# Patient Record
Sex: FEMALE | Race: BLACK OR AFRICAN AMERICAN | Employment: OTHER | URBAN - METROPOLITAN AREA
[De-identification: names, ages, dates, MRNs, and addresses within clinical notes are randomized per-mention and may not be internally consistent; named-entity substitution may affect disease eponyms.]

---

## 2022-01-01 ENCOUNTER — APPOINTMENT (OUTPATIENT)
Dept: GENERAL RADIOLOGY | Age: 82
DRG: 870 | End: 2022-01-01
Attending: INTERNAL MEDICINE
Payer: MEDICARE

## 2022-01-01 ENCOUNTER — APPOINTMENT (OUTPATIENT)
Dept: NON INVASIVE DIAGNOSTICS | Age: 82
DRG: 870 | End: 2022-01-01
Attending: SURGERY
Payer: MEDICARE

## 2022-01-01 ENCOUNTER — ANESTHESIA EVENT (OUTPATIENT)
Dept: CARDIOLOGY UNIT | Age: 82
DRG: 870 | End: 2022-01-01
Payer: MEDICARE

## 2022-01-01 ENCOUNTER — APPOINTMENT (OUTPATIENT)
Dept: NON INVASIVE DIAGNOSTICS | Age: 82
DRG: 870 | End: 2022-01-01
Attending: FAMILY MEDICINE
Payer: MEDICARE

## 2022-01-01 ENCOUNTER — APPOINTMENT (OUTPATIENT)
Dept: GENERAL RADIOLOGY | Age: 82
DRG: 177 | End: 2022-01-01
Attending: PHYSICIAN ASSISTANT
Payer: MEDICARE

## 2022-01-01 ENCOUNTER — ANESTHESIA (OUTPATIENT)
Dept: CARDIOLOGY UNIT | Age: 82
DRG: 870 | End: 2022-01-01
Payer: MEDICARE

## 2022-01-01 ENCOUNTER — HOSPITAL ENCOUNTER (INPATIENT)
Age: 82
LOS: 30 days | DRG: 870 | End: 2022-02-20
Attending: EMERGENCY MEDICINE | Admitting: FAMILY MEDICINE
Payer: MEDICARE

## 2022-01-01 ENCOUNTER — APPOINTMENT (OUTPATIENT)
Dept: CT IMAGING | Age: 82
DRG: 870 | End: 2022-01-01
Attending: EMERGENCY MEDICINE
Payer: MEDICARE

## 2022-01-01 ENCOUNTER — APPOINTMENT (OUTPATIENT)
Dept: GENERAL RADIOLOGY | Age: 82
DRG: 870 | End: 2022-01-01
Attending: EMERGENCY MEDICINE
Payer: MEDICARE

## 2022-01-01 ENCOUNTER — APPOINTMENT (OUTPATIENT)
Dept: CT IMAGING | Age: 82
DRG: 177 | End: 2022-01-01
Attending: PHYSICIAN ASSISTANT
Payer: MEDICARE

## 2022-01-01 ENCOUNTER — APPOINTMENT (OUTPATIENT)
Dept: INTERVENTIONAL RADIOLOGY/VASCULAR | Age: 82
DRG: 870 | End: 2022-01-01
Attending: FAMILY MEDICINE
Payer: MEDICARE

## 2022-01-01 ENCOUNTER — APPOINTMENT (OUTPATIENT)
Dept: GENERAL RADIOLOGY | Age: 82
DRG: 870 | End: 2022-01-01
Attending: FAMILY MEDICINE
Payer: MEDICARE

## 2022-01-01 ENCOUNTER — HOSPITAL ENCOUNTER (INPATIENT)
Age: 82
LOS: 4 days | Discharge: HOME HEALTH | DRG: 177 | End: 2022-01-21
Attending: FAMILY MEDICINE | Admitting: FAMILY MEDICINE
Payer: MEDICARE

## 2022-01-01 ENCOUNTER — APPOINTMENT (OUTPATIENT)
Dept: NON INVASIVE DIAGNOSTICS | Age: 82
DRG: 870 | End: 2022-01-01
Attending: INTERNAL MEDICINE
Payer: MEDICARE

## 2022-01-01 VITALS
BODY MASS INDEX: 33.32 KG/M2 | HEART RATE: 100 BPM | SYSTOLIC BLOOD PRESSURE: 143 MMHG | OXYGEN SATURATION: 90 % | WEIGHT: 200 LBS | HEIGHT: 65 IN | DIASTOLIC BLOOD PRESSURE: 95 MMHG | TEMPERATURE: 99 F | RESPIRATION RATE: 20 BRPM

## 2022-01-01 VITALS
RESPIRATION RATE: 17 BRPM | TEMPERATURE: 98 F | BODY MASS INDEX: 36.33 KG/M2 | DIASTOLIC BLOOD PRESSURE: 53 MMHG | HEART RATE: 70 BPM | WEIGHT: 218.03 LBS | SYSTOLIC BLOOD PRESSURE: 95 MMHG | OXYGEN SATURATION: 98 % | HEIGHT: 65 IN

## 2022-01-01 DIAGNOSIS — M62.242: ICD-10-CM

## 2022-01-01 DIAGNOSIS — I73.9 PVD (PERIPHERAL VASCULAR DISEASE) (HCC): ICD-10-CM

## 2022-01-01 DIAGNOSIS — J96.01 ACUTE RESPIRATORY FAILURE WITH HYPOXIA (HCC): Primary | ICD-10-CM

## 2022-01-01 DIAGNOSIS — R09.02 HYPOXIA: ICD-10-CM

## 2022-01-01 DIAGNOSIS — J12.82 PNEUMONIA DUE TO COVID-19 VIRUS: ICD-10-CM

## 2022-01-01 DIAGNOSIS — E87.6 HYPOKALEMIA: ICD-10-CM

## 2022-01-01 DIAGNOSIS — R41.82 ALTERED MENTAL STATUS, UNSPECIFIED ALTERED MENTAL STATUS TYPE: ICD-10-CM

## 2022-01-01 DIAGNOSIS — U07.1 PNEUMONIA DUE TO COVID-19 VIRUS: ICD-10-CM

## 2022-01-01 DIAGNOSIS — U07.1 COVID: Primary | ICD-10-CM

## 2022-01-01 LAB
ABO + RH BLD: NORMAL
ALBUMIN SERPL-MCNC: 1.6 G/DL (ref 3.5–5)
ALBUMIN SERPL-MCNC: 1.7 G/DL (ref 3.5–5)
ALBUMIN SERPL-MCNC: 1.7 G/DL (ref 3.5–5)
ALBUMIN SERPL-MCNC: 1.8 G/DL (ref 3.5–5)
ALBUMIN SERPL-MCNC: 1.9 G/DL (ref 3.5–5)
ALBUMIN SERPL-MCNC: 2 G/DL (ref 3.5–5)
ALBUMIN SERPL-MCNC: 2.1 G/DL (ref 3.5–5)
ALBUMIN SERPL-MCNC: 2.2 G/DL (ref 3.5–5)
ALBUMIN SERPL-MCNC: 2.2 G/DL (ref 3.5–5)
ALBUMIN SERPL-MCNC: 2.4 G/DL (ref 3.5–5)
ALBUMIN SERPL-MCNC: 2.6 G/DL (ref 3.5–5)
ALBUMIN SERPL-MCNC: 2.7 G/DL (ref 3.5–5)
ALBUMIN SERPL-MCNC: 2.8 G/DL (ref 3.5–5)
ALBUMIN SERPL-MCNC: 2.9 G/DL (ref 3.5–5)
ALBUMIN SERPL-MCNC: 2.9 G/DL (ref 3.5–5)
ALBUMIN SERPL-MCNC: 3 G/DL (ref 3.5–5)
ALBUMIN SERPL-MCNC: 3.1 G/DL (ref 3.5–5)
ALBUMIN/GLOB SERPL: 0.4 {RATIO} (ref 1.1–2.2)
ALBUMIN/GLOB SERPL: 0.5 {RATIO} (ref 1.1–2.2)
ALBUMIN/GLOB SERPL: 0.6 {RATIO} (ref 1.1–2.2)
ALBUMIN/GLOB SERPL: 0.6 {RATIO} (ref 1.1–2.2)
ALBUMIN/GLOB SERPL: 0.7 {RATIO} (ref 1.1–2.2)
ALBUMIN/GLOB SERPL: 0.7 {RATIO} (ref 1.1–2.2)
ALBUMIN/GLOB SERPL: 0.8 {RATIO} (ref 1.1–2.2)
ALBUMIN/GLOB SERPL: 0.9 {RATIO} (ref 1.1–2.2)
ALBUMIN/GLOB SERPL: 1 {RATIO} (ref 1.1–2.2)
ALBUMIN/GLOB SERPL: 1.2 {RATIO} (ref 1.1–2.2)
ALBUMIN/GLOB SERPL: 1.3 {RATIO} (ref 1.1–2.2)
ALP SERPL-CCNC: 100 U/L (ref 45–117)
ALP SERPL-CCNC: 135 U/L (ref 45–117)
ALP SERPL-CCNC: 171 U/L (ref 45–117)
ALP SERPL-CCNC: 38 U/L (ref 45–117)
ALP SERPL-CCNC: 38 U/L (ref 45–117)
ALP SERPL-CCNC: 39 U/L (ref 45–117)
ALP SERPL-CCNC: 46 U/L (ref 45–117)
ALP SERPL-CCNC: 47 U/L (ref 45–117)
ALP SERPL-CCNC: 48 U/L (ref 45–117)
ALP SERPL-CCNC: 57 U/L (ref 45–117)
ALP SERPL-CCNC: 58 U/L (ref 45–117)
ALP SERPL-CCNC: 59 U/L (ref 45–117)
ALP SERPL-CCNC: 61 U/L (ref 45–117)
ALP SERPL-CCNC: 61 U/L (ref 45–117)
ALP SERPL-CCNC: 62 U/L (ref 45–117)
ALP SERPL-CCNC: 62 U/L (ref 45–117)
ALP SERPL-CCNC: 64 U/L (ref 45–117)
ALP SERPL-CCNC: 70 U/L (ref 45–117)
ALP SERPL-CCNC: 70 U/L (ref 45–117)
ALP SERPL-CCNC: 71 U/L (ref 45–117)
ALP SERPL-CCNC: 72 U/L (ref 45–117)
ALP SERPL-CCNC: 72 U/L (ref 45–117)
ALP SERPL-CCNC: 77 U/L (ref 45–117)
ALP SERPL-CCNC: 78 U/L (ref 45–117)
ALP SERPL-CCNC: 78 U/L (ref 45–117)
ALP SERPL-CCNC: 82 U/L (ref 45–117)
ALP SERPL-CCNC: 82 U/L (ref 45–117)
ALP SERPL-CCNC: 84 U/L (ref 45–117)
ALP SERPL-CCNC: 88 U/L (ref 45–117)
ALP SERPL-CCNC: 94 U/L (ref 45–117)
ALT SERPL-CCNC: 106 U/L (ref 12–78)
ALT SERPL-CCNC: 106 U/L (ref 12–78)
ALT SERPL-CCNC: 107 U/L (ref 12–78)
ALT SERPL-CCNC: 111 U/L (ref 12–78)
ALT SERPL-CCNC: 124 U/L (ref 12–78)
ALT SERPL-CCNC: 142 U/L (ref 12–78)
ALT SERPL-CCNC: 154 U/L (ref 12–78)
ALT SERPL-CCNC: 223 U/L (ref 12–78)
ALT SERPL-CCNC: 307 U/L (ref 12–78)
ALT SERPL-CCNC: 317 U/L (ref 12–78)
ALT SERPL-CCNC: 403 U/L (ref 12–78)
ALT SERPL-CCNC: 50 U/L (ref 12–78)
ALT SERPL-CCNC: 51 U/L (ref 12–78)
ALT SERPL-CCNC: 54 U/L (ref 12–78)
ALT SERPL-CCNC: 55 U/L (ref 12–78)
ALT SERPL-CCNC: 56 U/L (ref 12–78)
ALT SERPL-CCNC: 61 U/L (ref 12–78)
ALT SERPL-CCNC: 61 U/L (ref 12–78)
ALT SERPL-CCNC: 62 U/L (ref 12–78)
ALT SERPL-CCNC: 63 U/L (ref 12–78)
ALT SERPL-CCNC: 65 U/L (ref 12–78)
ALT SERPL-CCNC: 67 U/L (ref 12–78)
ALT SERPL-CCNC: 68 U/L (ref 12–78)
ALT SERPL-CCNC: 76 U/L (ref 12–78)
ALT SERPL-CCNC: 77 U/L (ref 12–78)
ALT SERPL-CCNC: 83 U/L (ref 12–78)
ALT SERPL-CCNC: 85 U/L (ref 12–78)
ALT SERPL-CCNC: 88 U/L (ref 12–78)
ALT SERPL-CCNC: 90 U/L (ref 12–78)
ALT SERPL-CCNC: 91 U/L (ref 12–78)
ANION GAP SERPL CALC-SCNC: 1 MMOL/L (ref 5–15)
ANION GAP SERPL CALC-SCNC: 2 MMOL/L (ref 5–15)
ANION GAP SERPL CALC-SCNC: 3 MMOL/L (ref 5–15)
ANION GAP SERPL CALC-SCNC: 3 MMOL/L (ref 5–15)
ANION GAP SERPL CALC-SCNC: 4 MMOL/L (ref 5–15)
ANION GAP SERPL CALC-SCNC: 5 MMOL/L (ref 5–15)
ANION GAP SERPL CALC-SCNC: 6 MMOL/L (ref 5–15)
ANION GAP SERPL CALC-SCNC: 6 MMOL/L (ref 5–15)
ANION GAP SERPL CALC-SCNC: 7 MMOL/L (ref 5–15)
ANION GAP SERPL CALC-SCNC: 8 MMOL/L (ref 5–15)
ANION GAP SERPL CALC-SCNC: 9 MMOL/L (ref 5–15)
ANION GAP SERPL CALC-SCNC: ABNORMAL MMOL/L (ref 5–15)
APPEARANCE UR: ABNORMAL
APPEARANCE UR: ABNORMAL
APTT PPP: 104.9 SEC (ref 21.2–34.1)
APTT PPP: 121.9 SEC (ref 21.2–34.1)
APTT PPP: 126 SEC (ref 21.2–34.1)
APTT PPP: 25.2 SEC (ref 21.2–34.1)
APTT PPP: 26.2 SEC (ref 21.2–34.1)
APTT PPP: 26.3 SEC (ref 21.2–34.1)
APTT PPP: 30.3 SEC (ref 21.2–34.1)
APTT PPP: 41.2 SEC (ref 21.2–34.1)
APTT PPP: 63.1 SEC (ref 21.2–34.1)
APTT PPP: 72.3 SEC (ref 21.2–34.1)
APTT PPP: 77.1 SEC (ref 21.2–34.1)
APTT PPP: 87 SEC (ref 21.2–34.1)
APTT PPP: 91.3 SEC (ref 21.2–34.1)
APTT PPP: 94.7 SEC (ref 21.2–34.1)
APTT PPP: 98.1 SEC (ref 21.2–34.1)
APTT PPP: 98.3 SEC (ref 21.2–34.1)
APTT PPP: >153 SEC (ref 21.2–34.1)
APTT PPP: >153 SEC (ref 21.2–34.1)
ARTERIAL PATENCY WRIST A: ABNORMAL
ARTERIAL PATENCY WRIST A: POSITIVE
ARTERIAL PATENCY WRIST A: POSITIVE
AST SERPL W P-5'-P-CCNC: 100 U/L (ref 15–37)
AST SERPL W P-5'-P-CCNC: 107 U/L (ref 15–37)
AST SERPL W P-5'-P-CCNC: 123 U/L (ref 15–37)
AST SERPL W P-5'-P-CCNC: 126 U/L (ref 15–37)
AST SERPL W P-5'-P-CCNC: 133 U/L (ref 15–37)
AST SERPL W P-5'-P-CCNC: 138 U/L (ref 15–37)
AST SERPL W P-5'-P-CCNC: 181 U/L (ref 15–37)
AST SERPL W P-5'-P-CCNC: 189 U/L (ref 15–37)
AST SERPL W P-5'-P-CCNC: 227 U/L (ref 15–37)
AST SERPL W P-5'-P-CCNC: 258 U/L (ref 15–37)
AST SERPL W P-5'-P-CCNC: 305 U/L (ref 15–37)
AST SERPL W P-5'-P-CCNC: 323 U/L (ref 15–37)
AST SERPL W P-5'-P-CCNC: 38 U/L (ref 15–37)
AST SERPL W P-5'-P-CCNC: 42 U/L (ref 15–37)
AST SERPL W P-5'-P-CCNC: 45 U/L (ref 15–37)
AST SERPL W P-5'-P-CCNC: 46 U/L (ref 15–37)
AST SERPL W P-5'-P-CCNC: 47 U/L (ref 15–37)
AST SERPL W P-5'-P-CCNC: 48 U/L (ref 15–37)
AST SERPL W P-5'-P-CCNC: 49 U/L (ref 15–37)
AST SERPL W P-5'-P-CCNC: 49 U/L (ref 15–37)
AST SERPL W P-5'-P-CCNC: 51 U/L (ref 15–37)
AST SERPL W P-5'-P-CCNC: 54 U/L (ref 15–37)
AST SERPL W P-5'-P-CCNC: 55 U/L (ref 15–37)
AST SERPL W P-5'-P-CCNC: 60 U/L (ref 15–37)
AST SERPL W P-5'-P-CCNC: 62 U/L (ref 15–37)
AST SERPL W P-5'-P-CCNC: 63 U/L (ref 15–37)
AST SERPL W P-5'-P-CCNC: 64 U/L (ref 15–37)
AST SERPL W P-5'-P-CCNC: 73 U/L (ref 15–37)
AST SERPL W P-5'-P-CCNC: 78 U/L (ref 15–37)
AST SERPL W P-5'-P-CCNC: 96 U/L (ref 15–37)
ATRIAL RATE: 124 BPM
ATRIAL RATE: 166 BPM
ATRIAL RATE: 58 BPM
ATRIAL RATE: 67 BPM
ATRIAL RATE: 94 BPM
BACTERIA SPEC CULT: ABNORMAL
BACTERIA SPEC CULT: ABNORMAL
BACTERIA SPEC CULT: NORMAL
BACTERIA SPEC CULT: NORMAL
BACTERIA URNS QL MICRO: NEGATIVE /HPF
BACTERIA URNS QL MICRO: NEGATIVE /HPF
BASE DEFICIT BLDA-SCNC: 2.6 MMOL/L (ref 0–2)
BASE DEFICIT BLDA-SCNC: 3.1 MMOL/L (ref 0–2)
BASE EXCESS BLDA CALC-SCNC: 0.1 MMOL/L (ref 0–2)
BASE EXCESS BLDA CALC-SCNC: 0.5 MMOL/L (ref 0–2)
BASE EXCESS BLDA CALC-SCNC: 0.9 MMOL/L (ref 0–2)
BASE EXCESS BLDA CALC-SCNC: 1.1 MMOL/L (ref 0–2)
BASE EXCESS BLDA CALC-SCNC: 1.2 MMOL/L (ref 0–2)
BASE EXCESS BLDA CALC-SCNC: 10.2 MMOL/L (ref 0–2)
BASE EXCESS BLDA CALC-SCNC: 11.6 MMOL/L (ref 0–2)
BASE EXCESS BLDA CALC-SCNC: 14.6 MMOL/L (ref 0–2)
BASE EXCESS BLDA CALC-SCNC: 3 MMOL/L (ref 0–2)
BASE EXCESS BLDA CALC-SCNC: 3.8 MMOL/L (ref 0–2)
BASE EXCESS BLDA CALC-SCNC: 3.8 MMOL/L (ref 0–2)
BASE EXCESS BLDA CALC-SCNC: 4.3 MMOL/L (ref 0–2)
BASE EXCESS BLDA CALC-SCNC: 4.4 MMOL/L (ref 0–2)
BASE EXCESS BLDA CALC-SCNC: 4.5 MMOL/L (ref 0–2)
BASE EXCESS BLDA CALC-SCNC: 4.7 MMOL/L (ref 0–2)
BASE EXCESS BLDA CALC-SCNC: 4.9 MMOL/L (ref 0–2)
BASE EXCESS BLDA CALC-SCNC: 5.4 MMOL/L (ref 0–2)
BASE EXCESS BLDA CALC-SCNC: 5.7 MMOL/L (ref 0–2)
BASE EXCESS BLDA CALC-SCNC: 5.9 MMOL/L (ref 0–2)
BASE EXCESS BLDA CALC-SCNC: 6.3 MMOL/L (ref 0–2)
BASE EXCESS BLDA CALC-SCNC: 6.4 MMOL/L (ref 0–2)
BASE EXCESS BLDA CALC-SCNC: 7 MMOL/L (ref 0–2)
BASE EXCESS BLDA CALC-SCNC: 7.1 MMOL/L (ref 0–2)
BASE EXCESS BLDA CALC-SCNC: 7.4 MMOL/L (ref 0–2)
BASE EXCESS BLDA CALC-SCNC: 7.7 MMOL/L (ref 0–2)
BASE EXCESS BLDA CALC-SCNC: 7.7 MMOL/L (ref 0–2)
BASOPHILS # BLD: 0 K/UL (ref 0–0.1)
BASOPHILS NFR BLD: 0 % (ref 0–1)
BDY SITE: ABNORMAL
BILIRUB SERPL-MCNC: 0.3 MG/DL (ref 0.2–1)
BILIRUB SERPL-MCNC: 0.4 MG/DL (ref 0.2–1)
BILIRUB SERPL-MCNC: 0.5 MG/DL (ref 0.2–1)
BILIRUB SERPL-MCNC: 0.6 MG/DL (ref 0.2–1)
BILIRUB SERPL-MCNC: 0.7 MG/DL (ref 0.2–1)
BILIRUB SERPL-MCNC: 0.7 MG/DL (ref 0.2–1)
BILIRUB UR QL: NEGATIVE
BILIRUB UR QL: NEGATIVE
BLD PROD TYP BPU: NORMAL
BLOOD GROUP ANTIBODIES SERPL: NEGATIVE
BNP SERPL-MCNC: 623 PG/ML
BPU ID: NORMAL
BUN SERPL-MCNC: 103 MG/DL (ref 6–20)
BUN SERPL-MCNC: 11 MG/DL (ref 6–20)
BUN SERPL-MCNC: 11 MG/DL (ref 6–20)
BUN SERPL-MCNC: 118 MG/DL (ref 6–20)
BUN SERPL-MCNC: 145 MG/DL (ref 6–20)
BUN SERPL-MCNC: 19 MG/DL (ref 6–20)
BUN SERPL-MCNC: 20 MG/DL (ref 6–20)
BUN SERPL-MCNC: 24 MG/DL (ref 6–20)
BUN SERPL-MCNC: 24 MG/DL (ref 6–20)
BUN SERPL-MCNC: 25 MG/DL (ref 6–20)
BUN SERPL-MCNC: 27 MG/DL (ref 6–20)
BUN SERPL-MCNC: 29 MG/DL (ref 6–20)
BUN SERPL-MCNC: 29 MG/DL (ref 6–20)
BUN SERPL-MCNC: 31 MG/DL (ref 6–20)
BUN SERPL-MCNC: 34 MG/DL (ref 6–20)
BUN SERPL-MCNC: 35 MG/DL (ref 6–20)
BUN SERPL-MCNC: 39 MG/DL (ref 6–20)
BUN SERPL-MCNC: 41 MG/DL (ref 6–20)
BUN SERPL-MCNC: 42 MG/DL (ref 6–20)
BUN SERPL-MCNC: 44 MG/DL (ref 6–20)
BUN SERPL-MCNC: 47 MG/DL (ref 6–20)
BUN SERPL-MCNC: 50 MG/DL (ref 6–20)
BUN SERPL-MCNC: 51 MG/DL (ref 6–20)
BUN SERPL-MCNC: 56 MG/DL (ref 6–20)
BUN SERPL-MCNC: 56 MG/DL (ref 6–20)
BUN SERPL-MCNC: 59 MG/DL (ref 6–20)
BUN SERPL-MCNC: 61 MG/DL (ref 6–20)
BUN SERPL-MCNC: 64 MG/DL (ref 6–20)
BUN SERPL-MCNC: 69 MG/DL (ref 6–20)
BUN SERPL-MCNC: 71 MG/DL (ref 6–20)
BUN SERPL-MCNC: 89 MG/DL (ref 6–20)
BUN/CREAT SERPL: 106 (ref 12–20)
BUN/CREAT SERPL: 107 (ref 12–20)
BUN/CREAT SERPL: 111 (ref 12–20)
BUN/CREAT SERPL: 112 (ref 12–20)
BUN/CREAT SERPL: 123 (ref 12–20)
BUN/CREAT SERPL: 124 (ref 12–20)
BUN/CREAT SERPL: 127 (ref 12–20)
BUN/CREAT SERPL: 133 (ref 12–20)
BUN/CREAT SERPL: 138 (ref 12–20)
BUN/CREAT SERPL: 138 (ref 12–20)
BUN/CREAT SERPL: 14 (ref 12–20)
BUN/CREAT SERPL: 161 (ref 12–20)
BUN/CREAT SERPL: 21 (ref 12–20)
BUN/CREAT SERPL: 25 (ref 12–20)
BUN/CREAT SERPL: 33 (ref 12–20)
BUN/CREAT SERPL: 43 (ref 12–20)
BUN/CREAT SERPL: 53 (ref 12–20)
BUN/CREAT SERPL: 57 (ref 12–20)
BUN/CREAT SERPL: 58 (ref 12–20)
BUN/CREAT SERPL: 61 (ref 12–20)
BUN/CREAT SERPL: 63 (ref 12–20)
BUN/CREAT SERPL: 65 (ref 12–20)
BUN/CREAT SERPL: 66 (ref 12–20)
BUN/CREAT SERPL: 67 (ref 12–20)
BUN/CREAT SERPL: 71 (ref 12–20)
BUN/CREAT SERPL: 74 (ref 12–20)
BUN/CREAT SERPL: 75 (ref 12–20)
BUN/CREAT SERPL: 77 (ref 12–20)
BUN/CREAT SERPL: 77 (ref 12–20)
BUN/CREAT SERPL: 85 (ref 12–20)
BUN/CREAT SERPL: 97 (ref 12–20)
BUN/CREAT SERPL: 98 (ref 12–20)
BUN/CREAT SERPL: 99 (ref 12–20)
CA-I BLD-MCNC: 8.6 MG/DL (ref 8.5–10.1)
CA-I BLD-MCNC: 8.7 MG/DL (ref 8.5–10.1)
CA-I BLD-MCNC: 8.8 MG/DL (ref 8.5–10.1)
CA-I BLD-MCNC: 8.8 MG/DL (ref 8.5–10.1)
CA-I BLD-MCNC: 8.9 MG/DL (ref 8.5–10.1)
CA-I BLD-MCNC: 8.9 MG/DL (ref 8.5–10.1)
CA-I BLD-MCNC: 9 MG/DL (ref 8.5–10.1)
CA-I BLD-MCNC: 9.1 MG/DL (ref 8.5–10.1)
CA-I BLD-MCNC: 9.2 MG/DL (ref 8.5–10.1)
CA-I BLD-MCNC: 9.3 MG/DL (ref 8.5–10.1)
CA-I BLD-MCNC: 9.4 MG/DL (ref 8.5–10.1)
CA-I BLD-MCNC: 9.5 MG/DL (ref 8.5–10.1)
CA-I BLD-MCNC: 9.6 MG/DL (ref 8.5–10.1)
CA-I BLD-MCNC: 9.7 MG/DL (ref 8.5–10.1)
CA-I BLD-MCNC: 9.8 MG/DL (ref 8.5–10.1)
CA-I BLD-MCNC: 9.8 MG/DL (ref 8.5–10.1)
CALCULATED P AXIS, ECG09: 33 DEGREES
CALCULATED P AXIS, ECG09: 33 DEGREES
CALCULATED P AXIS, ECG09: 70 DEGREES
CALCULATED R AXIS, ECG10: -24 DEGREES
CALCULATED R AXIS, ECG10: -29 DEGREES
CALCULATED R AXIS, ECG10: -32 DEGREES
CALCULATED R AXIS, ECG10: -40 DEGREES
CALCULATED R AXIS, ECG10: -45 DEGREES
CALCULATED T AXIS, ECG11: -18 DEGREES
CALCULATED T AXIS, ECG11: 109 DEGREES
CALCULATED T AXIS, ECG11: 83 DEGREES
CALCULATED T AXIS, ECG11: 83 DEGREES
CALCULATED T AXIS, ECG11: 87 DEGREES
CHLORIDE SERPL-SCNC: 100 MMOL/L (ref 97–108)
CHLORIDE SERPL-SCNC: 101 MMOL/L (ref 97–108)
CHLORIDE SERPL-SCNC: 101 MMOL/L (ref 97–108)
CHLORIDE SERPL-SCNC: 102 MMOL/L (ref 97–108)
CHLORIDE SERPL-SCNC: 102 MMOL/L (ref 97–108)
CHLORIDE SERPL-SCNC: 103 MMOL/L (ref 97–108)
CHLORIDE SERPL-SCNC: 104 MMOL/L (ref 97–108)
CHLORIDE SERPL-SCNC: 104 MMOL/L (ref 97–108)
CHLORIDE SERPL-SCNC: 105 MMOL/L (ref 97–108)
CHLORIDE SERPL-SCNC: 109 MMOL/L (ref 97–108)
CHLORIDE SERPL-SCNC: 111 MMOL/L (ref 97–108)
CHLORIDE SERPL-SCNC: 112 MMOL/L (ref 97–108)
CHLORIDE SERPL-SCNC: 113 MMOL/L (ref 97–108)
CHLORIDE SERPL-SCNC: 113 MMOL/L (ref 97–108)
CHLORIDE SERPL-SCNC: 114 MMOL/L (ref 97–108)
CHLORIDE SERPL-SCNC: 116 MMOL/L (ref 97–108)
CHLORIDE SERPL-SCNC: 119 MMOL/L (ref 97–108)
CHLORIDE SERPL-SCNC: 92 MMOL/L (ref 97–108)
CHLORIDE SERPL-SCNC: 93 MMOL/L (ref 97–108)
CHLORIDE SERPL-SCNC: 94 MMOL/L (ref 97–108)
CHLORIDE SERPL-SCNC: 95 MMOL/L (ref 97–108)
CHLORIDE SERPL-SCNC: 95 MMOL/L (ref 97–108)
CHLORIDE UR-SCNC: 35 MMOL/L
CO2 SERPL-SCNC: 24 MMOL/L (ref 21–32)
CO2 SERPL-SCNC: 25 MMOL/L (ref 21–32)
CO2 SERPL-SCNC: 26 MMOL/L (ref 21–32)
CO2 SERPL-SCNC: 27 MMOL/L (ref 21–32)
CO2 SERPL-SCNC: 28 MMOL/L (ref 21–32)
CO2 SERPL-SCNC: 29 MMOL/L (ref 21–32)
CO2 SERPL-SCNC: 29 MMOL/L (ref 21–32)
CO2 SERPL-SCNC: 30 MMOL/L (ref 21–32)
CO2 SERPL-SCNC: 31 MMOL/L (ref 21–32)
CO2 SERPL-SCNC: 32 MMOL/L (ref 21–32)
CO2 SERPL-SCNC: 34 MMOL/L (ref 21–32)
CO2 SERPL-SCNC: 36 MMOL/L (ref 21–32)
CO2 SERPL-SCNC: 39 MMOL/L (ref 21–32)
COLOR UR: ABNORMAL
COLOR UR: ABNORMAL
COVID-19 RAPID TEST, COVR: DETECTED
COVID-19 RAPID TEST, COVR: DETECTED
CREAT SERPL-MCNC: 0.26 MG/DL (ref 0.55–1.02)
CREAT SERPL-MCNC: 0.31 MG/DL (ref 0.55–1.02)
CREAT SERPL-MCNC: 0.32 MG/DL (ref 0.55–1.02)
CREAT SERPL-MCNC: 0.34 MG/DL (ref 0.55–1.02)
CREAT SERPL-MCNC: 0.35 MG/DL (ref 0.55–1.02)
CREAT SERPL-MCNC: 0.36 MG/DL (ref 0.55–1.02)
CREAT SERPL-MCNC: 0.36 MG/DL (ref 0.55–1.02)
CREAT SERPL-MCNC: 0.38 MG/DL (ref 0.55–1.02)
CREAT SERPL-MCNC: 0.38 MG/DL (ref 0.55–1.02)
CREAT SERPL-MCNC: 0.45 MG/DL (ref 0.55–1.02)
CREAT SERPL-MCNC: 0.48 MG/DL (ref 0.55–1.02)
CREAT SERPL-MCNC: 0.48 MG/DL (ref 0.55–1.02)
CREAT SERPL-MCNC: 0.51 MG/DL (ref 0.55–1.02)
CREAT SERPL-MCNC: 0.52 MG/DL (ref 0.55–1.02)
CREAT SERPL-MCNC: 0.53 MG/DL (ref 0.55–1.02)
CREAT SERPL-MCNC: 0.56 MG/DL (ref 0.55–1.02)
CREAT SERPL-MCNC: 0.57 MG/DL (ref 0.55–1.02)
CREAT SERPL-MCNC: 0.6 MG/DL (ref 0.55–1.02)
CREAT SERPL-MCNC: 0.63 MG/DL (ref 0.55–1.02)
CREAT SERPL-MCNC: 0.64 MG/DL (ref 0.55–1.02)
CREAT SERPL-MCNC: 0.66 MG/DL (ref 0.55–1.02)
CREAT SERPL-MCNC: 0.7 MG/DL (ref 0.55–1.02)
CREAT SERPL-MCNC: 0.71 MG/DL (ref 0.55–1.02)
CREAT SERPL-MCNC: 0.79 MG/DL (ref 0.55–1.02)
CREAT SERPL-MCNC: 0.8 MG/DL (ref 0.55–1.02)
CREAT SERPL-MCNC: 0.8 MG/DL (ref 0.55–1.02)
CREAT SERPL-MCNC: 0.83 MG/DL (ref 0.55–1.02)
CREAT SERPL-MCNC: 0.83 MG/DL (ref 0.55–1.02)
CREAT SERPL-MCNC: 0.84 MG/DL (ref 0.55–1.02)
CREAT SERPL-MCNC: 0.89 MG/DL (ref 0.55–1.02)
CREAT SERPL-MCNC: 0.97 MG/DL (ref 0.55–1.02)
CREAT SERPL-MCNC: 1.14 MG/DL (ref 0.55–1.02)
CREAT SERPL-MCNC: 1.14 MG/DL (ref 0.55–1.02)
CREAT UR-MCNC: 170 MG/DL
CROSSMATCH RESULT,%XM: NORMAL
CRP SERPL-MCNC: 3.11 MG/DL (ref 0–0.6)
DIAGNOSIS, 93000: NORMAL
DIFFERENTIAL METHOD BLD: ABNORMAL
ECHO AO ASC DIAM: 2.9 CM
ECHO AO ASCENDING AORTA INDEX: 1.38 CM/M2
ECHO AO DESC DIAM: 1.8 CM
ECHO AO DESCENDING AORTA INDEX: 0.86 CM/M2
ECHO AO ROOT DIAM: 2.9 CM
ECHO AO ROOT INDEX: 1.38 CM/M2
ECHO AV AREA PEAK VELOCITY: 1.3 CM2
ECHO AV AREA VTI: 1.5 CM2
ECHO AV AREA/BSA PEAK VELOCITY: 0.6 CM2/M2
ECHO AV AREA/BSA VTI: 0.7 CM2/M2
ECHO AV MEAN GRADIENT: 7 MMHG
ECHO AV MEAN VELOCITY: 1.2 M/S
ECHO AV PEAK GRADIENT: 15 MMHG
ECHO AV PEAK VELOCITY: 1.9 M/S
ECHO AV VELOCITY RATIO: 0.53
ECHO AV VTI: 29.1 CM
ECHO EST RA PRESSURE: 3 MMHG
ECHO LA AREA 4C: 17 CM2
ECHO LA DIAMETER INDEX: 1.71 CM/M2
ECHO LA DIAMETER: 3.6 CM
ECHO LA MAJOR AXIS: 6.2 CM
ECHO LA TO AORTIC ROOT RATIO: 1.24
ECHO LV E' LATERAL VELOCITY: 7 CM/S
ECHO LV E' SEPTAL VELOCITY: 4 CM/S
ECHO LV EJECTION FRACTION BIPLANE: 69 % (ref 55–100)
ECHO LV FRACTIONAL SHORTENING: 38 % (ref 28–44)
ECHO LV INTERNAL DIMENSION DIASTOLE INDEX: 1.76 CM/M2
ECHO LV INTERNAL DIMENSION DIASTOLIC: 3.7 CM (ref 3.9–5.3)
ECHO LV INTERNAL DIMENSION SYSTOLIC INDEX: 1.1 CM/M2
ECHO LV INTERNAL DIMENSION SYSTOLIC: 2.3 CM
ECHO LV IVSD: 1.6 CM (ref 0.6–0.9)
ECHO LV MASS 2D: 197.7 G (ref 67–162)
ECHO LV MASS INDEX 2D: 94.1 G/M2 (ref 43–95)
ECHO LV POSTERIOR WALL DIASTOLIC: 1.3 CM (ref 0.6–0.9)
ECHO LV RELATIVE WALL THICKNESS RATIO: 0.7
ECHO LVOT AREA: 2.5 CM2
ECHO LVOT AV VTI INDEX: 0.6
ECHO LVOT DIAM: 1.8 CM
ECHO LVOT MEAN GRADIENT: 2 MMHG
ECHO LVOT PEAK GRADIENT: 4 MMHG
ECHO LVOT PEAK VELOCITY: 1 M/S
ECHO LVOT STROKE VOLUME INDEX: 21.2 ML/M2
ECHO LVOT SV: 44.5 ML
ECHO LVOT VTI: 17.5 CM
ECHO MV A VELOCITY: 0.74 M/S
ECHO MV AREA VTI: 1.8 CM2
ECHO MV E DECELERATION TIME (DT): 264 MS
ECHO MV E VELOCITY: 0.45 M/S
ECHO MV E/A RATIO: 0.61
ECHO MV E/E' LATERAL: 6.43
ECHO MV E/E' RATIO (AVERAGED): 8.84
ECHO MV E/E' SEPTAL: 11.25
ECHO MV LVOT VTI INDEX: 1.39
ECHO MV MAX VELOCITY: 1 M/S
ECHO MV MEAN GRADIENT: 1 MMHG
ECHO MV MEAN VELOCITY: 0.5 M/S
ECHO MV PEAK GRADIENT: 4 MMHG
ECHO MV REGURGITANT PEAK GRADIENT: 4 MMHG
ECHO MV REGURGITANT PEAK VELOCITY: 1 M/S
ECHO MV VTI: 24.3 CM
ECHO RIGHT VENTRICULAR SYSTOLIC PRESSURE (RVSP): 23 MMHG
ECHO TV REGURGITANT MAX VELOCITY: 2.23 M/S
ECHO TV REGURGITANT PEAK GRADIENT: 20 MMHG
EOSINOPHIL # BLD: 0 K/UL (ref 0–0.4)
EOSINOPHIL # BLD: 0.1 K/UL (ref 0–0.4)
EOSINOPHIL # BLD: 0.2 K/UL (ref 0–0.4)
EOSINOPHIL # BLD: 0.3 K/UL (ref 0–0.4)
EOSINOPHIL # BLD: 0.3 K/UL (ref 0–0.4)
EOSINOPHIL # BLD: 0.4 K/UL (ref 0–0.4)
EOSINOPHIL # BLD: 0.4 K/UL (ref 0–0.4)
EOSINOPHIL # BLD: 0.5 K/UL (ref 0–0.4)
EOSINOPHIL # BLD: 0.6 K/UL (ref 0–0.4)
EOSINOPHIL NFR BLD: 0 % (ref 0–7)
EOSINOPHIL NFR BLD: 1 % (ref 0–7)
EOSINOPHIL NFR BLD: 2 % (ref 0–7)
EOSINOPHIL NFR BLD: 4 % (ref 0–7)
EOSINOPHIL NFR BLD: 4 % (ref 0–7)
EOSINOPHIL NFR BLD: 5 % (ref 0–7)
EOSINOPHIL NFR BLD: 5 % (ref 0–7)
EOSINOPHIL NFR BLD: 7 % (ref 0–7)
EPAP/CPAP/PEEP, PAPEEP: 10
EPAP/CPAP/PEEP, PAPEEP: 10
EPAP/CPAP/PEEP, PAPEEP: 5
EPAP/CPAP/PEEP, PAPEEP: 8
ERYTHROCYTE [DISTWIDTH] IN BLOOD BY AUTOMATED COUNT: 12.9 % (ref 11.5–14.5)
ERYTHROCYTE [DISTWIDTH] IN BLOOD BY AUTOMATED COUNT: 13 % (ref 11.5–14.5)
ERYTHROCYTE [DISTWIDTH] IN BLOOD BY AUTOMATED COUNT: 13.1 % (ref 11.5–14.5)
ERYTHROCYTE [DISTWIDTH] IN BLOOD BY AUTOMATED COUNT: 13.2 % (ref 11.5–14.5)
ERYTHROCYTE [DISTWIDTH] IN BLOOD BY AUTOMATED COUNT: 13.3 % (ref 11.5–14.5)
ERYTHROCYTE [DISTWIDTH] IN BLOOD BY AUTOMATED COUNT: 13.3 % (ref 11.5–14.5)
ERYTHROCYTE [DISTWIDTH] IN BLOOD BY AUTOMATED COUNT: 13.4 % (ref 11.5–14.5)
ERYTHROCYTE [DISTWIDTH] IN BLOOD BY AUTOMATED COUNT: 13.5 % (ref 11.5–14.5)
ERYTHROCYTE [DISTWIDTH] IN BLOOD BY AUTOMATED COUNT: 13.7 % (ref 11.5–14.5)
ERYTHROCYTE [DISTWIDTH] IN BLOOD BY AUTOMATED COUNT: 13.7 % (ref 11.5–14.5)
ERYTHROCYTE [DISTWIDTH] IN BLOOD BY AUTOMATED COUNT: 13.8 % (ref 11.5–14.5)
ERYTHROCYTE [DISTWIDTH] IN BLOOD BY AUTOMATED COUNT: 13.9 % (ref 11.5–14.5)
ERYTHROCYTE [DISTWIDTH] IN BLOOD BY AUTOMATED COUNT: 14.2 % (ref 11.5–14.5)
ERYTHROCYTE [DISTWIDTH] IN BLOOD BY AUTOMATED COUNT: 14.3 % (ref 11.5–14.5)
ERYTHROCYTE [DISTWIDTH] IN BLOOD BY AUTOMATED COUNT: 14.5 % (ref 11.5–14.5)
ERYTHROCYTE [DISTWIDTH] IN BLOOD BY AUTOMATED COUNT: 14.6 % (ref 11.5–14.5)
ERYTHROCYTE [DISTWIDTH] IN BLOOD BY AUTOMATED COUNT: 14.8 % (ref 11.5–14.5)
ERYTHROCYTE [DISTWIDTH] IN BLOOD BY AUTOMATED COUNT: 14.8 % (ref 11.5–14.5)
ERYTHROCYTE [DISTWIDTH] IN BLOOD BY AUTOMATED COUNT: 15.1 % (ref 11.5–14.5)
ERYTHROCYTE [DISTWIDTH] IN BLOOD BY AUTOMATED COUNT: 15.2 % (ref 11.5–14.5)
ERYTHROCYTE [DISTWIDTH] IN BLOOD BY AUTOMATED COUNT: 15.3 % (ref 11.5–14.5)
ERYTHROCYTE [DISTWIDTH] IN BLOOD BY AUTOMATED COUNT: 15.4 % (ref 11.5–14.5)
ERYTHROCYTE [DISTWIDTH] IN BLOOD BY AUTOMATED COUNT: 15.5 % (ref 11.5–14.5)
ERYTHROCYTE [DISTWIDTH] IN BLOOD BY AUTOMATED COUNT: 15.7 % (ref 11.5–14.5)
ERYTHROCYTE [DISTWIDTH] IN BLOOD BY AUTOMATED COUNT: 15.7 % (ref 11.5–14.5)
FIO2 ON VENT: 100 %
FIO2 ON VENT: 50 %
FIO2 ON VENT: 70 %
FIO2 ON VENT: 80 %
FIO2 ON VENT: 80 %
FIO2 ON VENT: 85 %
FIO2 ON VENT: 90 %
FLUAV AG NPH QL IA: NEGATIVE
FLUBV AG NOSE QL IA: NEGATIVE
GAS FLOW.O2 SETTING OXYMISER: 12 L/MIN
GAS FLOW.O2 SETTING OXYMISER: 15 L/MIN
GLOBULIN SER CALC-MCNC: 2.4 G/DL (ref 2–4)
GLOBULIN SER CALC-MCNC: 2.4 G/DL (ref 2–4)
GLOBULIN SER CALC-MCNC: 2.6 G/DL (ref 2–4)
GLOBULIN SER CALC-MCNC: 2.9 G/DL (ref 2–4)
GLOBULIN SER CALC-MCNC: 2.9 G/DL (ref 2–4)
GLOBULIN SER CALC-MCNC: 3.1 G/DL (ref 2–4)
GLOBULIN SER CALC-MCNC: 3.4 G/DL (ref 2–4)
GLOBULIN SER CALC-MCNC: 3.6 G/DL (ref 2–4)
GLOBULIN SER CALC-MCNC: 3.8 G/DL (ref 2–4)
GLOBULIN SER CALC-MCNC: 3.8 G/DL (ref 2–4)
GLOBULIN SER CALC-MCNC: 3.9 G/DL (ref 2–4)
GLOBULIN SER CALC-MCNC: 4.1 G/DL (ref 2–4)
GLOBULIN SER CALC-MCNC: 4.2 G/DL (ref 2–4)
GLOBULIN SER CALC-MCNC: 4.3 G/DL (ref 2–4)
GLOBULIN SER CALC-MCNC: 4.3 G/DL (ref 2–4)
GLOBULIN SER CALC-MCNC: 4.4 G/DL (ref 2–4)
GLOBULIN SER CALC-MCNC: 4.4 G/DL (ref 2–4)
GLOBULIN SER CALC-MCNC: 4.5 G/DL (ref 2–4)
GLOBULIN SER CALC-MCNC: 4.6 G/DL (ref 2–4)
GLOBULIN SER CALC-MCNC: 4.8 G/DL (ref 2–4)
GLOBULIN SER CALC-MCNC: 4.9 G/DL (ref 2–4)
GLOBULIN SER CALC-MCNC: 5 G/DL (ref 2–4)
GLOBULIN SER CALC-MCNC: 5.2 G/DL (ref 2–4)
GLUCOSE BLD STRIP.AUTO-MCNC: 100 MG/DL (ref 65–117)
GLUCOSE BLD STRIP.AUTO-MCNC: 100 MG/DL (ref 65–117)
GLUCOSE BLD STRIP.AUTO-MCNC: 105 MG/DL (ref 65–117)
GLUCOSE BLD STRIP.AUTO-MCNC: 105 MG/DL (ref 65–117)
GLUCOSE BLD STRIP.AUTO-MCNC: 107 MG/DL (ref 65–117)
GLUCOSE BLD STRIP.AUTO-MCNC: 111 MG/DL (ref 65–117)
GLUCOSE BLD STRIP.AUTO-MCNC: 119 MG/DL (ref 65–117)
GLUCOSE BLD STRIP.AUTO-MCNC: 121 MG/DL (ref 65–117)
GLUCOSE BLD STRIP.AUTO-MCNC: 122 MG/DL (ref 65–117)
GLUCOSE BLD STRIP.AUTO-MCNC: 127 MG/DL (ref 65–117)
GLUCOSE BLD STRIP.AUTO-MCNC: 128 MG/DL (ref 65–117)
GLUCOSE BLD STRIP.AUTO-MCNC: 128 MG/DL (ref 65–117)
GLUCOSE BLD STRIP.AUTO-MCNC: 138 MG/DL (ref 65–117)
GLUCOSE BLD STRIP.AUTO-MCNC: 141 MG/DL (ref 65–117)
GLUCOSE BLD STRIP.AUTO-MCNC: 143 MG/DL (ref 65–117)
GLUCOSE BLD STRIP.AUTO-MCNC: 146 MG/DL (ref 65–117)
GLUCOSE BLD STRIP.AUTO-MCNC: 150 MG/DL (ref 65–117)
GLUCOSE BLD STRIP.AUTO-MCNC: 153 MG/DL (ref 65–117)
GLUCOSE BLD STRIP.AUTO-MCNC: 157 MG/DL (ref 65–117)
GLUCOSE BLD STRIP.AUTO-MCNC: 160 MG/DL (ref 65–117)
GLUCOSE BLD STRIP.AUTO-MCNC: 168 MG/DL (ref 65–117)
GLUCOSE BLD STRIP.AUTO-MCNC: 169 MG/DL (ref 65–117)
GLUCOSE BLD STRIP.AUTO-MCNC: 170 MG/DL (ref 65–117)
GLUCOSE BLD STRIP.AUTO-MCNC: 172 MG/DL (ref 65–117)
GLUCOSE BLD STRIP.AUTO-MCNC: 173 MG/DL (ref 65–117)
GLUCOSE BLD STRIP.AUTO-MCNC: 179 MG/DL (ref 65–117)
GLUCOSE BLD STRIP.AUTO-MCNC: 180 MG/DL (ref 65–117)
GLUCOSE BLD STRIP.AUTO-MCNC: 181 MG/DL (ref 65–117)
GLUCOSE BLD STRIP.AUTO-MCNC: 182 MG/DL (ref 65–117)
GLUCOSE BLD STRIP.AUTO-MCNC: 187 MG/DL (ref 65–117)
GLUCOSE BLD STRIP.AUTO-MCNC: 206 MG/DL (ref 65–117)
GLUCOSE BLD STRIP.AUTO-MCNC: 206 MG/DL (ref 65–117)
GLUCOSE BLD STRIP.AUTO-MCNC: 213 MG/DL (ref 65–117)
GLUCOSE BLD STRIP.AUTO-MCNC: 214 MG/DL (ref 65–117)
GLUCOSE BLD STRIP.AUTO-MCNC: 224 MG/DL (ref 65–117)
GLUCOSE BLD STRIP.AUTO-MCNC: 241 MG/DL (ref 65–117)
GLUCOSE BLD STRIP.AUTO-MCNC: 303 MG/DL (ref 65–117)
GLUCOSE BLD STRIP.AUTO-MCNC: 321 MG/DL (ref 65–117)
GLUCOSE BLD STRIP.AUTO-MCNC: 347 MG/DL (ref 65–117)
GLUCOSE BLD STRIP.AUTO-MCNC: 360 MG/DL (ref 65–117)
GLUCOSE BLD STRIP.AUTO-MCNC: 82 MG/DL (ref 65–117)
GLUCOSE BLD STRIP.AUTO-MCNC: 83 MG/DL (ref 65–117)
GLUCOSE BLD STRIP.AUTO-MCNC: 83 MG/DL (ref 65–117)
GLUCOSE BLD STRIP.AUTO-MCNC: 84 MG/DL (ref 65–117)
GLUCOSE BLD STRIP.AUTO-MCNC: 91 MG/DL (ref 65–117)
GLUCOSE BLD STRIP.AUTO-MCNC: 93 MG/DL (ref 65–117)
GLUCOSE BLD STRIP.AUTO-MCNC: 93 MG/DL (ref 65–117)
GLUCOSE BLD STRIP.AUTO-MCNC: 96 MG/DL (ref 65–117)
GLUCOSE BLD STRIP.AUTO-MCNC: 97 MG/DL (ref 65–117)
GLUCOSE BLD STRIP.AUTO-MCNC: 98 MG/DL (ref 65–117)
GLUCOSE BLD STRIP.AUTO-MCNC: 98 MG/DL (ref 65–117)
GLUCOSE SERPL-MCNC: 104 MG/DL (ref 65–100)
GLUCOSE SERPL-MCNC: 106 MG/DL (ref 65–100)
GLUCOSE SERPL-MCNC: 110 MG/DL (ref 65–100)
GLUCOSE SERPL-MCNC: 112 MG/DL (ref 65–100)
GLUCOSE SERPL-MCNC: 112 MG/DL (ref 65–100)
GLUCOSE SERPL-MCNC: 114 MG/DL (ref 65–100)
GLUCOSE SERPL-MCNC: 123 MG/DL (ref 65–100)
GLUCOSE SERPL-MCNC: 125 MG/DL (ref 65–100)
GLUCOSE SERPL-MCNC: 140 MG/DL (ref 65–100)
GLUCOSE SERPL-MCNC: 142 MG/DL (ref 65–100)
GLUCOSE SERPL-MCNC: 160 MG/DL (ref 65–100)
GLUCOSE SERPL-MCNC: 173 MG/DL (ref 65–100)
GLUCOSE SERPL-MCNC: 175 MG/DL (ref 65–100)
GLUCOSE SERPL-MCNC: 180 MG/DL (ref 65–100)
GLUCOSE SERPL-MCNC: 181 MG/DL (ref 65–100)
GLUCOSE SERPL-MCNC: 184 MG/DL (ref 65–100)
GLUCOSE SERPL-MCNC: 185 MG/DL (ref 65–100)
GLUCOSE SERPL-MCNC: 189 MG/DL (ref 65–100)
GLUCOSE SERPL-MCNC: 191 MG/DL (ref 65–100)
GLUCOSE SERPL-MCNC: 222 MG/DL (ref 65–100)
GLUCOSE SERPL-MCNC: 231 MG/DL (ref 65–100)
GLUCOSE SERPL-MCNC: 237 MG/DL (ref 65–100)
GLUCOSE SERPL-MCNC: 244 MG/DL (ref 65–100)
GLUCOSE SERPL-MCNC: 250 MG/DL (ref 65–100)
GLUCOSE SERPL-MCNC: 262 MG/DL (ref 65–100)
GLUCOSE SERPL-MCNC: 335 MG/DL (ref 65–100)
GLUCOSE SERPL-MCNC: 375 MG/DL (ref 65–100)
GLUCOSE SERPL-MCNC: 418 MG/DL (ref 65–100)
GLUCOSE SERPL-MCNC: 434 MG/DL (ref 65–100)
GLUCOSE SERPL-MCNC: 450 MG/DL (ref 65–100)
GLUCOSE SERPL-MCNC: 94 MG/DL (ref 65–100)
GLUCOSE SERPL-MCNC: 95 MG/DL (ref 65–100)
GLUCOSE SERPL-MCNC: 99 MG/DL (ref 65–100)
GLUCOSE UR STRIP.AUTO-MCNC: 50 MG/DL
GLUCOSE UR STRIP.AUTO-MCNC: NEGATIVE MG/DL
HCO3 BLDA-SCNC: 22 MMOL/L (ref 22–26)
HCO3 BLDA-SCNC: 22 MMOL/L (ref 22–26)
HCO3 BLDA-SCNC: 24 MMOL/L (ref 22–26)
HCO3 BLDA-SCNC: 24 MMOL/L (ref 22–26)
HCO3 BLDA-SCNC: 25 MMOL/L (ref 22–26)
HCO3 BLDA-SCNC: 27 MMOL/L (ref 22–26)
HCO3 BLDA-SCNC: 28 MMOL/L (ref 22–26)
HCO3 BLDA-SCNC: 29 MMOL/L (ref 22–26)
HCO3 BLDA-SCNC: 30 MMOL/L (ref 22–26)
HCO3 BLDA-SCNC: 31 MMOL/L (ref 22–26)
HCO3 BLDA-SCNC: 32 MMOL/L (ref 22–26)
HCO3 BLDA-SCNC: 34 MMOL/L (ref 22–26)
HCO3 BLDA-SCNC: 35 MMOL/L (ref 22–26)
HCO3 BLDA-SCNC: 38 MMOL/L (ref 22–26)
HCT VFR BLD AUTO: 12.4 % (ref 35–47)
HCT VFR BLD AUTO: 19 % (ref 35–47)
HCT VFR BLD AUTO: 21.8 % (ref 35–47)
HCT VFR BLD AUTO: 22.7 % (ref 35–47)
HCT VFR BLD AUTO: 22.8 % (ref 35–47)
HCT VFR BLD AUTO: 23.4 % (ref 35–47)
HCT VFR BLD AUTO: 24.2 % (ref 35–47)
HCT VFR BLD AUTO: 24.3 % (ref 35–47)
HCT VFR BLD AUTO: 24.4 % (ref 35–47)
HCT VFR BLD AUTO: 24.5 % (ref 35–47)
HCT VFR BLD AUTO: 24.7 % (ref 35–47)
HCT VFR BLD AUTO: 24.7 % (ref 35–47)
HCT VFR BLD AUTO: 25.1 % (ref 35–47)
HCT VFR BLD AUTO: 25.5 % (ref 35–47)
HCT VFR BLD AUTO: 25.6 % (ref 35–47)
HCT VFR BLD AUTO: 25.7 % (ref 35–47)
HCT VFR BLD AUTO: 26.2 % (ref 35–47)
HCT VFR BLD AUTO: 26.8 % (ref 35–47)
HCT VFR BLD AUTO: 28 % (ref 35–47)
HCT VFR BLD AUTO: 30.2 % (ref 35–47)
HCT VFR BLD AUTO: 31.1 % (ref 35–47)
HCT VFR BLD AUTO: 31.4 % (ref 35–47)
HCT VFR BLD AUTO: 31.5 % (ref 35–47)
HCT VFR BLD AUTO: 32 % (ref 35–47)
HCT VFR BLD AUTO: 33.4 % (ref 35–47)
HCT VFR BLD AUTO: 33.4 % (ref 35–47)
HCT VFR BLD AUTO: 33.8 % (ref 35–47)
HCT VFR BLD AUTO: 34.2 % (ref 35–47)
HCT VFR BLD AUTO: 34.2 % (ref 35–47)
HCT VFR BLD AUTO: 36 % (ref 35–47)
HCT VFR BLD AUTO: 37.7 % (ref 35–47)
HCT VFR BLD AUTO: 39.1 % (ref 35–47)
HGB BLD-MCNC: 10.3 G/DL (ref 11.5–16)
HGB BLD-MCNC: 10.6 G/DL (ref 11.5–16)
HGB BLD-MCNC: 10.6 G/DL (ref 11.5–16)
HGB BLD-MCNC: 10.9 G/DL (ref 11.5–16)
HGB BLD-MCNC: 11 G/DL (ref 11.5–16)
HGB BLD-MCNC: 11.5 G/DL (ref 11.5–16)
HGB BLD-MCNC: 12 G/DL (ref 11.5–16)
HGB BLD-MCNC: 12.5 G/DL (ref 11.5–16)
HGB BLD-MCNC: 4.1 G/DL (ref 11.5–16)
HGB BLD-MCNC: 6.6 G/DL (ref 11.5–16)
HGB BLD-MCNC: 7 G/DL (ref 11.5–16)
HGB BLD-MCNC: 7.3 G/DL (ref 11.5–16)
HGB BLD-MCNC: 7.3 G/DL (ref 11.5–16)
HGB BLD-MCNC: 7.8 G/DL (ref 11.5–16)
HGB BLD-MCNC: 7.9 G/DL (ref 11.5–16)
HGB BLD-MCNC: 8 G/DL (ref 11.5–16)
HGB BLD-MCNC: 8 G/DL (ref 11.5–16)
HGB BLD-MCNC: 8.1 G/DL (ref 11.5–16)
HGB BLD-MCNC: 8.2 G/DL (ref 11.5–16)
HGB BLD-MCNC: 8.4 G/DL (ref 11.5–16)
HGB BLD-MCNC: 8.5 G/DL (ref 11.5–16)
HGB BLD-MCNC: 8.6 G/DL (ref 11.5–16)
HGB BLD-MCNC: 9.1 G/DL (ref 11.5–16)
HGB BLD-MCNC: 9.6 G/DL (ref 11.5–16)
HGB BLD-MCNC: 9.7 G/DL (ref 11.5–16)
HGB BLD-MCNC: 9.8 G/DL (ref 11.5–16)
HGB BLD-MCNC: 9.8 G/DL (ref 11.5–16)
HGB BLD-MCNC: 9.9 G/DL (ref 11.5–16)
HGB UR QL STRIP: ABNORMAL
HGB UR QL STRIP: NEGATIVE
IMM GRANULOCYTES # BLD AUTO: 0 K/UL
IMM GRANULOCYTES # BLD AUTO: 0 K/UL (ref 0–0.04)
IMM GRANULOCYTES # BLD AUTO: 0.1 K/UL (ref 0–0.04)
IMM GRANULOCYTES # BLD AUTO: 0.2 K/UL (ref 0–0.04)
IMM GRANULOCYTES # BLD AUTO: 0.2 K/UL (ref 0–0.04)
IMM GRANULOCYTES # BLD AUTO: 0.3 K/UL (ref 0–0.04)
IMM GRANULOCYTES # BLD AUTO: 0.4 K/UL (ref 0–0.04)
IMM GRANULOCYTES # BLD AUTO: 0.5 K/UL (ref 0–0.04)
IMM GRANULOCYTES # BLD AUTO: 0.6 K/UL (ref 0–0.04)
IMM GRANULOCYTES # BLD AUTO: 0.9 K/UL (ref 0–0.04)
IMM GRANULOCYTES NFR BLD AUTO: 0 %
IMM GRANULOCYTES NFR BLD AUTO: 0 % (ref 0–0.5)
IMM GRANULOCYTES NFR BLD AUTO: 0 % (ref 0–0.5)
IMM GRANULOCYTES NFR BLD AUTO: 1 % (ref 0–0.5)
IMM GRANULOCYTES NFR BLD AUTO: 2 % (ref 0–0.5)
IMM GRANULOCYTES NFR BLD AUTO: 3 % (ref 0–0.5)
IMM GRANULOCYTES NFR BLD AUTO: 3 % (ref 0–0.5)
IPAP/PIP, IPAPIP: 0
IPAP/PIP, IPAPIP: 25
IPAP/PIP, IPAPIP: 28
KETONES UR QL STRIP.AUTO: 20 MG/DL
KETONES UR QL STRIP.AUTO: 5 MG/DL
LACTATE SERPL-SCNC: 1.3 MMOL/L (ref 0.4–2)
LACTATE SERPL-SCNC: 1.4 MMOL/L (ref 0.4–2)
LACTATE SERPL-SCNC: 3.7 MMOL/L (ref 0.4–2)
LEFT 1ST DIGIT BP: 83 MMHG
LEFT 2ND DIGIT BP: 101 MMHG
LEFT ARM BP: 141 MMHG
LEFT DIST BRACHIAL A EDV: 0 CM/S
LEFT DIST BRACHIAL A PSV: 147 CM/S
LEFT DIST RADIAL A PSV: 61.4 CM/S
LEFT PROX RADIAL A BP: 126 MMHG
LEFT PROX RADIAL A PSV: 65.3 CM/S
LEFT PROX ULNAR A BP: 99 MMHG
LEFT WBI: 0.89
LEUKOCYTE ESTERASE UR QL STRIP.AUTO: NEGATIVE
LEUKOCYTE ESTERASE UR QL STRIP.AUTO: NEGATIVE
LYMPHOCYTES # BLD: 0.5 K/UL (ref 0.8–3.5)
LYMPHOCYTES # BLD: 0.6 K/UL (ref 0.8–3.5)
LYMPHOCYTES # BLD: 0.7 K/UL (ref 0.8–3.5)
LYMPHOCYTES # BLD: 0.8 K/UL (ref 0.8–3.5)
LYMPHOCYTES # BLD: 0.9 K/UL (ref 0.8–3.5)
LYMPHOCYTES # BLD: 1.1 K/UL (ref 0.8–3.5)
LYMPHOCYTES # BLD: 1.2 K/UL (ref 0.8–3.5)
LYMPHOCYTES # BLD: 1.4 K/UL (ref 0.8–3.5)
LYMPHOCYTES # BLD: 1.5 K/UL (ref 0.8–3.5)
LYMPHOCYTES # BLD: 1.8 K/UL (ref 0.8–3.5)
LYMPHOCYTES NFR BLD: 10 % (ref 12–49)
LYMPHOCYTES NFR BLD: 10 % (ref 12–49)
LYMPHOCYTES NFR BLD: 14 % (ref 12–49)
LYMPHOCYTES NFR BLD: 14 % (ref 12–49)
LYMPHOCYTES NFR BLD: 15 % (ref 12–49)
LYMPHOCYTES NFR BLD: 15 % (ref 12–49)
LYMPHOCYTES NFR BLD: 17 % (ref 12–49)
LYMPHOCYTES NFR BLD: 2 % (ref 12–49)
LYMPHOCYTES NFR BLD: 23 % (ref 12–49)
LYMPHOCYTES NFR BLD: 3 % (ref 12–49)
LYMPHOCYTES NFR BLD: 4 % (ref 12–49)
LYMPHOCYTES NFR BLD: 5 % (ref 12–49)
LYMPHOCYTES NFR BLD: 6 % (ref 12–49)
LYMPHOCYTES NFR BLD: 6 % (ref 12–49)
LYMPHOCYTES NFR BLD: 9 % (ref 12–49)
LYMPHOCYTES NFR BLD: 9 % (ref 12–49)
MAGNESIUM SERPL-MCNC: 2.1 MG/DL (ref 1.6–2.4)
MAGNESIUM SERPL-MCNC: 2.6 MG/DL (ref 1.6–2.4)
MCH RBC QN AUTO: 29.3 PG (ref 26–34)
MCH RBC QN AUTO: 29.6 PG (ref 26–34)
MCH RBC QN AUTO: 29.6 PG (ref 26–34)
MCH RBC QN AUTO: 29.7 PG (ref 26–34)
MCH RBC QN AUTO: 29.7 PG (ref 26–34)
MCH RBC QN AUTO: 29.8 PG (ref 26–34)
MCH RBC QN AUTO: 29.8 PG (ref 26–34)
MCH RBC QN AUTO: 29.9 PG (ref 26–34)
MCH RBC QN AUTO: 29.9 PG (ref 26–34)
MCH RBC QN AUTO: 30 PG (ref 26–34)
MCH RBC QN AUTO: 30.2 PG (ref 26–34)
MCH RBC QN AUTO: 30.3 PG (ref 26–34)
MCH RBC QN AUTO: 30.3 PG (ref 26–34)
MCH RBC QN AUTO: 30.6 PG (ref 26–34)
MCH RBC QN AUTO: 31.3 PG (ref 26–34)
MCH RBC QN AUTO: 31.5 PG (ref 26–34)
MCH RBC QN AUTO: 31.6 PG (ref 26–34)
MCH RBC QN AUTO: 31.6 PG (ref 26–34)
MCH RBC QN AUTO: 31.7 PG (ref 26–34)
MCH RBC QN AUTO: 31.7 PG (ref 26–34)
MCH RBC QN AUTO: 31.9 PG (ref 26–34)
MCH RBC QN AUTO: 32.2 PG (ref 26–34)
MCH RBC QN AUTO: 32.3 PG (ref 26–34)
MCH RBC QN AUTO: 32.4 PG (ref 26–34)
MCH RBC QN AUTO: 32.5 PG (ref 26–34)
MCH RBC QN AUTO: 32.6 PG (ref 26–34)
MCH RBC QN AUTO: 32.8 PG (ref 26–34)
MCHC RBC AUTO-ENTMCNC: 30.8 G/DL (ref 30–36.5)
MCHC RBC AUTO-ENTMCNC: 30.9 G/DL (ref 30–36.5)
MCHC RBC AUTO-ENTMCNC: 30.9 G/DL (ref 30–36.5)
MCHC RBC AUTO-ENTMCNC: 31.1 G/DL (ref 30–36.5)
MCHC RBC AUTO-ENTMCNC: 31.4 G/DL (ref 30–36.5)
MCHC RBC AUTO-ENTMCNC: 31.5 G/DL (ref 30–36.5)
MCHC RBC AUTO-ENTMCNC: 31.7 G/DL (ref 30–36.5)
MCHC RBC AUTO-ENTMCNC: 31.8 G/DL (ref 30–36.5)
MCHC RBC AUTO-ENTMCNC: 31.8 G/DL (ref 30–36.5)
MCHC RBC AUTO-ENTMCNC: 31.9 G/DL (ref 30–36.5)
MCHC RBC AUTO-ENTMCNC: 32 G/DL (ref 30–36.5)
MCHC RBC AUTO-ENTMCNC: 32 G/DL (ref 30–36.5)
MCHC RBC AUTO-ENTMCNC: 32.1 G/DL (ref 30–36.5)
MCHC RBC AUTO-ENTMCNC: 32.2 G/DL (ref 30–36.5)
MCHC RBC AUTO-ENTMCNC: 32.2 G/DL (ref 30–36.5)
MCHC RBC AUTO-ENTMCNC: 32.4 G/DL (ref 30–36.5)
MCHC RBC AUTO-ENTMCNC: 32.5 G/DL (ref 30–36.5)
MCHC RBC AUTO-ENTMCNC: 32.5 G/DL (ref 30–36.5)
MCHC RBC AUTO-ENTMCNC: 32.7 G/DL (ref 30–36.5)
MCHC RBC AUTO-ENTMCNC: 32.9 G/DL (ref 30–36.5)
MCHC RBC AUTO-ENTMCNC: 33.1 G/DL (ref 30–36.5)
MCHC RBC AUTO-ENTMCNC: 33.2 G/DL (ref 30–36.5)
MCHC RBC AUTO-ENTMCNC: 33.3 G/DL (ref 30–36.5)
MCHC RBC AUTO-ENTMCNC: 33.5 G/DL (ref 30–36.5)
MCHC RBC AUTO-ENTMCNC: 33.9 G/DL (ref 30–36.5)
MCV RBC AUTO: 100.8 FL (ref 80–99)
MCV RBC AUTO: 101.1 FL (ref 80–99)
MCV RBC AUTO: 90.7 FL (ref 80–99)
MCV RBC AUTO: 92.1 FL (ref 80–99)
MCV RBC AUTO: 92.4 FL (ref 80–99)
MCV RBC AUTO: 92.9 FL (ref 80–99)
MCV RBC AUTO: 93 FL (ref 80–99)
MCV RBC AUTO: 93.5 FL (ref 80–99)
MCV RBC AUTO: 93.5 FL (ref 80–99)
MCV RBC AUTO: 94 FL (ref 80–99)
MCV RBC AUTO: 94.2 FL (ref 80–99)
MCV RBC AUTO: 94.7 FL (ref 80–99)
MCV RBC AUTO: 95 FL (ref 80–99)
MCV RBC AUTO: 95.4 FL (ref 80–99)
MCV RBC AUTO: 95.7 FL (ref 80–99)
MCV RBC AUTO: 95.7 FL (ref 80–99)
MCV RBC AUTO: 95.8 FL (ref 80–99)
MCV RBC AUTO: 95.8 FL (ref 80–99)
MCV RBC AUTO: 96.7 FL (ref 80–99)
MCV RBC AUTO: 97.2 FL (ref 80–99)
MCV RBC AUTO: 97.3 FL (ref 80–99)
MCV RBC AUTO: 97.4 FL (ref 80–99)
MCV RBC AUTO: 97.6 FL (ref 80–99)
MCV RBC AUTO: 97.6 FL (ref 80–99)
MCV RBC AUTO: 98.1 FL (ref 80–99)
MCV RBC AUTO: 98.7 FL (ref 80–99)
MCV RBC AUTO: 98.8 FL (ref 80–99)
MCV RBC AUTO: 98.8 FL (ref 80–99)
MCV RBC AUTO: 99.1 FL (ref 80–99)
METAMYELOCYTES NFR BLD MANUAL: 1 %
MONOCYTES # BLD: 0.1 K/UL (ref 0–1)
MONOCYTES # BLD: 0.2 K/UL (ref 0–1)
MONOCYTES # BLD: 0.3 K/UL (ref 0–1)
MONOCYTES # BLD: 0.4 K/UL (ref 0–1)
MONOCYTES # BLD: 0.5 K/UL (ref 0–1)
MONOCYTES # BLD: 0.6 K/UL (ref 0–1)
MONOCYTES # BLD: 0.6 K/UL (ref 0–1)
MONOCYTES # BLD: 0.7 K/UL (ref 0–1)
MONOCYTES # BLD: 0.7 K/UL (ref 0–1)
MONOCYTES # BLD: 0.8 K/UL (ref 0–1)
MONOCYTES # BLD: 0.8 K/UL (ref 0–1)
MONOCYTES # BLD: 0.9 K/UL (ref 0–1)
MONOCYTES # BLD: 1.4 K/UL (ref 0–1)
MONOCYTES # BLD: 1.5 K/UL (ref 0–1)
MONOCYTES # BLD: 1.5 K/UL (ref 0–1)
MONOCYTES NFR BLD: 2 % (ref 5–13)
MONOCYTES NFR BLD: 2 % (ref 5–13)
MONOCYTES NFR BLD: 3 % (ref 5–13)
MONOCYTES NFR BLD: 4 % (ref 5–13)
MONOCYTES NFR BLD: 5 % (ref 5–13)
MONOCYTES NFR BLD: 6 % (ref 5–13)
MONOCYTES NFR BLD: 7 % (ref 5–13)
MONOCYTES NFR BLD: 8 % (ref 5–13)
MUCOUS THREADS URNS QL MICRO: ABNORMAL /LPF
MYELOCYTES NFR BLD MANUAL: 1 %
NEUTS BAND NFR BLD MANUAL: 1 % (ref 0–6)
NEUTS SEG # BLD: 10.1 K/UL (ref 1.8–8)
NEUTS SEG # BLD: 10.2 K/UL (ref 1.8–8)
NEUTS SEG # BLD: 12 K/UL (ref 1.8–8)
NEUTS SEG # BLD: 13 K/UL (ref 1.8–8)
NEUTS SEG # BLD: 14.7 K/UL (ref 1.8–8)
NEUTS SEG # BLD: 14.7 K/UL (ref 1.8–8)
NEUTS SEG # BLD: 15.6 K/UL (ref 1.8–8)
NEUTS SEG # BLD: 17.9 K/UL (ref 1.8–8)
NEUTS SEG # BLD: 19.3 K/UL (ref 1.8–8)
NEUTS SEG # BLD: 2.6 K/UL (ref 1.8–8)
NEUTS SEG # BLD: 21.9 K/UL (ref 1.8–8)
NEUTS SEG # BLD: 24.1 K/UL (ref 1.8–8)
NEUTS SEG # BLD: 25.2 K/UL (ref 1.8–8)
NEUTS SEG # BLD: 28.1 K/UL (ref 1.8–8)
NEUTS SEG # BLD: 3.5 K/UL (ref 1.8–8)
NEUTS SEG # BLD: 33.5 K/UL (ref 1.8–8)
NEUTS SEG # BLD: 5 K/UL (ref 1.8–8)
NEUTS SEG # BLD: 5.7 K/UL (ref 1.8–8)
NEUTS SEG # BLD: 6.1 K/UL (ref 1.8–8)
NEUTS SEG # BLD: 6.4 K/UL (ref 1.8–8)
NEUTS SEG # BLD: 7.5 K/UL (ref 1.8–8)
NEUTS SEG # BLD: 7.6 K/UL (ref 1.8–8)
NEUTS SEG # BLD: 7.6 K/UL (ref 1.8–8)
NEUTS SEG # BLD: 9.5 K/UL (ref 1.8–8)
NEUTS SEG # BLD: 9.6 K/UL (ref 1.8–8)
NEUTS SEG NFR BLD: 70 % (ref 32–75)
NEUTS SEG NFR BLD: 75 % (ref 32–75)
NEUTS SEG NFR BLD: 75 % (ref 32–75)
NEUTS SEG NFR BLD: 77 % (ref 32–75)
NEUTS SEG NFR BLD: 77 % (ref 32–75)
NEUTS SEG NFR BLD: 79 % (ref 32–75)
NEUTS SEG NFR BLD: 82 % (ref 32–75)
NEUTS SEG NFR BLD: 82 % (ref 32–75)
NEUTS SEG NFR BLD: 84 % (ref 32–75)
NEUTS SEG NFR BLD: 84 % (ref 32–75)
NEUTS SEG NFR BLD: 87 % (ref 32–75)
NEUTS SEG NFR BLD: 89 % (ref 32–75)
NEUTS SEG NFR BLD: 90 % (ref 32–75)
NEUTS SEG NFR BLD: 91 % (ref 32–75)
NEUTS SEG NFR BLD: 92 % (ref 32–75)
NEUTS SEG NFR BLD: 93 % (ref 32–75)
NITRITE UR QL STRIP.AUTO: NEGATIVE
NITRITE UR QL STRIP.AUTO: NEGATIVE
NRBC # BLD: 0 K/UL (ref 0–0.01)
NRBC # BLD: 0.02 K/UL (ref 0–0.01)
NRBC # BLD: 0.02 K/UL (ref 0–0.01)
NRBC # BLD: 0.03 K/UL (ref 0–0.01)
NRBC # BLD: 0.05 K/UL (ref 0–0.01)
NRBC # BLD: 0.05 K/UL (ref 0–0.01)
NRBC # BLD: 0.07 K/UL (ref 0–0.01)
NRBC # BLD: 0.08 K/UL (ref 0–0.01)
NRBC # BLD: 0.12 K/UL (ref 0–0.01)
NRBC # BLD: 0.16 K/UL (ref 0–0.01)
NRBC # BLD: 0.22 K/UL (ref 0–0.01)
NRBC # BLD: 0.24 K/UL (ref 0–0.01)
NRBC BLD-RTO: 0 PER 100 WBC
NRBC BLD-RTO: 0.1 PER 100 WBC
NRBC BLD-RTO: 0.2 PER 100 WBC
NRBC BLD-RTO: 0.3 PER 100 WBC
NRBC BLD-RTO: 0.4 PER 100 WBC
NRBC BLD-RTO: 0.4 PER 100 WBC
NRBC BLD-RTO: 0.6 PER 100 WBC
NRBC BLD-RTO: 0.7 PER 100 WBC
NRBC BLD-RTO: 0.9 PER 100 WBC
P-R INTERVAL, ECG05: 130 MS
P-R INTERVAL, ECG05: 140 MS
P-R INTERVAL, ECG05: 152 MS
PCO2 BLDA: 36 MMHG (ref 35–45)
PCO2 BLDA: 37 MMHG (ref 35–45)
PCO2 BLDA: 38 MMHG (ref 35–45)
PCO2 BLDA: 41 MMHG (ref 35–45)
PCO2 BLDA: 42 MMHG (ref 35–45)
PCO2 BLDA: 42 MMHG (ref 35–45)
PCO2 BLDA: 43 MMHG (ref 35–45)
PCO2 BLDA: 44 MMHG (ref 35–45)
PCO2 BLDA: 44 MMHG (ref 35–45)
PCO2 BLDA: 45 MMHG (ref 35–45)
PCO2 BLDA: 46 MMHG (ref 35–45)
PCO2 BLDA: 48 MMHG (ref 35–45)
PCO2 BLDA: 49 MMHG (ref 35–45)
PCO2 BLDA: 52 MMHG (ref 35–45)
PCO2 BLDA: 53 MMHG (ref 35–45)
PCO2 BLDA: 53 MMHG (ref 35–45)
PCO2 BLDA: 54 MMHG (ref 35–45)
PCO2 BLDA: 55 MMHG (ref 35–45)
PCO2 BLDA: 60 MMHG (ref 35–45)
PERFORMED BY, TECHID: ABNORMAL
PERFORMED BY, TECHID: NORMAL
PH BLDA: 7.3 [PH] (ref 7.35–7.45)
PH BLDA: 7.32 [PH] (ref 7.35–7.45)
PH BLDA: 7.33 [PH] (ref 7.35–7.45)
PH BLDA: 7.34 [PH] (ref 7.35–7.45)
PH BLDA: 7.35 [PH] (ref 7.35–7.45)
PH BLDA: 7.36 [PH] (ref 7.35–7.45)
PH BLDA: 7.38 [PH] (ref 7.35–7.45)
PH BLDA: 7.39 [PH] (ref 7.35–7.45)
PH BLDA: 7.4 [PH] (ref 7.35–7.45)
PH BLDA: 7.4 [PH] (ref 7.35–7.45)
PH BLDA: 7.41 [PH] (ref 7.35–7.45)
PH BLDA: 7.42 [PH] (ref 7.35–7.45)
PH BLDA: 7.42 [PH] (ref 7.35–7.45)
PH BLDA: 7.43 [PH] (ref 7.35–7.45)
PH BLDA: 7.43 [PH] (ref 7.35–7.45)
PH BLDA: 7.44 [PH] (ref 7.35–7.45)
PH BLDA: 7.45 [PH] (ref 7.35–7.45)
PH BLDA: 7.46 [PH] (ref 7.35–7.45)
PH BLDA: 7.48 [PH] (ref 7.35–7.45)
PH BLDA: 7.48 [PH] (ref 7.35–7.45)
PH BLDA: 7.49 [PH] (ref 7.35–7.45)
PH BLDA: 7.49 [PH] (ref 7.35–7.45)
PH BLDA: 7.52 [PH] (ref 7.35–7.45)
PH UR STRIP: 6 [PH] (ref 5–8)
PH UR STRIP: 7 [PH] (ref 5–8)
PHOSPHATE SERPL-MCNC: 2.3 MG/DL (ref 2.6–4.7)
PHOSPHATE SERPL-MCNC: 3.2 MG/DL (ref 2.6–4.7)
PHOSPHATE SERPL-MCNC: 3.2 MG/DL (ref 2.6–4.7)
PLATELET # BLD AUTO: 101 K/UL (ref 150–400)
PLATELET # BLD AUTO: 103 K/UL (ref 150–400)
PLATELET # BLD AUTO: 111 K/UL (ref 150–400)
PLATELET # BLD AUTO: 122 K/UL (ref 150–400)
PLATELET # BLD AUTO: 131 K/UL (ref 150–400)
PLATELET # BLD AUTO: 132 K/UL (ref 150–400)
PLATELET # BLD AUTO: 132 K/UL (ref 150–400)
PLATELET # BLD AUTO: 134 K/UL (ref 150–400)
PLATELET # BLD AUTO: 140 K/UL (ref 150–400)
PLATELET # BLD AUTO: 150 K/UL (ref 150–400)
PLATELET # BLD AUTO: 160 K/UL (ref 150–400)
PLATELET # BLD AUTO: 161 K/UL (ref 150–400)
PLATELET # BLD AUTO: 164 K/UL (ref 150–400)
PLATELET # BLD AUTO: 171 K/UL (ref 150–400)
PLATELET # BLD AUTO: 180 K/UL (ref 150–400)
PLATELET # BLD AUTO: 186 K/UL (ref 150–400)
PLATELET # BLD AUTO: 189 K/UL (ref 150–400)
PLATELET # BLD AUTO: 190 K/UL (ref 150–400)
PLATELET # BLD AUTO: 197 K/UL (ref 150–400)
PLATELET # BLD AUTO: 204 K/UL (ref 150–400)
PLATELET # BLD AUTO: 205 K/UL (ref 150–400)
PLATELET # BLD AUTO: 216 K/UL (ref 150–400)
PLATELET # BLD AUTO: 231 K/UL (ref 150–400)
PLATELET # BLD AUTO: 236 K/UL (ref 150–400)
PLATELET # BLD AUTO: 247 K/UL (ref 150–400)
PLATELET # BLD AUTO: 281 K/UL (ref 150–400)
PLATELET # BLD AUTO: 304 K/UL (ref 150–400)
PLATELET # BLD AUTO: 335 K/UL (ref 150–400)
PLATELET # BLD AUTO: 408 K/UL (ref 150–400)
PLATELET # BLD AUTO: 79 K/UL (ref 150–400)
PLATELET # BLD AUTO: 98 K/UL (ref 150–400)
PMV BLD AUTO: 11 FL (ref 8.9–12.9)
PMV BLD AUTO: 11.1 FL (ref 8.9–12.9)
PMV BLD AUTO: 11.2 FL (ref 8.9–12.9)
PMV BLD AUTO: 11.2 FL (ref 8.9–12.9)
PMV BLD AUTO: 11.3 FL (ref 8.9–12.9)
PMV BLD AUTO: 11.3 FL (ref 8.9–12.9)
PMV BLD AUTO: 11.4 FL (ref 8.9–12.9)
PMV BLD AUTO: 11.5 FL (ref 8.9–12.9)
PMV BLD AUTO: 11.6 FL (ref 8.9–12.9)
PMV BLD AUTO: 11.7 FL (ref 8.9–12.9)
PMV BLD AUTO: 11.8 FL (ref 8.9–12.9)
PMV BLD AUTO: 11.9 FL (ref 8.9–12.9)
PMV BLD AUTO: 12 FL (ref 8.9–12.9)
PMV BLD AUTO: 12 FL (ref 8.9–12.9)
PMV BLD AUTO: 12.2 FL (ref 8.9–12.9)
PMV BLD AUTO: 12.2 FL (ref 8.9–12.9)
PMV BLD AUTO: 12.3 FL (ref 8.9–12.9)
PMV BLD AUTO: 12.3 FL (ref 8.9–12.9)
PMV BLD AUTO: 12.7 FL (ref 8.9–12.9)
PMV BLD AUTO: 13 FL (ref 8.9–12.9)
PMV BLD AUTO: 13.1 FL (ref 8.9–12.9)
PO2 BLDA: 105 MMHG (ref 75–100)
PO2 BLDA: 116 MMHG (ref 75–100)
PO2 BLDA: 269 MMHG (ref 75–100)
PO2 BLDA: 46 MMHG (ref 75–100)
PO2 BLDA: 50 MMHG (ref 75–100)
PO2 BLDA: 54 MMHG (ref 75–100)
PO2 BLDA: 59 MMHG (ref 75–100)
PO2 BLDA: 60 MMHG (ref 75–100)
PO2 BLDA: 61 MMHG (ref 75–100)
PO2 BLDA: 65 MMHG (ref 75–100)
PO2 BLDA: 67 MMHG (ref 75–100)
PO2 BLDA: 68 MMHG (ref 75–100)
PO2 BLDA: 70 MMHG (ref 75–100)
PO2 BLDA: 71 MMHG (ref 75–100)
PO2 BLDA: 72 MMHG (ref 75–100)
PO2 BLDA: 74 MMHG (ref 75–100)
PO2 BLDA: 80 MMHG (ref 75–100)
PO2 BLDA: 81 MMHG (ref 75–100)
PO2 BLDA: 82 MMHG (ref 75–100)
PO2 BLDA: 85 MMHG (ref 75–100)
PO2 BLDA: 85 MMHG (ref 75–100)
PO2 BLDA: 88 MMHG (ref 75–100)
PO2 BLDA: 96 MMHG (ref 75–100)
POTASSIUM SERPL-SCNC: 3.1 MMOL/L (ref 3.5–5.1)
POTASSIUM SERPL-SCNC: 3.1 MMOL/L (ref 3.5–5.1)
POTASSIUM SERPL-SCNC: 3.2 MMOL/L (ref 3.5–5.1)
POTASSIUM SERPL-SCNC: 3.3 MMOL/L (ref 3.5–5.1)
POTASSIUM SERPL-SCNC: 3.3 MMOL/L (ref 3.5–5.1)
POTASSIUM SERPL-SCNC: 3.4 MMOL/L (ref 3.5–5.1)
POTASSIUM SERPL-SCNC: 3.4 MMOL/L (ref 3.5–5.1)
POTASSIUM SERPL-SCNC: 3.5 MMOL/L (ref 3.5–5.1)
POTASSIUM SERPL-SCNC: 3.5 MMOL/L (ref 3.5–5.1)
POTASSIUM SERPL-SCNC: 3.6 MMOL/L (ref 3.5–5.1)
POTASSIUM SERPL-SCNC: 4 MMOL/L (ref 3.5–5.1)
POTASSIUM SERPL-SCNC: 4.3 MMOL/L (ref 3.5–5.1)
POTASSIUM SERPL-SCNC: 4.4 MMOL/L (ref 3.5–5.1)
POTASSIUM SERPL-SCNC: 4.5 MMOL/L (ref 3.5–5.1)
POTASSIUM SERPL-SCNC: 4.6 MMOL/L (ref 3.5–5.1)
POTASSIUM SERPL-SCNC: 4.6 MMOL/L (ref 3.5–5.1)
POTASSIUM SERPL-SCNC: 4.7 MMOL/L (ref 3.5–5.1)
POTASSIUM SERPL-SCNC: 4.8 MMOL/L (ref 3.5–5.1)
POTASSIUM SERPL-SCNC: 4.9 MMOL/L (ref 3.5–5.1)
POTASSIUM SERPL-SCNC: 4.9 MMOL/L (ref 3.5–5.1)
POTASSIUM SERPL-SCNC: 5 MMOL/L (ref 3.5–5.1)
POTASSIUM SERPL-SCNC: 5.2 MMOL/L (ref 3.5–5.1)
POTASSIUM SERPL-SCNC: 5.3 MMOL/L (ref 3.5–5.1)
POTASSIUM UR-SCNC: 43 MMOL/L
PROCALCITONIN SERPL-MCNC: 0.06 NG/ML
PROT SERPL-MCNC: 4.8 G/DL (ref 6.4–8.2)
PROT SERPL-MCNC: 4.9 G/DL (ref 6.4–8.2)
PROT SERPL-MCNC: 5.2 G/DL (ref 6.4–8.2)
PROT SERPL-MCNC: 5.4 G/DL (ref 6.4–8.2)
PROT SERPL-MCNC: 5.5 G/DL (ref 6.4–8.2)
PROT SERPL-MCNC: 5.6 G/DL (ref 6.4–8.2)
PROT SERPL-MCNC: 5.6 G/DL (ref 6.4–8.2)
PROT SERPL-MCNC: 5.8 G/DL (ref 6.4–8.2)
PROT SERPL-MCNC: 5.9 G/DL (ref 6.4–8.2)
PROT SERPL-MCNC: 6.1 G/DL (ref 6.4–8.2)
PROT SERPL-MCNC: 6.1 G/DL (ref 6.4–8.2)
PROT SERPL-MCNC: 6.2 G/DL (ref 6.4–8.2)
PROT SERPL-MCNC: 6.3 G/DL (ref 6.4–8.2)
PROT SERPL-MCNC: 6.5 G/DL (ref 6.4–8.2)
PROT SERPL-MCNC: 6.5 G/DL (ref 6.4–8.2)
PROT SERPL-MCNC: 6.6 G/DL (ref 6.4–8.2)
PROT SERPL-MCNC: 6.7 G/DL (ref 6.4–8.2)
PROT SERPL-MCNC: 6.7 G/DL (ref 6.4–8.2)
PROT SERPL-MCNC: 6.8 G/DL (ref 6.4–8.2)
PROT SERPL-MCNC: 6.9 G/DL (ref 6.4–8.2)
PROT SERPL-MCNC: 7.3 G/DL (ref 6.4–8.2)
PROT SERPL-MCNC: 7.4 G/DL (ref 6.4–8.2)
PROT SERPL-MCNC: 7.5 G/DL (ref 6.4–8.2)
PROT SERPL-MCNC: 8 G/DL (ref 6.4–8.2)
PROT UR STRIP-MCNC: 100 MG/DL
PROT UR STRIP-MCNC: 100 MG/DL
Q-T INTERVAL, ECG07: 296 MS
Q-T INTERVAL, ECG07: 316 MS
Q-T INTERVAL, ECG07: 346 MS
Q-T INTERVAL, ECG07: 370 MS
Q-T INTERVAL, ECG07: 470 MS
QRS DURATION, ECG06: 76 MS
QRS DURATION, ECG06: 76 MS
QRS DURATION, ECG06: 82 MS
QRS DURATION, ECG06: 84 MS
QRS DURATION, ECG06: 86 MS
QTC CALCULATION (BEZET), ECG08: 370 MS
QTC CALCULATION (BEZET), ECG08: 461 MS
QTC CALCULATION (BEZET), ECG08: 482 MS
QTC CALCULATION (BEZET), ECG08: 497 MS
QTC CALCULATION (BEZET), ECG08: 571 MS
RBC # BLD AUTO: 1.31 M/UL (ref 3.8–5.2)
RBC # BLD AUTO: 2.3 M/UL (ref 3.8–5.2)
RBC # BLD AUTO: 2.3 M/UL (ref 3.8–5.2)
RBC # BLD AUTO: 2.36 M/UL (ref 3.8–5.2)
RBC # BLD AUTO: 2.42 M/UL (ref 3.8–5.2)
RBC # BLD AUTO: 2.5 M/UL (ref 3.8–5.2)
RBC # BLD AUTO: 2.51 M/UL (ref 3.8–5.2)
RBC # BLD AUTO: 2.51 M/UL (ref 3.8–5.2)
RBC # BLD AUTO: 2.53 M/UL (ref 3.8–5.2)
RBC # BLD AUTO: 2.53 M/UL (ref 3.8–5.2)
RBC # BLD AUTO: 2.54 M/UL (ref 3.8–5.2)
RBC # BLD AUTO: 2.58 M/UL (ref 3.8–5.2)
RBC # BLD AUTO: 2.59 M/UL (ref 3.8–5.2)
RBC # BLD AUTO: 2.6 M/UL (ref 3.8–5.2)
RBC # BLD AUTO: 2.6 M/UL (ref 3.8–5.2)
RBC # BLD AUTO: 2.65 M/UL (ref 3.8–5.2)
RBC # BLD AUTO: 2.81 M/UL (ref 3.8–5.2)
RBC # BLD AUTO: 3.01 M/UL (ref 3.8–5.2)
RBC # BLD AUTO: 3.2 M/UL (ref 3.8–5.2)
RBC # BLD AUTO: 3.28 M/UL (ref 3.8–5.2)
RBC # BLD AUTO: 3.29 M/UL (ref 3.8–5.2)
RBC # BLD AUTO: 3.31 M/UL (ref 3.8–5.2)
RBC # BLD AUTO: 3.34 M/UL (ref 3.8–5.2)
RBC # BLD AUTO: 3.43 M/UL (ref 3.8–5.2)
RBC # BLD AUTO: 3.5 M/UL (ref 3.8–5.2)
RBC # BLD AUTO: 3.53 M/UL (ref 3.8–5.2)
RBC # BLD AUTO: 3.6 M/UL (ref 3.8–5.2)
RBC # BLD AUTO: 3.63 M/UL (ref 3.8–5.2)
RBC # BLD AUTO: 3.85 M/UL (ref 3.8–5.2)
RBC # BLD AUTO: 4.03 M/UL (ref 3.8–5.2)
RBC # BLD AUTO: 4.21 M/UL (ref 3.8–5.2)
RBC #/AREA URNS HPF: ABNORMAL /HPF (ref 0–5)
RBC #/AREA URNS HPF: ABNORMAL /HPF (ref 0–5)
RBC MORPH BLD: ABNORMAL
SAO2 % BLD: 100 %
SAO2 % BLD: 100 %
SAO2 % BLD: 77 %
SAO2 % BLD: 86 %
SAO2 % BLD: 89 %
SAO2 % BLD: 91 %
SAO2 % BLD: 92 %
SAO2 % BLD: 93 %
SAO2 % BLD: 94 %
SAO2 % BLD: 94 %
SAO2 % BLD: 95 %
SAO2 % BLD: 96 %
SAO2 % BLD: 97 %
SAO2 % BLD: 98 %
SAO2 % BLD: 99 %
SAO2% DEVICE SAO2% SENSOR NAME: ABNORMAL
SERVICE CMNT-IMP: ABNORMAL
SODIUM SERPL-SCNC: 130 MMOL/L (ref 136–145)
SODIUM SERPL-SCNC: 130 MMOL/L (ref 136–145)
SODIUM SERPL-SCNC: 131 MMOL/L (ref 136–145)
SODIUM SERPL-SCNC: 132 MMOL/L (ref 136–145)
SODIUM SERPL-SCNC: 133 MMOL/L (ref 136–145)
SODIUM SERPL-SCNC: 133 MMOL/L (ref 136–145)
SODIUM SERPL-SCNC: 135 MMOL/L (ref 136–145)
SODIUM SERPL-SCNC: 136 MMOL/L (ref 136–145)
SODIUM SERPL-SCNC: 137 MMOL/L (ref 136–145)
SODIUM SERPL-SCNC: 138 MMOL/L (ref 136–145)
SODIUM SERPL-SCNC: 139 MMOL/L (ref 136–145)
SODIUM SERPL-SCNC: 139 MMOL/L (ref 136–145)
SODIUM SERPL-SCNC: 140 MMOL/L (ref 136–145)
SODIUM SERPL-SCNC: 140 MMOL/L (ref 136–145)
SODIUM SERPL-SCNC: 142 MMOL/L (ref 136–145)
SODIUM SERPL-SCNC: 143 MMOL/L (ref 136–145)
SODIUM SERPL-SCNC: 143 MMOL/L (ref 136–145)
SODIUM SERPL-SCNC: 144 MMOL/L (ref 136–145)
SODIUM SERPL-SCNC: 145 MMOL/L (ref 136–145)
SODIUM SERPL-SCNC: 146 MMOL/L (ref 136–145)
SODIUM SERPL-SCNC: 147 MMOL/L (ref 136–145)
SODIUM SERPL-SCNC: 147 MMOL/L (ref 136–145)
SODIUM SERPL-SCNC: 148 MMOL/L (ref 136–145)
SODIUM SERPL-SCNC: 149 MMOL/L (ref 136–145)
SODIUM SERPL-SCNC: 150 MMOL/L (ref 136–145)
SODIUM UR-SCNC: 10 MMOL/L
SODIUM UR-SCNC: 36 MMOL/L
SP GR UR REFRACTOMETRY: 1.01 (ref 1–1.03)
SP GR UR REFRACTOMETRY: 1.03 (ref 1–1.03)
SPECIAL REQUESTS,SREQ: ABNORMAL
SPECIAL REQUESTS,SREQ: NORMAL
SPECIAL REQUESTS,SREQ: NORMAL
SPECIMEN EXP DATE BLD: NORMAL
SPECIMEN SITE: ABNORMAL
SPECIMEN SOURCE: ABNORMAL
SPECIMEN SOURCE: ABNORMAL
STATUS OF UNIT,%ST: NORMAL
THERAPEUTIC RANGE,PTTT: ABNORMAL SEC (ref 82–109)
THERAPEUTIC RANGE,PTTT: NORMAL SEC (ref 82–109)
TRANSFUSION STATUS PATIENT QL: NORMAL
TROPONIN-HIGH SENSITIVITY: 5686 NG/L (ref 0–51)
UA: UC IF INDICATED,UAUC: ABNORMAL
UNIT DIVISION, %UDIV: 0
UROBILINOGEN UR QL STRIP.AUTO: 0.1 EU/DL (ref 0.1–1)
UROBILINOGEN UR QL STRIP.AUTO: 4 EU/DL (ref 0.1–1)
VENTILATION MODE VENT: ABNORMAL
VENTRICULAR RATE, ECG03: 124 BPM
VENTRICULAR RATE, ECG03: 140 BPM
VENTRICULAR RATE, ECG03: 143 BPM
VENTRICULAR RATE, ECG03: 58 BPM
VENTRICULAR RATE, ECG03: 94 BPM
VT SETTING VENT: 450 ML
VT SETTING VENT: 500 ML
WBC # BLD AUTO: 10.5 K/UL (ref 3.6–11)
WBC # BLD AUTO: 10.6 K/UL (ref 3.6–11)
WBC # BLD AUTO: 10.6 K/UL (ref 3.6–11)
WBC # BLD AUTO: 11.1 K/UL (ref 3.6–11)
WBC # BLD AUTO: 11.2 K/UL (ref 3.6–11)
WBC # BLD AUTO: 11.7 K/UL (ref 3.6–11)
WBC # BLD AUTO: 14.2 K/UL (ref 3.6–11)
WBC # BLD AUTO: 14.6 K/UL (ref 3.6–11)
WBC # BLD AUTO: 16.2 K/UL (ref 3.6–11)
WBC # BLD AUTO: 16.5 K/UL (ref 3.6–11)
WBC # BLD AUTO: 16.9 K/UL (ref 3.6–11)
WBC # BLD AUTO: 17.1 K/UL (ref 3.6–11)
WBC # BLD AUTO: 19.3 K/UL (ref 3.6–11)
WBC # BLD AUTO: 21.4 K/UL (ref 3.6–11)
WBC # BLD AUTO: 23.9 K/UL (ref 3.6–11)
WBC # BLD AUTO: 26.9 K/UL (ref 3.6–11)
WBC # BLD AUTO: 28.1 K/UL (ref 3.6–11)
WBC # BLD AUTO: 3.8 K/UL (ref 3.6–11)
WBC # BLD AUTO: 31.3 K/UL (ref 3.6–11)
WBC # BLD AUTO: 34.5 K/UL (ref 3.6–11)
WBC # BLD AUTO: 36.8 K/UL (ref 3.6–11)
WBC # BLD AUTO: 4.7 K/UL (ref 3.6–11)
WBC # BLD AUTO: 5.8 K/UL (ref 3.6–11)
WBC # BLD AUTO: 6.2 K/UL (ref 3.6–11)
WBC # BLD AUTO: 7.4 K/UL (ref 3.6–11)
WBC # BLD AUTO: 7.5 K/UL (ref 3.6–11)
WBC # BLD AUTO: 8.3 K/UL (ref 3.6–11)
WBC # BLD AUTO: 8.5 K/UL (ref 3.6–11)
WBC # BLD AUTO: 9.1 K/UL (ref 3.6–11)
WBC # BLD AUTO: 9.5 K/UL (ref 3.6–11)
WBC # BLD AUTO: 9.8 K/UL (ref 3.6–11)
WBC URNS QL MICRO: ABNORMAL /HPF (ref 0–4)
WBC URNS QL MICRO: ABNORMAL /HPF (ref 0–4)

## 2022-01-01 PROCEDURE — 80053 COMPREHEN METABOLIC PANEL: CPT

## 2022-01-01 PROCEDURE — 74011250636 HC RX REV CODE- 250/636: Performed by: SURGERY

## 2022-01-01 PROCEDURE — 74011250636 HC RX REV CODE- 250/636: Performed by: FAMILY MEDICINE

## 2022-01-01 PROCEDURE — 93005 ELECTROCARDIOGRAM TRACING: CPT

## 2022-01-01 PROCEDURE — 93922 UPR/L XTREMITY ART 2 LEVELS: CPT | Performed by: SURGERY

## 2022-01-01 PROCEDURE — 74011000250 HC RX REV CODE- 250: Performed by: FAMILY MEDICINE

## 2022-01-01 PROCEDURE — 74011250636 HC RX REV CODE- 250/636: Performed by: INTERNAL MEDICINE

## 2022-01-01 PROCEDURE — 71045 X-RAY EXAM CHEST 1 VIEW: CPT

## 2022-01-01 PROCEDURE — 82803 BLOOD GASES ANY COMBINATION: CPT

## 2022-01-01 PROCEDURE — 74011000258 HC RX REV CODE- 258: Performed by: INTERNAL MEDICINE

## 2022-01-01 PROCEDURE — 65610000006 HC RM INTENSIVE CARE

## 2022-01-01 PROCEDURE — 94003 VENT MGMT INPAT SUBQ DAY: CPT

## 2022-01-01 PROCEDURE — 82962 GLUCOSE BLOOD TEST: CPT

## 2022-01-01 PROCEDURE — 77010033678 HC OXYGEN DAILY

## 2022-01-01 PROCEDURE — 85025 COMPLETE CBC W/AUTO DIFF WBC: CPT

## 2022-01-01 PROCEDURE — 74011250637 HC RX REV CODE- 250/637: Performed by: FAMILY MEDICINE

## 2022-01-01 PROCEDURE — 36415 COLL VENOUS BLD VENIPUNCTURE: CPT

## 2022-01-01 PROCEDURE — 85027 COMPLETE CBC AUTOMATED: CPT

## 2022-01-01 PROCEDURE — 74011250637 HC RX REV CODE- 250/637: Performed by: INTERNAL MEDICINE

## 2022-01-01 PROCEDURE — 74011000250 HC RX REV CODE- 250: Performed by: INTERNAL MEDICINE

## 2022-01-01 PROCEDURE — 74011636637 HC RX REV CODE- 636/637: Performed by: FAMILY MEDICINE

## 2022-01-01 PROCEDURE — 99285 EMERGENCY DEPT VISIT HI MDM: CPT

## 2022-01-01 PROCEDURE — 76937 US GUIDE VASCULAR ACCESS: CPT

## 2022-01-01 PROCEDURE — 82570 ASSAY OF URINE CREATININE: CPT

## 2022-01-01 PROCEDURE — 96374 THER/PROPH/DIAG INJ IV PUSH: CPT

## 2022-01-01 PROCEDURE — 30233N1 TRANSFUSION OF NONAUTOLOGOUS RED BLOOD CELLS INTO PERIPHERAL VEIN, PERCUTANEOUS APPROACH: ICD-10-PCS | Performed by: INTERNAL MEDICINE

## 2022-01-01 PROCEDURE — 36600 WITHDRAWAL OF ARTERIAL BLOOD: CPT

## 2022-01-01 PROCEDURE — 0BH17EZ INSERTION OF ENDOTRACHEAL AIRWAY INTO TRACHEA, VIA NATURAL OR ARTIFICIAL OPENING: ICD-10-PCS | Performed by: INTERNAL MEDICINE

## 2022-01-01 PROCEDURE — 93922 UPR/L XTREMITY ART 2 LEVELS: CPT

## 2022-01-01 PROCEDURE — 74011250636 HC RX REV CODE- 250/636

## 2022-01-01 PROCEDURE — 99291 CRITICAL CARE FIRST HOUR: CPT | Performed by: SURGERY

## 2022-01-01 PROCEDURE — 84300 ASSAY OF URINE SODIUM: CPT

## 2022-01-01 PROCEDURE — 5A1955Z RESPIRATORY VENTILATION, GREATER THAN 96 CONSECUTIVE HOURS: ICD-10-PCS | Performed by: INTERNAL MEDICINE

## 2022-01-01 PROCEDURE — 86900 BLOOD TYPING SEROLOGIC ABO: CPT

## 2022-01-01 PROCEDURE — 94761 N-INVAS EAR/PLS OXIMETRY MLT: CPT

## 2022-01-01 PROCEDURE — 97530 THERAPEUTIC ACTIVITIES: CPT

## 2022-01-01 PROCEDURE — 65270000029 HC RM PRIVATE

## 2022-01-01 PROCEDURE — 86140 C-REACTIVE PROTEIN: CPT

## 2022-01-01 PROCEDURE — 86923 COMPATIBILITY TEST ELECTRIC: CPT

## 2022-01-01 PROCEDURE — 74011250636 HC RX REV CODE- 250/636: Performed by: EMERGENCY MEDICINE

## 2022-01-01 PROCEDURE — 83735 ASSAY OF MAGNESIUM: CPT

## 2022-01-01 PROCEDURE — 82436 ASSAY OF URINE CHLORIDE: CPT

## 2022-01-01 PROCEDURE — 83605 ASSAY OF LACTIC ACID: CPT

## 2022-01-01 PROCEDURE — 99223 1ST HOSP IP/OBS HIGH 75: CPT | Performed by: SURGERY

## 2022-01-01 PROCEDURE — P9047 ALBUMIN (HUMAN), 25%, 50ML: HCPCS | Performed by: INTERNAL MEDICINE

## 2022-01-01 PROCEDURE — 87635 SARS-COV-2 COVID-19 AMP PRB: CPT

## 2022-01-01 PROCEDURE — 84145 PROCALCITONIN (PCT): CPT

## 2022-01-01 PROCEDURE — 87040 BLOOD CULTURE FOR BACTERIA: CPT

## 2022-01-01 PROCEDURE — 93306 TTE W/DOPPLER COMPLETE: CPT

## 2022-01-01 PROCEDURE — 93931 UPPER EXTREMITY STUDY: CPT | Performed by: SURGERY

## 2022-01-01 PROCEDURE — XW0DXM6 INTRODUCTION OF BARICITINIB INTO MOUTH AND PHARYNX, EXTERNAL APPROACH, NEW TECHNOLOGY GROUP 6: ICD-10-PCS | Performed by: FAMILY MEDICINE

## 2022-01-01 PROCEDURE — 81001 URINALYSIS AUTO W/SCOPE: CPT

## 2022-01-01 PROCEDURE — 85730 THROMBOPLASTIN TIME PARTIAL: CPT

## 2022-01-01 PROCEDURE — 85018 HEMOGLOBIN: CPT

## 2022-01-01 PROCEDURE — 97165 OT EVAL LOW COMPLEX 30 MIN: CPT

## 2022-01-01 PROCEDURE — C1894 INTRO/SHEATH, NON-LASER: HCPCS

## 2022-01-01 PROCEDURE — 80069 RENAL FUNCTION PANEL: CPT

## 2022-01-01 PROCEDURE — 70450 CT HEAD/BRAIN W/O DYE: CPT

## 2022-01-01 PROCEDURE — 93931 UPPER EXTREMITY STUDY: CPT

## 2022-01-01 PROCEDURE — 94002 VENT MGMT INPAT INIT DAY: CPT

## 2022-01-01 PROCEDURE — 74011250636 HC RX REV CODE- 250/636: Performed by: PHYSICIAN ASSISTANT

## 2022-01-01 PROCEDURE — 74011000250 HC RX REV CODE- 250

## 2022-01-01 PROCEDURE — 94760 N-INVAS EAR/PLS OXIMETRY 1: CPT

## 2022-01-01 PROCEDURE — 84133 ASSAY OF URINE POTASSIUM: CPT

## 2022-01-01 PROCEDURE — 84484 ASSAY OF TROPONIN QUANT: CPT

## 2022-01-01 PROCEDURE — 87804 INFLUENZA ASSAY W/OPTIC: CPT

## 2022-01-01 PROCEDURE — P9016 RBC LEUKOCYTES REDUCED: HCPCS

## 2022-01-01 PROCEDURE — 3E0333Z INTRODUCTION OF ANTI-INFLAMMATORY INTO PERIPHERAL VEIN, PERCUTANEOUS APPROACH: ICD-10-PCS | Performed by: FAMILY MEDICINE

## 2022-01-01 PROCEDURE — 97161 PT EVAL LOW COMPLEX 20 MIN: CPT

## 2022-01-01 PROCEDURE — 36430 TRANSFUSION BLD/BLD COMPNT: CPT

## 2022-01-01 PROCEDURE — 83880 ASSAY OF NATRIURETIC PEPTIDE: CPT

## 2022-01-01 PROCEDURE — 31500 INSERT EMERGENCY AIRWAY: CPT

## 2022-01-01 RX ORDER — DEXTROSE MONOHYDRATE 50 MG/ML
50 INJECTION, SOLUTION INTRAVENOUS CONTINUOUS
Status: DISCONTINUED | OUTPATIENT
Start: 2022-01-01 | End: 2022-01-01

## 2022-01-01 RX ORDER — DEXAMETHASONE SODIUM PHOSPHATE 4 MG/ML
4 INJECTION, SOLUTION INTRA-ARTICULAR; INTRALESIONAL; INTRAMUSCULAR; INTRAVENOUS; SOFT TISSUE EVERY 12 HOURS
Status: DISCONTINUED | OUTPATIENT
Start: 2022-01-01 | End: 2022-01-01

## 2022-01-01 RX ORDER — FUROSEMIDE 10 MG/ML
40 INJECTION INTRAMUSCULAR; INTRAVENOUS ONCE
Status: COMPLETED | OUTPATIENT
Start: 2022-01-01 | End: 2022-01-01

## 2022-01-01 RX ORDER — POLYETHYLENE GLYCOL 3350 17 G/17G
17 POWDER, FOR SOLUTION ORAL DAILY PRN
Status: DISCONTINUED | OUTPATIENT
Start: 2022-01-01 | End: 2022-01-01 | Stop reason: HOSPADM

## 2022-01-01 RX ORDER — MORPHINE SULFATE 2 MG/ML
2 INJECTION, SOLUTION INTRAMUSCULAR; INTRAVENOUS
Status: DISCONTINUED | OUTPATIENT
Start: 2022-01-01 | End: 2022-01-01

## 2022-01-01 RX ORDER — DEXTROSE 50 % IN WATER (D50W) INTRAVENOUS SYRINGE
25-50 AS NEEDED
Status: DISCONTINUED | OUTPATIENT
Start: 2022-01-01 | End: 2022-01-01

## 2022-01-01 RX ORDER — ONDANSETRON 2 MG/ML
4 INJECTION INTRAMUSCULAR; INTRAVENOUS
Status: DISCONTINUED | OUTPATIENT
Start: 2022-01-01 | End: 2022-01-01 | Stop reason: HOSPADM

## 2022-01-01 RX ORDER — DEXTROSE MONOHYDRATE, SODIUM CHLORIDE, SODIUM LACTATE, POTASSIUM CHLORIDE, CALCIUM CHLORIDE 5; 600; 310; 179; 20 G/100ML; MG/100ML; MG/100ML; MG/100ML; MG/100ML
INJECTION, SOLUTION INTRAVENOUS CONTINUOUS
Status: DISCONTINUED | OUTPATIENT
Start: 2022-01-01 | End: 2022-01-01

## 2022-01-01 RX ORDER — NOREPINEPHRINE BITARTRATE/D5W 4MG/250ML
.5-16 PLASTIC BAG, INJECTION (ML) INTRAVENOUS
Status: DISCONTINUED | OUTPATIENT
Start: 2022-01-01 | End: 2022-01-01 | Stop reason: CLARIF

## 2022-01-01 RX ORDER — AZITHROMYCIN 500 MG/1
500 TABLET, FILM COATED ORAL DAILY
Qty: 4 TABLET | Refills: 0 | Status: SHIPPED | OUTPATIENT
Start: 2022-01-01 | End: 2022-01-01

## 2022-01-01 RX ORDER — SODIUM CHLORIDE 9 MG/ML
75 INJECTION, SOLUTION INTRAVENOUS CONTINUOUS
Status: DISCONTINUED | OUTPATIENT
Start: 2022-01-01 | End: 2022-01-01 | Stop reason: SDUPTHER

## 2022-01-01 RX ORDER — ACETAMINOPHEN 325 MG/1
650 TABLET ORAL
Status: DISCONTINUED | OUTPATIENT
Start: 2022-01-01 | End: 2022-01-01

## 2022-01-01 RX ORDER — ROCURONIUM BROMIDE 10 MG/ML
100 INJECTION, SOLUTION INTRAVENOUS
Status: DISCONTINUED | OUTPATIENT
Start: 2022-01-01 | End: 2022-01-01

## 2022-01-01 RX ORDER — INSULIN GLARGINE 100 [IU]/ML
10 INJECTION, SOLUTION SUBCUTANEOUS DAILY
Status: DISCONTINUED | OUTPATIENT
Start: 2022-01-01 | End: 2022-01-01

## 2022-01-01 RX ORDER — NOREPINEPHRINE BIT/0.9 % NACL 8 MG/250ML
.5-16 INFUSION BOTTLE (ML) INTRAVENOUS
Status: DISCONTINUED | OUTPATIENT
Start: 2022-01-01 | End: 2022-01-01 | Stop reason: HOSPADM

## 2022-01-01 RX ORDER — POLYETHYLENE GLYCOL 3350 17 G/17G
17 POWDER, FOR SOLUTION ORAL DAILY PRN
Status: DISCONTINUED | OUTPATIENT
Start: 2022-01-01 | End: 2022-01-01

## 2022-01-01 RX ORDER — SODIUM CHLORIDE 9 MG/ML
75 INJECTION, SOLUTION INTRAVENOUS CONTINUOUS
Status: DISCONTINUED | OUTPATIENT
Start: 2022-01-01 | End: 2022-01-01

## 2022-01-01 RX ORDER — ADENOSINE 3 MG/ML
6 INJECTION, SOLUTION INTRAVENOUS
Status: COMPLETED | OUTPATIENT
Start: 2022-01-01 | End: 2022-01-01

## 2022-01-01 RX ORDER — FUROSEMIDE 10 MG/ML
40 INJECTION INTRAMUSCULAR; INTRAVENOUS EVERY 8 HOURS
Status: COMPLETED | OUTPATIENT
Start: 2022-01-01 | End: 2022-01-01

## 2022-01-01 RX ORDER — BUDESONIDE AND FORMOTEROL FUMARATE DIHYDRATE 160; 4.5 UG/1; UG/1
2 AEROSOL RESPIRATORY (INHALATION) 2 TIMES DAILY
Qty: 10.2 G | Refills: 0 | Status: SHIPPED | OUTPATIENT
Start: 2022-01-01

## 2022-01-01 RX ORDER — SODIUM POLYSTYRENE SULFONATE 15 G/60ML
30 SUSPENSION ORAL; RECTAL
Status: COMPLETED | OUTPATIENT
Start: 2022-01-01 | End: 2022-01-01

## 2022-01-01 RX ORDER — BUDESONIDE AND FORMOTEROL FUMARATE DIHYDRATE 160; 4.5 UG/1; UG/1
2 AEROSOL RESPIRATORY (INHALATION) 2 TIMES DAILY
Status: DISCONTINUED | OUTPATIENT
Start: 2022-01-01 | End: 2022-01-01

## 2022-01-01 RX ORDER — HYDROXYZINE 50 MG/ML
25 INJECTION, SOLUTION INTRAMUSCULAR
Status: DISCONTINUED | OUTPATIENT
Start: 2022-01-01 | End: 2022-01-01 | Stop reason: HOSPADM

## 2022-01-01 RX ORDER — AZITHROMYCIN 500 MG/1
500 TABLET, FILM COATED ORAL DAILY
Status: DISCONTINUED | OUTPATIENT
Start: 2022-01-01 | End: 2022-01-01

## 2022-01-01 RX ORDER — BUDESONIDE AND FORMOTEROL FUMARATE DIHYDRATE 160; 4.5 UG/1; UG/1
2 AEROSOL RESPIRATORY (INHALATION) 2 TIMES DAILY
Status: DISCONTINUED | OUTPATIENT
Start: 2022-01-01 | End: 2022-01-01 | Stop reason: HOSPADM

## 2022-01-01 RX ORDER — ACETAMINOPHEN 325 MG/1
650 TABLET ORAL
Status: DISCONTINUED | OUTPATIENT
Start: 2022-01-01 | End: 2022-01-01 | Stop reason: HOSPADM

## 2022-01-01 RX ORDER — ETOMIDATE 2 MG/ML
INJECTION INTRAVENOUS
Status: COMPLETED
Start: 2022-01-01 | End: 2022-01-01

## 2022-01-01 RX ORDER — DEXAMETHASONE SODIUM PHOSPHATE 4 MG/ML
4 INJECTION, SOLUTION INTRA-ARTICULAR; INTRALESIONAL; INTRAMUSCULAR; INTRAVENOUS; SOFT TISSUE EVERY 12 HOURS
Status: DISCONTINUED | OUTPATIENT
Start: 2022-01-01 | End: 2022-01-01 | Stop reason: HOSPADM

## 2022-01-01 RX ORDER — ALBUTEROL SULFATE 90 UG/1
2 AEROSOL, METERED RESPIRATORY (INHALATION)
Status: DISCONTINUED | OUTPATIENT
Start: 2022-01-01 | End: 2022-01-01 | Stop reason: HOSPADM

## 2022-01-01 RX ORDER — POTASSIUM CHLORIDE 750 MG/1
40 TABLET, FILM COATED, EXTENDED RELEASE ORAL
Status: ACTIVE | OUTPATIENT
Start: 2022-01-01 | End: 2022-01-01

## 2022-01-01 RX ORDER — ACETAMINOPHEN 650 MG/1
650 SUPPOSITORY RECTAL
Status: DISCONTINUED | OUTPATIENT
Start: 2022-01-01 | End: 2022-01-01 | Stop reason: HOSPADM

## 2022-01-01 RX ORDER — INSULIN LISPRO 100 [IU]/ML
INJECTION, SOLUTION INTRAVENOUS; SUBCUTANEOUS EVERY 6 HOURS
Status: DISCONTINUED | OUTPATIENT
Start: 2022-01-01 | End: 2022-01-01

## 2022-01-01 RX ORDER — ALBUTEROL SULFATE 90 UG/1
2 AEROSOL, METERED RESPIRATORY (INHALATION)
Status: DISCONTINUED | OUTPATIENT
Start: 2022-01-01 | End: 2022-01-01

## 2022-01-01 RX ORDER — ACETAMINOPHEN 650 MG/1
650 SUPPOSITORY RECTAL
Status: DISCONTINUED | OUTPATIENT
Start: 2022-01-01 | End: 2022-01-01

## 2022-01-01 RX ORDER — NOREPINEPHRINE BITARTRATE/D5W 8 MG/250ML
.5-3 PLASTIC BAG, INJECTION (ML) INTRAVENOUS
Status: DISCONTINUED | OUTPATIENT
Start: 2022-01-01 | End: 2022-01-01

## 2022-01-01 RX ORDER — POTASSIUM CHLORIDE 7.45 MG/ML
10 INJECTION INTRAVENOUS
Status: COMPLETED | OUTPATIENT
Start: 2022-01-01 | End: 2022-01-01

## 2022-01-01 RX ORDER — DEXAMETHASONE SODIUM PHOSPHATE 4 MG/ML
4 INJECTION, SOLUTION INTRA-ARTICULAR; INTRALESIONAL; INTRAMUSCULAR; INTRAVENOUS; SOFT TISSUE EVERY 6 HOURS
Status: DISCONTINUED | OUTPATIENT
Start: 2022-01-01 | End: 2022-01-01

## 2022-01-01 RX ORDER — HYDROXYZINE PAMOATE 25 MG/1
25 CAPSULE ORAL
Status: DISCONTINUED | OUTPATIENT
Start: 2022-01-01 | End: 2022-01-01 | Stop reason: HOSPADM

## 2022-01-01 RX ORDER — NOREPINEPHRINE BIT/0.9 % NACL 8 MG/250ML
INFUSION BOTTLE (ML) INTRAVENOUS
Status: COMPLETED
Start: 2022-01-01 | End: 2022-01-01

## 2022-01-01 RX ORDER — POTASSIUM CHLORIDE 750 MG/1
40 TABLET, FILM COATED, EXTENDED RELEASE ORAL
Status: COMPLETED | OUTPATIENT
Start: 2022-01-01 | End: 2022-01-01

## 2022-01-01 RX ORDER — HEPARIN SODIUM 5000 [USP'U]/ML
5000 INJECTION, SOLUTION INTRAVENOUS; SUBCUTANEOUS EVERY 8 HOURS
Status: DISCONTINUED | OUTPATIENT
Start: 2022-01-01 | End: 2022-01-01

## 2022-01-01 RX ORDER — MAGNESIUM SULFATE HEPTAHYDRATE 40 MG/ML
2 INJECTION, SOLUTION INTRAVENOUS ONCE
Status: COMPLETED | OUTPATIENT
Start: 2022-01-01 | End: 2022-01-01

## 2022-01-01 RX ORDER — ENOXAPARIN SODIUM 100 MG/ML
40 INJECTION SUBCUTANEOUS DAILY
Status: DISCONTINUED | OUTPATIENT
Start: 2022-01-01 | End: 2022-01-01

## 2022-01-01 RX ORDER — DEXAMETHASONE SODIUM PHOSPHATE 10 MG/ML
6 INJECTION INTRAMUSCULAR; INTRAVENOUS ONCE
Status: COMPLETED | OUTPATIENT
Start: 2022-01-01 | End: 2022-01-01

## 2022-01-01 RX ORDER — ETOMIDATE 2 MG/ML
20 INJECTION INTRAVENOUS ONCE
Status: DISCONTINUED | OUTPATIENT
Start: 2022-01-01 | End: 2022-01-01

## 2022-01-01 RX ORDER — DILTIAZEM HYDROCHLORIDE 30 MG/1
30 TABLET, FILM COATED ORAL 3 TIMES DAILY
Status: DISCONTINUED | OUTPATIENT
Start: 2022-01-01 | End: 2022-01-01

## 2022-01-01 RX ORDER — ADENOSINE 3 MG/ML
INJECTION, SOLUTION INTRAVENOUS
Status: DISPENSED
Start: 2022-01-01 | End: 2022-01-01

## 2022-01-01 RX ORDER — DEXAMETHASONE 4 MG/1
TABLET ORAL
Qty: 20 TABLET | Refills: 0 | Status: SHIPPED | OUTPATIENT
Start: 2022-01-01

## 2022-01-01 RX ORDER — LORAZEPAM 2 MG/ML
1 INJECTION INTRAMUSCULAR ONCE
Status: DISCONTINUED | OUTPATIENT
Start: 2022-01-01 | End: 2022-01-01

## 2022-01-01 RX ORDER — LORAZEPAM 2 MG/ML
2 INJECTION INTRAMUSCULAR
Status: DISCONTINUED | OUTPATIENT
Start: 2022-01-01 | End: 2022-01-01 | Stop reason: HOSPADM

## 2022-01-01 RX ORDER — HALOPERIDOL 5 MG/ML
0.5 INJECTION INTRAMUSCULAR ONCE
Status: COMPLETED | OUTPATIENT
Start: 2022-01-01 | End: 2022-01-01

## 2022-01-01 RX ORDER — SODIUM CHLORIDE 9 MG/ML
50 INJECTION, SOLUTION INTRAVENOUS CONTINUOUS
Status: DISCONTINUED | OUTPATIENT
Start: 2022-01-01 | End: 2022-01-01

## 2022-01-01 RX ORDER — AZITHROMYCIN 500 MG/1
500 TABLET, FILM COATED ORAL DAILY
Status: DISCONTINUED | OUTPATIENT
Start: 2022-01-01 | End: 2022-01-01 | Stop reason: HOSPADM

## 2022-01-01 RX ORDER — ONDANSETRON 4 MG/1
4 TABLET, ORALLY DISINTEGRATING ORAL
Status: DISCONTINUED | OUTPATIENT
Start: 2022-01-01 | End: 2022-01-01

## 2022-01-01 RX ORDER — HEPARIN SODIUM 1000 [USP'U]/ML
40 INJECTION, SOLUTION INTRAVENOUS; SUBCUTANEOUS AS NEEDED
Status: DISCONTINUED | OUTPATIENT
Start: 2022-01-01 | End: 2022-01-01

## 2022-01-01 RX ORDER — HEPARIN SODIUM 1000 [USP'U]/ML
80 INJECTION, SOLUTION INTRAVENOUS; SUBCUTANEOUS AS NEEDED
Status: DISCONTINUED | OUTPATIENT
Start: 2022-01-01 | End: 2022-01-01

## 2022-01-01 RX ORDER — MORPHINE SULFATE 2 MG/ML
2 INJECTION, SOLUTION INTRAMUSCULAR; INTRAVENOUS
Status: DISCONTINUED | OUTPATIENT
Start: 2022-01-01 | End: 2022-01-01 | Stop reason: HOSPADM

## 2022-01-01 RX ORDER — ACETAZOLAMIDE 500 MG/1
500 CAPSULE, EXTENDED RELEASE ORAL EVERY 6 HOURS
Status: COMPLETED | OUTPATIENT
Start: 2022-01-01 | End: 2022-01-01

## 2022-01-01 RX ORDER — PROPOFOL 10 MG/ML
INJECTION, EMULSION INTRAVENOUS
Status: COMPLETED
Start: 2022-01-01 | End: 2022-01-01

## 2022-01-01 RX ORDER — ONDANSETRON 4 MG/1
4 TABLET, ORALLY DISINTEGRATING ORAL
Status: DISCONTINUED | OUTPATIENT
Start: 2022-01-01 | End: 2022-01-01 | Stop reason: HOSPADM

## 2022-01-01 RX ORDER — ALBUTEROL SULFATE 90 UG/1
2 AEROSOL, METERED RESPIRATORY (INHALATION)
Qty: 18 G | Refills: 0 | Status: SHIPPED | OUTPATIENT
Start: 2022-01-01

## 2022-01-01 RX ORDER — ALBUMIN HUMAN 250 G/1000ML
25 SOLUTION INTRAVENOUS 2 TIMES DAILY
Status: COMPLETED | OUTPATIENT
Start: 2022-01-01 | End: 2022-01-01

## 2022-01-01 RX ORDER — SODIUM CHLORIDE 9 MG/ML
250 INJECTION, SOLUTION INTRAVENOUS AS NEEDED
Status: DISCONTINUED | OUTPATIENT
Start: 2022-01-01 | End: 2022-01-01

## 2022-01-01 RX ORDER — ONDANSETRON 2 MG/ML
4 INJECTION INTRAMUSCULAR; INTRAVENOUS
Status: DISCONTINUED | OUTPATIENT
Start: 2022-01-01 | End: 2022-01-01

## 2022-01-01 RX ORDER — SODIUM POLYSTYRENE SULFONATE 15 G/60ML
15 SUSPENSION ORAL; RECTAL
Status: COMPLETED | OUTPATIENT
Start: 2022-01-01 | End: 2022-01-01

## 2022-01-01 RX ORDER — MAGNESIUM SULFATE 100 %
4 CRYSTALS MISCELLANEOUS AS NEEDED
Status: DISCONTINUED | OUTPATIENT
Start: 2022-01-01 | End: 2022-01-01 | Stop reason: HOSPADM

## 2022-01-01 RX ORDER — DEXTROSE MONOHYDRATE 100 MG/ML
125-250 INJECTION, SOLUTION INTRAVENOUS AS NEEDED
Status: DISCONTINUED | OUTPATIENT
Start: 2022-01-01 | End: 2022-01-01 | Stop reason: HOSPADM

## 2022-01-01 RX ORDER — SODIUM CHLORIDE 9 MG/ML
250 INJECTION, SOLUTION INTRAVENOUS AS NEEDED
Status: DISCONTINUED | OUTPATIENT
Start: 2022-01-01 | End: 2022-01-01 | Stop reason: HOSPADM

## 2022-01-01 RX ORDER — DEXTROSE MONOHYDRATE AND SODIUM CHLORIDE 5; .45 G/100ML; G/100ML
50 INJECTION, SOLUTION INTRAVENOUS CONTINUOUS
Status: DISCONTINUED | OUTPATIENT
Start: 2022-01-01 | End: 2022-01-01

## 2022-01-01 RX ORDER — POTASSIUM CHLORIDE 1.5 G/1.77G
40 POWDER, FOR SOLUTION ORAL
Status: COMPLETED | OUTPATIENT
Start: 2022-01-01 | End: 2022-01-01

## 2022-01-01 RX ORDER — SODIUM CHLORIDE 9 MG/ML
75 INJECTION, SOLUTION INTRAVENOUS CONTINUOUS
Status: DISCONTINUED | OUTPATIENT
Start: 2022-01-01 | End: 2022-01-01 | Stop reason: HOSPADM

## 2022-01-01 RX ORDER — ENOXAPARIN SODIUM 100 MG/ML
40 INJECTION SUBCUTANEOUS DAILY
Status: DISCONTINUED | OUTPATIENT
Start: 2022-01-01 | End: 2022-01-01 | Stop reason: HOSPADM

## 2022-01-01 RX ORDER — PROPOFOL 10 MG/ML
0-50 VIAL (ML) INTRAVENOUS
Status: DISCONTINUED | OUTPATIENT
Start: 2022-01-01 | End: 2022-01-01 | Stop reason: HOSPADM

## 2022-01-01 RX ORDER — DEXAMETHASONE SODIUM PHOSPHATE 4 MG/ML
4 INJECTION, SOLUTION INTRA-ARTICULAR; INTRALESIONAL; INTRAMUSCULAR; INTRAVENOUS; SOFT TISSUE EVERY 24 HOURS
Status: DISCONTINUED | OUTPATIENT
Start: 2022-01-01 | End: 2022-01-01

## 2022-01-01 RX ORDER — FUROSEMIDE 10 MG/ML
20 INJECTION INTRAMUSCULAR; INTRAVENOUS EVERY 12 HOURS
Status: DISCONTINUED | OUTPATIENT
Start: 2022-01-01 | End: 2022-01-01

## 2022-01-01 RX ADMIN — BARICITINIB 2 MG: 2 TABLET, FILM COATED ORAL at 08:01

## 2022-01-01 RX ADMIN — PROPOFOL 40 MCG/KG/MIN: 10 INJECTION, EMULSION INTRAVENOUS at 05:45

## 2022-01-01 RX ADMIN — BARICITINIB 2 MG: 2 TABLET, FILM COATED ORAL at 08:48

## 2022-01-01 RX ADMIN — BARICITINIB 2 MG: 2 TABLET, FILM COATED ORAL at 09:34

## 2022-01-01 RX ADMIN — SODIUM CHLORIDE 75 ML/HR: 9 INJECTION, SOLUTION INTRAVENOUS at 10:31

## 2022-01-01 RX ADMIN — PROPOFOL 25 MCG/KG/MIN: 10 INJECTION, EMULSION INTRAVENOUS at 04:24

## 2022-01-01 RX ADMIN — DILTIAZEM HYDROCHLORIDE 30 MG: 30 TABLET, FILM COATED ORAL at 16:44

## 2022-01-01 RX ADMIN — ENOXAPARIN SODIUM 40 MG: 100 INJECTION SUBCUTANEOUS at 08:42

## 2022-01-01 RX ADMIN — DEXAMETHASONE SODIUM PHOSPHATE 4 MG: 4 INJECTION, SOLUTION INTRA-ARTICULAR; INTRALESIONAL; INTRAMUSCULAR; INTRAVENOUS; SOFT TISSUE at 06:01

## 2022-01-01 RX ADMIN — ALBUMIN (HUMAN) 25 G: 0.25 INJECTION, SOLUTION INTRAVENOUS at 21:16

## 2022-01-01 RX ADMIN — DEXAMETHASONE SODIUM PHOSPHATE 4 MG: 4 INJECTION, SOLUTION INTRA-ARTICULAR; INTRALESIONAL; INTRAMUSCULAR; INTRAVENOUS; SOFT TISSUE at 12:00

## 2022-01-01 RX ADMIN — Medication 14 UNITS/KG/HR: at 14:10

## 2022-01-01 RX ADMIN — PROPOFOL 50 MCG/KG/MIN: 10 INJECTION, EMULSION INTRAVENOUS at 12:07

## 2022-01-01 RX ADMIN — Medication 50 MCG/HR: at 16:46

## 2022-01-01 RX ADMIN — FUROSEMIDE 20 MG: 10 INJECTION, SOLUTION INTRAMUSCULAR; INTRAVENOUS at 21:45

## 2022-01-01 RX ADMIN — FUROSEMIDE 20 MG: 10 INJECTION, SOLUTION INTRAMUSCULAR; INTRAVENOUS at 20:31

## 2022-01-01 RX ADMIN — DEXMEDETOMIDINE HYDROCHLORIDE 0.4 MCG/KG/HR: 100 INJECTION, SOLUTION, CONCENTRATE INTRAVENOUS at 17:57

## 2022-01-01 RX ADMIN — PROPOFOL 30 MCG/KG/MIN: 10 INJECTION, EMULSION INTRAVENOUS at 06:02

## 2022-01-01 RX ADMIN — SODIUM CHLORIDE, PRESERVATIVE FREE 1 G: 5 INJECTION INTRAVENOUS at 00:00

## 2022-01-01 RX ADMIN — PROPOFOL 30 MCG/KG/MIN: 10 INJECTION, EMULSION INTRAVENOUS at 23:46

## 2022-01-01 RX ADMIN — DILTIAZEM HYDROCHLORIDE 30 MG: 30 TABLET, FILM COATED ORAL at 08:23

## 2022-01-01 RX ADMIN — HEPARIN SODIUM 3040 UNITS: 1000 INJECTION, SOLUTION INTRAVENOUS; SUBCUTANEOUS at 06:45

## 2022-01-01 RX ADMIN — DEXMEDETOMIDINE HYDROCHLORIDE 0.5 MCG/KG/HR: 100 INJECTION, SOLUTION, CONCENTRATE INTRAVENOUS at 21:41

## 2022-01-01 RX ADMIN — DILTIAZEM HYDROCHLORIDE 30 MG: 30 TABLET, FILM COATED ORAL at 22:41

## 2022-01-01 RX ADMIN — DEXMEDETOMIDINE HYDROCHLORIDE 1 MCG/KG/HR: 100 INJECTION, SOLUTION, CONCENTRATE INTRAVENOUS at 10:09

## 2022-01-01 RX ADMIN — AZITHROMYCIN MONOHYDRATE 500 MG: 500 INJECTION, POWDER, LYOPHILIZED, FOR SOLUTION INTRAVENOUS at 09:34

## 2022-01-01 RX ADMIN — DEXAMETHASONE SODIUM PHOSPHATE 4 MG: 4 INJECTION, SOLUTION INTRA-ARTICULAR; INTRALESIONAL; INTRAMUSCULAR; INTRAVENOUS; SOFT TISSUE at 17:47

## 2022-01-01 RX ADMIN — DEXAMETHASONE SODIUM PHOSPHATE 4 MG: 4 INJECTION, SOLUTION INTRAMUSCULAR; INTRAVENOUS at 12:00

## 2022-01-01 RX ADMIN — PROPOFOL 30 MCG/KG/MIN: 10 INJECTION, EMULSION INTRAVENOUS at 18:26

## 2022-01-01 RX ADMIN — DEXAMETHASONE SODIUM PHOSPHATE 4 MG: 4 INJECTION, SOLUTION INTRA-ARTICULAR; INTRALESIONAL; INTRAMUSCULAR; INTRAVENOUS; SOFT TISSUE at 15:10

## 2022-01-01 RX ADMIN — BARICITINIB 2 MG: 2 TABLET, FILM COATED ORAL at 09:15

## 2022-01-01 RX ADMIN — PROPOFOL 40 MCG/KG/MIN: 10 INJECTION, EMULSION INTRAVENOUS at 04:13

## 2022-01-01 RX ADMIN — FUROSEMIDE 40 MG: 10 INJECTION, SOLUTION INTRAMUSCULAR; INTRAVENOUS at 09:19

## 2022-01-01 RX ADMIN — BARICITINIB 4 MG: 2 TABLET, FILM COATED ORAL at 08:58

## 2022-01-01 RX ADMIN — HALOPERIDOL LACTATE 0.5 MG: 5 INJECTION, SOLUTION INTRAMUSCULAR at 05:03

## 2022-01-01 RX ADMIN — POTASSIUM CHLORIDE 40 MEQ: 1.5 FOR SOLUTION ORAL at 15:46

## 2022-01-01 RX ADMIN — Medication 4 MCG/MIN: at 17:41

## 2022-01-01 RX ADMIN — SODIUM CHLORIDE, PRESERVATIVE FREE 1 G: 5 INJECTION INTRAVENOUS at 16:41

## 2022-01-01 RX ADMIN — INSULIN LISPRO 3 UNITS: 100 INJECTION, SOLUTION INTRAVENOUS; SUBCUTANEOUS at 12:34

## 2022-01-01 RX ADMIN — DEXAMETHASONE SODIUM PHOSPHATE 4 MG: 4 INJECTION, SOLUTION INTRA-ARTICULAR; INTRALESIONAL; INTRAMUSCULAR; INTRAVENOUS; SOFT TISSUE at 08:32

## 2022-01-01 RX ADMIN — BARICITINIB 2 MG: 2 TABLET, FILM COATED ORAL at 08:23

## 2022-01-01 RX ADMIN — DEXMEDETOMIDINE HYDROCHLORIDE 0.7 MCG/KG/HR: 100 INJECTION, SOLUTION, CONCENTRATE INTRAVENOUS at 12:02

## 2022-01-01 RX ADMIN — DILTIAZEM HYDROCHLORIDE 30 MG: 30 TABLET, FILM COATED ORAL at 08:16

## 2022-01-01 RX ADMIN — PROPOFOL 40 MCG/KG/MIN: 10 INJECTION, EMULSION INTRAVENOUS at 15:49

## 2022-01-01 RX ADMIN — INSULIN LISPRO 3 UNITS: 100 INJECTION, SOLUTION INTRAVENOUS; SUBCUTANEOUS at 18:11

## 2022-01-01 RX ADMIN — INSULIN GLARGINE 10 UNITS: 100 INJECTION, SOLUTION SUBCUTANEOUS at 08:18

## 2022-01-01 RX ADMIN — DEXAMETHASONE SODIUM PHOSPHATE 4 MG: 4 INJECTION, SOLUTION INTRA-ARTICULAR; INTRALESIONAL; INTRAMUSCULAR; INTRAVENOUS; SOFT TISSUE at 08:44

## 2022-01-01 RX ADMIN — DEXAMETHASONE SODIUM PHOSPHATE 4 MG: 4 INJECTION, SOLUTION INTRA-ARTICULAR; INTRALESIONAL; INTRAMUSCULAR; INTRAVENOUS; SOFT TISSUE at 05:16

## 2022-01-01 RX ADMIN — PROPOFOL 25 MCG/KG/MIN: 10 INJECTION, EMULSION INTRAVENOUS at 16:28

## 2022-01-01 RX ADMIN — HEPARIN SODIUM 5000 UNITS: 5000 INJECTION INTRAVENOUS; SUBCUTANEOUS at 21:03

## 2022-01-01 RX ADMIN — SODIUM CHLORIDE, PRESERVATIVE FREE 1 G: 5 INJECTION INTRAVENOUS at 08:00

## 2022-01-01 RX ADMIN — INSULIN GLARGINE 10 UNITS: 100 INJECTION, SOLUTION SUBCUTANEOUS at 11:25

## 2022-01-01 RX ADMIN — DEXMEDETOMIDINE HYDROCHLORIDE 0.6 MCG/KG/HR: 100 INJECTION, SOLUTION, CONCENTRATE INTRAVENOUS at 17:00

## 2022-01-01 RX ADMIN — DEXAMETHASONE SODIUM PHOSPHATE 4 MG: 4 INJECTION, SOLUTION INTRA-ARTICULAR; INTRALESIONAL; INTRAMUSCULAR; INTRAVENOUS; SOFT TISSUE at 20:25

## 2022-01-01 RX ADMIN — DILTIAZEM HYDROCHLORIDE 30 MG: 30 TABLET, FILM COATED ORAL at 08:48

## 2022-01-01 RX ADMIN — PROPOFOL 25 MCG/KG/MIN: 10 INJECTION, EMULSION INTRAVENOUS at 03:31

## 2022-01-01 RX ADMIN — POTASSIUM CHLORIDE 40 MEQ: 750 TABLET, FILM COATED, EXTENDED RELEASE ORAL at 13:05

## 2022-01-01 RX ADMIN — SODIUM CHLORIDE, PRESERVATIVE FREE 1 G: 5 INJECTION INTRAVENOUS at 00:13

## 2022-01-01 RX ADMIN — ENOXAPARIN SODIUM 40 MG: 40 INJECTION SUBCUTANEOUS at 09:51

## 2022-01-01 RX ADMIN — DEXAMETHASONE SODIUM PHOSPHATE 4 MG: 4 INJECTION, SOLUTION INTRA-ARTICULAR; INTRALESIONAL; INTRAMUSCULAR; INTRAVENOUS; SOFT TISSUE at 00:36

## 2022-01-01 RX ADMIN — SODIUM CHLORIDE, PRESERVATIVE FREE 1 G: 5 INJECTION INTRAVENOUS at 07:54

## 2022-01-01 RX ADMIN — SODIUM CHLORIDE, PRESERVATIVE FREE 1 G: 5 INJECTION INTRAVENOUS at 15:35

## 2022-01-01 RX ADMIN — Medication 50 MCG/HR: at 18:15

## 2022-01-01 RX ADMIN — SODIUM CHLORIDE, PRESERVATIVE FREE 1 G: 5 INJECTION INTRAVENOUS at 17:12

## 2022-01-01 RX ADMIN — SODIUM CHLORIDE, PRESERVATIVE FREE 1 G: 5 INJECTION INTRAVENOUS at 15:28

## 2022-01-01 RX ADMIN — DEXMEDETOMIDINE HYDROCHLORIDE 0.8 MCG/KG/HR: 100 INJECTION, SOLUTION, CONCENTRATE INTRAVENOUS at 20:00

## 2022-01-01 RX ADMIN — DEXAMETHASONE SODIUM PHOSPHATE 4 MG: 4 INJECTION, SOLUTION INTRA-ARTICULAR; INTRALESIONAL; INTRAMUSCULAR; INTRAVENOUS; SOFT TISSUE at 08:09

## 2022-01-01 RX ADMIN — DEXMEDETOMIDINE HYDROCHLORIDE 0.4 MCG/KG/HR: 100 INJECTION, SOLUTION, CONCENTRATE INTRAVENOUS at 02:34

## 2022-01-01 RX ADMIN — DEXAMETHASONE SODIUM PHOSPHATE 4 MG: 4 INJECTION, SOLUTION INTRA-ARTICULAR; INTRALESIONAL; INTRAMUSCULAR; INTRAVENOUS; SOFT TISSUE at 08:21

## 2022-01-01 RX ADMIN — ENOXAPARIN SODIUM 40 MG: 100 INJECTION SUBCUTANEOUS at 08:13

## 2022-01-01 RX ADMIN — DEXAMETHASONE SODIUM PHOSPHATE 4 MG: 4 INJECTION, SOLUTION INTRA-ARTICULAR; INTRALESIONAL; INTRAMUSCULAR; INTRAVENOUS; SOFT TISSUE at 00:34

## 2022-01-01 RX ADMIN — SODIUM CHLORIDE 1000 ML: 9 INJECTION, SOLUTION INTRAVENOUS at 17:11

## 2022-01-01 RX ADMIN — HEPARIN SODIUM 5000 UNITS: 5000 INJECTION INTRAVENOUS; SUBCUTANEOUS at 06:18

## 2022-01-01 RX ADMIN — PROPOFOL 30 MCG/KG/MIN: 10 INJECTION, EMULSION INTRAVENOUS at 11:52

## 2022-01-01 RX ADMIN — PROPOFOL 30 MCG/KG/MIN: 10 INJECTION, EMULSION INTRAVENOUS at 18:14

## 2022-01-01 RX ADMIN — DEXMEDETOMIDINE HYDROCHLORIDE 0.6 MCG/KG/HR: 100 INJECTION, SOLUTION, CONCENTRATE INTRAVENOUS at 00:14

## 2022-01-01 RX ADMIN — SODIUM CHLORIDE, PRESERVATIVE FREE 1 G: 5 INJECTION INTRAVENOUS at 15:41

## 2022-01-01 RX ADMIN — DILTIAZEM HYDROCHLORIDE 30 MG: 30 TABLET, FILM COATED ORAL at 08:35

## 2022-01-01 RX ADMIN — PROPOFOL 45 MCG/KG/MIN: 10 INJECTION, EMULSION INTRAVENOUS at 10:25

## 2022-01-01 RX ADMIN — DILTIAZEM HYDROCHLORIDE 30 MG: 30 TABLET, FILM COATED ORAL at 17:30

## 2022-01-01 RX ADMIN — INSULIN GLARGINE 10 UNITS: 100 INJECTION, SOLUTION SUBCUTANEOUS at 08:08

## 2022-01-01 RX ADMIN — DILTIAZEM HYDROCHLORIDE 30 MG: 30 TABLET, FILM COATED ORAL at 21:18

## 2022-01-01 RX ADMIN — BARICITINIB 2 MG: 2 TABLET, FILM COATED ORAL at 08:44

## 2022-01-01 RX ADMIN — BARICITINIB 2 MG: 2 TABLET, FILM COATED ORAL at 08:09

## 2022-01-01 RX ADMIN — DEXAMETHASONE SODIUM PHOSPHATE 4 MG: 4 INJECTION, SOLUTION INTRA-ARTICULAR; INTRALESIONAL; INTRAMUSCULAR; INTRAVENOUS; SOFT TISSUE at 20:22

## 2022-01-01 RX ADMIN — SODIUM CHLORIDE, PRESERVATIVE FREE 1 G: 5 INJECTION INTRAVENOUS at 23:35

## 2022-01-01 RX ADMIN — DILTIAZEM HYDROCHLORIDE 30 MG: 30 TABLET, FILM COATED ORAL at 21:27

## 2022-01-01 RX ADMIN — FUROSEMIDE 20 MG: 10 INJECTION, SOLUTION INTRAMUSCULAR; INTRAVENOUS at 08:30

## 2022-01-01 RX ADMIN — Medication 50 MCG/HR: at 05:35

## 2022-01-01 RX ADMIN — HEPARIN SODIUM 5000 UNITS: 5000 INJECTION INTRAVENOUS; SUBCUTANEOUS at 14:29

## 2022-01-01 RX ADMIN — Medication 50 MCG/HR: at 21:16

## 2022-01-01 RX ADMIN — ALBUMIN (HUMAN) 25 G: 0.25 INJECTION, SOLUTION INTRAVENOUS at 11:55

## 2022-01-01 RX ADMIN — PROPOFOL 50 MCG/KG/MIN: 10 INJECTION, EMULSION INTRAVENOUS at 19:17

## 2022-01-01 RX ADMIN — DEXMEDETOMIDINE HYDROCHLORIDE 0.6 MCG/KG/HR: 100 INJECTION, SOLUTION, CONCENTRATE INTRAVENOUS at 09:16

## 2022-01-01 RX ADMIN — SODIUM CHLORIDE 50 ML/HR: 9 INJECTION, SOLUTION INTRAVENOUS at 07:45

## 2022-01-01 RX ADMIN — DEXTROSE MONOHYDRATE 50 ML/HR: 50 INJECTION, SOLUTION INTRAVENOUS at 12:53

## 2022-01-01 RX ADMIN — FUROSEMIDE 20 MG: 10 INJECTION, SOLUTION INTRAMUSCULAR; INTRAVENOUS at 20:08

## 2022-01-01 RX ADMIN — PROPOFOL 25 MCG/KG/MIN: 10 INJECTION, EMULSION INTRAVENOUS at 12:31

## 2022-01-01 RX ADMIN — ETOMIDATE 10 MG: 20 INJECTION, SOLUTION INTRAVENOUS at 12:19

## 2022-01-01 RX ADMIN — POTASSIUM CHLORIDE 10 MEQ: 10 INJECTION, SOLUTION INTRAVENOUS at 10:25

## 2022-01-01 RX ADMIN — DEXAMETHASONE SODIUM PHOSPHATE 4 MG: 4 INJECTION, SOLUTION INTRA-ARTICULAR; INTRALESIONAL; INTRAMUSCULAR; INTRAVENOUS; SOFT TISSUE at 06:43

## 2022-01-01 RX ADMIN — DEXTROSE MONOHYDRATE 50 ML/HR: 50 INJECTION, SOLUTION INTRAVENOUS at 04:07

## 2022-01-01 RX ADMIN — Medication 50 MCG/HR: at 14:21

## 2022-01-01 RX ADMIN — PROPOFOL 20 MCG/KG/MIN: 10 INJECTION, EMULSION INTRAVENOUS at 14:53

## 2022-01-01 RX ADMIN — PROPOFOL 50 MCG/KG/MIN: 10 INJECTION, EMULSION INTRAVENOUS at 00:42

## 2022-01-01 RX ADMIN — PROPOFOL 25 MCG/KG/MIN: 10 INJECTION, EMULSION INTRAVENOUS at 08:45

## 2022-01-01 RX ADMIN — PROPOFOL 50 MCG/KG/MIN: 10 INJECTION, EMULSION INTRAVENOUS at 04:34

## 2022-01-01 RX ADMIN — POTASSIUM CHLORIDE 40 MEQ: 750 TABLET, FILM COATED, EXTENDED RELEASE ORAL at 15:12

## 2022-01-01 RX ADMIN — LORAZEPAM 2 MG: 2 INJECTION INTRAMUSCULAR; INTRAVENOUS at 12:40

## 2022-01-01 RX ADMIN — DILTIAZEM HYDROCHLORIDE 30 MG: 30 TABLET, FILM COATED ORAL at 17:25

## 2022-01-01 RX ADMIN — PROPOFOL 40 MCG/KG/MIN: 10 INJECTION, EMULSION INTRAVENOUS at 17:27

## 2022-01-01 RX ADMIN — POTASSIUM CHLORIDE 40 MEQ: 750 TABLET, FILM COATED, EXTENDED RELEASE ORAL at 11:45

## 2022-01-01 RX ADMIN — DEXAMETHASONE SODIUM PHOSPHATE 4 MG: 4 INJECTION, SOLUTION INTRA-ARTICULAR; INTRALESIONAL; INTRAMUSCULAR; INTRAVENOUS; SOFT TISSUE at 06:39

## 2022-01-01 RX ADMIN — BARICITINIB 2 MG: 2 TABLET, FILM COATED ORAL at 08:32

## 2022-01-01 RX ADMIN — PROPOFOL 15 MCG/KG/MIN: 10 INJECTION, EMULSION INTRAVENOUS at 17:08

## 2022-01-01 RX ADMIN — HEPARIN SODIUM 5000 UNITS: 5000 INJECTION INTRAVENOUS; SUBCUTANEOUS at 14:00

## 2022-01-01 RX ADMIN — ENOXAPARIN SODIUM 40 MG: 100 INJECTION SUBCUTANEOUS at 08:44

## 2022-01-01 RX ADMIN — SODIUM CHLORIDE, PRESERVATIVE FREE 1 G: 5 INJECTION INTRAVENOUS at 15:49

## 2022-01-01 RX ADMIN — SODIUM CHLORIDE, PRESERVATIVE FREE 1 G: 5 INJECTION INTRAVENOUS at 16:00

## 2022-01-01 RX ADMIN — SODIUM CHLORIDE, PRESERVATIVE FREE 1 G: 5 INJECTION INTRAVENOUS at 00:04

## 2022-01-01 RX ADMIN — INSULIN GLARGINE 10 UNITS: 100 INJECTION, SOLUTION SUBCUTANEOUS at 08:44

## 2022-01-01 RX ADMIN — PROPOFOL 20 MCG/KG/MIN: 10 INJECTION, EMULSION INTRAVENOUS at 18:55

## 2022-01-01 RX ADMIN — DEXAMETHASONE SODIUM PHOSPHATE 4 MG: 4 INJECTION, SOLUTION INTRA-ARTICULAR; INTRALESIONAL; INTRAMUSCULAR; INTRAVENOUS; SOFT TISSUE at 08:49

## 2022-01-01 RX ADMIN — PROPOFOL 30 MCG/KG/MIN: 10 INJECTION, EMULSION INTRAVENOUS at 18:23

## 2022-01-01 RX ADMIN — DEXMEDETOMIDINE HYDROCHLORIDE 0.4 MCG/KG/HR: 100 INJECTION, SOLUTION, CONCENTRATE INTRAVENOUS at 06:03

## 2022-01-01 RX ADMIN — Medication 75 MCG/HR: at 20:35

## 2022-01-01 RX ADMIN — HEPARIN SODIUM 5000 UNITS: 5000 INJECTION INTRAVENOUS; SUBCUTANEOUS at 22:16

## 2022-01-01 RX ADMIN — DEXAMETHASONE SODIUM PHOSPHATE 4 MG: 4 INJECTION, SOLUTION INTRA-ARTICULAR; INTRALESIONAL; INTRAMUSCULAR; INTRAVENOUS; SOFT TISSUE at 18:10

## 2022-01-01 RX ADMIN — SODIUM ZIRCONIUM CYCLOSILICATE 10 G: 10 POWDER, FOR SUSPENSION ORAL at 16:44

## 2022-01-01 RX ADMIN — HEPARIN SODIUM 5000 UNITS: 5000 INJECTION INTRAVENOUS; SUBCUTANEOUS at 21:17

## 2022-01-01 RX ADMIN — DILTIAZEM HYDROCHLORIDE 30 MG: 30 TABLET, FILM COATED ORAL at 22:14

## 2022-01-01 RX ADMIN — DILTIAZEM HYDROCHLORIDE 30 MG: 30 TABLET, FILM COATED ORAL at 15:58

## 2022-01-01 RX ADMIN — PROPOFOL 40 MCG/KG/MIN: 10 INJECTION, EMULSION INTRAVENOUS at 17:09

## 2022-01-01 RX ADMIN — BARICITINIB 2 MG: 2 TABLET, FILM COATED ORAL at 08:12

## 2022-01-01 RX ADMIN — DEXAMETHASONE SODIUM PHOSPHATE 4 MG: 4 INJECTION, SOLUTION INTRA-ARTICULAR; INTRALESIONAL; INTRAMUSCULAR; INTRAVENOUS; SOFT TISSUE at 11:11

## 2022-01-01 RX ADMIN — DEXAMETHASONE SODIUM PHOSPHATE 4 MG: 4 INJECTION, SOLUTION INTRA-ARTICULAR; INTRALESIONAL; INTRAMUSCULAR; INTRAVENOUS; SOFT TISSUE at 12:23

## 2022-01-01 RX ADMIN — PROPOFOL 40 MCG/KG/MIN: 10 INJECTION, EMULSION INTRAVENOUS at 15:33

## 2022-01-01 RX ADMIN — Medication 16 UNITS/KG/HR: at 10:44

## 2022-01-01 RX ADMIN — DILTIAZEM HYDROCHLORIDE 30 MG: 30 TABLET, FILM COATED ORAL at 21:06

## 2022-01-01 RX ADMIN — DILTIAZEM HYDROCHLORIDE 30 MG: 30 TABLET, FILM COATED ORAL at 21:13

## 2022-01-01 RX ADMIN — BARICITINIB 2 MG: 2 TABLET, FILM COATED ORAL at 08:30

## 2022-01-01 RX ADMIN — SODIUM CHLORIDE, PRESERVATIVE FREE 1 G: 5 INJECTION INTRAVENOUS at 00:14

## 2022-01-01 RX ADMIN — PROPOFOL 40 MCG/KG/MIN: 10 INJECTION, EMULSION INTRAVENOUS at 00:32

## 2022-01-01 RX ADMIN — POTASSIUM CHLORIDE 10 MEQ: 7.46 INJECTION, SOLUTION INTRAVENOUS at 12:15

## 2022-01-01 RX ADMIN — DEXAMETHASONE SODIUM PHOSPHATE 4 MG: 4 INJECTION, SOLUTION INTRA-ARTICULAR; INTRALESIONAL; INTRAMUSCULAR; INTRAVENOUS; SOFT TISSUE at 11:56

## 2022-01-01 RX ADMIN — BARICITINIB 2 MG: 2 TABLET, FILM COATED ORAL at 08:42

## 2022-01-01 RX ADMIN — DEXAMETHASONE SODIUM PHOSPHATE 4 MG: 4 INJECTION, SOLUTION INTRAMUSCULAR; INTRAVENOUS at 23:11

## 2022-01-01 RX ADMIN — DILTIAZEM HYDROCHLORIDE 30 MG: 30 TABLET, FILM COATED ORAL at 18:02

## 2022-01-01 RX ADMIN — INSULIN LISPRO 6 UNITS: 100 INJECTION, SOLUTION INTRAVENOUS; SUBCUTANEOUS at 12:37

## 2022-01-01 RX ADMIN — DILTIAZEM HYDROCHLORIDE 30 MG: 30 TABLET, FILM COATED ORAL at 08:01

## 2022-01-01 RX ADMIN — BARICITINIB 2 MG: 2 TABLET, FILM COATED ORAL at 08:03

## 2022-01-01 RX ADMIN — DILTIAZEM HYDROCHLORIDE 30 MG: 30 TABLET, FILM COATED ORAL at 16:26

## 2022-01-01 RX ADMIN — DEXAMETHASONE SODIUM PHOSPHATE 4 MG: 4 INJECTION, SOLUTION INTRAMUSCULAR; INTRAVENOUS at 18:22

## 2022-01-01 RX ADMIN — SODIUM CHLORIDE, PRESERVATIVE FREE 1 G: 5 INJECTION INTRAVENOUS at 08:42

## 2022-01-01 RX ADMIN — PROPOFOL 25 MCG/KG/MIN: 10 INJECTION, EMULSION INTRAVENOUS at 22:13

## 2022-01-01 RX ADMIN — PROPOFOL 40 MCG/KG/MIN: 10 INJECTION, EMULSION INTRAVENOUS at 06:17

## 2022-01-01 RX ADMIN — DEXAMETHASONE SODIUM PHOSPHATE 4 MG: 4 INJECTION, SOLUTION INTRA-ARTICULAR; INTRALESIONAL; INTRAMUSCULAR; INTRAVENOUS; SOFT TISSUE at 00:25

## 2022-01-01 RX ADMIN — PROPOFOL 50 MCG/KG/MIN: 10 INJECTION, EMULSION INTRAVENOUS at 20:00

## 2022-01-01 RX ADMIN — DILTIAZEM HYDROCHLORIDE 30 MG: 30 TABLET, FILM COATED ORAL at 21:16

## 2022-01-01 RX ADMIN — SODIUM CHLORIDE 50 ML/HR: 9 INJECTION, SOLUTION INTRAVENOUS at 10:17

## 2022-01-01 RX ADMIN — PROPOFOL 20 MCG/KG/MIN: 10 INJECTION, EMULSION INTRAVENOUS at 18:19

## 2022-01-01 RX ADMIN — Medication 1 MCG/MIN: at 22:35

## 2022-01-01 RX ADMIN — DEXTROSE MONOHYDRATE 50 ML/HR: 50 INJECTION, SOLUTION INTRAVENOUS at 16:07

## 2022-01-01 RX ADMIN — SODIUM CHLORIDE, PRESERVATIVE FREE 1 G: 5 INJECTION INTRAVENOUS at 00:38

## 2022-01-01 RX ADMIN — HEPARIN SODIUM 5000 UNITS: 5000 INJECTION INTRAVENOUS; SUBCUTANEOUS at 05:31

## 2022-01-01 RX ADMIN — DEXAMETHASONE SODIUM PHOSPHATE 4 MG: 4 INJECTION, SOLUTION INTRA-ARTICULAR; INTRALESIONAL; INTRAMUSCULAR; INTRAVENOUS; SOFT TISSUE at 00:14

## 2022-01-01 RX ADMIN — DEXAMETHASONE SODIUM PHOSPHATE 4 MG: 4 INJECTION, SOLUTION INTRA-ARTICULAR; INTRALESIONAL; INTRAMUSCULAR; INTRAVENOUS; SOFT TISSUE at 06:14

## 2022-01-01 RX ADMIN — PROPOFOL 50 MCG/KG/MIN: 10 INJECTION, EMULSION INTRAVENOUS at 16:01

## 2022-01-01 RX ADMIN — Medication 1 MCG/MIN: at 02:00

## 2022-01-01 RX ADMIN — PROPOFOL 35 MCG/KG/MIN: 10 INJECTION, EMULSION INTRAVENOUS at 18:02

## 2022-01-01 RX ADMIN — PROPOFOL 45 MCG/KG/MIN: 10 INJECTION, EMULSION INTRAVENOUS at 14:35

## 2022-01-01 RX ADMIN — DILTIAZEM HYDROCHLORIDE 30 MG: 30 TABLET, FILM COATED ORAL at 08:12

## 2022-01-01 RX ADMIN — FUROSEMIDE 20 MG: 10 INJECTION, SOLUTION INTRAMUSCULAR; INTRAVENOUS at 08:19

## 2022-01-01 RX ADMIN — DEXTROSE MONOHYDRATE 50 ML/HR: 50 INJECTION, SOLUTION INTRAVENOUS at 06:53

## 2022-01-01 RX ADMIN — HEPARIN SODIUM 5000 UNITS: 5000 INJECTION INTRAVENOUS; SUBCUTANEOUS at 21:06

## 2022-01-01 RX ADMIN — DILTIAZEM HYDROCHLORIDE 30 MG: 30 TABLET, FILM COATED ORAL at 10:52

## 2022-01-01 RX ADMIN — ENOXAPARIN SODIUM 40 MG: 100 INJECTION SUBCUTANEOUS at 10:30

## 2022-01-01 RX ADMIN — PROPOFOL 40 MCG/KG/MIN: 10 INJECTION, EMULSION INTRAVENOUS at 02:00

## 2022-01-01 RX ADMIN — DILTIAZEM HYDROCHLORIDE 30 MG: 30 TABLET, FILM COATED ORAL at 17:00

## 2022-01-01 RX ADMIN — SODIUM CHLORIDE 50 ML/HR: 9 INJECTION, SOLUTION INTRAVENOUS at 05:21

## 2022-01-01 RX ADMIN — Medication 12 UNITS/KG/HR: at 18:35

## 2022-01-01 RX ADMIN — DILTIAZEM HYDROCHLORIDE 30 MG: 30 TABLET, FILM COATED ORAL at 22:32

## 2022-01-01 RX ADMIN — INSULIN LISPRO 3 UNITS: 100 INJECTION, SOLUTION INTRAVENOUS; SUBCUTANEOUS at 18:00

## 2022-01-01 RX ADMIN — DEXAMETHASONE SODIUM PHOSPHATE 4 MG: 4 INJECTION, SOLUTION INTRA-ARTICULAR; INTRALESIONAL; INTRAMUSCULAR; INTRAVENOUS; SOFT TISSUE at 11:52

## 2022-01-01 RX ADMIN — DEXAMETHASONE SODIUM PHOSPHATE 4 MG: 4 INJECTION, SOLUTION INTRAMUSCULAR; INTRAVENOUS at 20:00

## 2022-01-01 RX ADMIN — DEXAMETHASONE SODIUM PHOSPHATE 4 MG: 4 INJECTION, SOLUTION INTRA-ARTICULAR; INTRALESIONAL; INTRAMUSCULAR; INTRAVENOUS; SOFT TISSUE at 18:19

## 2022-01-01 RX ADMIN — POTASSIUM CHLORIDE 10 MEQ: 7.46 INJECTION, SOLUTION INTRAVENOUS at 08:17

## 2022-01-01 RX ADMIN — DEXMEDETOMIDINE HYDROCHLORIDE 0.7 MCG/KG/HR: 100 INJECTION, SOLUTION, CONCENTRATE INTRAVENOUS at 18:18

## 2022-01-01 RX ADMIN — DEXAMETHASONE SODIUM PHOSPHATE 4 MG: 4 INJECTION, SOLUTION INTRAMUSCULAR; INTRAVENOUS at 05:55

## 2022-01-01 RX ADMIN — DILTIAZEM HYDROCHLORIDE 30 MG: 30 TABLET, FILM COATED ORAL at 16:48

## 2022-01-01 RX ADMIN — BARICITINIB 2 MG: 2 TABLET, FILM COATED ORAL at 10:53

## 2022-01-01 RX ADMIN — SODIUM CHLORIDE, PRESERVATIVE FREE 1 G: 5 INJECTION INTRAVENOUS at 18:02

## 2022-01-01 RX ADMIN — DEXMEDETOMIDINE HYDROCHLORIDE 0.9 MCG/KG/HR: 100 INJECTION, SOLUTION, CONCENTRATE INTRAVENOUS at 16:44

## 2022-01-01 RX ADMIN — PROPOFOL 35 MCG/KG/MIN: 10 INJECTION, EMULSION INTRAVENOUS at 13:36

## 2022-01-01 RX ADMIN — Medication 2 MCG/MIN: at 20:07

## 2022-01-01 RX ADMIN — ACETAZOLAMIDE 500 MG: 500 CAPSULE, EXTENDED RELEASE ORAL at 18:19

## 2022-01-01 RX ADMIN — Medication 4 MCG/MIN: at 05:20

## 2022-01-01 RX ADMIN — DILTIAZEM HYDROCHLORIDE 30 MG: 30 TABLET, FILM COATED ORAL at 08:19

## 2022-01-01 RX ADMIN — DEXMEDETOMIDINE HYDROCHLORIDE 0.5 MCG/KG/HR: 100 INJECTION, SOLUTION, CONCENTRATE INTRAVENOUS at 12:34

## 2022-01-01 RX ADMIN — FUROSEMIDE 40 MG: 10 INJECTION, SOLUTION INTRAMUSCULAR; INTRAVENOUS at 09:34

## 2022-01-01 RX ADMIN — DILTIAZEM HYDROCHLORIDE 30 MG: 30 TABLET, FILM COATED ORAL at 17:12

## 2022-01-01 RX ADMIN — DEXAMETHASONE SODIUM PHOSPHATE 4 MG: 4 INJECTION, SOLUTION INTRA-ARTICULAR; INTRALESIONAL; INTRAMUSCULAR; INTRAVENOUS; SOFT TISSUE at 11:53

## 2022-01-01 RX ADMIN — DEXAMETHASONE SODIUM PHOSPHATE 4 MG: 4 INJECTION, SOLUTION INTRAMUSCULAR; INTRAVENOUS at 05:18

## 2022-01-01 RX ADMIN — DEXMEDETOMIDINE HYDROCHLORIDE 0.7 MCG/KG/HR: 100 INJECTION, SOLUTION, CONCENTRATE INTRAVENOUS at 21:29

## 2022-01-01 RX ADMIN — DILTIAZEM HYDROCHLORIDE 30 MG: 30 TABLET, FILM COATED ORAL at 16:41

## 2022-01-01 RX ADMIN — DEXMEDETOMIDINE HYDROCHLORIDE 0.4 MCG/KG/HR: 100 INJECTION, SOLUTION, CONCENTRATE INTRAVENOUS at 17:10

## 2022-01-01 RX ADMIN — DEXMEDETOMIDINE HYDROCHLORIDE 1 MCG/KG/HR: 100 INJECTION, SOLUTION, CONCENTRATE INTRAVENOUS at 13:34

## 2022-01-01 RX ADMIN — DEXAMETHASONE SODIUM PHOSPHATE 4 MG: 4 INJECTION, SOLUTION INTRA-ARTICULAR; INTRALESIONAL; INTRAMUSCULAR; INTRAVENOUS; SOFT TISSUE at 08:01

## 2022-01-01 RX ADMIN — Medication 16 UNITS/KG/HR: at 11:00

## 2022-01-01 RX ADMIN — PROPOFOL 25 MCG/KG/MIN: 10 INJECTION, EMULSION INTRAVENOUS at 00:40

## 2022-01-01 RX ADMIN — MORPHINE SULFATE 2 MG: 2 INJECTION, SOLUTION INTRAMUSCULAR; INTRAVENOUS at 14:39

## 2022-01-01 RX ADMIN — BARICITINIB 2 MG: 2 TABLET, FILM COATED ORAL at 08:35

## 2022-01-01 RX ADMIN — PROPOFOL 40 MCG/KG/MIN: 10 INJECTION, EMULSION INTRAVENOUS at 21:09

## 2022-01-01 RX ADMIN — DEXMEDETOMIDINE HYDROCHLORIDE 0.6 MCG/KG/HR: 100 INJECTION, SOLUTION, CONCENTRATE INTRAVENOUS at 10:27

## 2022-01-01 RX ADMIN — DEXMEDETOMIDINE HYDROCHLORIDE 1.5 MCG/KG/HR: 100 INJECTION, SOLUTION, CONCENTRATE INTRAVENOUS at 10:50

## 2022-01-01 RX ADMIN — DEXAMETHASONE SODIUM PHOSPHATE 4 MG: 4 INJECTION, SOLUTION INTRA-ARTICULAR; INTRALESIONAL; INTRAMUSCULAR; INTRAVENOUS; SOFT TISSUE at 08:48

## 2022-01-01 RX ADMIN — DILTIAZEM HYDROCHLORIDE 30 MG: 30 TABLET, FILM COATED ORAL at 15:28

## 2022-01-01 RX ADMIN — FUROSEMIDE 40 MG: 10 INJECTION, SOLUTION INTRAMUSCULAR; INTRAVENOUS at 21:27

## 2022-01-01 RX ADMIN — DILTIAZEM HYDROCHLORIDE 30 MG: 30 TABLET, FILM COATED ORAL at 10:29

## 2022-01-01 RX ADMIN — SODIUM CHLORIDE 75 ML/HR: 9 INJECTION, SOLUTION INTRAVENOUS at 23:39

## 2022-01-01 RX ADMIN — DEXAMETHASONE SODIUM PHOSPHATE 4 MG: 4 INJECTION, SOLUTION INTRA-ARTICULAR; INTRALESIONAL; INTRAMUSCULAR; INTRAVENOUS; SOFT TISSUE at 08:19

## 2022-01-01 RX ADMIN — BARICITINIB 2 MG: 2 TABLET, FILM COATED ORAL at 08:16

## 2022-01-01 RX ADMIN — DILTIAZEM HYDROCHLORIDE 30 MG: 30 TABLET, FILM COATED ORAL at 15:43

## 2022-01-01 RX ADMIN — PROPOFOL 25 MCG/KG/MIN: 10 INJECTION, EMULSION INTRAVENOUS at 13:22

## 2022-01-01 RX ADMIN — INSULIN LISPRO 15 UNITS: 100 INJECTION, SOLUTION INTRAVENOUS; SUBCUTANEOUS at 18:08

## 2022-01-01 RX ADMIN — DILTIAZEM HYDROCHLORIDE 30 MG: 30 TABLET, FILM COATED ORAL at 20:21

## 2022-01-01 RX ADMIN — HEPARIN SODIUM 5000 UNITS: 5000 INJECTION INTRAVENOUS; SUBCUTANEOUS at 15:46

## 2022-01-01 RX ADMIN — HEPARIN SODIUM 5000 UNITS: 5000 INJECTION INTRAVENOUS; SUBCUTANEOUS at 05:45

## 2022-01-01 RX ADMIN — PROPOFOL 30 MCG/KG/MIN: 10 INJECTION, EMULSION INTRAVENOUS at 09:37

## 2022-01-01 RX ADMIN — PROPOFOL 40 MCG/KG/MIN: 10 INJECTION, EMULSION INTRAVENOUS at 00:19

## 2022-01-01 RX ADMIN — PROPOFOL 35 MCG/KG/MIN: 10 INJECTION, EMULSION INTRAVENOUS at 01:36

## 2022-01-01 RX ADMIN — HEPARIN SODIUM 5000 UNITS: 5000 INJECTION INTRAVENOUS; SUBCUTANEOUS at 21:45

## 2022-01-01 RX ADMIN — POTASSIUM CHLORIDE 10 MEQ: 7.46 INJECTION, SOLUTION INTRAVENOUS at 10:10

## 2022-01-01 RX ADMIN — DEXTROSE MONOHYDRATE 50 ML/HR: 50 INJECTION, SOLUTION INTRAVENOUS at 05:55

## 2022-01-01 RX ADMIN — DEXAMETHASONE SODIUM PHOSPHATE 4 MG: 4 INJECTION, SOLUTION INTRA-ARTICULAR; INTRALESIONAL; INTRAMUSCULAR; INTRAVENOUS; SOFT TISSUE at 14:08

## 2022-01-01 RX ADMIN — DEXMEDETOMIDINE HYDROCHLORIDE 0.6 MCG/KG/HR: 100 INJECTION, SOLUTION, CONCENTRATE INTRAVENOUS at 03:46

## 2022-01-01 RX ADMIN — DEXAMETHASONE SODIUM PHOSPHATE 4 MG: 4 INJECTION, SOLUTION INTRA-ARTICULAR; INTRALESIONAL; INTRAMUSCULAR; INTRAVENOUS; SOFT TISSUE at 09:15

## 2022-01-01 RX ADMIN — DEXAMETHASONE SODIUM PHOSPHATE 4 MG: 4 INJECTION, SOLUTION INTRA-ARTICULAR; INTRALESIONAL; INTRAMUSCULAR; INTRAVENOUS; SOFT TISSUE at 05:27

## 2022-01-01 RX ADMIN — DEXAMETHASONE SODIUM PHOSPHATE 4 MG: 4 INJECTION, SOLUTION INTRA-ARTICULAR; INTRALESIONAL; INTRAMUSCULAR; INTRAVENOUS; SOFT TISSUE at 08:35

## 2022-01-01 RX ADMIN — FUROSEMIDE 20 MG: 10 INJECTION, SOLUTION INTRAMUSCULAR; INTRAVENOUS at 08:12

## 2022-01-01 RX ADMIN — AZITHROMYCIN MONOHYDRATE 500 MG: 500 INJECTION, POWDER, LYOPHILIZED, FOR SOLUTION INTRAVENOUS at 10:22

## 2022-01-01 RX ADMIN — Medication 50 MCG/HR: at 18:26

## 2022-01-01 RX ADMIN — PROPOFOL 50 MCG/KG/MIN: 10 INJECTION, EMULSION INTRAVENOUS at 11:53

## 2022-01-01 RX ADMIN — DEXAMETHASONE SODIUM PHOSPHATE 4 MG: 4 INJECTION, SOLUTION INTRA-ARTICULAR; INTRALESIONAL; INTRAMUSCULAR; INTRAVENOUS; SOFT TISSUE at 11:45

## 2022-01-01 RX ADMIN — SODIUM CHLORIDE, PRESERVATIVE FREE 1 G: 5 INJECTION INTRAVENOUS at 10:55

## 2022-01-01 RX ADMIN — DEXAMETHASONE SODIUM PHOSPHATE 4 MG: 4 INJECTION, SOLUTION INTRA-ARTICULAR; INTRALESIONAL; INTRAMUSCULAR; INTRAVENOUS; SOFT TISSUE at 05:03

## 2022-01-01 RX ADMIN — INSULIN GLARGINE 10 UNITS: 100 INJECTION, SOLUTION SUBCUTANEOUS at 08:05

## 2022-01-01 RX ADMIN — FUROSEMIDE 20 MG: 10 INJECTION, SOLUTION INTRAMUSCULAR; INTRAVENOUS at 08:31

## 2022-01-01 RX ADMIN — PROPOFOL 40 MCG/KG/MIN: 10 INJECTION, EMULSION INTRAVENOUS at 15:43

## 2022-01-01 RX ADMIN — PROPOFOL 50 MCG/KG/MIN: 10 INJECTION, EMULSION INTRAVENOUS at 11:30

## 2022-01-01 RX ADMIN — INSULIN GLARGINE 10 UNITS: 100 INJECTION, SOLUTION SUBCUTANEOUS at 09:15

## 2022-01-01 RX ADMIN — DILTIAZEM HYDROCHLORIDE 30 MG: 30 TABLET, FILM COATED ORAL at 08:05

## 2022-01-01 RX ADMIN — DILTIAZEM HYDROCHLORIDE 30 MG: 30 TABLET, FILM COATED ORAL at 21:28

## 2022-01-01 RX ADMIN — PROPOFOL 40 MCG/KG/MIN: 10 INJECTION, EMULSION INTRAVENOUS at 07:58

## 2022-01-01 RX ADMIN — Medication 100 MCG/HR: at 19:17

## 2022-01-01 RX ADMIN — BARICITINIB 2 MG: 2 TABLET, FILM COATED ORAL at 10:55

## 2022-01-01 RX ADMIN — SODIUM CHLORIDE, PRESERVATIVE FREE 1 G: 5 INJECTION INTRAVENOUS at 17:46

## 2022-01-01 RX ADMIN — SODIUM CHLORIDE, PRESERVATIVE FREE 1 G: 5 INJECTION INTRAVENOUS at 00:22

## 2022-01-01 RX ADMIN — PROPOFOL 40 MCG/KG/MIN: 10 INJECTION, EMULSION INTRAVENOUS at 21:32

## 2022-01-01 RX ADMIN — PROPOFOL 50 MCG/KG/MIN: 10 INJECTION, EMULSION INTRAVENOUS at 08:12

## 2022-01-01 RX ADMIN — SODIUM CHLORIDE, PRESERVATIVE FREE 1 G: 5 INJECTION INTRAVENOUS at 23:51

## 2022-01-01 RX ADMIN — DEXAMETHASONE SODIUM PHOSPHATE 4 MG: 4 INJECTION, SOLUTION INTRA-ARTICULAR; INTRALESIONAL; INTRAMUSCULAR; INTRAVENOUS; SOFT TISSUE at 00:00

## 2022-01-01 RX ADMIN — MORPHINE SULFATE 2 MG: 2 INJECTION, SOLUTION INTRAMUSCULAR; INTRAVENOUS at 13:23

## 2022-01-01 RX ADMIN — PROPOFOL 40 MCG/KG/MIN: 10 INJECTION, EMULSION INTRAVENOUS at 09:53

## 2022-01-01 RX ADMIN — DEXMEDETOMIDINE HYDROCHLORIDE 0.6 MCG/KG/HR: 100 INJECTION, SOLUTION, CONCENTRATE INTRAVENOUS at 05:34

## 2022-01-01 RX ADMIN — DILTIAZEM HYDROCHLORIDE 30 MG: 30 TABLET, FILM COATED ORAL at 22:17

## 2022-01-01 RX ADMIN — Medication 100 MCG/HR: at 16:04

## 2022-01-01 RX ADMIN — DEXAMETHASONE SODIUM PHOSPHATE 4 MG: 4 INJECTION, SOLUTION INTRA-ARTICULAR; INTRALESIONAL; INTRAMUSCULAR; INTRAVENOUS; SOFT TISSUE at 05:35

## 2022-01-01 RX ADMIN — PROPOFOL 30 MCG/KG/MIN: 10 INJECTION, EMULSION INTRAVENOUS at 18:39

## 2022-01-01 RX ADMIN — SODIUM CHLORIDE, PRESERVATIVE FREE 1 G: 5 INJECTION INTRAVENOUS at 09:34

## 2022-01-01 RX ADMIN — SODIUM CHLORIDE, PRESERVATIVE FREE 1 G: 5 INJECTION INTRAVENOUS at 23:36

## 2022-01-01 RX ADMIN — DILTIAZEM HYDROCHLORIDE 30 MG: 30 TABLET, FILM COATED ORAL at 08:32

## 2022-01-01 RX ADMIN — DILTIAZEM HYDROCHLORIDE 30 MG: 30 TABLET, FILM COATED ORAL at 18:49

## 2022-01-01 RX ADMIN — Medication 100 MCG/HR: at 02:30

## 2022-01-01 RX ADMIN — DEXAMETHASONE SODIUM PHOSPHATE 4 MG: 4 INJECTION, SOLUTION INTRA-ARTICULAR; INTRALESIONAL; INTRAMUSCULAR; INTRAVENOUS; SOFT TISSUE at 18:49

## 2022-01-01 RX ADMIN — FUROSEMIDE 20 MG: 10 INJECTION, SOLUTION INTRAMUSCULAR; INTRAVENOUS at 21:08

## 2022-01-01 RX ADMIN — DEXAMETHASONE SODIUM PHOSPHATE 4 MG: 4 INJECTION, SOLUTION INTRA-ARTICULAR; INTRALESIONAL; INTRAMUSCULAR; INTRAVENOUS; SOFT TISSUE at 12:26

## 2022-01-01 RX ADMIN — SODIUM CHLORIDE, PRESERVATIVE FREE 1 G: 5 INJECTION INTRAVENOUS at 16:44

## 2022-01-01 RX ADMIN — BARICITINIB 2 MG: 2 TABLET, FILM COATED ORAL at 14:46

## 2022-01-01 RX ADMIN — DILTIAZEM HYDROCHLORIDE 30 MG: 30 TABLET, FILM COATED ORAL at 17:14

## 2022-01-01 RX ADMIN — DILTIAZEM HYDROCHLORIDE 30 MG: 30 TABLET, FILM COATED ORAL at 16:06

## 2022-01-01 RX ADMIN — SODIUM CHLORIDE, PRESERVATIVE FREE 1 G: 5 INJECTION INTRAVENOUS at 01:02

## 2022-01-01 RX ADMIN — HEPARIN SODIUM 5000 UNITS: 5000 INJECTION INTRAVENOUS; SUBCUTANEOUS at 13:22

## 2022-01-01 RX ADMIN — DEXMEDETOMIDINE HYDROCHLORIDE 0.6 MCG/KG/HR: 100 INJECTION, SOLUTION, CONCENTRATE INTRAVENOUS at 02:01

## 2022-01-01 RX ADMIN — PROPOFOL 30 MCG/KG/MIN: 10 INJECTION, EMULSION INTRAVENOUS at 07:51

## 2022-01-01 RX ADMIN — INSULIN GLARGINE 10 UNITS: 100 INJECTION, SOLUTION SUBCUTANEOUS at 08:49

## 2022-01-01 RX ADMIN — SODIUM CHLORIDE 75 ML/HR: 9 INJECTION, SOLUTION INTRAVENOUS at 05:53

## 2022-01-01 RX ADMIN — HYDROXYZINE HYDROCHLORIDE 25 MG: 50 INJECTION, SOLUTION INTRAMUSCULAR at 19:43

## 2022-01-01 RX ADMIN — INSULIN LISPRO 3 UNITS: 100 INJECTION, SOLUTION INTRAVENOUS; SUBCUTANEOUS at 17:40

## 2022-01-01 RX ADMIN — LORAZEPAM 2 MG: 2 INJECTION INTRAMUSCULAR; INTRAVENOUS at 15:40

## 2022-01-01 RX ADMIN — ADENOSINE 6 MG: 3 INJECTION, SOLUTION INTRAVENOUS at 02:40

## 2022-01-01 RX ADMIN — DEXMEDETOMIDINE HYDROCHLORIDE 0.4 MCG/KG/HR: 100 INJECTION, SOLUTION, CONCENTRATE INTRAVENOUS at 13:22

## 2022-01-01 RX ADMIN — FUROSEMIDE 40 MG: 10 INJECTION, SOLUTION INTRAMUSCULAR; INTRAVENOUS at 14:08

## 2022-01-01 RX ADMIN — PROPOFOL 45 MCG/KG/MIN: 10 INJECTION, EMULSION INTRAVENOUS at 00:24

## 2022-01-01 RX ADMIN — PROPOFOL 50 MCG/KG/MIN: 10 INJECTION, EMULSION INTRAVENOUS at 08:42

## 2022-01-01 RX ADMIN — DEXAMETHASONE SODIUM PHOSPHATE 4 MG: 4 INJECTION, SOLUTION INTRA-ARTICULAR; INTRALESIONAL; INTRAMUSCULAR; INTRAVENOUS; SOFT TISSUE at 00:21

## 2022-01-01 RX ADMIN — PROPOFOL 30 MCG/KG/MIN: 10 INJECTION, EMULSION INTRAVENOUS at 02:23

## 2022-01-01 RX ADMIN — HEPARIN SODIUM 5000 UNITS: 5000 INJECTION INTRAVENOUS; SUBCUTANEOUS at 21:44

## 2022-01-01 RX ADMIN — PERFLUTREN: 6.52 INJECTION, SUSPENSION INTRAVENOUS at 09:00

## 2022-01-01 RX ADMIN — PROPOFOL 40 MCG/KG/MIN: 10 INJECTION, EMULSION INTRAVENOUS at 05:17

## 2022-01-01 RX ADMIN — PROPOFOL 25 MCG/KG/MIN: 10 INJECTION, EMULSION INTRAVENOUS at 05:26

## 2022-01-01 RX ADMIN — BARICITINIB 2 MG: 2 TABLET, FILM COATED ORAL at 08:05

## 2022-01-01 RX ADMIN — DEXAMETHASONE SODIUM PHOSPHATE 4 MG: 4 INJECTION, SOLUTION INTRA-ARTICULAR; INTRALESIONAL; INTRAMUSCULAR; INTRAVENOUS; SOFT TISSUE at 08:05

## 2022-01-01 RX ADMIN — DEXMEDETOMIDINE HYDROCHLORIDE 1 MCG/KG/HR: 100 INJECTION, SOLUTION, CONCENTRATE INTRAVENOUS at 14:54

## 2022-01-01 RX ADMIN — POLYETHYLENE GLYCOL 3350 17 G: 17 POWDER, FOR SOLUTION ORAL at 08:13

## 2022-01-01 RX ADMIN — Medication 75 MCG/HR: at 20:40

## 2022-01-01 RX ADMIN — HEPARIN SODIUM 5000 UNITS: 5000 INJECTION INTRAVENOUS; SUBCUTANEOUS at 13:26

## 2022-01-01 RX ADMIN — DEXMEDETOMIDINE HYDROCHLORIDE 0.6 MCG/KG/HR: 100 INJECTION, SOLUTION, CONCENTRATE INTRAVENOUS at 02:58

## 2022-01-01 RX ADMIN — DILTIAZEM HYDROCHLORIDE 30 MG: 30 TABLET, FILM COATED ORAL at 21:03

## 2022-01-01 RX ADMIN — PROPOFOL 10 MCG/KG/MIN: 10 INJECTION, EMULSION INTRAVENOUS at 08:00

## 2022-01-01 RX ADMIN — HEPARIN SODIUM 5000 UNITS: 5000 INJECTION INTRAVENOUS; SUBCUTANEOUS at 05:35

## 2022-01-01 RX ADMIN — DEXMEDETOMIDINE HYDROCHLORIDE 0.6 MCG/KG/HR: 100 INJECTION, SOLUTION, CONCENTRATE INTRAVENOUS at 10:04

## 2022-01-01 RX ADMIN — SODIUM CHLORIDE, PRESERVATIVE FREE 1 G: 5 INJECTION INTRAVENOUS at 08:23

## 2022-01-01 RX ADMIN — HEPARIN SODIUM 5000 UNITS: 5000 INJECTION INTRAVENOUS; SUBCUTANEOUS at 14:35

## 2022-01-01 RX ADMIN — INSULIN LISPRO 3 UNITS: 100 INJECTION, SOLUTION INTRAVENOUS; SUBCUTANEOUS at 00:22

## 2022-01-01 RX ADMIN — Medication 50 MCG/HR: at 18:37

## 2022-01-01 RX ADMIN — PROPOFOL 50 MCG/KG/MIN: 10 INJECTION, EMULSION INTRAVENOUS at 08:13

## 2022-01-01 RX ADMIN — DILTIAZEM HYDROCHLORIDE 30 MG: 30 TABLET, FILM COATED ORAL at 21:43

## 2022-01-01 RX ADMIN — DEXAMETHASONE SODIUM PHOSPHATE 6 MG: 10 INJECTION INTRAMUSCULAR; INTRAVENOUS at 17:13

## 2022-01-01 RX ADMIN — SODIUM CHLORIDE, PRESERVATIVE FREE 1 G: 5 INJECTION INTRAVENOUS at 17:32

## 2022-01-01 RX ADMIN — BARICITINIB 4 MG: 2 TABLET, FILM COATED ORAL at 09:51

## 2022-01-01 RX ADMIN — Medication 50 MCG/HR: at 08:16

## 2022-01-01 RX ADMIN — DEXAMETHASONE SODIUM PHOSPHATE 4 MG: 4 INJECTION, SOLUTION INTRAMUSCULAR; INTRAVENOUS at 05:16

## 2022-01-01 RX ADMIN — BARICITINIB 2 MG: 2 TABLET, FILM COATED ORAL at 11:45

## 2022-01-01 RX ADMIN — DILTIAZEM HYDROCHLORIDE 30 MG: 30 TABLET, FILM COATED ORAL at 21:09

## 2022-01-01 RX ADMIN — INSULIN GLARGINE 10 UNITS: 100 INJECTION, SOLUTION SUBCUTANEOUS at 09:00

## 2022-01-01 RX ADMIN — DEXAMETHASONE SODIUM PHOSPHATE 4 MG: 4 INJECTION, SOLUTION INTRA-ARTICULAR; INTRALESIONAL; INTRAMUSCULAR; INTRAVENOUS; SOFT TISSUE at 17:14

## 2022-01-01 RX ADMIN — PROPOFOL 20 MCG/KG/MIN: 10 INJECTION, EMULSION INTRAVENOUS at 17:40

## 2022-01-01 RX ADMIN — SODIUM POLYSTYRENE SULFONATE 30 G: 15 SUSPENSION ORAL; RECTAL at 11:59

## 2022-01-01 RX ADMIN — PROPOFOL 40 MCG/KG/MIN: 10 INJECTION, EMULSION INTRAVENOUS at 11:46

## 2022-01-01 RX ADMIN — Medication 100 MCG/HR: at 12:29

## 2022-01-01 RX ADMIN — SODIUM CHLORIDE, PRESERVATIVE FREE 1 G: 5 INJECTION INTRAVENOUS at 11:45

## 2022-01-01 RX ADMIN — PROPOFOL 25 MCG/KG/MIN: 10 INJECTION, EMULSION INTRAVENOUS at 16:46

## 2022-01-01 RX ADMIN — SODIUM CHLORIDE, PRESERVATIVE FREE 1 G: 5 INJECTION INTRAVENOUS at 17:25

## 2022-01-01 RX ADMIN — DILTIAZEM HYDROCHLORIDE 30 MG: 30 TABLET, FILM COATED ORAL at 22:40

## 2022-01-01 RX ADMIN — SODIUM POLYSTYRENE SULFONATE 15 G: 15 SUSPENSION ORAL; RECTAL at 12:27

## 2022-01-01 RX ADMIN — SODIUM CHLORIDE, PRESERVATIVE FREE 1 G: 5 INJECTION INTRAVENOUS at 10:29

## 2022-01-01 RX ADMIN — SODIUM CHLORIDE, PRESERVATIVE FREE 1 G: 5 INJECTION INTRAVENOUS at 23:38

## 2022-01-01 RX ADMIN — PROPOFOL 20 MCG/KG/MIN: 10 INJECTION, EMULSION INTRAVENOUS at 00:14

## 2022-01-01 RX ADMIN — ENOXAPARIN SODIUM 40 MG: 100 INJECTION SUBCUTANEOUS at 09:40

## 2022-01-01 RX ADMIN — PROPOFOL 40 MCG/KG/MIN: 10 INJECTION, EMULSION INTRAVENOUS at 12:28

## 2022-01-01 RX ADMIN — PROPOFOL 30 MCG/KG/MIN: 10 INJECTION, EMULSION INTRAVENOUS at 22:40

## 2022-01-01 RX ADMIN — HEPARIN SODIUM 5000 UNITS: 5000 INJECTION INTRAVENOUS; SUBCUTANEOUS at 05:56

## 2022-01-01 RX ADMIN — SODIUM CHLORIDE, PRESERVATIVE FREE 1 G: 5 INJECTION INTRAVENOUS at 08:04

## 2022-01-01 RX ADMIN — DEXMEDETOMIDINE HYDROCHLORIDE 1 MCG/KG/HR: 100 INJECTION, SOLUTION, CONCENTRATE INTRAVENOUS at 19:05

## 2022-01-01 RX ADMIN — PROPOFOL 40 MCG/KG/MIN: 10 INJECTION, EMULSION INTRAVENOUS at 21:39

## 2022-01-01 RX ADMIN — Medication 18 UNITS/KG/HR: at 15:52

## 2022-01-01 RX ADMIN — AZITHROMYCIN MONOHYDRATE 500 MG: 500 INJECTION, POWDER, LYOPHILIZED, FOR SOLUTION INTRAVENOUS at 08:15

## 2022-01-01 RX ADMIN — PROPOFOL 20 MCG/KG/MIN: 10 INJECTION, EMULSION INTRAVENOUS at 04:07

## 2022-01-01 RX ADMIN — PROPOFOL 40 MCG/KG/MIN: 10 INJECTION, EMULSION INTRAVENOUS at 19:59

## 2022-01-01 RX ADMIN — HEPARIN SODIUM 6080 UNITS: 1000 INJECTION, SOLUTION INTRAVENOUS; SUBCUTANEOUS at 00:32

## 2022-01-01 RX ADMIN — DEXTROSE MONOHYDRATE, SODIUM CHLORIDE, SODIUM LACTATE, POTASSIUM CHLORIDE, CALCIUM CHLORIDE: 5; 600; 310; 179; 20 INJECTION, SOLUTION INTRAVENOUS at 01:00

## 2022-01-01 RX ADMIN — HEPARIN SODIUM 5000 UNITS: 5000 INJECTION INTRAVENOUS; SUBCUTANEOUS at 05:53

## 2022-01-01 RX ADMIN — SODIUM CHLORIDE, PRESERVATIVE FREE 1 G: 5 INJECTION INTRAVENOUS at 16:06

## 2022-01-01 RX ADMIN — FUROSEMIDE 20 MG: 10 INJECTION, SOLUTION INTRAMUSCULAR; INTRAVENOUS at 08:10

## 2022-01-01 RX ADMIN — INSULIN GLARGINE 10 UNITS: 100 INJECTION, SOLUTION SUBCUTANEOUS at 08:32

## 2022-01-01 RX ADMIN — DEXAMETHASONE SODIUM PHOSPHATE 4 MG: 4 INJECTION, SOLUTION INTRA-ARTICULAR; INTRALESIONAL; INTRAMUSCULAR; INTRAVENOUS; SOFT TISSUE at 08:20

## 2022-01-01 RX ADMIN — SODIUM CHLORIDE, PRESERVATIVE FREE 1 G: 5 INJECTION INTRAVENOUS at 09:19

## 2022-01-01 RX ADMIN — SODIUM CHLORIDE, PRESERVATIVE FREE 1 G: 5 INJECTION INTRAVENOUS at 08:37

## 2022-01-01 RX ADMIN — HEPARIN SODIUM 5000 UNITS: 5000 INJECTION INTRAVENOUS; SUBCUTANEOUS at 05:26

## 2022-01-01 RX ADMIN — PROPOFOL 10 MCG/KG/MIN: 10 INJECTION, EMULSION INTRAVENOUS at 12:00

## 2022-01-01 RX ADMIN — MORPHINE SULFATE 2 MG: 2 INJECTION, SOLUTION INTRAMUSCULAR; INTRAVENOUS at 12:34

## 2022-01-01 RX ADMIN — DEXAMETHASONE SODIUM PHOSPHATE 4 MG: 4 INJECTION, SOLUTION INTRA-ARTICULAR; INTRALESIONAL; INTRAMUSCULAR; INTRAVENOUS; SOFT TISSUE at 00:37

## 2022-01-01 RX ADMIN — PROPOFOL 35 MCG/KG/MIN: 10 INJECTION, EMULSION INTRAVENOUS at 02:29

## 2022-01-01 RX ADMIN — PROPOFOL 35 MCG/KG/MIN: 10 INJECTION, EMULSION INTRAVENOUS at 04:13

## 2022-01-01 RX ADMIN — DEXTROSE MONOHYDRATE, SODIUM CHLORIDE, SODIUM LACTATE, POTASSIUM CHLORIDE, CALCIUM CHLORIDE: 5; 600; 310; 179; 20 INJECTION, SOLUTION INTRAVENOUS at 14:59

## 2022-01-01 RX ADMIN — AZITHROMYCIN MONOHYDRATE 500 MG: 500 TABLET ORAL at 23:11

## 2022-01-01 RX ADMIN — FUROSEMIDE 20 MG: 10 INJECTION, SOLUTION INTRAMUSCULAR; INTRAVENOUS at 21:44

## 2022-01-01 RX ADMIN — HEPARIN SODIUM 5000 UNITS: 5000 INJECTION INTRAVENOUS; SUBCUTANEOUS at 22:09

## 2022-01-01 RX ADMIN — DEXMEDETOMIDINE HYDROCHLORIDE 0.4 MCG/KG/HR: 100 INJECTION, SOLUTION, CONCENTRATE INTRAVENOUS at 23:04

## 2022-01-01 RX ADMIN — DEXMEDETOMIDINE HYDROCHLORIDE 0.8 MCG/KG/HR: 100 INJECTION, SOLUTION, CONCENTRATE INTRAVENOUS at 18:41

## 2022-01-01 RX ADMIN — DEXAMETHASONE SODIUM PHOSPHATE 4 MG: 4 INJECTION, SOLUTION INTRA-ARTICULAR; INTRALESIONAL; INTRAMUSCULAR; INTRAVENOUS; SOFT TISSUE at 11:33

## 2022-01-01 RX ADMIN — HEPARIN SODIUM 5000 UNITS: 5000 INJECTION INTRAVENOUS; SUBCUTANEOUS at 06:50

## 2022-01-01 RX ADMIN — PROPOFOL 40 MCG/KG/MIN: 10 INJECTION, EMULSION INTRAVENOUS at 14:34

## 2022-01-01 RX ADMIN — DEXAMETHASONE SODIUM PHOSPHATE 4 MG: 4 INJECTION, SOLUTION INTRA-ARTICULAR; INTRALESIONAL; INTRAMUSCULAR; INTRAVENOUS; SOFT TISSUE at 23:51

## 2022-01-01 RX ADMIN — AZITHROMYCIN MONOHYDRATE 500 MG: 500 INJECTION, POWDER, LYOPHILIZED, FOR SOLUTION INTRAVENOUS at 09:40

## 2022-01-01 RX ADMIN — PROPOFOL 35 MCG/KG/MIN: 10 INJECTION, EMULSION INTRAVENOUS at 01:08

## 2022-01-01 RX ADMIN — Medication 20 MCG/MIN: at 05:14

## 2022-01-01 RX ADMIN — BARICITINIB 2 MG: 2 TABLET, FILM COATED ORAL at 09:58

## 2022-01-01 RX ADMIN — PROPOFOL 40 MCG/KG/MIN: 10 INJECTION, EMULSION INTRAVENOUS at 06:58

## 2022-01-01 RX ADMIN — PROPOFOL 35 MCG/KG/MIN: 10 INJECTION, EMULSION INTRAVENOUS at 06:25

## 2022-01-01 RX ADMIN — ACETAMINOPHEN 650 MG: 325 TABLET ORAL at 09:15

## 2022-01-01 RX ADMIN — BARICITINIB 2 MG: 2 TABLET, FILM COATED ORAL at 08:19

## 2022-01-01 RX ADMIN — PROPOFOL 25 MCG/KG/MIN: 10 INJECTION, EMULSION INTRAVENOUS at 18:27

## 2022-01-01 RX ADMIN — SODIUM CHLORIDE, PRESERVATIVE FREE 1 G: 5 INJECTION INTRAVENOUS at 08:13

## 2022-01-01 RX ADMIN — FUROSEMIDE 20 MG: 10 INJECTION, SOLUTION INTRAMUSCULAR; INTRAVENOUS at 08:48

## 2022-01-01 RX ADMIN — BARICITINIB 4 MG: 2 TABLET, FILM COATED ORAL at 09:10

## 2022-01-01 RX ADMIN — PROPOFOL 50 MCG/KG/MIN: 10 INJECTION, EMULSION INTRAVENOUS at 00:22

## 2022-01-01 RX ADMIN — PROPOFOL 5 MCG/KG/MIN: 10 INJECTION, EMULSION INTRAVENOUS at 10:06

## 2022-01-01 RX ADMIN — DEXAMETHASONE SODIUM PHOSPHATE 4 MG: 4 INJECTION, SOLUTION INTRA-ARTICULAR; INTRALESIONAL; INTRAMUSCULAR; INTRAVENOUS; SOFT TISSUE at 17:32

## 2022-01-01 RX ADMIN — DILTIAZEM HYDROCHLORIDE 30 MG: 30 TABLET, FILM COATED ORAL at 22:45

## 2022-01-01 RX ADMIN — HEPARIN SODIUM 5000 UNITS: 5000 INJECTION INTRAVENOUS; SUBCUTANEOUS at 13:36

## 2022-01-01 RX ADMIN — Medication 4 MCG/MIN: at 05:38

## 2022-01-01 RX ADMIN — HEPARIN SODIUM 5000 UNITS: 5000 INJECTION INTRAVENOUS; SUBCUTANEOUS at 13:05

## 2022-01-01 RX ADMIN — DILTIAZEM HYDROCHLORIDE 30 MG: 30 TABLET, FILM COATED ORAL at 16:39

## 2022-01-01 RX ADMIN — PROPOFOL 40 MCG/KG/MIN: 10 INJECTION, EMULSION INTRAVENOUS at 10:41

## 2022-01-01 RX ADMIN — POTASSIUM CHLORIDE 10 MEQ: 10 INJECTION, SOLUTION INTRAVENOUS at 11:33

## 2022-01-01 RX ADMIN — PROPOFOL 40 MCG/KG/MIN: 10 INJECTION, EMULSION INTRAVENOUS at 03:04

## 2022-01-01 RX ADMIN — PROPOFOL 35 MCG/KG/MIN: 10 INJECTION, EMULSION INTRAVENOUS at 20:42

## 2022-01-01 RX ADMIN — DEXAMETHASONE SODIUM PHOSPHATE 4 MG: 4 INJECTION, SOLUTION INTRA-ARTICULAR; INTRALESIONAL; INTRAMUSCULAR; INTRAVENOUS; SOFT TISSUE at 17:11

## 2022-01-01 RX ADMIN — MORPHINE SULFATE 2 MG: 2 INJECTION, SOLUTION INTRAMUSCULAR; INTRAVENOUS at 16:17

## 2022-01-01 RX ADMIN — PROPOFOL 50 MCG/KG/MIN: 10 INJECTION, EMULSION INTRAVENOUS at 15:19

## 2022-01-01 RX ADMIN — DILTIAZEM HYDROCHLORIDE 30 MG: 30 TABLET, FILM COATED ORAL at 17:10

## 2022-01-01 RX ADMIN — HEPARIN SODIUM 5000 UNITS: 5000 INJECTION INTRAVENOUS; SUBCUTANEOUS at 13:06

## 2022-01-01 RX ADMIN — SODIUM POLYSTYRENE SULFONATE 30 G: 15 SUSPENSION ORAL; RECTAL at 10:17

## 2022-01-01 RX ADMIN — ALBUMIN (HUMAN) 25 G: 0.25 INJECTION, SOLUTION INTRAVENOUS at 20:23

## 2022-01-01 RX ADMIN — PROPOFOL 25 MCG/KG/MIN: 10 INJECTION, EMULSION INTRAVENOUS at 05:55

## 2022-01-01 RX ADMIN — DEXAMETHASONE SODIUM PHOSPHATE 4 MG: 4 INJECTION, SOLUTION INTRA-ARTICULAR; INTRALESIONAL; INTRAMUSCULAR; INTRAVENOUS; SOFT TISSUE at 08:12

## 2022-01-01 RX ADMIN — SODIUM CHLORIDE, PRESERVATIVE FREE 1 G: 5 INJECTION INTRAVENOUS at 00:03

## 2022-01-01 RX ADMIN — BARICITINIB 2 MG: 2 TABLET, FILM COATED ORAL at 09:19

## 2022-01-01 RX ADMIN — PROPOFOL 35 MCG/KG/MIN: 10 INJECTION, EMULSION INTRAVENOUS at 05:21

## 2022-01-01 RX ADMIN — SODIUM CHLORIDE, PRESERVATIVE FREE 1 G: 5 INJECTION INTRAVENOUS at 00:27

## 2022-01-01 RX ADMIN — DEXTROSE MONOHYDRATE 50 ML/HR: 50 INJECTION, SOLUTION INTRAVENOUS at 11:19

## 2022-01-01 RX ADMIN — HYDROXYZINE HYDROCHLORIDE 25 MG: 50 INJECTION, SOLUTION INTRAMUSCULAR at 01:11

## 2022-01-01 RX ADMIN — SODIUM CHLORIDE, PRESERVATIVE FREE 1 G: 5 INJECTION INTRAVENOUS at 00:36

## 2022-01-01 RX ADMIN — PROPOFOL 50 MCG/KG/MIN: 10 INJECTION, EMULSION INTRAVENOUS at 10:45

## 2022-01-01 RX ADMIN — MAGNESIUM SULFATE HEPTAHYDRATE 2 G: 2 INJECTION, SOLUTION INTRAVENOUS at 10:44

## 2022-01-01 RX ADMIN — ENOXAPARIN SODIUM 40 MG: 100 INJECTION SUBCUTANEOUS at 10:16

## 2022-01-01 RX ADMIN — DEXAMETHASONE SODIUM PHOSPHATE 4 MG: 4 INJECTION, SOLUTION INTRAMUSCULAR; INTRAVENOUS at 00:10

## 2022-01-01 RX ADMIN — SODIUM CHLORIDE, PRESERVATIVE FREE 1 G: 5 INJECTION INTRAVENOUS at 10:17

## 2022-01-01 RX ADMIN — SODIUM CHLORIDE, PRESERVATIVE FREE 1 G: 5 INJECTION INTRAVENOUS at 02:46

## 2022-01-01 RX ADMIN — PROPOFOL 50 MCG/KG/MIN: 10 INJECTION, EMULSION INTRAVENOUS at 14:46

## 2022-01-01 RX ADMIN — HEPARIN SODIUM 5000 UNITS: 5000 INJECTION INTRAVENOUS; SUBCUTANEOUS at 21:12

## 2022-01-01 RX ADMIN — HEPARIN SODIUM 5000 UNITS: 5000 INJECTION INTRAVENOUS; SUBCUTANEOUS at 22:42

## 2022-01-01 RX ADMIN — DILTIAZEM HYDROCHLORIDE 30 MG: 30 TABLET, FILM COATED ORAL at 16:00

## 2022-01-01 RX ADMIN — SODIUM CHLORIDE, PRESERVATIVE FREE 1 G: 5 INJECTION INTRAVENOUS at 08:12

## 2022-01-01 RX ADMIN — POTASSIUM CHLORIDE 10 MEQ: 7.46 INJECTION, SOLUTION INTRAVENOUS at 09:15

## 2022-01-01 RX ADMIN — INSULIN LISPRO 6 UNITS: 100 INJECTION, SOLUTION INTRAVENOUS; SUBCUTANEOUS at 00:03

## 2022-01-01 RX ADMIN — POLYETHYLENE GLYCOL 3350 17 G: 17 POWDER, FOR SOLUTION ORAL at 08:44

## 2022-01-01 RX ADMIN — DEXAMETHASONE SODIUM PHOSPHATE 4 MG: 4 INJECTION, SOLUTION INTRA-ARTICULAR; INTRALESIONAL; INTRAMUSCULAR; INTRAVENOUS; SOFT TISSUE at 09:58

## 2022-01-01 RX ADMIN — ACETAMINOPHEN 650 MG: 325 TABLET ORAL at 23:03

## 2022-01-01 RX ADMIN — PROPOFOL 15 MCG/KG/MIN: 10 INJECTION, EMULSION INTRAVENOUS at 14:16

## 2022-01-01 RX ADMIN — POTASSIUM CHLORIDE 10 MEQ: 7.46 INJECTION, SOLUTION INTRAVENOUS at 09:28

## 2022-01-01 RX ADMIN — PROPOFOL 30 MCG/KG/MIN: 10 INJECTION, EMULSION INTRAVENOUS at 04:55

## 2022-01-01 RX ADMIN — PROPOFOL 20 MCG/KG/MIN: 10 INJECTION, EMULSION INTRAVENOUS at 13:06

## 2022-01-01 RX ADMIN — FUROSEMIDE 20 MG: 10 INJECTION, SOLUTION INTRAMUSCULAR; INTRAVENOUS at 21:42

## 2022-01-01 RX ADMIN — DEXAMETHASONE SODIUM PHOSPHATE 4 MG: 4 INJECTION, SOLUTION INTRA-ARTICULAR; INTRALESIONAL; INTRAMUSCULAR; INTRAVENOUS; SOFT TISSUE at 17:25

## 2022-01-01 RX ADMIN — PROPOFOL 20 MCG/KG/MIN: 10 INJECTION, EMULSION INTRAVENOUS at 05:23

## 2022-01-01 RX ADMIN — PROPOFOL 35 MCG/KG/MIN: 10 INJECTION, EMULSION INTRAVENOUS at 21:16

## 2022-01-01 RX ADMIN — SODIUM CHLORIDE, PRESERVATIVE FREE 1 G: 5 INJECTION INTRAVENOUS at 10:28

## 2022-01-01 RX ADMIN — DEXAMETHASONE SODIUM PHOSPHATE 4 MG: 4 INJECTION, SOLUTION INTRA-ARTICULAR; INTRALESIONAL; INTRAMUSCULAR; INTRAVENOUS; SOFT TISSUE at 05:24

## 2022-01-01 RX ADMIN — HEPARIN SODIUM 5000 UNITS: 5000 INJECTION INTRAVENOUS; SUBCUTANEOUS at 21:08

## 2022-01-01 RX ADMIN — SODIUM CHLORIDE, PRESERVATIVE FREE 1 G: 5 INJECTION INTRAVENOUS at 00:33

## 2022-01-01 RX ADMIN — ENOXAPARIN SODIUM 40 MG: 100 INJECTION SUBCUTANEOUS at 09:34

## 2022-01-01 RX ADMIN — DEXAMETHASONE SODIUM PHOSPHATE 4 MG: 4 INJECTION, SOLUTION INTRA-ARTICULAR; INTRALESIONAL; INTRAMUSCULAR; INTRAVENOUS; SOFT TISSUE at 23:38

## 2022-01-01 RX ADMIN — DEXAMETHASONE SODIUM PHOSPHATE 4 MG: 4 INJECTION, SOLUTION INTRA-ARTICULAR; INTRALESIONAL; INTRAMUSCULAR; INTRAVENOUS; SOFT TISSUE at 08:04

## 2022-01-01 RX ADMIN — DEXAMETHASONE SODIUM PHOSPHATE 4 MG: 4 INJECTION, SOLUTION INTRA-ARTICULAR; INTRALESIONAL; INTRAMUSCULAR; INTRAVENOUS; SOFT TISSUE at 20:24

## 2022-01-01 RX ADMIN — DEXMEDETOMIDINE HYDROCHLORIDE 0.4 MCG/KG/HR: 100 INJECTION, SOLUTION, CONCENTRATE INTRAVENOUS at 22:42

## 2022-01-01 RX ADMIN — DEXMEDETOMIDINE HYDROCHLORIDE 1.2 MCG/KG/HR: 100 INJECTION, SOLUTION, CONCENTRATE INTRAVENOUS at 14:47

## 2022-01-01 RX ADMIN — PROPOFOL 25 MCG/KG/MIN: 10 INJECTION, EMULSION INTRAVENOUS at 17:32

## 2022-01-01 RX ADMIN — DEXAMETHASONE SODIUM PHOSPHATE 4 MG: 4 INJECTION, SOLUTION INTRA-ARTICULAR; INTRALESIONAL; INTRAMUSCULAR; INTRAVENOUS; SOFT TISSUE at 18:12

## 2022-01-01 RX ADMIN — PROPOFOL 40 MCG/KG/MIN: 10 INJECTION, EMULSION INTRAVENOUS at 17:19

## 2022-01-01 RX ADMIN — DEXAMETHASONE SODIUM PHOSPHATE 4 MG: 4 INJECTION, SOLUTION INTRA-ARTICULAR; INTRALESIONAL; INTRAMUSCULAR; INTRAVENOUS; SOFT TISSUE at 08:30

## 2022-01-01 RX ADMIN — ALBUMIN (HUMAN) 25 G: 0.25 INJECTION, SOLUTION INTRAVENOUS at 17:11

## 2022-01-01 RX ADMIN — DILTIAZEM HYDROCHLORIDE 30 MG: 30 TABLET, FILM COATED ORAL at 09:58

## 2022-01-01 RX ADMIN — INSULIN GLARGINE 10 UNITS: 100 INJECTION, SOLUTION SUBCUTANEOUS at 08:11

## 2022-01-01 RX ADMIN — DEXMEDETOMIDINE HYDROCHLORIDE 0.7 MCG/KG/HR: 100 INJECTION, SOLUTION, CONCENTRATE INTRAVENOUS at 15:43

## 2022-01-01 RX ADMIN — PROPOFOL 20 MCG/KG/MIN: 10 INJECTION, EMULSION INTRAVENOUS at 10:48

## 2022-01-01 RX ADMIN — INSULIN LISPRO 3 UNITS: 100 INJECTION, SOLUTION INTRAVENOUS; SUBCUTANEOUS at 17:18

## 2022-01-01 RX ADMIN — HEPARIN SODIUM 5000 UNITS: 5000 INJECTION INTRAVENOUS; SUBCUTANEOUS at 05:19

## 2022-01-01 RX ADMIN — PROPOFOL INJECTABLE EMULSION 10 MCG/KG/MIN: 10 INJECTION, EMULSION INTRAVENOUS at 12:00

## 2022-01-01 RX ADMIN — SODIUM ZIRCONIUM CYCLOSILICATE 10 G: 10 POWDER, FOR SUSPENSION ORAL at 08:42

## 2022-01-01 RX ADMIN — SODIUM CHLORIDE, PRESERVATIVE FREE 1 G: 5 INJECTION INTRAVENOUS at 08:16

## 2022-01-01 RX ADMIN — SODIUM CHLORIDE, PRESERVATIVE FREE 1 G: 5 INJECTION INTRAVENOUS at 15:54

## 2022-01-01 RX ADMIN — SODIUM CHLORIDE 50 ML/HR: 9 INJECTION, SOLUTION INTRAVENOUS at 22:47

## 2022-01-01 RX ADMIN — HEPARIN SODIUM 5000 UNITS: 5000 INJECTION INTRAVENOUS; SUBCUTANEOUS at 13:11

## 2022-01-01 RX ADMIN — POTASSIUM CHLORIDE 10 MEQ: 7.46 INJECTION, SOLUTION INTRAVENOUS at 06:50

## 2022-01-01 RX ADMIN — SODIUM CHLORIDE, PRESERVATIVE FREE 1 G: 5 INJECTION INTRAVENOUS at 10:53

## 2022-01-01 RX ADMIN — SODIUM CHLORIDE, PRESERVATIVE FREE 1 G: 5 INJECTION INTRAVENOUS at 23:48

## 2022-01-01 RX ADMIN — DEXMEDETOMIDINE HYDROCHLORIDE 0.5 MCG/KG/HR: 100 INJECTION, SOLUTION, CONCENTRATE INTRAVENOUS at 09:25

## 2022-01-01 RX ADMIN — INSULIN LISPRO 3 UNITS: 100 INJECTION, SOLUTION INTRAVENOUS; SUBCUTANEOUS at 18:23

## 2022-01-01 RX ADMIN — PROPOFOL 30 MCG/KG/MIN: 10 INJECTION, EMULSION INTRAVENOUS at 15:42

## 2022-01-01 RX ADMIN — ENOXAPARIN SODIUM 40 MG: 100 INJECTION SUBCUTANEOUS at 08:03

## 2022-01-01 RX ADMIN — INSULIN GLARGINE 10 UNITS: 100 INJECTION, SOLUTION SUBCUTANEOUS at 08:03

## 2022-01-01 RX ADMIN — PROPOFOL 20 MCG/KG/MIN: 10 INJECTION, EMULSION INTRAVENOUS at 21:24

## 2022-01-01 RX ADMIN — SODIUM CHLORIDE, PRESERVATIVE FREE 1 G: 5 INJECTION INTRAVENOUS at 17:14

## 2022-01-01 RX ADMIN — PROPOFOL 25 MCG/KG/MIN: 10 INJECTION, EMULSION INTRAVENOUS at 21:14

## 2022-01-01 RX ADMIN — DEXAMETHASONE SODIUM PHOSPHATE 4 MG: 4 INJECTION, SOLUTION INTRA-ARTICULAR; INTRALESIONAL; INTRAMUSCULAR; INTRAVENOUS; SOFT TISSUE at 12:18

## 2022-01-01 RX ADMIN — SODIUM CHLORIDE, PRESERVATIVE FREE 1 G: 5 INJECTION INTRAVENOUS at 16:42

## 2022-01-01 RX ADMIN — PROPOFOL 25 MCG/KG/MIN: 10 INJECTION, EMULSION INTRAVENOUS at 10:29

## 2022-01-01 RX ADMIN — SODIUM CHLORIDE, PRESERVATIVE FREE 1 G: 5 INJECTION INTRAVENOUS at 16:38

## 2022-01-01 RX ADMIN — ENOXAPARIN SODIUM 40 MG: 40 INJECTION SUBCUTANEOUS at 09:10

## 2022-01-01 RX ADMIN — DILTIAZEM HYDROCHLORIDE 30 MG: 30 TABLET, FILM COATED ORAL at 15:49

## 2022-01-01 RX ADMIN — SODIUM CHLORIDE, PRESERVATIVE FREE 1 G: 5 INJECTION INTRAVENOUS at 18:49

## 2022-01-01 RX ADMIN — DILTIAZEM HYDROCHLORIDE 30 MG: 30 TABLET, FILM COATED ORAL at 08:09

## 2022-01-01 RX ADMIN — HEPARIN SODIUM 5000 UNITS: 5000 INJECTION INTRAVENOUS; SUBCUTANEOUS at 16:41

## 2022-01-01 RX ADMIN — DILTIAZEM HYDROCHLORIDE 30 MG: 30 TABLET, FILM COATED ORAL at 08:43

## 2022-01-01 RX ADMIN — DEXAMETHASONE SODIUM PHOSPHATE 4 MG: 4 INJECTION, SOLUTION INTRA-ARTICULAR; INTRALESIONAL; INTRAMUSCULAR; INTRAVENOUS; SOFT TISSUE at 17:33

## 2022-01-01 RX ADMIN — SODIUM CHLORIDE, PRESERVATIVE FREE 1 G: 5 INJECTION INTRAVENOUS at 08:21

## 2022-01-01 RX ADMIN — PROPOFOL 40 MCG/KG/MIN: 10 INJECTION, EMULSION INTRAVENOUS at 12:04

## 2022-01-01 RX ADMIN — DILTIAZEM HYDROCHLORIDE 30 MG: 30 TABLET, FILM COATED ORAL at 21:50

## 2022-01-01 RX ADMIN — BARICITINIB 2 MG: 2 TABLET, FILM COATED ORAL at 10:29

## 2022-01-01 RX ADMIN — DILTIAZEM HYDROCHLORIDE 30 MG: 30 TABLET, FILM COATED ORAL at 21:17

## 2022-01-01 RX ADMIN — DEXAMETHASONE SODIUM PHOSPHATE 4 MG: 4 INJECTION, SOLUTION INTRA-ARTICULAR; INTRALESIONAL; INTRAMUSCULAR; INTRAVENOUS; SOFT TISSUE at 05:37

## 2022-01-01 RX ADMIN — SODIUM CHLORIDE, PRESERVATIVE FREE 1 G: 5 INJECTION INTRAVENOUS at 08:44

## 2022-01-01 RX ADMIN — PROPOFOL 30 MCG/KG/MIN: 10 INJECTION, EMULSION INTRAVENOUS at 23:21

## 2022-01-01 RX ADMIN — DEXTROSE AND SODIUM CHLORIDE 50 ML/HR: 5; 450 INJECTION, SOLUTION INTRAVENOUS at 10:09

## 2022-01-01 RX ADMIN — DEXAMETHASONE SODIUM PHOSPHATE 4 MG: 4 INJECTION, SOLUTION INTRA-ARTICULAR; INTRALESIONAL; INTRAMUSCULAR; INTRAVENOUS; SOFT TISSUE at 21:16

## 2022-01-01 RX ADMIN — INSULIN LISPRO 6 UNITS: 100 INJECTION, SOLUTION INTRAVENOUS; SUBCUTANEOUS at 18:16

## 2022-01-01 RX ADMIN — DEXMEDETOMIDINE HYDROCHLORIDE 0.4 MCG/KG/HR: 100 INJECTION, SOLUTION, CONCENTRATE INTRAVENOUS at 09:11

## 2022-01-01 RX ADMIN — PROPOFOL 25 MCG/KG/MIN: 10 INJECTION, EMULSION INTRAVENOUS at 15:04

## 2022-01-01 RX ADMIN — SODIUM CHLORIDE, PRESERVATIVE FREE 1 G: 5 INJECTION INTRAVENOUS at 09:39

## 2022-01-01 RX ADMIN — BARICITINIB 2 MG: 2 TABLET, FILM COATED ORAL at 08:14

## 2022-01-01 RX ADMIN — HEPARIN SODIUM 5000 UNITS: 5000 INJECTION INTRAVENOUS; SUBCUTANEOUS at 21:28

## 2022-01-01 RX ADMIN — FUROSEMIDE 20 MG: 10 INJECTION, SOLUTION INTRAMUSCULAR; INTRAVENOUS at 08:03

## 2022-01-01 RX ADMIN — FUROSEMIDE 20 MG: 10 INJECTION, SOLUTION INTRAMUSCULAR; INTRAVENOUS at 08:49

## 2022-01-01 RX ADMIN — PROPOFOL 40 MCG/KG/MIN: 10 INJECTION, EMULSION INTRAVENOUS at 08:11

## 2022-01-01 RX ADMIN — AZITHROMYCIN MONOHYDRATE 500 MG: 500 INJECTION, POWDER, LYOPHILIZED, FOR SOLUTION INTRAVENOUS at 10:30

## 2022-01-01 RX ADMIN — FUROSEMIDE 20 MG: 10 INJECTION, SOLUTION INTRAMUSCULAR; INTRAVENOUS at 22:16

## 2022-01-01 RX ADMIN — Medication 50 MCG/HR: at 09:37

## 2022-01-01 RX ADMIN — DEXAMETHASONE SODIUM PHOSPHATE 4 MG: 4 INJECTION, SOLUTION INTRA-ARTICULAR; INTRALESIONAL; INTRAMUSCULAR; INTRAVENOUS; SOFT TISSUE at 23:47

## 2022-01-01 RX ADMIN — DEXAMETHASONE SODIUM PHOSPHATE 4 MG: 4 INJECTION, SOLUTION INTRA-ARTICULAR; INTRALESIONAL; INTRAMUSCULAR; INTRAVENOUS; SOFT TISSUE at 01:08

## 2022-01-01 RX ADMIN — DILTIAZEM HYDROCHLORIDE 30 MG: 30 TABLET, FILM COATED ORAL at 10:55

## 2022-01-01 RX ADMIN — PROPOFOL 35 MCG/KG/MIN: 10 INJECTION, EMULSION INTRAVENOUS at 08:31

## 2022-01-01 RX ADMIN — FUROSEMIDE 20 MG: 10 INJECTION, SOLUTION INTRAMUSCULAR; INTRAVENOUS at 21:17

## 2022-01-01 RX ADMIN — PROPOFOL 25 MCG/KG/MIN: 10 INJECTION, EMULSION INTRAVENOUS at 05:32

## 2022-01-01 RX ADMIN — PROPOFOL 25 MCG/KG/MIN: 10 INJECTION, EMULSION INTRAVENOUS at 00:24

## 2022-01-01 RX ADMIN — PROPOFOL 30 MCG/KG/MIN: 10 INJECTION, EMULSION INTRAVENOUS at 00:14

## 2022-01-01 RX ADMIN — INSULIN LISPRO 3 UNITS: 100 INJECTION, SOLUTION INTRAVENOUS; SUBCUTANEOUS at 12:14

## 2022-01-01 RX ADMIN — ENOXAPARIN SODIUM 40 MG: 40 INJECTION SUBCUTANEOUS at 08:58

## 2022-01-01 RX ADMIN — Medication 14 UNITS/KG/HR: at 10:56

## 2022-01-01 RX ADMIN — INSULIN LISPRO 6 UNITS: 100 INJECTION, SOLUTION INTRAVENOUS; SUBCUTANEOUS at 13:12

## 2022-01-01 RX ADMIN — FUROSEMIDE 20 MG: 10 INJECTION, SOLUTION INTRAMUSCULAR; INTRAVENOUS at 09:58

## 2022-01-01 RX ADMIN — DILTIAZEM HYDROCHLORIDE 30 MG: 30 TABLET, FILM COATED ORAL at 08:03

## 2022-01-01 RX ADMIN — ACETAZOLAMIDE 500 MG: 500 CAPSULE, EXTENDED RELEASE ORAL at 14:22

## 2022-01-01 RX ADMIN — HEPARIN SODIUM 5000 UNITS: 5000 INJECTION INTRAVENOUS; SUBCUTANEOUS at 05:33

## 2022-01-01 RX ADMIN — SODIUM CHLORIDE, PRESERVATIVE FREE 1 G: 5 INJECTION INTRAVENOUS at 01:08

## 2022-01-01 RX ADMIN — HEPARIN SODIUM 5000 UNITS: 5000 INJECTION INTRAVENOUS; SUBCUTANEOUS at 21:42

## 2022-01-01 RX ADMIN — Medication 100 MCG/HR: at 05:32

## 2022-01-01 RX ADMIN — BARICITINIB 2 MG: 2 TABLET, FILM COATED ORAL at 08:21

## 2022-01-01 RX ADMIN — DILTIAZEM HYDROCHLORIDE 30 MG: 30 TABLET, FILM COATED ORAL at 21:44

## 2022-01-01 RX ADMIN — PROPOFOL 50 MCG/KG/MIN: 10 INJECTION, EMULSION INTRAVENOUS at 07:54

## 2022-01-01 RX ADMIN — DILTIAZEM HYDROCHLORIDE 30 MG: 30 TABLET, FILM COATED ORAL at 22:42

## 2022-01-01 RX ADMIN — DEXMEDETOMIDINE HYDROCHLORIDE 0.6 MCG/KG/HR: 100 INJECTION, SOLUTION, CONCENTRATE INTRAVENOUS at 01:36

## 2022-01-01 RX ADMIN — DEXAMETHASONE SODIUM PHOSPHATE 4 MG: 4 INJECTION, SOLUTION INTRAMUSCULAR; INTRAVENOUS at 14:43

## 2022-01-01 RX ADMIN — DEXAMETHASONE SODIUM PHOSPHATE 4 MG: 4 INJECTION, SOLUTION INTRA-ARTICULAR; INTRALESIONAL; INTRAMUSCULAR; INTRAVENOUS; SOFT TISSUE at 01:01

## 2022-01-01 RX ADMIN — FUROSEMIDE 20 MG: 10 INJECTION, SOLUTION INTRAMUSCULAR; INTRAVENOUS at 21:03

## 2022-01-01 RX ADMIN — PROPOFOL 50 MCG/KG/MIN: 10 INJECTION, EMULSION INTRAVENOUS at 08:44

## 2022-01-01 RX ADMIN — DEXMEDETOMIDINE HYDROCHLORIDE 1 MCG/KG/HR: 100 INJECTION, SOLUTION, CONCENTRATE INTRAVENOUS at 13:11

## 2022-01-01 RX ADMIN — PROPOFOL 40 MCG/KG/MIN: 10 INJECTION, EMULSION INTRAVENOUS at 03:28

## 2022-01-01 RX ADMIN — HEPARIN SODIUM 5000 UNITS: 5000 INJECTION INTRAVENOUS; SUBCUTANEOUS at 06:49

## 2022-01-17 PROBLEM — J96.01 ACUTE HYPOXEMIC RESPIRATORY FAILURE DUE TO COVID-19 (HCC): Status: ACTIVE | Noted: 2022-01-01

## 2022-01-17 PROBLEM — U07.1 COVID-19: Status: ACTIVE | Noted: 2022-01-01

## 2022-01-17 PROBLEM — U07.1 ACUTE HYPOXEMIC RESPIRATORY FAILURE DUE TO COVID-19 (HCC): Status: ACTIVE | Noted: 2022-01-01

## 2022-01-17 NOTE — Clinical Note
Status[de-identified] INPATIENT [101]   Type of Bed: Remote Telemetry [29]   Cardiac Monitoring Required?: Yes   Inpatient Hospitalization Certified Necessary for the Following Reasons: 3.  Patient receiving treatment that can only be provided in an inpatient setting (further clarification in H&P documentation)   Admitting Diagnosis: Acute hypoxemic respiratory failure due to COVID-19 [facility]) [[id number]]   Admitting Physician: Iona Caruso [[id number]]   Attending Physician: Iona Caruso [[id number]]   Estimated Length of Stay: 2 Midnights   Discharge Plan[de-identified] Home with Office Follow-up

## 2022-01-17 NOTE — ED PROVIDER NOTES
EMERGENCY DEPARTMENT HISTORY AND PHYSICAL EXAM      Date: 1/17/2022  Patient Name: Karthik Black    History of Presenting Illness     Chief Complaint   Patient presents with    Fatigue       History Provided By: Patient, Patient's Son and EMS    HPI: Karthik Black, 80 y.o. female with a past medical history significant Hypertension and arthritis presents to the ED with cc of lethargy, generalized body aches, and low blood pressure onset 3 days ago. Patient mildly hypoxic on EMS arrival at 91%. Patient is not vaccinated against COVID-19. Son denies any known exposure. Son reports that the patient's knee was bothering her a few days ago and she did have a ground-level fall. He denies any head injury. He also reports that she has been generally confused, not acting herself. She was checked out by EMS after her fall, no noted injuries. Son concerned about UTI. Low-grade fever. Patient denies chest pain, shortness of breath, abdominal pain. Son also reports lack of food and water intake. Patient is DNR according to son who is POA. There are no other complaints, changes, or physical findings at this time.     PCP: Lyndon Baumgarten, MD    Current Facility-Administered Medications   Medication Dose Route Frequency Provider Last Rate Last Admin    0.9% sodium chloride infusion  75 mL/hr IntraVENous CONTINUOUS Sandeep Lopez MD        acetaminophen (TYLENOL) tablet 650 mg  650 mg Oral Q6H PRN Sandeep Lopez MD        Or   Stormy Izaguirre acetaminophen (TYLENOL) suppository 650 mg  650 mg Rectal Q6H PRN Sandeep Lopez MD        polyethylene glycol (MIRALAX) packet 17 g  17 g Oral DAILY PRN Sandeep Lopez MD        ondansetron (ZOFRAN ODT) tablet 4 mg  4 mg Oral Q8H PRN Polo Lopez MD        Or    ondansetron [facility]) injection 4 mg  4 mg IntraVENous Q6H PRN Sandeep Lopez MD        enoxaparin (LOVENOX) injection 40 mg  40 mg SubCUTAneous DAILY Sandeep Lopez MD        dexamethasone (DECADRON) 4 mg/mL injection 4 mg  4 mg IntraVENous Q6H Polo Lopez MD   4 mg at 01/18/22 0516    baricitinib (OLUMIANT) tablet 4 mg  4 mg Oral DAILY Jose Lopez MD        albuterol (PROVENTIL HFA, VENTOLIN HFA, PROAIR HFA) inhaler 2 Puff  2 Puff Inhalation Q6H PRN Jose Lopez MD        budesonide-formoteroL (SYMBICORT) 160-4.5 mcg/actuation HFA inhaler 2 Puff  2 Puff Inhalation BID Jose Lopez MD        [facility]) tablet 500 mg  500 mg Oral DAILY Zahira Cheng MD           Past History     Past Medical History:  No past medical history on file. Past Surgical History:  No past surgical history on file. Family History:  No family history on file. Social History:  Social History     Tobacco Use    Smoking status: Not on file    Smokeless tobacco: Not on file   Substance Use Topics    Alcohol use: Not on file    Drug use: Not on file       Allergies: Allergies   Allergen Reactions    Penicillins Hives         Review of Systems   Review of Systems   Constitutional: Positive for activity change, appetite change, fatigue and fever. Negative for chills. HENT: Negative for congestion, ear pain, rhinorrhea, sneezing and sore throat. Eyes: Negative for pain and visual disturbance. Respiratory: Negative for cough and shortness of breath. Cardiovascular: Negative for chest pain. Gastrointestinal: Negative for abdominal pain, diarrhea, nausea and vomiting. Genitourinary: Negative for dysuria and hematuria. Musculoskeletal: Negative for gait problem. Skin: Negative for rash. Neurological: Negative for speech difficulty, weakness and headaches. Psychiatric/Behavioral: Positive for confusion. The patient is not nervous/anxious. All other systems reviewed and are negative. Physical Exam   Physical Exam  Vitals and nursing note reviewed. Constitutional:       General: She is not in acute distress. Appearance: Normal appearance.  She is ill-appearing. She is not toxic-appearing. HENT:      Head: Normocephalic and atraumatic. Nose: Nose normal.      Mouth/Throat:      Mouth: Mucous membranes are moist.   Eyes:      Extraocular Movements: Extraocular movements intact. Conjunctiva/sclera: Conjunctivae normal.      Pupils: Pupils are equal, round, and reactive to light. Cardiovascular:      Rate and Rhythm: Normal rate. Pulses: Normal pulses. Heart sounds: Normal heart sounds. Pulmonary:      Effort: Pulmonary effort is normal. No tachypnea or respiratory distress. Breath sounds: Normal breath sounds. Decreased air movement present. No wheezing. Abdominal:      General: Bowel sounds are normal.      Palpations: Abdomen is soft. Tenderness: There is no abdominal tenderness. Musculoskeletal:         General: No deformity or signs of injury. Normal range of motion. Cervical back: Normal range of motion. Right lower leg: No edema. Left lower leg: No edema. Skin:     General: Skin is warm and dry. Capillary Refill: Capillary refill takes less than 2 seconds. Findings: No rash. Neurological:      General: No focal deficit present. Mental Status: She is alert. She is confused. GCS: GCS eye subscore is 4. GCS verbal subscore is 5. GCS motor subscore is 6. Cranial Nerves: No cranial nerve deficit, dysarthria or facial asymmetry. Motor: Motor function is intact.       Comments: No focal weakness   Psychiatric:         Mood and Affect: Mood normal.         Diagnostic Study Results     Labs -     Recent Results (from the past 48 hour(s))   URINALYSIS W/ REFLEX CULTURE    Collection Time: 01/17/22 10:30 AM    Specimen: Urine   Result Value Ref Range    Color Yellow/Straw      Appearance Turbid (A) Clear      Specific gravity 1.014 1.003 - 1.030      pH (UA) 7.0 5.0 - 8.0      Protein 100 (A) Negative mg/dL    Glucose Negative Negative mg/dL    Ketone 20 (A) Negative mg/dL Bilirubin Negative Negative      Blood Small (A) Negative      Urobilinogen 0.1 0.1 - 1.0 EU/dL    Nitrites Negative Negative      Leukocyte Esterase Negative Negative      UA:UC IF INDICATED Culture not indicated by UA result Culture not indicated by UA result      WBC 0-4 0 - 4 /hpf    RBC 0-5 0 - 5 /hpf    Bacteria Negative Negative /hpf    Mucus Trace /lpf   EKG, 12 LEAD, INITIAL    Collection Time: 01/17/22 11:23 AM   Result Value Ref Range    Ventricular Rate 94 BPM    Atrial Rate 94 BPM    P-R Interval 152 ms    QRS Duration 76 ms    Q-T Interval 296 ms    QTC Calculation (Bezet) 370 ms    Calculated P Axis 33 degrees    Calculated R Axis -29 degrees    Calculated T Axis 87 degrees    Diagnosis       Sinus rhythm with Premature atrial complexes with Abberant conduction  Moderate voltage criteria for LVH, may be normal variant  Cannot rule out Septal infarct , age undetermined  Abnormal ECG  No previous ECGs available  Confirmed by AJAY MONROY MD ([id number]) on 1/17/2022 5:36:46 PM     CBC WITH AUTOMATED DIFF    Collection Time: 01/17/22 11:33 AM   Result Value Ref Range    WBC 4.7 3.6 - 11.0 K/uL    RBC 3.63 (L) 3.80 - 5.20 M/uL    HGB 10.9 (L) 11.5 - 16.0 g/dL    HCT 34.2 (L) 35.0 - 47.0 %    MCV 94.2 80.0 - 99.0 FL    MCH 30.0 26.0 - 34.0 PG    MCHC 31.9 30.0 - 36.5 g/dL    RDW 13.5 11.5 - 14.5 %    PLATELET 063 726 - 036 K/uL    MPV 11.4 8.9 - 12.9 FL    NRBC 0.0 0.0  WBC    ABSOLUTE NRBC 0.00 0.00 - 0.01 K/uL    NEUTROPHILS 75 32 - 75 %    LYMPHOCYTES 17 12 - 49 %    MONOCYTES 8 5 - 13 %    EOSINOPHILS 0 0 - 7 %    BASOPHILS 0 0 - 1 %    IMMATURE GRANULOCYTES 0 0 - 0.5 %    ABS. NEUTROPHILS 3.5 1.8 - 8.0 K/UL    ABS. LYMPHOCYTES 0.8 0.8 - 3.5 K/UL    ABS. MONOCYTES 0.4 0.0 - 1.0 K/UL    ABS. EOSINOPHILS 0.0 0.0 - 0.4 K/UL    ABS. BASOPHILS 0.0 0.0 - 0.1 K/UL    ABS. IMM.  GRANS. 0.0 0.00 - 0.04 K/UL    DF AUTOMATED     METABOLIC PANEL, COMPREHENSIVE    Collection Time: 01/17/22 11:33 AM   Result Value Ref Range    Sodium 139 136 - 145 mmol/L    Potassium 3.2 (L) 3.5 - 5.1 mmol/L    Chloride 105 97 - 108 mmol/L    CO2 29 21 - 32 mmol/L    Anion gap 5 5 - 15 mmol/L    Glucose 106 (H) 65 - 100 mg/dL    BUN 11 6 - 20 mg/dL    Creatinine 0.80 0.55 - 1.02 mg/dL    BUN/Creatinine ratio 14 12 - 20      GFR est AA >60 >60 ml/min/1.73m2    GFR est non-AA >60 >60 ml/min/1.73m2    Calcium 9.0 8.5 - 10.1 mg/dL    Bilirubin, total 0.3 0.2 - 1.0 mg/dL    AST (SGOT) 138 (H) 15 - 37 U/L    ALT (SGPT) 50 12 - 78 U/L    Alk. phosphatase 39 (L) 45 - 117 U/L    Protein, total 7.3 6.4 - 8.2 g/dL    Albumin 2.9 (L) 3.5 - 5.0 g/dL    Globulin 4.4 (H) 2.0 - 4.0 g/dL    A-G Ratio 0.7 (L) 1.1 - 2.2     COVID-19 RAPID TEST    Collection Time: 01/17/22 11:36 AM   Result Value Ref Range    Specimen source Please find results under separate order      COVID-19 rapid test DETECTED (A) Not Detected     INFLUENZA A & B AG (RAPID TEST)    Collection Time: 01/17/22 11:36 AM   Result Value Ref Range    Influenza A Antigen Negative Negative      Influenza B Antigen Negative Negative     LACTIC ACID    Collection Time: 01/17/22  1:00 PM   Result Value Ref Range    Lactic acid 1.3 0.4 - 2.0 mmol/L   METABOLIC PANEL, COMPREHENSIVE    Collection Time: 01/18/22  7:12 AM   Result Value Ref Range    Sodium 143 136 - 145 mmol/L    Potassium 3.6 3.5 - 5.1 mmol/L    Chloride 109 (H) 97 - 108 mmol/L    CO2 28 21 - 32 mmol/L    Anion gap 6 5 - 15 mmol/L    Glucose 112 (H) 65 - 100 mg/dL    BUN 11 6 - 20 mg/dL    Creatinine 0.53 (L) 0.55 - 1.02 mg/dL    BUN/Creatinine ratio 21 (H) 12 - 20      GFR est AA >60 >60 ml/min/1.73m2    GFR est non-AA >60 >60 ml/min/1.73m2    Calcium 8.9 8.5 - 10.1 mg/dL    Bilirubin, total 0.3 0.2 - 1.0 mg/dL    AST (SGOT) 189 (H) 15 - 37 U/L    ALT (SGPT) 62 12 - 78 U/L    Alk.  phosphatase 38 (L) 45 - 117 U/L    Protein, total 6.9 6.4 - 8.2 g/dL    Albumin 2.7 (L) 3.5 - 5.0 g/dL    Globulin 4.2 (H) 2.0 - 4.0 g/dL    A-G Ratio 0.6 (L) 1.1 - 2.2     CBC WITH AUTOMATED DIFF    Collection Time: 01/18/22  7:12 AM   Result Value Ref Range    WBC 3.8 3.6 - 11.0 K/uL    RBC 3.60 (L) 3.80 - 5.20 M/uL    HGB 11.0 (L) 11.5 - 16.0 g/dL    HCT 34.2 (L) 35.0 - 47.0 %    MCV 95.0 80.0 - 99.0 FL    MCH 30.6 26.0 - 34.0 PG    MCHC 32.2 30.0 - 36.5 g/dL    RDW 13.4 11.5 - 14.5 %    PLATELET 473 843 - 408 K/uL    MPV 11.0 8.9 - 12.9 FL    NRBC 0.0 0.0  WBC    ABSOLUTE NRBC 0.00 0.00 - 0.01 K/uL    NEUTROPHILS PENDING %    LYMPHOCYTES PENDING %    MONOCYTES PENDING %    EOSINOPHILS PENDING %    BASOPHILS PENDING %    IMMATURE GRANULOCYTES PENDING %    ABS. NEUTROPHILS PENDING K/UL    ABS. LYMPHOCYTES PENDING K/UL    ABS. MONOCYTES PENDING K/UL    ABS. EOSINOPHILS PENDING K/UL    ABS. BASOPHILS PENDING K/UL    ABS. IMM. GRANS. PENDING K/UL    DF PENDING        Radiologic Studies -   XR Results (most recent):  Results from Hospital Encounter encounter on 01/17/22    XR CHEST PORT    Narrative  1 view    Moderate patchy central infiltrates, right greater than left, accentuated by  hypoinflation and technique. No effusion or pneumothorax. Borderline  cardiomegaly without congestion     CT Results  (Last 48 hours)               01/17/22 1333  CT HEAD WO CONT Final result    Impression:  Periventricular/subcortical white matter gliosis. Evidence for   nonacute end vessel occlusive change. Central cerebral arteriosclerosis. No intracranial hemorrhage. Narrative:  CT head. Axial images are reviewed along with reformatted sagittal/coronal images. Dose reduction: All CT scans at this facility are performed using dose reduction   optimization techniques as appropriate to a performed exam including the   following-   automated exposure control, adjustments of mA and/or Kv according to patient   size, or use of iterative reconstructive technique. .       Bone windows demonstrate normal aeration of the imaged sinuses and mastoid air   cells. Review of intracranial content reveals patchy low density through   periventricular/subcortical white matter. Evidence for nonacute end vessel   occlusive change. Central cerebral arteriosclerosis. Falx based calcification No   hydrocephalus. No intracranial hemorrhage. Medical Decision Making and ED Course   I am the first provider for this patient. I reviewed the vital signs, available nursing notes, past medical history, past surgical history, family history and social history. Vital Signs-Reviewed the patient's vital signs. Patient Vitals for the past 12 hrs:   Temp Pulse Resp BP SpO2   01/18/22 0826 98.1 °F (36.7 °C) 76 24 (!) 147/50 98 %   01/18/22 0429  72 18 107/67 94 %   01/18/22 0300  (!) 107 15 (!) 107/46 96 %   01/18/22 0103  85 20 (!) 141/71 98 %       Records Reviewed: Nursing Notes, Old Medical Records, Previous Radiology Studies and Previous Laboratory Studies    Provider Notes (Medical Decision Making):       MDM  Number of Diagnoses or Management Options  Altered mental status, unspecified altered mental status type  COVID  Hypokalemia  Hypoxia  Diagnosis management comments: 51-year-old female with history of hypertension is presenting to the emergency department via EMS with confusion, lethargy, low blood pressure. Patient is positive for COVID-19. Patient is hypoxic, 88% on room air however she is not tachypneic, in no acute respiratory distress. Supplemental oxygen via nasal cannula improves patient saturation to 95%. Low-grade fever on arrival.  Mildly hypotensive. She was given IV fluid bolus. Mild hypokalemia. Head CT is without any acute intracranial findings. No focal weakness. She is refusing chest x-ray adamantly, multiple times. States that she will do it tomorrow. Patient will be admitted to medicine service, stable at time of admission.        Amount and/or Complexity of Data Reviewed  Clinical lab tests: ordered and reviewed  Tests in the radiology section of CPT®: ordered and reviewed  Obtain history from someone other than the patient: yes  Review and summarize past medical records: yes          ED Course:   Initial assessment performed. The patients presenting problems have been discussed, and they are in agreement with the care plan formulated and outlined with them. I have encouraged them to ask questions as they arise throughout their visit. 2:37 PM  Patient is refusing chest xray. Refused multiple times. 3:45 PM  Progress Note:  Patient was re-evaluated at bedside. Saturating 88% on room air. NAD. Consult Note:  3:45 PM  Spencer Henry PA-C spoke with Dr. Jessica Madrigal,  Specialty: Internal Medicine  Discussed pt's hx, disposition, and available diagnostic and imaging results. Reviewed care plans. Dr. Jessica Madrigal agrees with admit plan. Admit Note:  3:45 PM  Pt is being admitted by Dr. Jessica Madrigal. The results of their tests and reason(s) for their admission have been discussed with pt and/or available family. They convey agreement and understanding for the need to be admitted and for admission diagnosis. Procedures       Spencer Henry PA-C    Procedures       CRITICAL CARE NOTE :  12:47 PM  Amount of Critical Care Time: _35___(minutes)__    IMPENDING DETERIORATION -Respiratory  ASSOCIATED RISK FACTORS - Hypoxia, Hypotension  MANAGEMENT- Bedside Assessment and Supervision of Care  INTERPRETATION -  Blood Pressure and Cardiac Output Measures   INTERVENTIONS - Supplemental oxygen  CASE REVIEW - Hospitalist/Intensivist, Nursing and Family  TREATMENT RESPONSE -Stable  PERFORMED BY - DELL    NOTES   :  I have spent critical care time involved in lab review, consultations with specialist, family decision- making, bedside attention and documentation. This time excludes time spent in any separate billed procedures. During this entire length of time I was immediately available to the patient .     Spencer Henry TALISHA        Disposition     Disposition: Admitted to Floor Medical Floor the case was discussed with the admitting physician Dr. Margarita Kirkpatrick    Admitted      Diagnosis     Clinical Impression:   1. COVID    2. Altered mental status, unspecified altered mental status type    3. Hypokalemia    4. Hypoxia        Attestations:    Walter Remy PA-C    Please note that this dictation was completed with [employer], the computer voice recognition software. Quite often unanticipated grammatical, syntax, homophones, and other interpretive errors are inadvertently transcribed by the computer software. Please disregard these errors. Please excuse any errors that have escaped final proofreading. Thank you.

## 2022-01-18 NOTE — PROGRESS NOTES
Problem: Self Care Deficits Care Plan (Adult)  Goal: *Acute Goals and Plan of Care (Insert Text)  Description: Pt will be MI sup<->sit in prep for EOB ADL's  Pt will be MI  LB dressing EOB level  Pt will be MI  grooming EOB level  Pt will be MI  sit<-> prep for toilet transfer  Pt will be MI  toilet transfer with LRAD  Pt will be MI  toileting/cloth mgmt LRAD  Pt will be MI  grooming standing sink  Pt will be MI bathing sitting/standing sink LRAD  Pt will be MI maksim UE HEP in prep for self care tasks    Outcome: Not Met     OCCUPATIONAL THERAPY EVALUATION  Patient: Karthik Black (80 y.o. female)  Date: 1/18/2022  Primary Diagnosis: Acute hypoxemic respiratory failure due to COVID-19 ([facility] [address] [U07.1, J96.01]  COVID-19 [U07.1]        Precautions: COVID 19 positive       ASSESSMENT  Patient is 79 y/o female came to [facility] with possible UTI, generalized weakness, intermittent confusion x1 week and adm 1/17/2022 for COVID 19 positive/infection (1/17/2022), HTN, generalized weakness, hypokalemia, hypoxia, AMS,     Pt has hx of HTN, arthritis. Pt received semi supine in bed A&Ox to self, place and time and agreeable for OT/PT eval/tx. Per pt report, pt lives with son  in 1 story home with 5 steps maksim rails to enter and is primarily MI for self care (has shower chair) and functional transfers/mobility (has RW and cane however pt does not use).  Pt noted with intermittent confusion and requiring redirecting throughout eval.     Pt currently presents with decreased balance, decreased activity tolerance (on RA with SPO2 decreasing to 86% with activity increasing to 92% with rest, decreased safety awareness, decreased command following (requiring redirecting throughout eval), generalized weakness and increased need for assist with self care (SBA simple grooming EOb simulated, min A LB dressing EOB simulated, min A toileting/cloth mgmt) and functional transfers/mobility (mod A rolling, mod Ax2 sup<->sit, min Ax2 scooting EOB, sit<->Stand and side step with Rw and gait belt in prep for [facility] transfer). Pt would benefit from skilled OT services while at [facility] in order to increase safety and independence with self care and functional transfers/mobility. D/C recommendation TBD between SNF vs HHOT pending PLOF information     Other factors to consider for discharge: time since onset, severity of deficits, PLOF        PLAN :  Recommendations and Planned Interventions: self care training, functional mobility training, therapeutic exercise, balance training, therapeutic activities, endurance activities, patient education, and home safety training    Frequency/Duration: Patient will be followed by occupational therapy 3-5x/week to address goals. Recommendation for discharge: (in order for the patient to meet his/her long term goals)  TBD SNF vs HHOT pending progress with therapy    This discharge recommendation:  Has been made in collaboration with the attending provider and/or case management    IF patient discharges home will need the following DME: TBD       SUBJECTIVE:   Patient stated my son is there with me.     OBJECTIVE DATA SUMMARY:   HISTORY:   No past medical history on file. No past surgical history on file. Expanded or extensive additional review of patient history:     Home Situation  Home Environment: Private residence  # Steps to Enter: 5  Rails to Enter: Yes  Hand Rails : Bilateral  One/Two Story Residence: One story  Living Alone: No  Support Systems: Child(jon) (pt stating son is with her 24/7)  Patient Expects to be Discharged to[de-identified] House  Current DME Used/Available at Home: Cane, straight,Shower chair,Walker, rolling    PLOF: Pt MI for ADLS/IADLS, MI with mobility prior to admission.      EXAMINATION OF PERFORMANCE DEFICITS:  Cognitive/Behavioral Status:  Neurologic State: Alert  Orientation Level: Oriented to person;Oriented to place;Oriented to time     Range of Motion:  AROM: Generally decreased, functional  PROM: Generally decreased, functional     Strength:  Strength: Generally decreased, functional (maksim UE grossly observed to be 3+/5)     Balance:  Sitting: Intact  Standing: Impaired; With support  Standing - Static: Constant support; Fair  Standing - Dynamic : Fair    Functional Mobility and Transfers for ADLs:  Bed Mobility:  Rolling: Moderate assistance  Supine to Sit: Moderate assistance;Assist x2  Sit to Supine: Moderate assistance;Assist x2  Scooting: Minimum assistance;Assist x2    Transfers:  Sit to Stand: Minimum assistance;Assist x2  Stand to Sit: Minimum assistance;Assist x2  Assistive Device : Gait Belt;Walker, rolling    ADL Assessment:     Oral Facial Hygiene/Grooming: Stand-by assistance (EOB simulated)    Lower Body Dressing: Minimum assistance (EOB)    Toileting: Minimum assistance (simulated)     ADL Intervention and task modifications:     Grooming  Grooming Assistance: Stand-by assistance (simulated)  Position Performed: Seated edge of bed  Washing Face: Stand-by assistance  Washing Hands: Stand-by assistance    Lower Body Dressing Assistance  Socks: Minimum assistance  Position Performed: Seated edge of bed    Toileting  Toileting Assistance: Minimum assistance (simulated)  Clothing Management: Minimum assistance    [address] [place] [address] [address] [address] [facility] \"6 Clicks\"                                                       Daily Activity Inpatient Short Form  How much help from another person does the patient currently need. .. Total; A Lot A Little None   1. Putting on and taking off regular lower body clothing? []  1 []  2 [x]  3 []  4   2. Bathing (including washing, rinsing, drying)? []  1 []  2 [x]  3 []  4   3. Toileting, which includes using toilet, bedpan or urinal? [] 1 []  2 [x]  3 []  4   4. Putting on and taking off regular upper body clothing? []  1 []  2 [x]  3 []  4   5. Taking care of personal grooming such as brushing teeth? []  1 []  2 [x]  3 []  4   6. Eating meals?  []  1 []  2 []  3 [x]  4   © 2007, Trustees of [address] [place] [address] [address] [address], under license to [employer]. All rights reserved     Score: 19/24     Interpretation of Tool:  Represents clinically-significant functional categories (i.e. Activities of daily living). Percentage of Impairment CH    0%   CI    1-19% CJ    20-39% CK    40-59% CL    60-79% CM    80-99% CN     100%   [facility] [address] [facility]  Score 6-24 24 23 20-22 15-19 10-14 7-9 6        Occupational Therapy Evaluation Charge Determination   History Examination Decision-Making   LOW Complexity : Brief history review  MEDIUM Complexity : 3-5 performance deficits relating to physical, cognitive , or psychosocial skils that result in activity limitations and / or participation restrictions MEDIUM Complexity : Patient may present with comorbidities that affect occupational performnce. Miniml to moderate modification of tasks or assistance (eg, physical or verbal ) with assesment(s) is necessary to enable patient to complete evaluation       Based on the above components, the patient evaluation is determined to be of the following complexity level: LOW   Pain Rating:  No pain reported    Activity Tolerance:   Fair and requires rest breaks  Please refer to the flowsheet for vital signs taken during this treatment. After treatment patient left in no apparent distress:    Supine in bed, Call bell within reach, Bed / chair alarm activated, and Side rails x 3    COMMUNICATION/EDUCATION:   The patients plan of care was discussed with: Physical therapist and Registered nurse. PT/OT sessions occurred together for increased safety of pt and clinician. Home safety education was provided and the patient/caregiver indicated understanding. and Patient/family have participated as able in goal setting and plan of care. This patients plan of care is appropriate for delegation to [facility].     Thank you for this referral.  Zoe Edwards  Time Calculation: 24 mins

## 2022-01-18 NOTE — PROGRESS NOTES
Problem: Mobility Impaired (Adult and Pediatric)  Goal: *Acute Goals and Plan of Care (Insert Text)  Description: Physical Therapy Goals  Initiated 1/18/22  1)  I with HEP in 7 days to improve overall functional mobility. 2)  Bed mobility and transfers with sup in 7 days to prevent skin breakdown. 3)  Pt to amb 100ft with LRAD and sup in 7 days    Pt. Goal:  Pt to be able to walk safely without falls. Outcome: Not Met   PHYSICAL THERAPY EVALUATION  Patient: Ilana Birmingham (89 y.o. female)  Date: 1/18/2022  Primary Diagnosis: Acute hypoxemic respiratory failure due to COVID-19 ([facility] [address] [U07.1, J96.01]  COVID-19 [U07.1]        Precautions: fall/COVID +       ASSESSMENT  Per OT note: \"Patient is 81 y/o female came to [facility] with possible UTI, generalized weakness, intermittent confusion x1 week and adm 1/17/2022 for COVID 19 positive/infection (1/17/2022), HTN, generalized weakness, hypokalemia, hypoxia, AMS. Pt received semi supine in bed A&Ox to self, place and time and agreeable for OT/PT eval/tx. Per pt report, pt lives with son  in 1 story home with 5 steps maksim rails to enter and is primarily MI for self care (has shower chair) and functional transfers/mobility (has RW and cane however pt does not use). Pt noted with intermittent confusion and requiring redirecting throughout eval.\"      Based on the objective data described below, the patient presents with decreased balance, decreased activity tolerance (on RA with SPO2 decreasing to 86% with activity increasing to 92% with rest, decreased safety awareness, decreased command following (requiring redirecting throughout eval), generalized weakness, difficulty with functional transfers/mobility (mod A rolling, mod Ax2 sup<->sit, min Ax2 scooting EOB, sit<->Stand,) and difficulty with gait requiring RW and min A x 2 for sidestepping along edge of bed. Pt amb 6ft along the edge of bed before saying she needed to sit down due to fatigue.   Pt would benefit from skilled PT services while at [facility] in order to increase safety with functional transfers/mobility. D/C recommendation TBD between SNF vs HHPT pending PLOF information. Other factors to consider for discharge: impaired mobility, level of assist     Patient will benefit from skilled therapy intervention to address the above noted impairments. PLAN :  Recommendations and Planned Interventions: bed mobility training, transfer training, gait training, therapeutic exercises, patient and family training/education, and therapeutic activities      Frequency/Duration: Patient will be followed by physical therapy:  3-5x/week to address goals. Recommendation for discharge: (in order for the patient to meet his/her long term goals)  SNF vs home with HHPT and family care    This discharge recommendation:  Has been made in collaboration with the attending provider and/or case management    IF patient discharges home will need the following DME: to be determined (TBD)         SUBJECTIVE:   Patient stated my son is always there.     OBJECTIVE DATA SUMMARY:   HISTORY:    No past medical history on file. No past surgical history on file. Personal factors and/or comorbidities impacting plan of care: impaired mobility, level of assist    Home Situation  Home Environment: Private residence  # Steps to Enter: 5  Rails to Enter: Yes  Hand Rails : Bilateral  One/Two Story Residence: One story  Living Alone: No  Support Systems: Child(jon) (pt stating son is with her 24/7)  Patient Expects to be Discharged to[de-identified] House  Current DME Used/Available at Home: Cane, straight,Shower chair,Walker, rolling    PLOF: Pt MI for ADLS/IADLS, MI with mobility prior to admission.      EXAMINATION/PRESENTATION/DECISION MAKING:   Critical Behavior:  Neurologic State: Alert  Orientation Level: Oriented to person,Oriented to place,Oriented to time        Range Of Motion:  AROM: Generally decreased, functional           PROM: Generally decreased, functional           Strength:     Right Left   HIP FLEXION 3-/5 3-/5   HIP EXTENSION     HIP ABDUCTION     HIP ADDUCTION     KNEE FLEXION 4/5 4/5   KNEE EXTENSION 4/5 4/5   ANKLE PF  4/5 4/5   ANKLE DF 4/5 4/5     0/5       No palpable muscle contraction  1/5       Palpable muscle contraction, no joint movement  2-/5      Less than full range of motion in gravity eliminated position  2/5       Able to complete full range of motion in gravity eliminated position  2+/5     Able to initiate movement against gravity  3-/5      More than half but not full range of motion against gravity  3/5       Able to complete full range of motion against gravity  3+/5     Completes full range of motion against gravity with minimal resistance  4-/5      Completes full range of motion against gravity with minimal-moderate resistance  4/5       Completes full range of motion against gravity with moderate resistance  4+/5     Completes full range of motion against gravity with moderate-maximum resistance  5/5       Completes full range of motion against gravity with maximum resistance                        Functional Mobility:  Bed Mobility:  Rolling: Moderate assistance  Supine to Sit: Moderate assistance;Assist x2  Sit to Supine: Moderate assistance;Assist x2  Scooting: Minimum assistance;Assist x2  Transfers:  Sit to Stand: Minimum assistance;Assist x2  Stand to Sit: Minimum assistance;Assist x2                       Balance:   Sitting: Intact  Standing: Impaired; With support  Standing - Static: Constant support; Fair  Standing - Dynamic : Fair  Ambulation/Gait Training:  Distance (ft): 6 Feet (ft)  Assistive Device: Gait belt;Walker, rolling  Ambulation - Level of Assistance: Minimal assistance;Assist x2     Gait Description (WDL): Exceptions to WDL                 Speed/Autumn: Slow  Step Length: Left shortened;Right shortened                  Functional Measure:    [employer] AM-PAC[employer] 6 [facility]         Basic Mobility Inpatient Short Form  How much difficulty does the patient currently have. .. Unable A Lot A Little None   1. Turning over in bed (including adjusting bedclothes, sheets and blankets)? [] 1   [x] 2   [] 3   [] 4   2. Sitting down on and standing up from a chair with arms ( e.g., wheelchair, bedside commode, etc.)   [] 1   [x] 2   [] 3   [] 4   3. Moving from lying on back to sitting on the side of the bed? [] 1   [x] 2   [] 3   [] 4          How much help from another person does the patient currently need. .. Total A Lot A Little None   4. Moving to and from a bed to a chair (including a wheelchair)? [] 1   [] 2   [x] 3   [] 4   5. Need to walk in hospital room? [] 1   [] 2   [x] 3   [] 4   6. Climbing 3-5 steps with a railing? [] 1   [] 2   [x] 3   [] 4   © , Trustees of [address] [place] [address], under license to [employer]. All rights reserved     Score:  Initial: 15 Most Recent: X (Date: 22)   Interpretation of Tool:  Represents activities that are increasingly more difficult (i.e. Bed mobility, Transfers, Gait). Score 24 23 22-20 19-15 14-10 9-7 6   Modifier CH CI CJ CK CL CM CN          Physical Therapy Evaluation Charge Determination   History Examination Presentation Decision-Making   MEDIUM  Complexity : 1-2 comorbidities / personal factors will impact the outcome/ POC  MEDIUM Complexity : 3 Standardized tests and measures addressing body structure, function, activity limitation and / or participation in recreation  LOW Complexity : Stable, uncomplicated  Other Functional Measure [facility] 6 low      Based on the above components, the patient evaluation is determined to be of the following complexity level: LOW     Pain Ratin/10    Activity Tolerance:   Fair, requires rest breaks, and observed SOB with activity  Please refer to the flowsheet for vital signs taken during this treatment.     After treatment patient left in no apparent distress:   Supine in bed and Call bell within reach    COMMUNICATION/EDUCATION:   The patients plan of care was discussed with: Occupational therapist and Registered nurse. Patient/family agree to work toward stated goals and plan of care. PT/OT sessions occurred together for increased safety of pt and clinician.      Thank you for this referral.  Bridget Worthy   Time Calculation: 28 mins

## 2022-01-18 NOTE — ROUTINE PROCESS
TRANSFER - OUT REPORT:    Verbal report given to LU Mace(name) on Karthik Black  being transferred to [facility](unit) for routine progression of care       Report consisted of patients Situation, Background, Assessment and   Recommendations(SBAR). Information from the following report(s) ED Summary was reviewed with the receiving nurse. Lines:   Peripheral IV 01/17/22 Left Antecubital (Active)        Opportunity for questions and clarification was provided.       Patient transported with:   aitainment

## 2022-01-18 NOTE — ED NOTES
Pt transferred to a hospital bed to make more comfortable while waiting for bed assignment upstairs.

## 2022-01-18 NOTE — PROGRESS NOTES
Remdesivir Consult from Dr. Sridhar Esquivel:    Patient oxygen requirements have decreased since admit, on room air this morning at 98%. Patient starting baricitinib, has received dexamethasone IV (1/17). Patient does not qualify for Remdesivir.

## 2022-01-18 NOTE — ED NOTES
Bedside and Verbal shift change report given to Grayson Ceballos RN (oncoming nurse) by Carlos Velazquez RN (offgoing nurse). Report included the following information SBAR, ED Summary and MAR.

## 2022-01-18 NOTE — CONSULTS
PULMONARY CONSULT  [facility]    Name: Diaz Elizondo MRN: [id number]   : 1940 Hospital: [address] [place] [address]   Date: 2022  Admission date: 2022 Hospital Day: 2       HPI:     Hospital Problems  Never Reviewed          Codes Class Noted POA    Acute hypoxemic respiratory failure due to COVID-19 [facility]) ICD-10-CM: U07.1, J96.01  ICD-9-CM: 518.81, 079.89, 799.02  2022 Unknown        COVID-19 ICD-10-CM: U07.1  ICD-9-CM: 079.89  2022 Unknown                   [x] High complexity decision making was performed  [x] See my orders for details      Subjective/Initial History:     I was asked by Parnell Opitz, MD to see Diaz Elizondo  a 80 y.o.   female in consultation     Excerpts from admission 2022 or consult notes as follows:   53-year-old lady came in because of generalized weakness shortness of breath and fatigue his oxygen saturation was low she was put on oxygen via nasal cannula still see short of breath and dyspneic she is not vaccinated COVID-19 test came back positive chest x-ray shows bilateral patchy infiltrate consistent with COVID-19 so admitted and pulmonary consult was called.       Allergies   Allergen Reactions    Penicillins Hives        MAR reviewed and pertinent medications noted or modified as needed     Current Facility-Administered Medications   Medication    0.9% sodium chloride infusion    acetaminophen (TYLENOL) tablet 650 mg    Or    acetaminophen (TYLENOL) suppository 650 mg    polyethylene glycol (MIRALAX) packet 17 g    ondansetron (ZOFRAN ODT) tablet 4 mg    Or    ondansetron (ZOFRAN) injection 4 mg    enoxaparin (LOVENOX) injection 40 mg    dexamethasone (DECADRON) 4 mg/mL injection 4 mg    baricitinib (OLUMIANT) tablet 4 mg    albuterol (PROVENTIL HFA, VENTOLIN HFA, PROAIR HFA) inhaler 2 Puff    budesonide-formoteroL (SYMBICORT) 160-4.5 mcg/actuation HFA inhaler 2 Puff    azithromycin (ZITHROMAX) tablet 500 mg     No current outpatient medications on file. Patient PCP: Lamar Godoy MD  PMH:  has no past medical history on file. PSH:   has no past surgical history on file. FHX: family history is not on file. SHX:       ROS:    Review of Systems   Constitutional: Positive for malaise/fatigue. HENT: Negative. Eyes: Negative. Respiratory: Positive for shortness of breath. Cardiovascular: Negative. Gastrointestinal: Negative. Genitourinary: Negative. Musculoskeletal: Negative. Skin: Negative. Neurological: Negative. Psychiatric/Behavioral: Negative. Objective:     Vital Signs: Telemetry:    normal sinus rhythm Intake/Output:   Visit Vitals  BP (!) 147/50   Pulse 76   Temp 98.1 °F (36.7 °C)   Resp 24   Ht 5' 5\" (1.651 m)   Wt 90.7 kg (200 lb)   SpO2 98%   BMI 33.28 kg/m²       Temp (24hrs), Av.6 °F (37.6 °C), Min:98.1 °F (36.7 °C), Max:100.8 °F (38.2 °C)        O2 Device: None (Room air) O2 Flow Rate (L/min): 4 l/min       Wt Readings from Last 4 Encounters:   22 90.7 kg (200 lb)        No intake or output data in the 24 hours ending 22 1021    Last shift:      No intake/output data recorded. Last 3 shifts: No intake/output data recorded. Physical Exam:     Physical Exam  Constitutional:       Appearance: Normal appearance. HENT:      Head: Normocephalic and atraumatic. Nose: Nose normal.      Mouth/Throat:      Mouth: Mucous membranes are moist.   Eyes:      Pupils: Pupils are equal, round, and reactive to light. Cardiovascular:      Rate and Rhythm: Normal rate and regular rhythm. Pulses: Normal pulses. Heart sounds: Normal heart sounds. Pulmonary:      Effort: Pulmonary effort is normal.      Breath sounds: Rhonchi present. Abdominal:      General: Abdomen is flat. Bowel sounds are normal.   Musculoskeletal:      Cervical back: Normal range of motion and neck supple. Skin:     General: Skin is warm.    Neurological:      General: No focal deficit present. Mental Status: She is alert. Psychiatric:         Mood and Affect: Mood normal.          Labs:    Recent Labs     01/18/22  0712 01/17/22  1133   WBC 3.8 4.7   HGB 11.0* 10.9*    190     Recent Labs     01/18/22  0712 01/17/22  1300 01/17/22  1133     --  139   K 3.6  --  3.2*   *  --  105   CO2 28  --  29   *  --  106*   BUN 11  --  11   CREA 0.53*  --  0.80   CA 8.9  --  9.0   LAC  --  1.3  --    ALB 2.7*  --  2.9*   ALT 62  --  50     No results for input(s): PH, PCO2, PO2, HCO3, FIO2 in the last 72 hours. No results for input(s): CPK, CKNDX, TROIQ in the last 72 hours. No lab exists for component: CPKMB  No results found for: BNPP, BNP   No results found for: CULTNo results found for: TSH, TSHEXT    Imaging:    CXR Results  (Last 48 hours)               01/17/22 2051  XR CHEST PORT Final result    Narrative:  1 view       Moderate patchy central infiltrates, right greater than left, accentuated by   hypoinflation and technique. No effusion or pneumothorax. Borderline   cardiomegaly without congestion           Results from Hospital Encounter encounter on 01/17/22    XR CHEST PORT    Narrative  1 view    Moderate patchy central infiltrates, right greater than left, accentuated by  hypoinflation and technique. No effusion or pneumothorax. Borderline  cardiomegaly without congestion    Results from Hospital Encounter encounter on 01/17/22    CT HEAD WO CONT    Narrative  CT head. Axial images are reviewed along with reformatted sagittal/coronal images. Dose reduction: All CT scans at this facility are performed using dose reduction  optimization techniques as appropriate to a performed exam including the  following-  automated exposure control, adjustments of mA and/or Kv according to patient  size, or use of iterative reconstructive technique. .    Bone windows demonstrate normal aeration of the imaged sinuses and mastoid air  cells.     Review of intracranial content reveals patchy low density through  periventricular/subcortical white matter. Evidence for nonacute end vessel  occlusive change. Central cerebral arteriosclerosis. Falx based calcification No  hydrocephalus. No intracranial hemorrhage. Impression  Periventricular/subcortical white matter gliosis. Evidence for  nonacute end vessel occlusive change. Central cerebral arteriosclerosis. No intracranial hemorrhage. IMPRESSION:   1. Acute hypoxic respiratory failure  2. COVID-19 pneumonia  3. Hypertension by history  4. Pt is requiring Drug therapy requiring intensive monitoring for toxicity  5. Pt is unstable, unpredictable needing inpatient monitoring; is acutely ill and at high risk of sudden decline and decompensation with severe consequenses and continued end organ dysfunction and failure  6. Prognosis guarded       RECOMMENDATIONS/PLAN:     1. She is on 4 liter nasal Cannula oxygen as salvage oxygen delivery device to provide high concentration of oxygen to overcome refractory hypoxia;  2. Patient fulfill criteria for remdesivir she is already on baricitinib we will consult pharmacy  3. On Decadron Zithromax  4. Intubate and place on vent if NIV fails  5. Agree with Empiric IV antibiotics pending culture results   6. Follow culture results  7. Supplemental O2 to keep sats > 93%  8. Aspiration precautions  9. Labs to follow electrolytes, renal function and and blood counts  10. Glucose monitoring and SSI  11. Bronchial hygiene with respiratory therapy techniques, bronchodilators  12.  DVT, SUP prophylaxis       This care involved high complexity medical decision making: I personally:  · Reviewed the flowsheet and previous days notes  · Reviewed and summarized records or history from previous days note or discussions with staff, family  · High Risk Drug therapy requiring intensive monitoring for toxicity: eg steroids, pressors, antibiotics  · Reviewed and/or ordered Clinical lab tests  · Reviewed images and/or ordered Radiology tests  · Reviewed the patients ECG / Telemetry  · Reviewed and/or adjusted NiPPV settings  · Called and arranged for Radiologic procedures or interventions  · performed or ordered Diagnostic endoscopies with identified risk factors.   · discussed my assessment/management with : Nursing, Hospitalist and Family for coordination of care          Constantin Fields MD

## 2022-01-18 NOTE — H&P
History and Physical    NAME: Manuela Silver   :  1940   MRN:  [id number]     Date/Time:  2022 9:00 PM    Patient PCP: Mani Gill MD  ______________________________________________________________________             Subjective:     CHIEF COMPLAINT:     Shortness of breath fatigue    HISTORY OF PRESENT ILLNESS:       Patient is a 80y.o. year old female history of hypertension arthritis. We will with generalized weakness body ache low blood pressure since last 3 days EMS was called saturation was 91% on room air patient not vaccinated against COVID-19 came to emergency room seen by ER physician work-up done shows positive for COVID-19 patient was admitted for further evaluation and treatment    No past medical history on file. Hypertension and arthritis    No past surgical history on file. Social History     Tobacco Use    Smoking status: Not on file    Smokeless tobacco: Not on file   Substance Use Topics    Alcohol use: Not on file        No family history on file.     Allergies   Allergen Reactions    Penicillins Hives        Prior to Admission medications    Not on File         Current Facility-Administered Medications:     potassium chloride SR (KLOR-CON 10) tablet 40 mEq, 40 mEq, Oral, NOW, Patty Lopez MD    0.9% sodium chloride infusion, 75 mL/hr, IntraVENous, CONTINUOUS, Patty Lopez MD    acetaminophen (TYLENOL) tablet 650 mg, 650 mg, Oral, Q6H PRN **OR** acetaminophen (TYLENOL) suppository 650 mg, 650 mg, Rectal, Q6H PRN, Patty Lopez MD    polyethylene glycol (MIRALAX) packet 17 g, 17 g, Oral, DAILY PRN, Patty Lopez MD    ondansetron (ZOFRAN ODT) tablet 4 mg, 4 mg, Oral, Q8H PRN **OR** ondansetron (ZOFRAN) injection 4 mg, 4 mg, IntraVENous, Q6H PRN, Polo Lopez MD    [START ON 2022] enoxaparin (LOVENOX) injection 40 mg, 40 mg, SubCUTAneous, DAILY, Polo Lopez MD    [START ON 2022] azithromycin (ZITHROMAX) tablet 500 mg, 500 mg, Oral, DAILY, Polo Lopez MD    [START ON 1/18/2022] dexamethasone (DECADRON) 4 mg/mL injection 4 mg, 4 mg, IntraVENous, Q6H, Polo Lopez MD    [START ON 1/18/2022] baricitinib (OLUMIANT) tablet 2 mg, 2 mg, Oral, DAILY, Mohiuddin, Corrinne Cords, MD  No current outpatient medications on file.     LAB DATA REVIEWED:    Recent Results (from the past 24 hour(s))   URINALYSIS W/ REFLEX CULTURE    Collection Time: 01/17/22 10:30 AM    Specimen: Urine   Result Value Ref Range    Color Yellow/Straw      Appearance Turbid (A) Clear      Specific gravity 1.014 1.003 - 1.030      pH (UA) 7.0 5.0 - 8.0      Protein 100 (A) Negative mg/dL    Glucose Negative Negative mg/dL    Ketone 20 (A) Negative mg/dL    Bilirubin Negative Negative      Blood Small (A) Negative      Urobilinogen 0.1 0.1 - 1.0 EU/dL    Nitrites Negative Negative      Leukocyte Esterase Negative Negative      UA:UC IF INDICATED Culture not indicated by UA result Culture not indicated by UA result      WBC 0-4 0 - 4 /hpf    RBC 0-5 0 - 5 /hpf    Bacteria Negative Negative /hpf    Mucus Trace /lpf   EKG, 12 LEAD, INITIAL    Collection Time: 01/17/22 11:23 AM   Result Value Ref Range    Ventricular Rate 94 BPM    Atrial Rate 94 BPM    P-R Interval 152 ms    QRS Duration 76 ms    Q-T Interval 296 ms    QTC Calculation (Bezet) 370 ms    Calculated P Axis 33 degrees    Calculated R Axis -29 degrees    Calculated T Axis 87 degrees    Diagnosis       Sinus rhythm with Premature atrial complexes with Abberant conduction  Moderate voltage criteria for LVH, may be normal variant  Cannot rule out Septal infarct , age undetermined  Abnormal ECG  No previous ECGs available  Confirmed by ELIANA WILLIAMSON, Funmi Jo ([id number]) on 1/17/2022 5:36:46 PM     CBC WITH AUTOMATED DIFF    Collection Time: 01/17/22 11:33 AM   Result Value Ref Range    WBC 4.7 3.6 - 11.0 K/uL    RBC 3.63 (L) 3.80 - 5.20 M/uL    HGB 10.9 (L) 11.5 - 16.0 g/dL    HCT 34.2 (L) 35.0 - 47.0 %    MCV 94.2 80.0 - 99.0 FL    MCH 30.0 26.0 - 34.0 PG    MCHC 31.9 30.0 - 36.5 g/dL    RDW 13.5 11.5 - 14.5 %    PLATELET 270 422 - 488 K/uL    MPV 11.4 8.9 - 12.9 FL    NRBC 0.0 0.0  WBC    ABSOLUTE NRBC 0.00 0.00 - 0.01 K/uL    NEUTROPHILS 75 32 - 75 %    LYMPHOCYTES 17 12 - 49 %    MONOCYTES 8 5 - 13 %    EOSINOPHILS 0 0 - 7 %    BASOPHILS 0 0 - 1 %    IMMATURE GRANULOCYTES 0 0 - 0.5 %    ABS. NEUTROPHILS 3.5 1.8 - 8.0 K/UL    ABS. LYMPHOCYTES 0.8 0.8 - 3.5 K/UL    ABS. MONOCYTES 0.4 0.0 - 1.0 K/UL    ABS. EOSINOPHILS 0.0 0.0 - 0.4 K/UL    ABS. BASOPHILS 0.0 0.0 - 0.1 K/UL    ABS. IMM. GRANS. 0.0 0.00 - 0.04 K/UL    DF AUTOMATED     METABOLIC PANEL, COMPREHENSIVE    Collection Time: 01/17/22 11:33 AM   Result Value Ref Range    Sodium 139 136 - 145 mmol/L    Potassium 3.2 (L) 3.5 - 5.1 mmol/L    Chloride 105 97 - 108 mmol/L    CO2 29 21 - 32 mmol/L    Anion gap 5 5 - 15 mmol/L    Glucose 106 (H) 65 - 100 mg/dL    BUN 11 6 - 20 mg/dL    Creatinine 0.80 0.55 - 1.02 mg/dL    BUN/Creatinine ratio 14 12 - 20      GFR est AA >60 >60 ml/min/1.73m2    GFR est non-AA >60 >60 ml/min/1.73m2    Calcium 9.0 8.5 - 10.1 mg/dL    Bilirubin, total 0.3 0.2 - 1.0 mg/dL    AST (SGOT) 138 (H) 15 - 37 U/L    ALT (SGPT) 50 12 - 78 U/L    Alk.  phosphatase 39 (L) 45 - 117 U/L    Protein, total 7.3 6.4 - 8.2 g/dL    Albumin 2.9 (L) 3.5 - 5.0 g/dL    Globulin 4.4 (H) 2.0 - 4.0 g/dL    A-G Ratio 0.7 (L) 1.1 - 2.2     COVID-19 RAPID TEST    Collection Time: 01/17/22 11:36 AM   Result Value Ref Range    Specimen source Please find results under separate order      COVID-19 rapid test DETECTED (A) Not Detected     INFLUENZA A & B AG (RAPID TEST)    Collection Time: 01/17/22 11:36 AM   Result Value Ref Range    Influenza A Antigen Negative Negative      Influenza B Antigen Negative Negative     LACTIC ACID    Collection Time: 01/17/22  1:00 PM   Result Value Ref Range    Lactic acid 1.3 0.4 - 2.0 mmol/L       XR Results (most recent):  Results from Hospital Encounter encounter on 01/17/22    XR CHEST PORT    Narrative  1 view    Moderate patchy central infiltrates, right greater than left, accentuated by  hypoinflation and technique. No effusion or pneumothorax. Borderline  cardiomegaly without congestion         XR CHEST PORT   Final Result      CT HEAD WO CONT   Final Result   Periventricular/subcortical white matter gliosis. Evidence for   nonacute end vessel occlusive change. Central cerebral arteriosclerosis. No intracranial hemorrhage. Review of Systems:  Constitutional: Negative for chills and fever. HENT: Negative. Eyes: Negative. Respiratory: Negative. Cardiovascular: Negative. Gastrointestinal: Negative for abdominal pain and nausea. Skin: Negative. Neurological: Negative. Objective:   VITALS:    Visit Vitals  /87 (BP 1 Location: Right lower arm, BP Patient Position: At rest)   Pulse 87   Temp 99.8 °F (37.7 °C)   Resp 20   Ht 5' 5\" (1.651 m)   Wt 90.7 kg (200 lb)   SpO2 96%   BMI 33.28 kg/m²       Physical Exam:   Constitutional: pt is oriented to person, place, and time. HENT:   Head: Normocephalic and atraumatic. Eyes: Pupils are equal, round, and reactive to light. EOM are normal.   Cardiovascular: Normal rate, regular rhythm and normal heart sounds. Pulmonary/Chest: Breath sounds normal. No wheezes. No rales. Exhibits no tenderness. Abdominal: Soft. Bowel sounds are normal. There is no abdominal tenderness. There is no rebound and no guarding. Musculoskeletal: Normal range of motion. Neurological: pt is alert and oriented to person, place, and time. Alert. Normal strength. No cranial nerve deficit or sensory deficit. Displays a negative Romberg sign.         ASSESSMENT & PLAN:    COVID-19 infection  Hypertension  Generalized weakness        Current Facility-Administered Medications:     potassium chloride SR (KLOR-CON 10) tablet 40 mEq, 40 mEq, Oral, NOW, Eder Lopez MD    0.9% sodium chloride infusion, 75 mL/hr, IntraVENous, CONTINUOUS, Thania Lopez MD    acetaminophen (TYLENOL) tablet 650 mg, 650 mg, Oral, Q6H PRN **OR** acetaminophen (TYLENOL) suppository 650 mg, 650 mg, Rectal, Q6H PRN, Thania Lopez MD    polyethylene glycol (MIRALAX) packet 17 g, 17 g, Oral, DAILY PRN, Thania Lopez MD    ondansetron (ZOFRAN ODT) tablet 4 mg, 4 mg, Oral, Q8H PRN **OR** ondansetron (ZOFRAN) injection 4 mg, 4 mg, IntraVENous, Q6H PRN, Polo Lopez MD [place]  [START ON 1/18/2022] enoxaparin (LOVENOX) injection 40 mg, 40 mg, SubCUTAneous, DAILY, Thania Lopez MD [place]  [START ON 1/18/2022] azithromycin (ZITHROMAX) tablet 500 mg, 500 mg, Oral, DAILY, Polo Lopez MD [place]  [START ON 1/18/2022] dexamethasone (DECADRON) 4 mg/mL injection 4 mg, 4 mg, IntraVENous, Q6H, Polo Lopez MD    [START ON 1/18/2022] baricitinib (OLUMIANT) tablet 2 mg, 2 mg, Oral, DAILY, Thania Lopez MD  No current outpatient medications on file.        ________________________________________________________________________    Signed: Josesito Pineda MD

## 2022-01-19 NOTE — PROGRESS NOTES
PULMONARY NOTE  [facility]    Name: Diaz Elizondo MRN: [id number]   : 1940 Hospital: [address] [place] [address]   Date: 2022  Admission date: 2022 Hospital Day: 3       HPI:     Hospital Problems  Never Reviewed          Codes Class Noted POA    Acute hypoxemic respiratory failure due to COVID-19 [facility]) ICD-10-CM: U07.1, J96.01  ICD-9-CM: 518.81, 079.89, 799.02  2022 Unknown        COVID-19 ICD-10-CM: U07.1  ICD-9-CM: 079.89  2022 Unknown                   [x] High complexity decision making was performed  [x] See my orders for details      Subjective/Initial History:     I was asked by Parnell Opitz, MD to see Diaz Elizondo  a 80 y.o.   female in consultation     Excerpts from admission 2022 or consult notes as follows:   74-year-old lady came in because of generalized weakness shortness of breath and fatigue his oxygen saturation was low she was put on oxygen via nasal cannula still see short of breath and dyspneic she is not vaccinated COVID-19 test came back positive chest x-ray shows bilateral patchy infiltrate consistent with COVID-19 so admitted and pulmonary consult was called.       Allergies   Allergen Reactions    Penicillins Hives        MAR reviewed and pertinent medications noted or modified as needed     Current Facility-Administered Medications   Medication    0.9% sodium chloride infusion    acetaminophen (TYLENOL) tablet 650 mg    Or    acetaminophen (TYLENOL) suppository 650 mg    polyethylene glycol (MIRALAX) packet 17 g    ondansetron (ZOFRAN ODT) tablet 4 mg    Or    ondansetron (ZOFRAN) injection 4 mg    enoxaparin (LOVENOX) injection 40 mg    dexamethasone (DECADRON) 4 mg/mL injection 4 mg    baricitinib (OLUMIANT) tablet 4 mg    albuterol (PROVENTIL HFA, VENTOLIN HFA, PROAIR HFA) inhaler 2 Puff    budesonide-formoteroL (SYMBICORT) 160-4.5 mcg/actuation HFA inhaler 2 Puff    azithromycin (ZITHROMAX) tablet 500 mg      Patient PCP: Emelina Pereira MD  PMH:  has no past medical history on file. PSH:   has no past surgical history on file. FHX: family history is not on file. SHX:       ROS:    Review of Systems   Constitutional: Negative. HENT: Negative. Eyes: Negative. Respiratory: Positive for cough and shortness of breath. Cardiovascular: Negative. Gastrointestinal: Negative. Genitourinary: Negative. Musculoskeletal: Negative. Skin: Negative. Neurological: Negative. Psychiatric/Behavioral: Negative. Objective:     Vital Signs: Telemetry:    normal sinus rhythm Intake/Output:   Visit Vitals  BP (!) 136/54   Pulse 71   Temp 98.3 °F (36.8 °C)   Resp 20   Ht 5' 5\" (1.651 m)   Wt 200 lb (90.7 kg)   SpO2 (!) 89%   BMI 33.28 kg/m²       Temp (24hrs), Av °F (36.7 °C), Min:97.8 °F (36.6 °C), Max:98.3 °F (36.8 °C)        O2 Device: Nasal cannula O2 Flow Rate (L/min): 2 l/min       Wt Readings from Last 4 Encounters:   22 200 lb (90.7 kg)        No intake or output data in the 24 hours ending 22 1027    Last shift:      No intake/output data recorded. Last 3 shifts: No intake/output data recorded. Physical Exam:     Physical Exam  Constitutional:       Appearance: Normal appearance. HENT:      Head: Normocephalic and atraumatic. Nose: Nose normal.      Mouth/Throat:      Mouth: Mucous membranes are moist.   Eyes:      Pupils: Pupils are equal, round, and reactive to light. Cardiovascular:      Rate and Rhythm: Normal rate and regular rhythm. Pulses: Normal pulses. Heart sounds: Normal heart sounds. Pulmonary:      Effort: Pulmonary effort is normal.      Breath sounds: Rhonchi present. Abdominal:      General: Abdomen is flat. Bowel sounds are normal.   Musculoskeletal:      Cervical back: Normal range of motion and neck supple. Skin:     General: Skin is warm. Neurological:      General: No focal deficit present. Mental Status: She is alert. Psychiatric:         Mood and Affect: Mood normal.          Labs:    Recent Labs     01/18/22  0712 01/17/22  1133   WBC 3.8 4.7   HGB 11.0* 10.9*    190     Recent Labs     01/18/22  0712 01/17/22  1300 01/17/22  1133     --  139   K 3.6  --  3.2*   *  --  105   CO2 28  --  29   *  --  106*   BUN 11  --  11   CREA 0.53*  --  0.80   CA 8.9  --  9.0   LAC  --  1.3  --    ALB 2.7*  --  2.9*   ALT 62  --  50     No results for input(s): PH, PCO2, PO2, HCO3, FIO2 in the last 72 hours. No results for input(s): CPK, CKNDX, TROIQ in the last 72 hours. No lab exists for component: CPKMB  No results found for: BNPP, BNP   Lab Results   Component Value Date/Time    Culture result:  01/17/2022 07:12 AM     Gram pos cocci in clusters one of two bottles has been flagged positive by instrument. Bottle has been sent to [facility] laboratory to assess for possible growth. CALLED TO AND READ BACK BY [facility] RN AT 4548 BY INDERJIT   No results found for: TSH, TSHEXT, TSHEXT    Imaging:    CXR Results  (Last 48 hours)               01/17/22 2051  XR CHEST PORT Final result    Narrative:  1 view       Moderate patchy central infiltrates, right greater than left, accentuated by   hypoinflation and technique. No effusion or pneumothorax. Borderline   cardiomegaly without congestion           Results from Hospital Encounter encounter on 01/17/22    XR CHEST PORT    Narrative  1 view    Moderate patchy central infiltrates, right greater than left, accentuated by  hypoinflation and technique. No effusion or pneumothorax. Borderline  cardiomegaly without congestion    Results from Hospital Encounter encounter on 01/17/22    CT HEAD WO CONT    Narrative  CT head. Axial images are reviewed along with reformatted sagittal/coronal images.   Dose reduction: All CT scans at this facility are performed using dose reduction  optimization techniques as appropriate to a performed exam including the  following-  automated exposure control, adjustments of mA and/or Kv according to patient  size, or use of iterative reconstructive technique. .    Bone windows demonstrate normal aeration of the imaged sinuses and mastoid air  cells. Review of intracranial content reveals patchy low density through  periventricular/subcortical white matter. Evidence for nonacute end vessel  occlusive change. Central cerebral arteriosclerosis. Falx based calcification No  hydrocephalus. No intracranial hemorrhage. Impression  Periventricular/subcortical white matter gliosis. Evidence for  nonacute end vessel occlusive change. Central cerebral arteriosclerosis. No intracranial hemorrhage. IMPRESSION:   1. Acute hypoxic respiratory failure  2. COVID-19 pneumonia  3. Hypertension by history  4. Pt is requiring Drug therapy requiring intensive monitoring for toxicity  5. Pt is unstable, unpredictable needing inpatient monitoring; is acutely ill and at high risk of sudden decline and decompensation with severe consequenses and continued end organ dysfunction and failure  6. Prognosis guarded       RECOMMENDATIONS/PLAN:     1. She is on 4 liter nasal Cannula oxygen as salvage oxygen delivery device to provide high concentration of oxygen to overcome refractory hypoxia;  2. Patient fulfill criteria for remdesivir she is already on baricitinib we will consult pharmacy  3. She was not wearing nasal cannula on examination, counseled on importance of oxygen compliance  4. On Decadron Zithromax  5. Intubate and place on vent if NIV fails  6. Agree with Empiric IV antibiotics pending culture results   7. Follow culture results  8. Supplemental O2 to keep sats > 93%  9. Aspiration precautions  10. Labs to follow electrolytes, renal function and and blood counts  11. Glucose monitoring and SSI  12. Bronchial hygiene with respiratory therapy techniques, bronchodilators  13.  DVT, SUP prophylaxis       This care involved high complexity medical decision making: I personally:  · Reviewed the flowsheet and previous days notes  · Reviewed and summarized records or history from previous days note or discussions with staff, family  · High Risk Drug therapy requiring intensive monitoring for toxicity: eg steroids, pressors, antibiotics  · Reviewed and/or ordered Clinical lab tests  · Reviewed images and/or ordered Radiology tests  · Reviewed the patients ECG / Telemetry  · Reviewed and/or adjusted NiPPV settings  · Called and arranged for Radiologic procedures or interventions  · performed or ordered Diagnostic endoscopies with identified risk factors.   · discussed my assessment/management with : Nursing, Hospitalist and Family for coordination of care          [facility]

## 2022-01-19 NOTE — PROGRESS NOTES
PULMONARY NOTE  [facility]    Name: Elodia Elliott MRN: [id number]   : 1940 Hospital: [facility]   Date: 2022  Admission date: 2022 Hospital Day: 3       HPI:     Hospital Problems  Never Reviewed          Codes Class Noted POA    Acute hypoxemic respiratory failure due to COVID-19 [facility]) ICD-10-CM: U07.1, J96.01  ICD-9-CM: 518.81, 079.89, 799.02  2022 Unknown        COVID-19 ICD-10-CM: U07.1  ICD-9-CM: 079.89  2022 Unknown                   [x] High complexity decision making was performed  [x] See my orders for details      Subjective/Initial History:     I was asked by Antonio Portillo MD to see Elodia Elliott  a 80 y.o.   female in consultation     Excerpts from admission 2022 or consult notes as follows:   22-year-old lady came in because of generalized weakness shortness of breath and fatigue his oxygen saturation was low she was put on oxygen via nasal cannula still see short of breath and dyspneic she is not vaccinated COVID-19 test came back positive chest x-ray shows bilateral patchy infiltrate consistent with COVID-19 so admitted and pulmonary consult was called.       Allergies   Allergen Reactions    Penicillins Hives        MAR reviewed and pertinent medications noted or modified as needed     Current Facility-Administered Medications   Medication    0.9% sodium chloride infusion    acetaminophen (TYLENOL) tablet 650 mg    Or    acetaminophen (TYLENOL) suppository 650 mg    polyethylene glycol (MIRALAX) packet 17 g    ondansetron (ZOFRAN ODT) tablet 4 mg    Or    ondansetron (ZOFRAN) injection 4 mg    enoxaparin (LOVENOX) injection 40 mg    dexamethasone (DECADRON) 4 mg/mL injection 4 mg    baricitinib (OLUMIANT) tablet 4 mg    albuterol (PROVENTIL HFA, VENTOLIN HFA, PROAIR HFA) inhaler 2 Puff    budesonide-formoteroL (SYMBICORT) 160-4.5 mcg/actuation HFA inhaler 2 Puff    azithromycin (ZITHROMAX) tablet 500 mg      Patient PCP: Marla Oliveira MD  PMH:  has no past medical history on file. PSH:   has no past surgical history on file. FHX: family history is not on file. SHX:       ROS:    Review of Systems   Constitutional: Negative. HENT: Negative. Eyes: Negative. Respiratory: Positive for cough and shortness of breath. Cardiovascular: Negative. Gastrointestinal: Negative. Genitourinary: Negative. Musculoskeletal: Negative. Skin: Negative. Neurological: Negative. Psychiatric/Behavioral: Negative. Objective:     Vital Signs: Telemetry:    normal sinus rhythm Intake/Output:   Visit Vitals  BP (!) 136/54   Pulse 71   Temp 98.3 °F (36.8 °C)   Resp 20   Ht 5' 5\" (1.651 m)   Wt 90.7 kg (200 lb)   SpO2 (!) 89%   BMI 33.28 kg/m²       Temp (24hrs), Av °F (36.7 °C), Min:97.8 °F (36.6 °C), Max:98.3 °F (36.8 °C)        O2 Device: Nasal cannula O2 Flow Rate (L/min): 2 l/min       Wt Readings from Last 4 Encounters:   22 90.7 kg (200 lb)        No intake or output data in the 24 hours ending 22 1147    Last shift:      No intake/output data recorded. Last 3 shifts: No intake/output data recorded. Physical Exam:     Physical Exam  Constitutional:       Appearance: Normal appearance. HENT:      Head: Normocephalic and atraumatic. Nose: Nose normal.      Mouth/Throat:      Mouth: Mucous membranes are moist.   Eyes:      Pupils: Pupils are equal, round, and reactive to light. Cardiovascular:      Rate and Rhythm: Normal rate and regular rhythm. Pulses: Normal pulses. Heart sounds: Normal heart sounds. Pulmonary:      Effort: Pulmonary effort is normal.      Breath sounds: Rhonchi present. Abdominal:      General: Abdomen is flat. Bowel sounds are normal.   Musculoskeletal:      Cervical back: Normal range of motion and neck supple. Skin:     General: Skin is warm. Neurological:      General: No focal deficit present. Mental Status: She is alert. Psychiatric:         Mood and Affect: Mood normal.          Labs:    Recent Labs     01/18/22  0712 01/17/22  1133   WBC 3.8 4.7   HGB 11.0* 10.9*    190     Recent Labs     01/18/22  0712 01/17/22  1300 01/17/22  1133     --  139   K 3.6  --  3.2*   *  --  105   CO2 28  --  29   *  --  106*   BUN 11  --  11   CREA 0.53*  --  0.80   CA 8.9  --  9.0   LAC  --  1.3  --    ALB 2.7*  --  2.9*   ALT 62  --  50     No results for input(s): PH, PCO2, PO2, HCO3, FIO2 in the last 72 hours. No results for input(s): CPK, CKNDX, TROIQ in the last 72 hours. No lab exists for component: CPKMB  No results found for: BNPP, BNP   Lab Results   Component Value Date/Time    Culture result: No growth after 2 hours 01/18/2022 07:12 AM    Culture result: (A) 01/17/2022 07:12 AM     Gram Positive Cocci in clusters growing in 1 of 4 bottles drawn NO SITE INDICATED    Culture result:  01/17/2022 07:12 AM     (NOTE) GPC IN CLUSTERS GROWING IN 1 OF 2 BOTTLES CALLED TO JOSE MIGUEL PAGAN AT 0831 ON 1/19/22.    No results found for: TSH, TSHEXT, TSHEXT    Imaging:    CXR Results  (Last 48 hours)               01/17/22 2051  XR CHEST PORT Final result    Narrative:  1 view       Moderate patchy central infiltrates, right greater than left, accentuated by   hypoinflation and technique. No effusion or pneumothorax. Borderline   cardiomegaly without congestion           Results from Hospital Encounter encounter on 01/17/22    XR CHEST PORT    Narrative  1 view    Moderate patchy central infiltrates, right greater than left, accentuated by  hypoinflation and technique. No effusion or pneumothorax. Borderline  cardiomegaly without congestion    Results from Hospital Encounter encounter on 01/17/22    CT HEAD WO CONT    Narrative  CT head. Axial images are reviewed along with reformatted sagittal/coronal images.   Dose reduction: All CT scans at this facility are performed using dose reduction  optimization techniques as appropriate to a performed exam including the  following-  automated exposure control, adjustments of mA and/or Kv according to patient  size, or use of iterative reconstructive technique. .    Bone windows demonstrate normal aeration of the imaged sinuses and mastoid air  cells. Review of intracranial content reveals patchy low density through  periventricular/subcortical white matter. Evidence for nonacute end vessel  occlusive change. Central cerebral arteriosclerosis. Falx based calcification No  hydrocephalus. No intracranial hemorrhage. Impression  Periventricular/subcortical white matter gliosis. Evidence for  nonacute end vessel occlusive change. Central cerebral arteriosclerosis. No intracranial hemorrhage. IMPRESSION:   1. Acute hypoxic respiratory failure  2. COVID-19 pneumonia  3. Hypertension by history      RECOMMENDATIONS/PLAN:     1. She is on 2 liter nasal Cannula oxygen as salvage oxygen delivery device to provide high concentration of oxygen to overcome refractory hypoxia; again she is not wearing her oxygen  2. Patient is on baricitinib   3. She was not wearing nasal cannula on examination, counseled on importance of oxygen compliance  4.  On Decadron Zithromax       ·           Cherise Olszewski, MD

## 2022-01-19 NOTE — ROUTINE PROCESS
SpO2 96% on room air at rest. Patient states that she walks with a cane or walker. PT note states that patient has decreased balance, generalized weakness and decreased safety. Unable to ambulate patient at this time.

## 2022-01-19 NOTE — PROGRESS NOTES
Problem: Patient Education: Go to Patient Education Activity  Goal: Patient/Family Education  Outcome: Progressing Towards Goal     Problem: Breathing Pattern - Ineffective  Goal: Ability to achieve and maintain a regular respiratory rate  Outcome: Progressing Towards Goal

## 2022-01-19 NOTE — PROGRESS NOTES
OCCUPATIONAL THERAPY TREATMENT  Patient: Benita West (52 y.o. female)  Date: 1/19/2022  Diagnosis: Acute hypoxemic respiratory failure due to COVID-19 ([facility] [address] [U07.1, J96.01]  COVID-19 [U07.1] <principal problem not specified>       Precautions:    Chart, occupational therapy assessment, plan of care, and goals were reviewed. ASSESSMENT  Patient continues with skilled OT services and is minimally progressing towards goals. Upon CORDERO/PTA arrival, pt semi supine in bed and agreeable to tx session. Pt noted with intermittent confusion throughout session requiring constant redirection to task. Pt required cueing for initiation for all mobility and ADLs today. Pt required [address] x2 for all bed mobility. Pt balance at EOB varied, pt required CGA always due to random leaning in all directions. Pt completed seated EOB UB bathing with CGA and donning of clean gown with ModA. Pt picking at heart monitor leads/ gown requiring cueing to focus on task. Pt able to complete sit>stand from EOB with Igor x2 at EOB using RW for balance upon standing. Pt leaned forearms unsafely on RW and when cued to take a step back pt sat down at EOB. Pt left semi supine in bed with call bell within reach. Will continue to follow pt throughout remainder of stay and progress towards OT goals. Recommending SNF at discharge when medically appropriate. Current Level of Function Impacting Discharge (ADLs): ModA x2 bed mobility, Igor x2 sit>stand, ModA UB dressing, CGA UB bathing    Other factors to consider for discharge: time since onset, severity of deficits, PLOF         PLAN :  Patient continues to benefit from skilled intervention to address the above impairments. Continue treatment per established plan of care. to address goals.     Recommendation for discharge: (in order for the patient to meet his/her long term goals)  [place]    This discharge recommendation:  Has been made in collaboration with the attending provider and/or case management    IF patient discharges home will need the following DME: TBD       SUBJECTIVE:   Patient stated I am so cold.     OBJECTIVE DATA SUMMARY:   Cognitive/Behavioral Status:  Neurologic State: Alert;Confused  Orientation Level: Disoriented to situation;Disoriented to place;Oriented to person;Oriented to time    Functional Mobility and Transfers for ADLs:  Bed Mobility:  Rolling: Moderate assistance;Assist x2  Supine to Sit: Moderate assistance;Assist x2  Sit to Supine: Moderate assistance;Assist x2  Scooting: Moderate assistance;Assist x2    Transfers:  Sit to Stand: Minimum assistance;Assist x2    Balance:  Sitting: Impaired; With support  Sitting - Static: Fair (occasional)  Sitting - Dynamic: Fair (occasional)  Standing: Impaired; With support;Pull to stand  Standing - Static: Constant support; Fair    ADL Intervention:  Upper Body Bathing  Bathing Assistance: Contact guard assistance  Position Performed: Seated edge of bed    Upper [address]: Moderate assistance    Pain:  0/10    Activity Tolerance:   Fair  Please refer to the flowsheet for vital signs taken during this treatment. After treatment patient left in no apparent distress:   Supine in bed, Call bell within reach, and Side rails x 3    COMMUNICATION/COLLABORATION:   The patients plan of care was discussed with: Physical therapy assistant and Registered nurse. [place] with PT for increased safety for pt and clinician.     CONSUELO Mazariegos  Time Calculation: 31 mins    Problem: Self Care Deficits Care Plan (Adult)  Goal: *Acute Goals and Plan of Care (Insert Text)  Description: Pt will be MI sup<->sit in prep for EOB ADL's  Pt will be MI  LB dressing EOB level  Pt will be MI  grooming EOB level  Pt will be MI  sit<-> prep for toilet transfer  Pt will be MI  toilet transfer with LRAD  Pt will be MI  toileting/cloth mgmt LRAD  Pt will be MI  grooming standing sink  Pt will be MI bathing sitting/standing sink LRAD  Pt will be MI maksim UE HEP in prep for self care tasks    Outcome: Progressing Towards Goal

## 2022-01-19 NOTE — PROGRESS NOTES
PHYSICAL THERAPY TREATMENT  Patient: Kraig Grimes (40 y.o. female)  Date: 1/19/2022  Diagnosis: [facility] [U07.1, J96.01]  COVID-19 [U07.1] <principal problem not specified>       Precautions:    Chart, physical therapy assessment, plan of care and goals were reviewed. ASSESSMENT  Patient continues with skilled PT services and is minimally progressing towards goals. Patient demos intermittent confusion throughout treatment today req constant redirection to task. Pt req initiation for all mobility and req more assistance today, mod A x2 for bed mobility. Increased assistance may be due to the constant initiation needed for each task. Pt balance varied at EOB requiring CGA always due to random leaning in all directions. Patient picking at heart monitor leads/gown req cues to focus on task. Patient stood with min A x2 at EOB with RW and pt leaned forearms unsafely on RW and when cued to take a step pt sat back down EOB. Based on today, rec d/c to SNF for safety and improving functional mobility. Current Level of Function Impacting Discharge (mobility/balance): weakness, unsteady mobility     Other factors to consider for discharge: safety, assistance at home, confusion     PLAN :  Patient continues to benefit from skilled intervention to address the above impairments. Continue treatment per established plan of care. to address goals.     Recommendation for discharge: (in order for the patient to meet his/her long term goals)  [place]    This discharge recommendation:  Has been made in collaboration with the attending provider and/or case management    IF patient discharges home will need the following DME: to be determined (TBD)       SUBJECTIVE:   Patient stated it's cold in here    OBJECTIVE DATA SUMMARY:   Critical Behavior:  Neurologic State: Alert,Confused  Orientation Level: Disoriented to situation,Disoriented to place,Oriented to person,Oriented to time        Functional Mobility Training:  Bed Mobility:  Rolling: Moderate assistance;Assist x2  Supine to Sit: Moderate assistance;Assist x2  Sit to Supine: Moderate assistance;Assist x2  Scooting: Moderate assistance;Assist x2        Transfers:  Sit to Stand: Minimum assistance;Assist x2  Stand to Sit: Minimum assistance;Assist x2       Balance:  Sitting: Impaired; With support  Sitting - Static: Fair (occasional)  Sitting - Dynamic: Fair (occasional)  Standing: Impaired; With support;Pull to stand  Standing - Static: Constant support; Fair    Pain Ratin/10    Activity Tolerance:   Fair, requires rest breaks, and constant redirection to task  Please refer to the flowsheet for vital signs taken during this treatment. After treatment patient left in no apparent distress:   Supine in bed, Call bell within reach, Bed / chair alarm activated, and Side rails x 3    COMMUNICATION/COLLABORATION:   The patients plan of care was discussed with: Occupational therapy assistant. Cotx with KAMLESH for increased safety. [place] Geiger   Time Calculation: 31 mins         Problem: Mobility Impaired (Adult and Pediatric)  Goal: *Acute Goals and Plan of Care (Insert Text)  Description: Physical Therapy Goals  Initiated 22  1)  I with HEP in 7 days to improve overall functional mobility. 2)  Bed mobility and transfers with sup in 7 days to prevent skin breakdown. 3)  Pt to amb 100ft with LRAD and sup in 7 days    Pt. Goal:  Pt to be able to walk safely without falls.      Outcome: Progressing Towards Goal

## 2022-01-19 NOTE — PROGRESS NOTES
General Daily Progress Note          Patient Name:   Sofia Rothman       YOB: 1940       Age:  80 y.o. Admit Date: 1/17/2022      Subjective:     Patient is a 80y.o. year old female history of hypertension arthritis. We will with generalized weakness body ache low blood pressure since last 3 days EMS was called saturation was 91% on room air patient not vaccinated against COVID-19 came to emergency room seen by ER physician work-up done shows positive for COVID-19 patient was admitted for further evaluation and treatment       Patient breathing much better this morning more alert awake      Objective:     Visit Vitals  BP (!) 136/54   Pulse 71   Temp 98.3 °F (36.8 °C)   Resp 20   Ht 5' 5\" (1.651 m)   Wt 90.7 kg (200 lb)   SpO2 (!) 89%   BMI 33.28 kg/m²        Recent Results (from the past 24 hour(s))   CBC W/O DIFF    Collection Time: 01/19/22  9:36 AM   Result Value Ref Range    WBC 8.5 3.6 - 11.0 K/uL    RBC 4.03 3.80 - 5.20 M/uL    HGB 12.0 11.5 - 16.0 g/dL    HCT 37.7 35.0 - 47.0 %    MCV 93.5 80.0 - 99.0 FL    MCH 29.8 26.0 - 34.0 PG    MCHC 31.8 30.0 - 36.5 g/dL    RDW 13.3 11.5 - 14.5 %    PLATELET 033 518 - 275 K/uL    MPV 11.6 8.9 - 12.9 FL    NRBC 0.0 0.0  WBC    ABSOLUTE NRBC 0.00 0.00 - 8.84 K/uL   METABOLIC PANEL, COMPREHENSIVE    Collection Time: 01/19/22  9:36 AM   Result Value Ref Range    Sodium 142 136 - 145 mmol/L    Potassium 3.3 (L) 3.5 - 5.1 mmol/L    Chloride 111 (H) 97 - 108 mmol/L    CO2 27 21 - 32 mmol/L    Anion gap 4 (L) 5 - 15 mmol/L    Glucose 95 65 - 100 mg/dL    BUN 19 6 - 20 mg/dL    Creatinine 0.57 0.55 - 1.02 mg/dL    BUN/Creatinine ratio 33 (H) 12 - 20      GFR est AA >60 >60 ml/min/1.73m2    GFR est non-AA >60 >60 ml/min/1.73m2    Calcium 9.5 8.5 - 10.1 mg/dL    Bilirubin, total 0.3 0.2 - 1.0 mg/dL    AST (SGOT) 227 (H) 15 - 37 U/L    ALT (SGPT) 91 (H) 12 - 78 U/L    Alk.  phosphatase 38 (L) 45 - 117 U/L    Protein, total 7.5 6.4 - 8.2 g/dL    Albumin 2.9 (L) 3.5 - 5.0 g/dL    Globulin 4.6 (H) 2.0 - 4.0 g/dL    A-G Ratio 0.6 (L) 1.1 - 2.2       [unfilled]      Review of Systems    Constitutional: Negative for chills and fever. HENT: Negative. Eyes: Negative. Respiratory: Negative. Cardiovascular: Negative. Gastrointestinal: Negative for abdominal pain and nausea. Skin: Negative. Neurological: Negative. Physical Exam:      Constitutional: pt is oriented to person, place, and time. HENT:   Head: Normocephalic and atraumatic. Eyes: Pupils are equal, round, and reactive to light. EOM are normal.   Cardiovascular: Normal rate, regular rhythm and normal heart sounds. Pulmonary/Chest: Breath sounds normal. No wheezes. No rales. Exhibits no tenderness. Abdominal: Soft. Bowel sounds are normal. There is no abdominal tenderness. There is no rebound and no guarding. Musculoskeletal: Normal range of motion. Neurological: pt is alert and oriented to person, place, and time. XR CHEST PORT   Final Result      CT HEAD WO CONT   Final Result   Periventricular/subcortical white matter gliosis. Evidence for   nonacute end vessel occlusive change. Central cerebral arteriosclerosis. No intracranial hemorrhage.            Recent Results (from the past 24 hour(s))   CBC W/O DIFF    Collection Time: 01/19/22  9:36 AM   Result Value Ref Range    WBC 8.5 3.6 - 11.0 K/uL    RBC 4.03 3.80 - 5.20 M/uL    HGB 12.0 11.5 - 16.0 g/dL    HCT 37.7 35.0 - 47.0 %    MCV 93.5 80.0 - 99.0 FL    MCH 29.8 26.0 - 34.0 PG    MCHC 31.8 30.0 - 36.5 g/dL    RDW 13.3 11.5 - 14.5 %    PLATELET 784 601 - 006 K/uL    MPV 11.6 8.9 - 12.9 FL    NRBC 0.0 0.0  WBC    ABSOLUTE NRBC 0.00 0.00 - 7.77 K/uL   METABOLIC PANEL, COMPREHENSIVE    Collection Time: 01/19/22  9:36 AM   Result Value Ref Range    Sodium 142 136 - 145 mmol/L    Potassium 3.3 (L) 3.5 - 5.1 mmol/L    Chloride 111 (H) 97 - 108 mmol/L    CO2 27 21 - 32 mmol/L    Anion gap 4 (L) 5 - 15 mmol/L Glucose 95 65 - 100 mg/dL    BUN 19 6 - 20 mg/dL    Creatinine 0.57 0.55 - 1.02 mg/dL    BUN/Creatinine ratio 33 (H) 12 - 20      GFR est AA >60 >60 ml/min/1.73m2    GFR est non-AA >60 >60 ml/min/1.73m2    Calcium 9.5 8.5 - 10.1 mg/dL    Bilirubin, total 0.3 0.2 - 1.0 mg/dL    AST (SGOT) 227 (H) 15 - 37 U/L    ALT (SGPT) 91 (H) 12 - 78 U/L    Alk. phosphatase 38 (L) 45 - 117 U/L    Protein, total 7.5 6.4 - 8.2 g/dL    Albumin 2.9 (L) 3.5 - 5.0 g/dL    Globulin 4.6 (H) 2.0 - 4.0 g/dL    A-G Ratio 0.6 (L) 1.1 - 2.2         Results     Procedure Component Value Units Date/Time    CULTURE, BLOOD #1 [[id number]] Collected: 01/18/22 2754    Order Status: Completed Specimen: Blood Updated: 01/19/22 1111     Special Requests: No Special Requests        Culture result: No growth after 2 hours       COVID-19 RAPID TEST [[id number]]  (Abnormal) Collected: 01/17/22 1136    Order Status: Completed Specimen: Nasopharyngeal Updated: 01/17/22 1201     Specimen source       Please find results under separate order           COVID-19 rapid test DETECTED        Comment: Rapid Abbott ID Now   The specimen is POSITIVE for SARS-CoV-2, the novel coronavirus associated with COVID-19. This test has been authorized by the FDA under an Emergency Use Authorization (EUA) for use by authorized laboratories.    Fact sheet for Healthcare Providers: ConventionUpdate.co.nz Fact sheet for Patients: ConventionUpdate.co.nz   Methodology: Isothermal Nucleic Acid Amplification Results verified, phoned to and read back by Carlos Persaud, 12:00   1/17/22 MACK         INFLUENZA A & B AG (RAPID TEST) [[id number]] Collected: 01/17/22 1136    Order Status: Completed Specimen: Nasopharyngeal from Nasal washing Updated: 01/17/22 1202     Influenza A Antigen Negative        Influenza B Antigen Negative       CULTURE, BLOOD #2 [[id number]]  (Abnormal) Collected: 01/17/22 6765    Order Status: Completed Specimen: Blood Updated: 01/19/22 1133     Special Requests: No Special Requests        Culture result:       Gram Positive Cocci in clusters growing in 1 of 4 bottles drawn NO SITE INDICATED            (NOTE) [facility] IN CL[facility] GROWING IN 1 OF 2 BOTTLES CALLED TO JOSE MIGUEL PAGAN AT 0831 ON 1/19/22. JF           Labs:     Recent Labs     01/19/22  0936 01/18/22  0712   WBC 8.5 3.8   HGB 12.0 11.0*   HCT 37.7 34.2*    204     Recent Labs     01/19/22  0936 01/18/22  0712 01/17/22  1133    143 139   K 3.3* 3.6 3.2*   * 109* 105   CO2 27 28 29   BUN 19 11 11   CREA 0.57 0.53* 0.80   GLU 95 112* 106*   CA 9.5 8.9 9.0     Recent Labs     01/19/22  0936 01/18/22  0712 01/17/22  1133   ALT 91* 62 50   AP 38* 38* 39*   TBILI 0.3 0.3 0.3   TP 7.5 6.9 7.3   ALB 2.9* 2.7* 2.9*   GLOB 4.6* 4.2* 4.4*     No results for input(s): INR, PTP, APTT, INREXT, INREXT in the last 72 hours. No results for input(s): FE, TIBC, PSAT, FERR in the last 72 hours. No results found for: FOL, RBCF   No results for input(s): PH, PCO2, PO2 in the last 72 hours. No results for input(s): CPK, CKNDX, TROIQ in the last 72 hours.     No lab exists for component: CPKMB  No results found for: CHOL, CHOLX, CHLST, CHOLV, HDL, HDLP, LDL, LDLC, DLDLP, TGLX, TRIGL, TRIGP, CHHD, CHHDX  No results found for: GLUCPOC  Lab Results   Component Value Date/Time    Color Yellow/Straw 01/17/2022 10:30 AM    Appearance Turbid (A) 01/17/2022 10:30 AM    Specific gravity 1.014 01/17/2022 10:30 AM    pH (UA) 7.0 01/17/2022 10:30 AM    Protein 100 (A) 01/17/2022 10:30 AM    Glucose Negative 01/17/2022 10:30 AM    Ketone 20 (A) 01/17/2022 10:30 AM    Bilirubin Negative 01/17/2022 10:30 AM    Urobilinogen 0.1 01/17/2022 10:30 AM    Nitrites Negative 01/17/2022 10:30 AM    Leukocyte Esterase Negative 01/17/2022 10:30 AM    Bacteria Negative 01/17/2022 10:30 AM    WBC 0-4 01/17/2022 10:30 AM    RBC 0-5 01/17/2022 10:30 AM         Assessment:     COVID-19 infection  Hypertension  Generalized weakness      Plan:       Continue Zithromax 100 mg IV daily Decadron 4 mg IV every 6 hours Olumiant 4 mg daily continue IV fluids    PT OT consult      Current Facility-Administered Medications:     0.9% sodium chloride infusion, 75 mL/hr, IntraVENous, CONTINUOUS, Bennie Lopez MD    acetaminophen (TYLENOL) tablet 650 mg, 650 mg, Oral, Q6H PRN **OR** acetaminophen (TYLENOL) suppository 650 mg, 650 mg, Rectal, Q6H PRN, Bennie Lopez MD    polyethylene glycol (MIRALAX) packet 17 g, 17 g, Oral, DAILY PRN, Bennie Lopez MD    ondansetron (ZOFRAN ODT) tablet 4 mg, 4 mg, Oral, Q8H PRN **OR** ondansetron (ZOFRAN) injection 4 mg, 4 mg, IntraVENous, Q6H PRN, Polo Lopez MD    enoxaparin (LOVENOX) injection 40 mg, 40 mg, SubCUTAneous, DAILY, Polo Lopez MD, 40 mg at 01/19/22 0858    dexamethasone (DECADRON) 4 mg/mL injection 4 mg, 4 mg, IntraVENous, Q6H, Polo Lopez MD, 4 mg at 01/19/22 0555    baricitinib (OLUMIANT) tablet 4 mg, 4 mg, Oral, DAILY, Polo Lopez MD, 4 mg at 01/19/22 0858    albuterol (PROVENTIL HFA, VENTOLIN HFA, PROAIR HFA) inhaler 2 Puff, 2 Puff, Inhalation, Q6H PRN, Polo Lopez MD    budesonide-formoteroL (SYMBICORT) 160-4.5 mcg/actuation HFA inhaler 2 Puff, 2 Puff, Inhalation, BID, Polo Lopez MD    [facility]) tablet 500 mg, 500 mg, Oral, DAILY, Polo Lopez MD, 500 mg at 01/18/22 7756

## 2022-01-20 NOTE — PROGRESS NOTES
CM called patients son, Sofia Clay @ [phone] to notify that patient has been accepted with [id number] date 1/23/22. Sofia Clay stated that he has spoken with [address] and they are delivering patients oxygen, wheelchair and bedside commode to patients home. Son is going to bring portable tank when he picks patient up. CM gave son nurses station number to call and notify the nurse when the oxygen has been delivered and he is on his way. Son verbalized understanding. Discharge plan of care/case management plan validated with provider discharge order.

## 2022-01-20 NOTE — PROGRESS NOTES
PHYSICAL THERAPY TREATMENT  Patient: Ortega Morocho (82 y.o. female)  Date: 1/20/2022  Diagnosis: Acute hypoxemic respiratory failure due to COVID-19 ([facility] [U07.1, J96.01]  COVID-19 [U07.1] <principal problem not specified>       Precautions:    Chart, physical therapy assessment, plan of care and goals were reviewed. ASSESSMENT  Patient continues with skilled PT services and is progressing towards goals. Patient continues to demonstrate intermittent confusion and req constant safety cueing with mobility as well as to keep nasal cannula on. Patient sat up to the EOB with mod A x2 and was unsafely scooting sideways to the EOB, so assisted back to supine to sit up again with safer rolling/cueing and technique. Patient was then able to stand with min-mod A x2 and cont to lean forward on RW unsafely despite cueing for hand placement. Able to take 1 side step with RW and patient needed to sit back down due to fatigue and poor balance. Progressing fair but limited mobility and very unsafe at this time. Progressing well overall, rec d/c to SNF. Current Level of Function Impacting Discharge (mobility/balance): weakness, unsteady mobility, A x2    Other factors to consider for discharge: safety, assistance needed at home         PLAN :  Patient continues to benefit from skilled intervention to address the above impairments. Continue treatment per established plan of care. to address goals.     Recommendation for discharge: (in order for the patient to meet his/her long term goals)  oRcky Muir    This discharge recommendation:  Has been made in collaboration with the attending provider and/or case management    IF patient discharges home will need the following DME: to be determined (TBD)       SUBJECTIVE:   Patient stated I was some ice cold water    OBJECTIVE DATA SUMMARY:   Critical Behavior:  Neurologic State: Confused  Orientation Level: Disoriented to situation,Disoriented to place,Disoriented to time        Functional Mobility Training:  Bed Mobility:  Rolling: Moderate assistance  Supine to Sit: Moderate assistance;Assist x2  Sit to Supine: Moderate assistance;Assist x2  Scooting: Moderate assistance;Assist x2        Transfers:  Sit to Stand: Assist x2;Minimum assistance; Moderate assistance  Stand to Sit: Assist x2;Minimum assistance; Moderate assistance       Balance:  Sitting: Impaired; With support  Sitting - Static: Fair (occasional)  Sitting - Dynamic: Fair (occasional)  Standing: Impaired; With support  Standing - Static: Constant support; Fair    Ambulation/Gait Training:  Attempted side steps, pt took 2 side steps    Therapeutic Exercises:   10x LAQ, ankle pumps    Pain Ratin/10    Activity Tolerance:   Fair and requires rest breaks  Please refer to the flowsheet for vital signs taken during this treatment. After treatment patient left in no apparent distress:   Supine in bed, Call bell within reach, and Side rails x 3    COMMUNICATION/COLLABORATION:   The patients plan of care was discussed with: Occupational therapy assistant and Registered nurse. Cotx with KAMLESH for increased safety. Essence Geiger   Time Calculation: 38 mins         Problem: Mobility Impaired (Adult and Pediatric)  Goal: *Acute Goals and Plan of Care (Insert Text)  Description: Physical Therapy Goals  Initiated 22  1)  I with HEP in 7 days to improve overall functional mobility. 2)  Bed mobility and transfers with sup in 7 days to prevent skin breakdown. 3)  Pt to amb 100ft with LRAD and sup in 7 days    Pt. Goal:  Pt to be able to walk safely without falls.      Outcome: Progressing Towards Goal

## 2022-01-20 NOTE — DISCHARGE SUMMARY
General Daily Progress Note          Patient Name:   Benita West       YOB: 1940       Age:  80 y.o. Admit Date: 1/17/2022      Subjective:     Patient is a 80y.o. year old female history of hypertension arthritis. We will with generalized weakness body ache low blood pressure since last 3 days EMS was called saturation was 91% on room air patient not vaccinated against COVID-19 came to emergency room seen by ER physician work-up done shows positive for COVID-19 patient was admitted for further evaluation and treatment       Patient breathing much better this morning more alert awake      Objective:     Visit Vitals  /61   Pulse 89   Temp 98.9 °F (37.2 °C)   Resp 20   Ht 5' 5\" (1.651 m)   Wt 90.7 kg (200 lb)   SpO2 93%   BMI 33.28 kg/m²        No results found for this or any previous visit (from the past 24 hour(s)). [unfilled]      Review of Systems    Constitutional: Negative for chills and fever. HENT: Negative. Eyes: Negative. Respiratory: Negative. Cardiovascular: Negative. Gastrointestinal: Negative for abdominal pain and nausea. Skin: Negative. Neurological: Negative. Physical Exam:      Constitutional: pt is oriented to person, place, and time. HENT:   Head: Normocephalic and atraumatic. Eyes: Pupils are equal, round, and reactive to light. EOM are normal.   Cardiovascular: Normal rate, regular rhythm and normal heart sounds. Pulmonary/Chest: Breath sounds normal. No wheezes. No rales. Exhibits no tenderness. Abdominal: Soft. Bowel sounds are normal. There is no abdominal tenderness. There is no rebound and no guarding. Musculoskeletal: Normal range of motion. Neurological: pt is alert and oriented to person, place, and time. XR CHEST PORT   Final Result      CT HEAD WO CONT   Final Result   Periventricular/subcortical white matter gliosis. Evidence for   nonacute end vessel occlusive change. Central cerebral arteriosclerosis. No intracranial hemorrhage. No results found for this or any previous visit (from the past 24 hour(s)). Results     Procedure Component Value Units Date/Time    CULTURE, BLOOD #1 [[id number]] Collected: 01/18/22 7367    Order Status: Completed Specimen: Blood Updated: 01/20/22 1101     Special Requests: No Special Requests        Culture result: No growth 1 day       COVID-19 RAPID TEST [[id number]]  (Abnormal) Collected: 01/17/22 1136    Order Status: Completed Specimen: Nasopharyngeal Updated: 01/17/22 1201     Specimen source       Please find results under separate order           COVID-19 rapid test DETECTED        Comment: Rapid Abbott ID Now   The specimen is POSITIVE for SARS-CoV-2, the novel coronavirus associated with COVID-19. This test has been authorized by the FDA under an Emergency Use Authorization (EUA) for use by authorized laboratories. Fact sheet for Healthcare Providers: expresscoindate.co.nz Fact sheet for Patients: expresscoindate.co.nz   Methodology: Isothermal Nucleic Acid Amplification Results verified, phoned to and read back by Marlys Del Valle, 12:00   1/17/22 LBO         INFLUENZA A & B AG (RAPID TEST) [[id number]] Collected: 01/17/22 1136    Order Status: Completed Specimen: Nasopharyngeal from Nasal washing Updated: 01/17/22 1202     Influenza A Antigen Negative        Influenza B Antigen Negative       CULTURE, BLOOD #2 [[id number]]  (Abnormal) Collected: 01/17/22 0712    Order Status: Completed Specimen: Blood Updated: 01/20/22 0946     Special Requests: No Special Requests        Culture result:       Staphylococcus species, coagulase negative growing in 1 of 4 bottles drawn NO SITE INDICATED            (NOTE) [facility] IN [facility] GROWING IN 1 OF 2 BOTTLES CALLED TO [address] [place] [address] AT 0831 ON 1/19/22.  JF           Labs:     Recent Labs     01/19/22  0936 01/18/22  0712   WBC 8.5 3.8   HGB 12.0 11.0*   HCT 37.7 34.2*    204 Recent Labs     01/19/22  0936 01/18/22  0712    143   K 3.3* 3.6   * 109*   CO2 27 28   BUN 19 11   CREA 0.57 0.53*   GLU 95 112*   CA 9.5 8.9     Recent Labs     01/19/22  0936 01/18/22  0712   ALT 91* 62   AP 38* 38*   TBILI 0.3 0.3   TP 7.5 6.9   ALB 2.9* 2.7*   GLOB 4.6* 4.2*     No results for input(s): INR, PTP, APTT, INREXT, INREXT in the last 72 hours. No results for input(s): FE, TIBC, PSAT, FERR in the last 72 hours. No results found for: FOL, RBCF   No results for input(s): PH, PCO2, PO2 in the last 72 hours. No results for input(s): CPK, CKNDX, TROIQ in the last 72 hours.     No lab exists for component: CPKMB  No results found for: CHOL, CHOLX, CHLST, CHOLV, HDL, HDLP, LDL, LDLC, DLDLP, TGLX, TRIGL, TRIGP, CHHD, CHHDX  No results found for: GLUCPOC  Lab Results   Component Value Date/Time    Color Yellow/Straw 01/17/2022 10:30 AM    Appearance Turbid (A) 01/17/2022 10:30 AM    Specific gravity 1.014 01/17/2022 10:30 AM    pH (UA) 7.0 01/17/2022 10:30 AM    Protein 100 (A) 01/17/2022 10:30 AM    Glucose Negative 01/17/2022 10:30 AM    Ketone 20 (A) 01/17/2022 10:30 AM    Bilirubin Negative 01/17/2022 10:30 AM    Urobilinogen 0.1 01/17/2022 10:30 AM    Nitrites Negative 01/17/2022 10:30 AM    Leukocyte Esterase Negative 01/17/2022 10:30 AM    Bacteria Negative 01/17/2022 10:30 AM    WBC 0-4 01/17/2022 10:30 AM    RBC 0-5 01/17/2022 10:30 AM         Assessment:     COVID-19 infection  Hypertension  Generalized weakness      Plan:         Patient seen by the PT OT recommend skilled care    F/u LFT    Discharge to skilled care rehab on 2 L oxygen by nasal cannula

## 2022-01-20 NOTE — PROGRESS NOTES
OCCUPATIONAL THERAPY TREATMENT  Patient: Neftali Dutton (78 y.o. female)  Date: 1/20/2022  Diagnosis: Acute hypoxemic respiratory failure due to COVID-19 ([facility] [U07.1, J96.01]  COVID-19 [U07.1] <principal problem not specified>       Precautions:    Chart, occupational therapy assessment, plan of care, and goals were reviewed. ASSESSMENT  Patient continues with skilled OT services and is progressing towards goals. Upon CORDERO/PTA arrival, pt semi supine in bed and agreeable to tx session. Pt noted with intermittent confusion and required constant cueing for mobility as well as to keep in NC. Pt sat up to EOB with ModA x2 and was unsafely scooting sideways to the EOB, pt assisted back to supine to sit up again with safer rolling/cueing and technique. Pt able to complete sit>stand with Min/ModA x2 and continued to lean forward onto RW unsafely despite cueing for hand placement. Pt able to take one side step to [facility] with RW and needed to sit down due to fatigue and poor balance. Pt returned to supine and completed face washing with setup A. Pt left semi supine in bed with call bell within reach. Will continue to follow pt throughout remainder of stay and progress towards OT goals. Recommending SNF at discharge when medically appropriate. Current Level of Function Impacting Discharge (ADLs): Mod-ModA x2 bed mobility, Min-ModA x2 transfers, setup A grooming    Other factors to consider for discharge: time since onset, severity of deficits, PLOF         PLAN :  Patient continues to benefit from skilled intervention to address the above impairments. Continue treatment per established plan of care. to address goals.     Recommendation for discharge: (in order for the patient to meet his/her long term goals)  Rocky Muir    This discharge recommendation:  Has been made in collaboration with the attending provider and/or case management    IF patient discharges home will need the following DME: JACOBO SUBJECTIVE:   Patient stated I want some ice water.     OBJECTIVE DATA SUMMARY:   Cognitive/Behavioral Status:  Neurologic State: Confused  Orientation Level: Disoriented to situation;Disoriented to place; Disoriented to time    Functional Mobility and Transfers for ADLs:  Bed Mobility:  Rolling: Moderate assistance  Supine to Sit: Moderate assistance;Assist x2  Sit to Supine: Moderate assistance;Assist x2  Scooting: Moderate assistance;Assist x2    Transfers:  Sit to Stand: Assist x2;Minimum assistance; Moderate assistance    Balance:  Sitting: Impaired; With support  Sitting - Static: Fair (occasional)  Sitting - Dynamic: Fair (occasional)  Standing: Impaired; With support  Standing - Static: Constant support; Fair    ADL Intervention:  Grooming  Position Performed: Long sitting on bed  Washing Face: Set-up    Pain:  0/10    Activity Tolerance:   Fair and requires rest breaks  Please refer to the flowsheet for vital signs taken during this treatment. After treatment patient left in no apparent distress:   Supine in bed and Call bell within reach    COMMUNICATION/COLLABORATION:   The patients plan of care was discussed with: Physical therapy assistant and Registered nurse. Cotreat with PT for increased safety for pt and clinician.     CONSUELO Mazariegos  Time Calculation: 38 mins    Problem: Self Care Deficits Care Plan (Adult)  Goal: *Acute Goals and Plan of Care (Insert Text)  Description: Pt will be MI sup<->sit in prep for EOB ADL's  Pt will be MI  LB dressing EOB level  Pt will be MI  grooming EOB level  Pt will be MI  sit<-> prep for toilet transfer  Pt will be MI  toilet transfer with LRAD  Pt will be MI  toileting/cloth mgmt LRAD  Pt will be MI  grooming standing sink  Pt will be MI bathing sitting/standing sink LRAD  Pt will be MI maksim UE HEP in prep for self care tasks    Outcome: Progressing Towards Goal

## 2022-01-20 NOTE — DISCHARGE INSTRUCTIONS
Discharge Instructions       PATIENT ID: Rosetta Méndez  MRN: [id number]   YOB: 1940    DATE OF ADMISSION: 1/17/2022 10:11 AM    DATE OF DISCHARGE: 1/20/2022    PRIMARY CARE PROVIDER: Mariam Barajas MD     ATTENDING PHYSICIAN: Zahira Cheng MD  DISCHARGING PROVIDER: Daniella Gan MD    To contact this individual call [phone] and ask the  to page. If unavailable ask to be transferred the Adult Hospitalist Department. DISCHARGE DIAGNOSES COVID-19    CONSULTATIONS: IP CONSULT TO HOSPITALIST  IP CONSULT TO PULMONOLOGY    PROCEDURES/SURGERIES: * No surgery found *    PENDING TEST RESULTS:   At the time of discharge the following test results are still pending: None    FOLLOW UP APPOINTMENTS:   Follow-up Information     Follow up With Specialties Details Why Casey Moreno MD Internal Medicine In 1 week  [address]  [phone]             ADDITIONAL CARE RECOMMENDATIONS: Discharge home on oxygen    DIET: Cardiac Diet      ACTIVITY: Activity as tolerated    Wound care: Wound Care Order: submitted to [facility] Please view [email or web address]    EQUIPMENT needed: 2 L oxygen by nasal cannula    DISCHARGE MEDICATIONS:   See Medication Reconciliation Form    · It is important that you take the medication exactly as they are prescribed. · Keep your medication in the bottles provided by the pharmacist and keep a list of the medication names, dosages, and times to be taken in your wallet. · Do not take other medications without consulting your doctor. NOTIFY YOUR PHYSICIAN FOR ANY OF THE FOLLOWING:   Fever over 101 degrees for 24 hours. Chest pain, shortness of breath, fever, chills, nausea, vomiting, diarrhea, change in mentation, falling, weakness, bleeding. Severe pain or pain not relieved by medications. Or, any other signs or symptoms that you may have questions about.       DISPOSITION: Home With:   OT  PT  [facility]  RN       SNF/Inpatient Rehab/LTAC    Independent/assisted living    Hospice    Other:         PROBLEM LIST Updated:  Yes ***       Signed:   Shahid Bray MD  1/20/2022  11:48 AM     Discharge Instructions       PATIENT ID: Tayler House  MRN: [id number]   YOB: 1940    DATE OF ADMISSION: 1/17/2022 10:11 AM    DATE OF DISCHARGE: 1/20/2022    PRIMARY CARE PROVIDER: Ashanti Platt MD     ATTENDING PHYSICIAN: Rahat Nascimento MD  DISCHARGING PROVIDER: Shahid Bray MD    To contact this individual call [phone] and ask the  to page. If unavailable ask to be transferred the Adult Hospitalist Department. DISCHARGE DIAGNOSES ***    CONSULTATIONS: IP CONSULT TO HOSPITALIST  IP CONSULT TO PULMONOLOGY    PROCEDURES/SURGERIES: * No surgery found *    PENDING TEST RESULTS:   At the time of discharge the following test results are still pending: ***    FOLLOW UP APPOINTMENTS:   Follow-up Information     Follow up With Specialties Details Why Contact Info    Ashanti Platt MD Internal Medicine In 1 week  [address]  [phone]             ADDITIONAL CARE RECOMMENDATIONS: ***    DIET: {diet:66682}      ACTIVITY: {discharge activity:44932}    Wound care: {[facility] Wound Care Instructions:15849}    EQUIPMENT needed: ***      DISCHARGE MEDICATIONS:   See Medication Reconciliation Form    · It is important that you take the medication exactly as they are prescribed. · Keep your medication in the bottles provided by the pharmacist and keep a list of the medication names, dosages, and times to be taken in your wallet. · Do not take other medications without consulting your doctor. NOTIFY YOUR PHYSICIAN FOR ANY OF THE FOLLOWING:   Fever over 101 degrees for 24 hours. Chest pain, shortness of breath, fever, chills, nausea, vomiting, diarrhea, change in mentation, falling, weakness, bleeding.  Severe pain or pain not relieved by medications. Or, any other signs or symptoms that you may have questions about.       DISPOSITION:    Home With:   OT  PT  HH  RN       SNF/Inpatient Rehab/LTAC    Independent/assisted living    Hospice    Other:         PROBLEM LIST Updated:  Yes ***       Signed:   Armando Decker MD  1/20/2022  11:48 AM

## 2022-01-20 NOTE — PROGRESS NOTES
[facility] unable to accept patient due to being COVID +. Currently, only 2 SNF's in [place], 1 in [facility] and 1 in [place] have availability for COVID + patients. CM called patients son, Baldomero Rodriguez @ [phone] to notify. Son states that he does not want his mother to be far away from him and also states that the patient would not want to be far from home. Son requests [facility] for patient. CM encouraged SNF for safety, but son is adamant about wanting HH. DME was discussed and son requests bedside commode and wheelchair. CM notified son that patient will require oxygen at home. Son verbalized understanding. No preference for [facility] agency or DME supplier. CM sent referrals. CM notified Dr. Love Butts.

## 2022-01-20 NOTE — PROGRESS NOTES
PULMONARY NOTE  [facility]    Name: Manuela Silver MRN: [id number]   : 1940 Hospital: [address] [place] [address]   Date: 2022  Admission date: 2022 Hospital Day: 4       HPI:     Hospital Problems  Never Reviewed          Codes Class Noted POA    Acute hypoxemic respiratory failure due to COVID-19 [facility]) ICD-10-CM: U07.1, J96.01  ICD-9-CM: 518.81, 079.89, 799.02  2022 Unknown        COVID-19 ICD-10-CM: U07.1  ICD-9-CM: 079.89  2022 Unknown                   [x] High complexity decision making was performed  [x] See my orders for details      Subjective/Initial History:     I was asked by Perfecto Shepard MD to see Manuela Silver  a 80 y.o.   female in consultation     Excerpts from admission 2022 or consult notes as follows:   61-year-old lady came in because of generalized weakness shortness of breath and fatigue his oxygen saturation was low she was put on oxygen via nasal cannula still see short of breath and dyspneic she is not vaccinated COVID-19 test came back positive chest x-ray shows bilateral patchy infiltrate consistent with COVID-19 so admitted and pulmonary consult was called.     : Patient endorses occasional cough, but does not feel she needs the nasal cannula oxygen as a supportive measure    Allergies   Allergen Reactions    Penicillins Hives        MAR reviewed and pertinent medications noted or modified as needed     Current Facility-Administered Medications   Medication    0.9% sodium chloride infusion    acetaminophen (TYLENOL) tablet 650 mg    Or    acetaminophen (TYLENOL) suppository 650 mg    polyethylene glycol (MIRALAX) packet 17 g    ondansetron (ZOFRAN ODT) tablet 4 mg    Or    ondansetron (ZOFRAN) injection 4 mg    enoxaparin (LOVENOX) injection 40 mg    dexamethasone (DECADRON) 4 mg/mL injection 4 mg    baricitinib (OLUMIANT) tablet 4 mg    albuterol (PROVENTIL HFA, VENTOLIN HFA, PROAIR HFA) inhaler 2 Puff    budesonide-formoteroL (SYMBICORT) 160-4.5 mcg/actuation HFA inhaler 2 Puff    azithromycin (ZITHROMAX) tablet 500 mg      Patient PCP: Amber Cassidy MD  PMH:  has no past medical history on file. PSH:   has no past surgical history on file. FHX: family history is not on file. SHX:       ROS:    Review of Systems   Constitutional: Negative. HENT: Negative. Eyes: Negative. Respiratory: Positive for cough. Cardiovascular: Negative. Gastrointestinal: Negative. Genitourinary: Negative. Musculoskeletal: Negative. Skin: Negative. Neurological: Negative. Psychiatric/Behavioral: Negative. Objective:     Vital Signs: Telemetry:    normal sinus rhythm Intake/Output:   Visit Vitals  /61   Pulse 89   Temp 98.9 °F (37.2 °C)   Resp 20   Ht 5' 5\" (1.651 m)   Wt 90.7 kg (200 lb)   SpO2 93%   BMI 33.28 kg/m²       Temp (24hrs), Av.1 °F (37.3 °C), Min:98.9 °F (37.2 °C), Max:99.3 °F (37.4 °C)        O2 Device: None (Room air) O2 Flow Rate (L/min): 2 l/min       Wt Readings from Last 4 Encounters:   22 90.7 kg (200 lb)        No intake or output data in the 24 hours ending 22 0957    Last shift:      No intake/output data recorded. Last 3 shifts: No intake/output data recorded. Physical Exam:     Physical Exam  Constitutional:       Appearance: Normal appearance. HENT:      Head: Normocephalic and atraumatic. Nose: Nose normal.      Mouth/Throat:      Mouth: Mucous membranes are moist.   Eyes:      Pupils: Pupils are equal, round, and reactive to light. Cardiovascular:      Rate and Rhythm: Normal rate and regular rhythm. Pulses: Normal pulses. Heart sounds: Normal heart sounds. Pulmonary:      Effort: Pulmonary effort is normal.      Breath sounds: Normal breath sounds. Abdominal:      General: Abdomen is flat. Bowel sounds are normal.   Musculoskeletal:      Cervical back: Normal range of motion and neck supple. Skin:     General: Skin is warm. Neurological:      General: No focal deficit present. Mental Status: She is alert. Psychiatric:         Mood and Affect: Mood normal.          Labs:    Recent Labs     01/19/22  0936 01/18/22  0712 01/17/22  1133   WBC 8.5 3.8 4.7   HGB 12.0 11.0* 10.9*    204 190     Recent Labs     01/19/22  0936 01/18/22  0712 01/17/22  1300 01/17/22  1133    143  --  139   K 3.3* 3.6  --  3.2*   * 109*  --  105   CO2 27 28  --  29   GLU 95 112*  --  106*   BUN 19 11  --  11   CREA 0.57 0.53*  --  0.80   CA 9.5 8.9  --  9.0   LAC  --   --  1.3  --    ALB 2.9* 2.7*  --  2.9*   ALT 91* 62  --  50     No results for input(s): PH, PCO2, PO2, HCO3, FIO2 in the last 72 hours. No results for input(s): CPK, CKNDX, TROIQ in the last 72 hours. No lab exists for component: CPKMB  No results found for: BNPP, BNP   Lab Results   Component Value Date/Time    Culture result: No growth after 5 hours 01/18/2022 07:12 AM    Culture result: (A) 01/17/2022 07:12 AM     Staphylococcus species, coagulase negative growing in 1 of 4 bottles drawn NO SITE INDICATED    Culture result:  01/17/2022 07:12 AM     (NOTE) GPC IN CLUSTERS GROWING IN 1 OF 2 BOTTLES CALLED TO JOSE MIGUEL PAGAN AT 0831 ON 1/19/22.    No results found for: TSH, TSHEXT, TSHEXT    Imaging:    CXR Results  (Last 48 hours)    None        Results from Hospital Encounter encounter on 01/17/22    XR CHEST PORT    Narrative  1 view    Moderate patchy central infiltrates, right greater than left, accentuated by  hypoinflation and technique. No effusion or pneumothorax. Borderline  cardiomegaly without congestion    Results from Hospital Encounter encounter on 01/17/22    CT HEAD WO CONT    Narrative  CT head. Axial images are reviewed along with reformatted sagittal/coronal images.   Dose reduction: All CT scans at this facility are performed using dose reduction  optimization techniques as appropriate to a performed exam including the  following-  automated exposure control, adjustments of mA and/or Kv according to patient  size, or use of iterative reconstructive technique. .    Bone windows demonstrate normal aeration of the imaged sinuses and mastoid air  cells. Review of intracranial content reveals patchy low density through  periventricular/subcortical white matter. Evidence for nonacute end vessel  occlusive change. Central cerebral arteriosclerosis. Falx based calcification No  hydrocephalus. No intracranial hemorrhage. Impression  Periventricular/subcortical white matter gliosis. Evidence for  nonacute end vessel occlusive change. Central cerebral arteriosclerosis. No intracranial hemorrhage. IMPRESSION:   1. Acute hypoxic respiratory failure  2. COVID-19 pneumonia  3. Hypertension by history      RECOMMENDATIONS/PLAN:     1. She is on 2 liter nasal Cannula oxygen as salvage oxygen delivery device to provide high concentration of oxygen to overcome refractory hypoxia; again she is not wearing her oxygen (morning of 1/20)  2. Will get pulse ox room air and ambulation  3.  Patient is on baricitinib  Decadron, Zithromax       ·           Stephanie Paniagua MD

## 2022-01-20 NOTE — PROGRESS NOTES
PULMONARY NOTE  [facility]    Name: Manuela Silver MRN: [id number]   : 1940 Hospital: [address] [place] [address]   Date: 2022  Admission date: 2022 Hospital Day: 4       HPI:     Hospital Problems  Never Reviewed          Codes Class Noted POA    Acute hypoxemic respiratory failure due to COVID-19 [facility]) ICD-10-CM: U07.1, J96.01  ICD-9-CM: 518.81, 079.89, 799.02  2022 Unknown        COVID-19 ICD-10-CM: U07.1  ICD-9-CM: 079.89  2022 Unknown                   [x] High complexity decision making was performed  [x] See my orders for details      Subjective/Initial History:     I was asked by Perfecto Shepard MD to see Manuela Silver  a 80 y.o.   female in consultation     Excerpts from admission 2022 or consult notes as follows:   44-year-old lady came in because of generalized weakness shortness of breath and fatigue his oxygen saturation was low she was put on oxygen via nasal cannula still see short of breath and dyspneic she is not vaccinated COVID-19 test came back positive chest x-ray shows bilateral patchy infiltrate consistent with COVID-19 so admitted and pulmonary consult was called.     : Patient endorses occasional cough, but does not feel she needs the nasal cannula oxygen as a supportive measure    Allergies   Allergen Reactions    Penicillins Hives        MAR reviewed and pertinent medications noted or modified as needed     Current Facility-Administered Medications   Medication    0.9% sodium chloride infusion    acetaminophen (TYLENOL) tablet 650 mg    Or    acetaminophen (TYLENOL) suppository 650 mg    polyethylene glycol (MIRALAX) packet 17 g    ondansetron (ZOFRAN ODT) tablet 4 mg    Or    ondansetron (ZOFRAN) injection 4 mg    enoxaparin (LOVENOX) injection 40 mg    dexamethasone (DECADRON) 4 mg/mL injection 4 mg    baricitinib (OLUMIANT) tablet 4 mg    albuterol (PROVENTIL HFA, VENTOLIN HFA, PROAIR HFA) inhaler 2 Puff    budesonide-formoteroL (SYMBICORT) 160-4.5 mcg/actuation HFA inhaler 2 Puff    azithromycin (ZITHROMAX) tablet 500 mg      Patient PCP: Mani Gill MD  PMH:  has no past medical history on file. PSH:   has no past surgical history on file. FHX: family history is not on file. SHX:       ROS:    Review of Systems   Constitutional: Negative. HENT: Negative. Eyes: Negative. Respiratory: Positive for cough. Cardiovascular: Negative. Gastrointestinal: Negative. Genitourinary: Negative. Musculoskeletal: Negative. Skin: Negative. Neurological: Negative. Psychiatric/Behavioral: Negative. Objective:     Vital Signs: Telemetry:    normal sinus rhythm Intake/Output:   Visit Vitals  /61   Pulse 89   Temp 98.9 °F (37.2 °C)   Resp 20   Ht 5' 5\" (1.651 m)   Wt 200 lb (90.7 kg)   SpO2 93%   BMI 33.28 kg/m²       Temp (24hrs), Av.8 °F (37.1 °C), Min:98.3 °F (36.8 °C), Max:99.3 °F (37.4 °C)        O2 Device: None (Room air) O2 Flow Rate (L/min): 2 l/min       Wt Readings from Last 4 Encounters:   22 200 lb (90.7 kg)        No intake or output data in the 24 hours ending 22 0854    Last shift:      No intake/output data recorded. Last 3 shifts: No intake/output data recorded. Physical Exam:     Physical Exam  Constitutional:       Appearance: Normal appearance. HENT:      Head: Normocephalic and atraumatic. Nose: Nose normal.      Mouth/Throat:      Mouth: Mucous membranes are moist.   Eyes:      Pupils: Pupils are equal, round, and reactive to light. Cardiovascular:      Rate and Rhythm: Normal rate and regular rhythm. Pulses: Normal pulses. Heart sounds: Normal heart sounds. Pulmonary:      Effort: Pulmonary effort is normal.      Breath sounds: Normal breath sounds. Abdominal:      General: Abdomen is flat. Bowel sounds are normal.   Musculoskeletal:      Cervical back: Normal range of motion and neck supple. Skin:     General: Skin is warm. Neurological:      General: No focal deficit present. Mental Status: She is alert. Psychiatric:         Mood and Affect: Mood normal.          Labs:    Recent Labs     01/19/22  0936 01/18/22  0712 01/17/22  1133   WBC 8.5 3.8 4.7   HGB 12.0 11.0* 10.9*    204 190     Recent Labs     01/19/22  0936 01/18/22  0712 01/17/22  1300 01/17/22  1133    143  --  139   K 3.3* 3.6  --  3.2*   * 109*  --  105   CO2 27 28  --  29   GLU 95 112*  --  106*   BUN 19 11  --  11   CREA 0.57 0.53*  --  0.80   CA 9.5 8.9  --  9.0   LAC  --   --  1.3  --    ALB 2.9* 2.7*  --  2.9*   ALT 91* 62  --  50     No results for input(s): PH, PCO2, PO2, HCO3, FIO2 in the last 72 hours. No results for input(s): CPK, CKNDX, TROIQ in the last 72 hours. No lab exists for component: CPKMB  No results found for: BNPP, BNP   Lab Results   Component Value Date/Time    Culture result: No growth after 5 hours 01/18/2022 07:12 AM    Culture result: (A) 01/17/2022 07:12 AM     Gram Positive Cocci in clusters growing in 1 of 4 bottles drawn NO SITE INDICATED    Culture result:  01/17/2022 07:12 AM     (NOTE) GPC IN CLUSTERS GROWING IN 1 OF 2 BOTTLES CALLED TO JOSE MIGUEL PAGAN AT 0831 ON 1/19/22.    No results found for: TSH, TSHEXT, TSHEXT    Imaging:    CXR Results  (Last 48 hours)    None        Results from Hospital Encounter encounter on 01/17/22    XR CHEST PORT    Narrative  1 view    Moderate patchy central infiltrates, right greater than left, accentuated by  hypoinflation and technique. No effusion or pneumothorax. Borderline  cardiomegaly without congestion    Results from Hospital Encounter encounter on 01/17/22    CT HEAD WO CONT    Narrative  CT head. Axial images are reviewed along with reformatted sagittal/coronal images.   Dose reduction: All CT scans at this facility are performed using dose reduction  optimization techniques as appropriate to a performed exam including the  following-  automated exposure control, adjustments of mA and/or Kv according to patient  size, or use of iterative reconstructive technique. .    Bone windows demonstrate normal aeration of the imaged sinuses and mastoid air  cells. Review of intracranial content reveals patchy low density through  periventricular/subcortical white matter. Evidence for nonacute end vessel  occlusive change. Central cerebral arteriosclerosis. Falx based calcification No  hydrocephalus. No intracranial hemorrhage. Impression  Periventricular/subcortical white matter gliosis. Evidence for  nonacute end vessel occlusive change. Central cerebral arteriosclerosis. No intracranial hemorrhage. IMPRESSION:   1. Acute hypoxic respiratory failure  2. COVID-19 pneumonia  3. Hypertension by history      RECOMMENDATIONS/PLAN:     1. She is on 2 liter nasal Cannula oxygen as salvage oxygen delivery device to provide high concentration of oxygen to overcome refractory hypoxia; again she is not wearing her oxygen (morning of 1/20)  2. Patient is on baricitinib   3.  On Decadron, Zithromax       ·           Gricelda Saunders

## 2022-01-20 NOTE — PROGRESS NOTES
Reason for Admission:  Acute hypoxemic respiratory failure due to COVID-19                   RUR Score:  9%                   Plan for utilizing home health: None         PCP: First and Last name:  Arturo Perez MD     Name of Practice:    Are you a current patient: Yes/No:    Approximate date of last visit: Around 2 years ago. Patient takes no medications   Can you participate in a virtual visit with your PCP:                     Current Advanced Directive/Advance Care Plan: DNR    Healthcare Decision Maker:   Click here to complete [place] including selection of the Healthcare Decision Maker Relationship (ie \"Primary\")           Son (POA)- Flori Bearden.: [phone]  -Son asked that CM text him CM email address and he will email POA documentation. CM sent text. Transition of Care Plan: CM called patients son to complete assessment as patient is confused per chart. Address verified on chart. Patient lives with son in a 1 story residence with 5 steps to enter. DME: walker, cane, shower chair. Independent in all care. Son provides transportation. CM discussed DC plans and therapy recommendations for SNF. Son agrees and choice given for [facility].  CM sent referral.

## 2022-01-21 PROBLEM — J96.91 RESPIRATORY FAILURE WITH HYPOXIA (HCC): Status: ACTIVE | Noted: 2022-01-01

## 2022-01-21 NOTE — H&P
History and Physical    NAME: Farhana Carrion   :  1940   MRN:  [id number]     Date/Time:  2022 8:42 AM    Patient PCP: Panchito Salgado MD  ______________________________________________________________________             Subjective:     CHIEF COMPLAINT:     Shortness of breath    HISTORY OF PRESENT ILLNESS:       Patient is a 80y.o. year old female with signal past medical history of hypertension arthritis who discharged from the hospital yesterday in stable condition patient is admitted last admission with COVID-19 patient was asymptomatic all this time while in the hospital patient discharged on 2 L oxygen to skilled care but patient son want to go home with home health but is at home patient's saturations drops sent back to the ER seen by the ER physician patient placed on BiPAP patient was little hypotensive started on Levophed admitted for further work-up and treatment    Patient had no COVID-vaccine    No past medical history on file. Arthritis hypertension        No past surgical history on file. Social History     Tobacco Use    Smoking status: Not on file    Smokeless tobacco: Not on file   Substance Use Topics    Alcohol use: Not on file        No family history on file. Allergies   Allergen Reactions    Penicillins Hives        Prior to Admission medications    Medication Sig Start Date End Date Taking? Authorizing Provider   albuterol (PROVENTIL HFA, VENTOLIN HFA, PROAIR HFA) 90 mcg/actuation inhaler Take 2 Puffs by inhalation every six (6) hours as needed for Wheezing or Shortness of Breath. 22   Mohiuddin, Gibson Babinski, MD   azithromycin (ZITHROMAX) 500 mg tab Take 1 Tablet by mouth daily for 4 doses. 22  Mohiuddin, Gibson Babinski, MD   baricitinib (OLUMIANT) 2 mg tablet Take 2 Tablets by mouth daily. 22   Mohiuddin, Gibson Babinski, MD   budesonide-formoteroL [facility]) 160-4.5 mcg/actuation HFAA Take 2 Puffs by inhalation two (2) times a day.  22   Jesús Greerelor, MD dexAMETHasone (Decadron) 4 mg tablet 1 tablet twice a day 1/20/22   Eber Munoz MD         Current Facility-Administered Medications:     NOREPINephrine (LEVOPHED) 8 mg in 0.9% NS 250ml infusion, 0.5-16 mcg/min, IntraVENous, TITRATE, Polo Lopez MD, Last Rate: 18.8 mL/hr at 01/21/22 0541, 10 mcg/min at 01/21/22 0541    albuterol (PROVENTIL HFA, VENTOLIN HFA, PROAIR HFA) inhaler 2 Puff, 2 Puff, Inhalation, Q6H PRN, Cesar Lopez MD    budesonide-formoteroL (SYMBICORT) 160-4.5 mcg/actuation HFA inhaler 2 Puff, 2 Puff, Inhalation, BID, Cesar Lopez MD    0.9% sodium chloride infusion, 75 mL/hr, IntraVENous, CONTINUOUS, Cesar Lopez MD    acetaminophen (TYLENOL) tablet 650 mg, 650 mg, Oral, Q6H PRN **OR** acetaminophen (TYLENOL) suppository 650 mg, 650 mg, Rectal, Q6H PRN, Cesar Lopez MD    polyethylene glycol (MIRALAX) packet 17 g, 17 g, Oral, DAILY PRN, Cesar Lopez MD    ondansetron (ZOFRAN ODT) tablet 4 mg, 4 mg, Oral, Q8H PRN **OR** ondansetron (ZOFRAN) injection 4 mg, 4 mg, IntraVENous, Q6H PRN, Polo Lopez MD    enoxaparin (LOVENOX) injection 40 mg, 40 mg, SubCUTAneous, DAILY, Cesar Lopez MD    baricitinib (OLUMIANT) tablet 4 mg, 4 mg, Oral, DAILY, Cesar Lopez MD    albuterol (PROVENTIL HFA, VENTOLIN HFA, PROAIR HFA) inhaler 2 Puff, 2 Puff, Inhalation, Q6H PRN, Cesar Lopez MD    budesonide-formoteroL (SYMBICORT) 160-4.5 mcg/actuation HFA inhaler 2 Puff, 2 Puff, Inhalation, BID, Cesar Lopez MD    0.9% sodium chloride infusion, 75 mL/hr, IntraVENous, CONTINUOUS, Cesar Lopez MD    acetaminophen (TYLENOL) tablet 650 mg, 650 mg, Oral, Q6H PRN **OR** acetaminophen (TYLENOL) suppository 650 mg, 650 mg, Rectal, Q6H PRN, Polo Lopez MD    polyethylene glycol (MIRALAX) packet 17 g, 17 g, Oral, DAILY PRN, Polo Lopez MD    ondansetron (ZOFRAN ODT) tablet 4 mg, 4 mg, Oral, Q8H PRN **OR** ondansetron (ZOFRAN) injection 4 mg, 4 mg, IntraVENous, Q6H PRN, Johnathan Lopez Res, MD    meropenem (MERREM) 1 g in 0.9% sodium chloride 20 mL IV syringe, 1 g, IntraVENous, Q8H, Polo Lopez MD    azithromycin (ZITHROMAX) 500 mg in 0.9% sodium chloride 250 mL IVPB, 500 mg, IntraVENous, Q24H, Johnathan Lopez Res, MD    dexamethasone (DECADRON) 4 mg/mL injection 4 mg, 4 mg, IntraVENous, Q6H, Polo Lopez MD    potassium chloride SR (KLOR-CON 10) tablet 40 mEq, 40 mEq, Oral, NOW, Polo Lopez MD    Current Outpatient Medications:     albuterol (PROVENTIL HFA, VENTOLIN HFA, PROAIR HFA) 90 mcg/actuation inhaler, Take 2 Puffs by inhalation every six (6) hours as needed for Wheezing or Shortness of Breath., Disp: 18 g, Rfl: 0    azithromycin (ZITHROMAX) 500 mg tab, Take 1 Tablet by mouth daily for 4 doses. , Disp: 4 Tablet, Rfl: 0    baricitinib (OLUMIANT) 2 mg tablet, Take 2 Tablets by mouth daily. , Disp: 10 Tablet, Rfl: 0    budesonide-formoteroL (SYMBICORT) 160-4.5 mcg/actuation HFAA, Take 2 Puffs by inhalation two (2) times a day., Disp: 10.2 g, Rfl: 0    dexAMETHasone (Decadron) 4 mg tablet, 1 tablet twice a day, Disp: 20 Tablet, Rfl: 0    LAB DATA REVIEWED:    Recent Results (from the past 24 hour(s))   EKG, 12 LEAD, INITIAL    Collection Time: 01/21/22  2:24 AM   Result Value Ref Range    Ventricular Rate 143 BPM    Atrial Rate 67 BPM    QRS Duration 86 ms    Q-T Interval 370 ms    QTC Calculation (Bezet) 571 ms    Calculated R Axis -45 degrees    Calculated T Axis 83 degrees    Diagnosis       Sinus tachycardia  Left axis deviation  Minimal voltage criteria for LVH, may be normal variant  Anterior infarct (cited on or before 17-JAN-2022)  Abnormal ECG  When compared with ECG of 17-JAN-2022 11:23,  Abberant conduction is no longer Present  Vent.  rate has increased BY  49 BPM  Nonspecific T wave abnormality no longer evident in Inferior leads  Nonspecific T wave abnormality, improved in Anterior leads  Confirmed by Dora Franco MD, Chrissy Zelaya ([phone]) on 1/21/2022 7:07:23 AM     GLUCOSE, POC    Collection Time: 01/21/22  2:45 AM   Result Value Ref Range    Glucose (POC) 241 (H) 65 - 117 mg/dL    Performed by Rico Boles        XR Results (most recent):  Results from Hospital Encounter encounter on 01/21/22    XR CHEST PORT    Narrative  Study: XR CHEST PORT    Clinical indication: unresponsive    Comparison: Chest x-ray 1/17/2022    Impression  Findings/impression:    Diffuse interstitial airspace disease/edema. No definite pleural effusion or  pneumothorax. Cardiac contours are partially obscured. No acute osseous abnormality identified. XR CHEST PORT   Final Result   Findings/impression:      Diffuse interstitial airspace disease/edema. No definite pleural effusion or   pneumothorax. Cardiac contours are partially obscured. No acute osseous abnormality identified. CT HEAD WO CONT    (Results Pending)        Review of Systems:  Alert awake confused    Objective:   VITALS:    Visit Vitals  BP (!) 121/48 (BP 1 Location: Right upper arm, BP Patient Position: At rest)   Pulse (!) 102   Resp (!) 48   SpO2 98%       Physical Exam:   Constitutional: Alert awake confused   HENT:   Head: Normocephalic and atraumatic. Eyes: Pupils are equal, round, and reactive to light. EOM are normal.   Cardiovascular: Normal rate, regular rhythm and normal heart sounds. Pulmonary/Chest: Breath sounds normal. No wheezes. No rales. Exhibits no tenderness. Abdominal: Soft. Bowel sounds are normal. There is no abdominal tenderness. There is no rebound and no guarding. Musculoskeletal: Normal range of motion.    Neurological: Awake confused moving all the extremities      ASSESSMENT & PLAN:    Acute hypoxemic respiratory failure  COVID-19 pneumonitis   hypotension    Hypertension  Arthritis  Hypokalemia    Admit the patient to ICU because of hypotension  Continue Zithromax 5 mg IV every 24 hours meropenem 1 g every 8 hours Decadron 4 mg IV every 6 hours patient on Levophed drip    Pulmonary consult    Patient is DNR      Current Facility-Administered Medications:     NOREPINephrine (LEVOPHED) 8 mg in 0.9% NS 250ml infusion, 0.5-16 mcg/min, IntraVENous, TITRATE, Polo Lopez MD, Last Rate: 18.8 mL/hr at 01/21/22 0541, 10 mcg/min at 01/21/22 0541    albuterol (PROVENTIL HFA, VENTOLIN HFA, PROAIR HFA) inhaler 2 Puff, 2 Puff, Inhalation, Q6H PRN, Manan Lopez MD    budesonide-formoteroL (SYMBICORT) 160-4.5 mcg/actuation HFA inhaler 2 Puff, 2 Puff, Inhalation, BID, Manan Lopez MD    0.9% sodium chloride infusion, 75 mL/hr, IntraVENous, CONTINUOUS, Manan Lopez MD    acetaminophen (TYLENOL) tablet 650 mg, 650 mg, Oral, Q6H PRN **OR** acetaminophen (TYLENOL) suppository 650 mg, 650 mg, Rectal, Q6H PRN, Manan Lopez MD    polyethylene glycol (MIRALAX) packet 17 g, 17 g, Oral, DAILY PRN, Manan Lopez MD    ondansetron (ZOFRAN ODT) tablet 4 mg, 4 mg, Oral, Q8H PRN **OR** ondansetron (ZOFRAN) injection 4 mg, 4 mg, IntraVENous, Q6H PRN, Polo Lopez MD    enoxaparin (LOVENOX) injection 40 mg, 40 mg, SubCUTAneous, DAILY, Manan Lopez MD    baricitinib (OLUMIANT) tablet 4 mg, 4 mg, Oral, DAILY, Manan Lopez MD    albuterol (PROVENTIL HFA, VENTOLIN HFA, PROAIR HFA) inhaler 2 Puff, 2 Puff, Inhalation, Q6H PRN, Manan Lopez MD    budesonide-formoteroL (SYMBICORT) 160-4.5 mcg/actuation HFA inhaler 2 Puff, 2 Puff, Inhalation, BID, Manan Lopez MD    0.9% sodium chloride infusion, 75 mL/hr, IntraVENous, CONTINUOUS, Manan Lopez MD    acetaminophen (TYLENOL) tablet 650 mg, 650 mg, Oral, Q6H PRN **OR** acetaminophen (TYLENOL) suppository 650 mg, 650 mg, Rectal, Q6H PRN, Manan Lopez MD    polyethylene glycol (MIRALAX) packet 17 g, 17 g, Oral, DAILY PRN, Polo Lopez MD    ondansetron (ZOFRAN ODT) tablet 4 mg, 4 mg, Oral, Q8H PRN **OR** ondansetron (ZOFRAN) injection 4 mg, 4 mg, IntraVENous, Q6H PRN, Lila Lopez MD    meropenem (MERREM) 1 g in 0.9% sodium chloride 20 mL IV syringe, 1 g, IntraVENous, Q8H, Polo Lopez MD    azithromycin (ZITHROMAX) 500 mg in 0.9% sodium chloride 250 mL IVPB, 500 mg, IntraVENous, Q24H, Lila Lopez MD    dexamethasone (DECADRON) 4 mg/mL injection 4 mg, 4 mg, IntraVENous, Q6H, Polo Lopez MD    potassium chloride SR (KLOR-CON 10) tablet 40 mEq, 40 mEq, Oral, NOW, Polo Lopez MD    Current Outpatient Medications:     albuterol (PROVENTIL HFA, VENTOLIN HFA, PROAIR HFA) 90 mcg/actuation inhaler, Take 2 Puffs by inhalation every six (6) hours as needed for Wheezing or Shortness of Breath., Disp: 18 g, Rfl: 0    azithromycin (ZITHROMAX) 500 mg tab, Take 1 Tablet by mouth daily for 4 doses. , Disp: 4 Tablet, Rfl: 0    baricitinib (OLUMIANT) 2 mg tablet, Take 2 Tablets by mouth daily. , Disp: 10 Tablet, Rfl: 0    budesonide-formoteroL (SYMBICORT) 160-4.5 mcg/actuation HFAA, Take 2 Puffs by inhalation two (2) times a day., Disp: 10.2 g, Rfl: 0    dexAMETHasone (Decadron) 4 mg tablet, 1 tablet twice a day, Disp: 20 Tablet, Rfl: 0      ________________________________________________________________________    Signed: Merly Neff MD

## 2022-01-21 NOTE — ED NOTES
Son Brooklynn Oconnell contacted due to DNR status in computer and confirmed, no intubation, shocks or resuscitation should be done.

## 2022-01-21 NOTE — PROGRESS NOTES
Spiritual Care Assessment/Progress Note  [facility]      NAME: Neftali Dutton      MRN: [id number]  AGE: 80 y.o. SEX: female  Oriental orthodox Affiliation: Presbyterian   Language: English     1/21/2022     Total Time (in minutes): 10     Spiritual Assessment begun in [facility] DEPT through conversation with:         [x]Patient        [] Family    [] Friend(s)        Reason for Consult: Emergency Department visit     Spiritual beliefs: (Please include comment if needed)     [x] Identifies with a lj tradition:  PRESBYTERIAN       [] Supported by a lj community:            [] Claims no spiritual orientation:           [] Seeking spiritual identity:                [] Adheres to an individual form of spirituality:           [] Not able to assess:                           Identified resources for coping:      [] Prayer                               [] Music                  [] Guided Imagery     [] Family/friends                 [] Pet visits     [] Devotional reading                         [x] Unknown     [] Other:                                              Interventions offered during this visit: (See comments for more details)    Patient Interventions: Prayer (actual)           Plan of Care:     [] Support spiritual and/or cultural needs    [] Support AMD and/or advance care planning process      [] Support grieving process   [] Coordinate Rites and/or Rituals    [] Coordination with community clergy   [] No spiritual needs identified at this time   [] Detailed Plan of Care below (See Comments)  [] Make referral to Music Therapy  [] Make referral to Pet Therapy     [] Make referral to Addiction services  [] Make referral to [facility]  [] Make referral to Spiritual Care Partner  [] No future visits requested        [x] Contact Spiritual Care for further referrals     Comments:  visited patient in ED T1. Patient minimally engaging, words unintelligible.   provided words of caring and support, patient constantly pulled at oxy mask.  provided prayer, Advised nurse to contact [facility] for any further referrals.     Visited by Raiza Richards, [facility], [facility]    can be contacted by calling  and requesting  on call

## 2022-01-21 NOTE — CONSULTS
IMPRESSION:   1. Acute hypoxic respiratory  2. COVID-19 pneumonia  3. Sepsis with septic shock  4. Arthritis hypertension by hx  5. Hypokalemia  6. Additional workup outlined below  7. Pt is at high risk of sudden decline and decompensation with life threatening consequenses and continued end organ dysfunction and failure  8. Pt is critically ill. Time spent with pt and staff actively rendering care, managing pt and coordinating care as stated below;  55 minutes, exclusive of any procedures      RECOMMENDATIONS/PLAN:   1. ICU monitoring  1. Non Invasive Ventilator for mechanical life support and prevent respiratory arrest with protective lung strategies BIPAP for non invasive ventilatory life support to avoid worsening respiratory acidosis, hypercacarbic or hypoxic respiratory arrest we will get arterial blood gases and change BiPAP settings  2. Patient is on dexamethasone and baricitinib  3. Replace potassium  4. Troponin is around 6000  5. Lactic acid 3.7  6. She is DNR  7. Chest x-ray shows diffuse bilateral infiltrate with congestive changes  8. Agree with Empiric IV antibiotics pending culture results on meropenem and Zithromax  9. Follow culture results  10. CVP monitoring hemodynamically unstable on Levophed will continue to  11. IV vasopressors for circulatory shock refractory to fluids to maintain SBP> 90  12. Transfuse prn to maintain Hgb > 7  13. Labs to follow electrolytes, renal function and and blood counts  14. Bronchial hygiene with respiratory therapy techniques, bronchodilators  15. Pt needs IV fluids with additives and Drug therapy requiring intensive monitoring for toxicity  16. Prescription drug management with home med reconciliation reviewed  17. DVT, SUP prophylaxis  18.  Will be available to assist in medical management while in the CCU pending disposition     [x] High complexity decision making was performed  [x] See my orders for details  HPI  57-year-old lady came in because of shortness of breath and dyspnea she has significant past medical history of arthritis hypertension she was recently discharged from the hospital came back with worsening of hypoxia she was discharged on oxygen 2 L nasal cannula and patient condition got worse she was supposed to be discharged to skilled care but patient's son took her home where her condition got worse and she was transferred back to the hospital now she is on noninvasive ventilator BiPAP machine hemodynamically unstable hypotensive on vasopressors and not giving much history acutely ill so critical care consult was called  PMH:  has no past medical history on file. PSH:   has no past surgical history on file. FHX: family history is not on file.      SHX:      ALL:   Allergies   Allergen Reactions    Penicillins Hives        MEDS:   [x] Reviewed - As Below   [] Not reviewed    Current Facility-Administered Medications   Medication    NOREPINephrine (LEVOPHED) 8 mg in 0.9% NS 250ml infusion    budesonide-formoteroL (SYMBICORT) 160-4.5 mcg/actuation HFA inhaler 2 Puff    0.9% sodium chloride infusion    acetaminophen (TYLENOL) tablet 650 mg    Or    acetaminophen (TYLENOL) suppository 650 mg    polyethylene glycol (MIRALAX) packet 17 g    ondansetron (ZOFRAN ODT) tablet 4 mg    enoxaparin (LOVENOX) injection 40 mg    baricitinib (OLUMIANT) tablet 2 mg    albuterol (PROVENTIL HFA, VENTOLIN HFA, PROAIR HFA) inhaler 2 Puff    polyethylene glycol (MIRALAX) packet 17 g    ondansetron (ZOFRAN) injection 4 mg    meropenem (MERREM) 1 g in 0.9% sodium chloride 20 mL IV syringe    dexamethasone (DECADRON) 4 mg/mL injection 4 mg    potassium chloride SR (KLOR-CON 10) tablet 40 mEq    azithromycin (ZITHROMAX) 500 mg in 0.9% sodium chloride 250 mL (VIAL-MATE)     Current Outpatient Medications   Medication Sig    albuterol (PROVENTIL HFA, VENTOLIN HFA, PROAIR HFA) 90 mcg/actuation inhaler Take 2 Puffs by inhalation every six (6) hours as needed for Wheezing or Shortness of Breath.  azithromycin (ZITHROMAX) 500 mg tab Take 1 Tablet by mouth daily for 4 doses.  baricitinib (OLUMIANT) 2 mg tablet Take 2 Tablets by mouth daily.  budesonide-formoteroL (SYMBICORT) 160-4.5 mcg/actuation HFAA Take 2 Puffs by inhalation two (2) times a day.  dexAMETHasone (Decadron) 4 mg tablet 1 tablet twice a day      MAR reviewed and pertinent medications noted or modified as needed   Current Facility-Administered Medications   Medication    NOREPINephrine (LEVOPHED) 8 mg in 0.9% NS 250ml infusion    budesonide-formoteroL (SYMBICORT) 160-4.5 mcg/actuation HFA inhaler 2 Puff    0.9% sodium chloride infusion    acetaminophen (TYLENOL) tablet 650 mg    Or    acetaminophen (TYLENOL) suppository 650 mg    polyethylene glycol (MIRALAX) packet 17 g    ondansetron (ZOFRAN ODT) tablet 4 mg    enoxaparin (LOVENOX) injection 40 mg    baricitinib (OLUMIANT) tablet 2 mg    albuterol (PROVENTIL HFA, VENTOLIN HFA, PROAIR HFA) inhaler 2 Puff    polyethylene glycol (MIRALAX) packet 17 g    ondansetron (ZOFRAN) injection 4 mg    meropenem (MERREM) 1 g in 0.9% sodium chloride 20 mL IV syringe    dexamethasone (DECADRON) 4 mg/mL injection 4 mg    potassium chloride SR (KLOR-CON 10) tablet 40 mEq    azithromycin (ZITHROMAX) 500 mg in 0.9% sodium chloride 250 mL (VIAL-MATE)     Current Outpatient Medications   Medication Sig    albuterol (PROVENTIL HFA, VENTOLIN HFA, PROAIR HFA) 90 mcg/actuation inhaler Take 2 Puffs by inhalation every six (6) hours as needed for Wheezing or Shortness of Breath.  azithromycin (ZITHROMAX) 500 mg tab Take 1 Tablet by mouth daily for 4 doses.  baricitinib (OLUMIANT) 2 mg tablet Take 2 Tablets by mouth daily.  budesonide-formoteroL (SYMBICORT) 160-4.5 mcg/actuation HFAA Take 2 Puffs by inhalation two (2) times a day.  dexAMETHasone (Decadron) 4 mg tablet 1 tablet twice a day      PMH:  has no past medical history on file.   PSH:   has no past surgical history on file. FHX: family history is not on file. SHX:       ROS:  Unable to obtain    Hemodynamics:    CO:    CI:    CVP:    SVR:   PAP Systolic:    PAP Diastolic:    PVR:    SN13:        Ventilator Settings:      Mode Rate TV Press PEEP FiO2 PIP Min. Vent               90 %     21.2 l/min        Vital Signs: Telemetry:    normal sinus rhythm Intake/Output:   Visit Vitals  BP (!) 138/90   Pulse 95   Resp 25   SpO2 92%       Temp (24hrs), Av.6 °F (37 °C), Min:98.2 °F (36.8 °C), Max:99 °F (37.2 °C)        O2 Device: BIPAP         Wt Readings from Last 4 Encounters:   22 90.7 kg (200 lb)        No intake or output data in the 24 hours ending 22 1055    Last shift:      No intake/output data recorded. Last 3 shifts: No intake/output data recorded. Physical Exam:     General: BIPAP/NIV;  HEENT: NCAT, poor dentition, lips and mucosa dry  Eyes: anicteric; conjunctiva clear  Neck: no nodes, , trach midline; no accessory MM use. Chest: no deformity,   Cardiac: R regular; no murmur;   Lungs: distant breath sounds; no wheezes  Abd: soft, NT, hypoactive BS  Ext: no edema; no joint swelling;  No clubbing  : NO [place], clear urine  Neuro: \"alert\"  Psych- + agitation,   Skin: warm, dry, no cyanosis;   Pulses: 1-2+ Bilateral pedal, radial  Capillary: brisk; pale      DATA:    MAR reviewed and pertinent medications noted or modified as needed  MEDS:   Current Facility-Administered Medications   Medication    NOREPINephrine (LEVOPHED) 8 mg in 0.9% NS 250ml infusion    budesonide-formoteroL (SYMBICORT) 160-4.5 mcg/actuation HFA inhaler 2 Puff    0.9% sodium chloride infusion    acetaminophen (TYLENOL) tablet 650 mg    Or    acetaminophen (TYLENOL) suppository 650 mg    polyethylene glycol (MIRALAX) packet 17 g    ondansetron (ZOFRAN ODT) tablet 4 mg    enoxaparin (LOVENOX) injection 40 mg    baricitinib (OLUMIANT) tablet 2 mg    albuterol (PROVENTIL HFA, VENTOLIN HFA, PROAIR HFA) inhaler 2 Puff    polyethylene glycol (MIRALAX) packet 17 g    ondansetron (ZOFRAN) injection 4 mg    meropenem (MERREM) 1 g in 0.9% sodium chloride 20 mL IV syringe    dexamethasone (DECADRON) 4 mg/mL injection 4 mg    potassium chloride SR (KLOR-CON 10) tablet 40 mEq    azithromycin (ZITHROMAX) 500 mg in 0.9% sodium chloride 250 mL (VIAL-MATE)     Current Outpatient Medications   Medication Sig    albuterol (PROVENTIL HFA, VENTOLIN HFA, PROAIR HFA) 90 mcg/actuation inhaler Take 2 Puffs by inhalation every six (6) hours as needed for Wheezing or Shortness of Breath.  azithromycin (ZITHROMAX) 500 mg tab Take 1 Tablet by mouth daily for 4 doses.  baricitinib (OLUMIANT) 2 mg tablet Take 2 Tablets by mouth daily.  budesonide-formoteroL (SYMBICORT) 160-4.5 mcg/actuation HFAA Take 2 Puffs by inhalation two (2) times a day.  dexAMETHasone (Decadron) 4 mg tablet 1 tablet twice a day        Labs:    Recent Labs     01/21/22  0755 01/19/22  0936   WBC 14.2* 8.5   HGB 12.5 12.0   * 281     Recent Labs     01/21/22  0755 01/19/22  0936    142   K 3.2* 3.3*   * 111*   CO2 25 27   * 95   BUN 29* 19   CREA 1.14* 0.57   CA 9.8 9.5   LAC 3.7*  --    ALB 2.8* 2.9*   * 91*     No results for input(s): PH, PCO2, PO2, HCO3, FIO2 in the last 72 hours. No results for input(s): CPK, CKNDX, TROIQ in the last 72 hours. No lab exists for component: CPKMB  No results found for: BNPP, BNP   Lab Results   Component Value Date/Time    Culture result: No growth 1 day 01/18/2022 07:12 AM    Culture result: (A) 01/17/2022 07:12 AM     Staphylococcus species, coagulase negative growing in 1 of 4 bottles drawn NO SITE INDICATED    Culture result:  01/17/2022 07:12 AM     (NOTE) [facility] IN [facility] GROWING IN 1 OF 2 BOTTLES CALLED TO JOSE MIGUEL PAGAN AT 0831 ON 1/19/22.  JF     No results found for: TSH, TSHEXT     Imaging:    Results from Hospital Encounter encounter on 01/21/22    XR CHEST PORT    Narrative  Study: XR CHEST PORT    Clinical indication: unresponsive    Comparison: Chest x-ray 1/17/2022    Impression  Findings/impression:    Diffuse interstitial airspace disease/edema. No definite pleural effusion or  pneumothorax. Cardiac contours are partially obscured. No acute osseous abnormality identified. Results from [place] encounter on 01/17/22    CT HEAD WO CONT    Narrative  CT head. Axial images are reviewed along with reformatted sagittal/coronal images. Dose reduction: All CT scans at this facility are performed using dose reduction  optimization techniques as appropriate to a performed exam including the  following-  automated exposure control, adjustments of mA and/or Kv according to patient  size, or use of iterative reconstructive technique. .    Bone windows demonstrate normal aeration of the imaged sinuses and mastoid air  cells. Review of intracranial content reveals patchy low density through  periventricular/subcortical white matter. Evidence for nonacute end vessel  occlusive change. Central cerebral arteriosclerosis. Falx based calcification No  hydrocephalus. No intracranial hemorrhage. Impression  Periventricular/subcortical white matter gliosis. Evidence for  nonacute end vessel occlusive change. Central cerebral arteriosclerosis. No intracranial hemorrhage. This care involved high complexity decision making which includes independently reviewing the patient's past medical records, current laboratory results, medication profiles that were immediately available to me and actual Xray images at the bedside in order to assess, support vital system function, and to treat this degree of vital organ system failure, and to prevent further life threatening deterioration of the patients condition. I was in direct communication with the nursing staff throughout this time.     Medical Decision Making Today  · Reviewed the flowsheet and previous days notes  · Reviewed and summarized records or history from previous days note or discussions with staff, family  · Parenteral controlled substances - Reviewed/ Adjusted / Molly Scarlett / Started  · High Risk Drug therapy requiring intensive monitoring for toxicity: eg steroids, pressors, antibiotics  · Review and order of Clinical lab tests  · Review and Order of Radiology tests  · Review and Order of Medicine tests  · Independent visualization of radiologic Images  · Reviewed Ventilator / NiPPV  · I have personally reviewed the patients ECG / Telemetry  · Diagnostic endoscopies with identified risk factors    My assessment/management discussed with: Consultants, Nursing, Pharmacy, Case Management, PT, OT, Respiratory Therapy, Hospitalist and Family for coordination of care    I have provided total of 55 minutes of critical care time rendering care exclusive of any procedures. During this entire length of time the patient's condition was unstable, unpredictable and critically ill in the CCU/ ICU. I was immediately available to the patient whose care required several interactions with nursing, multidisciplinary team members leading to multiple interventions with fluid resuscitation and medication adjustments to optimize respiratory support, hemodynamic treatment, medication changes based on repeat labs results, reviews, exams and assessments. The reason for providing this level of medical care was due to a critical illness that impaired one or more vital organ systems, such that there was a high probability of sudden or life threatening deterioration in the patient's condition.

## 2022-01-21 NOTE — ED TRIAGE NOTES
Pt arrives to the ED via EMS from home with c/o AMS. Per EMS pt was going in SVT and hypoxic. Pt was recently d/c from [facility] today, COVID+. Per EMS on arrival pt received 2mg versed in route    Pt arrives 70% on NRB.  Pt minimally responsive with spontaneous eye opening    John De Paz MD at bedside      RT at bedside

## 2022-01-21 NOTE — CONSULTS
Interventional Radiology Post Procedure Note    1/21/2022    Indications: ICU status needing CVC    Procedure(s): US-guided bedside CVC placement    Preliminary Findings (full report to follow): Patent right IJV    Contrast: None    Specimen: None    Implants: See above    Estimated blood loss: Minimal    Complications: None    Plan: Catheter functional; waiting for chest XR to confirm catheter position     Follow Up: PRN

## 2022-01-22 NOTE — ROUTINE PROCESS
TRANSFER - OUT REPORT:    Verbal report given to Marina RN Bedside(name) on Carlton Mak  being transferred to [facility](unit) for routine progression of care       Report consisted of patients Situation, Background, Assessment and   Recommendations(SBAR). Information from the following report(s) SBAR, ED Summary, Intake/Output, MAR, Recent Results and Cardiac Rhythm Sinus with PVC was reviewed with the receiving nurse. Lines:   Triple Lumen 01/21/22 Anterior;Right Internal jugular (Active)       Peripheral IV 01/21/22 Anterior;Right Hand (Active)       Peripheral IV 01/21/22 Right Wrist (Active)       Peripheral IV 01/21/22 Left Antecubital (Active)   Site Assessment Clean, dry, & intact 01/21/22 0803   Phlebitis Assessment 0 01/21/22 0803   Infiltration Assessment 0 01/21/22 0803   Dressing Status Clean, dry, & intact 01/21/22 0803   Dressing Type Transparent;Tape 01/21/22 0803   Hub Color/Line Status Pink 01/21/22 0803       Intraosseous Line 01/21/22 Left;Humerus (Active)   Site Assessment Clean, dry, & intact 01/21/22 0238   Dressing Status Clean, dry, & intact 01/21/22 0238        Opportunity for questions and clarification was provided.       Patient transported with:   Monitor  O2 @ BiPAP liters  Registered Nurse  Tech

## 2022-01-22 NOTE — CONSULTS
IMPRESSION:   1. Acute hypoxic respiratory  2. COVID-19 pneumonia  3. Sepsis with septic shock  4. Arthritis hypertension by hx  5. Hypokalemia  6. Additional workup outlined below  7. Pt is at high risk of sudden decline and decompensation with life threatening consequenses and continued end organ dysfunction and failure  8. Pt is critically ill. Time spent with pt and staff actively rendering care, managing pt and coordinating care as stated below; 30 minutes, exclusive of any procedures      RECOMMENDATIONS/PLAN:   1. ICU monitoring  1. Non Invasive Ventilator for mechanical life support and prevent respiratory arrest with protective lung strategies BIPAP for non invasive ventilatory life support to avoid worsening respiratory acidosis, hypercacarbic or hypoxic respiratory arrest we will get arterial blood gases and change BiPAP settings blood gases still shows hypoxia she is on 70% of rate of 12 500 tidal volume  2. Patient is on dexamethasone and baricitinib  3. Patient is off Levophed  4. Replace potassium  5. Troponin is around 6000  6. Lactic acid 3.7  7. She is DNR  8. Chest x-ray shows diffuse bilateral infiltrate with congestive changes  9. She is on meropenem and Zithromax  10. CVP monitoring hemodynamically getting better off Levophed  11. IV vasopressors for circulatory shock refractory to fluids to maintain SBP> 90  12. Transfuse prn to maintain Hgb > 7  13. Labs to follow electrolytes, renal function and and blood counts  14. Bronchial hygiene with respiratory therapy techniques, bronchodilators  15. Pt needs IV fluids with additives and Drug therapy requiring intensive monitoring for toxicity  16. Prescription drug management with home med reconciliation reviewed  17. DVT, SUP prophylaxis  18.  Will be available to assist in medical management while in the CCU pending disposition     [x] High complexity decision making was performed  [x] See my orders for details  HPI  68-year-old lady came in because of shortness of breath and dyspnea she has significant past medical history of arthritis hypertension she was recently discharged from the hospital came back with worsening of hypoxia she was discharged on oxygen 2 L nasal cannula and patient condition got worse she was supposed to be discharged to skilled care but patient's son took her home where her condition got worse and she was transferred back to the hospital now she is on noninvasive ventilator BiPAP machine hemodynamically unstable hypotensive on vasopressors and not giving much history acutely ill so critical care consult was called  PMH:  has no past medical history on file. PSH:   has a past surgical history that includes ir insert non tunl cvc over 5 yrs (1/21/2022). FHX: family history is not on file.      SHX:      ALL:   Allergies   Allergen Reactions    Penicillins Hives        MEDS:   [x] Reviewed - As Below   [] Not reviewed    Current Facility-Administered Medications   Medication    NOREPINephrine (LEVOPHED) 8 mg in 0.9% NS 250ml infusion    budesonide-formoteroL (SYMBICORT) 160-4.5 mcg/actuation HFA inhaler 2 Puff    0.9% sodium chloride infusion    acetaminophen (TYLENOL) tablet 650 mg    Or    acetaminophen (TYLENOL) suppository 650 mg    polyethylene glycol (MIRALAX) packet 17 g    ondansetron (ZOFRAN ODT) tablet 4 mg    enoxaparin (LOVENOX) injection 40 mg    baricitinib (OLUMIANT) tablet 2 mg    albuterol (PROVENTIL HFA, VENTOLIN HFA, PROAIR HFA) inhaler 2 Puff    polyethylene glycol (MIRALAX) packet 17 g    ondansetron (ZOFRAN) injection 4 mg    meropenem (MERREM) 1 g in 0.9% sodium chloride 20 mL IV syringe    dexamethasone (DECADRON) 4 mg/mL injection 4 mg    azithromycin (ZITHROMAX) 500 mg in 0.9% sodium chloride 250 mL (VIAL-MATE)    hydrOXYzine (VISTARIL) injection 25 mg      MAR reviewed and pertinent medications noted or modified as needed   Current Facility-Administered Medications   Medication    NOREPINephrine (LEVOPHED) 8 mg in 0.9% NS 250ml infusion    budesonide-formoteroL (SYMBICORT) 160-4.5 mcg/actuation HFA inhaler 2 Puff    0.9% sodium chloride infusion    acetaminophen (TYLENOL) tablet 650 mg    Or    acetaminophen (TYLENOL) suppository 650 mg    polyethylene glycol (MIRALAX) packet 17 g    ondansetron (ZOFRAN ODT) tablet 4 mg    enoxaparin (LOVENOX) injection 40 mg    baricitinib (OLUMIANT) tablet 2 mg    albuterol (PROVENTIL HFA, VENTOLIN HFA, PROAIR HFA) inhaler 2 Puff    polyethylene glycol (MIRALAX) packet 17 g    ondansetron (ZOFRAN) injection 4 mg    meropenem (MERREM) 1 g in 0.9% sodium chloride 20 mL IV syringe    dexamethasone (DECADRON) 4 mg/mL injection 4 mg    azithromycin (ZITHROMAX) 500 mg in 0.9% sodium chloride 250 mL (VIAL-MATE)    hydrOXYzine (VISTARIL) injection 25 mg      PMH:  has no past medical history on file. PSH:   has a past surgical history that includes ir insert non tunl cvc over 5 yrs (2022). FHX: family history is not on file. SHX:       ROS:  Unable to obtain    Hemodynamics:    CO:    CI:    CVP:    SVR:   PAP Systolic:    PAP Diastolic:    PVR:    FE06:        Ventilator Settings:      Mode Rate TV Press PEEP FiO2 PIP Min. Vent               100 %     21.1 l/min        Vital Signs: Telemetry:    normal sinus rhythm Intake/Output:   Visit Vitals  /79   Pulse 90   Temp 98 °F (36.7 °C)   Resp (!) 35   Ht 5' 5\" (1.651 m)   Wt 90.7 kg (200 lb)   SpO2 98%   BMI 33.28 kg/m²       Temp (24hrs), Av.3 °F (36.8 °C), Min:97.9 °F (36.6 °C), Max:98.7 °F (37.1 °C)        O2 Device: BIPAP         Wt Readings from Last 4 Encounters:   22 90.7 kg (200 lb)   22 90.7 kg (200 lb)          Intake/Output Summary (Last 24 hours) at 2022 0902  Last data filed at 2022 0630  Gross per 24 hour   Intake    Output 1300 ml   Net -1300 ml       Last shift:      No intake/output data recorded.   Last 3 shifts:  1901 -  0700  In: - Out: 1300 [Urine:1300]       Physical Exam:     General: BIPAP/NIV;  HEENT: NCAT, poor dentition, lips and mucosa dry  Eyes: anicteric; conjunctiva clear  Neck: no nodes, , trach midline; no accessory MM use. Chest: no deformity,   Cardiac: R regular; no murmur;   Lungs: distant breath sounds; no wheezes  Abd: soft, NT, hypoactive BS  Ext: no edema; no joint swelling;  No clubbing  : NO [place], clear urine  Neuro: \"alert\"  Psych- + agitation,   Skin: warm, dry, no cyanosis;   Pulses: 1-2+ Bilateral pedal, radial  Capillary: brisk; pale      DATA:    MAR reviewed and pertinent medications noted or modified as needed  MEDS:   Current Facility-Administered Medications   Medication    NOREPINephrine (LEVOPHED) 8 mg in 0.9% NS 250ml infusion    budesonide-formoteroL (SYMBICORT) 160-4.5 mcg/actuation HFA inhaler 2 Puff    0.9% sodium chloride infusion    acetaminophen (TYLENOL) tablet 650 mg    Or    acetaminophen (TYLENOL) suppository 650 mg    polyethylene glycol (MIRALAX) packet 17 g    ondansetron (ZOFRAN ODT) tablet 4 mg    enoxaparin (LOVENOX) injection 40 mg    baricitinib (OLUMIANT) tablet 2 mg    albuterol (PROVENTIL HFA, VENTOLIN HFA, PROAIR HFA) inhaler 2 Puff    polyethylene glycol (MIRALAX) packet 17 g    ondansetron (ZOFRAN) injection 4 mg    meropenem (MERREM) 1 g in 0.9% sodium chloride 20 mL IV syringe    dexamethasone (DECADRON) 4 mg/mL injection 4 mg    azithromycin (ZITHROMAX) 500 mg in 0.9% sodium chloride 250 mL (VIAL-MATE)    hydrOXYzine (VISTARIL) injection 25 mg        Labs:    Recent Labs     01/22/22  0300 01/21/22  0755 01/19/22  0936   WBC 11.1* 14.2* 8.5   HGB 11.5 12.5 12.0    408* 281     Recent Labs     01/22/22  0300 01/21/22  0755 01/19/22  0936   * 145 142   K 3.3* 3.2* 3.3*   * 112* 111*   CO2 27 25 27   * 142* 95   BUN 27* 29* 19   CREA 0.63 1.14* 0.57   CA 9.4 9.8 9.5   LAC  --  3.7*  --    ALB 2.4* 2.8* 2.9*   * 142* 91*     Recent Labs     01/22/22  0311 01/21/22  1130   PH 7.44 7.43   PCO2 37 37   PO2 59* 67*   HCO3 25 24   FIO2 70.0 90     No results for input(s): CPK, CKNDX, TROIQ in the last 72 hours. No lab exists for component: CPKMB  No results found for: BNPP, BNP   Lab Results   Component Value Date/Time    Culture result: No growth 3 days 01/18/2022 07:12 AM    Culture result: (A) 01/17/2022 07:12 AM     Staphylococcus species, coagulase negative growing in 1 of 4 bottles drawn NO SITE INDICATED    Culture result:  01/17/2022 07:12 AM     (NOTE) [facility] IN [facility] GROWING IN 1 OF 2 BOTTLES CALLED TO JOSE MIGUEL PAGAN AT 0831 ON 1/19/22. JF     No results found for: TSH, TSHEXT, TSHEXT     Imaging:    Results from Hospital Encounter encounter on 01/21/22    XR CHEST PORT    Narrative  1 view 9910    Right IJ line with tip in lower SVC    Impression  Patchy mixed asymmetric lung disease appears somewhat better but the  lungs are better inflated. No effusion or pneumothorax. Normal heart and  mediastinum      Results from Hospital Encounter encounter on 01/21/22    CT HEAD WO CONT    Narrative  The study is a noncontrasted head CT examination dated 1/21/2022. HISTORY: Unresponsive. TECHNIQUE: Thin section axial imaging was performed followed by sagittal and  coronal reconstructed imaging. Dose Reduction Technique was employed to reduce radiation exposure - This  includes reduction optimization techniques as appropriate to a performed exam  with automated exposure control adjustments of the mA and/or Kv according to  patient size, or use of iterative reconstruction technique. COMPARISON: CT head dated 1/17/2022. There is an appropriate size to the ventricles, the deep cortical sulci, and the  sylvian fissures for the patient's age. This examination is negative for  intracranial hemorrhage, mass effect or an extra axial collection. The  gray-white matter junctions are preserved without cytotoxic or vasogenic edema.     An assessment of the white matter tracts demonstrates a mild decrease in the  density characteristics of the central periventricular white matter. These  findings are consistent with expected aging changes and milder microvascular  disease. There also is a region of diminished density within the right posterior  inferior cerebellar hemisphere which may represent an older cerebrovascular  insult. ORBITS: This examination is negative for acute orbital pathology. PARANASAL SINUSES: The paranasal sinuses are well pneumatized without acute  sinus disease. There is a decreased number of right sided mastoid air cells which can be  associated with chronic mastoiditis. The craniocervical junction images in a  normal fashion. Impression  1. There are milder microvascular changes within the central periventricular  white matter. 2.  There is an area of diminished density within the inferior aspect of the  right cerebellar hemisphere without change (series 201 image number 12). These  findings may represent an older ischemic insult within the right posterior  inferior cerebellar region. 3.  This examination is negative for acute intracranial pathology or short-term  interval changes dating back to 1/17/2022. This care involved high complexity decision making which includes independently reviewing the patient's past medical records, current laboratory results, medication profiles that were immediately available to me and actual Xray images at the bedside in order to assess, support vital system function, and to treat this degree of vital organ system failure, and to prevent further life threatening deterioration of the patients condition. I was in direct communication with the nursing staff throughout this time.     Medical Decision Making Today  · Reviewed the flowsheet and previous days notes  · Reviewed and summarized records or history from previous days note or discussions with staff, family  · Parenteral controlled substances - Reviewed/ Adjusted / Weaned / Started  · High Risk Drug therapy requiring intensive monitoring for toxicity: eg steroids, pressors, antibiotics  · Review and order of Clinical lab tests  · Review and Order of Radiology tests  · Review and Order of Medicine tests  · Independent visualization of radiologic Images  · Reviewed Ventilator / NiPPV  · I have personally reviewed the patients ECG / Telemetry  · Diagnostic endoscopies with identified risk factors

## 2022-01-22 NOTE — PROGRESS NOTES
General Daily Progress Note          Patient Name:   Richar Stewart       YOB: 1940       Age:  80 y.o.       Admit Date: 1/21/2022      Subjective:     Patient is a 80y.o. year old female with signal past medical history of hypertension arthritis who discharged from the hospital yesterday in stable condition patient is admitted last admission with COVID-19 patient was asymptomatic all this time while in the hospital patient discharged on 2 L oxygen to skilled care but patient son want to go home with home health but is at home patient's saturations drops sent back to the ER seen by the ER physician patient placed on BiPAP patient was little hypotensive started on Levophed admitted for further work-up and treatment       Patient awake on BiPAP      Objective:     Visit Vitals  /79   Pulse 90   Temp 98 °F (36.7 °C)   Resp (!) 35   Ht 5' 5\" (1.651 m)   Wt 90.7 kg (200 lb)   SpO2 100%   BMI 33.28 kg/m²        Recent Results (from the past 24 hour(s))   BLOOD GAS, ARTERIAL    Collection Time: 01/21/22 11:30 AM   Result Value Ref Range    pH 7.43 7.35 - 7.45      PCO2 37 35 - 45 mmHg    PO2 67 (L) 75 - 100 mmHg    O2 SAT 93 (L) >95 %    BICARBONATE 24 22 - 26 mmol/L    BASE EXCESS 0.5 0 - 2 mmol/L    O2 METHOD BiPAP      FIO2 90 %    Tidal volume 500      SET RATE 15      EPAP/CPAP/PEEP 8      Sample source BiPAP      SITE Right Radial      BEN'S TEST Positive     METABOLIC PANEL, COMPREHENSIVE    Collection Time: 01/22/22  3:00 AM   Result Value Ref Range    Sodium 147 (H) 136 - 145 mmol/L    Potassium 3.3 (L) 3.5 - 5.1 mmol/L    Chloride 116 (H) 97 - 108 mmol/L    CO2 27 21 - 32 mmol/L    Anion gap 4 (L) 5 - 15 mmol/L    Glucose 160 (H) 65 - 100 mg/dL    BUN 27 (H) 6 - 20 mg/dL    Creatinine 0.63 0.55 - 1.02 mg/dL    BUN/Creatinine ratio 43 (H) 12 - 20      GFR est AA >60 >60 ml/min/1.73m2    GFR est non-AA >60 >60 ml/min/1.73m2    Calcium 9.4 8.5 - 10.1 mg/dL    Bilirubin, total 0.7 0.2 - 1.0 mg/dL    AST (SGOT) 181 (H) 15 - 37 U/L    ALT (SGPT) 111 (H) 12 - 78 U/L    Alk. phosphatase 47 45 - 117 U/L    Protein, total 7.4 6.4 - 8.2 g/dL    Albumin 2.4 (L) 3.5 - 5.0 g/dL    Globulin 5.0 (H) 2.0 - 4.0 g/dL    A-G Ratio 0.5 (L) 1.1 - 2.2     CBC WITH AUTOMATED DIFF    Collection Time: 01/22/22  3:00 AM   Result Value Ref Range    WBC 11.1 (H) 3.6 - 11.0 K/uL    RBC 3.85 3.80 - 5.20 M/uL    HGB 11.5 11.5 - 16.0 g/dL    HCT 36.0 35.0 - 47.0 %    MCV 93.5 80.0 - 99.0 FL    MCH 29.9 26.0 - 34.0 PG    MCHC 31.9 30.0 - 36.5 g/dL    RDW 13.9 11.5 - 14.5 %    PLATELET 738 053 - 352 K/uL    MPV 11.2 8.9 - 12.9 FL    NRBC 0.0 0.0  WBC    ABSOLUTE NRBC 0.00 0.00 - 0.01 K/uL    NEUTROPHILS 90 (H) 32 - 75 %    LYMPHOCYTES 6 (L) 12 - 49 %    MONOCYTES 3 (L) 5 - 13 %    EOSINOPHILS 0 0 - 7 %    BASOPHILS 0 0 - 1 %    IMMATURE GRANULOCYTES 1 (H) 0 - 0.5 %    ABS. NEUTROPHILS 10.1 (H) 1.8 - 8.0 K/UL    ABS. LYMPHOCYTES 0.7 (L) 0.8 - 3.5 K/UL    ABS. MONOCYTES 0.3 0.0 - 1.0 K/UL    ABS. EOSINOPHILS 0.0 0.0 - 0.4 K/UL    ABS. BASOPHILS 0.0 0.0 - 0.1 K/UL    ABS. IMM. GRANS. 0.1 (H) 0.00 - 0.04 K/UL    DF AUTOMATED     BLOOD GAS, ARTERIAL    Collection Time: 01/22/22  3:11 AM   Result Value Ref Range    pH 7.44 7.35 - 7.45      PCO2 37 35 - 45 mmHg    PO2 59 (L) 75 - 100 mmHg    O2 SAT 91 (L) >95 %    BICARBONATE 25 22 - 26 mmol/L    BASE EXCESS 1.1 0 - 2 mmol/L    O2 METHOD Non-invasive ventilation      FIO2 70.0 %    Tidal volume 500      SET RATE 12      EPAP/CPAP/PEEP 8.0      SITE Right Radial      BEN'S TEST PASS       [unfilled]      Review of Systems    Unable to obtain. Physical Exam:      Constitutional: Awake on BiPAP  HENT:   Head: Normocephalic and atraumatic. Eyes: Pupils are equal, round, and reactive to light. EOM are normal.   Cardiovascular: Normal rate, regular rhythm and normal heart sounds. Pulmonary/Chest: Breath sounds normal. No wheezes. No rales. Exhibits no tenderness. Abdominal: Soft. Bowel sounds are normal. There is no abdominal tenderness. There is no rebound and no guarding. Musculoskeletal: Normal range of motion. Neurological: pt is alert and oriented to person, place, and time. XR CHEST PORT   Final Result   Moderate diffuse bilateral airspace opacities, likely a combination of edema and   airspace disease, mildly increased from the previous exam.      CT HEAD WO CONT   Final Result   1. There are milder microvascular changes within the central periventricular   white matter. 2.  There is an area of diminished density within the inferior aspect of the   right cerebellar hemisphere without change (series 201 image number 12). These   findings may represent an older ischemic insult within the right posterior   inferior cerebellar region. 3.  This examination is negative for acute intracranial pathology or short-term   interval changes dating back to 1/17/2022. XR CHEST PORT   Final Result   Patchy mixed asymmetric lung disease appears somewhat better but the   lungs are better inflated. No effusion or pneumothorax. Normal heart and   mediastinum      IR INSERT NON TUNL CVC OVER 5 YRS   Final Result   Ultrasound of the right neck demonstrated patent IJV. Successful US-guided placement of a right internal jugular triple lumen central   venous catheter as described above. The catheter is functioning. PLAN:   Catheter position was confirmed by follow-up chest x-ray: catheter tip in the   lower SVC and ready to use. IR US GUIDED VASCULAR ACCESS   Final Result   Ultrasound of the right neck demonstrated patent IJV. Successful US-guided placement of a right internal jugular triple lumen central   venous catheter as described above. The catheter is functioning. PLAN:   Catheter position was confirmed by follow-up chest x-ray: catheter tip in the   lower SVC and ready to use.       XR CHEST PORT   Final Result Findings/impression:      Diffuse interstitial airspace disease/edema. No definite pleural effusion or   pneumothorax. Cardiac contours are partially obscured. No acute osseous abnormality identified. XR CHEST PORT    (Results Pending)        Recent Results (from the past 24 hour(s))   BLOOD GAS, ARTERIAL    Collection Time: 01/21/22 11:30 AM   Result Value Ref Range    pH 7.43 7.35 - 7.45      PCO2 37 35 - 45 mmHg    PO2 67 (L) 75 - 100 mmHg    O2 SAT 93 (L) >95 %    BICARBONATE 24 22 - 26 mmol/L    BASE EXCESS 0.5 0 - 2 mmol/L    O2 METHOD BiPAP      FIO2 90 %    Tidal volume 500      SET RATE 15      EPAP/CPAP/PEEP 8      Sample source BiPAP      SITE Right Radial      BEN'S TEST Positive     METABOLIC PANEL, COMPREHENSIVE    Collection Time: 01/22/22  3:00 AM   Result Value Ref Range    Sodium 147 (H) 136 - 145 mmol/L    Potassium 3.3 (L) 3.5 - 5.1 mmol/L    Chloride 116 (H) 97 - 108 mmol/L    CO2 27 21 - 32 mmol/L    Anion gap 4 (L) 5 - 15 mmol/L    Glucose 160 (H) 65 - 100 mg/dL    BUN 27 (H) 6 - 20 mg/dL    Creatinine 0.63 0.55 - 1.02 mg/dL    BUN/Creatinine ratio 43 (H) 12 - 20      GFR est AA >60 >60 ml/min/1.73m2    GFR est non-AA >60 >60 ml/min/1.73m2    Calcium 9.4 8.5 - 10.1 mg/dL    Bilirubin, total 0.7 0.2 - 1.0 mg/dL    AST (SGOT) 181 (H) 15 - 37 U/L    ALT (SGPT) 111 (H) 12 - 78 U/L    Alk.  phosphatase 47 45 - 117 U/L    Protein, total 7.4 6.4 - 8.2 g/dL    Albumin 2.4 (L) 3.5 - 5.0 g/dL    Globulin 5.0 (H) 2.0 - 4.0 g/dL    A-G Ratio 0.5 (L) 1.1 - 2.2     CBC WITH AUTOMATED DIFF    Collection Time: 01/22/22  3:00 AM   Result Value Ref Range    WBC 11.1 (H) 3.6 - 11.0 K/uL    RBC 3.85 3.80 - 5.20 M/uL    HGB 11.5 11.5 - 16.0 g/dL    HCT 36.0 35.0 - 47.0 %    MCV 93.5 80.0 - 99.0 FL    MCH 29.9 26.0 - 34.0 PG    MCHC 31.9 30.0 - 36.5 g/dL    RDW 13.9 11.5 - 14.5 %    PLATELET 577 486 - 482 K/uL    MPV 11.2 8.9 - 12.9 FL    NRBC 0.0 0.0  WBC    ABSOLUTE NRBC 0.00 0.00 - 0.01 K/uL    NEUTROPHILS 90 (H) 32 - 75 %    LYMPHOCYTES 6 (L) 12 - 49 %    MONOCYTES 3 (L) 5 - 13 %    EOSINOPHILS 0 0 - 7 %    BASOPHILS 0 0 - 1 %    IMMATURE GRANULOCYTES 1 (H) 0 - 0.5 %    ABS. NEUTROPHILS 10.1 (H) 1.8 - 8.0 K/UL    ABS. LYMPHOCYTES 0.7 (L) 0.8 - 3.5 K/UL    ABS. MONOCYTES 0.3 0.0 - 1.0 K/UL    ABS. EOSINOPHILS 0.0 0.0 - 0.4 K/UL    ABS. BASOPHILS 0.0 0.0 - 0.1 K/UL    ABS. IMM.  GRANS. 0.1 (H) 0.00 - 0.04 K/UL    DF AUTOMATED     BLOOD GAS, ARTERIAL    Collection Time: 01/22/22  3:11 AM   Result Value Ref Range    pH 7.44 7.35 - 7.45      PCO2 37 35 - 45 mmHg    PO2 59 (L) 75 - 100 mmHg    O2 SAT 91 (L) >95 %    BICARBONATE 25 22 - 26 mmol/L    BASE EXCESS 1.1 0 - 2 mmol/L    O2 METHOD Non-invasive ventilation      FIO2 70.0 %    Tidal volume 500      SET RATE 12      EPAP/CPAP/PEEP 8.0      SITE Right Radial      BEN'S TEST PASS         Results     Procedure Component Value Units Date/Time    MRSA SCREEN - PCR (NASAL) [[id number]] Collected: 01/22/22 0440    Order Status: No result Specimen: Swab     MRSA SCREEN - PCR (NASAL) [[id number]] Collected: 01/22/22 0300    Order Status: Canceled Specimen: Swab     CULTURE, BLOOD #1 [[id number]]     Order Status: Sent Specimen: Blood     CULTURE, BLOOD #2 [[id number]]     Order Status: Sent Specimen: Blood     CULTURE, BLOOD, PAIRED [[id number]] Collected: 01/21/22 0755    Order Status: Completed Specimen: Blood Updated: 01/21/22 0804    CULTURE, BLOOD #1 [[id number]] Collected: 01/18/22 3044    Order Status: Completed Specimen: Blood Updated: 01/22/22 0825     Special Requests: No Special Requests        Culture result: No growth 3 days       COVID-19 RAPID TEST [[id number]]  (Abnormal) Collected: 01/17/22 1136    Order Status: Completed Specimen: Nasopharyngeal Updated: 01/17/22 1201     Specimen source       Please find results under separate order           COVID-19 rapid test DETECTED        Comment: Rapid [employer] ID Now   The specimen is POSITIVE for SARS-CoV-2, the novel coronavirus associated with COVID-19. This test has been authorized by the FDA under an Emergency Use Authorization (EUA) for use by authorized laboratories. Fact sheet for Healthcare Providers: ConventionUpdate.co.nz Fact sheet for Patients: ConventionUpdate.co.nz   Methodology: Isothermal Nucleic Acid Amplification Results verified, phoned to and read back by Lucretia Millan, 12:00   1/17/22 LBO         INFLUENZA A & B AG (RAPID TEST) [[id number]] Collected: 01/17/22 1136    Order Status: Completed Specimen: Nasopharyngeal from Nasal washing Updated: 01/17/22 1202     Influenza A Antigen Negative        Influenza B Antigen Negative       CULTURE, BLOOD #2 [[id number]]  (Abnormal) Collected: 01/17/22 0712    Order Status: Completed Specimen: Blood Updated: 01/20/22 0946     Special Requests: No Special Requests        Culture result:       Staphylococcus species, coagulase negative growing in 1 of 4 bottles drawn NO SITE INDICATED            (NOTE) [facility] GROWING IN 1 OF 2 BOTTLES CALLED TO [address] [place] [address] AT 0831 ON 1/19/22. JF           Labs:     Recent Labs     01/22/22  0300 01/21/22  0755   WBC 11.1* 14.2*   HGB 11.5 12.5   HCT 36.0 39.1    408*     Recent Labs     01/22/22  0300 01/21/22  0755   * 145   K 3.3* 3.2*   * 112*   CO2 27 25   BUN 27* 29*   CREA 0.63 1.14*   * 142*   CA 9.4 9.8     Recent Labs     01/22/22  0300 01/21/22  0755   * 142*   AP 47 46   TBILI 0.7 0.6   TP 7.4 8.0   ALB 2.4* 2.8*   GLOB 5.0* 5.2*     No results for input(s): INR, PTP, APTT, INREXT in the last 72 hours. No results for input(s): FE, TIBC, PSAT, FERR in the last 72 hours. No results found for: FOL, RBCF   Recent Labs     01/22/22  0311 01/21/22  1130   PH 7.44 7.43   PCO2 37 37   PO2 59* 67*     No results for input(s): CPK, CKNDX, TROIQ in the last 72 hours.     No lab exists for component: CPKMB  No results found for: CHOL, CHOLX, CHLST, CHOLV, HDL, HDLP, LDL, LDLC, DLDLP, TGLX, TRIGL, TRIGP, CHHD, CHHDX  Lab Results   Component Value Date/Time    Glucose (POC) 241 (H) 01/21/2022 02:45 AM     Lab Results   Component Value Date/Time    Color Yellow/Straw 01/17/2022 10:30 AM    Appearance Turbid (A) 01/17/2022 10:30 AM    Specific gravity 1.014 01/17/2022 10:30 AM    pH (UA) 7.0 01/17/2022 10:30 AM    Protein 100 (A) 01/17/2022 10:30 AM    Glucose Negative 01/17/2022 10:30 AM    Ketone 20 (A) 01/17/2022 10:30 AM    Bilirubin Negative 01/17/2022 10:30 AM    Urobilinogen 0.1 01/17/2022 10:30 AM    Nitrites Negative 01/17/2022 10:30 AM    Leukocyte Esterase Negative 01/17/2022 10:30 AM    Bacteria Negative 01/17/2022 10:30 AM    WBC 0-4 01/17/2022 10:30 AM    RBC 0-5 01/17/2022 10:30 AM         Assessment:     Acute hypoxemic respiratory failure on BiPAP 100%   COVID-19 pneumonitis   hypotension     Hypertension  Arthritis  Hypokalemia      Plan:       Continue Zithromax 500 g IV daily IV meropenem 1 g every 8 hours Decadron 4 mg every 6 hours  Olumiant 2 mg daily    Ordered NG tube placement start NG tube feeding  Replace potassium    Monitor electrolyte      Current Facility-Administered Medications:     potassium chloride 10 mEq in 100 ml IVPB, 10 mEq, IntraVENous, Q1H, Karan Flower MD, Last Rate: 100 mL/hr at 01/22/22 1025, 10 mEq at 01/22/22 1025    dexmedeTOMidine (PRECEDEX) 400 mcg in 0.9% sodium chloride (MBP/ADV) 100 mL MBP, 0.1-1.5 mcg/kg/hr, IntraVENous, TITRATE, Karan Flower MD, Last Rate: 6.8 mL/hr at 01/22/22 1054, 0.3 mcg/kg/hr at 01/22/22 1054    NOREPINephrine (LEVOPHED) 8 mg in 0.9% NS 250ml infusion, 0.5-16 mcg/min, IntraVENous, TITRATE, Jorge Lopez MD, Stopped at 01/21/22 2300    budesonide-formoteroL (SYMBICORT) 160-4.5 mcg/actuation HFA inhaler 2 Puff, 2 Puff, Inhalation, BID, Polo Lopez MD    0.9% sodium chloride infusion, 75 mL/hr, IntraVENous, CONTINUOUS, Jorge Lopez MD, Last Rate: 75 mL/hr at 01/21/22 2339, 75 mL/hr at 01/21/22 2339    acetaminophen (TYLENOL) tablet 650 mg, 650 mg, Oral, Q6H PRN **OR** acetaminophen (TYLENOL) suppository 650 mg, 650 mg, Rectal, Q6H PRN, Thania Lopez MD    polyethylene glycol (MIRALAX) packet 17 g, 17 g, Oral, DAILY PRN, Thania Lopez MD    ondansetron (ZOFRAN ODT) tablet 4 mg, 4 mg, Oral, Q8H PRN **OR** [DISCONTINUED] ondansetron (ZOFRAN) injection 4 mg, 4 mg, IntraVENous, Q6H PRN, Polo Lopez MD    enoxaparin (LOVENOX) injection 40 mg, 40 mg, SubCUTAneous, DAILY, Polo Lopez MD, 40 mg at 01/22/22 1016    baricitinib (OLUMIANT) tablet 2 mg, 2 mg, Oral, DAILY, Thania Lopez MD    albuterol (PROVENTIL HFA, VENTOLIN HFA, PROAIR HFA) inhaler 2 Puff, 2 Puff, Inhalation, Q6H PRN, Thania Lopez MD    polyethylene glycol (MIRALAX) packet 17 g, 17 g, Oral, DAILY PRN, Thania Lopez MD    [DISCONTINUED] ondansetron (ZOFRAN ODT) tablet 4 mg, 4 mg, Oral, Q8H PRN **OR** ondansetron (ZOFRAN) injection 4 mg, 4 mg, IntraVENous, Q6H PRN, Polo Lopez MD    meropenem (MERREM) 1 g in 0.9% sodium chloride 20 mL IV syringe, 1 g, IntraVENous, Q8H, Polo Lopez MD, 1 g at 01/22/22 1017    dexamethasone (DECADRON) 4 mg/mL injection 4 mg, 4 mg, IntraVENous, Q6H, Polo Lopez MD, 4 mg at 01/22/22 0503    azithromycin (ZITHROMAX) 500 mg in 0.9% sodium chloride 250 mL (VIAL-MATE), 500 mg, IntraVENous, Q24H, Polo Lopez MD, Last Rate: 250 mL/hr at 01/22/22 1022, 500 mg at 01/22/22 1022    hydrOXYzine (VISTARIL) injection 25 mg, 25 mg, IntraMUSCular, Q6H PRN, Polo Lopez MD, 25 mg at 01/22/22 0111

## 2022-01-22 NOTE — PROGRESS NOTES
Per order, attempted to start placement of NG insertion. Unsuccessful, d/t patient immediately desat in the 70's in less than 2 seconds. Patient is uncooperative; refuses to open mouth. Notififed Dr. Gian Villeda. Stated to hold on NG placement at this time. Continuing with POC.

## 2022-01-22 NOTE — ED PROVIDER NOTES
EMERGENCY DEPARTMENT HISTORY AND PHYSICAL EXAM      Date: 1/21/2022  Patient Name: Marcus Villanueva      History of Presenting Illness     Chief Complaint   Patient presents with    Respiratory Distress       History Provided By: Patient's Son and EMS    HPI: Marcus Villanueva, 80 y.o. female with a past medical history significant hypertension and COVID presents to the ED with cc of altered mental status and respiratory distress. According to EMS reports, patient was recently discharged from the hospital on 1/21/2022 after treatment for COVID. EMS was initially called by patient's son to assist in getting patient into the house, however when EMS arrived they suggested patient be transported back to the hospital for evaluation. EMS noted episodes of SVT during transport. Upon arrival to the ED, patient is altered and minimally responsive to verbal stimuli. SPO2 of 70% on nonrebreather. Per patient's son who is her DPOA, patient is DNR/DNI/no cardioversion/defibrillation. There are no other complaints, changes, or physical findings at this time.     PCP: Olimpia Cunningham MD    Current Facility-Administered Medications   Medication Dose Route Frequency Provider Last Rate Last Admin    NOREPINephrine (LEVOPHED) 8 mg in 0.9% NS 250ml infusion  0.5-16 mcg/min IntraVENous TITRATE Polo Lopez MD 5.6 mL/hr at 01/21/22 1545 3 mcg/min at 01/21/22 1545    budesonide-formoteroL (SYMBICORT) 160-4.5 mcg/actuation HFA inhaler 2 Puff  2 Puff Inhalation BID Polo Lopez MD        0.9% sodium chloride infusion  75 mL/hr IntraVENous CONTINUOUS Polo Lopez MD 75 mL/hr at 01/21/22 1031 75 mL/hr at 01/21/22 1031    acetaminophen (TYLENOL) tablet 650 mg  650 mg Oral Q6H PRN Polo Lopez MD        Or   Crum acetaminophen (TYLENOL) suppository 650 mg  650 mg Rectal Q6H PRN Cindy Lopez MD        polyethylene glycol (MIRALAX) packet 17 g  17 g Oral DAILY PRN Cindy Lopez MD        ondansetron [facility]) tablet 4 mg  4 mg Oral Q8H PRN Brayan Lopez MD        enoxaparin (LOVENOX) injection 40 mg  40 mg SubCUTAneous DAILY Brayan Lopez MD   40 mg at 01/21/22 1030    baricitinib (OLUMIANT) tablet 2 mg  2 mg Oral DAILY Brayan Lopez MD        albuterol (PROVENTIL HFA, VENTOLIN HFA, PROAIR HFA) inhaler 2 Puff  2 Puff Inhalation Q6H PRN Brayan Lopez MD        polyethylene glycol (MIRALAX) packet 17 g  17 g Oral DAILY PRN Brayan Lopez MD        ondansetron [facility]) injection 4 mg  4 mg IntraVENous Q6H PRN Brayan Lopez MD        meropenem (MERREM) 1 g in 0.9% sodium chloride 20 mL IV syringe  1 g IntraVENous Q8H Polo Lopez MD   1 g at 01/21/22 1029    dexamethasone (DECADRON) 4 mg/mL injection 4 mg  4 mg IntraVENous Q6H Polo Lopez MD   4 mg at 01/21/22 1510    azithromycin (ZITHROMAX) 500 mg in 0.9% sodium chloride 250 mL (VIAL-MATE)  500 mg IntraVENous Q24H Polo Lopez  mL/hr at 01/21/22 1030 500 mg at 01/21/22 1030       Past History     Past Medical History:  No past medical history on file. Past Surgical History:  Past Surgical History:   Procedure Laterality Date    IR INSERT NON TUNL CVC OVER 5 YRS  1/21/2022       Family History:  No family history on file. Social History:  Social History     Tobacco Use    Smoking status: Not on file    Smokeless tobacco: Not on file   Substance Use Topics    Alcohol use: Not on file    Drug use: Not on file       Allergies: Allergies   Allergen Reactions    Penicillins Hives         Review of Systems     Review of Systems   Unable to perform ROS: Severe respiratory distress       Physical Exam     Physical Exam  Constitutional:       General: She is in acute distress. Appearance: Normal appearance. She is ill-appearing and toxic-appearing. HENT:      Head: Normocephalic and atraumatic.       Right Ear: External ear normal.      Left Ear: External ear normal.      Nose: Nose normal. Mouth/Throat:      Mouth: Mucous membranes are dry. Eyes:      Extraocular Movements: Extraocular movements intact. Conjunctiva/sclera: Conjunctivae normal.   Cardiovascular:      Rate and Rhythm: Regular rhythm. Tachycardia present. Pulses: Normal pulses. Heart sounds: Normal heart sounds. Pulmonary:      Effort: Tachypnea, accessory muscle usage and respiratory distress present. Breath sounds: Decreased breath sounds present. Abdominal:      General: Abdomen is flat. There is no distension. Palpations: Abdomen is soft. Tenderness: There is no abdominal tenderness. Musculoskeletal:         General: Normal range of motion. Cervical back: Normal range of motion. Skin:     General: Skin is warm and dry. Capillary Refill: Capillary refill takes 2 to 3 seconds. Neurological:      Mental Status: She is alert. She is disoriented. GCS: GCS eye subscore is 3. GCS verbal subscore is 3. GCS motor subscore is 5. Lab and Diagnostic Study Results     Labs -     Recent Results (from the past 12 hour(s))   CBC WITH AUTOMATED DIFF    Collection Time: 01/21/22  7:55 AM   Result Value Ref Range    WBC 14.2 (H) 3.6 - 11.0 K/uL    RBC 4.21 3.80 - 5.20 M/uL    HGB 12.5 11.5 - 16.0 g/dL    HCT 39.1 35.0 - 47.0 %    MCV 92.9 80.0 - 99.0 FL    MCH 29.7 26.0 - 34.0 PG    MCHC 32.0 30.0 - 36.5 g/dL    RDW 13.8 11.5 - 14.5 %    PLATELET 389 (H) 015 - 400 K/uL    MPV 11.1 8.9 - 12.9 FL    NRBC 0.0 0.0  WBC    ABSOLUTE NRBC 0.00 0.00 - 0.01 K/uL    NEUTROPHILS 84 (H) 32 - 75 %    LYMPHOCYTES 9 (L) 12 - 49 %    MONOCYTES 6 5 - 13 %    EOSINOPHILS 0 0 - 7 %    BASOPHILS 0 0 - 1 %    IMMATURE GRANULOCYTES 1 (H) 0 - 0.5 %    ABS. NEUTROPHILS 12.0 (H) 1.8 - 8.0 K/UL    ABS. LYMPHOCYTES 1.2 0.8 - 3.5 K/UL    ABS. MONOCYTES 0.8 0.0 - 1.0 K/UL    ABS. EOSINOPHILS 0.0 0.0 - 0.4 K/UL    ABS. BASOPHILS 0.0 0.0 - 0.1 K/UL    ABS. IMM.  GRANS. 0.1 (H) 0.00 - 0.04 K/UL    DF AUTOMATED METABOLIC PANEL, COMPREHENSIVE    Collection Time: 01/21/22  7:55 AM   Result Value Ref Range    Sodium 145 136 - 145 mmol/L    Potassium 3.2 (L) 3.5 - 5.1 mmol/L    Chloride 112 (H) 97 - 108 mmol/L    CO2 25 21 - 32 mmol/L    Anion gap 8 5 - 15 mmol/L    Glucose 142 (H) 65 - 100 mg/dL    BUN 29 (H) 6 - 20 mg/dL    Creatinine 1.14 (H) 0.55 - 1.02 mg/dL    BUN/Creatinine ratio 25 (H) 12 - 20      GFR est AA 56 (L) >60 ml/min/1.73m2    GFR est non-AA 46 (L) >60 ml/min/1.73m2    Calcium 9.8 8.5 - 10.1 mg/dL    Bilirubin, total 0.6 0.2 - 1.0 mg/dL    AST (SGOT) 258 (H) 15 - 37 U/L    ALT (SGPT) 142 (H) 12 - 78 U/L    Alk. phosphatase 46 45 - 117 U/L    Protein, total 8.0 6.4 - 8.2 g/dL    Albumin 2.8 (L) 3.5 - 5.0 g/dL    Globulin 5.2 (H) 2.0 - 4.0 g/dL    A-G Ratio 0.5 (L) 1.1 - 2.2     TROPONIN-HIGH SENSITIVITY    Collection Time: 01/21/22  7:55 AM   Result Value Ref Range    Troponin-High Sensitivity 5,686 (HH) 0 - 51 ng/L   LACTIC ACID    Collection Time: 01/21/22  7:55 AM   Result Value Ref Range    Lactic acid 3.7 (HH) 0.4 - 2.0 mmol/L   BLOOD GAS, ARTERIAL    Collection Time: 01/21/22 11:30 AM   Result Value Ref Range    pH 7.43 7.35 - 7.45      PCO2 37 35 - 45 mmHg    PO2 67 (L) 75 - 100 mmHg    O2 SAT 93 (L) >95 %    BICARBONATE 24 22 - 26 mmol/L    BASE EXCESS 0.5 0 - 2 mmol/L    O2 METHOD BiPAP      FIO2 90 %    Tidal volume 500      SET RATE 15      EPAP/CPAP/PEEP 8      Sample source BiPAP      SITE Right Radial      BEN'S TEST Positive         Radiologic Studies -   [unfilled]  CT Results  (Last 48 hours)               01/21/22 1704  CT HEAD WO CONT Final result    Impression:  1. There are milder microvascular changes within the central periventricular   white matter. 2.  There is an area of diminished density within the inferior aspect of the   right cerebellar hemisphere without change (series 201 image number 12).  These   findings may represent an older ischemic insult within the right posterior   inferior cerebellar region. 3.  This examination is negative for acute intracranial pathology or short-term   interval changes dating back to 1/17/2022. Narrative: The study is a noncontrasted head CT examination dated 1/21/2022. HISTORY: Unresponsive. TECHNIQUE: Thin section axial imaging was performed followed by sagittal and   coronal reconstructed imaging. Dose Reduction Technique was employed to reduce radiation exposure - This   includes reduction optimization techniques as appropriate to a performed exam   with automated exposure control adjustments of the mA and/or Kv according to   patient size, or use of iterative reconstruction technique. COMPARISON: CT head dated 1/17/2022. There is an appropriate size to the ventricles, the deep cortical sulci, and the   sylvian fissures for the patient's age. This examination is negative for   intracranial hemorrhage, mass effect or an extra axial collection. The   gray-white matter junctions are preserved without cytotoxic or vasogenic edema. An assessment of the white matter tracts demonstrates a mild decrease in the   density characteristics of the central periventricular white matter. These   findings are consistent with expected aging changes and milder microvascular   disease. There also is a region of diminished density within the right posterior   inferior cerebellar hemisphere which may represent an older cerebrovascular   insult. ORBITS: This examination is negative for acute orbital pathology. PARANASAL SINUSES: The paranasal sinuses are well pneumatized without acute   sinus disease. There is a decreased number of right sided mastoid air cells which can be   associated with chronic mastoiditis. The craniocervical junction images in a   normal fashion.                CXR Results  (Last 48 hours)               01/21/22 1500  XR CHEST PORT Final result    Impression: Patchy mixed asymmetric lung disease appears somewhat better but the   lungs are better inflated. No effusion or pneumothorax. Normal heart and   mediastinum       Narrative:  1 view 1451       Right IJ line with tip in lower SVC           01/21/22 0258  XR CHEST PORT Final result    Impression:  Findings/impression:       Diffuse interstitial airspace disease/edema. No definite pleural effusion or   pneumothorax. Cardiac contours are partially obscured. No acute osseous abnormality identified. Narrative:  Study: XR CHEST PORT       Clinical indication: unresponsive       Comparison: Chest x-ray 1/17/2022                 Medical Decision Making and ED Course   - I am the first and primary provider for this patient AND AM THE PRIMARY PROVIDER OF RECORD. - I reviewed the vital signs, available nursing notes, past medical history, past surgical history, family history and social history. - Initial assessment performed. The patients presenting problems have been discussed, and the staff are in agreement with the care plan formulated and outlined with them. I have encouraged them to ask questions as they arise throughout their visit. Vital Signs-Reviewed the patient's vital signs. Patient Vitals for the past 12 hrs:   Temp Pulse Resp BP SpO2   01/21/22 1544 97.9 °F (36.6 °C) (!) 101 25 (!) 143/66 96 %   01/21/22 1036  95 25 (!) 138/90 92 %   01/21/22 1014  95 (!) 35 120/70 97 %       Records Reviewed: Nursing Notes and Old Medical Records    The patient presents with altered mental status with a differential diagnosis of  cerebral hemorrhage, encephalopathy, hepatic encephalopathy, hypernatremia, hyponatremia, hypoxia, ICB, UTI and volume depletion    ED Course:       ED Course as of 01/21/22 1915 Fri Jan 21, 2022   0603 Spoke with patient's son, patient's Jayne Bran he states that he does not want patient to be intubated, received chest compressions or defibrillation/cardioversion. Patient's blood pressure noted to be low. I am unable to reach patient's son to ask about the desires of a central line. We will continue to attempt calling and start Levophed via peripheral line at this point in time. [RS]   0381 I attempted to call patient's son again with no answer. [RS]      ED Course User Index  [RS] Joe Rogers DO         Provider Notes (Medical Decision Making):   81-year-old female with history of hypertension presents to the ED for evaluation of altered mental status and hypoxia. Patient was recently discharged from the hospital for admission due to COVID pneumonia. Per reports, patient was discharged home with 2 L nasal cannula. Upon arrival to the ED, patient noted to be altered. She is tachycardic with rates into the 140s. She is hypoxic on nonrebreather with SPO2 in the 70s and tachypneic with respiratory rates into the 40s with guppy breathing. Patient is DNR/DNI per patient's son, who is DPOA. He is not ready to make the patient comfort care at this time. Patient with improvement of her mentation and subsequently placed on BiPAP with improvement of her oxygenation as well as decrease in her respiratory rate. Chest x-ray shows bilateral interstitial pulmonary infiltrates consistent with COVID-pneumonia. Patient with apparent SVT versus A. fib. Given borderline blood pressure, 6 mg of adenosine was given without change. Repeat EKG shows atrial fibrillation. With improvement of patient's oxygenation, patient apparently with return to sinus tachycardia. Patient did develop gilberto hypotension while in the ED. Levophed started peripherally with improvement of her blood pressure. As I am unable to contact patient's son at this time, will run peripherally.     Blood work pending at time of signout to Dr. Kareem Siddiqui for admission to the hospital.  MDM           Consultations:       Consultations: -  Hospitalist Consultant: Dr. Kareem Siddiqui: We have asked for emergent assistance with regard to this patient. We have discussed the patients HPI, ROS, PE and results this far. They will come and evaluate the patient for admission. Procedures and Critical Care       Performed by: Cynthia Do DO  PROCEDURES:  Procedures         CRITICAL CARE NOTE :  7:15 PM  Amount of Critical Care Time: 48(minutes)    IMPENDING DETERIORATION -Respiratory and Cardiovascular  ASSOCIATED RISK FACTORS - Hypotension, Hypoxia and Dysrhythmia  MANAGEMENT- Bedside Assessment  INTERPRETATION -  Xrays, ECG and Blood Pressure  INTERVENTIONS - hemodynamic mngmt and noninvasive vent mngmt  CASE REVIEW - Hospitalist/Intensivist, Nursing and Family  TREATMENT RESPONSE -Stable  PERFORMED BY - Self    NOTES   :  I have spent critical care time involved in lab review, consultations with specialist, family decision- making, bedside attention and documentation. This time excludes time spent in any separate billed procedures. During this entire length of time I was immediately available to the patient . Cynthia Do DO        Disposition     Disposition: Admitted to ICU Medical ICU the case was discussed with the admitting physician Jessica    Admitted      Diagnosis     Clinical Impression:   1. Acute respiratory failure with hypoxia ([address]    2. Pneumonia due to COVID-19 virus        Attestations:    Cyntiha Do DO    Please note that this dictation was completed with [employer], the computer voice recognition software. Quite often unanticipated grammatical, syntax, homophones, and other interpretive errors are inadvertently transcribed by the computer software. Please disregard these errors. Please excuse any errors that have escaped final proofreading. Thank you.

## 2022-01-23 PROBLEM — E87.0 HYPERNATREMIA: Status: ACTIVE | Noted: 2022-01-01

## 2022-01-23 PROBLEM — E87.6 HYPOKALEMIA: Status: ACTIVE | Noted: 2022-01-01

## 2022-01-23 NOTE — ANESTHESIA PROCEDURE NOTES
Emergent Intubation  Performed by: Maron Aschoff, CRNA  Authorized by: Maron Aschoff, CRNA     Emergent Intubation:   Location:  ICU  Date/Time:  1/23/2022 12:00 PM  Indications:  Respiratory failure  Spontaneous Ventilation: present    Level of Consciousness: sedated  Preoxygenated:  Yes      Airway Documentation:   Airway:  ETT - Cuffed  Technique:  Direct laryngoscopy  Blade Type:  Ashok  Blade Size:  4  ETT Line Gage:  Lips  ETT Insertion depth (cm):  24  Placement verified by: auscultation, EtCO2 and BBS    Attempts:  2  Difficult airway: No    Anterior view

## 2022-01-23 NOTE — PROGRESS NOTES
IMPRESSION:   1. Acute hypoxic respiratory  2. COVID-19 pneumonia  3. Sepsis with septic shock  4. Arthritis hypertension by hx  5. Hypokalemia  6. Hypernatremia  7. Additional workup outlined below  8. Pt is at high risk of sudden decline and decompensation with life threatening consequenses and continued end organ dysfunction and failure  9. Pt is critically ill. Time spent with pt and staff actively rendering care, managing pt and coordinating care as stated below; 30 minutes, exclusive of any procedures      RECOMMENDATIONS/PLAN:   1. ICU monitoring  1. Non Invasive Ventilator for mechanical life support and prevent respiratory arrest with protective lung strategies BIPAP for non invasive ventilatory life support to avoid worsening respiratory acidosis, hypercacarbic or hypoxic respiratory arrest she is on AVAPS settings blood gases still shows hypoxia she is on 100% of rate of 12, 500 tidal volume  2. Patient is on dexamethasone and baricitinib  3. Patient is off Levophed  4. Replace potassium  5. Start on IV fluid D5W nephrology consult  6. Troponin is around 6000 will get cardiology consult  7. Lactic acid 3.7  8. She is DNR  9. Chest x-ray shows diffuse bilateral infiltrate with congestive changes  10. She is on meropenem and Zithromax  11. CVP monitoring hemodynamically getting better off Levophed  12. IV vasopressors for circulatory shock refractory to fluids to maintain SBP> 90  13. Transfuse prn to maintain Hgb > 7  14. Labs to follow electrolytes, renal function and and blood counts  15. Bronchial hygiene with respiratory therapy techniques, bronchodilators  16. Pt needs IV fluids with additives and Drug therapy requiring intensive monitoring for toxicity  17. Prescription drug management with home med reconciliation reviewed  18. DVT, SUP prophylaxis  19.  Will be available to assist in medical management while in the CCU pending disposition     [x] High complexity decision making was performed  [x] See my orders for details  HPI  79-year-old lady came in because of shortness of breath and dyspnea she has significant past medical history of arthritis hypertension she was recently discharged from the hospital came back with worsening of hypoxia she was discharged on oxygen 2 L nasal cannula and patient condition got worse she was supposed to be discharged to skilled care but patient's son took her home where her condition got worse and she was transferred back to the hospital now she is on noninvasive ventilator BiPAP machine hemodynamically unstable hypotensive on vasopressors and not giving much history acutely ill so critical care consult was called  PMH:  has no past medical history on file. PSH:   has a past surgical history that includes ir insert non tunl cvc over 5 yrs (1/21/2022). FHX: family history is not on file.      SHX:      ALL:   Allergies   Allergen Reactions    Penicillins Hives        MEDS:   [x] Reviewed - As Below   [] Not reviewed    Current Facility-Administered Medications   Medication    dexmedeTOMidine (PRECEDEX) 400 mcg in 0.9% sodium chloride (MBP/ADV) 100 mL MBP    NOREPINephrine (LEVOPHED) 8 mg in 0.9% NS 250ml infusion    budesonide-formoteroL (SYMBICORT) 160-4.5 mcg/actuation HFA inhaler 2 Puff    0.9% sodium chloride infusion    acetaminophen (TYLENOL) tablet 650 mg    Or    acetaminophen (TYLENOL) suppository 650 mg    polyethylene glycol (MIRALAX) packet 17 g    ondansetron (ZOFRAN ODT) tablet 4 mg    enoxaparin (LOVENOX) injection 40 mg    baricitinib (OLUMIANT) tablet 2 mg    albuterol (PROVENTIL HFA, VENTOLIN HFA, PROAIR HFA) inhaler 2 Puff    polyethylene glycol (MIRALAX) packet 17 g    ondansetron (ZOFRAN) injection 4 mg    meropenem (MERREM) 1 g in 0.9% sodium chloride 20 mL IV syringe    dexamethasone (DECADRON) 4 mg/mL injection 4 mg    azithromycin (ZITHROMAX) 500 mg in 0.9% sodium chloride 250 mL (VIAL-MATE)    hydrOXYzine (VISTARIL) injection 25 mg      MAR reviewed and pertinent medications noted or modified as needed   Current Facility-Administered Medications   Medication    dexmedeTOMidine (PRECEDEX) 400 mcg in 0.9% sodium chloride (MBP/ADV) 100 mL MBP    NOREPINephrine (LEVOPHED) 8 mg in 0.9% NS 250ml infusion    budesonide-formoteroL (SYMBICORT) 160-4.5 mcg/actuation HFA inhaler 2 Puff    0.9% sodium chloride infusion    acetaminophen (TYLENOL) tablet 650 mg    Or    acetaminophen (TYLENOL) suppository 650 mg    polyethylene glycol (MIRALAX) packet 17 g    ondansetron (ZOFRAN ODT) tablet 4 mg    enoxaparin (LOVENOX) injection 40 mg    baricitinib (OLUMIANT) tablet 2 mg    albuterol (PROVENTIL HFA, VENTOLIN HFA, PROAIR HFA) inhaler 2 Puff    polyethylene glycol (MIRALAX) packet 17 g    ondansetron (ZOFRAN) injection 4 mg    meropenem (MERREM) 1 g in 0.9% sodium chloride 20 mL IV syringe    dexamethasone (DECADRON) 4 mg/mL injection 4 mg    azithromycin (ZITHROMAX) 500 mg in 0.9% sodium chloride 250 mL (VIAL-MATE)    hydrOXYzine (VISTARIL) injection 25 mg      PMH:  has no past medical history on file. PSH:   has a past surgical history that includes ir insert non tunl cvc over 5 yrs (2022). FHX: family history is not on file. SHX:       ROS:  Unable to obtain    Hemodynamics:    CO:    CI:    CVP:    SVR:   PAP Systolic:    PAP Diastolic:    PVR:    SB85:        Ventilator Settings:      Mode Rate TV Press PEEP FiO2 PIP Min.  Vent               100 %     22 l/min        Vital Signs: Telemetry:    normal sinus rhythm Intake/Output:   Visit Vitals  /71 (BP 1 Location: Right lower arm, BP Patient Position: At rest)   Pulse (!) 57   Temp 97.7 °F (36.5 °C)   Resp (!) 36   Ht 5' 5\" (1.651 m)   Wt 90.7 kg (200 lb)   SpO2 92%   BMI 33.28 kg/m²       Temp (24hrs), Av.8 °F (36.6 °C), Min:97.4 °F (36.3 °C), Max:98.5 °F (36.9 °C)        O2 Device: BIPAP         Wt Readings from Last 4 Encounters:   22 90.7 kg (200 lb) 01/17/22 90.7 kg (200 lb)          Intake/Output Summary (Last 24 hours) at 1/23/2022 0858  Last data filed at 1/22/2022 1930  Gross per 24 hour   Intake    Output 700 ml   Net -700 ml       Last shift:      No intake/output data recorded. Last 3 shifts: 01/21 1901 - 01/23 0700  In: -   Out: 2000 [Urine:2000]       Physical Exam:     General: BIPAP/NIV;  HEENT: NCAT, poor dentition, lips and mucosa dry  Eyes: anicteric; conjunctiva clear  Neck: no nodes, , trach midline; no accessory MM use. Chest: no deformity,   Cardiac: R regular; no murmur;   Lungs: distant breath sounds; no wheezes  Abd: soft, NT, hypoactive BS  Ext: no edema; no joint swelling;  No clubbing  : NO [place], clear urine  Neuro: \"alert\"  Psych- + agitation,   Skin: warm, dry, no cyanosis;   Pulses: 1-2+ Bilateral pedal, radial  Capillary: brisk; pale      DATA:    MAR reviewed and pertinent medications noted or modified as needed  MEDS:   Current Facility-Administered Medications   Medication    dexmedeTOMidine (PRECEDEX) 400 mcg in 0.9% sodium chloride (MBP/ADV) 100 mL MBP    NOREPINephrine (LEVOPHED) 8 mg in 0.9% NS 250ml infusion    budesonide-formoteroL (SYMBICORT) 160-4.5 mcg/actuation HFA inhaler 2 Puff    0.9% sodium chloride infusion    acetaminophen (TYLENOL) tablet 650 mg    Or    acetaminophen (TYLENOL) suppository 650 mg    polyethylene glycol (MIRALAX) packet 17 g    ondansetron (ZOFRAN ODT) tablet 4 mg    enoxaparin (LOVENOX) injection 40 mg    baricitinib (OLUMIANT) tablet 2 mg    albuterol (PROVENTIL HFA, VENTOLIN HFA, PROAIR HFA) inhaler 2 Puff    polyethylene glycol (MIRALAX) packet 17 g    ondansetron (ZOFRAN) injection 4 mg    meropenem (MERREM) 1 g in 0.9% sodium chloride 20 mL IV syringe    dexamethasone (DECADRON) 4 mg/mL injection 4 mg    azithromycin (ZITHROMAX) 500 mg in 0.9% sodium chloride 250 mL (VIAL-MATE)    hydrOXYzine (VISTARIL) injection 25 mg        Labs:    Recent Labs     01/23/22  0569 01/22/22  0300 01/21/22  0755   WBC 10.6 11.1* 14.2*   HGB 10.6* 11.5 12.5    335 408*     Recent Labs     01/23/22  0541 01/22/22  0300 01/21/22  0755   * 147* 145   K 3.4* 3.3* 3.2*   * 116* 112*   CO2 24 27 25   * 160* 142*   BUN 41* 27* 29*   CREA 0.71 0.63 1.14*   CA 9.3 9.4 9.8   LAC  --   --  3.7*   ALB 2.2* 2.4* 2.8*   ALT 85* 111* 142*     Recent Labs     01/23/22  0509 01/22/22  0311 01/21/22  1130   PH 7.39 7.44 7.43   PCO2 42 37 37   PO2 68* 59* 67*   HCO3 24 25 24   FIO2 100.0 70.0 90     No results for input(s): CPK, CKNDX, TROIQ in the last 72 hours. No lab exists for component: CPKMB  No results found for: BNPP, BNP   Lab Results   Component Value Date/Time    Culture result: No growth 3 days 01/18/2022 07:12 AM    Culture result: (A) 01/17/2022 07:12 AM     Staphylococcus species, coagulase negative growing in 1 of 4 bottles drawn NO SITE INDICATED    Culture result:  01/17/2022 07:12 AM     (NOTE) [facility] IN [facility] GROWING IN 1 OF 2 BOTTLES CALLED TO JOSE MIGUEL PAGAN AT 0831 ON 1/19/22.      No results found for: TSH, TSHEXT, TSHEXT     Imaging:    Results from Hospital Encounter encounter on 01/21/22    XR CHEST PORT    Narrative  1 view 9118    Right IJ line with tip in lower SVC    Impression  Patchy mixed asymmetric lung disease appears somewhat better but the  lungs are better inflated. No effusion or pneumothorax. Normal heart and  mediastinum      Results from Hospital Encounter encounter on 01/21/22    CT HEAD WO CONT    Narrative  The study is a noncontrasted head CT examination dated 1/21/2022. HISTORY: Unresponsive. TECHNIQUE: Thin section axial imaging was performed followed by sagittal and  coronal reconstructed imaging.     Dose Reduction Technique was employed to reduce radiation exposure - This  includes reduction optimization techniques as appropriate to a performed exam  with automated exposure control adjustments of the mA and/or Kv according to  patient size, or use of iterative reconstruction technique. COMPARISON: CT head dated 1/17/2022. There is an appropriate size to the ventricles, the deep cortical sulci, and the  sylvian fissures for the patient's age. This examination is negative for  intracranial hemorrhage, mass effect or an extra axial collection. The  gray-white matter junctions are preserved without cytotoxic or vasogenic edema. An assessment of the white matter tracts demonstrates a mild decrease in the  density characteristics of the central periventricular white matter. These  findings are consistent with expected aging changes and milder microvascular  disease. There also is a region of diminished density within the right posterior  inferior cerebellar hemisphere which may represent an older cerebrovascular  insult. ORBITS: This examination is negative for acute orbital pathology. PARANASAL SINUSES: The paranasal sinuses are well pneumatized without acute  sinus disease. There is a decreased number of right sided mastoid air cells which can be  associated with chronic mastoiditis. The craniocervical junction images in a  normal fashion. Impression  1. There are milder microvascular changes within the central periventricular  white matter. 2.  There is an area of diminished density within the inferior aspect of the  right cerebellar hemisphere without change (series 201 image number 12). These  findings may represent an older ischemic insult within the right posterior  inferior cerebellar region. 3.  This examination is negative for acute intracranial pathology or short-term  interval changes dating back to 1/17/2022.       This care involved high complexity decision making which includes independently reviewing the patient's past medical records, current laboratory results, medication profiles that were immediately available to me and actual Xray images at the bedside in order to assess, support vital system function, and to treat this degree of vital organ system failure, and to prevent further life threatening deterioration of the patients condition. I was in direct communication with the nursing staff throughout this time.     Medical Decision Making Today  · Reviewed the flowsheet and previous days notes  · Reviewed and summarized records or history from previous days note or discussions with staff, family  · Parenteral controlled substances - Reviewed/ Adjusted / Michelle Brooms / Started  · High Risk Drug therapy requiring intensive monitoring for toxicity: eg steroids, pressors, antibiotics  · Review and order of Clinical lab tests  · Review and Order of Radiology tests  · Review and Order of Medicine tests  · Independent visualization of radiologic Images  · Reviewed Ventilator / NiPPV  · I have personally reviewed the patients ECG / Telemetry  · Diagnostic endoscopies with identified risk factors

## 2022-01-23 NOTE — ASSESSMENT & PLAN NOTE
- Nephrology consultation initially obtained on account of hyponatremia and hypokalemia  -After initial correction, patient was noted to have further worsening of serum sodium now up to [address] [facility] patient's maintenance fluids from normal saline at 50 cc an hour, to D5W at 50 cc an hour  Continued monitoring for serum sodium level of daily labs

## 2022-01-23 NOTE — CONSULTS
Consult Date: 1/23/2022    IP CONSULT TO NEPHROLOGY  Consult performed by: Blanquita Hernandez MD  Consult ordered by: Georgia Chatman MD          Subjective    Ms. Neftali Dutton is a 73-year-old female with past medical history that is significant for osteoarthritis, hypertension, and recent hospitalization for hypoxic respiratory failure, discharged to skilled nursing care, and brought back for symptoms of worsening shortness of breath on 1/17. Debora Stevenson Patient is started on noninvasive ventilation, noted to be hypotensive. Patient has been started on vasopressors. Patient mental status is suboptimal and she is not able to give significant history. She is currently being treated with baricitinib, meropenem, azithromycin, while on Levophed gtt. Nephrology consultation is now obtained on account of hyponatremia and hypokalemia    During the hospital course her serum sodium has worsened from 139 on 1/17-1 50 on 1/23. Concurrently her serum potassium is also low with values close to 3.2-3.4. Patient's current drips of Precedex and Levophed are based on normal saline. Her most recent chest x-ray shows moderate diffuse bilateral airspace opacities likely a combination of edema and airspace disease. Other notable lab abnormalities include elevated BUN at 41, normal serum creatinine, glucose of 222. No past medical history on file. Past Surgical History:   Procedure Laterality Date    IR INSERT NON TUNL CVC OVER 5 YRS  1/21/2022     No family history on file.    Social History     Tobacco Use    Smoking status: Not on file    Smokeless tobacco: Not on file   Substance Use Topics    Alcohol use: Not on file       Current Facility-Administered Medications   Medication Dose Route Frequency Provider Last Rate Last Admin    dexmedeTOMidine (PRECEDEX) 400 mcg in 0.9% sodium chloride (MBP/ADV) 100 mL MBP  0.1-1.5 mcg/kg/hr IntraVENous TITRATE Karan Flower MD 11.3 mL/hr at 01/23/22 0420 0.5 mcg/kg/hr at 01/23/22 0420    NOREPINephrine (LEVOPHED) 8 mg in 0.9% NS 250ml infusion  0.5-16 mcg/min IntraVENous TITRATE Alma Lopez MD   Stopped at 01/21/22 2300    budesonide-formoteroL (SYMBICORT) 160-4.5 mcg/actuation HFA inhaler 2 Puff  2 Puff Inhalation BID Alma Lopez MD        acetaminophen (TYLENOL) tablet 650 mg  650 mg Oral Q6H PRN Alma Lopez MD        Or   [facility] acetaminophen (TYLENOL) suppository 650 mg  650 mg Rectal Q6H PRN Alma Lopez MD        polyethylene glycol (MIRALAX) packet 17 g  17 g Oral DAILY PRN Alma Lopez MD        ondansetron (ZOFRAN ODT) tablet 4 mg  4 mg Oral Q8H PRN Alma Lopez MD        enoxaparin (LOVENOX) injection 40 mg  40 mg SubCUTAneous DAILY Polo Lopez MD   40 mg at 01/22/22 1016    baricitinib (OLUMIANT) tablet 2 mg  2 mg Oral DAILY Alma Lopez MD        albuterol (PROVENTIL HFA, VENTOLIN HFA, PROAIR HFA) inhaler 2 Puff  2 Puff Inhalation Q6H PRN Alma Lopez MD        polyethylene glycol (MIRALAX) packet 17 g  17 g Oral DAILY PRN Alma Lopez MD        ondansetron [facility]) injection 4 mg  4 mg IntraVENous Q6H PRN Alma Lopez MD        meropenem (MERREM) 1 g in 0.9% sodium chloride 20 mL IV syringe  1 g IntraVENous Q8H Polo Lopez MD   1 g at 01/23/22 0036    dexamethasone (DECADRON) 4 mg/mL injection 4 mg  4 mg IntraVENous Q6H Polo Lopez MD   4 mg at 01/23/22 0537    azithromycin (ZITHROMAX) 500 mg in 0.9% sodium chloride 250 mL (VIAL-MATE)  500 mg IntraVENous Q24H Polo Lopez  mL/hr at 01/22/22 1022 500 mg at 01/22/22 1022    hydrOXYzine (VISTARIL) injection 25 mg  25 mg IntraMUSCular Q6H PRN Polo Lopez MD   25 mg at 01/22/22 0111        Review of Systems   Unable to perform ROS: Acuity of condition       Objective     Vital signs for last 24 hours:  Visit Vitals  /71 (BP 1 Location: Right lower arm, BP Patient Position: At rest)   Pulse (!) 57   Temp 97.7 °F (36.5 °C) Resp (!) 36   Ht 5' 5\" (1.651 m)   Wt 90.7 kg (200 lb)   SpO2 92%   BMI 33.28 kg/m²       Intake/Output this shift:  Current Shift: No intake/output data recorded. Last 3 Shifts: 01/21 1901 - 01/23 0700  In: -   Out: 2000 [Urine:2000]    Data Review:   Recent Results (from the past 24 hour(s))   BLOOD GAS, ARTERIAL    Collection Time: 01/23/22  5:09 AM   Result Value Ref Range    pH 7.39 7.35 - 7.45      PCO2 42 35 - 45 mmHg    PO2 68 (L) 75 - 100 mmHg    O2 SAT 93 (L) >95 %    BICARBONATE 24 22 - 26 mmol/L    BASE EXCESS 0.1 0 - 2 mmol/L    O2 METHOD Non-invasive ventilation      FIO2 100.0 %    MODE AVAPS      Tidal volume 500      SET RATE 12      EPAP/CPAP/PEEP 8.0      SITE Right Radial      BEN'S TEST PASS     CBC WITH AUTOMATED DIFF    Collection Time: 01/23/22  5:41 AM   Result Value Ref Range    WBC 10.6 3.6 - 11.0 K/uL    RBC 3.53 (L) 3.80 - 5.20 M/uL    HGB 10.6 (L) 11.5 - 16.0 g/dL    HCT 33.8 (L) 35.0 - 47.0 %    MCV 95.8 80.0 - 99.0 FL    MCH 30.0 26.0 - 34.0 PG    MCHC 31.4 30.0 - 36.5 g/dL    RDW 14.2 11.5 - 14.5 %    PLATELET 722 302 - 607 K/uL    MPV 11.1 8.9 - 12.9 FL    NRBC 0.0 0.0  WBC    ABSOLUTE NRBC 0.00 0.00 - 0.01 K/uL    NEUTROPHILS 90 (H) 32 - 75 %    LYMPHOCYTES 5 (L) 12 - 49 %    MONOCYTES 4 (L) 5 - 13 %    EOSINOPHILS 0 0 - 7 %    BASOPHILS 0 0 - 1 %    IMMATURE GRANULOCYTES 1 (H) 0 - 0.5 %    ABS. NEUTROPHILS 9.6 (H) 1.8 - 8.0 K/UL    ABS. LYMPHOCYTES 0.5 (L) 0.8 - 3.5 K/UL    ABS. MONOCYTES 0.4 0.0 - 1.0 K/UL    ABS. EOSINOPHILS 0.0 0.0 - 0.4 K/UL    ABS. BASOPHILS 0.0 0.0 - 0.1 K/UL    ABS. IMM.  GRANS. 0.1 (H) 0.00 - 0.04 K/UL    DF AUTOMATED     METABOLIC PANEL, COMPREHENSIVE    Collection Time: 01/23/22  5:41 AM   Result Value Ref Range    Sodium 150 (H) 136 - 145 mmol/L    Potassium 3.4 (L) 3.5 - 5.1 mmol/L    Chloride 119 (H) 97 - 108 mmol/L    CO2 24 21 - 32 mmol/L    Anion gap 7 5 - 15 mmol/L    Glucose 222 (H) 65 - 100 mg/dL    BUN 41 (H) 6 - 20 mg/dL Creatinine 0.71 0.55 - 1.02 mg/dL    BUN/Creatinine ratio 58 (H) 12 - 20      GFR est AA >60 >60 ml/min/1.73m2    GFR est non-AA >60 >60 ml/min/1.73m2    Calcium 9.3 8.5 - 10.1 mg/dL    Bilirubin, total 0.4 0.2 - 1.0 mg/dL    AST (SGOT) 96 (H) 15 - 37 U/L    ALT (SGPT) 85 (H) 12 - 78 U/L    Alk. phosphatase 59 45 - 117 U/L    Protein, total 6.8 6.4 - 8.2 g/dL    Albumin 2.2 (L) 3.5 - 5.0 g/dL    Globulin 4.6 (H) 2.0 - 4.0 g/dL    A-G Ratio 0.5 (L) 1.1 - 2.2         Physical Exam  Constitutional:       General: She is in acute distress. Appearance: She is toxic-appearing. Pulmonary:      Effort: Respiratory distress present. Comments: On bipap  tachypneic  Abdominal:      General: There is no distension. Musculoskeletal:         General: No swelling. Hypernatremia  - Nephrology consultation is now obtained on account of hyponatremia and hypokalemia  - During the hospital course her serum sodium has worsened from 139 on 1/17-1 50 on 1/23. Concurrently her serum potassium is also low with values close to 3.2-3.4.  - Patient's current drips of Precedex and Levophed are based on normal saline. Patient's current free water deficit stands at 3.3 L, with additional insensible fluid losses of up to 1 L/day given high insensible losses.     Plan   Start the patient on D5W at 125 cc an hour, with additional potassium supplementation   Urine electrolytes to evaluate for free water clearance   Monitoring serum sodium with daily labs      Hypokalemia   Low serum potassium  And seen throughout the hospital course   In the setting of hyponatremia which can result in low extracellular potassium levels    Plan   Correcting underlying hyponatremia with D5W   Additional 20 mEq/L of KCl per liter of D5W infused

## 2022-01-23 NOTE — ANESTHESIA PREPROCEDURE EVALUATION
Relevant Problems   No relevant active problems       Anesthetic History   No history of anesthetic complications            Review of Systems / Medical History  Patient summary reviewed, nursing notes reviewed and pertinent labs reviewed    Pulmonary          Pneumonia         Neuro/Psych   Within defined limits           Cardiovascular    Hypertension                   GI/Hepatic/Renal  Within defined limits              Endo/Other  Within defined limits           Other Findings              Physical Exam    Airway  Mallampati: III  TM Distance: 4 - 6 cm  Neck ROM: normal range of motion, short neck   Mouth opening: Normal     Cardiovascular    Rhythm: regular           Dental    Dentition: Edentulous     Pulmonary      Decreased breath sounds: bilateral           Abdominal        Comments: Pt positive covid pneumonia on bipap needing intubation for respiratory distress Other Findings            Anesthetic Plan    ASA: 4, emergent  Anesthesia type: total IV anesthesia    Monitoring Plan: Continuous noninvasive hemodynamic monitoring    Post procedure ventilation   Induction: Intravenous  Anesthetic plan and risks discussed with: Family

## 2022-01-23 NOTE — PROGRESS NOTES
General Daily Progress Note          Patient Name:   Tamara Gruber       YOB: 1940       Age:  80 y.o.       Admit Date: 1/21/2022      Subjective:     Patient is a 80y.o. year old female with signal past medical history of hypertension arthritis who discharged from the hospital yesterday in stable condition patient is admitted last admission with COVID-19 patient was asymptomatic all this time while in the hospital patient discharged on 2 L oxygen to skilled care but patient son want to go home with home health but is at home patient's saturations drops sent back to the ER seen by the ER physician patient placed on BiPAP patient was little hypotensive started on Levophed admitted for further work-up and treatment       Patient awake on BiPAP  Patient very restless moving around and pulling out BiPAP      Objective:     Visit Vitals  /71 (BP 1 Location: Right lower arm, BP Patient Position: At rest)   Pulse (!) 57   Temp 97.7 °F (36.5 °C)   Resp (!) 36   Ht 5' 5\" (1.651 m)   Wt 90.7 kg (200 lb)   SpO2 92%   BMI 33.28 kg/m²        Recent Results (from the past 24 hour(s))   BLOOD GAS, ARTERIAL    Collection Time: 01/23/22  5:09 AM   Result Value Ref Range    pH 7.39 7.35 - 7.45      PCO2 42 35 - 45 mmHg    PO2 68 (L) 75 - 100 mmHg    O2 SAT 93 (L) >95 %    BICARBONATE 24 22 - 26 mmol/L    BASE EXCESS 0.1 0 - 2 mmol/L    O2 METHOD Non-invasive ventilation      FIO2 100.0 %    MODE AVAPS      Tidal volume 500      SET RATE 12      EPAP/CPAP/PEEP 8.0      SITE Right Radial      BEN'S TEST PASS     CBC WITH AUTOMATED DIFF    Collection Time: 01/23/22  5:41 AM   Result Value Ref Range    WBC 10.6 3.6 - 11.0 K/uL    RBC 3.53 (L) 3.80 - 5.20 M/uL    HGB 10.6 (L) 11.5 - 16.0 g/dL    HCT 33.8 (L) 35.0 - 47.0 %    MCV 95.8 80.0 - 99.0 FL    MCH 30.0 26.0 - 34.0 PG    MCHC 31.4 30.0 - 36.5 g/dL    RDW 14.2 11.5 - 14.5 %    PLATELET 889 581 - 785 K/uL    MPV 11.1 8.9 - 12.9 FL    NRBC 0.0 0.0  WBC ABSOLUTE NRBC 0.00 0.00 - 0.01 K/uL    NEUTROPHILS 90 (H) 32 - 75 %    LYMPHOCYTES 5 (L) 12 - 49 %    MONOCYTES 4 (L) 5 - 13 %    EOSINOPHILS 0 0 - 7 %    BASOPHILS 0 0 - 1 %    IMMATURE GRANULOCYTES 1 (H) 0 - 0.5 %    ABS. NEUTROPHILS 9.6 (H) 1.8 - 8.0 K/UL    ABS. LYMPHOCYTES 0.5 (L) 0.8 - 3.5 K/UL    ABS. MONOCYTES 0.4 0.0 - 1.0 K/UL    ABS. EOSINOPHILS 0.0 0.0 - 0.4 K/UL    ABS. BASOPHILS 0.0 0.0 - 0.1 K/UL    ABS. IMM. GRANS. 0.1 (H) 0.00 - 0.04 K/UL    DF AUTOMATED     METABOLIC PANEL, COMPREHENSIVE    Collection Time: 01/23/22  5:41 AM   Result Value Ref Range    Sodium 150 (H) 136 - 145 mmol/L    Potassium 3.4 (L) 3.5 - 5.1 mmol/L    Chloride 119 (H) 97 - 108 mmol/L    CO2 24 21 - 32 mmol/L    Anion gap 7 5 - 15 mmol/L    Glucose 222 (H) 65 - 100 mg/dL    BUN 41 (H) 6 - 20 mg/dL    Creatinine 0.71 0.55 - 1.02 mg/dL    BUN/Creatinine ratio 58 (H) 12 - 20      GFR est AA >60 >60 ml/min/1.73m2    GFR est non-AA >60 >60 ml/min/1.73m2    Calcium 9.3 8.5 - 10.1 mg/dL    Bilirubin, total 0.4 0.2 - 1.0 mg/dL    AST (SGOT) 96 (H) 15 - 37 U/L    ALT (SGPT) 85 (H) 12 - 78 U/L    Alk. phosphatase 59 45 - 117 U/L    Protein, total 6.8 6.4 - 8.2 g/dL    Albumin 2.2 (L) 3.5 - 5.0 g/dL    Globulin 4.6 (H) 2.0 - 4.0 g/dL    A-G Ratio 0.5 (L) 1.1 - 2.2       [unfilled]      Review of Systems    Unable to obtain. Physical Exam:      Constitutional: Awake on BiPAP  HENT:   Head: Normocephalic and atraumatic. Eyes: Pupils are equal, round, and reactive to light. EOM are normal.   Cardiovascular: Normal rate, regular rhythm and normal heart sounds. Pulmonary/Chest: Breath sounds normal. No wheezes. No rales. Exhibits no tenderness. Abdominal: Soft. Bowel sounds are normal. There is no abdominal tenderness. There is no rebound and no guarding. Musculoskeletal: Normal range of motion. Neurological: pt is alert and oriented to person, place, and time.      XR CHEST PORT   Final Result   Moderate patchy diffuse bilateral airspace opacities, waxing and waning from the   prior exam.      XR CHEST PORT   Final Result   Moderate diffuse bilateral airspace opacities, likely a combination of edema and   airspace disease, mildly increased from the previous exam.      CT HEAD WO CONT   Final Result   1. There are milder microvascular changes within the central periventricular   white matter. 2.  There is an area of diminished density within the inferior aspect of the   right cerebellar hemisphere without change (series 201 image number 12). These   findings may represent an older ischemic insult within the right posterior   inferior cerebellar region. 3.  This examination is negative for acute intracranial pathology or short-term   interval changes dating back to 1/17/2022. XR CHEST PORT   Final Result   Patchy mixed asymmetric lung disease appears somewhat better but the   lungs are better inflated. No effusion or pneumothorax. Normal heart and   mediastinum      IR INSERT NON TUNL CVC OVER 5 YRS   Final Result   Ultrasound of the right neck demonstrated patent IJV. Successful US-guided placement of a right internal jugular triple lumen central   venous catheter as described above. The catheter is functioning. PLAN:   Catheter position was confirmed by follow-up chest x-ray: catheter tip in the   lower SVC and ready to use. IR US GUIDED VASCULAR ACCESS   Final Result   Ultrasound of the right neck demonstrated patent IJV. Successful US-guided placement of a right internal jugular triple lumen central   venous catheter as described above. The catheter is functioning. PLAN:   Catheter position was confirmed by follow-up chest x-ray: catheter tip in the   lower SVC and ready to use. XR CHEST PORT   Final Result   Findings/impression:      Diffuse interstitial airspace disease/edema. No definite pleural effusion or   pneumothorax. Cardiac contours are partially obscured. No acute osseous abnormality identified. Recent Results (from the past 24 hour(s))   BLOOD GAS, ARTERIAL    Collection Time: 01/23/22  5:09 AM   Result Value Ref Range    pH 7.39 7.35 - 7.45      PCO2 42 35 - 45 mmHg    PO2 68 (L) 75 - 100 mmHg    O2 SAT 93 (L) >95 %    BICARBONATE 24 22 - 26 mmol/L    BASE EXCESS 0.1 0 - 2 mmol/L    O2 METHOD Non-invasive ventilation      FIO2 100.0 %    MODE AVAPS      Tidal volume 500      SET RATE 12      EPAP/CPAP/PEEP 8.0      SITE Right Radial      BEN'S TEST PASS     CBC WITH AUTOMATED DIFF    Collection Time: 01/23/22  5:41 AM   Result Value Ref Range    WBC 10.6 3.6 - 11.0 K/uL    RBC 3.53 (L) 3.80 - 5.20 M/uL    HGB 10.6 (L) 11.5 - 16.0 g/dL    HCT 33.8 (L) 35.0 - 47.0 %    MCV 95.8 80.0 - 99.0 FL    MCH 30.0 26.0 - 34.0 PG    MCHC 31.4 30.0 - 36.5 g/dL    RDW 14.2 11.5 - 14.5 %    PLATELET 109 443 - 927 K/uL    MPV 11.1 8.9 - 12.9 FL    NRBC 0.0 0.0  WBC    ABSOLUTE NRBC 0.00 0.00 - 0.01 K/uL    NEUTROPHILS 90 (H) 32 - 75 %    LYMPHOCYTES 5 (L) 12 - 49 %    MONOCYTES 4 (L) 5 - 13 %    EOSINOPHILS 0 0 - 7 %    BASOPHILS 0 0 - 1 %    IMMATURE GRANULOCYTES 1 (H) 0 - 0.5 %    ABS. NEUTROPHILS 9.6 (H) 1.8 - 8.0 K/UL    ABS. LYMPHOCYTES 0.5 (L) 0.8 - 3.5 K/UL    ABS. MONOCYTES 0.4 0.0 - 1.0 K/UL    ABS. EOSINOPHILS 0.0 0.0 - 0.4 K/UL    ABS. BASOPHILS 0.0 0.0 - 0.1 K/UL    ABS. IMM.  GRANS. 0.1 (H) 0.00 - 0.04 K/UL    DF AUTOMATED     METABOLIC PANEL, COMPREHENSIVE    Collection Time: 01/23/22  5:41 AM   Result Value Ref Range    Sodium 150 (H) 136 - 145 mmol/L    Potassium 3.4 (L) 3.5 - 5.1 mmol/L    Chloride 119 (H) 97 - 108 mmol/L    CO2 24 21 - 32 mmol/L    Anion gap 7 5 - 15 mmol/L    Glucose 222 (H) 65 - 100 mg/dL    BUN 41 (H) 6 - 20 mg/dL    Creatinine 0.71 0.55 - 1.02 mg/dL    BUN/Creatinine ratio 58 (H) 12 - 20      GFR est AA >60 >60 ml/min/1.73m2    GFR est non-AA >60 >60 ml/min/1.73m2    Calcium 9.3 8.5 - 10.1 mg/dL    Bilirubin, total 0.4 0.2 - 1.0 mg/dL    AST (SGOT) 96 (H) 15 - 37 U/L    ALT (SGPT) 85 (H) 12 - 78 U/L    Alk. phosphatase 59 45 - 117 U/L    Protein, total 6.8 6.4 - 8.2 g/dL    Albumin 2.2 (L) 3.5 - 5.0 g/dL    Globulin 4.6 (H) 2.0 - 4.0 g/dL    A-G Ratio 0.5 (L) 1.1 - 2.2         Results     Procedure Component Value Units Date/Time    MRSA SCREEN - PCR (NASAL) [[id number]] Collected: 01/22/22 0440    Order Status: No result Specimen: Swab     MRSA SCREEN - PCR (NASAL) [[id number]] Collected: 01/22/22 0300    Order Status: Canceled Specimen: Swab     CULTURE, BLOOD #1 [[id number]]     Order Status: Sent Specimen: Blood     CULTURE, BLOOD #2 [[id number]]     Order Status: Sent Specimen: Blood     CULTURE, BLOOD, PAIRED [[id number]] Collected: 01/21/22 0755    Order Status: Completed Specimen: Blood Updated: 01/21/22 0804    CULTURE, BLOOD #1 [[id number]] Collected: 01/18/22 0712    Order Status: Completed Specimen: Blood Updated: 01/22/22 0825     Special Requests: No Special Requests        Culture result: No growth 3 days       COVID-19 RAPID TEST [[id number]]  (Abnormal) Collected: 01/17/22 1136    Order Status: Completed Specimen: Nasopharyngeal Updated: 01/17/22 1201     Specimen source       Please find results under separate order           COVID-19 rapid test DETECTED        Comment: Rapid Abbott ID Now   The specimen is POSITIVE for SARS-CoV-2, the novel coronavirus associated with COVID-19. This test has been authorized by the FDA under an Emergency Use Authorization (EUA) for use by authorized laboratories.    Fact sheet for Healthcare Providers: ConventionUpdate.co.nz Fact sheet for Patients: ConventionUpdate.co.nz   Methodology: Isothermal Nucleic Acid Amplification Results verified, phoned to and read back by Luna Rai, 12:00   1/17/22 LBO         INFLUENZA A & B AG (RAPID TEST) [[id number]] Collected: 01/17/22 1136    Order Status: Completed Specimen: Nasopharyngeal from Nasal washing Updated: 01/17/22 1202     Influenza A Antigen Negative        Influenza B Antigen Negative       CULTURE, BLOOD #2 [[id number]]  (Abnormal) Collected: 01/17/22 0712    Order Status: Completed Specimen: Blood Updated: 01/20/22 0946     Special Requests: No Special Requests        Culture result:       Staphylococcus species, coagulase negative growing in 1 of 4 bottles drawn NO SITE INDICATED            (NOTE) [facility] GROWING IN 1 OF 2 BOTTLES CALLED TO JOSE MIGUEL PAGAN AT 0831 ON 1/19/22. JF           Labs:     Recent Labs     01/23/22  0541 01/22/22  0300   WBC 10.6 11.1*   HGB 10.6* 11.5   HCT 33.8* 36.0    335     Recent Labs     01/23/22  0541 01/22/22  0300 01/21/22  0755   * 147* 145   K 3.4* 3.3* 3.2*   * 116* 112*   CO2 24 27 25   BUN 41* 27* 29*   CREA 0.71 0.63 1.14*   * 160* 142*   CA 9.3 9.4 9.8     Recent Labs     01/23/22  0541 01/22/22  0300 01/21/22  0755   ALT 85* 111* 142*   AP 59 47 46   TBILI 0.4 0.7 0.6   TP 6.8 7.4 8.0   ALB 2.2* 2.4* 2.8*   GLOB 4.6* 5.0* 5.2*     No results for input(s): INR, PTP, APTT, INREXT, INREXT in the last 72 hours. No results for input(s): FE, TIBC, PSAT, FERR in the last 72 hours. No results found for: FOL, RBCF   Recent Labs     01/23/22  0509 01/22/22  0311   PH 7.39 7.44   PCO2 42 37   PO2 68* 59*     No results for input(s): CPK, CKNDX, TROIQ in the last 72 hours.     No lab exists for component: CPKMB  No results found for: CHOL, CHOLX, CHLST, CHOLV, HDL, HDLP, LDL, LDLC, DLDLP, TGLX, TRIGL, TRIGP, CHHD, CHHDX  Lab Results   Component Value Date/Time    Glucose (POC) 241 (H) 01/21/2022 02:45 AM     Lab Results   Component Value Date/Time    Color Yellow/Straw 01/17/2022 10:30 AM    Appearance Turbid (A) 01/17/2022 10:30 AM    Specific gravity 1.014 01/17/2022 10:30 AM    pH (UA) 7.0 01/17/2022 10:30 AM    Protein 100 (A) 01/17/2022 10:30 AM    Glucose Negative 01/17/2022 10:30 AM    Ketone 20 (A) 01/17/2022 10:30 AM Bilirubin Negative 01/17/2022 10:30 AM    Urobilinogen 0.1 01/17/2022 10:30 AM    Nitrites Negative 01/17/2022 10:30 AM    Leukocyte Esterase Negative 01/17/2022 10:30 AM    Bacteria Negative 01/17/2022 10:30 AM    WBC 0-4 01/17/2022 10:30 AM    RBC 0-5 01/17/2022 10:30 AM         Assessment:     Acute hypoxemic respiratory failure on BiPAP 100%   COVID-19 pneumonitis   hypotension     Hypertension  Arthritis  Hypokalemia  Hyperenatremia      Plan:     Replace potassium  Continue Zithromax 500 mg daily Olumiant 2 mg daily  Symbicort 2 puff twice a day  Decadron 4 mg IV every 6 hours  Decadron 4 mg IV every 6 hours meropenem 1 g every 8 hours    Follow-up with pulmonologist and nephrologist      Current Facility-Administered Medications:     dextrose 5% - LR with KCl 20 mEq/L infusion, , IntraVENous, CONTINUOUS, Christine Ramos MD    LORazepam (ATIVAN) injection 1 mg, 1 mg, IntraVENous, ONCE, Polo Lopez MD    hydrOXYzine pamoate (VISTARIL) capsule 25 mg, 25 mg, Oral, Q4H PRN, Polo Lopez MD    dexmedeTOMidine (PRECEDEX) 400 mcg in 0.9% sodium chloride (MBP/ADV) 100 mL MBP, 0.1-1.5 mcg/kg/hr, IntraVENous, TITRATE, Karan Flower MD, Last Rate: 22.7 mL/hr at 01/23/22 1009, 1 mcg/kg/hr at 01/23/22 1009    NOREPINephrine (LEVOPHED) 8 mg in 0.9% NS 250ml infusion, 0.5-16 mcg/min, IntraVENous, TITRATE, Damaris Lopez MD, Stopped at 01/21/22 2300    budesonide-formoteroL (SYMBICORT) 160-4.5 mcg/actuation HFA inhaler 2 Puff, 2 Puff, Inhalation, BID, Damaris Lopez MD    acetaminophen (TYLENOL) tablet 650 mg, 650 mg, Oral, Q6H PRN **OR** acetaminophen (TYLENOL) suppository 650 mg, 650 mg, Rectal, Q6H PRN, Polo Lopez MD    polyethylene glycol (MIRALAX) packet 17 g, 17 g, Oral, DAILY PRN, Polo Lopez MD    ondansetron (ZOFRAN ODT) tablet 4 mg, 4 mg, Oral, Q8H PRN **OR** [DISCONTINUED] ondansetron (ZOFRAN) injection 4 mg, 4 mg, IntraVENous, Q6H PRN, Damaris Lopez MD    enoxaparin (LOVENOX) injection 40 mg, 40 mg, SubCUTAneous, DAILY, Polo Lopez MD, 40 mg at 01/23/22 0940    baricitinib (OLUMIANT) tablet 2 mg, 2 mg, Oral, DAILY, Jose Lopez MD    albuterol (PROVENTIL HFA, VENTOLIN HFA, PROAIR HFA) inhaler 2 Puff, 2 Puff, Inhalation, Q6H PRN, Jose Lopez MD    polyethylene glycol (MIRALAX) packet 17 g, 17 g, Oral, DAILY PRN, Jose Lopez MD    [DISCONTINUED] ondansetron (ZOFRAN ODT) tablet 4 mg, 4 mg, Oral, Q8H PRN **OR** ondansetron (ZOFRAN) injection 4 mg, 4 mg, IntraVENous, Q6H PRN, Polo Lopez MD    meropenem (MERREM) 1 g in 0.9% sodium chloride 20 mL IV syringe, 1 g, IntraVENous, Q8H, Polo Lopez MD, 1 g at 01/23/22 0939    dexamethasone (DECADRON) 4 mg/mL injection 4 mg, 4 mg, IntraVENous, Q6H, Polo Lopez MD, 4 mg at 01/23/22 0537    azithromycin (ZITHROMAX) 500 mg in 0.9% sodium chloride 250 mL (VIAL-MATE), 500 mg, IntraVENous, Q24H, Polo Lopez MD, Last Rate: 250 mL/hr at 01/23/22 0940, 500 mg at 01/23/22 0940    hydrOXYzine (VISTARIL) injection 25 mg, 25 mg, IntraMUSCular, Q6H PRN, Polo Lopez MD, 25 mg at 01/22/22 0111

## 2022-01-24 NOTE — PROGRESS NOTES
Nutrition Note    RD updated by RN, D5 at 125ml/hr now d/c'd, RD adjusted TF order as follows: With hemodynamic stability, initiate the following:  Initiate TF via OGT of Promote at goal of 10 ml/hr  Flush with 200ml water q6hrs  Please provide 2 pkts Prosource TID, 1 overnight (7pkt/d; 420kcals, 105g pro)  Provides 660kcals (52%), 120g pro (94%), and 1400ml water (93%)      Propofol 50mcg/kg/min providing 718kcals/d (109%)     Please document TF initiation, rate, flushes, prosource provision, and GRVs    Estimated Daily Nutrient Needs:  Energy (kcal): 1270kcals (14kcals/kg); Weight Used for Energy Requirements: Current  Protein (g): 127g (1.4g/kg);  Weight Used for Protein Requirements: Current  Fluid (ml/day): 1500ml; Method Used for Fluid Requirements: Other (comment) (adult minimum)       Electronically signed by Leonarda Mckeon on 1/24/2022 at 11:43 AM    Contact: Ext [phone], or via [employer]

## 2022-01-24 NOTE — PROGRESS NOTES
1/24/2022 11:51 AM      Admit Date: 1/21/2022    Admit Diagnosis: Respiratory failure with hypoxia (HCC) [J96.91]  COVID-19 [U07.1]      Subjective:   Seen  And evaluated   Intubated   Review of Systems   Unable to perform ROS: Intubated      Cant be done  Objective:      Visit Vitals  /70 (BP 1 Location: Right lower arm, BP Patient Position: At rest)   Pulse 72   Temp 97.7 °F (36.5 °C)   Resp 28   Ht 5' 5\" (1.651 m)   Wt 90.7 kg (200 lb)   SpO2 95%   BMI 33.28 kg/m²       Physical Exam:  General-ill appearing, vent dependent   Low dose levophed  Tube feeds on hold for now  Urine clear   tachypneic but hypoxic as per saturation  Fi02 100%  Obtunded on precedex drip       Recent Results (from the past 24 hour(s))   BLOOD GAS, ARTERIAL    Collection Time: 01/23/22 12:40 PM   Result Value Ref Range    pH 7.30 (L) 7.35 - 7.45      PCO2 48 (H) 35 - 45 mmHg    PO2 269 (H) 75 - 100 mmHg    O2  >95 %    BICARBONATE 22 22 - 26 mmol/L    BASE DEFICIT 3.1 (H) 0 - 2 mmol/L    O2 METHOD VENT      FIO2 100 %    MODE Assist Control/Volume Control      Tidal volume 450      SET RATE 12      EPAP/CPAP/PEEP 10      SITE Right Radial      BEN'S TEST Positive     CBC W/O DIFF    Collection Time: 01/24/22  4:10 AM   Result Value Ref Range    WBC 11.2 (H) 3.6 - 11.0 K/uL    RBC 3.50 (L) 3.80 - 5.20 M/uL    HGB 10.6 (L) 11.5 - 16.0 g/dL    HCT 33.4 (L) 35.0 - 47.0 %    MCV 95.4 80.0 - 99.0 FL    MCH 30.3 26.0 - 34.0 PG    MCHC 31.7 30.0 - 36.5 g/dL    RDW 14.3 11.5 - 14.5 %    PLATELET 173 518 - 827 K/uL    MPV 11.2 8.9 - 12.9 FL    NRBC 0.2 (H) 0.0  WBC    ABSOLUTE NRBC 0.02 (H) 0.00 - 2.33 K/uL   METABOLIC PANEL, COMPREHENSIVE    Collection Time: 01/24/22  4:10 AM   Result Value Ref Range    Sodium 149 (H) 136 - 145 mmol/L    Potassium 4.4 3.5 - 5.1 mmol/L    Chloride 119 (H) 97 - 108 mmol/L    CO2 24 21 - 32 mmol/L    Anion gap 6 5 - 15 mmol/L    Glucose 237 (H) 65 - 100 mg/dL    BUN 51 (H) 6 - 20 mg/dL Creatinine 0.97 0.55 - 1.02 mg/dL    BUN/Creatinine ratio 53 (H) 12 - 20      GFR est AA >60 >60 ml/min/1.73m2    GFR est non-AA 55 (L) >60 ml/min/1.73m2    Calcium 9.5 8.5 - 10.1 mg/dL    Bilirubin, total 0.4 0.2 - 1.0 mg/dL    AST (SGOT) 133 (H) 15 - 37 U/L    ALT (SGPT) 106 (H) 12 - 78 U/L    Alk. phosphatase 57 45 - 117 U/L    Protein, total 6.6 6.4 - 8.2 g/dL    Albumin 2.2 (L) 3.5 - 5.0 g/dL    Globulin 4.4 (H) 2.0 - 4.0 g/dL    A-G Ratio 0.5 (L) 1.1 - 2.2            Intake/Output Summary (Last 24 hours) at 1/24/2022 1151  Last data filed at 1/24/2022 1100  Gross per 24 hour   Intake 4296.1 ml   Output 330 ml   Net 3966.1 ml          Data Review:   Recent Labs     01/24/22  0410   *   K 4.4   BUN 51*   CREA 0.97   WBC 11.2*   HGB 10.6*   HCT 33.4*          No current facility-administered medications on file prior to encounter. Current Outpatient Medications on File Prior to Encounter   Medication Sig Dispense Refill    albuterol (PROVENTIL HFA, VENTOLIN HFA, PROAIR HFA) 90 mcg/actuation inhaler Take 2 Puffs by inhalation every six (6) hours as needed for Wheezing or Shortness of Breath. 18 g 0    azithromycin (ZITHROMAX) 500 mg tab Take 1 Tablet by mouth daily for 4 doses. 4 Tablet 0    baricitinib (OLUMIANT) 2 mg tablet Take 2 Tablets by mouth daily. 10 Tablet 0    budesonide-formoteroL (SYMBICORT) 160-4.5 mcg/actuation HFAA Take 2 Puffs by inhalation two (2) times a day. 10.2 g 0    dexAMETHasone (Decadron) 4 mg tablet 1 tablet twice a day 20 Tablet 0      Assessment/Plan:     Active Problems:    COVID-19 (1/17/2022)      Respiratory failure with hypoxia (HCC) (1/21/2022)      Hypernatremia (1/23/2022)      Hypokalemia (1/23/2022)        DIAGNOSES:  1. Mild hypernatremia  2. hypokelamie resolved  3. COVID 19 PNA  4. VDRF  5. Hypoalbuminemia      DISCUSSION:   Mild chronic hypernatremia at this time.  Monitor I and 0s    PLAN:   Tube feeds to be restarted   Give extra water 1200 ml a day.. could be 300 ml q4 hrs    Track renal panel,  And mg tomorrow am        Thanks for consulting me. Please don't hesitate to contact me if any questions arise of if I can assist in any manner. This dictation was done by dragon, computer voice recognition software. Often unanticipated grammatical, syntax, phones and other interpretive errors are inadvertently transcribed. Please excuse errors that have escaped final proofreading. Please contact me if you suspect dictation or transcription errors.   Dr Irving Gamboa  [address] [place] [address], [address] [facility], [facility]  Cell Phone: [phone]  Telephone: [phone]  Fax: [phone]

## 2022-01-24 NOTE — PROGRESS NOTES
General Daily Progress Note          Patient Name:   Ilana Birmingham       YOB: 1940       Age:  80 y.o.       Admit Date: 1/21/2022      Subjective:     Patient is a 80y.o. year old female with signal past medical history of hypertension arthritis who discharged from the hospital yesterday in stable condition patient is admitted last admission with COVID-19 patient was asymptomatic all this time while in the hospital patient discharged on 2 L oxygen to skilled care but patient son want to go home with home health but is at home patient's saturations drops sent back to the ER seen by the ER physician patient placed on BiPAP patient was little hypotensive started on Levophed admitted for further work-up and treatment         Patient e hypoxemic and hypotension patient was intubated and started on Levophed drip    Patient is DNR      Objective:     Visit Vitals  /70 (BP 1 Location: Right lower arm, BP Patient Position: At rest)   Pulse 72   Temp 97.7 °F (36.5 °C)   Resp 28   Ht 5' 5\" (1.651 m)   Wt 90.7 kg (200 lb)   SpO2 95%   BMI 33.28 kg/m²        Recent Results (from the past 24 hour(s))   CHLORIDE, URINE RANDOM    Collection Time: 01/23/22 10:30 AM   Result Value Ref Range    Chloride,urine random 35 mmol/L   SODIUM, UR, RANDOM    Collection Time: 01/23/22 10:30 AM   Result Value Ref Range    Sodium,urine random 36 mmol/L   POTASSIUM, UR, RANDOM    Collection Time: 01/23/22 10:30 AM   Result Value Ref Range    Potassium urine, random 43 mmol/L   CREATININE, UR, RANDOM    Collection Time: 01/23/22 10:30 AM   Result Value Ref Range    Creatinine, urine 170.00 mg/dL   URINALYSIS W/MICROSCOPIC    Collection Time: 01/23/22 10:30 AM   Result Value Ref Range    Color Yellow/Straw      Appearance Turbid (A) Clear      Specific gravity 1.027 1.003 - 1.030      pH (UA) 6.0 5.0 - 8.0      Protein 100 (A) Negative mg/dL    Glucose 50 (A) Negative mg/dL    Ketone 5 (A) Negative mg/dL    Bilirubin Negative Negative      Blood Negative Negative      Urobilinogen 4.0 (H) 0.1 - 1.0 EU/dL    Nitrites Negative Negative      Leukocyte Esterase Negative Negative      WBC 0-4 0 - 4 /hpf    RBC 0-5 0 - 5 /hpf    Bacteria Negative Negative /hpf   BLOOD GAS, ARTERIAL    Collection Time: 01/23/22 11:15 AM   Result Value Ref Range    pH 7.33 (L) 7.35 - 7.45      PCO2 44 35 - 45 mmHg    PO2 46 (LL) 75 - 100 mmHg    O2 SAT 77 (LL) >95 %    BICARBONATE 22 22 - 26 mmol/L    BASE DEFICIT 2.6 (H) 0 - 2 mmol/L    O2 METHOD Non-invasive ventilation      FIO2 100.0 %    MODE AVAPS      Tidal volume 500      SET RATE 12      EPAP/CPAP/PEEP 8      SITE Right Radial      BEN'S TEST PASS      Critical value read back  KARIS CLARK RN    BLOOD GAS, ARTERIAL    Collection Time: 01/23/22 12:40 PM   Result Value Ref Range    pH 7.30 (L) 7.35 - 7.45      PCO2 48 (H) 35 - 45 mmHg    PO2 269 (H) 75 - 100 mmHg    O2  >95 %    BICARBONATE 22 22 - 26 mmol/L    BASE DEFICIT 3.1 (H) 0 - 2 mmol/L    O2 METHOD VENT      FIO2 100 %    MODE Assist Control/Volume Control      Tidal volume 450      SET RATE 12      EPAP/CPAP/PEEP 10      SITE Right Radial      BEN'S TEST Positive     CBC W/O DIFF    Collection Time: 01/24/22  4:10 AM   Result Value Ref Range    WBC 11.2 (H) 3.6 - 11.0 K/uL    RBC 3.50 (L) 3.80 - 5.20 M/uL    HGB 10.6 (L) 11.5 - 16.0 g/dL    HCT 33.4 (L) 35.0 - 47.0 %    MCV 95.4 80.0 - 99.0 FL    MCH 30.3 26.0 - 34.0 PG    MCHC 31.7 30.0 - 36.5 g/dL    RDW 14.3 11.5 - 14.5 %    PLATELET 368 809 - 623 K/uL    MPV 11.2 8.9 - 12.9 FL    NRBC 0.2 (H) 0.0  WBC    ABSOLUTE NRBC 0.02 (H) 0.00 - 1.68 K/uL   METABOLIC PANEL, COMPREHENSIVE    Collection Time: 01/24/22  4:10 AM   Result Value Ref Range    Sodium 149 (H) 136 - 145 mmol/L    Potassium 4.4 3.5 - 5.1 mmol/L    Chloride 119 (H) 97 - 108 mmol/L    CO2 24 21 - 32 mmol/L    Anion gap 6 5 - 15 mmol/L    Glucose 237 (H) 65 - 100 mg/dL    BUN 51 (H) 6 - 20 mg/dL    Creatinine 0.97 0.55 - 1.02 mg/dL    BUN/Creatinine ratio 53 (H) 12 - 20      GFR est AA >60 >60 ml/min/1.73m2    GFR est non-AA 55 (L) >60 ml/min/1.73m2    Calcium 9.5 8.5 - 10.1 mg/dL    Bilirubin, total 0.4 0.2 - 1.0 mg/dL    AST (SGOT) 133 (H) 15 - 37 U/L    ALT (SGPT) 106 (H) 12 - 78 U/L    Alk. phosphatase 57 45 - 117 U/L    Protein, total 6.6 6.4 - 8.2 g/dL    Albumin 2.2 (L) 3.5 - 5.0 g/dL    Globulin 4.4 (H) 2.0 - 4.0 g/dL    A-G Ratio 0.5 (L) 1.1 - 2.2       [unfilled]      Review of Systems    Unable to obtain. Physical Exam:      Constitutional: Awake on BiPAP  HENT:   Head: Normocephalic and atraumatic. Eyes: Pupils are equal, round, and reactive to light. EOM are normal.   Cardiovascular: Normal rate, regular rhythm and normal heart sounds. Pulmonary/Chest: Breath sounds normal. No wheezes. No rales. Exhibits no tenderness. Abdominal: Soft. Bowel sounds are normal. There is no abdominal tenderness. There is no rebound and no guarding. Musculoskeletal: Normal range of motion. Neurological: pt is alert and oriented to person, place, and time. XR CHEST PORT   Final Result   Diffuse opacities in the lungs, not significantly changed. XR CHEST PORT   Final Result   1. ETT terminating 2 cm above the maggie. Remaining support apparatus as above. 2.  Worsening bilateral airspace disease with developing ARDS not excluded. XR CHEST PORT   Final Result   Moderate patchy diffuse bilateral airspace opacities, waxing and waning from the   prior exam.      XR CHEST PORT   Final Result   Moderate diffuse bilateral airspace opacities, likely a combination of edema and   airspace disease, mildly increased from the previous exam.      CT HEAD WO CONT   Final Result   1. There are milder microvascular changes within the central periventricular   white matter.    2.  There is an area of diminished density within the inferior aspect of the   right cerebellar hemisphere without change (series 201 image number 12). These   findings may represent an older ischemic insult within the right posterior   inferior cerebellar region. 3.  This examination is negative for acute intracranial pathology or short-term   interval changes dating back to 1/17/2022. XR CHEST PORT   Final Result   Patchy mixed asymmetric lung disease appears somewhat better but the   lungs are better inflated. No effusion or pneumothorax. Normal heart and   mediastinum      IR INSERT NON TUNL CVC OVER 5 YRS   Final Result   Ultrasound of the right neck demonstrated patent IJV. Successful US-guided placement of a right internal jugular triple lumen central   venous catheter as described above. The catheter is functioning. PLAN:   Catheter position was confirmed by follow-up chest x-ray: catheter tip in the   lower SVC and ready to use. IR US GUIDED VASCULAR ACCESS   Final Result   Ultrasound of the right neck demonstrated patent IJV. Successful US-guided placement of a right internal jugular triple lumen central   venous catheter as described above. The catheter is functioning. PLAN:   Catheter position was confirmed by follow-up chest x-ray: catheter tip in the   lower SVC and ready to use. XR CHEST PORT   Final Result   Findings/impression:      Diffuse interstitial airspace disease/edema. No definite pleural effusion or   pneumothorax. Cardiac contours are partially obscured. No acute osseous abnormality identified.       XR CHEST PORT    (Results Pending)        Recent Results (from the past 24 hour(s))   CHLORIDE, URINE RANDOM    Collection Time: 01/23/22 10:30 AM   Result Value Ref Range    Chloride,urine random 35 mmol/L   SODIUM, UR, RANDOM    Collection Time: 01/23/22 10:30 AM   Result Value Ref Range    Sodium,urine random 36 mmol/L   POTASSIUM, UR, RANDOM    Collection Time: 01/23/22 10:30 AM   Result Value Ref Range    Potassium urine, random 43 mmol/L   CREATININE, UR, RANDOM Collection Time: 01/23/22 10:30 AM   Result Value Ref Range    Creatinine, urine 170.00 mg/dL   URINALYSIS W/MICROSCOPIC    Collection Time: 01/23/22 10:30 AM   Result Value Ref Range    Color Yellow/Straw      Appearance Turbid (A) Clear      Specific gravity 1.027 1.003 - 1.030      pH (UA) 6.0 5.0 - 8.0      Protein 100 (A) Negative mg/dL    Glucose 50 (A) Negative mg/dL    Ketone 5 (A) Negative mg/dL    Bilirubin Negative Negative      Blood Negative Negative      Urobilinogen 4.0 (H) 0.1 - 1.0 EU/dL    Nitrites Negative Negative      Leukocyte Esterase Negative Negative      WBC 0-4 0 - 4 /hpf    RBC 0-5 0 - 5 /hpf    Bacteria Negative Negative /hpf   BLOOD GAS, ARTERIAL    Collection Time: 01/23/22 11:15 AM   Result Value Ref Range    pH 7.33 (L) 7.35 - 7.45      PCO2 44 35 - 45 mmHg    PO2 46 (LL) 75 - 100 mmHg    O2 SAT 77 (LL) >95 %    BICARBONATE 22 22 - 26 mmol/L    BASE DEFICIT 2.6 (H) 0 - 2 mmol/L    O2 METHOD Non-invasive ventilation      FIO2 100.0 %    MODE AVAPS      Tidal volume 500      SET RATE 12      EPAP/CPAP/PEEP 8      SITE Right Radial      BEN'S TEST PASS      Critical value read back  KARIS CLARK RN    BLOOD GAS, ARTERIAL    Collection Time: 01/23/22 12:40 PM   Result Value Ref Range    pH 7.30 (L) 7.35 - 7.45      PCO2 48 (H) 35 - 45 mmHg    PO2 269 (H) 75 - 100 mmHg    O2  >95 %    BICARBONATE 22 22 - 26 mmol/L    BASE DEFICIT 3.1 (H) 0 - 2 mmol/L    O2 METHOD VENT      FIO2 100 %    MODE Assist Control/Volume Control      Tidal volume 450      SET RATE 12      EPAP/CPAP/PEEP 10      SITE Right Radial      BEN'S TEST Positive     CBC W/O DIFF    Collection Time: 01/24/22  4:10 AM   Result Value Ref Range    WBC 11.2 (H) 3.6 - 11.0 K/uL    RBC 3.50 (L) 3.80 - 5.20 M/uL    HGB 10.6 (L) 11.5 - 16.0 g/dL    HCT 33.4 (L) 35.0 - 47.0 %    MCV 95.4 80.0 - 99.0 FL    MCH 30.3 26.0 - 34.0 PG    MCHC 31.7 30.0 - 36.5 g/dL    RDW 14.3 11.5 - 14.5 %    PLATELET 536 633 - 926 K/uL MPV 11.2 8.9 - 12.9 FL    NRBC 0.2 (H) 0.0  WBC    ABSOLUTE NRBC 0.02 (H) 0.00 - 5.33 K/uL   METABOLIC PANEL, COMPREHENSIVE    Collection Time: 01/24/22  4:10 AM   Result Value Ref Range    Sodium 149 (H) 136 - 145 mmol/L    Potassium 4.4 3.5 - 5.1 mmol/L    Chloride 119 (H) 97 - 108 mmol/L    CO2 24 21 - 32 mmol/L    Anion gap 6 5 - 15 mmol/L    Glucose 237 (H) 65 - 100 mg/dL    BUN 51 (H) 6 - 20 mg/dL    Creatinine 0.97 0.55 - 1.02 mg/dL    BUN/Creatinine ratio 53 (H) 12 - 20      GFR est AA >60 >60 ml/min/1.73m2    GFR est non-AA 55 (L) >60 ml/min/1.73m2    Calcium 9.5 8.5 - 10.1 mg/dL    Bilirubin, total 0.4 0.2 - 1.0 mg/dL    AST (SGOT) 133 (H) 15 - 37 U/L    ALT (SGPT) 106 (H) 12 - 78 U/L    Alk.  phosphatase 57 45 - 117 U/L    Protein, total 6.6 6.4 - 8.2 g/dL    Albumin 2.2 (L) 3.5 - 5.0 g/dL    Globulin 4.4 (H) 2.0 - 4.0 g/dL    A-G Ratio 0.5 (L) 1.1 - 2.2         Results     Procedure Component Value Units Date/Time    MRSA SCREEN - PCR (NASAL) [[id number]] Collected: 01/22/22 0440    Order Status: Canceled Specimen: Swab     MRSA SCREEN - PCR (NASAL) [[id number]] Collected: 01/22/22 0300    Order Status: Canceled Specimen: Swab     CULTURE, BLOOD #1 [[id number]]     Order Status: Canceled Specimen: Blood     CULTURE, BLOOD #2 [[id number]]     Order Status: Canceled Specimen: Blood     CULTURE, BLOOD, PAIRED [[id number]] Collected: 01/21/22 0754    Order Status: Completed Specimen: Blood Updated: 01/24/22 0840     Special Requests: No Special Requests        Culture result: No growth 2 days       CULTURE, BLOOD #1 [[id number]] Collected: 01/18/22 0497    Order Status: Completed Specimen: Blood Updated: 01/24/22 0840     Special Requests: No Special Requests        Culture result: No growth 5 days       COVID-19 RAPID TEST [[id number]]  (Abnormal) Collected: 01/17/22 1136    Order Status: Completed Specimen: Nasopharyngeal Updated: 01/17/22 1201     Specimen source       Please find results under separate order           COVID-19 rapid test DETECTED        Comment: Rapid Abbott ID Now   The specimen is POSITIVE for SARS-CoV-2, the novel coronavirus associated with COVID-19. This test has been authorized by the FDA under an Emergency Use Authorization (EUA) for use by authorized laboratories. Fact sheet for Healthcare Providers: ConventionUpdate.co.nz Fact sheet for Patients: ConventionUpdate.co.nz   Methodology: Isothermal Nucleic Acid Amplification Results verified, phoned to and read back by Mariela Stark, 12:00   1/17/22 LBO         INFLUENZA A & B AG (RAPID TEST) [[id number]] Collected: 01/17/22 1136    Order Status: Completed Specimen: Nasopharyngeal from Nasal washing Updated: 01/17/22 1202     Influenza A Antigen Negative        Influenza B Antigen Negative       CULTURE, BLOOD #2 [[id number]]  (Abnormal) Collected: 01/17/22 0712    Order Status: Completed Specimen: Blood Updated: 01/20/22 0946     Special Requests: No Special Requests        Culture result:       Staphylococcus species, coagulase negative growing in 1 of 4 bottles drawn NO SITE INDICATED            (NOTE) [facility] GROWING IN 1 OF 2 BOTTLES CALLED TO [address] [place] [address] AT 0831 ON 1/19/22. JF           Labs:     Recent Labs     01/24/22  0410 01/23/22  0541   WBC 11.2* 10.6   HGB 10.6* 10.6*   HCT 33.4* 33.8*    304     Recent Labs     01/24/22  0410 01/23/22  0541 01/22/22  0300   * 150* 147*   K 4.4 3.4* 3.3*   * 119* 116*   CO2 24 24 27   BUN 51* 41* 27*   CREA 0.97 0.71 0.63   * 222* 160*   CA 9.5 9.3 9.4     Recent Labs     01/24/22  0410 01/23/22  0541 01/22/22  0300   * 85* 111*   AP 57 59 47   TBILI 0.4 0.4 0.7   TP 6.6 6.8 7.4   ALB 2.2* 2.2* 2.4*   GLOB 4.4* 4.6* 5.0*     No results for input(s): INR, PTP, APTT, INREXT, INREXT in the last 72 hours. No results for input(s): FE, TIBC, PSAT, FERR in the last 72 hours.    No results found for: FOL, RBCF   Recent Labs     01/23/22  1240 01/23/22  1115   PH 7.30* 7.33*   PCO2 48* 44   PO2 269* 46*     No results for input(s): CPK, CKNDX, TROIQ in the last 72 hours.     No lab exists for component: CPKMB  No results found for: CHOL, CHOLX, CHLST, CHOLV, HDL, HDLP, LDL, LDLC, DLDLP, TGLX, TRIGL, TRIGP, CHHD, CHHDX  Lab Results   Component Value Date/Time    Glucose (POC) 241 (H) 01/21/2022 02:45 AM     Lab Results   Component Value Date/Time    Color Yellow/Straw 01/23/2022 10:30 AM    Appearance Turbid (A) 01/23/2022 10:30 AM    Specific gravity 1.027 01/23/2022 10:30 AM    pH (UA) 6.0 01/23/2022 10:30 AM    Protein 100 (A) 01/23/2022 10:30 AM    Glucose 50 (A) 01/23/2022 10:30 AM    Ketone 5 (A) 01/23/2022 10:30 AM    Bilirubin Negative 01/23/2022 10:30 AM    Urobilinogen 4.0 (H) 01/23/2022 10:30 AM    Nitrites Negative 01/23/2022 10:30 AM    Leukocyte Esterase Negative 01/23/2022 10:30 AM    Bacteria Negative 01/23/2022 10:30 AM    WBC 0-4 01/23/2022 10:30 AM    RBC 0-5 01/23/2022 10:30 AM         Assessment:     Acute hypoxemic respiratory failure on on ventilator 50% of   COVID-19 pneumonitis   hypotension     Hypertension  Arthritis  Hypokalemia  Hyperenatremia      Plan:     Zithromax 500 mg IV daily  Olumiant 2 mg daily  Decadron 4 mg every 6 hours  Meropenem 1 g every 8 hours    Follow-up with pulmonologist and nephrologist    Overall prognosis    Patient is DNR  Discussed with the patient's son    Repeat the lab      Current Facility-Administered Medications:     fentaNYL (PF) 1,500 mcg/30 mL (50 mcg/mL) infusion, 0-200 mcg/hr, IntraVENous, TITRATE, Karan Flower MD, Last Rate: 1 mL/hr at 01/24/22 0816, 50 mcg/hr at 01/24/22 0816    dextrose 5% - LR with KCl 20 mEq/L infusion, , IntraVENous, CONTINUOUS, Mirela Stinson MD, Last Rate: 125 mL/hr at 01/24/22 0100, New Bag at 01/24/22 0100    hydrOXYzine pamoate (VISTARIL) capsule 25 mg, 25 mg, Oral, Q4H PRN, Polo Lopez MD    propofol (DIPRIVAN) 10 mg/mL infusion, 0-50 mcg/kg/min, IntraVENous, TITRATE, Karan Flower MD, Last Rate: 27.2 mL/hr at 01/24/22 0813, 50 mcg/kg/min at 01/24/22 0813    dexmedeTOMidine (PRECEDEX) 400 mcg in 0.9% sodium chloride (MBP/ADV) 100 mL MBP, 0.1-1.5 mcg/kg/hr, IntraVENous, TITRATE, Karan Flower MD, Stopped at 01/24/22 0824    NOREPINephrine (LEVOPHED) 8 mg in 0.9% NS 250ml infusion, 0.5-16 mcg/min, IntraVENous, TITRATE, Polo Lopez MD, Last Rate: 3.8 mL/hr at 01/24/22 0825, 2 mcg/min at 01/24/22 0825    acetaminophen (TYLENOL) tablet 650 mg, 650 mg, Oral, Q6H PRN **OR** acetaminophen (TYLENOL) suppository 650 mg, 650 mg, Rectal, Q6H PRN, Dorothea Lopez MD    polyethylene glycol (MIRALAX) packet 17 g, 17 g, Oral, DAILY PRN, Dorothea Lopez MD    ondansetron (ZOFRAN ODT) tablet 4 mg, 4 mg, Oral, Q8H PRN **OR** [DISCONTINUED] ondansetron (ZOFRAN) injection 4 mg, 4 mg, IntraVENous, Q6H PRN, Polo Lopez MD    enoxaparin (LOVENOX) injection 40 mg, 40 mg, SubCUTAneous, DAILY, Polo Lopez MD, 40 mg at 01/24/22 0813    baricitinib (OLUMIANT) tablet 2 mg, 2 mg, Oral, DAILY, Polo Lopez MD, 2 mg at 01/24/22 0814    albuterol (PROVENTIL HFA, VENTOLIN HFA, PROAIR HFA) inhaler 2 Puff, 2 Puff, Inhalation, Q6H PRN, Dorothea Lopez MD    polyethylene glycol (MIRALAX) packet 17 g, 17 g, Oral, DAILY PRN, Dorothea Lopez MD    [DISCONTINUED] ondansetron (ZOFRAN ODT) tablet 4 mg, 4 mg, Oral, Q8H PRN **OR** ondansetron (ZOFRAN) injection 4 mg, 4 mg, IntraVENous, Q6H PRN, Polo Lopez MD    meropenem (MERREM) 1 g in 0.9% sodium chloride 20 mL IV syringe, 1 g, IntraVENous, Q8H, Polo Lopez MD, 1 g at 01/24/22 0813    dexamethasone (DECADRON) 4 mg/mL injection 4 mg, 4 mg, IntraVENous, Q6H, Polo Lopez MD, 4 mg at 01/24/22 0516    azithromycin (ZITHROMAX) 500 mg in 0.9% sodium chloride 250 mL (VIAL-MATE), 500 mg, IntraVENous, Q24H, Polo Lopez MD, Last Rate: 250 mL/hr at 01/24/22 0815, 500 mg at 01/24/22 0815    hydrOXYzine (VISTARIL) injection 25 mg, 25 mg, IntraMUSCular, Q6H PRN, Polo Lopez MD, 25 mg at 01/22/22 0111

## 2022-01-24 NOTE — PROGRESS NOTES
Comprehensive Nutrition Assessment    Type and Reason for Visit: Initial (vent)    Nutrition Recommendations/Plan:   With hemodynamic stability, initiate the following:  Please provide 2 pkts Prosource q6hrs (8pkt/day)  Flush with 200ml water q6hrs  Provides 480kcals (38%), 120g pro (94%), and 800ml water (53%)        50mcg/kg/min providing 718kcals/d      D5 at 125ml/hr providing 510kcals/d      With D5 and Propofol, meets 134% EENs and 253% fluid needs     Please document TF initiation, rate, flushes, prosource provision, and GRVs    Nutrition Assessment:  Admitted for hypoxia, hypotension, intubated (1/23). +COVID-19. Noted recent re-admit, inpatient x5d with COVID-19 but asymptomatic. TF ordered by MD this AM, RD to adjust. Please only initiate with hemodynamic stability. Labs: H/H 10/6/33.4, Na 149, K+ 4.4, BUN 51, -873, , , Alb 2.2. Meds:  Azithromycin, baricitinib, dexamethasone, precedex, D5 at 125ml/hr, meropenem, levophed, propofol at 50mcg/kg/min. Malnutrition Assessment:  Malnutrition Status: At risk for malnutrition (specify) (intubation)    Context:  Acute illness     Findings of the 6 clinical characteristics of malnutrition:   Energy Intake:  Mild decrease in energy intake (specify) (likely poor PO considering hx COVID-19)  Weight Loss:  No significant weight loss     Body Fat Loss:  Unable to assess,     Muscle Mass Loss:  Unable to assess,    Fluid Accumulation:  No significant fluid accumulation,      Estimated Daily Nutrient Needs:  Energy (kcal): 1270kcals (14kcals/kg); Weight Used for Energy Requirements: Current  Protein (g): 127g (1.4g/kg); Weight Used for Protein Requirements: Current  Fluid (ml/day): 1500ml; Method Used for Fluid Requirements: Other (comment) (adult minimum)      Nutrition Related Findings:  Unable to perform NFPE d/t COVID-19 precautions. No hx dysphagia. Last BM 1/21. No edema.       Wounds:    None       Current Nutrition Therapies:  ADULT DIET Regular; Low Fat/Low Chol/High Fiber/2 gm Na  ADULT TUBE FEEDING Nasogastric; Standard with Fiber; Delivery Method: Continuous; Continuous Initial Rate (mL/hr): 30; Continuous Advance Tube Feeding: No; Water Flush Volume (mL): 100; Water Flush Frequency: Q 3 hours    Anthropometric Measures:  · Height:  5' 5\" (165.1 cm)  · Current Body Wt:  90.7 kg (199 lb 15.3 oz) (1/21)   · Admission Body Wt:       · Usual Body Wt:   (stefan)     · Ideal Body Wt:  125 lbs:  160 %   · BMI Category:  Obese class 1 (BMI 30.0-34. 9)     Wt Readings from Last 6 Encounters:   01/21/22 90.7 kg (200 lb)   01/17/22 90.7 kg (200 lb)       Nutrition Diagnosis:   · Inadequate oral intake related to impaired respiratory function as evidenced by intubation,nutrition support-enteral nutrition      Nutrition Interventions:   Food and/or Nutrient Delivery: Continue NPO,Start tube feeding  Nutrition Education and Counseling: No recommendations at this time,Education not appropriate  Coordination of Nutrition Care: Continue to monitor while inpatient    Goals:  Intakes >/=75% of EENs in >5 days, Wt maintenance within +/-0.5kg in >5 days       Nutrition Monitoring and Evaluation:   Behavioral-Environmental Outcomes: None identified  Food/Nutrient Intake Outcomes: Enteral nutrition intake/tolerance  Physical Signs/Symptoms Outcomes: Biochemical data,Hemodynamic status,Weight    Discharge Planning:     Too soon to determine     Electronically signed by Karma Augustin on 1/24/2022 at 7:32 AM    Contact: Ext [phone], or via meets

## 2022-01-24 NOTE — PROGRESS NOTES
IMPRESSION:   1. Acute hypoxic respiratory  2. COVID-19 pneumonia  3. Sepsis with septic shock  4. Arthritis hypertension by hx  5. Hypokalemia  6. Hypernatremia  7. Additional workup outlined below  8. Pt is at high risk of sudden decline and decompensation with life threatening consequenses and continued end organ dysfunction and failure  9. Pt is critically ill. Time spent with pt and staff actively rendering care, managing pt and coordinating care as stated below; 30 minutes, exclusive of any procedures      RECOMMENDATIONS/PLAN:   1. ICU monitoring  1. Patient condition got worse on 1/24 went to asystole subsequently got intubated now on ventilator assist control mode sedated with propofol and Precedex heart rate is in 6 high 50s will discontinue Precedex start patient on fentanyl will decrease FiO2 decrease the PEEP decrease the rate  2. Patient is on dexamethasone and baricitinib  3. Patient is back on Levophed  4. Potassium corrected  5. Start on IV fluid D5W   6. Troponin is around 6000   7. Lactic acid 3.7  8. Chest x-ray shows diffuse bilateral infiltrate with congestive changes  9. She is on meropenem and Zithromax  10. CVP monitoring hemodynamically unstable  11. IV vasopressors for circulatory shock refractory to fluids to maintain SBP> 90  12. Transfuse prn to maintain Hgb > 7  13. Labs to follow electrolytes, renal function and and blood counts  14. Bronchial hygiene with respiratory therapy techniques, bronchodilators  15. Pt needs IV fluids with additives and Drug therapy requiring intensive monitoring for toxicity  16. Prescription drug management with home med reconciliation reviewed  17. DVT, SUP prophylaxis  18.  Will be available to assist in medical management while in the CCU pending disposition     [x] High complexity decision making was performed  [x] See my orders for details  HPI  61-year-old lady came in because of shortness of breath and dyspnea she has significant past medical history of arthritis hypertension she was recently discharged from the hospital came back with worsening of hypoxia she was discharged on oxygen 2 L nasal cannula and patient condition got worse she was supposed to be discharged to skilled care but patient's son took her home where her condition got worse and she was transferred back to the hospital now she is on noninvasive ventilator BiPAP machine hemodynamically unstable hypotensive on vasopressors and not giving much history acutely ill so critical care consult was called  PMH:  has no past medical history on file. PSH:   has a past surgical history that includes ir insert non tunl cvc over 5 yrs (1/21/2022). FHX: family history is not on file.      SHX:      ALL:   Allergies   Allergen Reactions    Penicillins Hives        MEDS:   [x] Reviewed - As Below   [] Not reviewed    Current Facility-Administered Medications   Medication    fentaNYL (PF) 1,500 mcg/30 mL (50 mcg/mL) infusion    dextrose 5% - LR with KCl 20 mEq/L infusion    hydrOXYzine pamoate (VISTARIL) capsule 25 mg    propofol (DIPRIVAN) 10 mg/mL infusion    dexmedeTOMidine (PRECEDEX) 400 mcg in 0.9% sodium chloride (MBP/ADV) 100 mL MBP    NOREPINephrine (LEVOPHED) 8 mg in 0.9% NS 250ml infusion    acetaminophen (TYLENOL) tablet 650 mg    Or    acetaminophen (TYLENOL) suppository 650 mg    polyethylene glycol (MIRALAX) packet 17 g    ondansetron (ZOFRAN ODT) tablet 4 mg    enoxaparin (LOVENOX) injection 40 mg    baricitinib (OLUMIANT) tablet 2 mg    albuterol (PROVENTIL HFA, VENTOLIN HFA, PROAIR HFA) inhaler 2 Puff    polyethylene glycol (MIRALAX) packet 17 g    ondansetron (ZOFRAN) injection 4 mg    meropenem (MERREM) 1 g in 0.9% sodium chloride 20 mL IV syringe    dexamethasone (DECADRON) 4 mg/mL injection 4 mg    azithromycin (ZITHROMAX) 500 mg in 0.9% sodium chloride 250 mL (VIAL-MATE)    hydrOXYzine (VISTARIL) injection 25 mg      MAR reviewed and pertinent medications noted or modified as needed   Current Facility-Administered Medications   Medication    fentaNYL (PF) 1,500 mcg/30 mL (50 mcg/mL) infusion    dextrose 5% - LR with KCl 20 mEq/L infusion    hydrOXYzine pamoate (VISTARIL) capsule 25 mg    propofol (DIPRIVAN) 10 mg/mL infusion    dexmedeTOMidine (PRECEDEX) 400 mcg in 0.9% sodium chloride (MBP/ADV) 100 mL MBP    NOREPINephrine (LEVOPHED) 8 mg in 0.9% NS 250ml infusion    acetaminophen (TYLENOL) tablet 650 mg    Or    acetaminophen (TYLENOL) suppository 650 mg    polyethylene glycol (MIRALAX) packet 17 g    ondansetron (ZOFRAN ODT) tablet 4 mg    enoxaparin (LOVENOX) injection 40 mg    baricitinib (OLUMIANT) tablet 2 mg    albuterol (PROVENTIL HFA, VENTOLIN HFA, PROAIR HFA) inhaler 2 Puff    polyethylene glycol (MIRALAX) packet 17 g    ondansetron (ZOFRAN) injection 4 mg    meropenem (MERREM) 1 g in 0.9% sodium chloride 20 mL IV syringe    dexamethasone (DECADRON) 4 mg/mL injection 4 mg    azithromycin (ZITHROMAX) 500 mg in 0.9% sodium chloride 250 mL (VIAL-MATE)    hydrOXYzine (VISTARIL) injection 25 mg      PMH:  has no past medical history on file. PSH:   has a past surgical history that includes ir insert non tunl cvc over 5 yrs (2022). FHX: family history is not on file. SHX:       ROS:  Unable to obtain    Hemodynamics:    CO:    CI:    CVP:    SVR:   PAP Systolic:    PAP Diastolic:    PVR:    DL20:        Ventilator Settings:      Mode Rate TV Press PEEP FiO2 PIP Min.  Vent   Assist control    450 ml    10 cm H20 50 %  33 cm H2O  10.7 l/min        Vital Signs: Telemetry:    normal sinus rhythm Intake/Output:   Visit Vitals  /70 (BP 1 Location: Right lower arm, BP Patient Position: At rest)   Pulse 60   Temp 98.4 °F (36.9 °C)   Resp 25   Ht 5' 5\" (1.651 m)   Wt 90.7 kg (200 lb)   SpO2 99%   BMI 33.28 kg/m²       Temp (24hrs), Av.3 °F (36.3 °C), Min:96.3 °F (35.7 °C), Max:98.4 °F (36.9 °C)        O2 Device: Ventilator         Wt Readings from Last 4 Encounters:   01/21/22 90.7 kg (200 lb)   01/17/22 90.7 kg (200 lb)          Intake/Output Summary (Last 24 hours) at 1/24/2022 0807  Last data filed at 1/24/2022 0300  Gross per 24 hour   Intake 100 ml   Output 480 ml   Net -380 ml       Last shift:      No intake/output data recorded. Last 3 shifts: 01/22 1901 - 01/24 0700  In: 100   Out: 80 [Urine:580]       Physical Exam:     General: BIPAP/NIV;  HEENT: NCAT, poor dentition, lips and mucosa dry  Eyes: anicteric; conjunctiva clear  Neck: no nodes, , trach midline; no accessory MM use. Chest: no deformity,   Cardiac: R regular; no murmur;   Lungs: distant breath sounds; no wheezes  Abd: soft, NT, hypoactive BS  Ext: no edema; no joint swelling;  No clubbing  : NO [place], clear urine  Neuro: \"alert\"  Psych- + agitation,   Skin: warm, dry, no cyanosis;   Pulses: 1-2+ Bilateral pedal, radial  Capillary: brisk; pale      DATA:    MAR reviewed and pertinent medications noted or modified as needed  MEDS:   Current Facility-Administered Medications   Medication    fentaNYL (PF) 1,500 mcg/30 mL (50 mcg/mL) infusion    dextrose 5% - LR with KCl 20 mEq/L infusion    hydrOXYzine pamoate (VISTARIL) capsule 25 mg    propofol (DIPRIVAN) 10 mg/mL infusion    dexmedeTOMidine (PRECEDEX) 400 mcg in 0.9% sodium chloride (MBP/ADV) 100 mL MBP    NOREPINephrine (LEVOPHED) 8 mg in 0.9% NS 250ml infusion    acetaminophen (TYLENOL) tablet 650 mg    Or    acetaminophen (TYLENOL) suppository 650 mg    polyethylene glycol (MIRALAX) packet 17 g    ondansetron (ZOFRAN ODT) tablet 4 mg    enoxaparin (LOVENOX) injection 40 mg    baricitinib (OLUMIANT) tablet 2 mg    albuterol (PROVENTIL HFA, VENTOLIN HFA, PROAIR HFA) inhaler 2 Puff    polyethylene glycol (MIRALAX) packet 17 g    ondansetron (ZOFRAN) injection 4 mg    meropenem (MERREM) 1 g in 0.9% sodium chloride 20 mL IV syringe    dexamethasone (DECADRON) 4 mg/mL injection 4 mg    azithromycin (ZITHROMAX) 500 mg in 0.9% sodium chloride 250 mL (VIAL-MATE)    hydrOXYzine (VISTARIL) injection 25 mg        Labs:    Recent Labs     01/24/22  0410 01/23/22  0541 01/22/22  0300   WBC 11.2* 10.6 11.1*   HGB 10.6* 10.6* 11.5    304 335     Recent Labs     01/24/22  0410 01/23/22  0541 01/22/22  0300   * 150* 147*   K 4.4 3.4* 3.3*   * 119* 116*   CO2 24 24 27   * 222* 160*   BUN 51* 41* 27*   CREA 0.97 0.71 0.63   CA 9.5 9.3 9.4   ALB 2.2* 2.2* 2.4*   * 85* 111*     Recent Labs     01/23/22  1240 01/23/22  1115 01/23/22  0509   PH 7.30* 7.33* 7.39   PCO2 48* 44 42   PO2 269* 46* 68*   HCO3 22 22 24   FIO2 100 100.0 100.0     No results for input(s): CPK, CKNDX, TROIQ in the last 72 hours. No lab exists for component: CPKMB  No results found for: BNPP, BNP   Lab Results   Component Value Date/Time    Culture result: No growth 1 day 01/21/2022 07:55 AM    Culture result: No growth 4 days 01/18/2022 07:12 AM    Culture result: (A) 01/17/2022 07:12 AM     Staphylococcus species, coagulase negative growing in 1 of 4 bottles drawn NO SITE INDICATED    Culture result:  01/17/2022 07:12 AM     (NOTE) [facility] IN [facility] GROWING IN 1 OF 2 BOTTLES CALLED TO JOSE MIGUEL PAGAN AT 0831 ON 1/19/22. JF     No results found for: TSH, TSHEXT, TSHEXT     Imaging:    Results from Hospital Encounter encounter on 01/21/22    XR CHEST PORT    Narrative  Study: Chest radiograph(s), 1 view    Clinical Indication: Post intubation. Comparison: Chest radiograph dated 1/23/2022. Findings:    Interval intubation with the tip of the endotracheal tube terminating  approximately 2 cm superior to the maggie. Interval placement of a gastric tube  which terminates in the left medial mid abdomen with the side-port approximately  16 cm distal to the expected position of the GE junction. Unchanged positioning  of a right internal jugular approach central venous catheter. Overlying  monitoring leads.     The cardiac silhouette is unchanged in size. Calcified plaque in aortic arch. Heterogeneous opacities throughout both lungs have overall mildly worsened in  the interval. No new large pleural effusion. No discernible pneumothorax. Impression  1. ETT terminating 2 cm above the maggie. Remaining support apparatus as above. 2.  Worsening bilateral airspace disease with developing ARDS not excluded. Results from [place] encounter on 01/21/22    CT HEAD WO CONT    Narrative  The study is a noncontrasted head CT examination dated 1/21/2022. HISTORY: Unresponsive. TECHNIQUE: Thin section axial imaging was performed followed by sagittal and  coronal reconstructed imaging. Dose Reduction Technique was employed to reduce radiation exposure - This  includes reduction optimization techniques as appropriate to a performed exam  with automated exposure control adjustments of the mA and/or Kv according to  patient size, or use of iterative reconstruction technique. COMPARISON: CT head dated 1/17/2022. There is an appropriate size to the ventricles, the deep cortical sulci, and the  sylvian fissures for the patient's age. This examination is negative for  intracranial hemorrhage, mass effect or an extra axial collection. The  gray-white matter junctions are preserved without cytotoxic or vasogenic edema. An assessment of the white matter tracts demonstrates a mild decrease in the  density characteristics of the central periventricular white matter. These  findings are consistent with expected aging changes and milder microvascular  disease. There also is a region of diminished density within the right posterior  inferior cerebellar hemisphere which may represent an older cerebrovascular  insult. ORBITS: This examination is negative for acute orbital pathology. PARANASAL SINUSES: The paranasal sinuses are well pneumatized without acute  sinus disease.     There is a decreased number of right sided mastoid air cells which can be  associated with chronic mastoiditis. The craniocervical junction images in a  normal fashion. Impression  1. There are milder microvascular changes within the central periventricular  white matter. 2.  There is an area of diminished density within the inferior aspect of the  right cerebellar hemisphere without change (series 201 image number 12). These  findings may represent an older ischemic insult within the right posterior  inferior cerebellar region. 3.  This examination is negative for acute intracranial pathology or short-term  interval changes dating back to 1/17/2022.       · 1/24 event noted intubated last night on ventilator back on Levofed

## 2022-01-24 NOTE — PROGRESS NOTES
CM reviewed clinical chart. Discharge disposition is home with home health provided by [facility]. CM will continue to follow.

## 2022-01-25 NOTE — PROGRESS NOTES
General Daily Progress Note          Patient Name:   Carlton Mak       YOB: 1940       Age:  80 y.o.       Admit Date: 1/21/2022      Subjective:     Patient is a 80y.o. year old female with signal past medical history of hypertension arthritis who discharged from the hospital yesterday in stable condition patient is admitted last admission with COVID-19 patient was asymptomatic all this time while in the hospital patient discharged on 2 L oxygen to skilled care but patient son want to go home with home health but is at home patient's saturations drops sent back to the ER seen by the ER physician patient placed on BiPAP patient was little hypotensive started on Levophed admitted for further work-up and treatment         Patient e hypoxemic and hypotension patient was intubated and started on Levophed drip    Patient is DNR    Patient on ventilator 80% O2  On fentanyl drip for sedation  On Levophed for hypotension  NG tube in place for medication and feeding      Objective:     Visit Vitals  BP (!) 123/59 (BP 1 Location: Right lower arm, BP Patient Position: At rest)   Pulse 73   Temp 98.1 °F (36.7 °C)   Resp 12   Ht 5' 5\" (1.651 m)   Wt 102.3 kg (225 lb 8.5 oz)   SpO2 98%   BMI 37.53 kg/m²        Recent Results (from the past 24 hour(s))   GLUCOSE, POC    Collection Time: 01/24/22 11:15 PM   Result Value Ref Range    Glucose (POC) 150 (H) 65 - 117 mg/dL    Performed by Nicolette Neumann    CBC WITH AUTOMATED DIFF    Collection Time: 01/25/22  4:50 AM   Result Value Ref Range    WBC 16.5 (H) 3.6 - 11.0 K/uL    RBC 3.43 (L) 3.80 - 5.20 M/uL    HGB 10.3 (L) 11.5 - 16.0 g/dL    HCT 33.4 (L) 35.0 - 47.0 %    MCV 97.4 80.0 - 99.0 FL    MCH 30.0 26.0 - 34.0 PG    MCHC 30.8 30.0 - 36.5 g/dL    RDW 14.6 (H) 11.5 - 14.5 %    PLATELET 055 205 - 232 K/uL    MPV 11.7 8.9 - 12.9 FL    NRBC 0.4 (H) 0.0  WBC    ABSOLUTE NRBC 0.07 (H) 0.00 - 0.01 K/uL    NEUTROPHILS 89 (H) 32 - 75 %    LYMPHOCYTES 5 (L) 12 - 49 %    MONOCYTES 3 (L) 5 - 13 %    EOSINOPHILS 0 0 - 7 %    BASOPHILS 0 0 - 1 %    IMMATURE GRANULOCYTES 3 (H) 0 - 0.5 %    ABS. NEUTROPHILS 14.7 (H) 1.8 - 8.0 K/UL    ABS. LYMPHOCYTES 0.8 0.8 - 3.5 K/UL    ABS. MONOCYTES 0.5 0.0 - 1.0 K/UL    ABS. EOSINOPHILS 0.0 0.0 - 0.4 K/UL    ABS. BASOPHILS 0.0 0.0 - 0.1 K/UL    ABS. IMM. GRANS. 0.4 (H) 0.00 - 0.04 K/UL    DF AUTOMATED     METABOLIC PANEL, COMPREHENSIVE    Collection Time: 01/25/22  4:50 AM   Result Value Ref Range    Sodium 149 (H) 136 - 145 mmol/L    Potassium 4.5 3.5 - 5.1 mmol/L    Chloride 119 (H) 97 - 108 mmol/L    CO2 26 21 - 32 mmol/L    Anion gap 4 (L) 5 - 15 mmol/L    Glucose 189 (H) 65 - 100 mg/dL    BUN 59 (H) 6 - 20 mg/dL    Creatinine 0.80 0.55 - 1.02 mg/dL    BUN/Creatinine ratio 74 (H) 12 - 20      GFR est AA >60 >60 ml/min/1.73m2    GFR est non-AA >60 >60 ml/min/1.73m2    Calcium 9.4 8.5 - 10.1 mg/dL    Bilirubin, total 0.3 0.2 - 1.0 mg/dL    AST (SGOT) 100 (H) 15 - 37 U/L    ALT (SGPT) 90 (H) 12 - 78 U/L    Alk.  phosphatase 61 45 - 117 U/L    Protein, total 6.5 6.4 - 8.2 g/dL    Albumin 2.0 (L) 3.5 - 5.0 g/dL    Globulin 4.5 (H) 2.0 - 4.0 g/dL    A-G Ratio 0.4 (L) 1.1 - 2.2     RENAL FUNCTION PANEL    Collection Time: 01/25/22  4:50 AM   Result Value Ref Range    Sodium 149 (H) 136 - 145 mmol/L    Potassium 4.6 3.5 - 5.1 mmol/L    Chloride 119 (H) 97 - 108 mmol/L    CO2 26 21 - 32 mmol/L    Anion gap 4 (L) 5 - 15 mmol/L    Glucose 181 (H) 65 - 100 mg/dL    BUN 61 (H) 6 - 20 mg/dL    Creatinine 0.79 0.55 - 1.02 mg/dL    BUN/Creatinine ratio 77 (H) 12 - 20      GFR est AA >60 >60 ml/min/1.73m2    GFR est non-AA >60 >60 ml/min/1.73m2    Calcium 9.5 8.5 - 10.1 mg/dL    Phosphorus 3.2 2.6 - 4.7 mg/dL    Albumin 2.1 (L) 3.5 - 5.0 g/dL   MAGNESIUM    Collection Time: 01/25/22  4:50 AM   Result Value Ref Range    Magnesium 2.6 (H) 1.6 - 2.4 mg/dL   BLOOD GAS, ARTERIAL    Collection Time: 01/25/22  5:10 AM   Result Value Ref Range    pH 7.32 (L) 7.35 - 7.45      PCO2 53 (H) 35 - 45 mmHg    PO2 54 (L) 75 - 100 mmHg    O2 SAT 86 (L) >95 %    BICARBONATE 25 22 - 26 mmol/L    BASE EXCESS 1.2 0 - 2 mmol/L    O2 METHOD VENT      FIO2 70.0 %    MODE Assist Control/Volume Control      Tidal volume 450      SET RATE 12      EPAP/CPAP/PEEP 10.0      SITE Right Radial      BEN'S TEST PASS      Critical value read back CALLED TO       [unfilled]      Review of Systems    Unable to obtain. Physical Exam:      Constitutional: Awake on BiPAP  HENT:   Head: Normocephalic and atraumatic. Eyes: Pupils are equal, round, and reactive to light. EOM are normal.   Cardiovascular: Normal rate, regular rhythm and normal heart sounds. Pulmonary/Chest: Breath sounds normal. No wheezes. No rales. Exhibits no tenderness. Abdominal: Soft. Bowel sounds are normal. There is no abdominal tenderness. There is no rebound and no guarding. Musculoskeletal: Normal range of motion. Neurological: pt is alert and oriented to person, place, and time. XR CHEST PORT   Final Result   Endotracheal tube tip at the maggie. Diffuse opacities in the   lungs, accentuated by lower lung volumes or increased. XR CHEST PORT   Final Result   Diffuse opacities in the lungs, not significantly changed. XR CHEST PORT   Final Result   1. ETT terminating 2 cm above the maggie. Remaining support apparatus as above. 2.  Worsening bilateral airspace disease with developing ARDS not excluded. XR CHEST PORT   Final Result   Moderate patchy diffuse bilateral airspace opacities, waxing and waning from the   prior exam.      XR CHEST PORT   Final Result   Moderate diffuse bilateral airspace opacities, likely a combination of edema and   airspace disease, mildly increased from the previous exam.      CT HEAD WO CONT   Final Result   1. There are milder microvascular changes within the central periventricular   white matter.    2.  There is an area of diminished density within the inferior aspect of the   right cerebellar hemisphere without change (series 201 image number 12). These   findings may represent an older ischemic insult within the right posterior   inferior cerebellar region. 3.  This examination is negative for acute intracranial pathology or short-term   interval changes dating back to 1/17/2022. XR CHEST PORT   Final Result   Patchy mixed asymmetric lung disease appears somewhat better but the   lungs are better inflated. No effusion or pneumothorax. Normal heart and   mediastinum      IR INSERT NON TUNL CVC OVER 5 YRS   Final Result   Ultrasound of the right neck demonstrated patent IJV. Successful US-guided placement of a right internal jugular triple lumen central   venous catheter as described above. The catheter is functioning. PLAN:   Catheter position was confirmed by follow-up chest x-ray: catheter tip in the   lower SVC and ready to use. IR US GUIDED VASCULAR ACCESS   Final Result   Ultrasound of the right neck demonstrated patent IJV. Successful US-guided placement of a right internal jugular triple lumen central   venous catheter as described above. The catheter is functioning. PLAN:   Catheter position was confirmed by follow-up chest x-ray: catheter tip in the   lower SVC and ready to use. XR CHEST PORT   Final Result   Findings/impression:      Diffuse interstitial airspace disease/edema. No definite pleural effusion or   pneumothorax. Cardiac contours are partially obscured. No acute osseous abnormality identified.       XR CHEST PORT    (Results Pending)        Recent Results (from the past 24 hour(s))   GLUCOSE, POC    Collection Time: 01/24/22 11:15 PM   Result Value Ref Range    Glucose (POC) 150 (H) 65 - 117 mg/dL    Performed by Arian Reynolds    CBC WITH AUTOMATED DIFF    Collection Time: 01/25/22  4:50 AM   Result Value Ref Range    WBC 16.5 (H) 3.6 - 11.0 K/uL    RBC 3.43 (L) 3.80 - 5.20 M/uL HGB 10.3 (L) 11.5 - 16.0 g/dL    HCT 33.4 (L) 35.0 - 47.0 %    MCV 97.4 80.0 - 99.0 FL    MCH 30.0 26.0 - 34.0 PG    MCHC 30.8 30.0 - 36.5 g/dL    RDW 14.6 (H) 11.5 - 14.5 %    PLATELET 351 452 - 072 K/uL    MPV 11.7 8.9 - 12.9 FL    NRBC 0.4 (H) 0.0  WBC    ABSOLUTE NRBC 0.07 (H) 0.00 - 0.01 K/uL    NEUTROPHILS 89 (H) 32 - 75 %    LYMPHOCYTES 5 (L) 12 - 49 %    MONOCYTES 3 (L) 5 - 13 %    EOSINOPHILS 0 0 - 7 %    BASOPHILS 0 0 - 1 %    IMMATURE GRANULOCYTES 3 (H) 0 - 0.5 %    ABS. NEUTROPHILS 14.7 (H) 1.8 - 8.0 K/UL    ABS. LYMPHOCYTES 0.8 0.8 - 3.5 K/UL    ABS. MONOCYTES 0.5 0.0 - 1.0 K/UL    ABS. EOSINOPHILS 0.0 0.0 - 0.4 K/UL    ABS. BASOPHILS 0.0 0.0 - 0.1 K/UL    ABS. IMM. GRANS. 0.4 (H) 0.00 - 0.04 K/UL    DF AUTOMATED     METABOLIC PANEL, COMPREHENSIVE    Collection Time: 01/25/22  4:50 AM   Result Value Ref Range    Sodium 149 (H) 136 - 145 mmol/L    Potassium 4.5 3.5 - 5.1 mmol/L    Chloride 119 (H) 97 - 108 mmol/L    CO2 26 21 - 32 mmol/L    Anion gap 4 (L) 5 - 15 mmol/L    Glucose 189 (H) 65 - 100 mg/dL    BUN 59 (H) 6 - 20 mg/dL    Creatinine 0.80 0.55 - 1.02 mg/dL    BUN/Creatinine ratio 74 (H) 12 - 20      GFR est AA >60 >60 ml/min/1.73m2    GFR est non-AA >60 >60 ml/min/1.73m2    Calcium 9.4 8.5 - 10.1 mg/dL    Bilirubin, total 0.3 0.2 - 1.0 mg/dL    AST (SGOT) 100 (H) 15 - 37 U/L    ALT (SGPT) 90 (H) 12 - 78 U/L    Alk.  phosphatase 61 45 - 117 U/L    Protein, total 6.5 6.4 - 8.2 g/dL    Albumin 2.0 (L) 3.5 - 5.0 g/dL    Globulin 4.5 (H) 2.0 - 4.0 g/dL    A-G Ratio 0.4 (L) 1.1 - 2.2     RENAL FUNCTION PANEL    Collection Time: 01/25/22  4:50 AM   Result Value Ref Range    Sodium 149 (H) 136 - 145 mmol/L    Potassium 4.6 3.5 - 5.1 mmol/L    Chloride 119 (H) 97 - 108 mmol/L    CO2 26 21 - 32 mmol/L    Anion gap 4 (L) 5 - 15 mmol/L    Glucose 181 (H) 65 - 100 mg/dL    BUN 61 (H) 6 - 20 mg/dL    Creatinine 0.79 0.55 - 1.02 mg/dL    BUN/Creatinine ratio 77 (H) 12 - 20      GFR est AA >60 >60 ml/min/1.73m2    GFR est non-AA >60 >60 ml/min/1.73m2    Calcium 9.5 8.5 - 10.1 mg/dL    Phosphorus 3.2 2.6 - 4.7 mg/dL    Albumin 2.1 (L) 3.5 - 5.0 g/dL   MAGNESIUM    Collection Time: 01/25/22  4:50 AM   Result Value Ref Range    Magnesium 2.6 (H) 1.6 - 2.4 mg/dL   BLOOD GAS, ARTERIAL    Collection Time: 01/25/22  5:10 AM   Result Value Ref Range    pH 7.32 (L) 7.35 - 7.45      PCO2 53 (H) 35 - 45 mmHg    PO2 54 (L) 75 - 100 mmHg    O2 SAT 86 (L) >95 %    BICARBONATE 25 22 - 26 mmol/L    BASE EXCESS 1.2 0 - 2 mmol/L    O2 METHOD VENT      FIO2 70.0 %    MODE Assist Control/Volume Control      Tidal volume 450      SET RATE 12      EPAP/CPAP/PEEP 10.0      SITE Right Radial      BEN'S TEST PASS      Critical value read back CALLED TO         Results     Procedure Component Value Units Date/Time    MRSA SCREEN - PCR (NASAL) [[id number]] Collected: 01/22/22 0440    Order Status: Canceled Specimen: Swab     MRSA SCREEN - PCR (NASAL) [[id number]] Collected: 01/22/22 0300    Order Status: Canceled Specimen: Swab     CULTURE, BLOOD #1 [[id number]]     Order Status: Canceled Specimen: Blood     CULTURE, BLOOD #2 [[id number]]     Order Status: Canceled Specimen: Blood     CULTURE, BLOOD, PAIRED [[id number]] Collected: 01/21/22 0755    Order Status: Completed Specimen: Blood Updated: 01/24/22 0840     Special Requests: No Special Requests        Culture result: No growth 2 days       CULTURE, BLOOD #1 [[id number]] Collected: 01/18/22 0198    Order Status: Completed Specimen: Blood Updated: 01/24/22 0840     Special Requests: No Special Requests        Culture result: No growth 5 days       COVID-19 RAPID TEST [[id number]]  (Abnormal) Collected: 01/17/22 1136    Order Status: Completed Specimen: Nasopharyngeal Updated: 01/17/22 1201     Specimen source       Please find results under separate order           COVID-19 rapid test DETECTED        Comment: Rapid [employer] ID Now   The specimen is POSITIVE for SARS-CoV-2, the novel coronavirus associated with COVID-19. This test has been authorized by the FDA under an Emergency Use Authorization (EUA) for use by authorized laboratories. Fact sheet for Healthcare Providers: ConventionUpdate.co.nz Fact sheet for Patients: ConventionUpdate.co.nz   Methodology: Isothermal Nucleic Acid Amplification Results verified, phoned to and read back by Mariela Stark, 12:00   1/17/22 LBO         INFLUENZA A & B AG (RAPID TEST) [[id number]] Collected: 01/17/22 1136    Order Status: Completed Specimen: Nasopharyngeal from Nasal washing Updated: 01/17/22 1202     Influenza A Antigen Negative        Influenza B Antigen Negative       CULTURE, BLOOD #2 [[id number]]  (Abnormal) Collected: 01/17/22 0712    Order Status: Completed Specimen: Blood Updated: 01/20/22 0946     Special Requests: No Special Requests        Culture result:       Staphylococcus species, coagulase negative growing in 1 of 4 bottles drawn NO SITE INDICATED            (NOTE) [facility] GROWING IN 1 OF 2 BOTTLES CALLED TO [address] [place] [address] AT 0831 ON 1/19/22. JF           Labs:     Recent Labs     01/25/22 0450 01/24/22 0410   WBC 16.5* 11.2*   HGB 10.3* 10.6*   HCT 33.4* 33.4*    236     Recent Labs     01/25/22 0450 01/24/22 0410 01/23/22  0541   *  149* 149* 150*   K 4.6  4.5 4.4 3.4*   *  119* 119* 119*   CO2 26  26 24 24   BUN 61*  59* 51* 41*   CREA 0.79  0.80 0.97 0.71   *  189* 237* 222*   CA 9.5  9.4 9.5 9.3   MG 2.6*  --   --    PHOS 3.2  --   --      Recent Labs     01/25/22 0450 01/24/22 0410 01/23/22  0541   ALT 90* 106* 85*   AP 61 57 59   TBILI 0.3 0.4 0.4   TP 6.5 6.6 6.8   ALB 2.1*  2.0* 2.2* 2.2*   GLOB 4.5* 4.4* 4.6*     No results for input(s): INR, PTP, APTT, INREXT, INREXT in the last 72 hours. No results for input(s): FE, TIBC, PSAT, FERR in the last 72 hours.    No results found for: FOL, RBCF   Recent Labs     01/25/22  0510 01/23/22  1240   PH 7.32* 7.30*   PCO2 53* 48*   PO2 54* 269*     No results for input(s): CPK, CKNDX, TROIQ in the last 72 hours.     No lab exists for component: CPKMB  No results found for: CHOL, CHOLX, CHLST, CHOLV, HDL, HDLP, LDL, LDLC, DLDLP, TGLX, TRIGL, TRIGP, CHHD, CHHDX  Lab Results   Component Value Date/Time    Glucose (POC) 150 (H) 01/24/2022 11:15 PM    Glucose (POC) 241 (H) 01/21/2022 02:45 AM     Lab Results   Component Value Date/Time    Color Yellow/Straw 01/23/2022 10:30 AM    Appearance Turbid (A) 01/23/2022 10:30 AM    Specific gravity 1.027 01/23/2022 10:30 AM    pH (UA) 6.0 01/23/2022 10:30 AM    Protein 100 (A) 01/23/2022 10:30 AM    Glucose 50 (A) 01/23/2022 10:30 AM    Ketone 5 (A) 01/23/2022 10:30 AM    Bilirubin Negative 01/23/2022 10:30 AM    Urobilinogen 4.0 (H) 01/23/2022 10:30 AM    Nitrites Negative 01/23/2022 10:30 AM    Leukocyte Esterase Negative 01/23/2022 10:30 AM    Bacteria Negative 01/23/2022 10:30 AM    WBC 0-4 01/23/2022 10:30 AM    RBC 0-5 01/23/2022 10:30 AM         Assessment:     Acute hypoxemic respiratory failure on on ventilator 80% of   COVID-19 pneumonitis   hypotension     Hypertension  Arthritis  Hypokalemia  Hyperenatremia      Plan:     Zithromax 500 mg IV daily  Olumiant 2 mg daily  Decadron 4 mg every 6 hours  Meropenem 1 g every 8 hours    Follow-up with pulmonologist and nephrologist    Overall prognosis    Patient is DNR  Discussed with the patient's son    Repeat the lab      Current Facility-Administered Medications:     furosemide (LASIX) injection 40 mg, 40 mg, IntraVENous, ONCE, Karan Flower MD    fentaNYL (PF) 1,500 mcg/30 mL (50 mcg/mL) infusion, 0-200 mcg/hr, IntraVENous, TITRATE, Allauddin, Karan, MD, Last Rate: 2 mL/hr at 01/25/22 0532, 100 mcg/hr at 01/25/22 0532    hydrOXYzine pamoate (VISTARIL) capsule 25 mg, 25 mg, Oral, Q4H PRN, Polo Lopez MD    propofol (DIPRIVAN) 10 mg/mL infusion, 0-50 mcg/kg/min, IntraVENous, TITRATE, Georgia Chatman MD, Last Rate: 27.2 mL/hr at 01/25/22 0842, 50 mcg/kg/min at 01/25/22 0842    dexmedeTOMidine (PRECEDEX) 400 mcg in 0.9% sodium chloride (MBP/ADV) 100 mL MBP, 0.1-1.5 mcg/kg/hr, IntraVENous, TITRATE, Karan Flower MD, Stopped at 01/24/22 0824    NOREPINephrine (LEVOPHED) 8 mg in 0.9% NS 250ml infusion, 0.5-16 mcg/min, IntraVENous, TITRATE, Johnathan Lopez Res, MD, Last Rate: 3.8 mL/hr at 01/24/22 0825, 2 mcg/min at 01/24/22 0825    acetaminophen (TYLENOL) tablet 650 mg, 650 mg, Oral, Q6H PRN **OR** acetaminophen (TYLENOL) suppository 650 mg, 650 mg, Rectal, Q6H PRN, Johnathan Lopez Res, MD    polyethylene glycol (MIRALAX) packet 17 g, 17 g, Oral, DAILY PRN, Johnathan Lopez Res, MD    ondansetron (ZOFRAN ODT) tablet 4 mg, 4 mg, Oral, Q8H PRN **OR** [DISCONTINUED] ondansetron (ZOFRAN) injection 4 mg, 4 mg, IntraVENous, Q6H PRN, Polo Lopez MD    enoxaparin (LOVENOX) injection 40 mg, 40 mg, SubCUTAneous, DAILY, Polo Lopez MD, 40 mg at 01/24/22 0813    baricitinib (OLUMIANT) tablet 2 mg, 2 mg, Oral, DAILY, Polo Lopez MD, 2 mg at 01/24/22 0814    albuterol (PROVENTIL HFA, VENTOLIN HFA, PROAIR HFA) inhaler 2 Puff, 2 Puff, Inhalation, Q6H PRN, Johnathan Lopez Res, MD    polyethylene glycol (MIRALAX) packet 17 g, 17 g, Oral, DAILY PRN, Earlene Rea MD    [DISCONTINUED] ondansetron (ZOFRAN ODT) tablet 4 mg, 4 mg, Oral, Q8H PRN **OR** ondansetron (ZOFRAN) injection 4 mg, 4 mg, IntraVENous, Q6H PRN, Polo Lopez MD    meropenem (MERREM) 1 g in 0.9% sodium chloride 20 mL IV syringe, 1 g, IntraVENous, Q8H, Polo Lopez MD, 1 g at 01/25/22 0022    dexamethasone (DECADRON) 4 mg/mL injection 4 mg, 4 mg, IntraVENous, Q6H, Pool Lopez MD, 4 mg at 01/25/22 0535    azithromycin (ZITHROMAX) 500 mg in 0.9% sodium chloride 250 mL (VIAL-MATE), 500 mg, IntraVENous, Q24H, Polo Lopez MD, Last Rate: 250 mL/hr at 01/24/22 0815, 500 mg at 01/24/22 0815    hydrOXYzine (VISTARIL) injection 25 mg, 25 mg, IntraMUSCular, Q6H PRN, Polo Lopez MD, 25 mg at 01/22/22 0111

## 2022-01-25 NOTE — PROGRESS NOTES
Bedside and Verbal shift change report given to Ramandeep Bah RN (oncoming nurse) by Thea Hsieh RN  (offgoing nurse). Report included the following information SBAR, Kardex, Intake/Output, MAR, Recent Results and Dual Neuro Assessment, cardiac rhythm.

## 2022-01-25 NOTE — PROGRESS NOTES
IMPRESSION:   1. Acute hypoxic respiratory  2. COVID-19 pneumonia  3. Sepsis with septic shock  4. Arthritis hypertension by hx  5. Hypokalemia  6. Hypernatremia  7. Additional workup outlined below  8. Pt is at high risk of sudden decline and decompensation with life threatening consequenses and continued end organ dysfunction and failure  9. Pt is critically ill. Time spent with pt and staff actively rendering care, managing pt and coordinating care as stated below; 30 minutes, exclusive of any procedures      RECOMMENDATIONS/PLAN:   1. ICU monitoring  1. Patient condition got worse on 1/24 went to asystole subsequently got intubated now on ventilator assist control mode sedated with propofol and fentanyl Precedex was discontinued because of bradycardia arterial blood gases showed PCO2 elevated and low PO2 is on 70% FiO2  2. Patient is on dexamethasone and baricitinib  3. Patient is back on Levophed  4. Potassium corrected  5. Start on IV fluid D5W   6. Troponin is elevated  7. Lactic acid 3.7  8. Chest x-ray shows diffuse bilateral infiltrate with congestive changes  9. She is on meropenem and Zithromax  10. CVP monitoring hemodynamically unstable  11. IV vasopressors for circulatory shock refractory to fluids to maintain SBP> 90  12. Transfuse prn to maintain Hgb > 7  13. Labs to follow electrolytes, renal function and and blood counts  14. Bronchial hygiene with respiratory therapy techniques, bronchodilators  15. Pt needs IV fluids with additives and Drug therapy requiring intensive monitoring for toxicity  16. Prescription drug management with home med reconciliation reviewed  17. DVT, SUP prophylaxis  18.  Will be available to assist in medical management while in the CCU pending disposition     [x] High complexity decision making was performed  [x] See my orders for details  HPI  30-year-old lady came in because of shortness of breath and dyspnea she has significant past medical history of arthritis hypertension she was recently discharged from the hospital came back with worsening of hypoxia she was discharged on oxygen 2 L nasal cannula and patient condition got worse she was supposed to be discharged to skilled care but patient's son took her home where her condition got worse and she was transferred back to the hospital now she is on noninvasive ventilator BiPAP machine hemodynamically unstable hypotensive on vasopressors and not giving much history acutely ill so critical care consult was called  PMH:  has no past medical history on file. PSH:   has a past surgical history that includes ir insert non tunl cvc over 5 yrs (1/21/2022). FHX: family history is not on file.      SHX:      ALL:   Allergies   Allergen Reactions    Penicillins Hives        MEDS:   [x] Reviewed - As Below   [] Not reviewed    Current Facility-Administered Medications   Medication    fentaNYL (PF) 1,500 mcg/30 mL (50 mcg/mL) infusion    hydrOXYzine pamoate (VISTARIL) capsule 25 mg    propofol (DIPRIVAN) 10 mg/mL infusion    dexmedeTOMidine (PRECEDEX) 400 mcg in 0.9% sodium chloride (MBP/ADV) 100 mL MBP    NOREPINephrine (LEVOPHED) 8 mg in 0.9% NS 250ml infusion    acetaminophen (TYLENOL) tablet 650 mg    Or    acetaminophen (TYLENOL) suppository 650 mg    polyethylene glycol (MIRALAX) packet 17 g    ondansetron (ZOFRAN ODT) tablet 4 mg    enoxaparin (LOVENOX) injection 40 mg    baricitinib (OLUMIANT) tablet 2 mg    albuterol (PROVENTIL HFA, VENTOLIN HFA, PROAIR HFA) inhaler 2 Puff    polyethylene glycol (MIRALAX) packet 17 g    ondansetron (ZOFRAN) injection 4 mg    meropenem (MERREM) 1 g in 0.9% sodium chloride 20 mL IV syringe    dexamethasone (DECADRON) 4 mg/mL injection 4 mg    azithromycin (ZITHROMAX) 500 mg in 0.9% sodium chloride 250 mL (VIAL-MATE)    hydrOXYzine (VISTARIL) injection 25 mg      MAR reviewed and pertinent medications noted or modified as needed   Current Facility-Administered Medications   Medication    fentaNYL (PF) 1,500 mcg/30 mL (50 mcg/mL) infusion    hydrOXYzine pamoate (VISTARIL) capsule 25 mg    propofol (DIPRIVAN) 10 mg/mL infusion    dexmedeTOMidine (PRECEDEX) 400 mcg in 0.9% sodium chloride (MBP/ADV) 100 mL MBP    NOREPINephrine (LEVOPHED) 8 mg in 0.9% NS 250ml infusion    acetaminophen (TYLENOL) tablet 650 mg    Or    acetaminophen (TYLENOL) suppository 650 mg    polyethylene glycol (MIRALAX) packet 17 g    ondansetron (ZOFRAN ODT) tablet 4 mg    enoxaparin (LOVENOX) injection 40 mg    baricitinib (OLUMIANT) tablet 2 mg    albuterol (PROVENTIL HFA, VENTOLIN HFA, PROAIR HFA) inhaler 2 Puff    polyethylene glycol (MIRALAX) packet 17 g    ondansetron (ZOFRAN) injection 4 mg    meropenem (MERREM) 1 g in 0.9% sodium chloride 20 mL IV syringe    dexamethasone (DECADRON) 4 mg/mL injection 4 mg    azithromycin (ZITHROMAX) 500 mg in 0.9% sodium chloride 250 mL (VIAL-MATE)    hydrOXYzine (VISTARIL) injection 25 mg      PMH:  has no past medical history on file. PSH:   has a past surgical history that includes ir insert non tunl cvc over 5 yrs (2022). FHX: family history is not on file. SHX:       ROS:  Unable to obtain    Hemodynamics:    CO:    CI:    CVP:    SVR:   PAP Systolic:    PAP Diastolic:    PVR:    GE72:        Ventilator Settings:      Mode Rate TV Press PEEP FiO2 PIP Min.  Vent   Assist control,Volume control    450 ml    5 cm H20 80 %  33 cm H2O  5.48 l/min        Vital Signs: Telemetry:    normal sinus rhythm Intake/Output:   Visit Vitals  BP (!) 123/59 (BP 1 Location: Right lower arm, BP Patient Position: At rest)   Pulse 73   Temp 98.1 °F (36.7 °C)   Resp 12   Ht 5' 5\" (1.651 m)   Wt 102.3 kg (225 lb 8.5 oz)   SpO2 98%   BMI 37.53 kg/m²       Temp (24hrs), Av.7 °F (36.5 °C), Min:97.4 °F (36.3 °C), Max:98.1 °F (36.7 °C)        O2 Device: Ventilator         Wt Readings from Last 4 Encounters:   22 102.3 kg (225 lb 8.5 oz)   22 90.7 kg (200 lb)          Intake/Output Summary (Last 24 hours) at 1/25/2022 0813  Last data filed at 1/25/2022 0600  Gross per 24 hour   Intake 3385.62 ml   Output 1250 ml   Net 2135.62 ml       Last shift:      No intake/output data recorded. Last 3 shifts: 01/23 1901 - 01/25 0700  In: 7139.4 [I.V.:4859.4]  Out: 1330 [Urine:1330]       Physical Exam:     General: Intubated on ventilator  HEENT: NCAT, poor dentition, lips and mucosa dry  Eyes: anicteric; conjunctiva clear  Neck: no nodes, , trach midline; no accessory MM use. Chest: no deformity,   Cardiac: R regular; no murmur;   Lungs: distant breath sounds; no wheezes  Abd: soft, NT, hypoactive BS  Ext: no edema; no joint swelling;  No clubbing  : NO [place], clear urine  Neuro: \"alert\"  Psych- + agitation,   Skin: warm, dry, no cyanosis;   Pulses: 1-2+ Bilateral pedal, radial  Capillary: brisk; pale      DATA:    MAR reviewed and pertinent medications noted or modified as needed  MEDS:   Current Facility-Administered Medications   Medication    fentaNYL (PF) 1,500 mcg/30 mL (50 mcg/mL) infusion    hydrOXYzine pamoate (VISTARIL) capsule 25 mg    propofol (DIPRIVAN) 10 mg/mL infusion    dexmedeTOMidine (PRECEDEX) 400 mcg in 0.9% sodium chloride (MBP/ADV) 100 mL MBP    NOREPINephrine (LEVOPHED) 8 mg in 0.9% NS 250ml infusion    acetaminophen (TYLENOL) tablet 650 mg    Or    acetaminophen (TYLENOL) suppository 650 mg    polyethylene glycol (MIRALAX) packet 17 g    ondansetron (ZOFRAN ODT) tablet 4 mg    enoxaparin (LOVENOX) injection 40 mg    baricitinib (OLUMIANT) tablet 2 mg    albuterol (PROVENTIL HFA, VENTOLIN HFA, PROAIR HFA) inhaler 2 Puff    polyethylene glycol (MIRALAX) packet 17 g    ondansetron (ZOFRAN) injection 4 mg    meropenem (MERREM) 1 g in 0.9% sodium chloride 20 mL IV syringe    dexamethasone (DECADRON) 4 mg/mL injection 4 mg    azithromycin (ZITHROMAX) 500 mg in 0.9% sodium chloride 250 mL (VIAL-MATE)    hydrOXYzine (VISTARIL) injection 25 mg        Labs:    Recent Labs     01/25/22  0450 01/24/22  0410 01/23/22  0541   WBC 16.5* 11.2* 10.6   HGB 10.3* 10.6* 10.6*    236 304     Recent Labs     01/25/22  0450 01/24/22  0410 01/23/22  0541   *  149* 149* 150*   K 4.6  4.5 4.4 3.4*   *  119* 119* 119*   CO2 26  26 24 24   *  189* 237* 222*   BUN 61*  59* 51* 41*   CREA 0.79  0.80 0.97 0.71   CA 9.5  9.4 9.5 9.3   MG 2.6*  --   --    PHOS 3.2  --   --    ALB 2.1*  2.0* 2.2* 2.2*   ALT 90* 106* 85*     Recent Labs     01/25/22  0510 01/23/22  1240 01/23/22  1115   PH 7.32* 7.30* 7.33*   PCO2 53* 48* 44   PO2 54* 269* 46*   HCO3 25 22 22   FIO2 70.0 100 100.0     No results for input(s): CPK, CKNDX, TROIQ in the last 72 hours. No lab exists for component: CPKMB  No results found for: BNPP, BNP   Lab Results   Component Value Date/Time    Culture result: No growth 2 days 01/21/2022 07:55 AM    Culture result: No growth 5 days 01/18/2022 07:12 AM    Culture result: (A) 01/17/2022 07:12 AM     Staphylococcus species, coagulase negative growing in 1 of 4 bottles drawn NO SITE INDICATED    Culture result:  01/17/2022 07:12 AM     (NOTE) [facility] IN [facility] GROWING IN 1 OF 2 BOTTLES CALLED TO JOSE MIGUEL PAGAN AT 0831 ON 1/19/22. JF     No results found for: TSH, TSHEXT, TSHEXT     Imaging:    Results from Hospital Encounter encounter on 01/21/22    XR CHEST PORT    Narrative  Chest, frontal view, 1/25/2022    History: Covid. Comparison: Including chest 1/24/2022. Findings: Right internal jugular catheter tip is near the SVC/right atrial  junction. Endotracheal tube tip is at the maggie and could be retracted 2 to 3  cm as clinically warranted. Feeding tube is seen to the proximal stomach; the  tip is excluded from the field-of-view. The cardiac silhouette is stable. Lung  volumes are decreased as compared to the study performed one day prior.   Diffuse  opacities in the lungs are accentuated by lower lung volumes or increased. No  pneumothorax or pleural effusion is definitively identified. The osseous  structures are stable. Impression  Endotracheal tube tip at the maggie. Diffuse opacities in the  lungs, accentuated by lower lung volumes or increased. Results from [place] encounter on 01/21/22    CT HEAD WO CONT    Narrative  The study is a noncontrasted head CT examination dated 1/21/2022. HISTORY: Unresponsive. TECHNIQUE: Thin section axial imaging was performed followed by sagittal and  coronal reconstructed imaging. Dose Reduction Technique was employed to reduce radiation exposure - This  includes reduction optimization techniques as appropriate to a performed exam  with automated exposure control adjustments of the mA and/or Kv according to  patient size, or use of iterative reconstruction technique. COMPARISON: CT head dated 1/17/2022. There is an appropriate size to the ventricles, the deep cortical sulci, and the  sylvian fissures for the patient's age. This examination is negative for  intracranial hemorrhage, mass effect or an extra axial collection. The  gray-white matter junctions are preserved without cytotoxic or vasogenic edema. An assessment of the white matter tracts demonstrates a mild decrease in the  density characteristics of the central periventricular white matter. These  findings are consistent with expected aging changes and milder microvascular  disease. There also is a region of diminished density within the right posterior  inferior cerebellar hemisphere which may represent an older cerebrovascular  insult. ORBITS: This examination is negative for acute orbital pathology. PARANASAL SINUSES: The paranasal sinuses are well pneumatized without acute  sinus disease. There is a decreased number of right sided mastoid air cells which can be  associated with chronic mastoiditis.  The craniocervical junction images in a  normal fashion. Impression  1. There are milder microvascular changes within the central periventricular  white matter. 2.  There is an area of diminished density within the inferior aspect of the  right cerebellar hemisphere without change (series 201 image number 12). These  findings may represent an older ischemic insult within the right posterior  inferior cerebellar region. 3.  This examination is negative for acute intracranial pathology or short-term  interval changes dating back to 1/17/2022.       · 1/24 event noted intubated last night on ventilator back on Levofed  · 1/25 remain intubated will change vent settings will give 1 dose of Lasix already on vasopressor

## 2022-01-26 NOTE — PROGRESS NOTES
Bedside and Verbal shift change report given to Christian Mei RN (oncoming nurse) by Coty Nova RN (offgoing nurse). Report included the following information SBAR, Kardex, Intake/Output, Recent Results and Dual Neuro Assessment, cardiac rhythm.

## 2022-01-26 NOTE — PROGRESS NOTES
IMPRESSION:   1. Acute hypoxic respiratory  2. COVID-19 pneumonia  3. Sepsis with septic shock  4. Arthritis hypertension by hx  5. Hypokalemia  6. Hypernatremia  7. Additional workup outlined below  8. Pt is at high risk of sudden decline and decompensation with life threatening consequenses and continued end organ dysfunction and failure  9. Pt is critically ill. Time spent with pt and staff actively rendering care, managing pt and coordinating care as stated below; 30 minutes, exclusive of any procedures      RECOMMENDATIONS/PLAN:   1. ICU monitoring  1. Patient condition got worse on 1/24 went to asystole subsequently got intubated now on ventilator assist control mode sedated with propofol and fentanyl Precedex was discontinued because of bradycardia arterial blood gases acceptable  will decrease FiO2 and will increase the rate  2. Patient is on dexamethasone and baricitinib  3. Patient is off Levophed  4. Potassium corrected  5. Start on IV fluid D5W and also he is getting free water through the PEG tube will decrease to 100 every 4 hours sodium has corrected now his potassium is elevated will repeat labs  6. Troponin is elevated  7. Lactic acid 3.7  8. Chest x-ray shows diffuse bilateral infiltrate with congestive changes  9. She is on meropenem and Zithromax  10. CVP monitoring hemodynamically unstable  11. IV vasopressors for circulatory shock refractory to fluids to maintain SBP> 90  12. Transfuse prn to maintain Hgb > 7  13. Labs to follow electrolytes, renal function and and blood counts  14. Bronchial hygiene with respiratory therapy techniques, bronchodilators  15. Pt needs IV fluids with additives and Drug therapy requiring intensive monitoring for toxicity  16. Prescription drug management with home med reconciliation reviewed  17. DVT, SUP prophylaxis  18.  Will be available to assist in medical management while in the CCU pending disposition     [x] High complexity decision making was performed  [x] See my orders for details  HPI  68-year-old lady came in because of shortness of breath and dyspnea she has significant past medical history of arthritis hypertension she was recently discharged from the hospital came back with worsening of hypoxia she was discharged on oxygen 2 L nasal cannula and patient condition got worse she was supposed to be discharged to skilled care but patient's son took her home where her condition got worse and she was transferred back to the hospital now she is on noninvasive ventilator BiPAP machine hemodynamically unstable hypotensive on vasopressors and not giving much history acutely ill so critical care consult was called  PMH:  has no past medical history on file. PSH:   has a past surgical history that includes ir insert non tunl cvc over 5 yrs (1/21/2022). FHX: family history is not on file.      SHX:      ALL:   Allergies   Allergen Reactions    Penicillins Hives        MEDS:   [x] Reviewed - As Below   [] Not reviewed    Current Facility-Administered Medications   Medication    fentaNYL (PF) 1,500 mcg/30 mL (50 mcg/mL) infusion    hydrOXYzine pamoate (VISTARIL) capsule 25 mg    propofol (DIPRIVAN) 10 mg/mL infusion    dexmedeTOMidine (PRECEDEX) 400 mcg in 0.9% sodium chloride (MBP/ADV) 100 mL MBP    NOREPINephrine (LEVOPHED) 8 mg in 0.9% NS 250ml infusion    acetaminophen (TYLENOL) tablet 650 mg    Or    acetaminophen (TYLENOL) suppository 650 mg    polyethylene glycol (MIRALAX) packet 17 g    ondansetron (ZOFRAN ODT) tablet 4 mg    enoxaparin (LOVENOX) injection 40 mg    baricitinib (OLUMIANT) tablet 2 mg    albuterol (PROVENTIL HFA, VENTOLIN HFA, PROAIR HFA) inhaler 2 Puff    polyethylene glycol (MIRALAX) packet 17 g    ondansetron (ZOFRAN) injection 4 mg    meropenem (MERREM) 1 g in 0.9% sodium chloride 20 mL IV syringe    dexamethasone (DECADRON) 4 mg/mL injection 4 mg    hydrOXYzine (VISTARIL) injection 25 mg      MAR reviewed and pertinent medications noted or modified as needed   Current Facility-Administered Medications   Medication    fentaNYL (PF) 1,500 mcg/30 mL (50 mcg/mL) infusion    hydrOXYzine pamoate (VISTARIL) capsule 25 mg    propofol (DIPRIVAN) 10 mg/mL infusion    dexmedeTOMidine (PRECEDEX) 400 mcg in 0.9% sodium chloride (MBP/ADV) 100 mL MBP    NOREPINephrine (LEVOPHED) 8 mg in 0.9% NS 250ml infusion    acetaminophen (TYLENOL) tablet 650 mg    Or    acetaminophen (TYLENOL) suppository 650 mg    polyethylene glycol (MIRALAX) packet 17 g    ondansetron (ZOFRAN ODT) tablet 4 mg    enoxaparin (LOVENOX) injection 40 mg    baricitinib (OLUMIANT) tablet 2 mg    albuterol (PROVENTIL HFA, VENTOLIN HFA, PROAIR HFA) inhaler 2 Puff    polyethylene glycol (MIRALAX) packet 17 g    ondansetron (ZOFRAN) injection 4 mg    meropenem (MERREM) 1 g in 0.9% sodium chloride 20 mL IV syringe    dexamethasone (DECADRON) 4 mg/mL injection 4 mg    hydrOXYzine (VISTARIL) injection 25 mg      PMH:  has no past medical history on file. PSH:   has a past surgical history that includes ir insert non tunl cvc over 5 yrs (2022). FHX: family history is not on file. SHX:       ROS:  Unable to obtain    Hemodynamics:    CO:    CI:    CVP:    SVR:   PAP Systolic:    PAP Diastolic:    PVR:    LO07:        Ventilator Settings:      Mode Rate TV Press PEEP FiO2 PIP Min.  Vent   Assist control,Volume control    450 ml    5 cm H20 70 %  38 cm H2O  5.62 l/min        Vital Signs: Telemetry:    normal sinus rhythm Intake/Output:   Visit Vitals  /64 (BP 1 Location: Right lower arm, BP Patient Position: At rest)   Pulse 71   Temp 96.8 °F (36 °C)   Resp 13   Ht 5' 5\" (1.651 m)   Wt 102.9 kg (226 lb 13.7 oz)   SpO2 96%   BMI 37.75 kg/m²       Temp (24hrs), Av.9 °F (36.6 °C), Min:96.8 °F (36 °C), Max:98.6 °F (37 °C)        O2 Device: Endotracheal tube,Ventilator         Wt Readings from Last 4 Encounters:   22 102.9 kg (226 lb 13.7 oz) 01/17/22 90.7 kg (200 lb)          Intake/Output Summary (Last 24 hours) at 1/26/2022 0856  Last data filed at 1/26/2022 0700  Gross per 24 hour   Intake 3321.64 ml   Output 1350 ml   Net 1971.64 ml       Last shift:      No intake/output data recorded. Last 3 shifts: 01/24 1901 - 01/26 0700  In: 4977.6 [I.V.:1687.6]  Out: 1750 [Urine:1750]       Physical Exam:     General: Intubated on ventilator  HEENT: NCAT, poor dentition, lips and mucosa dry  Eyes: anicteric; conjunctiva clear  Neck: no nodes, , trach midline; no accessory MM use. Chest: no deformity,   Cardiac: R regular; no murmur;   Lungs: distant breath sounds; no wheezes  Abd: soft, NT, hypoactive BS  Ext: no edema; no joint swelling;  No clubbing  : NO [place], clear urine  Neuro: \"alert\"  Psych- + agitation,   Skin: warm, dry, no cyanosis;   Pulses: 1-2+ Bilateral pedal, radial  Capillary: brisk; pale      DATA:    MAR reviewed and pertinent medications noted or modified as needed  MEDS:   Current Facility-Administered Medications   Medication    fentaNYL (PF) 1,500 mcg/30 mL (50 mcg/mL) infusion    hydrOXYzine pamoate (VISTARIL) capsule 25 mg    propofol (DIPRIVAN) 10 mg/mL infusion    dexmedeTOMidine (PRECEDEX) 400 mcg in 0.9% sodium chloride (MBP/ADV) 100 mL MBP    NOREPINephrine (LEVOPHED) 8 mg in 0.9% NS 250ml infusion    acetaminophen (TYLENOL) tablet 650 mg    Or    acetaminophen (TYLENOL) suppository 650 mg    polyethylene glycol (MIRALAX) packet 17 g    ondansetron (ZOFRAN ODT) tablet 4 mg    enoxaparin (LOVENOX) injection 40 mg    baricitinib (OLUMIANT) tablet 2 mg    albuterol (PROVENTIL HFA, VENTOLIN HFA, PROAIR HFA) inhaler 2 Puff    polyethylene glycol (MIRALAX) packet 17 g    ondansetron (ZOFRAN) injection 4 mg    meropenem (MERREM) 1 g in 0.9% sodium chloride 20 mL IV syringe    dexamethasone (DECADRON) 4 mg/mL injection 4 mg    hydrOXYzine (VISTARIL) injection 25 mg        Labs:    Recent Labs     01/26/22  2181 01/25/22  0450 01/24/22  0410   WBC 10.6 16.5* 11.2*   HGB 9.7* 10.3* 10.6*   * 247 236     Recent Labs     01/26/22  0410 01/25/22  0450 01/24/22  0410   * 149*  149* 149*   K 5.3* 4.6  4.5 4.4   * 119*  119* 119*   CO2 30 26  26 24   * 181*  189* 237*   BUN 64* 61*  59* 51*   CREA 0.83 0.79  0.80 0.97   CA 9.6 9.5  9.4 9.5   MG  --  2.6*  --    PHOS  --  3.2  --    ALB 2.0* 2.1*  2.0* 2.2*   ALT 76 90* 106*     Recent Labs     01/26/22  0416 01/25/22  0510 01/23/22  1240   PH 7.34* 7.32* 7.30*   PCO2 55* 53* 48*   PO2 116* 54* 269*   HCO3 27* 25 22   FIO2 70.0 70.0 100     No results for input(s): CPK, CKNDX, TROIQ in the last 72 hours. No lab exists for component: CPKMB  No results found for: BNPP, BNP   Lab Results   Component Value Date/Time    Culture result: No growth 4 days 01/21/2022 07:55 AM    Culture result: No growth 6 days 01/18/2022 07:12 AM    Culture result: (A) 01/17/2022 07:12 AM     Staphylococcus species, coagulase negative growing in 1 of 4 bottles drawn NO SITE INDICATED    Culture result:  01/17/2022 07:12 AM     (NOTE) [facility] IN [facility] GROWING IN 1 OF 2 BOTTLES CALLED TO JOSE MIGUEL PAGAN AT 0831 ON 1/19/22. JF     No results found for: TSH, TSHEXT, TSHEXT     Imaging:    Results from Hospital Encounter encounter on 01/21/22    XR CHEST PORT    Narrative  Chest single view. Comparison single view chest January 25, 2022. ET tube projects to the maggie similar compared to prior imaging. Right neck central vascular catheter. Diffuse patchy reticular markings again  noted through lungs. Cardiac and mediastinal structures unchanged. No  pneumothorax or sizable pleural effusion. Study findings called to ICU. Recommend pullback 2 cm ET tube. Results from [place] encounter on 01/21/22    CT HEAD WO CONT    Narrative  The study is a noncontrasted head CT examination dated 1/21/2022. HISTORY: Unresponsive.     TECHNIQUE: Thin section axial imaging was performed followed by sagittal and  coronal reconstructed imaging. Dose Reduction Technique was employed to reduce radiation exposure - This  includes reduction optimization techniques as appropriate to a performed exam  with automated exposure control adjustments of the mA and/or Kv according to  patient size, or use of iterative reconstruction technique. COMPARISON: CT head dated 1/17/2022. There is an appropriate size to the ventricles, the deep cortical sulci, and the  sylvian fissures for the patient's age. This examination is negative for  intracranial hemorrhage, mass effect or an extra axial collection. The  gray-white matter junctions are preserved without cytotoxic or vasogenic edema. An assessment of the white matter tracts demonstrates a mild decrease in the  density characteristics of the central periventricular white matter. These  findings are consistent with expected aging changes and milder microvascular  disease. There also is a region of diminished density within the right posterior  inferior cerebellar hemisphere which may represent an older cerebrovascular  insult. ORBITS: This examination is negative for acute orbital pathology. PARANASAL SINUSES: The paranasal sinuses are well pneumatized without acute  sinus disease. There is a decreased number of right sided mastoid air cells which can be  associated with chronic mastoiditis. The craniocervical junction images in a  normal fashion. Impression  1. There are milder microvascular changes within the central periventricular  white matter. 2.  There is an area of diminished density within the inferior aspect of the  right cerebellar hemisphere without change (series 201 image number 12). These  findings may represent an older ischemic insult within the right posterior  inferior cerebellar region.   3.  This examination is negative for acute intracranial pathology or short-term  interval changes dating back to 1/17/2022.       · 1/24 event noted intubated last night on ventilator back on Levofed  · 1/25 remain intubated will change vent settings will give 1 dose of Lasix already on vasopressor no

## 2022-01-26 NOTE — PROGRESS NOTES
1/26/2022 11:51 AM      Admit Date: 1/21/2022    Admit Diagnosis: Respiratory failure with hypoxia (HCC) [J96.91]  COVID-19 [U07.1]      Subjective:   Seen  And evaluated   Intubated   Review of Systems   Unable to perform ROS: Intubated      Cant be done  Objective:      Visit Vitals  BP (!) (P) 128/50 (BP Patient Position: At rest)   Pulse 76   Temp 97.8 °F (36.6 °C)   Resp 12   Ht 5' 5\" (1.651 m)   Wt 102.9 kg (226 lb 13.7 oz)   SpO2 96%   BMI 37.75 kg/m²       Physical Exam:  General-ill appearing, vent dependent   Low dose levophed  Tube feeds on hold for now  Urine clear   tachypneic but hypoxic as per saturation  Fi02 100%  Obtunded on precedex drip       Recent Results (from the past 24 hour(s))   CBC WITH AUTOMATED DIFF    Collection Time: 01/26/22  4:10 AM   Result Value Ref Range    WBC 10.6 3.6 - 11.0 K/uL    RBC 3.20 (L) 3.80 - 5.20 M/uL    HGB 9.7 (L) 11.5 - 16.0 g/dL    HCT 31.4 (L) 35.0 - 47.0 %    MCV 98.1 80.0 - 99.0 FL    MCH 30.3 26.0 - 34.0 PG    MCHC 30.9 30.0 - 36.5 g/dL    RDW 13.7 11.5 - 14.5 %    PLATELET 294 (L) 121 - 400 K/uL    MPV 11.9 8.9 - 12.9 FL    NRBC 0.3 (H) 0.0  WBC    ABSOLUTE NRBC 0.03 (H) 0.00 - 0.01 K/uL    NEUTROPHILS 90 (H) 32 - 75 %    LYMPHOCYTES 5 (L) 12 - 49 %    MONOCYTES 3 (L) 5 - 13 %    EOSINOPHILS 0 0 - 7 %    BASOPHILS 0 0 - 1 %    METAMYELOCYTES 1 (H) 0 %    MYELOCYTES 1 (H) 0 %    IMMATURE GRANULOCYTES 0 %    ABS. NEUTROPHILS 9.5 (H) 1.8 - 8.0 K/UL    ABS. LYMPHOCYTES 0.5 (L) 0.8 - 3.5 K/UL    ABS. MONOCYTES 0.3 0.0 - 1.0 K/UL    ABS. EOSINOPHILS 0.0 0.0 - 0.4 K/UL    ABS. BASOPHILS 0.0 0.0 - 0.1 K/UL    ABS. IMM.  GRANS. 0.0 K/UL    DF Manual      RBC COMMENTS Polychromasia  1+       METABOLIC PANEL, COMPREHENSIVE    Collection Time: 01/26/22  4:10 AM   Result Value Ref Range    Sodium 146 (H) 136 - 145 mmol/L    Potassium 5.3 (H) 3.5 - 5.1 mmol/L    Chloride 114 (H) 97 - 108 mmol/L    CO2 30 21 - 32 mmol/L    Anion gap 2 (L) 5 - 15 mmol/L Glucose 191 (H) 65 - 100 mg/dL    BUN 64 (H) 6 - 20 mg/dL    Creatinine 0.83 0.55 - 1.02 mg/dL    BUN/Creatinine ratio 77 (H) 12 - 20      GFR est AA >60 >60 ml/min/1.73m2    GFR est non-AA >60 >60 ml/min/1.73m2    Calcium 9.6 8.5 - 10.1 mg/dL    Bilirubin, total 0.4 0.2 - 1.0 mg/dL    AST (SGOT) 73 (H) 15 - 37 U/L    ALT (SGPT) 76 12 - 78 U/L    Alk. phosphatase 88 45 - 117 U/L    Protein, total 6.3 (L) 6.4 - 8.2 g/dL    Albumin 2.0 (L) 3.5 - 5.0 g/dL    Globulin 4.3 (H) 2.0 - 4.0 g/dL    A-G Ratio 0.5 (L) 1.1 - 2.2     BLOOD GAS, ARTERIAL    Collection Time: 01/26/22  4:16 AM   Result Value Ref Range    pH 7.34 (L) 7.35 - 7.45      PCO2 55 (H) 35 - 45 mmHg    PO2 116 (H) 75 - 100 mmHg    O2 SAT 99 >95 %    BICARBONATE 27 (H) 22 - 26 mmol/L    BASE EXCESS 3.0 (H) 0 - 2 mmol/L    O2 METHOD VENT      FIO2 70.0 %    MODE Assist Control/Volume Control      Tidal volume 450      SET RATE 12      EPAP/CPAP/PEEP 5.0      Sample source Arterial      SITE Right Radial      BEN'S TEST PASS     GLUCOSE, POC    Collection Time: 01/26/22  4:36 AM   Result Value Ref Range    Glucose (POC) 347 (H) 65 - 117 mg/dL    Performed by Shala Ledezma           Intake/Output Summary (Last 24 hours) at 1/26/2022 1444  Last data filed at 1/26/2022 1357  Gross per 24 hour   Intake 2556.92 ml   Output 1350 ml   Net 1206.92 ml          Data Review:   Recent Labs     01/26/22  0410   *   K 5.3*   BUN 64*   CREA 0.83   WBC 10.6   HGB 9.7*   HCT 31.4*   *       No current facility-administered medications on file prior to encounter. Current Outpatient Medications on File Prior to Encounter   Medication Sig Dispense Refill    albuterol (PROVENTIL HFA, VENTOLIN HFA, PROAIR HFA) 90 mcg/actuation inhaler Take 2 Puffs by inhalation every six (6) hours as needed for Wheezing or Shortness of Breath. 18 g 0    baricitinib (OLUMIANT) 2 mg tablet Take 2 Tablets by mouth daily.  10 Tablet 0    budesonide-formoteroL (SYMBICORT) 160-4.5 mcg/actuation HFAA Take 2 Puffs by inhalation two (2) times a day. 10.2 g 0    dexAMETHasone (Decadron) 4 mg tablet 1 tablet twice a day 20 Tablet 0      Assessment/Plan:     Active Problems:    COVID-19 (1/17/2022)      Respiratory failure with hypoxia (HCC) (1/21/2022)      Hypernatremia (1/23/2022)      Hypokalemia (1/23/2022)        DIAGNOSES:  1. Mild hypernatremia  2. hypokelamie resolved  3. COVID 19 PNA  4. VDRF  5. Hypoalbuminemia      DISCUSSION:  Na down to 146; K was 5.3     PLAN:  Switched feeds to nepro  If K rises use lokalema  Water flushes to continue      Thanks for consulting me. Please don't hesitate to contact me if any questions arise of if I can assist in any manner. This dictation was done by dragon, computer voice recognition software. Often unanticipated grammatical, syntax, phones and other interpretive errors are inadvertently transcribed. Please excuse errors that have escaped final proofreading. Please contact me if you suspect dictation or transcription errors.   Dr Bri Castro  [address], [address] [place], [address] [facility]  Cell Phone: [phone]  Telephone: [phone]  Fax: [phone]

## 2022-01-26 NOTE — PROGRESS NOTES
Clinical chart reviewed by CM. Patient's discharge plan is to return home with home health services provided by [facility]. CM will continue to follow.

## 2022-01-26 NOTE — PROGRESS NOTES
General Daily Progress Note          Patient Name:   Sofia Rothman       YOB: 1940       Age:  80 y.o. Admit Date: 1/21/2022      Subjective:     Patient is a 80y.o. year old female with signal past medical history of hypertension arthritis who discharged from the hospital yesterday in stable condition patient is admitted last admission with COVID-19 patient was asymptomatic all this time while in the hospital patient discharged on 2 L oxygen to skilled care but patient son want to go home with home health but is at home patient's saturations drops sent back to the ER seen by the ER physician patient placed on BiPAP patient was little hypotensive started on Levophed admitted for further work-up and treatment         Patient e hypoxemic and hypotension patient was intubated and started on Levophed drip    Patient is DNR    Patient on ventilator 70% O2  On fentanyl drip for sedation  NG tube in place for medication and feeding      Objective:     Visit Vitals  /64 (BP 1 Location: Right lower arm, BP Patient Position: At rest)   Pulse 71   Temp 96.8 °F (36 °C)   Resp 13   Ht 5' 5\" (1.651 m)   Wt 102.9 kg (226 lb 13.7 oz)   SpO2 96%   BMI 37.75 kg/m²        Recent Results (from the past 24 hour(s))   CBC WITH AUTOMATED DIFF    Collection Time: 01/26/22  4:10 AM   Result Value Ref Range    WBC 10.6 3.6 - 11.0 K/uL    RBC 3.20 (L) 3.80 - 5.20 M/uL    HGB 9.7 (L) 11.5 - 16.0 g/dL    HCT 31.4 (L) 35.0 - 47.0 %    MCV 98.1 80.0 - 99.0 FL    MCH 30.3 26.0 - 34.0 PG    MCHC 30.9 30.0 - 36.5 g/dL    RDW 13.7 11.5 - 14.5 %    PLATELET 739 (L) 228 - 400 K/uL    MPV 11.9 8.9 - 12.9 FL    NRBC 0.3 (H) 0.0  WBC    ABSOLUTE NRBC 0.03 (H) 0.00 - 0.01 K/uL    NEUTROPHILS 90 (H) 32 - 75 %    LYMPHOCYTES 5 (L) 12 - 49 %    MONOCYTES 3 (L) 5 - 13 %    EOSINOPHILS 0 0 - 7 %    BASOPHILS 0 0 - 1 %    METAMYELOCYTES 1 (H) 0 %    MYELOCYTES 1 (H) 0 %    IMMATURE GRANULOCYTES 0 %    ABS.  NEUTROPHILS 9.5 (H) 1.8 - 8.0 K/UL    ABS. LYMPHOCYTES 0.5 (L) 0.8 - 3.5 K/UL    ABS. MONOCYTES 0.3 0.0 - 1.0 K/UL    ABS. EOSINOPHILS 0.0 0.0 - 0.4 K/UL    ABS. BASOPHILS 0.0 0.0 - 0.1 K/UL    ABS. IMM. GRANS. 0.0 K/UL    DF Manual      RBC COMMENTS Polychromasia  1+       METABOLIC PANEL, COMPREHENSIVE    Collection Time: 01/26/22  4:10 AM   Result Value Ref Range    Sodium 146 (H) 136 - 145 mmol/L    Potassium 5.3 (H) 3.5 - 5.1 mmol/L    Chloride 114 (H) 97 - 108 mmol/L    CO2 30 21 - 32 mmol/L    Anion gap 2 (L) 5 - 15 mmol/L    Glucose 191 (H) 65 - 100 mg/dL    BUN 64 (H) 6 - 20 mg/dL    Creatinine 0.83 0.55 - 1.02 mg/dL    BUN/Creatinine ratio 77 (H) 12 - 20      GFR est AA >60 >60 ml/min/1.73m2    GFR est non-AA >60 >60 ml/min/1.73m2    Calcium 9.6 8.5 - 10.1 mg/dL    Bilirubin, total 0.4 0.2 - 1.0 mg/dL    AST (SGOT) 73 (H) 15 - 37 U/L    ALT (SGPT) 76 12 - 78 U/L    Alk. phosphatase 88 45 - 117 U/L    Protein, total 6.3 (L) 6.4 - 8.2 g/dL    Albumin 2.0 (L) 3.5 - 5.0 g/dL    Globulin 4.3 (H) 2.0 - 4.0 g/dL    A-G Ratio 0.5 (L) 1.1 - 2.2     BLOOD GAS, ARTERIAL    Collection Time: 01/26/22  4:16 AM   Result Value Ref Range    pH 7.34 (L) 7.35 - 7.45      PCO2 55 (H) 35 - 45 mmHg    PO2 116 (H) 75 - 100 mmHg    O2 SAT 99 >95 %    BICARBONATE 27 (H) 22 - 26 mmol/L    BASE EXCESS 3.0 (H) 0 - 2 mmol/L    O2 METHOD VENT      FIO2 70.0 %    MODE Assist Control/Volume Control      Tidal volume 450      SET RATE 12      EPAP/CPAP/PEEP 5.0      Sample source Arterial      SITE Right Radial      BEN'S TEST PASS     GLUCOSE, POC    Collection Time: 01/26/22  4:36 AM   Result Value Ref Range    Glucose (POC) 347 (H) 65 - 117 mg/dL    Performed by Nay Rod      [unfilled]      Review of Systems    Unable to obtain. Physical Exam:      Constitutional: Awake on BiPAP  HENT:   Head: Normocephalic and atraumatic. Eyes: Pupils are equal, round, and reactive to light.  EOM are normal.   Cardiovascular: Normal rate, regular rhythm and normal heart sounds. Pulmonary/Chest: Breath sounds normal. No wheezes. No rales. Exhibits no tenderness. Abdominal: Soft. Bowel sounds are normal. There is no abdominal tenderness. There is no rebound and no guarding. Musculoskeletal: Normal range of motion. Neurological: pt is alert and oriented to person, place, and time. XR CHEST PORT   Final Result      XR CHEST PORT   Final Result   Endotracheal tube tip at the maggie. Diffuse opacities in the   lungs, accentuated by lower lung volumes or increased. XR CHEST PORT   Final Result   Diffuse opacities in the lungs, not significantly changed. XR CHEST PORT   Final Result   1. ETT terminating 2 cm above the maggie. Remaining support apparatus as above. 2.  Worsening bilateral airspace disease with developing ARDS not excluded. XR CHEST PORT   Final Result   Moderate patchy diffuse bilateral airspace opacities, waxing and waning from the   prior exam.      XR CHEST PORT   Final Result   Moderate diffuse bilateral airspace opacities, likely a combination of edema and   airspace disease, mildly increased from the previous exam.      CT HEAD WO CONT   Final Result   1. There are milder microvascular changes within the central periventricular   white matter. 2.  There is an area of diminished density within the inferior aspect of the   right cerebellar hemisphere without change (series 201 image number 12). These   findings may represent an older ischemic insult within the right posterior   inferior cerebellar region. 3.  This examination is negative for acute intracranial pathology or short-term   interval changes dating back to 1/17/2022. XR CHEST PORT   Final Result   Patchy mixed asymmetric lung disease appears somewhat better but the   lungs are better inflated. No effusion or pneumothorax.  Normal heart and   mediastinum      IR INSERT NON TUNL CVC OVER 5 YRS   Final Result   Ultrasound of the right neck demonstrated patent IJV. Successful US-guided placement of a right internal jugular triple lumen central   venous catheter as described above. The catheter is functioning. PLAN:   Catheter position was confirmed by follow-up chest x-ray: catheter tip in the   lower SVC and ready to use. IR US GUIDED VASCULAR ACCESS   Final Result   Ultrasound of the right neck demonstrated patent IJV. Successful US-guided placement of a right internal jugular triple lumen central   venous catheter as described above. The catheter is functioning. PLAN:   Catheter position was confirmed by follow-up chest x-ray: catheter tip in the   lower SVC and ready to use. XR CHEST PORT   Final Result   Findings/impression:      Diffuse interstitial airspace disease/edema. No definite pleural effusion or   pneumothorax. Cardiac contours are partially obscured. No acute osseous abnormality identified. XR CHEST PORT    (Results Pending)        Recent Results (from the past 24 hour(s))   CBC WITH AUTOMATED DIFF    Collection Time: 01/26/22  4:10 AM   Result Value Ref Range    WBC 10.6 3.6 - 11.0 K/uL    RBC 3.20 (L) 3.80 - 5.20 M/uL    HGB 9.7 (L) 11.5 - 16.0 g/dL    HCT 31.4 (L) 35.0 - 47.0 %    MCV 98.1 80.0 - 99.0 FL    MCH 30.3 26.0 - 34.0 PG    MCHC 30.9 30.0 - 36.5 g/dL    RDW 13.7 11.5 - 14.5 %    PLATELET 758 (L) 759 - 400 K/uL    MPV 11.9 8.9 - 12.9 FL    NRBC 0.3 (H) 0.0  WBC    ABSOLUTE NRBC 0.03 (H) 0.00 - 0.01 K/uL    NEUTROPHILS 90 (H) 32 - 75 %    LYMPHOCYTES 5 (L) 12 - 49 %    MONOCYTES 3 (L) 5 - 13 %    EOSINOPHILS 0 0 - 7 %    BASOPHILS 0 0 - 1 %    METAMYELOCYTES 1 (H) 0 %    MYELOCYTES 1 (H) 0 %    IMMATURE GRANULOCYTES 0 %    ABS. NEUTROPHILS 9.5 (H) 1.8 - 8.0 K/UL    ABS. LYMPHOCYTES 0.5 (L) 0.8 - 3.5 K/UL    ABS. MONOCYTES 0.3 0.0 - 1.0 K/UL    ABS. EOSINOPHILS 0.0 0.0 - 0.4 K/UL    ABS. BASOPHILS 0.0 0.0 - 0.1 K/UL    ABS. IMM.  GRANS. 0.0 K/UL    DF Manual      RBC COMMENTS Polychromasia  1+       METABOLIC PANEL, COMPREHENSIVE    Collection Time: 01/26/22  4:10 AM   Result Value Ref Range    Sodium 146 (H) 136 - 145 mmol/L    Potassium 5.3 (H) 3.5 - 5.1 mmol/L    Chloride 114 (H) 97 - 108 mmol/L    CO2 30 21 - 32 mmol/L    Anion gap 2 (L) 5 - 15 mmol/L    Glucose 191 (H) 65 - 100 mg/dL    BUN 64 (H) 6 - 20 mg/dL    Creatinine 0.83 0.55 - 1.02 mg/dL    BUN/Creatinine ratio 77 (H) 12 - 20      GFR est AA >60 >60 ml/min/1.73m2    GFR est non-AA >60 >60 ml/min/1.73m2    Calcium 9.6 8.5 - 10.1 mg/dL    Bilirubin, total 0.4 0.2 - 1.0 mg/dL    AST (SGOT) 73 (H) 15 - 37 U/L    ALT (SGPT) 76 12 - 78 U/L    Alk.  phosphatase 88 45 - 117 U/L    Protein, total 6.3 (L) 6.4 - 8.2 g/dL    Albumin 2.0 (L) 3.5 - 5.0 g/dL    Globulin 4.3 (H) 2.0 - 4.0 g/dL    A-G Ratio 0.5 (L) 1.1 - 2.2     BLOOD GAS, ARTERIAL    Collection Time: 01/26/22  4:16 AM   Result Value Ref Range    pH 7.34 (L) 7.35 - 7.45      PCO2 55 (H) 35 - 45 mmHg    PO2 116 (H) 75 - 100 mmHg    O2 SAT 99 >95 %    BICARBONATE 27 (H) 22 - 26 mmol/L    BASE EXCESS 3.0 (H) 0 - 2 mmol/L    O2 METHOD VENT      FIO2 70.0 %    MODE Assist Control/Volume Control      Tidal volume 450      SET RATE 12      EPAP/CPAP/PEEP 5.0      Sample source Arterial      SITE Right Radial      BEN'S TEST PASS     GLUCOSE, POC    Collection Time: 01/26/22  4:36 AM   Result Value Ref Range    Glucose (POC) 347 (H) 65 - 117 mg/dL    Performed by Ondina Dennis        Results     Procedure Component Value Units Date/Time    MRSA SCREEN - PCR (NASAL) [[id number]] Collected: 01/22/22 0440    Order Status: Canceled Specimen: Swab     MRSA SCREEN - PCR (NASAL) [[id number]] Collected: 01/22/22 0300    Order Status: Canceled Specimen: Swab     CULTURE, BLOOD #1 [[id number]]     Order Status: Canceled Specimen: Blood     CULTURE, BLOOD #2 [[id number]]     Order Status: Canceled Specimen: Blood     CULTURE, BLOOD, PAIRED [[id number]] Collected: 01/21/22 0755    Order Status: Completed Specimen: Blood Updated: 01/26/22 9416     Special Requests: No Special Requests        Culture result: No growth 4 days       CULTURE, BLOOD #1 [[id number]] Collected: 01/18/22 7358    Order Status: Completed Specimen: Blood Updated: 01/25/22 0912     Special Requests: No Special Requests        Culture result: No growth 6 days       COVID-19 RAPID TEST [[id number]]  (Abnormal) Collected: 01/17/22 1136    Order Status: Completed Specimen: Nasopharyngeal Updated: 01/17/22 1201     Specimen source       Please find results under separate order           COVID-19 rapid test DETECTED        Comment: Rapid [employer] ID Now   The specimen is POSITIVE for SARS-CoV-2, the novel coronavirus associated with COVID-19. This test has been authorized by the FDA under an Emergency Use Authorization (EUA) for use by authorized laboratories. Fact sheet for Healthcare Providers: Convention"[employer]date.co.nz Fact sheet for Patients: T[employer]ored [employer]   Methodology: Isothermal Nucleic Acid Amplification Results verified, phoned to and read back by Ramiro De Leon, 12:00   1/17/22 O         INFLUENZA A & B AG (RAPID TEST) [[id number]] Collected: 01/17/22 1136    Order Status: Completed Specimen: Nasopharyngeal from Nasal washing Updated: 01/17/22 1202     Influenza A Antigen Negative        Influenza B Antigen Negative       CULTURE, BLOOD #2 [[id number]]  (Abnormal) Collected: 01/17/22 0712    Order Status: Completed Specimen: Blood Updated: 01/20/22 0946     Special Requests: No Special Requests        Culture result:       Staphylococcus species, coagulase negative growing in 1 of 4 bottles drawn NO SITE INDICATED            (NOTE) [facility] GROWING IN 1 OF 2 BOTTLES CALLED TO [address] [place] [address] AT 0831 ON 1/19/22.  JF           Labs:     Recent Labs     01/26/22  0410 01/25/22  0450   WBC 10.6 16.5*   HGB 9.7* 10.3*   HCT 31.4* 33.4*   * 247     Recent Labs 01/26/22 0410 01/25/22 0450 01/24/22 0410   * 149*  149* 149*   K 5.3* 4.6  4.5 4.4   * 119*  119* 119*   CO2 30 26 26 24   BUN 64* 61*  59* 51*   CREA 0.83 0.79  0.80 0.97   * 181*  189* 237*   CA 9.6 9.5  9.4 9.5   MG  --  2.6*  --    PHOS  --  3.2  --      Recent Labs     01/26/22 0410 01/25/22 0450 01/24/22 0410   ALT 76 90* 106*   AP 88 61 57   TBILI 0.4 0.3 0.4   TP 6.3* 6.5 6.6   ALB 2.0* 2.1*  2.0* 2.2*   GLOB 4.3* 4.5* 4.4*     No results for input(s): INR, PTP, APTT, INREXT, INREXT in the last 72 hours. No results for input(s): FE, TIBC, PSAT, FERR in the last 72 hours. No results found for: FOL, RBCF   Recent Labs     01/26/22 0416 01/25/22  0510   PH 7.34* 7.32*   PCO2 55* 53*   PO2 116* 54*     No results for input(s): CPK, CKNDX, TROIQ in the last 72 hours.     No lab exists for component: CPKMB  No results found for: CHOL, CHOLX, CHLST, CHOLV, HDL, HDLP, LDL, LDLC, DLDLP, TGLX, TRIGL, TRIGP, CHHD, CHHDX  Lab Results   Component Value Date/Time    Glucose (POC) 347 (H) 01/26/2022 04:36 AM    Glucose (POC) 150 (H) 01/24/2022 11:15 PM    Glucose (POC) 241 (H) 01/21/2022 02:45 AM     Lab Results   Component Value Date/Time    Color Yellow/Straw 01/23/2022 10:30 AM    Appearance Turbid (A) 01/23/2022 10:30 AM    Specific gravity 1.027 01/23/2022 10:30 AM    pH (UA) 6.0 01/23/2022 10:30 AM    Protein 100 (A) 01/23/2022 10:30 AM    Glucose 50 (A) 01/23/2022 10:30 AM    Ketone 5 (A) 01/23/2022 10:30 AM    Bilirubin Negative 01/23/2022 10:30 AM    Urobilinogen 4.0 (H) 01/23/2022 10:30 AM    Nitrites Negative 01/23/2022 10:30 AM    Leukocyte Esterase Negative 01/23/2022 10:30 AM    Bacteria Negative 01/23/2022 10:30 AM    WBC 0-4 01/23/2022 10:30 AM    RBC 0-5 01/23/2022 10:30 AM         Assessment:     Acute hypoxemic respiratory failure on on ventilator 80% of   COVID-19 pneumonitis hypotension  Thrombocytopenia  Hypertension  Arthritis  Hyperkalemia  Hyperenatremia      Plan:     Zithromax 500 mg IV daily  Olumiant 2 mg daily  Decadron 4 mg every 6 hours  Meropenem 1 g every 8 hours    Follow-up with pulmonologist and nephrologist    Overall prognosis    Patient is DNR  Discussed with the patient's son    Repeat the lab      Current Facility-Administered Medications:     sodium zirconium cyclosilicate (LOKELMA) powder packet 10 g, 10 g, Oral, DAILY, Gianna Chaudhary MD    fentaNYL (PF) 1,500 mcg/30 mL (50 mcg/mL) infusion, 0-200 mcg/hr, IntraVENous, TITRATE, Rolf Flower MD, Last Rate: 2 mL/hr at 01/25/22 1917, 100 mcg/hr at 01/25/22 1917    hydrOXYzine pamoate (VISTARIL) capsule 25 mg, 25 mg, Oral, Q4H PRN, Polo Lopez MD    propofol (DIPRIVAN) 10 mg/mL infusion, 0-50 mcg/kg/min, IntraVENous, TITRATE, Rolf Flower MD, Last Rate: 27.2 mL/hr at 01/26/22 0754, 50 mcg/kg/min at 01/26/22 0754    dexmedeTOMidine (PRECEDEX) 400 mcg in 0.9% sodium chloride (MBP/ADV) 100 mL MBP, 0.1-1.5 mcg/kg/hr, IntraVENous, TITRATE, Rolf Flower MD, Stopped at 01/24/22 0824    NOREPINephrine (LEVOPHED) 8 mg in 0.9% NS 250ml infusion, 0.5-16 mcg/min, IntraVENous, TITRATE, Anuradha Lopez MD, Stopped at 01/25/22 1137    acetaminophen (TYLENOL) tablet 650 mg, 650 mg, Oral, Q6H PRN **OR** acetaminophen (TYLENOL) suppository 650 mg, 650 mg, Rectal, Q6H PRN, Anuradha Lopez MD    polyethylene glycol (MIRALAX) packet 17 g, 17 g, Oral, DAILY PRN, Anuradha Lopez MD    ondansetron (ZOFRAN ODT) tablet 4 mg, 4 mg, Oral, Q8H PRN **OR** [DISCONTINUED] ondansetron (ZOFRAN) injection 4 mg, 4 mg, IntraVENous, Q6H PRN, Polo Lopez MD    enoxaparin (LOVENOX) injection 40 mg, 40 mg, SubCUTAneous, DAILY, Polo Lopez MD, 40 mg at 01/26/22 0803    baricitinib (OLUMIANT) tablet 2 mg, 2 mg, Oral, DAILY, Polo Lopez MD, 2 mg at 01/26/22 0803    albuterol (PROVENTIL HFA, VENTOLIN HFA, PROAIR HFA) inhaler 2 Puff, 2 Puff, Inhalation, Q6H PRN, Breanna Lopez MD    polyethylene glycol (MIRALAX) packet 17 g, 17 g, Oral, DAILY PRN, Breanna Lopez MD    [DISCONTINUED] ondansetron (ZOFRAN ODT) tablet 4 mg, 4 mg, Oral, Q8H PRN **OR** ondansetron (ZOFRAN) injection 4 mg, 4 mg, IntraVENous, Q6H PRN, Polo Lopez MD    meropenem (MERREM) 1 g in 0.9% sodium chloride 20 mL IV syringe, 1 g, IntraVENous, Q8H, Polo Lopez MD, 1 g at 01/26/22 0754    dexamethasone (DECADRON) 4 mg/mL injection 4 mg, 4 mg, IntraVENous, Q6H, Polo Lopez MD, 4 mg at 01/26/22 3790    hydrOXYzine (VISTARIL) injection 25 mg, 25 mg, IntraMUSCular, Q6H PRN, Ploo Lopez MD, 25 mg at 01/22/22 0111

## 2022-01-27 NOTE — PROGRESS NOTES
Bedside and Verbal shift change report given to Sheri RN (oncoming nurse) by Ghulam Rojo RN (offgoing nurse). Report included the following information SBAR, Kardex, Intake/Output, MAR, Recent Results and Dual Neuro Assessment, cardiac rhythm.

## 2022-01-27 NOTE — PROGRESS NOTES
CM reviewed clinical chart. Patient's discharge plan is to return home with home health services provided by [facility]. CM will continue to follow.

## 2022-01-27 NOTE — PROGRESS NOTES
General Daily Progress Note          Patient Name:   Rafia Dyson       YOB: 1940       Age:  80 y.o.       Admit Date: 1/21/2022      Subjective:     Patient is a 80y.o. year old female with signal past medical history of hypertension arthritis who discharged from the hospital yesterday in stable condition patient is admitted last admission with COVID-19 patient was asymptomatic all this time while in the hospital patient discharged on 2 L oxygen to skilled care but patient son want to go home with home health but is at home patient's saturations drops sent back to the ER seen by the ER physician patient placed on BiPAP patient was little hypotensive started on Levophed admitted for further work-up and treatment         Patient e hypoxemic and hypotension patient was intubated and started on Levophed drip    Patient is DNR    Patient on ventilator 90% O2  Fentanyl and propofol drip  NG tube in place for medication and feeding      Objective:     Visit Vitals  BP (!) 129/53 (BP 1 Location: Right lower arm, BP Patient Position: At rest)   Pulse 67   Temp 98 °F (36.7 °C)   Resp 19   Ht 5' 5\" (1.651 m)   Wt 104 kg (229 lb 4.5 oz)   SpO2 97%   BMI 38.15 kg/m²        Recent Results (from the past 24 hour(s))   BLOOD GAS, ARTERIAL    Collection Time: 01/27/22  4:45 AM   Result Value Ref Range    pH 7.40 7.35 - 7.45      PCO2 54 (H) 35 - 45 mmHg    PO2 82 75 - 100 mmHg    O2 SAT 98 >95 %    BICARBONATE 32 (H) 22 - 26 mmol/L    BASE EXCESS 7.7 (H) 0 - 2 mmol/L    O2 METHOD VENT      FIO2 100.0 %    MODE Assist Control/Volume Control      Tidal volume 450      SET RATE 12      EPAP/CPAP/PEEP 5.0      SITE Right Radial      BEN'S TEST PASS     CBC WITH AUTOMATED DIFF    Collection Time: 01/27/22  5:00 AM   Result Value Ref Range    WBC 11.7 (H) 3.6 - 11.0 K/uL    RBC 3.34 (L) 3.80 - 5.20 M/uL    HGB 9.9 (L) 11.5 - 16.0 g/dL    HCT 32.0 (L) 35.0 - 47.0 %    MCV 95.8 80.0 - 99.0 FL    MCH 29.6 26.0 - 34.0 PG MCHC 30.9 30.0 - 36.5 g/dL    RDW 13.3 11.5 - 14.5 %    PLATELET 460 251 - 118 K/uL    MPV 11.5 8.9 - 12.9 FL    NRBC 0.4 (H) 0.0  WBC    ABSOLUTE NRBC 0.05 (H) 0.00 - 0.01 K/uL    NEUTROPHILS 87 (H) 32 - 75 %    LYMPHOCYTES 6 (L) 12 - 49 %    MONOCYTES 5 5 - 13 %    EOSINOPHILS 0 0 - 7 %    BASOPHILS 0 0 - 1 %    IMMATURE GRANULOCYTES 2 (H) 0 - 0.5 %    ABS. NEUTROPHILS 10.2 (H) 1.8 - 8.0 K/UL    ABS. LYMPHOCYTES 0.6 (L) 0.8 - 3.5 K/UL    ABS. MONOCYTES 0.6 0.0 - 1.0 K/UL    ABS. EOSINOPHILS 0.0 0.0 - 0.4 K/UL    ABS. BASOPHILS 0.0 0.0 - 0.1 K/UL    ABS. IMM. GRANS. 0.3 (H) 0.00 - 0.04 K/UL    DF AUTOMATED     METABOLIC PANEL, COMPREHENSIVE    Collection Time: 01/27/22  5:00 AM   Result Value Ref Range    Sodium 147 (H) 136 - 145 mmol/L    Potassium 4.7 3.5 - 5.1 mmol/L    Chloride 113 (H) 97 - 108 mmol/L    CO2 31 21 - 32 mmol/L    Anion gap 3 (L) 5 - 15 mmol/L    Glucose 175 (H) 65 - 100 mg/dL    BUN 56 (H) 6 - 20 mg/dL    Creatinine 0.66 0.55 - 1.02 mg/dL    BUN/Creatinine ratio 85 (H) 12 - 20      GFR est AA >60 >60 ml/min/1.73m2    GFR est non-AA >60 >60 ml/min/1.73m2    Calcium 9.3 8.5 - 10.1 mg/dL    Bilirubin, total 0.5 0.2 - 1.0 mg/dL    AST (SGOT) 107 (H) 15 - 37 U/L    ALT (SGPT) 106 (H) 12 - 78 U/L    Alk. phosphatase 58 45 - 117 U/L    Protein, total 6.2 (L) 6.4 - 8.2 g/dL    Albumin 2.0 (L) 3.5 - 5.0 g/dL    Globulin 4.2 (H) 2.0 - 4.0 g/dL    A-G Ratio 0.5 (L) 1.1 - 2.2     GLUCOSE, POC    Collection Time: 01/27/22  5:20 AM   Result Value Ref Range    Glucose (POC) 182 (H) 65 - 117 mg/dL    Performed by Garry Ferrell      [unfilled]      Review of Systems    Unable to obtain. Physical Exam:      Constitutional: Awake on BiPAP  HENT:   Head: Normocephalic and atraumatic. Eyes: Pupils are equal, round, and reactive to light. EOM are normal.   Cardiovascular: Normal rate, regular rhythm and normal heart sounds. Pulmonary/Chest: Breath sounds normal. No wheezes. No rales. Exhibits no tenderness. Abdominal: Soft. Bowel sounds are normal. There is no abdominal tenderness. There is no rebound and no guarding. Musculoskeletal: Normal range of motion. Neurological: pt is alert and oriented to person, place, and time. XR CHEST PORT   Final Result   Endotracheal tube tip still at the maggie. Diffuse opacities in the   lungs, not significantly changed. XR CHEST PORT   Final Result      XR CHEST PORT   Final Result   Endotracheal tube tip at the maggie. Diffuse opacities in the   lungs, accentuated by lower lung volumes or increased. XR CHEST PORT   Final Result   Diffuse opacities in the lungs, not significantly changed. XR CHEST PORT   Final Result   1. ETT terminating 2 cm above the maggie. Remaining support apparatus as above. 2.  Worsening bilateral airspace disease with developing ARDS not excluded. XR CHEST PORT   Final Result   Moderate patchy diffuse bilateral airspace opacities, waxing and waning from the   prior exam.      XR CHEST PORT   Final Result   Moderate diffuse bilateral airspace opacities, likely a combination of edema and   airspace disease, mildly increased from the previous exam.      CT HEAD WO CONT   Final Result   1. There are milder microvascular changes within the central periventricular   white matter. 2.  There is an area of diminished density within the inferior aspect of the   right cerebellar hemisphere without change (series 201 image number 12). These   findings may represent an older ischemic insult within the right posterior   inferior cerebellar region. 3.  This examination is negative for acute intracranial pathology or short-term   interval changes dating back to 1/17/2022. XR CHEST PORT   Final Result   Patchy mixed asymmetric lung disease appears somewhat better but the   lungs are better inflated. No effusion or pneumothorax.  Normal heart and   mediastinum      IR INSERT NON TUNL CVC OVER 5 YRS Final Result   Ultrasound of the right neck demonstrated patent IJV. Successful US-guided placement of a right internal jugular triple lumen central   venous catheter as described above. The catheter is functioning. PLAN:   Catheter position was confirmed by follow-up chest x-ray: catheter tip in the   lower SVC and ready to use. IR US GUIDED VASCULAR ACCESS   Final Result   Ultrasound of the right neck demonstrated patent IJV. Successful US-guided placement of a right internal jugular triple lumen central   venous catheter as described above. The catheter is functioning. PLAN:   Catheter position was confirmed by follow-up chest x-ray: catheter tip in the   lower SVC and ready to use. XR CHEST PORT   Final Result   Findings/impression:      Diffuse interstitial airspace disease/edema. No definite pleural effusion or   pneumothorax. Cardiac contours are partially obscured. No acute osseous abnormality identified.       XR CHEST PORT    (Results Pending)        Recent Results (from the past 24 hour(s))   BLOOD GAS, ARTERIAL    Collection Time: 01/27/22  4:45 AM   Result Value Ref Range    pH 7.40 7.35 - 7.45      PCO2 54 (H) 35 - 45 mmHg    PO2 82 75 - 100 mmHg    O2 SAT 98 >95 %    BICARBONATE 32 (H) 22 - 26 mmol/L    BASE EXCESS 7.7 (H) 0 - 2 mmol/L    O2 METHOD VENT      FIO2 100.0 %    MODE Assist Control/Volume Control      Tidal volume 450      SET RATE 12      EPAP/CPAP/PEEP 5.0      SITE Right Radial      BEN'S TEST PASS     CBC WITH AUTOMATED DIFF    Collection Time: 01/27/22  5:00 AM   Result Value Ref Range    WBC 11.7 (H) 3.6 - 11.0 K/uL    RBC 3.34 (L) 3.80 - 5.20 M/uL    HGB 9.9 (L) 11.5 - 16.0 g/dL    HCT 32.0 (L) 35.0 - 47.0 %    MCV 95.8 80.0 - 99.0 FL    MCH 29.6 26.0 - 34.0 PG    MCHC 30.9 30.0 - 36.5 g/dL    RDW 13.3 11.5 - 14.5 %    PLATELET 948 159 - 934 K/uL    MPV 11.5 8.9 - 12.9 FL    NRBC 0.4 (H) 0.0  WBC    ABSOLUTE NRBC 0.05 (H) 0.00 - 0.01 K/uL    NEUTROPHILS 87 (H) 32 - 75 %    LYMPHOCYTES 6 (L) 12 - 49 %    MONOCYTES 5 5 - 13 %    EOSINOPHILS 0 0 - 7 %    BASOPHILS 0 0 - 1 %    IMMATURE GRANULOCYTES 2 (H) 0 - 0.5 %    ABS. NEUTROPHILS 10.2 (H) 1.8 - 8.0 K/UL    ABS. LYMPHOCYTES 0.6 (L) 0.8 - 3.5 K/UL    ABS. MONOCYTES 0.6 0.0 - 1.0 K/UL    ABS. EOSINOPHILS 0.0 0.0 - 0.4 K/UL    ABS. BASOPHILS 0.0 0.0 - 0.1 K/UL    ABS. IMM. GRANS. 0.3 (H) 0.00 - 0.04 K/UL    DF AUTOMATED     METABOLIC PANEL, COMPREHENSIVE    Collection Time: 01/27/22  5:00 AM   Result Value Ref Range    Sodium 147 (H) 136 - 145 mmol/L    Potassium 4.7 3.5 - 5.1 mmol/L    Chloride 113 (H) 97 - 108 mmol/L    CO2 31 21 - 32 mmol/L    Anion gap 3 (L) 5 - 15 mmol/L    Glucose 175 (H) 65 - 100 mg/dL    BUN 56 (H) 6 - 20 mg/dL    Creatinine 0.66 0.55 - 1.02 mg/dL    BUN/Creatinine ratio 85 (H) 12 - 20      GFR est AA >60 >60 ml/min/1.73m2    GFR est non-AA >60 >60 ml/min/1.73m2    Calcium 9.3 8.5 - 10.1 mg/dL    Bilirubin, total 0.5 0.2 - 1.0 mg/dL    AST (SGOT) 107 (H) 15 - 37 U/L    ALT (SGPT) 106 (H) 12 - 78 U/L    Alk.  phosphatase 58 45 - 117 U/L    Protein, total 6.2 (L) 6.4 - 8.2 g/dL    Albumin 2.0 (L) 3.5 - 5.0 g/dL    Globulin 4.2 (H) 2.0 - 4.0 g/dL    A-G Ratio 0.5 (L) 1.1 - 2.2     GLUCOSE, POC    Collection Time: 01/27/22  5:20 AM   Result Value Ref Range    Glucose (POC) 182 (H) 65 - 117 mg/dL    Performed by Wendy Beck        Results     Procedure Component Value Units Date/Time    MRSA SCREEN - PCR (NASAL) [[id number]] Collected: 01/22/22 0440    Order Status: Canceled Specimen: Swab     MRSA SCREEN - PCR (NASAL) [[id number]] Collected: 01/22/22 0300    Order Status: Canceled Specimen: Swab     CULTURE, BLOOD #1 [[id number]]     Order Status: Canceled Specimen: Blood     CULTURE, BLOOD #2 [[id number]]     Order Status: Canceled Specimen: Blood     CULTURE, BLOOD, PAIRED [[id number]] Collected: 01/21/22 0755    Order Status: Completed Specimen: Blood Updated: 01/26/22 1331 Special Requests: No Special Requests        Culture result: No growth 4 days       CULTURE, BLOOD #1 [[id number]] Collected: 01/18/22 2961    Order Status: Completed Specimen: Blood Updated: 01/25/22 0912     Special Requests: No Special Requests        Culture result: No growth 6 days       COVID-19 RAPID TEST [[id number]]  (Abnormal) Collected: 01/17/22 1136    Order Status: Completed Specimen: Nasopharyngeal Updated: 01/17/22 1201     Specimen source       Please find results under separate order           COVID-19 rapid test DETECTED        Comment: Rapid Abbott ID Now   The specimen is POSITIVE for SARS-CoV-2, the novel coronavirus associated with COVID-19. This test has been authorized by the FDA under an Emergency Use Authorization (EUA) for use by authorized laboratories. Fact sheet for Healthcare Providers: ConventionUpdate.co.nz Fact sheet for Patients: ConventionUpdate.co.nz   Methodology: Isothermal Nucleic Acid Amplification Results verified, phoned to and read back by Jose Eduardo Hamilton, 12:00   1/17/22 LBO         INFLUENZA A & B AG (RAPID TEST) [[id number]] Collected: 01/17/22 1136    Order Status: Completed Specimen: Nasopharyngeal from Nasal washing Updated: 01/17/22 1202     Influenza A Antigen Negative        Influenza B Antigen Negative       CULTURE, BLOOD #2 [[id number]]  (Abnormal) Collected: 01/17/22 0712    Order Status: Completed Specimen: Blood Updated: 01/20/22 0946     Special Requests: No Special Requests        Culture result:       Staphylococcus species, coagulase negative growing in 1 of 4 bottles drawn NO SITE INDICATED            (NOTE) [facility] GROWING IN 1 OF 2 BOTTLES CALLED TO [address] [place] [address] AT 0831 ON 1/19/22.  JF           Labs:     Recent Labs     01/27/22  0500 01/26/22  0410   WBC 11.7* 10.6   HGB 9.9* 9.7*   HCT 32.0* 31.4*    140*     Recent Labs     01/27/22  0500 01/26/22  0410 01/25/22  0450   * 146* 149* 149*   K 4.7 5.3* 4.6  4.5   * 114* 119*  119*   CO2 31 30 26  26   BUN 56* 64* 61*  59*   CREA 0.66 0.83 0.79  0.80   * 191* 181*  189*   CA 9.3 9.6 9.5  9.4   MG  --   --  2.6*   PHOS  --   --  3.2     Recent Labs     01/27/22  0500 01/26/22  0410 01/25/22  0450   * 76 90*   AP 58 88 61   TBILI 0.5 0.4 0.3   TP 6.2* 6.3* 6.5   ALB 2.0* 2.0* 2.1*  2.0*   GLOB 4.2* 4.3* 4.5*     No results for input(s): INR, PTP, APTT, INREXT, INREXT in the last 72 hours. No results for input(s): FE, TIBC, PSAT, FERR in the last 72 hours. No results found for: FOL, RBCF   Recent Labs     01/27/22  0445 01/26/22  0416   PH 7.40 7.34*   PCO2 54* 55*   PO2 82 116*     No results for input(s): CPK, CKNDX, TROIQ in the last 72 hours.     No lab exists for component: CPKMB  No results found for: CHOL, CHOLX, CHLST, CHOLV, HDL, HDLP, LDL, LDLC, DLDLP, TGLX, TRIGL, TRIGP, CHHD, CHHDX  Lab Results   Component Value Date/Time    Glucose (POC) 182 (H) 01/27/2022 05:20 AM    Glucose (POC) 347 (H) 01/26/2022 04:36 AM    Glucose (POC) 150 (H) 01/24/2022 11:15 PM    Glucose (POC) 241 (H) 01/21/2022 02:45 AM     Lab Results   Component Value Date/Time    Color Yellow/Straw 01/23/2022 10:30 AM    Appearance Turbid (A) 01/23/2022 10:30 AM    Specific gravity 1.027 01/23/2022 10:30 AM    pH (UA) 6.0 01/23/2022 10:30 AM    Protein 100 (A) 01/23/2022 10:30 AM    Glucose 50 (A) 01/23/2022 10:30 AM    Ketone 5 (A) 01/23/2022 10:30 AM    Bilirubin Negative 01/23/2022 10:30 AM    Urobilinogen 4.0 (H) 01/23/2022 10:30 AM    Nitrites Negative 01/23/2022 10:30 AM    Leukocyte Esterase Negative 01/23/2022 10:30 AM    Bacteria Negative 01/23/2022 10:30 AM    WBC 0-4 01/23/2022 10:30 AM    RBC 0-5 01/23/2022 10:30 AM         Assessment:     Acute hypoxemic respiratory failure on on ventilator 90% of   COVID-19 pneumonitis   hypotension  Thrombocytopenia  Hypertension  Arthritis  Hyperkalemia  Hyperenatremia      Plan:       Olumiant 2 mg daily  Decadron 4 mg every 6 hours  Meropenem 1 g every 8 hours    Follow-up with pulmonologist and nephrologist    Overall prognosis    IV fluids 50 cc/h    Patient is DNR  Discussed with the patient's son    Repeat the lab      Current Facility-Administered Medications:     fentaNYL (PF) 1,500 mcg/30 mL (50 mcg/mL) infusion, 0-200 mcg/hr, IntraVENous, TITRATE, Karan Flower MD, Last Rate: 2 mL/hr at 01/27/22 0230, 100 mcg/hr at 01/27/22 0230    hydrOXYzine pamoate (VISTARIL) capsule 25 mg, 25 mg, Oral, Q4H PRN, Polo Lopez MD    propofol (DIPRIVAN) 10 mg/mL infusion, 0-50 mcg/kg/min, IntraVENous, TITRATE, Molly Flower MD, Last Rate: 16.3 mL/hr at 01/27/22 0852, 30 mcg/kg/min at 01/27/22 0852    dexmedeTOMidine (PRECEDEX) 400 mcg in 0.9% sodium chloride (MBP/ADV) 100 mL MBP, 0.1-1.5 mcg/kg/hr, IntraVENous, TITRATE, Molly Flower MD, Stopped at 01/24/22 0824    NOREPINephrine (LEVOPHED) 8 mg in 0.9% NS 250ml infusion, 0.5-16 mcg/min, IntraVENous, TITRATE, Margoth Lopez MD, Stopped at 01/25/22 1137    acetaminophen (TYLENOL) tablet 650 mg, 650 mg, Oral, Q6H PRN **OR** acetaminophen (TYLENOL) suppository 650 mg, 650 mg, Rectal, Q6H PRN, Margoth Lopez MD    polyethylene glycol (MIRALAX) packet 17 g, 17 g, Oral, DAILY PRN, Margoth Lopez MD    ondansetron (ZOFRAN ODT) tablet 4 mg, 4 mg, Oral, Q8H PRN **OR** [DISCONTINUED] ondansetron (ZOFRAN) injection 4 mg, 4 mg, IntraVENous, Q6H PRN, Polo Lopez MD    enoxaparin (LOVENOX) injection 40 mg, 40 mg, SubCUTAneous, DAILY, Polo Lopez MD, 40 mg at 01/27/22 7540    baricitinib (OLUMIANT) tablet 2 mg, 2 mg, Oral, DAILY, Polo Lopez MD, 2 mg at 01/27/22 0842    albuterol (PROVENTIL HFA, VENTOLIN HFA, PROAIR HFA) inhaler 2 Puff, 2 Puff, Inhalation, Q6H PRN, Polo Lopez MD    polyethylene glycol (MIRALAX) packet 17 g, 17 g, Oral, DAILY PRN, Yaritza Harman MD    [DISCONTINUED] ondansetron (ZOFRAN ODT) tablet 4 mg, 4 mg, Oral, Q8H PRN **OR** ondansetron (ZOFRAN) injection 4 mg, 4 mg, IntraVENous, Q6H PRN, Polo Lopez MD    meropenem (MERREM) 1 g in 0.9% sodium chloride 20 mL IV syringe, 1 g, IntraVENous, Q8H, Polo Lopez MD, 1 g at 01/27/22 0842    dexamethasone (DECADRON) 4 mg/mL injection 4 mg, 4 mg, IntraVENous, Q6H, Polo Lopez MD, 4 mg at 01/27/22 9799    hydrOXYzine (VISTARIL) injection 25 mg, 25 mg, IntraMUSCular, Q6H PRN, Polo Lopez MD, 25 mg at 01/22/22 0111

## 2022-01-27 NOTE — PROGRESS NOTES
Comprehensive Nutrition Assessment    Type and Reason for Visit: Reassess (early goal)    Nutrition Recommendations/Plan:  Continue TF via OGT of Promote at goal of 10 ml/hr  Flush with 200ml water q6hrs or per nephrology  Please provide 2 pkts Prosource TID, 1 overnight (7pkt/d; 420kcals, 105g pro)  Provides 660kcals (52%), 120g pro (94%), and 1400ml water (93%)      D5 at 50ml/hr providing 204kcals/d       Propofol 30mcg/kg/min providing 430kcals/d       Together D5 and Propofol providing 101% needs      Please document TF initiation, rate, flushes, prosource provision, and GRVs    Nutrition Assessment:  Admitted for hypoxia, hypotension, intubated (1/23). +COVID-19. Noted recent re-admit, inpatient x5d with COVID-19 but asymptomatic. (1/24) TF ordered by MD, RD adjusted. TF continues today, flushes increased by nephrology and D5 added back on. RD to adjust TF to best meet needs. Labs: H/H 9.9/32.0, Na 147, K+ 4.7, BUN 56, -347, , Alt 106, Mg 2.6. Alb 2.0. Meds: Baricitinib, dexamethasone, D5 at 50ml/hr, fentanyl, meropenem, Propofol at 30mcg/kg/min. Malnutrition Assessment:  Malnutrition Status: At risk for malnutrition (specify) (intubation)    Context:  Acute illness     Findings of the 6 clinical characteristics of malnutrition:   Energy Intake:  Mild decrease in energy intake (specify) (likely poor PO considering hx COVID-19)  Weight Loss:  No significant weight loss     Body Fat Loss:  Unable to assess,     Muscle Mass Loss:  Unable to assess,    Fluid Accumulation:  No significant fluid accumulation,          Estimated Daily Nutrient Needs:  Energy (kcal): 1270kcals (14kcals/kg); Weight Used for Energy Requirements: Current  Protein (g): 127g (1.4g/kg); Weight Used for Protein Requirements: Current  Fluid (ml/day): 1500ml; Method Used for Fluid Requirements: Other (comment) (adult minimum)      Nutrition Related Findings:  Unable to perform NFPE d/t COVID-19 precautions. No hx dysphagia. Last BM 1/21, new new. Non-pitting generalized edema and to x4 extremities. Wounds:    None       Current Nutrition Therapies:  DIET NPO  ADULT TUBE FEEDING Orogastric; Other Tube Feeding (Specify); Nepro; Delivery Method: Continuous; Continuous Initial Rate (mL/hr): 30; Continuous Advance Tube Feeding: No; Water Flush Volume (mL): 400; Water Flush Frequency: Q 4 hours; Modulars/Add. .. Anthropometric Measures:  · Height:  5' 5\" (165.1 cm)  · Current Body Wt:  104 kg (229 lb 4.5 oz) (1/27)   · Admission Body Wt:  199 lb 15.3 oz (1/12)    · Usual Body Wt:   (stefan)     · Ideal Body Wt:  125 lbs:  183.4 %   · BMI Category:  Obese class 2 (BMI 35.0-39.9)     ? Wt gain of 30# in last few days, will con mara to use lower wt. Nutrition Diagnosis:   · Inadequate oral intake related to impaired respiratory function as evidenced by intubation,nutrition support-enteral nutrition      Nutrition Interventions:   Food and/or Nutrient Delivery: Continue NPO,Modify tube feeding  Nutrition Education and Counseling: No recommendations at this time,Education not appropriate  Coordination of Nutrition Care: Continue to monitor while inpatient    Goals:  Intakes >/=75% of EENs in >5 days, Wt maintenance within +/-0.5kg in >5 days       Nutrition Monitoring and Evaluation:   Behavioral-Environmental Outcomes: None identified  Food/Nutrient Intake Outcomes: Enteral nutrition intake/tolerance  Physical Signs/Symptoms Outcomes: Biochemical data,Hemodynamic status,Weight    Discharge Planning:     Too soon to determine     Electronically signed by Phuong Ribera on 1/27/2022 at 2:36 PM    Contact: Ext [phone], or via [facility]

## 2022-01-27 NOTE — PROGRESS NOTES
IMPRESSION:   1. Acute hypoxic respiratory  2. COVID-19 pneumonia  3. Sepsis with septic shock  4. Arthritis hypertension by hx  5. Hypokalemia  6. Hypernatremia  7. Additional workup outlined below  8. Pt is at high risk of sudden decline and decompensation with life threatening consequenses and continued end organ dysfunction and failure  9. Pt is critically ill. Time spent with pt and staff actively rendering care, managing pt and coordinating care as stated below; 30 minutes, exclusive of any procedures      RECOMMENDATIONS/PLAN:   1. ICU monitoring  1. Patient condition got worse on 1/24 went to asystole subsequently got intubated now on ventilator assist control mode sedated with propofol and fentanyl Precedex was discontinued because of bradycardia arterial blood gases acceptable  will decrease FiO2 and will increase the rate she is on 100% FiO2 for 50 tidal volume PEEP of 5 rate of 12  2. Patient is on dexamethasone and baricitinib  3. Patient is off Levophed  4. Potassium corrected  5. Start on IV fluid D5W and also he is getting free water through the PEG tube will decrease to 100 every 4 hours sodium has corrected now his potassium is elevated will repeat labs  6. Troponin is elevated  7. Lactic acid 3.7  8. Chest x-ray shows diffuse bilateral infiltrate with congestive changes  9. She is on meropenem and Zithromax  10. CVP monitoring hemodynamically unstable  11. IV vasopressors for circulatory shock refractory to fluids to maintain SBP> 90  12. Transfuse prn to maintain Hgb > 7  13. Labs to follow electrolytes, renal function and and blood counts  14. Bronchial hygiene with respiratory therapy techniques, bronchodilators  15. Pt needs IV fluids with additives and Drug therapy requiring intensive monitoring for toxicity  16. Prescription drug management with home med reconciliation reviewed  17. DVT, SUP prophylaxis  18.  Will be available to assist in medical management while in the CCU pending disposition     [x] High complexity decision making was performed  [x] See my orders for details  HPI  81-year-old lady came in because of shortness of breath and dyspnea she has significant past medical history of arthritis hypertension she was recently discharged from the hospital came back with worsening of hypoxia she was discharged on oxygen 2 L nasal cannula and patient condition got worse she was supposed to be discharged to skilled care but patient's son took her home where her condition got worse and she was transferred back to the hospital now she is on noninvasive ventilator BiPAP machine hemodynamically unstable hypotensive on vasopressors and not giving much history acutely ill so critical care consult was called  PMH:  has no past medical history on file. PSH:   has a past surgical history that includes ir insert non tunl cvc over 5 yrs (1/21/2022). FHX: family history is not on file.      SHX:      ALL:   Allergies   Allergen Reactions    Penicillins Hives        MEDS:   [x] Reviewed - As Below   [] Not reviewed    Current Facility-Administered Medications   Medication    sodium zirconium cyclosilicate (LOKELMA) powder packet 10 g    fentaNYL (PF) 1,500 mcg/30 mL (50 mcg/mL) infusion    hydrOXYzine pamoate (VISTARIL) capsule 25 mg    propofol (DIPRIVAN) 10 mg/mL infusion    dexmedeTOMidine (PRECEDEX) 400 mcg in 0.9% sodium chloride (MBP/ADV) 100 mL MBP    NOREPINephrine (LEVOPHED) 8 mg in 0.9% NS 250ml infusion    acetaminophen (TYLENOL) tablet 650 mg    Or    acetaminophen (TYLENOL) suppository 650 mg    polyethylene glycol (MIRALAX) packet 17 g    ondansetron (ZOFRAN ODT) tablet 4 mg    enoxaparin (LOVENOX) injection 40 mg    baricitinib (OLUMIANT) tablet 2 mg    albuterol (PROVENTIL HFA, VENTOLIN HFA, PROAIR HFA) inhaler 2 Puff    polyethylene glycol (MIRALAX) packet 17 g    ondansetron (ZOFRAN) injection 4 mg    meropenem (MERREM) 1 g in 0.9% sodium chloride 20 mL IV syringe  dexamethasone (DECADRON) 4 mg/mL injection 4 mg    hydrOXYzine (VISTARIL) injection 25 mg      MAR reviewed and pertinent medications noted or modified as needed   Current Facility-Administered Medications   Medication    sodium zirconium cyclosilicate (LOKELMA) powder packet 10 g    fentaNYL (PF) 1,500 mcg/30 mL (50 mcg/mL) infusion    hydrOXYzine pamoate (VISTARIL) capsule 25 mg    propofol (DIPRIVAN) 10 mg/mL infusion    dexmedeTOMidine (PRECEDEX) 400 mcg in 0.9% sodium chloride (MBP/ADV) 100 mL MBP    NOREPINephrine (LEVOPHED) 8 mg in 0.9% NS 250ml infusion    acetaminophen (TYLENOL) tablet 650 mg    Or    acetaminophen (TYLENOL) suppository 650 mg    polyethylene glycol (MIRALAX) packet 17 g    ondansetron (ZOFRAN ODT) tablet 4 mg    enoxaparin (LOVENOX) injection 40 mg    baricitinib (OLUMIANT) tablet 2 mg    albuterol (PROVENTIL HFA, VENTOLIN HFA, PROAIR HFA) inhaler 2 Puff    polyethylene glycol (MIRALAX) packet 17 g    ondansetron (ZOFRAN) injection 4 mg    meropenem (MERREM) 1 g in 0.9% sodium chloride 20 mL IV syringe    dexamethasone (DECADRON) 4 mg/mL injection 4 mg    hydrOXYzine (VISTARIL) injection 25 mg      PMH:  has no past medical history on file. PSH:   has a past surgical history that includes ir insert non tunl cvc over 5 yrs (2022). FHX: family history is not on file. SHX:       ROS:  Unable to obtain    Hemodynamics:    CO:    CI:    CVP:    SVR:   PAP Systolic:    PAP Diastolic:    PVR:    SB93:        Ventilator Settings:      Mode Rate TV Press PEEP FiO2 PIP Min.  Vent   Assist control,Volume control    450 ml    5 cm H20 90 %  29 cm H2O  6.04 l/min        Vital Signs: Telemetry:    normal sinus rhythm Intake/Output:   Visit Vitals  /65 (BP 1 Location: Right lower arm, BP Patient Position: At rest)   Pulse 65   Temp 98 °F (36.7 °C)   Resp 12   Ht 5' 5\" (1.651 m)   Wt 104 kg (229 lb 4.5 oz)   SpO2 99%   BMI 38.15 kg/m²       Temp (24hrs), Av.7 °F (36.5 °C), Min:97.1 °F (36.2 °C), Max:98 °F (36.7 °C)        O2 Device: Ventilator         Wt Readings from Last 4 Encounters:   01/27/22 104 kg (229 lb 4.5 oz)   01/17/22 90.7 kg (200 lb)          Intake/Output Summary (Last 24 hours) at 1/27/2022 1262  Last data filed at 1/27/2022 9172  Gross per 24 hour   Intake 1926.86 ml   Output 1200 ml   Net 726.86 ml       Last shift:      No intake/output data recorded. Last 3 shifts: 01/25 1901 - 01/27 0700  In: 3024.9 [I.V.:1324.9]  Out: 1800 [Urine:1800]       Physical Exam:     General: Intubated on ventilator  HEENT: NCAT, poor dentition, lips and mucosa dry  Eyes: anicteric; conjunctiva clear  Neck: no nodes, , trach midline; no accessory MM use. Chest: no deformity,   Cardiac: R regular; no murmur;   Lungs: distant breath sounds; no wheezes  Abd: soft, NT, hypoactive BS  Ext: no edema; no joint swelling;  No clubbing  : NO [place], clear urine  Neuro: \"alert\"  Psych- + agitation,   Skin: warm, dry, no cyanosis;   Pulses: 1-2+ Bilateral pedal, radial  Capillary: brisk; pale      DATA:    MAR reviewed and pertinent medications noted or modified as needed  MEDS:   Current Facility-Administered Medications   Medication    sodium zirconium cyclosilicate (LOKELMA) powder packet 10 g    fentaNYL (PF) 1,500 mcg/30 mL (50 mcg/mL) infusion    hydrOXYzine pamoate (VISTARIL) capsule 25 mg    propofol (DIPRIVAN) 10 mg/mL infusion    dexmedeTOMidine (PRECEDEX) 400 mcg in 0.9% sodium chloride (MBP/ADV) 100 mL MBP    NOREPINephrine (LEVOPHED) 8 mg in 0.9% NS 250ml infusion    acetaminophen (TYLENOL) tablet 650 mg    Or    acetaminophen (TYLENOL) suppository 650 mg    polyethylene glycol (MIRALAX) packet 17 g    ondansetron (ZOFRAN ODT) tablet 4 mg    enoxaparin (LOVENOX) injection 40 mg    baricitinib (OLUMIANT) tablet 2 mg    albuterol (PROVENTIL HFA, VENTOLIN HFA, PROAIR HFA) inhaler 2 Puff    polyethylene glycol (MIRALAX) packet 17 g    ondansetron (ZOFRAN) injection 4 mg    meropenem (MERREM) 1 g in 0.9% sodium chloride 20 mL IV syringe    dexamethasone (DECADRON) 4 mg/mL injection 4 mg    hydrOXYzine (VISTARIL) injection 25 mg        Labs:    Recent Labs     01/27/22  0500 01/26/22  0410 01/25/22  0450   WBC 11.7* 10.6 16.5*   HGB 9.9* 9.7* 10.3*    140* 247     Recent Labs     01/27/22  0500 01/26/22  0410 01/25/22  0450   * 146* 149*  149*   K 4.7 5.3* 4.6  4.5   * 114* 119*  119*   CO2 31 30 26  26   * 191* 181*  189*   BUN 56* 64* 61*  59*   CREA 0.66 0.83 0.79  0.80   CA 9.3 9.6 9.5  9.4   MG  --   --  2.6*   PHOS  --   --  3.2   ALB 2.0* 2.0* 2.1*  2.0*   * 76 90*     Recent Labs     01/27/22  0445 01/26/22  0416 01/25/22  0510   PH 7.40 7.34* 7.32*   PCO2 54* 55* 53*   PO2 82 116* 54*   HCO3 32* 27* 25   FIO2 100.0 70.0 70.0     No results for input(s): CPK, CKNDX, TROIQ in the last 72 hours. No lab exists for component: CPKMB  No results found for: BNPP, BNP   Lab Results   Component Value Date/Time    Culture result: No growth 4 days 01/21/2022 07:55 AM    Culture result: No growth 6 days 01/18/2022 07:12 AM    Culture result: (A) 01/17/2022 07:12 AM     Staphylococcus species, coagulase negative growing in 1 of 4 bottles drawn NO SITE INDICATED    Culture result:  01/17/2022 07:12 AM     (NOTE) [facility] IN [facility] GROWING IN 1 OF 2 BOTTLES CALLED TO JOSE MIGUEL PAGAN AT 0831 ON 1/19/22. JF     No results found for: TSH, TSHEXT, TSHEXT     Imaging:    Results from Hospital Encounter encounter on 01/21/22    XR CHEST PORT    Narrative  Chest single view. Comparison single view chest January 25, 2022. ET tube projects to the maggie similar compared to prior imaging. Right neck central vascular catheter. Diffuse patchy reticular markings again  noted through lungs. Cardiac and mediastinal structures unchanged. No  pneumothorax or sizable pleural effusion. Study findings called to ICU.  Recommend pullback 2 cm ET tube. Results from [place] encounter on 01/21/22    CT HEAD WO CONT    Narrative  The study is a noncontrasted head CT examination dated 1/21/2022. HISTORY: Unresponsive. TECHNIQUE: Thin section axial imaging was performed followed by sagittal and  coronal reconstructed imaging. Dose Reduction Technique was employed to reduce radiation exposure - This  includes reduction optimization techniques as appropriate to a performed exam  with automated exposure control adjustments of the mA and/or Kv according to  patient size, or use of iterative reconstruction technique. COMPARISON: CT head dated 1/17/2022. There is an appropriate size to the ventricles, the deep cortical sulci, and the  sylvian fissures for the patient's age. This examination is negative for  intracranial hemorrhage, mass effect or an extra axial collection. The  gray-white matter junctions are preserved without cytotoxic or vasogenic edema. An assessment of the white matter tracts demonstrates a mild decrease in the  density characteristics of the central periventricular white matter. These  findings are consistent with expected aging changes and milder microvascular  disease. There also is a region of diminished density within the right posterior  inferior cerebellar hemisphere which may represent an older cerebrovascular  insult. ORBITS: This examination is negative for acute orbital pathology. PARANASAL SINUSES: The paranasal sinuses are well pneumatized without acute  sinus disease. There is a decreased number of right sided mastoid air cells which can be  associated with chronic mastoiditis. The craniocervical junction images in a  normal fashion. Impression  1. There are milder microvascular changes within the central periventricular  white matter. 2.  There is an area of diminished density within the inferior aspect of the  right cerebellar hemisphere without change (series 201 image number 12). These  findings may represent an older ischemic insult within the right posterior  inferior cerebellar region. 3.  This examination is negative for acute intracranial pathology or short-term  interval changes dating back to 1/17/2022.       · 1/24 event noted intubated last night on ventilator back on Levofed  · 1/25 remain intubated will change vent settings will give 1 dose of Lasix already on vasopressor  · 1/27 100% FiO2 remain on ventilator will change vent setting

## 2022-01-28 NOTE — PROGRESS NOTES
Clinical chart reviewed by CM. Patient's discharge plan at admission was to return home with home health. Per attending physician's note, patient's son may elect to implement comfort care measures pending a consult with vascular surgeon on Monday. CM will continue to follow.

## 2022-01-28 NOTE — PROGRESS NOTES
General Daily Progress Note          Patient Name:   Karthik Black       YOB: 1940       Age:  80 y.o.       Admit Date: 1/21/2022      Subjective:     Patient is a 80y.o. year old female with signal past medical history of hypertension arthritis who discharged from the hospital yesterday in stable condition patient is admitted last admission with COVID-19 patient was asymptomatic all this time while in the hospital patient discharged on 2 L oxygen to skilled care but patient son want to go home with home health but is at home patient's saturations drops sent back to the ER seen by the ER physician patient placed on BiPAP patient was little hypotensive started on Levophed admitted for further work-up and treatment         Patient e hypoxemic and hypotension patient was intubated and started on Levophed drip    Patient is DNR    Patient on ventilator 90% O2  Fentanyl and propofol drip  NG tube in place for medication and feeding      Objective:     Visit Vitals  BP (!) 119/52 (BP 1 Location: Right lower arm, BP Patient Position: At rest)   Pulse 76   Temp 98.4 °F (36.9 °C)   Resp 16   Ht 5' 5\" (1.651 m)   Wt 104.5 kg (230 lb 6.1 oz)   SpO2 100%   BMI 38.34 kg/m²        Recent Results (from the past 24 hour(s))   GLUCOSE, POC    Collection Time: 01/27/22 11:33 AM   Result Value Ref Range    Glucose (POC) 168 (H) 65 - 117 mg/dL    Performed by Aminta Jeong    EKG, 12 LEAD, INITIAL    Collection Time: 01/27/22  2:06 PM   Result Value Ref Range    Ventricular Rate 58 BPM    Atrial Rate 58 BPM    P-R Interval 130 ms    QRS Duration 82 ms    Q-T Interval 470 ms    QTC Calculation (Bezet) 461 ms    Calculated P Axis 70 degrees    Calculated R Axis -32 degrees    Calculated T Axis -18 degrees    Diagnosis       Sinus bradycardia  Left axis deviation  Moderate voltage criteria for LVH, may be normal variant  ST & T wave abnormality, consider anterolateral ischemia  Abnormal ECG  When compared with ECG of 21-JAN-2022 03:07,  Previous ECG has undetermined rhythm, needs review  ST no longer depressed in Lateral leads  T wave inversion now evident in Inferior leads  T wave inversion now evident in Anterolateral leads  Confirmed by [facility] Araceli ANTUNEZ ([id number]) on 1/27/2022 10:22:08 PM     BLOOD GAS, ARTERIAL    Collection Time: 01/28/22  2:55 AM   Result Value Ref Range    pH 7.42 7.35 - 7.45      PCO2 55 (H) 35 - 45 mmHg    PO2 67 (L) 75 - 100 mmHg    O2 SAT 96 >95 %    BICARBONATE 34 (H) 22 - 26 mmol/L    BASE EXCESS 10.2 (H) 0 - 2 mmol/L    O2 METHOD VENT      FIO2 100.0 %    MODE Assist Control/Volume Control      Tidal volume 450      SET RATE 12      EPAP/CPAP/PEEP 5.0      SITE Right Radial      BEN'S TEST PASS     CBC WITH AUTOMATED DIFF    Collection Time: 01/28/22  5:10 AM   Result Value Ref Range    WBC 14.6 (H) 3.6 - 11.0 K/uL    RBC 3.29 (L) 3.80 - 5.20 M/uL    HGB 9.8 (L) 11.5 - 16.0 g/dL    HCT 31.5 (L) 35.0 - 47.0 %    MCV 95.7 80.0 - 99.0 FL    MCH 29.8 26.0 - 34.0 PG    MCHC 31.1 30.0 - 36.5 g/dL    RDW 13.2 11.5 - 14.5 %    PLATELET 745 731 - 034 K/uL    MPV 11.7 8.9 - 12.9 FL    NRBC 0.1 (H) 0.0  WBC    ABSOLUTE NRBC 0.02 (H) 0.00 - 0.01 K/uL    NEUTROPHILS 89 (H) 32 - 75 %    LYMPHOCYTES 4 (L) 12 - 49 %    MONOCYTES 5 5 - 13 %    EOSINOPHILS 0 0 - 7 %    BASOPHILS 0 0 - 1 %    IMMATURE GRANULOCYTES 2 (H) 0 - 0.5 %    ABS. NEUTROPHILS 13.0 (H) 1.8 - 8.0 K/UL    ABS. LYMPHOCYTES 0.6 (L) 0.8 - 3.5 K/UL    ABS. MONOCYTES 0.7 0.0 - 1.0 K/UL    ABS. EOSINOPHILS 0.0 0.0 - 0.4 K/UL    ABS. BASOPHILS 0.0 0.0 - 0.1 K/UL    ABS. IMM.  GRANS. 0.3 (H) 0.00 - 0.04 K/UL    DF AUTOMATED     METABOLIC PANEL, COMPREHENSIVE    Collection Time: 01/28/22  5:10 AM   Result Value Ref Range    Sodium 140 136 - 145 mmol/L    Potassium 4.8 3.5 - 5.1 mmol/L    Chloride 104 97 - 108 mmol/L    CO2 34 (H) 21 - 32 mmol/L    Anion gap 2 (L) 5 - 15 mmol/L    Glucose 184 (H) 65 - 100 mg/dL    BUN 42 (H) 6 - 20 mg/dL    Creatinine 0. 56 0.55 - 1.02 mg/dL    BUN/Creatinine ratio 75 (H) 12 - 20      GFR est AA >60 >60 ml/min/1.73m2    GFR est non-AA >60 >60 ml/min/1.73m2    Calcium 9.3 8.5 - 10.1 mg/dL    Bilirubin, total 0.5 0.2 - 1.0 mg/dL    AST (SGOT) 123 (H) 15 - 37 U/L    ALT (SGPT) 124 (H) 12 - 78 U/L    Alk. phosphatase 62 45 - 117 U/L    Protein, total 6.1 (L) 6.4 - 8.2 g/dL    Albumin 1.9 (L) 3.5 - 5.0 g/dL    Globulin 4.2 (H) 2.0 - 4.0 g/dL    A-G Ratio 0.5 (L) 1.1 - 2.2     GLUCOSE, POC    Collection Time: 01/28/22  5:11 AM   Result Value Ref Range    Glucose (POC) 169 (H) 65 - 117 mg/dL    Performed by Elza Pérez      [unfilled]      Review of Systems    Unable to obtain. Physical Exam:      Constitutional: Awake on BiPAP  HENT:   Head: Normocephalic and atraumatic. Eyes: Pupils are equal, round, and reactive to light. EOM are normal.   Cardiovascular: Normal rate, regular rhythm and normal heart sounds. Pulmonary/Chest: Breath sounds normal. No wheezes. No rales. Exhibits no tenderness. Abdominal: Soft. Bowel sounds are normal. There is no abdominal tenderness. There is no rebound and no guarding. Musculoskeletal: Normal range of motion. Neurological: pt is alert and oriented to person, place, and time. XR CHEST PORT   Final Result      XR CHEST PORT   Final Result   Endotracheal tube tip still at the maggie. Diffuse opacities in the   lungs, not significantly changed. XR CHEST PORT   Final Result      XR CHEST PORT   Final Result   Endotracheal tube tip at the maggie. Diffuse opacities in the   lungs, accentuated by lower lung volumes or increased. XR CHEST PORT   Final Result   Diffuse opacities in the lungs, not significantly changed. XR CHEST PORT   Final Result   1. ETT terminating 2 cm above the maggie. Remaining support apparatus as above. 2.  Worsening bilateral airspace disease with developing ARDS not excluded.       XR CHEST PORT   Final Result   Moderate patchy diffuse bilateral airspace opacities, waxing and waning from the   prior exam.      XR CHEST PORT   Final Result   Moderate diffuse bilateral airspace opacities, likely a combination of edema and   airspace disease, mildly increased from the previous exam.      CT HEAD WO CONT   Final Result   1. There are milder microvascular changes within the central periventricular   white matter. 2.  There is an area of diminished density within the inferior aspect of the   right cerebellar hemisphere without change (series 201 image number 12). These   findings may represent an older ischemic insult within the right posterior   inferior cerebellar region. 3.  This examination is negative for acute intracranial pathology or short-term   interval changes dating back to 1/17/2022. XR CHEST PORT   Final Result   Patchy mixed asymmetric lung disease appears somewhat better but the   lungs are better inflated. No effusion or pneumothorax. Normal heart and   mediastinum      IR INSERT NON TUNL CVC OVER 5 YRS   Final Result   Ultrasound of the right neck demonstrated patent IJV. Successful US-guided placement of a right internal jugular triple lumen central   venous catheter as described above. The catheter is functioning. PLAN:   Catheter position was confirmed by follow-up chest x-ray: catheter tip in the   lower SVC and ready to use. IR US GUIDED VASCULAR ACCESS   Final Result   Ultrasound of the right neck demonstrated patent IJV. Successful US-guided placement of a right internal jugular triple lumen central   venous catheter as described above. The catheter is functioning. PLAN:   Catheter position was confirmed by follow-up chest x-ray: catheter tip in the   lower SVC and ready to use. XR CHEST PORT   Final Result   Findings/impression:      Diffuse interstitial airspace disease/edema. No definite pleural effusion or   pneumothorax. Cardiac contours are partially obscured.       No acute osseous abnormality identified.            Recent Results (from the past 24 hour(s))   GLUCOSE, POC    Collection Time: 01/27/22 11:33 AM   Result Value Ref Range    Glucose (POC) 168 (H) 65 - 117 mg/dL    Performed by Sandy Napier    EKG, 12 LEAD, INITIAL    Collection Time: 01/27/22  2:06 PM   Result Value Ref Range    Ventricular Rate 58 BPM    Atrial Rate 58 BPM    P-R Interval 130 ms    QRS Duration 82 ms    Q-T Interval 470 ms    QTC Calculation (Bezet) 461 ms    Calculated P Axis 70 degrees    Calculated R Axis -32 degrees    Calculated T Axis -18 degrees    Diagnosis       Sinus bradycardia  Left axis deviation  Moderate voltage criteria for LVH, may be normal variant  ST & T wave abnormality, consider anterolateral ischemia  Abnormal ECG  When compared with ECG of 21-JAN-2022 03:07,  Previous ECG has undetermined rhythm, needs review  ST no longer depressed in Lateral leads  T wave inversion now evident in Inferior leads  T wave inversion now evident in Anterolateral leads  [facility] by [id number] on 1/27/2022 10:22:08 PM     BLOOD GAS, ARTERIAL    Collection Time: 01/28/22  2:55 AM   Result Value Ref Range    pH 7.42 7.35 - 7.45      PCO2 55 (H) 35 - 45 mmHg    PO2 67 (L) 75 - 100 mmHg    O2 SAT 96 >95 %    BICARBONATE 34 (H) 22 - 26 mmol/L    BASE EXCESS 10.2 (H) 0 - 2 mmol/L    O2 METHOD VENT      FIO2 100.0 %    MODE Assist Control/Volume Control      Tidal volume 450      SET RATE 12      EPAP/CPAP/PEEP 5.0      SITE Right Radial      BEN'S TEST PASS     CBC WITH AUTOMATED DIFF    Collection Time: 01/28/22  5:10 AM   Result Value Ref Range    WBC 14.6 (H) 3.6 - 11.0 K/uL    RBC 3.29 (L) 3.80 - 5.20 M/uL    HGB 9.8 (L) 11.5 - 16.0 g/dL    HCT 31.5 (L) 35.0 - 47.0 %    MCV 95.7 80.0 - 99.0 FL    MCH 29.8 26.0 - 34.0 PG    MCHC 31.1 30.0 - 36.5 g/dL    RDW 13.2 11.5 - 14.5 %    PLATELET 740 534 - 273 K/uL    MPV 11.7 8.9 - 12.9 FL    NRBC 0.1 (H) 0.0  WBC    ABSOLUTE NRBC 0.02 (H) 0.00 - 0.01 K/uL    NEUTROPHILS 89 (H) 32 - 75 %    LYMPHOCYTES 4 (L) 12 - 49 %    MONOCYTES 5 5 - 13 %    EOSINOPHILS 0 0 - 7 %    BASOPHILS 0 0 - 1 %    IMMATURE GRANULOCYTES 2 (H) 0 - 0.5 %    ABS. NEUTROPHILS 13.0 (H) 1.8 - 8.0 K/UL    ABS. LYMPHOCYTES 0.6 (L) 0.8 - 3.5 K/UL    ABS. MONOCYTES 0.7 0.0 - 1.0 K/UL    ABS. EOSINOPHILS 0.0 0.0 - 0.4 K/UL    ABS. BASOPHILS 0.0 0.0 - 0.1 K/UL    ABS. IMM. GRANS. 0.3 (H) 0.00 - 0.04 K/UL    DF AUTOMATED     METABOLIC PANEL, COMPREHENSIVE    Collection Time: 01/28/22  5:10 AM   Result Value Ref Range    Sodium 140 136 - 145 mmol/L    Potassium 4.8 3.5 - 5.1 mmol/L    Chloride 104 97 - 108 mmol/L    CO2 34 (H) 21 - 32 mmol/L    Anion gap 2 (L) 5 - 15 mmol/L    Glucose 184 (H) 65 - 100 mg/dL    BUN 42 (H) 6 - 20 mg/dL    Creatinine 0.56 0.55 - 1.02 mg/dL    BUN/Creatinine ratio 75 (H) 12 - 20      GFR est AA >60 >60 ml/min/1.73m2    GFR est non-AA >60 >60 ml/min/1.73m2    Calcium 9.3 8.5 - 10.1 mg/dL    Bilirubin, total 0.5 0.2 - 1.0 mg/dL    AST (SGOT) 123 (H) 15 - 37 U/L    ALT (SGPT) 124 (H) 12 - 78 U/L    Alk.  phosphatase 62 45 - 117 U/L    Protein, total 6.1 (L) 6.4 - 8.2 g/dL    Albumin 1.9 (L) 3.5 - 5.0 g/dL    Globulin 4.2 (H) 2.0 - 4.0 g/dL    A-G Ratio 0.5 (L) 1.1 - 2.2     GLUCOSE, POC    Collection Time: 01/28/22  5:11 AM   Result Value Ref Range    Glucose (POC) 169 (H) 65 - 117 mg/dL    Performed by Jesse Labor        Results     Procedure Component Value Units Date/Time    MRSA SCREEN - PCR (NASAL) [[id number]] Collected: 01/22/22 0440    Order Status: Canceled Specimen: Swab     MRSA SCREEN - PCR (NASAL) [[id number]] Collected: 01/22/22 0300    Order Status: Canceled Specimen: Swab     CULTURE, BLOOD #1 [[id number]]     Order Status: Canceled Specimen: Blood     CULTURE, BLOOD #2 [[id number]]     Order Status: Canceled Specimen: Blood     CULTURE, BLOOD, PAIRED [[id number]] Collected: 01/21/22 0755    Order Status: Completed Specimen: Blood Updated: 01/27/22 1203     Special Requests: No Special Requests        Culture result: No growth 5 days       CULTURE, BLOOD #1 [[id number]] Collected: 01/18/22 3923    Order Status: Completed Specimen: Blood Updated: 01/25/22 0912     Special Requests: No Special Requests        Culture result: No growth 6 days       COVID-19 RAPID TEST [[id number]]  (Abnormal) Collected: 01/17/22 1136    Order Status: Completed Specimen: Nasopharyngeal Updated: 01/17/22 1201     Specimen source       Please find results under separate order           COVID-19 rapid test DETECTED        Comment: Rapid [employer] ID Now   The specimen is POSITIVE for SARS-CoV-2, the novel coronavirus associated with COVID-19. This test has been authorized by the FDA under an Emergency Use Authorization (EUA) for use by authorized laboratories. Fact sheet for Healthcare Providers: ConventionUpdate.co.nz Fact sheet for Patients: ConventionUpdate.co.nz   Methodology: Isothermal Nucleic Acid Amplification Results verified, phoned to and read back by Guillaume Ramos, 12:00   1/17/22 LBO         INFLUENZA A & B AG (RAPID TEST) [[id number]] Collected: 01/17/22 1136    Order Status: Completed Specimen: Nasopharyngeal from Nasal washing Updated: 01/17/22 1202     Influenza A Antigen Negative        Influenza B Antigen Negative       CULTURE, BLOOD #2 [[id number]]  (Abnormal) Collected: 01/17/22 0712    Order Status: Completed Specimen: Blood Updated: 01/20/22 0946     Special Requests: No Special Requests        Culture result:       Staphylococcus species, coagulase negative growing in 1 of 4 bottles drawn NO SITE INDICATED            (NOTE) [facility] GROWING IN 1 OF 2 BOTTLES CALLED TO [address] [place] [address] AT 0831 ON 1/19/22.  JF           Labs:     Recent Labs     01/28/22  0510 01/27/22  0500   WBC 14.6* 11.7*   HGB 9.8* 9.9*   HCT 31.5* 32.0*    216     Recent Labs     01/28/22  0510 01/27/22  0500 01/26/22  0410  147* 146*   K 4.8 4.7 5.3*    113* 114*   CO2 34* 31 30   BUN 42* 56* 64*   CREA 0.56 0.66 0.83   * 175* 191*   CA 9.3 9.3 9.6     Recent Labs     01/28/22  0510 01/27/22  0500 01/26/22  0410   * 106* 76   AP 62 58 88   TBILI 0.5 0.5 0.4   TP 6.1* 6.2* 6.3*   ALB 1.9* 2.0* 2.0*   GLOB 4.2* 4.2* 4.3*     No results for input(s): INR, PTP, APTT, INREXT, INREXT in the last 72 hours. No results for input(s): FE, TIBC, PSAT, FERR in the last 72 hours. No results found for: FOL, RBCF   Recent Labs     01/28/22  0255 01/27/22  0445   PH 7.42 7.40   PCO2 55* 54*   PO2 67* 82     No results for input(s): CPK, CKNDX, TROIQ in the last 72 hours.     No lab exists for component: CPKMB  No results found for: CHOL, CHOLX, CHLST, CHOLV, HDL, HDLP, LDL, LDLC, DLDLP, TGLX, TRIGL, TRIGP, CHHD, CHHDX  Lab Results   Component Value Date/Time    Glucose (POC) 169 (H) 01/28/2022 05:11 AM    Glucose (POC) 168 (H) 01/27/2022 11:33 AM    Glucose (POC) 182 (H) 01/27/2022 05:20 AM    Glucose (POC) 347 (H) 01/26/2022 04:36 AM    Glucose (POC) 150 (H) 01/24/2022 11:15 PM     Lab Results   Component Value Date/Time    Color Yellow/Straw 01/23/2022 10:30 AM    Appearance Turbid (A) 01/23/2022 10:30 AM    Specific gravity 1.027 01/23/2022 10:30 AM    pH (UA) 6.0 01/23/2022 10:30 AM    Protein 100 (A) 01/23/2022 10:30 AM    Glucose 50 (A) 01/23/2022 10:30 AM    Ketone 5 (A) 01/23/2022 10:30 AM    Bilirubin Negative 01/23/2022 10:30 AM    Urobilinogen 4.0 (H) 01/23/2022 10:30 AM    Nitrites Negative 01/23/2022 10:30 AM    Leukocyte Esterase Negative 01/23/2022 10:30 AM    Bacteria Negative 01/23/2022 10:30 AM    WBC 0-4 01/23/2022 10:30 AM    RBC 0-5 01/23/2022 10:30 AM         Assessment:     Acute hypoxemic respiratory failure on on ventilator 90% of   COVID-19 pneumonitis   hypotension  Thrombocytopenia  Hypertension  Arthritis  Hyperkalemia  Hyperenatremia  Rule out ischemia of the left hand    Plan:     Order NATHALIE of the left arm  Olumiant 2 mg daily  Decadron 4 mg every 6 hours  Meropenem 1 g every 8 hours    Follow-up with pulmonologist and nephrologist    Overall prognosis    IV fluids 50 cc/h    Patient is DNR  Discussed with the patient's son  He want to continue current medication    Repeat the lab      Current Facility-Administered Medications:     dextrose 5% infusion, 50 mL/hr, IntraVENous, CONTINUOUS, Christine Ramos MD, Last Rate: 50 mL/hr at 01/27/22 1253, 50 mL/hr at 01/27/22 1253    fentaNYL (PF) 1,500 mcg/30 mL (50 mcg/mL) infusion, 0-200 mcg/hr, IntraVENous, TITRATE, Canelo Flower MD, Last Rate: 1.5 mL/hr at 01/27/22 2035, 75 mcg/hr at 01/27/22 2035    hydrOXYzine pamoate (VISTARIL) capsule 25 mg, 25 mg, Oral, Q4H PRN, Polo Lopez MD    propofol (DIPRIVAN) 10 mg/mL infusion, 0-50 mcg/kg/min, IntraVENous, TITRATE, Canelo Flower MD, Last Rate: 13.6 mL/hr at 01/28/22 0024, 25 mcg/kg/min at 01/28/22 0024    dexmedeTOMidine (PRECEDEX) 400 mcg in 0.9% sodium chloride (MBP/ADV) 100 mL MBP, 0.1-1.5 mcg/kg/hr, IntraVENous, TITRATE, Canelo Flower MD, Stopped at 01/24/22 0824    NOREPINephrine (LEVOPHED) 8 mg in 0.9% NS 250ml infusion, 0.5-16 mcg/min, IntraVENous, TITRATE, Damaris Lopez MD, Stopped at 01/25/22 1137    acetaminophen (TYLENOL) tablet 650 mg, 650 mg, Oral, Q6H PRN **OR** acetaminophen (TYLENOL) suppository 650 mg, 650 mg, Rectal, Q6H PRN, Damaris Lopez MD    polyethylene glycol (MIRALAX) packet 17 g, 17 g, Oral, DAILY PRN, Damaris Lopez MD    ondansetron (ZOFRAN ODT) tablet 4 mg, 4 mg, Oral, Q8H PRN **OR** [DISCONTINUED] ondansetron (ZOFRAN) injection 4 mg, 4 mg, IntraVENous, Q6H PRN, Polo Lopez MD    enoxaparin (LOVENOX) injection 40 mg, 40 mg, SubCUTAneous, DAILY, Polo Lopez MD, 40 mg at 01/28/22 0844    baricitinib (OLUMIANT) tablet 2 mg, 2 mg, Oral, DAILY, Polo Lopez MD, 2 mg at 01/28/22 0844    albuterol (PROVENTIL HFA, VENTOLIN HFA, PROAIR HFA) inhaler 2 Puff, 2 Puff, Inhalation, Q6H PRN, Camille Lopez MD    polyethylene glycol (MIRALAX) packet 17 g, 17 g, Oral, DAILY PRN, Polo Lopez MD, 17 g at 01/28/22 0844    [DISCONTINUED] ondansetron (ZOFRAN ODT) tablet 4 mg, 4 mg, Oral, Q8H PRN **OR** ondansetron (ZOFRAN) injection 4 mg, 4 mg, IntraVENous, Q6H PRN, Polo Lopez MD    meropenem (MERREM) 1 g in 0.9% sodium chloride 20 mL IV syringe, 1 g, IntraVENous, Q8H, Polo Lopez MD, 1 g at 01/28/22 0844    dexamethasone (DECADRON) 4 mg/mL injection 4 mg, 4 mg, IntraVENous, Q6H, Polo Lopez MD, 4 mg at 01/28/22 7123    hydrOXYzine (VISTARIL) injection 25 mg, 25 mg, IntraMUSCular, Q6H PRN, Polo Lopez MD, 25 mg at 01/22/22 0111

## 2022-01-28 NOTE — PROGRESS NOTES
Discussed with the patient's son regarding ischemia of the left hand  And no vascular surgery available at this time    He want to wait till Monday to vascular surgeon come here  If no improvement he will think about comfort care    He want to keep the patient in the hospital do not transfer

## 2022-01-28 NOTE — PROGRESS NOTES
Bedside and Verbal shift change report given to Sheri RN (oncoming nurse) by Lucina Brooks RN (offgoing nurse). Report included the following information SBAR, Kardex, Intake/Output, MAR, Recent Results and Dual Neuro Assessment, cardiac rhythm.

## 2022-01-28 NOTE — PROGRESS NOTES
IMPRESSION:   1. Acute hypoxic respiratory  2. COVID-19 pneumonia  3. Sepsis with septic shock  4. Arthritis hypertension by hx  5. Hypokalemia  6. Hypernatremia  7. Additional workup outlined below  8. Pt is at high risk of sudden decline and decompensation with life threatening consequenses and continued end organ dysfunction and failure  9. Pt is critically ill. Time spent with pt and staff actively rendering care, managing pt and coordinating care as stated below; 30 minutes, exclusive of any procedures      RECOMMENDATIONS/PLAN:   1. ICU monitoring  1. Patient condition got worse on 1/24 went to asystole subsequently got intubated now on ventilator assist control mode sedated with propofol and fentanyl Precedex was discontinued because of bradycardia arterial blood gases acceptable  will decrease FiO2 and will increase the rate she is on 100% FiO2, 450 tidal volume PEEP of 5 rate of 12  2. Patient is on dexamethasone and baricitinib  3. Patient is off Levophed  4. Potassium corrected  5. Start on IV fluid D5W and also he is getting free water through the PEG tube will decrease to 100 every 4 hours sodium has corrected now his potassium is elevated will repeat labs  6. Troponin is elevated  7. Lactic acid 3.7  8. Chest x-ray shows diffuse bilateral infiltrate with congestive changes  9. She is on meropenem and Zithromax  10. CVP monitoring hemodynamically unstable  11. IV vasopressors for circulatory shock refractory to fluids to maintain SBP> 90  12. Will discuss with the family regarding her condition and prognosis  13. Transfuse prn to maintain Hgb > 7  14. Labs to follow electrolytes, renal function and and blood counts  15. Bronchial hygiene with respiratory therapy techniques, bronchodilators  16. Pt needs IV fluids with additives and Drug therapy requiring intensive monitoring for toxicity  17. Prescription drug management with home med reconciliation reviewed  18. DVT, SUP prophylaxis  19.  Will be available to assist in medical management while in the CCU pending disposition     [x] High complexity decision making was performed  [x] See my orders for details  HPI  44-year-old lady came in because of shortness of breath and dyspnea she has significant past medical history of arthritis hypertension she was recently discharged from the hospital came back with worsening of hypoxia she was discharged on oxygen 2 L nasal cannula and patient condition got worse she was supposed to be discharged to skilled care but patient's son took her home where her condition got worse and she was transferred back to the hospital now she is on noninvasive ventilator BiPAP machine hemodynamically unstable hypotensive on vasopressors and not giving much history acutely ill so critical care consult was called  PMH:  has no past medical history on file. PSH:   has a past surgical history that includes ir insert non tunl cvc over 5 yrs (1/21/2022). FHX: family history is not on file.      SHX:      ALL:   Allergies   Allergen Reactions    Penicillins Hives        MEDS:   [x] Reviewed - As Below   [] Not reviewed    Current Facility-Administered Medications   Medication    dextrose 5% infusion    fentaNYL (PF) 1,500 mcg/30 mL (50 mcg/mL) infusion    hydrOXYzine pamoate (VISTARIL) capsule 25 mg    propofol (DIPRIVAN) 10 mg/mL infusion    dexmedeTOMidine (PRECEDEX) 400 mcg in 0.9% sodium chloride (MBP/ADV) 100 mL MBP    NOREPINephrine (LEVOPHED) 8 mg in 0.9% NS 250ml infusion    acetaminophen (TYLENOL) tablet 650 mg    Or    acetaminophen (TYLENOL) suppository 650 mg    polyethylene glycol (MIRALAX) packet 17 g    ondansetron (ZOFRAN ODT) tablet 4 mg    enoxaparin (LOVENOX) injection 40 mg    baricitinib (OLUMIANT) tablet 2 mg    albuterol (PROVENTIL HFA, VENTOLIN HFA, PROAIR HFA) inhaler 2 Puff    polyethylene glycol (MIRALAX) packet 17 g    ondansetron (ZOFRAN) injection 4 mg    meropenem (MERREM) 1 g in 0.9% sodium chloride 20 mL IV syringe    dexamethasone (DECADRON) 4 mg/mL injection 4 mg    hydrOXYzine (VISTARIL) injection 25 mg      MAR reviewed and pertinent medications noted or modified as needed   Current Facility-Administered Medications   Medication    dextrose 5% infusion    fentaNYL (PF) 1,500 mcg/30 mL (50 mcg/mL) infusion    hydrOXYzine pamoate (VISTARIL) capsule 25 mg    propofol (DIPRIVAN) 10 mg/mL infusion    dexmedeTOMidine (PRECEDEX) 400 mcg in 0.9% sodium chloride (MBP/ADV) 100 mL MBP    NOREPINephrine (LEVOPHED) 8 mg in 0.9% NS 250ml infusion    acetaminophen (TYLENOL) tablet 650 mg    Or    acetaminophen (TYLENOL) suppository 650 mg    polyethylene glycol (MIRALAX) packet 17 g    ondansetron (ZOFRAN ODT) tablet 4 mg    enoxaparin (LOVENOX) injection 40 mg    baricitinib (OLUMIANT) tablet 2 mg    albuterol (PROVENTIL HFA, VENTOLIN HFA, PROAIR HFA) inhaler 2 Puff    polyethylene glycol (MIRALAX) packet 17 g    ondansetron (ZOFRAN) injection 4 mg    meropenem (MERREM) 1 g in 0.9% sodium chloride 20 mL IV syringe    dexamethasone (DECADRON) 4 mg/mL injection 4 mg    hydrOXYzine (VISTARIL) injection 25 mg      PMH:  has no past medical history on file. PSH:   has a past surgical history that includes ir insert non tunl cvc over 5 yrs (2022). FHX: family history is not on file. SHX:       ROS:  Unable to obtain    Hemodynamics:    CO:    CI:    CVP:    SVR:   PAP Systolic:    PAP Diastolic:    PVR:    ZR75:        Ventilator Settings:      Mode Rate TV Press PEEP FiO2 PIP Min.  Vent   Assist control,Volume control    450 ml    5 cm H20 100 %  21 cm H2O  6.8 l/min        Vital Signs: Telemetry:    normal sinus rhythm Intake/Output:   Visit Vitals  BP (!) 119/52 (BP 1 Location: Right lower arm, BP Patient Position: At rest)   Pulse 75   Temp 98.4 °F (36.9 °C)   Resp 16   Ht 5' 5\" (1.651 m)   Wt 104.5 kg (230 lb 6.1 oz)   SpO2 95%   BMI 38.34 kg/m²       Temp (24hrs), Av.4 °F (36.9 °C), Min:98 °F (36.7 °C), Max:98.6 °F (37 °C)        O2 Device: Ventilator         Wt Readings from Last 4 Encounters:   01/28/22 104.5 kg (230 lb 6.1 oz)   01/17/22 90.7 kg (200 lb)          Intake/Output Summary (Last 24 hours) at 1/28/2022 0817  Last data filed at 1/28/2022 0600  Gross per 24 hour   Intake 2629.78 ml   Output 1230 ml   Net 1399.78 ml       Last shift:      No intake/output data recorded. Last 3 shifts: 01/26 1901 - 01/28 0700  In: 3898 [I.V.:1898]  Out: 1880 [Urine:1880]       Physical Exam:     General: Intubated on ventilator  HEENT: NCAT, poor dentition, lips and mucosa dry  Eyes: anicteric; conjunctiva clear  Neck: no nodes, , trach midline; no accessory MM use. Chest: no deformity,   Cardiac: R regular; no murmur;   Lungs: distant breath sounds; no wheezes  Abd: soft, NT, hypoactive BS  Ext: no edema; no joint swelling;  No clubbing  : NO [place], clear urine  Neuro: \"alert\"  Psych- + agitation,   Skin: warm, dry, no cyanosis;   Pulses: 1-2+ Bilateral pedal, radial  Capillary: brisk; pale      DATA:    MAR reviewed and pertinent medications noted or modified as needed  MEDS:   Current Facility-Administered Medications   Medication    dextrose 5% infusion    fentaNYL (PF) 1,500 mcg/30 mL (50 mcg/mL) infusion    hydrOXYzine pamoate (VISTARIL) capsule 25 mg    propofol (DIPRIVAN) 10 mg/mL infusion    dexmedeTOMidine (PRECEDEX) 400 mcg in 0.9% sodium chloride (MBP/ADV) 100 mL MBP    NOREPINephrine (LEVOPHED) 8 mg in 0.9% NS 250ml infusion    acetaminophen (TYLENOL) tablet 650 mg    Or    acetaminophen (TYLENOL) suppository 650 mg    polyethylene glycol (MIRALAX) packet 17 g    ondansetron (ZOFRAN ODT) tablet 4 mg    enoxaparin (LOVENOX) injection 40 mg    baricitinib (OLUMIANT) tablet 2 mg    albuterol (PROVENTIL HFA, VENTOLIN HFA, PROAIR HFA) inhaler 2 Puff    polyethylene glycol (MIRALAX) packet 17 g    ondansetron (ZOFRAN) injection 4 mg    meropenem (MERREM) 1 g in 0.9% sodium chloride 20 mL IV syringe    dexamethasone (DECADRON) 4 mg/mL injection 4 mg    hydrOXYzine (VISTARIL) injection 25 mg        Labs:    Recent Labs     01/28/22  0510 01/27/22  0500 01/26/22  0410   WBC 14.6* 11.7* 10.6   HGB 9.8* 9.9* 9.7*    216 140*     Recent Labs     01/28/22  0510 01/27/22  0500 01/26/22  0410    147* 146*   K 4.8 4.7 5.3*    113* 114*   CO2 34* 31 30   * 175* 191*   BUN 42* 56* 64*   CREA 0.56 0.66 0.83   CA 9.3 9.3 9.6   ALB 1.9* 2.0* 2.0*   * 106* 76     Recent Labs     01/28/22  0255 01/27/22  0445 01/26/22  0416   PH 7.42 7.40 7.34*   PCO2 55* 54* 55*   PO2 67* 82 116*   HCO3 34* 32* 27*   FIO2 100.0 100.0 70.0     No results for input(s): CPK, CKNDX, TROIQ in the last 72 hours. No lab exists for component: CPKMB  No results found for: BNPP, BNP   Lab Results   Component Value Date/Time    Culture result: No growth 5 days 01/21/2022 07:55 AM    Culture result: No growth 6 days 01/18/2022 07:12 AM    Culture result: (A) 01/17/2022 07:12 AM     Staphylococcus species, coagulase negative growing in 1 of 4 bottles drawn NO SITE INDICATED    Culture result:  01/17/2022 07:12 AM     (NOTE) [facility] IN [facility] GROWING IN 1 OF 2 BOTTLES CALLED TO JOSE MIGUEL PAGAN AT 0831 ON 1/19/22.      No results found for: TSH, TSHEXT, TSHEXT     Imaging:    Results from Hospital Encounter encounter on 01/21/22    XR CHEST PORT    Narrative  Chest single view. Comparison single view chest January 27, 2022. Stable ET tube, NG tube, and right CVC. Patchy reticular markings predominant mid and lower lungs unchanged. Cardiac and  mediastinal structures unchanged. Thoracic aorta atherosclerosis. No  pneumothorax or sizable pleural effusion. Results from [place] encounter on 01/21/22    CT HEAD WO CONT    Narrative  The study is a noncontrasted head CT examination dated 1/21/2022. HISTORY: Unresponsive.     TECHNIQUE: Thin section axial imaging was performed followed by sagittal and  coronal reconstructed imaging. Dose Reduction Technique was employed to reduce radiation exposure - This  includes reduction optimization techniques as appropriate to a performed exam  with automated exposure control adjustments of the mA and/or Kv according to  patient size, or use of iterative reconstruction technique. COMPARISON: CT head dated 1/17/2022. There is an appropriate size to the ventricles, the deep cortical sulci, and the  sylvian fissures for the patient's age. This examination is negative for  intracranial hemorrhage, mass effect or an extra axial collection. The  gray-white matter junctions are preserved without cytotoxic or vasogenic edema. An assessment of the white matter tracts demonstrates a mild decrease in the  density characteristics of the central periventricular white matter. These  findings are consistent with expected aging changes and milder microvascular  disease. There also is a region of diminished density within the right posterior  inferior cerebellar hemisphere which may represent an older cerebrovascular  insult. ORBITS: This examination is negative for acute orbital pathology. PARANASAL SINUSES: The paranasal sinuses are well pneumatized without acute  sinus disease. There is a decreased number of right sided mastoid air cells which can be  associated with chronic mastoiditis. The craniocervical junction images in a  normal fashion. Impression  1. There are milder microvascular changes within the central periventricular  white matter. 2.  There is an area of diminished density within the inferior aspect of the  right cerebellar hemisphere without change (series 201 image number 12). These  findings may represent an older ischemic insult within the right posterior  inferior cerebellar region.   3.  This examination is negative for acute intracranial pathology or short-term  interval changes dating back to 1/17/2022.       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

## 2022-01-29 NOTE — PROGRESS NOTES
Progress Note    Patient: Elodia Elliott MRN: [id number]  SSN: [id number]    YOB: 1940  Age: 80 y.o. Sex: female      Admit Date: 1/21/2022    LOS: 8 days     Subjective:     Patient has Covid pneumonitis, acute hypoxic respiratory failure on vent support fi02 100%    Objective:     Vitals:    01/29/22 0900 01/29/22 1100 01/29/22 1105 01/29/22 1540   BP: (!) 100/51      Pulse: 77  77 79   Resp: 20  20    Temp:  98 °F (36.7 °C)     SpO2: 100%  100% 100%   Weight:       Height:            Intake and Output:  Current Shift: 01/29 0701 - 01/29 1900  In: -   Out: 1500 [Urine:1500]  Last three shifts: 01/27 1901 - 01/29 0700  In: 5042.1 [I.V.:2682.1]  Out: 2100 [Urine:2100]    Physical Exam:   General:   intubated   Eyes:     Ears:  Normal TMs and external ear canals both ears. Nose: Nares normal. Septum midline. Mucosa normal. No drainage or sinus tenderness. Mouth/Throat: Lips, mucosa, and tongue normal. Teeth and gums normal.   Neck: Supple, symmetrical, trachea midline, no adenopathy, thyroid: no enlargment/tenderness/nodules, no carotid bruit and no JVD. Back:   Symmetric, no curvature. ROM normal. No CVA tenderness. Lungs:   Clear to auscultation bilaterally. Heart:  Regular rate and rhythm, S1, S2 normal, no murmur, click, rub or gallop. Abdomen:   Soft, non-tender. Bowel sounds normal. No masses,  No organomegaly. Extremities: Extremities normal, atraumatic, no cyanosis or edema. Pulses: 2+ and symmetric all extremities. Skin: Skin color, texture, turgor normal. No rashes or lesions   Lymph nodes:  Intubated   Neurologic:        Lab/Data Review: All lab results for the last 24 hours reviewed.      Recent Results (from the past 24 hour(s))   PTT    Collection Time: 01/28/22 10:00 PM   Result Value Ref Range    aPTT 87.0 (H) 21.2 - 34.1 sec    aPTT, therapeutic range   82 - 109 sec   CBC WITH AUTOMATED DIFF    Collection Time: 01/29/22  4:00 AM   Result Value Ref Range    WBC 16. 2 (H) 3.6 - 11.0 K/uL    RBC 3.31 (L) 3.80 - 5.20 M/uL    HGB 9.8 (L) 11.5 - 16.0 g/dL    HCT 31.1 (L) 35.0 - 47.0 %    MCV 94.0 80.0 - 99.0 FL    MCH 29.6 26.0 - 34.0 PG    MCHC 31.5 30.0 - 36.5 g/dL    RDW 13.0 11.5 - 14.5 %    PLATELET 857 900 - 467 K/uL    MPV 11.8 8.9 - 12.9 FL    NRBC 0.0 0.0  WBC    ABSOLUTE NRBC 0.00 0.00 - 0.01 K/uL    NEUTROPHILS 90 (H) 32 - 75 %    LYMPHOCYTES 5 (L) 12 - 49 %    MONOCYTES 3 (L) 5 - 13 %    EOSINOPHILS 0 0 - 7 %    BASOPHILS 0 0 - 1 %    IMMATURE GRANULOCYTES 2 (H) 0 - 0.5 %    ABS. NEUTROPHILS 14.7 (H) 1.8 - 8.0 K/UL    ABS. LYMPHOCYTES 0.7 (L) 0.8 - 3.5 K/UL    ABS. MONOCYTES 0.4 0.0 - 1.0 K/UL    ABS. EOSINOPHILS 0.1 0.0 - 0.4 K/UL    ABS. BASOPHILS 0.0 0.0 - 0.1 K/UL    ABS. IMM. GRANS. 0.3 (H) 0.00 - 0.04 K/UL    DF AUTOMATED     METABOLIC PANEL, COMPREHENSIVE    Collection Time: 01/29/22  4:00 AM   Result Value Ref Range    Sodium 136 136 - 145 mmol/L    Potassium 5.0 3.5 - 5.1 mmol/L    Chloride 100 97 - 108 mmol/L    CO2 34 (H) 21 - 32 mmol/L    Anion gap 2 (L) 5 - 15 mmol/L    Glucose 173 (H) 65 - 100 mg/dL    BUN 34 (H) 6 - 20 mg/dL    Creatinine 0.48 (L) 0.55 - 1.02 mg/dL    BUN/Creatinine ratio 71 (H) 12 - 20      GFR est AA >60 >60 ml/min/1.73m2    GFR est non-AA >60 >60 ml/min/1.73m2    Calcium 9.4 8.5 - 10.1 mg/dL    Bilirubin, total 0.5 0.2 - 1.0 mg/dL    AST (SGOT) 78 (H) 15 - 37 U/L    ALT (SGPT) 88 (H) 12 - 78 U/L    Alk.  phosphatase 82 45 - 117 U/L    Protein, total 5.9 (L) 6.4 - 8.2 g/dL    Albumin 1.7 (L) 3.5 - 5.0 g/dL    Globulin 4.2 (H) 2.0 - 4.0 g/dL    A-G Ratio 0.4 (L) 1.1 - 2.2     PTT    Collection Time: 01/29/22  4:00 AM   Result Value Ref Range    aPTT 126.0 (HH) 21.2 - 34.1 sec    aPTT, therapeutic range   82 - 109 sec   PTT    Collection Time: 01/29/22 10:55 AM   Result Value Ref Range    aPTT 121.9 (HH) 21.2 - 34.1 sec    aPTT, therapeutic range   82 - 109 sec         Assessment:     Active Problems:    COVID-19 (1/17/2022) Respiratory failure with hypoxia ([facility] [address] (1/21/2022)      Hypernatremia (1/23/2022)      Hypokalemia (1/23/2022)        Plan:     Continue with present treatment    Signed By: Melina Cadet MD     January 29, 2022

## 2022-01-29 NOTE — PROGRESS NOTES
IMPRESSION:   1. Acute hypoxic respiratory  2. COVID-19 pneumonia  3. Sepsis with septic shock off pressors  4. Left hand ischemia  5. Arthritis hypertension by hx  6. Hypokalemia corrected  7. Hypernatremia corrected  8. Additional workup outlined below  9. Pt is at high risk of sudden decline and decompensation with life threatening consequenses and continued end organ dysfunction and failure  10. Pt is critically ill. Time spent with pt and staff actively rendering care, managing pt and coordinating care as stated below; 30 minutes, exclusive of any procedures      RECOMMENDATIONS/PLAN:   1. ICU monitoring  1. Patient condition got worse on 1/24 went to asystole subsequently got intubated now on ventilator assist control mode sedated with propofol and fentanyl Precedex was discontinued because of bradycardia arterial blood gases acceptable  will decrease FiO2 and will increase the rate she is on 100% FiO2, 450 tidal volume PEEP of 5 rate of 12 will increase the rate to 14  2. Patient is on dexamethasone and baricitinib  3. Patient is off Levophed  4. Potassium corrected  5. Left hand ischemia on IV heparin  6. Started on IV fluid D5W and also he is getting free water through the PEG tube will decrease to 100 every 4 hours sodium and potassium both correct we will decrease IV fluid and give 1 dose of Lasix  7. Troponin is elevated  8. Lactic acid 3.7  9. Chest x-ray shows diffuse bilateral infiltrate with congestive changes  10. She is on meropenem and finished Zithromax  11. CVP monitoring hemodynamically unstable  12. IV vasopressors for circulatory shock refractory to fluids to maintain SBP> 90  13. Will discuss with the family regarding her condition and prognosis  14. Transfuse prn to maintain Hgb > 7  15. Labs to follow electrolytes, renal function and and blood counts  16. Bronchial hygiene with respiratory therapy techniques, bronchodilators  17.  Pt needs IV fluids with additives and Drug therapy requiring intensive monitoring for toxicity  18. Prescription drug management with home med reconciliation reviewed  19. DVT, SUP prophylaxis  20. Will be available to assist in medical management while in the CCU pending disposition     [x] High complexity decision making was performed  [x] See my orders for details  HPI  82-year-old lady came in because of shortness of breath and dyspnea she has significant past medical history of arthritis hypertension she was recently discharged from the hospital came back with worsening of hypoxia she was discharged on oxygen 2 L nasal cannula and patient condition got worse she was supposed to be discharged to skilled care but patient's son took her home where her condition got worse and she was transferred back to the hospital now she is on noninvasive ventilator BiPAP machine hemodynamically unstable hypotensive on vasopressors and not giving much history acutely ill so critical care consult was called  PMH:  has no past medical history on file. PSH:   has a past surgical history that includes ir insert non tunl cvc over 5 yrs (1/21/2022). FHX: family history is not on file.      SHX:      ALL:   Allergies   Allergen Reactions    Penicillins Hives        MEDS:   [x] Reviewed - As Below   [] Not reviewed    Current Facility-Administered Medications   Medication    heparin 25,000 units in  ml infusion    heparin (porcine) 1,000 unit/mL injection 6,080 Units    Or    heparin (porcine) 1,000 unit/mL injection 3,040 Units    dextrose 5% infusion    fentaNYL (PF) 1,500 mcg/30 mL (50 mcg/mL) infusion    hydrOXYzine pamoate (VISTARIL) capsule 25 mg    propofol (DIPRIVAN) 10 mg/mL infusion    dexmedeTOMidine (PRECEDEX) 400 mcg in 0.9% sodium chloride (MBP/ADV) 100 mL MBP    NOREPINephrine (LEVOPHED) 8 mg in 0.9% NS 250ml infusion    acetaminophen (TYLENOL) tablet 650 mg    Or    acetaminophen (TYLENOL) suppository 650 mg    polyethylene glycol (MIRALAX) packet 17 g    ondansetron (ZOFRAN ODT) tablet 4 mg    baricitinib (OLUMIANT) tablet 2 mg    albuterol (PROVENTIL HFA, VENTOLIN HFA, PROAIR HFA) inhaler 2 Puff    polyethylene glycol (MIRALAX) packet 17 g    ondansetron (ZOFRAN) injection 4 mg    meropenem (MERREM) 1 g in 0.9% sodium chloride 20 mL IV syringe    dexamethasone (DECADRON) 4 mg/mL injection 4 mg    hydrOXYzine (VISTARIL) injection 25 mg      MAR reviewed and pertinent medications noted or modified as needed   Current Facility-Administered Medications   Medication    heparin 25,000 units in  ml infusion    heparin (porcine) 1,000 unit/mL injection 6,080 Units    Or    heparin (porcine) 1,000 unit/mL injection 3,040 Units    dextrose 5% infusion    fentaNYL (PF) 1,500 mcg/30 mL (50 mcg/mL) infusion    hydrOXYzine pamoate (VISTARIL) capsule 25 mg    propofol (DIPRIVAN) 10 mg/mL infusion    dexmedeTOMidine (PRECEDEX) 400 mcg in 0.9% sodium chloride (MBP/ADV) 100 mL MBP    NOREPINephrine (LEVOPHED) 8 mg in 0.9% NS 250ml infusion    acetaminophen (TYLENOL) tablet 650 mg    Or    acetaminophen (TYLENOL) suppository 650 mg    polyethylene glycol (MIRALAX) packet 17 g    ondansetron (ZOFRAN ODT) tablet 4 mg    baricitinib (OLUMIANT) tablet 2 mg    albuterol (PROVENTIL HFA, VENTOLIN HFA, PROAIR HFA) inhaler 2 Puff    polyethylene glycol (MIRALAX) packet 17 g    ondansetron (ZOFRAN) injection 4 mg    meropenem (MERREM) 1 g in 0.9% sodium chloride 20 mL IV syringe    dexamethasone (DECADRON) 4 mg/mL injection 4 mg    hydrOXYzine (VISTARIL) injection 25 mg      PMH:  has no past medical history on file. PSH:   has a past surgical history that includes ir insert non tunl cvc over 5 yrs (1/21/2022). FHX: family history is not on file.    SHX:       ROS:  Unable to obtain    Hemodynamics:    CO:    CI:    CVP:    SVR:   PAP Systolic:    PAP Diastolic:    PVR:    EC25:        Ventilator Settings:      Mode Rate TV Press PEEP FiO2 PIP Min. Vent   Assist control,Volume control    450 ml    5 cm H20 100 %  34 cm H2O  9.25 l/min        Vital Signs: Telemetry:    normal sinus rhythm Intake/Output:   Visit Vitals  BP (!) 112/54 (BP 1 Location: Right lower arm, BP Patient Position: At rest)   Pulse 74   Temp 98.3 °F (36.8 °C)   Resp 18   Ht 5' 5\" (1.651 m)   Wt 104.5 kg (230 lb 6.1 oz)   SpO2 100%   BMI 38.34 kg/m²       Temp (24hrs), Av.6 °F (37 °C), Min:98.3 °F (36.8 °C), Max:98.9 °F (37.2 °C)        O2 Device: Ventilator         Wt Readings from Last 4 Encounters:   22 104.5 kg (230 lb 6.1 oz)   22 90.7 kg (200 lb)          Intake/Output Summary (Last 24 hours) at 2022 0836  Last data filed at 2022 0600  Gross per 24 hour   Intake 3384.72 ml   Output 1400 ml   Net 1984.72 ml       Last shift:      No intake/output data recorded. Last 3 shifts:  1901 -  0700  In: 4842.1 [I.V.:2682.1]  Out: 2100 [Urine:2100]       Physical Exam:     General: Intubated on ventilator  HEENT: NCAT, poor dentition, lips and mucosa dry  Eyes: anicteric; conjunctiva clear  Neck: no nodes, , trach midline; no accessory MM use. Chest: no deformity,   Cardiac: R regular; no murmur;   Lungs: distant breath sounds; no wheezes  Abd: soft, NT, hypoactive BS  Ext: no edema; no joint swelling;  No clubbing  : NO [place], clear urine  Neuro: \"alert\"  Psych- + agitation,   Skin: warm, dry, no cyanosis;   Pulses: 1-2+ Bilateral pedal, radial  Capillary: brisk; pale      DATA:    MAR reviewed and pertinent medications noted or modified as needed  MEDS:   Current Facility-Administered Medications   Medication    heparin 25,000 units in  ml infusion    heparin (porcine) 1,000 unit/mL injection 6,080 Units    Or    heparin (porcine) 1,000 unit/mL injection 3,040 Units    dextrose 5% infusion    fentaNYL (PF) 1,500 mcg/30 mL (50 mcg/mL) infusion    hydrOXYzine pamoate (VISTARIL) capsule 25 mg    propofol (DIPRIVAN) 10 mg/mL infusion    dexmedeTOMidine (PRECEDEX) 400 mcg in 0.9% sodium chloride (MBP/ADV) 100 mL MBP    NOREPINephrine (LEVOPHED) 8 mg in 0.9% NS 250ml infusion    acetaminophen (TYLENOL) tablet 650 mg    Or    acetaminophen (TYLENOL) suppository 650 mg    polyethylene glycol (MIRALAX) packet 17 g    ondansetron (ZOFRAN ODT) tablet 4 mg    baricitinib (OLUMIANT) tablet 2 mg    albuterol (PROVENTIL HFA, VENTOLIN HFA, PROAIR HFA) inhaler 2 Puff    polyethylene glycol (MIRALAX) packet 17 g    ondansetron (ZOFRAN) injection 4 mg    meropenem (MERREM) 1 g in 0.9% sodium chloride 20 mL IV syringe    dexamethasone (DECADRON) 4 mg/mL injection 4 mg    hydrOXYzine (VISTARIL) injection 25 mg        Labs:    Recent Labs     01/29/22  0400 01/28/22  2200 01/28/22  1315 01/28/22  0510 01/27/22  0500   WBC 16.2*  --   --  14.6* 11.7*   HGB 9.8*  --   --  9.8* 9.9*     --   --  189 216   APTT 126.0* 87.0* 30.3  --   --      Recent Labs     01/29/22  0400 01/28/22  0510 01/27/22  0500    140 147*   K 5.0 4.8 4.7    104 113*   CO2 34* 34* 31   * 184* 175*   BUN 34* 42* 56*   CREA 0.48* 0.56 0.66   CA 9.4 9.3 9.3   ALB 1.7* 1.9* 2.0*   ALT 88* 124* 106*     Recent Labs     01/28/22  0255 01/27/22  0445   PH 7.42 7.40   PCO2 55* 54*   PO2 67* 82   HCO3 34* 32*   FIO2 100.0 100.0     No results for input(s): CPK, CKNDX, TROIQ in the last 72 hours. No lab exists for component: CPKMB  No results found for: BNPP, BNP   Lab Results   Component Value Date/Time    Culture result: No growth 6 days 01/21/2022 07:55 AM    Culture result: No growth 6 days 01/18/2022 07:12 AM    Culture result: (A) 01/17/2022 07:12 AM     Staphylococcus species, coagulase negative growing in 1 of 4 bottles drawn NO SITE INDICATED    Culture result:  01/17/2022 07:12 AM     (NOTE) [facility] IN [facility] GROWING IN 1 OF 2 BOTTLES CALLED TO JOSE MIGUEL PAGAN AT 0831 ON 1/19/22.  JF     No results found for: TSH, TSHEXT, TSHEXT     Imaging:    Results from Hospital Encounter encounter on 01/21/22    XR CHEST PORT    Narrative  Chest single view. Comparison single view chest January 27, 2022. Stable ET tube, NG tube, and right CVC. Patchy reticular markings predominant mid and lower lungs unchanged. Cardiac and  mediastinal structures unchanged. Thoracic aorta atherosclerosis. No  pneumothorax or sizable pleural effusion. Results from [place] encounter on 01/21/22    CT HEAD WO CONT    Narrative  The study is a noncontrasted head CT examination dated 1/21/2022. HISTORY: Unresponsive. TECHNIQUE: Thin section axial imaging was performed followed by sagittal and  coronal reconstructed imaging. Dose Reduction Technique was employed to reduce radiation exposure - This  includes reduction optimization techniques as appropriate to a performed exam  with automated exposure control adjustments of the mA and/or Kv according to  patient size, or use of iterative reconstruction technique. COMPARISON: CT head dated 1/17/2022. There is an appropriate size to the ventricles, the deep cortical sulci, and the  sylvian fissures for the patient's age. This examination is negative for  intracranial hemorrhage, mass effect or an extra axial collection. The  gray-white matter junctions are preserved without cytotoxic or vasogenic edema. An assessment of the white matter tracts demonstrates a mild decrease in the  density characteristics of the central periventricular white matter. These  findings are consistent with expected aging changes and milder microvascular  disease. There also is a region of diminished density within the right posterior  inferior cerebellar hemisphere which may represent an older cerebrovascular  insult. ORBITS: This examination is negative for acute orbital pathology. PARANASAL SINUSES: The paranasal sinuses are well pneumatized without acute  sinus disease.     There is a decreased number of right sided mastoid air cells which can be  associated with chronic mastoiditis. The craniocervical junction images in a  normal fashion. Impression  1. There are milder microvascular changes within the central periventricular  white matter. 2.  There is an area of diminished density within the inferior aspect of the  right cerebellar hemisphere without change (series 201 image number 12). These  findings may represent an older ischemic insult within the right posterior  inferior cerebellar region. 3.  This examination is negative for acute intracranial pathology or short-term  interval changes dating back to 1/17/2022.       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

## 2022-01-30 NOTE — PROGRESS NOTES
IMPRESSION:   1. Acute hypoxic respiratory  2. Acute on chronic hypercapnic respiratory failure will give Diamox  3. COVID-19 pneumonia  4. Pulmonary edema  5. Sepsis with septic shock off pressors  6. Left hand ischemia  7. Arthritis hypertension by hx  8. Hypokalemia corrected  9. Hypernatremia corrected  10. Now hyponatremic  11. RECOMMENDATIONS/PLAN:   1. ICU monitoring  1. Patient condition got worse on 1/24 went to asystole subsequently got intubated now on ventilator assist control mode sedated with propofol and fentanyl Precedex was discontinued because of bradycardia arterial blood gases acceptable currently on a/C 12  PEEP 5 FiO2 100%  2. Patient is on dexamethasone and baricitinib  3. Patient is off Levophed  4. Potassium corrected  5. Pulmonary edema we will give 2 doses Lasix today and check echo  6. Left hand ischemia on IV heparin  7. Now hyponatremic will discontinue D5W  8. Troponin is elevated  9. Lactic acid 3.7  10. She is on meropenem and finished Zithromax  11. [x] High complexity decision making was performed  [x] See my orders for details  HPI  79-year-old lady came in because of shortness of breath and dyspnea she has significant past medical history of arthritis hypertension she was recently discharged from the hospital came back with worsening of hypoxia she was discharged on oxygen 2 L nasal cannula and patient condition got worse she was supposed to be discharged to skilled care but patient's son took her home where her condition got worse and she was transferred back to the hospital now she is on noninvasive ventilator BiPAP machine hemodynamically unstable hypotensive on vasopressors and not giving much history acutely ill so critical care consult was called  PMH:  has no past medical history on file. PSH:   has a past surgical history that includes ir insert non tunl cvc over 5 yrs (1/21/2022). FHX: family history is not on file.      SHX:      ALL: Allergies   Allergen Reactions    Penicillins Hives        MEDS:   [x] Reviewed - As Below   [] Not reviewed    Current Facility-Administered Medications   Medication    dilTIAZem IR (CARDIZEM) tablet 30 mg    heparin 25,000 units in  ml infusion    heparin (porcine) 1,000 unit/mL injection 6,080 Units    Or    heparin (porcine) 1,000 unit/mL injection 3,040 Units    dextrose 5% infusion    fentaNYL (PF) 1,500 mcg/30 mL (50 mcg/mL) infusion    hydrOXYzine pamoate (VISTARIL) capsule 25 mg    propofol (DIPRIVAN) 10 mg/mL infusion    dexmedeTOMidine (PRECEDEX) 400 mcg in 0.9% sodium chloride (MBP/ADV) 100 mL MBP    NOREPINephrine (LEVOPHED) 8 mg in 0.9% NS 250ml infusion    acetaminophen (TYLENOL) tablet 650 mg    Or    acetaminophen (TYLENOL) suppository 650 mg    polyethylene glycol (MIRALAX) packet 17 g    ondansetron (ZOFRAN ODT) tablet 4 mg    baricitinib (OLUMIANT) tablet 2 mg    albuterol (PROVENTIL HFA, VENTOLIN HFA, PROAIR HFA) inhaler 2 Puff    polyethylene glycol (MIRALAX) packet 17 g    ondansetron (ZOFRAN) injection 4 mg    meropenem (MERREM) 1 g in 0.9% sodium chloride 20 mL IV syringe    dexamethasone (DECADRON) 4 mg/mL injection 4 mg    hydrOXYzine (VISTARIL) injection 25 mg      MAR reviewed and pertinent medications noted or modified as needed   Current Facility-Administered Medications   Medication    dilTIAZem IR (CARDIZEM) tablet 30 mg    heparin 25,000 units in  ml infusion    heparin (porcine) 1,000 unit/mL injection 6,080 Units    Or    heparin (porcine) 1,000 unit/mL injection 3,040 Units    dextrose 5% infusion    fentaNYL (PF) 1,500 mcg/30 mL (50 mcg/mL) infusion    hydrOXYzine pamoate (VISTARIL) capsule 25 mg    propofol (DIPRIVAN) 10 mg/mL infusion    dexmedeTOMidine (PRECEDEX) 400 mcg in 0.9% sodium chloride (MBP/ADV) 100 mL MBP    NOREPINephrine (LEVOPHED) 8 mg in 0.9% NS 250ml infusion    acetaminophen (TYLENOL) tablet 650 mg    Or    acetaminophen (TYLENOL) suppository 650 mg    polyethylene glycol (MIRALAX) packet 17 g    ondansetron (ZOFRAN ODT) tablet 4 mg    baricitinib (OLUMIANT) tablet 2 mg    albuterol (PROVENTIL HFA, VENTOLIN HFA, PROAIR HFA) inhaler 2 Puff    polyethylene glycol (MIRALAX) packet 17 g    ondansetron (ZOFRAN) injection 4 mg    meropenem (MERREM) 1 g in 0.9% sodium chloride 20 mL IV syringe    dexamethasone (DECADRON) 4 mg/mL injection 4 mg    hydrOXYzine (VISTARIL) injection 25 mg      PMH:  has no past medical history on file. PSH:   has a past surgical history that includes ir insert non tunl cvc over 5 yrs (2022). FHX: family history is not on file. SHX:       ROS:  Unable to obtain    Hemodynamics:    CO:    CI:    CVP:    SVR:   PAP Systolic:    PAP Diastolic:    PVR:    VF33:        Ventilator Settings:      Mode Rate TV Press PEEP FiO2 PIP Min.  Vent   Assist control,Volume control    450 ml    5 cm H20 100 %  33 cm H2O  9.33 l/min        Vital Signs: Telemetry:    normal sinus rhythm Intake/Output:   Visit Vitals  BP (!) 137/49   Pulse 91   Temp 98.7 °F (37.1 °C)   Resp 23   Ht 5' 5\" (1.651 m)   Wt 104.5 kg (230 lb 6.1 oz)   SpO2 98%   BMI 38.34 kg/m²       Temp (24hrs), Av.6 °F (37 °C), Min:97.9 °F (36.6 °C), Max:98.7 °F (37.1 °C)        O2 Device: Ventilator         Wt Readings from Last 4 Encounters:   22 104.5 kg (230 lb 6.1 oz)   22 90.7 kg (200 lb)          Intake/Output Summary (Last 24 hours) at 2022 1200  Last data filed at 2022 1100  Gross per 24 hour   Intake 2982.93 ml   Output 3500 ml   Net -517.07 ml       Last shift:       0701 -  1900  In: -   Out: 1050 [Urine:1050]  Last 3 shifts: 1901 -  0700  In: 7884 [I.V.:2772]  Out: 4700 [Urine:4700]       Physical Exam:     General: Intubated on ventilator unresponsive on propofol fentanyl  HEENT: NCAT,   Eyes: anicteric; conjunctiva clear no doll's eye reflex  Neck: no nodes, , trach midline; no accessory MM use. Questionable neck vein distention  Chest: no deformity,   Cardiac: R regular; no murmur;   Lungs: Diffuse wheezes and rales  Abd: soft, NT, hypoactive BS  Ext: Edema of the leg edema; no joint swelling;  No clubbing  : clear urine  Neuro: Sedated unresponsive on the ventilator no doll's eye reflex flaccid extremities  Psych-unable to assess  Skin: warm, dry, no cyanosis;   Pulses: Brachial radial pulses intact  Capillary: Normal capillary refill      DATA:    MAR reviewed and pertinent medications noted or modified as needed  MEDS:   Current Facility-Administered Medications   Medication    dilTIAZem IR (CARDIZEM) tablet 30 mg    heparin 25,000 units in  ml infusion    heparin (porcine) 1,000 unit/mL injection 6,080 Units    Or    heparin (porcine) 1,000 unit/mL injection 3,040 Units    dextrose 5% infusion    fentaNYL (PF) 1,500 mcg/30 mL (50 mcg/mL) infusion    hydrOXYzine pamoate (VISTARIL) capsule 25 mg    propofol (DIPRIVAN) 10 mg/mL infusion    dexmedeTOMidine (PRECEDEX) 400 mcg in 0.9% sodium chloride (MBP/ADV) 100 mL MBP    NOREPINephrine (LEVOPHED) 8 mg in 0.9% NS 250ml infusion    acetaminophen (TYLENOL) tablet 650 mg    Or    acetaminophen (TYLENOL) suppository 650 mg    polyethylene glycol (MIRALAX) packet 17 g    ondansetron (ZOFRAN ODT) tablet 4 mg    baricitinib (OLUMIANT) tablet 2 mg    albuterol (PROVENTIL HFA, VENTOLIN HFA, PROAIR HFA) inhaler 2 Puff    polyethylene glycol (MIRALAX) packet 17 g    ondansetron (ZOFRAN) injection 4 mg    meropenem (MERREM) 1 g in 0.9% sodium chloride 20 mL IV syringe    dexamethasone (DECADRON) 4 mg/mL injection 4 mg    hydrOXYzine (VISTARIL) injection 25 mg        Labs:    Recent Labs     01/30/22  0400 01/30/22  0015 01/29/22  1826 01/29/22  1055 01/29/22  0400 01/29/22  0400 01/28/22  1315 01/28/22  0510   WBC 16.9*  --   --   --   --  16.2*  --  14.6*   HGB 9.1*  --   --   --   --  9.8*  --  9.8*     -- --   --   --  180  --  189   APTT  --  98.1* 98.3* 121.9*   < > 126.0*   < >  --     < > = values in this interval not displayed. Recent Labs     01/30/22  0400 01/29/22  0400 01/28/22  0510   * 136 140   K 4.9 5.0 4.8   CL 95* 100 104   CO2 39* 34* 34*   * 173* 184*   BUN 31* 34* 42*   CREA 0.51* 0.48* 0.56   CA 9.2 9.4 9.3   ALB 1.6* 1.7* 1.9*   ALT 65 88* 124*     Recent Labs     01/30/22  0420 01/28/22  0255   PH 7.43 7.42   PCO2 60* 55*   PO2 96 67*   HCO3 38* 34*   FIO2 100.0 100.0   1/30 AC 12  PEEP 5 FiO2 100%    Lab Results   Component Value Date/Time    Culture result: No growth 6 days 01/21/2022 07:55 AM    Culture result: No growth 6 days 01/18/2022 07:12 AM    Culture result: (A) 01/17/2022 07:12 AM     Staphylococcus species, coagulase negative growing in 1 of 4 bottles drawn NO SITE INDICATED    Culture result:  01/17/2022 07:12 AM     (NOTE) [facility] GROWING IN 1 OF 2 BOTTLES CALLED TO JOSE MIGUEL PAGAN AT 0831 ON 1/19/22. JF     No results found for: TSH, TSHEXT, TSHEXT     Imaging:    Results from Hospital Encounter encounter on 01/21/22    XR CHEST PORT    Narrative  Chest one view. AP semiupright portable at 0158 dated 1/30/2022. Comparison 1/28/2022. An endotracheal tube, nasogastric tube and central line remain in place. The heart remains enlarged with calcified plaque in the aorta. Bilateral lung opacities are redemonstrated as seen with CHF, stable. There is  no pneumothorax. Impression  No interval change. Results from [place] encounter on 01/21/22    CT HEAD WO CONT    Narrative  The study is a noncontrasted head CT examination dated 1/21/2022. HISTORY: Unresponsive. TECHNIQUE: Thin section axial imaging was performed followed by sagittal and  coronal reconstructed imaging.     Dose Reduction Technique was employed to reduce radiation exposure - This  includes reduction optimization techniques as appropriate to a performed exam  with automated exposure control adjustments of the mA and/or Kv according to  patient size, or use of iterative reconstruction technique. COMPARISON: CT head dated 1/17/2022. There is an appropriate size to the ventricles, the deep cortical sulci, and the  sylvian fissures for the patient's age. This examination is negative for  intracranial hemorrhage, mass effect or an extra axial collection. The  gray-white matter junctions are preserved without cytotoxic or vasogenic edema. An assessment of the white matter tracts demonstrates a mild decrease in the  density characteristics of the central periventricular white matter. These  findings are consistent with expected aging changes and milder microvascular  disease. There also is a region of diminished density within the right posterior  inferior cerebellar hemisphere which may represent an older cerebrovascular  insult. ORBITS: This examination is negative for acute orbital pathology. PARANASAL SINUSES: The paranasal sinuses are well pneumatized without acute  sinus disease. There is a decreased number of right sided mastoid air cells which can be  associated with chronic mastoiditis. The craniocervical junction images in a  normal fashion. Impression  1. There are milder microvascular changes within the central periventricular  white matter. 2.  There is an area of diminished density within the inferior aspect of the  right cerebellar hemisphere without change (series 201 image number 12). These  findings may represent an older ischemic insult within the right posterior  inferior cerebellar region. 3.  This examination is negative for acute intracranial pathology or short-term  interval changes dating back to 1/17/2022.       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 Chest x-ray looks like pulmonary edema.   She has peripheral edema we will give Lasix today discontinue D5W continue tube feedings we will check echocardiogram  · Time of care 30-minute  ·   · Benito Allen MD

## 2022-01-30 NOTE — PROGRESS NOTES
Progress Note    Patient: Sofia Rothman MRN: [id number]  SSN: [id number]    YOB: 1940  Age: 80 y.o. Sex: female      Admit Date: 1/21/2022    LOS: 9 days     Subjective:     80years old intubated with Covid pneumonitis  1. Acute hypoxic respiratory  2. Acute on chronic hypercapnic respiratory failure will give Diamox  3. COVID-19 pneumonia  4. Pulmonary edema  5. Sepsis with septic shock off pressors  6. Left hand ischemia  7. Arthritis hypertension by hx  8. Hypokalemia corrected  9. Hypernatremia corrected  10. Now hyponatremic  11.  Acute hypoxic respiratory  12. Acute on chronic hypercapnic respiratory failure will give Diamox  13. COVID-19 pneumonia  15. Pulmonary edema  15. Sepsis with septic shock off pressors  16. Left hand ischemia  17. Arthritis hypertension by hx  18. Hypokalemia corrected  19. Hypernatremia corrected  20. Now hyponatremic  21.       Objective:     Vitals:    01/30/22 1058 01/30/22 1100 01/30/22 1500 01/30/22 1508   BP:  (!) 137/49     Pulse: 85 91  80   Resp: 22 23  23   Temp:  98.7 °F (37.1 °C) 98.5 °F (36.9 °C)    SpO2: 100% 98%  97%   Weight:       Height:            Intake and Output:  Current Shift: 01/30 0701 - 01/30 1900  In: -   Out: 5335 [FEOGE:8908]  Last three shifts: 01/28 1901 - 01/30 0700  In: 4992 [I.V.:2772]  Out: 4700 [Urine:4700]    Physical Exam:   General:  intubated   Eyes:     Ears:  Normal TMs and external ear canals both ears. Nose: Nares normal. Septum midline. Mucosa normal. No drainage or sinus tenderness. Mouth/Throat: Lips, mucosa, and tongue normal. Teeth and gums normal.   Neck: Supple, symmetrical, trachea midline, no adenopathy, thyroid: no enlargment/tenderness/nodules, no carotid bruit and no JVD. Back:   Symmetric, no curvature. ROM normal. No CVA tenderness. Lungs:   Clear to auscultation bilaterally. Heart:  Regular rate and rhythm, S1, S2 normal, no murmur, click, rub or gallop. Abdomen:   Soft, non-tender.  Bowel sounds normal. No masses,  No organomegaly. Extremities: Extremities normal, atraumatic, no cyanosis or edema. Pulses: 2+ and symmetric all extremities. Skin: Skin color, texture, turgor normal. No rashes or lesions   Lymph nodes: intubated   Neurologic:        Lab/Data Review: All lab results for the last 24 hours reviewed. left  Recent Results (from the past 24 hour(s))   PTT    Collection Time: 01/29/22  6:26 PM   Result Value Ref Range    aPTT 98.3 (H) 21.2 - 34.1 sec    aPTT, therapeutic range   82 - 109 sec   PTT    Collection Time: 01/30/22 12:15 AM   Result Value Ref Range    aPTT 98.1 (H) 21.2 - 34.1 sec    aPTT, therapeutic range   82 - 109 sec   CBC WITH AUTOMATED DIFF    Collection Time: 01/30/22  4:00 AM   Result Value Ref Range    WBC 16.9 (H) 3.6 - 11.0 K/uL    RBC 3.01 (L) 3.80 - 5.20 M/uL    HGB 9.1 (L) 11.5 - 16.0 g/dL    HCT 28.0 (L) 35.0 - 47.0 %    MCV 93.0 80.0 - 99.0 FL    MCH 30.2 26.0 - 34.0 PG    MCHC 32.5 30.0 - 36.5 g/dL    RDW 12.9 11.5 - 14.5 %    PLATELET 892 119 - 081 K/uL    MPV 12.0 8.9 - 12.9 FL    NRBC 0.0 0.0  WBC    ABSOLUTE NRBC 0.00 0.00 - 0.01 K/uL    NEUTROPHILS 92 (H) 32 - 75 %    LYMPHOCYTES 3 (L) 12 - 49 %    MONOCYTES 3 (L) 5 - 13 %    EOSINOPHILS 0 0 - 7 %    BASOPHILS 0 0 - 1 %    IMMATURE GRANULOCYTES 2 (H) 0 - 0.5 %    ABS. NEUTROPHILS 15.6 (H) 1.8 - 8.0 K/UL    ABS. LYMPHOCYTES 0.5 (L) 0.8 - 3.5 K/UL    ABS. MONOCYTES 0.6 0.0 - 1.0 K/UL    ABS. EOSINOPHILS 0.0 0.0 - 0.4 K/UL    ABS. BASOPHILS 0.0 0.0 - 0.1 K/UL    ABS. IMM.  GRANS. 0.3 (H) 0.00 - 0.04 K/UL    DF AUTOMATED     METABOLIC PANEL, COMPREHENSIVE    Collection Time: 01/30/22  4:00 AM   Result Value Ref Range    Sodium 133 (L) 136 - 145 mmol/L    Potassium 4.9 3.5 - 5.1 mmol/L    Chloride 95 (L) 97 - 108 mmol/L    CO2 39 (H) 21 - 32 mmol/L    Anion gap NEG 1 5 - 15 mmol/L    Glucose 180 (H) 65 - 100 mg/dL    BUN 31 (H) 6 - 20 mg/dL    Creatinine 0.51 (L) 0.55 - 1.02 mg/dL    BUN/Creatinine ratio 61 (H) 12 - 20      GFR est AA >60 >60 ml/min/1.73m2    GFR est non-AA >60 >60 ml/min/1.73m2    Calcium 9.2 8.5 - 10.1 mg/dL    Bilirubin, total 0.4 0.2 - 1.0 mg/dL    AST (SGOT) 62 (H) 15 - 37 U/L    ALT (SGPT) 65 12 - 78 U/L    Alk.  phosphatase 70 45 - 117 U/L    Protein, total 5.8 (L) 6.4 - 8.2 g/dL    Albumin 1.6 (L) 3.5 - 5.0 g/dL    Globulin 4.2 (H) 2.0 - 4.0 g/dL    A-G Ratio 0.4 (L) 1.1 - 2.2     BLOOD GAS, ARTERIAL    Collection Time: 01/30/22  4:20 AM   Result Value Ref Range    pH 7.43 7.35 - 7.45      PCO2 60 (H) 35 - 45 mmHg    PO2 96 75 - 100 mmHg    O2 SAT 98 >95 %    BICARBONATE 38 (H) 22 - 26 mmol/L    BASE EXCESS 14.6 (H) 0 - 2 mmol/L    O2 METHOD VENT      FIO2 100.0 %    MODE Assist Control/Volume Control      Tidal volume 450      SET RATE 12      EPAP/CPAP/PEEP 5.0      SITE Right Radial      BEN'S TEST PASS     GLUCOSE, POC    Collection Time: 01/30/22  4:57 AM   Result Value Ref Range    Glucose (POC) 179 (H) 65 - 117 mg/dL    Performed by Kallie May          Assessment:     Active Problems:    JMSEE-61 (1/17/2022)      Respiratory failure with hypoxia (HCC) (1/21/2022)      Hypernatremia (1/23/2022)      Hypokalemia (1/23/2022)        Plan:     Continue treatment    Signed By: Silas Kamara MD     January 30, 2022

## 2022-01-31 NOTE — CONSULTS
History and Physical    Chief complaints: Left hand ischemia   History of Presenting Illness:  Neftali Dutton is a 80 y.o. very pleasant woman currently hospitalized in intensive care unit with respiratory failure and she is currently intubated. I was consulted for evaluation of the left hand ischemia. Patient examined ICU. Patient currently intubated sedated. Chart reviewed. Images reviewed. Patient blood pressure has been borderline 90 systolic. No past medical history on file. Past Surgical History:   Procedure Laterality Date    IR INSERT NON TUNL CVC OVER 5 YRS  1/21/2022     No family history on file. Social History     Tobacco Use    Smoking status: Not on file    Smokeless tobacco: Not on file   Substance Use Topics    Alcohol use: Not on file       Prior to Admission medications    Medication Sig Start Date End Date Taking? Authorizing Provider   albuterol (PROVENTIL HFA, VENTOLIN HFA, PROAIR HFA) 90 mcg/actuation inhaler Take 2 Puffs by inhalation every six (6) hours as needed for Wheezing or Shortness of Breath. 1/20/22   Johnathan Lopez Res, MD   baricitinib (OLUMIANT) 2 mg tablet Take 2 Tablets by mouth daily. 1/21/22   Johnathan Lopez Res, MD   budesonide-formoteroL [facility]) 160-4.5 mcg/actuation HFAA Take 2 Puffs by inhalation two (2) times a day. 1/20/22   Johnathan Lopez Res, MD   dexAMETHasone (Decadron) 4 mg tablet 1 tablet twice a day 1/20/22   Johnathan Lopez Res, MD     Allergies   Allergen Reactions    Penicillins Hives        Review of Systems:  Pertinent review of systems discussed in HPI, and rest of organ systems personally reviewed and they are negative. Objective:   Vital signs reviewed:      Visit Vitals  BP (!) 98/50 (BP 1 Location: Right lower arm)   Pulse 73   Temp 98.9 °F (37.2 °C)   Resp 17   Ht 5' 5\" (1.651 m)   Wt 230 lb 6.1 oz (104.5 kg)   SpO2 100%   BMI 38.34 kg/m²       Physical Exam:   General appearance:   Patient is intubated. , not in particular distress. Head and neck atraumatic normocephalic. ENT shows normal oral mucosa, no jaundice   Eyes: Pupil equal gaze appropriate. Cardiac system regular rate rhythm. Pulmonary: No audible wheeze. Chest wall: Chest wall excursion normal with respiration cycle, there is no deformity or chest trauma. Abdomen: Soft not tender or distended, bowel sounds active. There is no obvious palpable mass, or hernia. Neurologic: Unable to assess  Cranial nerves unable to assess  Musculoskeletal system: Unable to assess  Skin: Warm and moist.  Hematologic system: No obvious bruising. Psychosocial: Unable to assess  Vascular examination: Patient has a palpable radial pulse on the right side and nonpalpable radial pulse. On the left side. Patient has rings in place. Patient's left fifth, fourth and third tip of the distal phalanx ischemic with dry gangrene. Data Review: Labs are reviewed.  Discussed  Recent Results (from the past 24 hour(s))   GLUCOSE, POC    Collection Time: 01/31/22  1:12 AM   Result Value Ref Range    Glucose (POC) 157 (H) 65 - 117 mg/dL    Performed by Yolanda NOLAN    PTT    Collection Time: 01/31/22  3:30 AM   Result Value Ref Range    aPTT 77.1 (H) 21.2 - 34.1 sec    aPTT, therapeutic range   82 - 109 sec   RENAL FUNCTION PANEL    Collection Time: 01/31/22  3:30 AM   Result Value Ref Range    Sodium 130 (L) 136 - 145 mmol/L    Potassium 4.5 3.5 - 5.1 mmol/L    Chloride 92 (L) 97 - 108 mmol/L    CO2 36 (H) 21 - 32 mmol/L    Anion gap 2 (L) 5 - 15 mmol/L    Glucose 185 (H) 65 - 100 mg/dL    BUN 39 (H) 6 - 20 mg/dL    Creatinine 0.60 0.55 - 1.02 mg/dL    BUN/Creatinine ratio 65 (H) 12 - 20      GFR est AA >60 >60 ml/min/1.73m2    GFR est non-AA >60 >60 ml/min/1.73m2    Calcium 9.7 8.5 - 10.1 mg/dL    Phosphorus 3.2 2.6 - 4.7 mg/dL    Albumin 1.8 (L) 3.5 - 5.0 g/dL   MAGNESIUM    Collection Time: 01/31/22  3:30 AM   Result Value Ref Range    Magnesium 2.1 1.6 - 2.4 mg/dL   CBC WITH AUTOMATED DIFF    Collection Time: 01/31/22  3:30 AM   Result Value Ref Range    WBC 19.3 (H) 3.6 - 11.0 K/uL    RBC 3.28 (L) 3.80 - 5.20 M/uL    HGB 9.6 (L) 11.5 - 16.0 g/dL    HCT 30.2 (L) 35.0 - 47.0 %    MCV 92.1 80.0 - 99.0 FL    MCH 29.3 26.0 - 34.0 PG    MCHC 31.8 30.0 - 36.5 g/dL    RDW 13.2 11.5 - 14.5 %    PLATELET 601 096 - 546 K/uL    MPV 12.3 8.9 - 12.9 FL    NRBC 0.0 0.0  WBC    ABSOLUTE NRBC 0.00 0.00 - 0.01 K/uL    NEUTROPHILS 93 (H) 32 - 75 %    LYMPHOCYTES 2 (L) 12 - 49 %    MONOCYTES 4 (L) 5 - 13 %    EOSINOPHILS 0 0 - 7 %    BASOPHILS 0 0 - 1 %    IMMATURE GRANULOCYTES 1 (H) 0 - 0.5 %    ABS. NEUTROPHILS 17.9 (H) 1.8 - 8.0 K/UL    ABS. LYMPHOCYTES 0.5 (L) 0.8 - 3.5 K/UL    ABS. MONOCYTES 0.7 0.0 - 1.0 K/UL    ABS. EOSINOPHILS 0.0 0.0 - 0.4 K/UL    ABS. BASOPHILS 0.0 0.0 - 0.1 K/UL    ABS. IMM. GRANS. 0.3 (H) 0.00 - 0.04 K/UL    DF AUTOMATED     BLOOD GAS, ARTERIAL    Collection Time: 01/31/22  3:49 AM   Result Value Ref Range    pH 7.49 (H) 7.35 - 7.45      PCO2 48 (H) 35 - 45 mmHg    PO2 60 (L) 75 - 100 mmHg    O2 SAT 94 (L) >95 %    BICARBONATE 35 (H) 22 - 26 mmol/L    BASE EXCESS 11.6 (H) 0 - 2 mmol/L    O2 METHOD VENT      FIO2 85.0 %    MODE Assist Control/Volume Control      Tidal volume 450      SET RATE 12      EPAP/CPAP/PEEP 5.0      Sample source Arterial      SITE Right Radial      BEN'S TEST PASS               Imagings reviewed: discussed as below. No name on file. Assessment:     Active Problems:    COVID-19 (1/17/2022)      Respiratory failure with hypoxia (HCC) (1/21/2022)      Hypernatremia (1/23/2022)      Hypokalemia (1/23/2022)        Plan:     Patient does have warm hand on the left side. Patient does have a strong Doppler signal in the left radial.  Also palmar arch Doppler signal present. Most likely modified this is secondary to localized thrombus secondary to hemodynamic instability. Optimize blood pressure.  Patient will need a local wound care with iodine swabs on the left 5, 4, 3 digits. Also asked nursing staff to have the rings removed from the fourth finger. Patient Doppler interrogation perfusion is adequate to the palmar arch level. I will obtain physiologic testing with brachial wrist index and brachial digit index as well.

## 2022-01-31 NOTE — PROGRESS NOTES
General Daily Progress Note          Patient Name:   Ortega Morocho       YOB: 1940       Age:  80 y.o.       Admit Date: 1/21/2022      Subjective:     Patient is a 80y.o. year old female with signal past medical history of hypertension arthritis who discharged from the hospital yesterday in stable condition patient is admitted last admission with COVID-19 patient was asymptomatic all this time while in the hospital patient discharged on 2 L oxygen to skilled care but patient son want to go home with home health but is at home patient's saturations drops sent back to the ER seen by the ER physician patient placed on BiPAP patient was little hypotensive started on Levophed admitted for further work-up and treatment         p    Patient is DNR    Patient on ventilator 85% O2   propofol drip    On heparin    NG tube in place for medication and feeding      Objective:     Visit Vitals  BP (!) 98/50 (BP 1 Location: Right lower arm)   Pulse 74   Temp 98.9 °F (37.2 °C)   Resp 20   Ht 5' 5\" (1.651 m)   Wt 104.5 kg (230 lb 6.1 oz)   SpO2 100%   BMI 38.34 kg/m²        Recent Results (from the past 24 hour(s))   GLUCOSE, POC    Collection Time: 01/31/22  1:12 AM   Result Value Ref Range    Glucose (POC) 157 (H) 65 - 117 mg/dL    Performed by Todd NOLAN    PTT    Collection Time: 01/31/22  3:30 AM   Result Value Ref Range    aPTT 77.1 (H) 21.2 - 34.1 sec    aPTT, therapeutic range   82 - 109 sec   RENAL FUNCTION PANEL    Collection Time: 01/31/22  3:30 AM   Result Value Ref Range    Sodium 130 (L) 136 - 145 mmol/L    Potassium 4.5 3.5 - 5.1 mmol/L    Chloride 92 (L) 97 - 108 mmol/L    CO2 36 (H) 21 - 32 mmol/L    Anion gap 2 (L) 5 - 15 mmol/L    Glucose 185 (H) 65 - 100 mg/dL    BUN 39 (H) 6 - 20 mg/dL    Creatinine 0.60 0.55 - 1.02 mg/dL    BUN/Creatinine ratio 65 (H) 12 - 20      GFR est AA >60 >60 ml/min/1.73m2    GFR est non-AA >60 >60 ml/min/1.73m2    Calcium 9.7 8.5 - 10.1 mg/dL    Phosphorus 3.2 2.6 - 4.7 mg/dL    Albumin 1.8 (L) 3.5 - 5.0 g/dL   MAGNESIUM    Collection Time: 01/31/22  3:30 AM   Result Value Ref Range    Magnesium 2.1 1.6 - 2.4 mg/dL   CBC WITH AUTOMATED DIFF    Collection Time: 01/31/22  3:30 AM   Result Value Ref Range    WBC 19.3 (H) 3.6 - 11.0 K/uL    RBC 3.28 (L) 3.80 - 5.20 M/uL    HGB 9.6 (L) 11.5 - 16.0 g/dL    HCT 30.2 (L) 35.0 - 47.0 %    MCV 92.1 80.0 - 99.0 FL    MCH 29.3 26.0 - 34.0 PG    MCHC 31.8 30.0 - 36.5 g/dL    RDW 13.2 11.5 - 14.5 %    PLATELET 825 320 - 190 K/uL    MPV 12.3 8.9 - 12.9 FL    NRBC 0.0 0.0  WBC    ABSOLUTE NRBC 0.00 0.00 - 0.01 K/uL    NEUTROPHILS 93 (H) 32 - 75 %    LYMPHOCYTES 2 (L) 12 - 49 %    MONOCYTES 4 (L) 5 - 13 %    EOSINOPHILS 0 0 - 7 %    BASOPHILS 0 0 - 1 %    IMMATURE GRANULOCYTES 1 (H) 0 - 0.5 %    ABS. NEUTROPHILS 17.9 (H) 1.8 - 8.0 K/UL    ABS. LYMPHOCYTES 0.5 (L) 0.8 - 3.5 K/UL    ABS. MONOCYTES 0.7 0.0 - 1.0 K/UL    ABS. EOSINOPHILS 0.0 0.0 - 0.4 K/UL    ABS. BASOPHILS 0.0 0.0 - 0.1 K/UL    ABS. IMM. GRANS. 0.3 (H) 0.00 - 0.04 K/UL    DF AUTOMATED     BLOOD GAS, ARTERIAL    Collection Time: 01/31/22  3:49 AM   Result Value Ref Range    pH 7.49 (H) 7.35 - 7.45      PCO2 48 (H) 35 - 45 mmHg    PO2 60 (L) 75 - 100 mmHg    O2 SAT 94 (L) >95 %    BICARBONATE 35 (H) 22 - 26 mmol/L    BASE EXCESS 11.6 (H) 0 - 2 mmol/L    O2 METHOD VENT      FIO2 85.0 %    MODE Assist Control/Volume Control      Tidal volume 450      SET RATE 12      EPAP/CPAP/PEEP 5.0      Sample source Arterial      SITE Right Radial      BEN'S TEST PASS       [unfilled]      Review of Systems    Unable to obtain. Physical Exam:      Constitutional: Awake on BiPAP  HENT:   Head: Normocephalic and atraumatic. Eyes: Pupils are equal, round, and reactive to light. EOM are normal.   Cardiovascular: Normal rate, regular rhythm and normal heart sounds. Pulmonary/Chest: Breath sounds normal. No wheezes. No rales. Exhibits no tenderness. Abdominal: Soft.  Bowel sounds are normal. There is no abdominal tenderness. There is no rebound and no guarding. Musculoskeletal: Normal range of motion. Neurological: pt is alert and oriented to person, place, and time. XR CHEST PORT   Final Result   No significant change. XR CHEST PORT   Final Result   No interval change. DUPLEX UPPER EXT ARTERY LEFT   Final Result      XR CHEST PORT   Final Result      XR CHEST PORT   Final Result   Endotracheal tube tip still at the maggie. Diffuse opacities in the   lungs, not significantly changed. XR CHEST PORT   Final Result      XR CHEST PORT   Final Result   Endotracheal tube tip at the maggie. Diffuse opacities in the   lungs, accentuated by lower lung volumes or increased. XR CHEST PORT   Final Result   Diffuse opacities in the lungs, not significantly changed. XR CHEST PORT   Final Result   1. ETT terminating 2 cm above the maggie. Remaining support apparatus as above. 2.  Worsening bilateral airspace disease with developing ARDS not excluded. XR CHEST PORT   Final Result   Moderate patchy diffuse bilateral airspace opacities, waxing and waning from the   prior exam.      XR CHEST PORT   Final Result   Moderate diffuse bilateral airspace opacities, likely a combination of edema and   airspace disease, mildly increased from the previous exam.      CT HEAD WO CONT   Final Result   1. There are milder microvascular changes within the central periventricular   white matter. 2.  There is an area of diminished density within the inferior aspect of the   right cerebellar hemisphere without change (series 201 image number 12). These   findings may represent an older ischemic insult within the right posterior   inferior cerebellar region. 3.  This examination is negative for acute intracranial pathology or short-term   interval changes dating back to 1/17/2022.                XR CHEST PORT   Final Result   Patchy mixed asymmetric lung disease appears somewhat better but the   lungs are better inflated. No effusion or pneumothorax. Normal heart and   mediastinum      IR INSERT NON TUNL CVC OVER 5 YRS   Final Result   Ultrasound of the right neck demonstrated patent IJV. Successful US-guided placement of a right internal jugular triple lumen central   venous catheter as described above. The catheter is functioning. PLAN:   Catheter position was confirmed by follow-up chest x-ray: catheter tip in the   lower SVC and ready to use. IR US GUIDED VASCULAR ACCESS   Final Result   Ultrasound of the right neck demonstrated patent IJV. Successful US-guided placement of a right internal jugular triple lumen central   venous catheter as described above. The catheter is functioning. PLAN:   Catheter position was confirmed by follow-up chest x-ray: catheter tip in the   lower SVC and ready to use. XR CHEST PORT   Final Result   Findings/impression:      Diffuse interstitial airspace disease/edema. No definite pleural effusion or   pneumothorax. Cardiac contours are partially obscured. No acute osseous abnormality identified.       WRIST BRACHIAL INDEX AT REST    (Results Pending)   XR CHEST PORT    (Results Pending)        Recent Results (from the past 24 hour(s))   GLUCOSE, POC    Collection Time: 01/31/22  1:12 AM   Result Value Ref Range    Glucose (POC) 157 (H) 65 - 117 mg/dL    Performed by Osvaldo NOLAN    PTT    Collection Time: 01/31/22  3:30 AM   Result Value Ref Range    aPTT 77.1 (H) 21.2 - 34.1 sec    aPTT, therapeutic range   82 - 109 sec   RENAL FUNCTION PANEL    Collection Time: 01/31/22  3:30 AM   Result Value Ref Range    Sodium 130 (L) 136 - 145 mmol/L    Potassium 4.5 3.5 - 5.1 mmol/L    Chloride 92 (L) 97 - 108 mmol/L    CO2 36 (H) 21 - 32 mmol/L    Anion gap 2 (L) 5 - 15 mmol/L    Glucose 185 (H) 65 - 100 mg/dL    BUN 39 (H) 6 - 20 mg/dL    Creatinine 0.60 0.55 - 1.02 mg/dL    BUN/Creatinine ratio 65 (H) 12 - 20 GFR est AA >60 >60 ml/min/1.73m2    GFR est non-AA >60 >60 ml/min/1.73m2    Calcium 9.7 8.5 - 10.1 mg/dL    Phosphorus 3.2 2.6 - 4.7 mg/dL    Albumin 1.8 (L) 3.5 - 5.0 g/dL   MAGNESIUM    Collection Time: 01/31/22  3:30 AM   Result Value Ref Range    Magnesium 2.1 1.6 - 2.4 mg/dL   CBC WITH AUTOMATED DIFF    Collection Time: 01/31/22  3:30 AM   Result Value Ref Range    WBC 19.3 (H) 3.6 - 11.0 K/uL    RBC 3.28 (L) 3.80 - 5.20 M/uL    HGB 9.6 (L) 11.5 - 16.0 g/dL    HCT 30.2 (L) 35.0 - 47.0 %    MCV 92.1 80.0 - 99.0 FL    MCH 29.3 26.0 - 34.0 PG    MCHC 31.8 30.0 - 36.5 g/dL    RDW 13.2 11.5 - 14.5 %    PLATELET 152 925 - 480 K/uL    MPV 12.3 8.9 - 12.9 FL    NRBC 0.0 0.0  WBC    ABSOLUTE NRBC 0.00 0.00 - 0.01 K/uL    NEUTROPHILS 93 (H) 32 - 75 %    LYMPHOCYTES 2 (L) 12 - 49 %    MONOCYTES 4 (L) 5 - 13 %    EOSINOPHILS 0 0 - 7 %    BASOPHILS 0 0 - 1 %    IMMATURE GRANULOCYTES 1 (H) 0 - 0.5 %    ABS. NEUTROPHILS 17.9 (H) 1.8 - 8.0 K/UL    ABS. LYMPHOCYTES 0.5 (L) 0.8 - 3.5 K/UL    ABS. MONOCYTES 0.7 0.0 - 1.0 K/UL    ABS. EOSINOPHILS 0.0 0.0 - 0.4 K/UL    ABS. BASOPHILS 0.0 0.0 - 0.1 K/UL    ABS. IMM.  GRANS. 0.3 (H) 0.00 - 0.04 K/UL    DF AUTOMATED     BLOOD GAS, ARTERIAL    Collection Time: 01/31/22  3:49 AM   Result Value Ref Range    pH 7.49 (H) 7.35 - 7.45      PCO2 48 (H) 35 - 45 mmHg    PO2 60 (L) 75 - 100 mmHg    O2 SAT 94 (L) >95 %    BICARBONATE 35 (H) 22 - 26 mmol/L    BASE EXCESS 11.6 (H) 0 - 2 mmol/L    O2 METHOD VENT      FIO2 85.0 %    MODE Assist Control/Volume Control      Tidal volume 450      SET RATE 12      EPAP/CPAP/PEEP 5.0      Sample source Arterial      SITE Right Radial      BEN'S TEST PASS         Results     Procedure Component Value Units Date/Time    MRSA SCREEN - PCR (NASAL) [[id number]] Collected: 01/22/22 0440    Order Status: Canceled Specimen: Swab     MRSA SCREEN - PCR (NASAL) [[id number]] Collected: 01/22/22 0300    Order Status: Canceled Specimen: Swab     CULTURE, BLOOD #1 [[id number]]     Order Status: Canceled Specimen: Blood     CULTURE, BLOOD #2 [[id number]]     Order Status: Canceled Specimen: Blood     CULTURE, BLOOD, PAIRED [[id number]] Collected: 01/21/22 0755    Order Status: Completed Specimen: Blood Updated: 01/28/22 1104     Special Requests: No Special Requests        Culture result: No growth 6 days       CULTURE, BLOOD #1 [[id number]] Collected: 01/18/22 5950    Order Status: Completed Specimen: Blood Updated: 01/25/22 0912     Special Requests: No Special Requests        Culture result: No growth 6 days       COVID-19 RAPID TEST [[id number]]  (Abnormal) Collected: 01/17/22 1136    Order Status: Completed Specimen: Nasopharyngeal Updated: 01/17/22 1201     Specimen source       Please find results under separate order           COVID-19 rapid test DETECTED        Comment: Rapid [employer] ID Now   The specimen is POSITIVE for SARS-CoV-2, the novel coronavirus associated with COVID-19. This test has been authorized by the FDA under an Emergency Use Authorization (EUA) for use by authorized laboratories.    Fact sheet for Healthcare Providers: ConventionUpdate.co.nz Fact sheet for Patients: ConventionUpdate.co.nz   Methodology: Isothermal Nucleic Acid Amplification Results verified, phoned to and read back by Leena Lundy, 12:00   1/17/22 O         INFLUENZA A & B AG (RAPID TEST) [[id number]] Collected: 01/17/22 1136    Order Status: Completed Specimen: Nasopharyngeal from Nasal washing Updated: 01/17/22 1202     Influenza A Antigen Negative        Influenza B Antigen Negative              Labs:     Recent Labs     01/31/22  0330 01/30/22  0400   WBC 19.3* 16.9*   HGB 9.6* 9.1*   HCT 30.2* 28.0*    164     Recent Labs     01/31/22  0330 01/30/22  0400 01/29/22  0400   * 133* 136   K 4.5 4.9 5.0   CL 92* 95* 100   CO2 36* 39* 34*   BUN 39* 31* 34*   CREA 0.60 0.51* 0.48*   * 180* 173*   CA 9.7 9.2 9.4   MG 2.1  --   -- PHOS 3.2  --   --      Recent Labs     01/31/22  0330 01/30/22  0400 01/29/22  0400   ALT  --  65 88*   AP  --  70 82   TBILI  --  0.4 0.5   TP  --  5.8* 5.9*   ALB 1.8* 1.6* 1.7*   GLOB  --  4.2* 4.2*     Recent Labs     01/31/22  0330 01/30/22  0015 01/29/22  1826   APTT 77.1* 98.1* 98.3*      No results for input(s): FE, TIBC, PSAT, FERR in the last 72 hours. No results found for: FOL, RBCF   Recent Labs     01/31/22  0349 01/30/22  0420   PH 7.49* 7.43   PCO2 48* 60*   PO2 60* 96     No results for input(s): CPK, CKNDX, TROIQ in the last 72 hours.     No lab exists for component: CPKMB  No results found for: CHOL, CHOLX, CHLST, CHOLV, HDL, HDLP, LDL, LDLC, DLDLP, TGLX, TRIGL, TRIGP, CHHD, CHHDX  Lab Results   Component Value Date/Time    Glucose (POC) 157 (H) 01/31/2022 01:12 AM    Glucose (POC) 179 (H) 01/30/2022 04:57 AM    Glucose (POC) 169 (H) 01/28/2022 05:11 AM    Glucose (POC) 168 (H) 01/27/2022 11:33 AM    Glucose (POC) 182 (H) 01/27/2022 05:20 AM     Lab Results   Component Value Date/Time    Color Yellow/Straw 01/23/2022 10:30 AM    Appearance Turbid (A) 01/23/2022 10:30 AM    Specific gravity 1.027 01/23/2022 10:30 AM    pH (UA) 6.0 01/23/2022 10:30 AM    Protein 100 (A) 01/23/2022 10:30 AM    Glucose 50 (A) 01/23/2022 10:30 AM    Ketone 5 (A) 01/23/2022 10:30 AM    Bilirubin Negative 01/23/2022 10:30 AM    Urobilinogen 4.0 (H) 01/23/2022 10:30 AM    Nitrites Negative 01/23/2022 10:30 AM    Leukocyte Esterase Negative 01/23/2022 10:30 AM    Bacteria Negative 01/23/2022 10:30 AM    WBC 0-4 01/23/2022 10:30 AM    RBC 0-5 01/23/2022 10:30 AM         Assessment:     Acute hypoxemic respiratory failure on on ventilator 85% of   COVID-19 pneumonitis   hypotension  Thrombocytopenia  Hypertension  Arthritis  Hyperkalemia  Hyponatremia  Rule out ischemia of the left hand    Plan:     Seen by the vascular surgeon he wanted to physiological testing with brachial wrist index and brachial digital index      Olumiant 2 mg daily  Decadron 4 mg every 6 hours  Meropenem 1 g every 8 hours  Diltiazem 30 mg 3 times  Follow-up with pulmonologist and nephrologist    Overall prognosis        Patient is DNR  He want to continue current medication  Continue heparin drip      Follow-up vascular surgical recommend  Repeat the lab      Current Facility-Administered Medications:     dilTIAZem IR (CARDIZEM) tablet 30 mg, 30 mg, Oral, TID, Karan Flower MD, 30 mg at 01/30/22 2127    heparin 25,000 units in  ml infusion, 18-36 Units/kg/hr (Adjusted), IntraVENous, TITRATE, Polo Lopez MD, Last Rate: 10.6 mL/hr at 01/30/22 1410, 14 Units/kg/hr at 01/30/22 1410    heparin (porcine) 1,000 unit/mL injection 6,080 Units, 80 Units/kg (Adjusted), IntraVENous, PRN **OR** heparin (porcine) 1,000 unit/mL injection 3,040 Units, 40 Units/kg (Adjusted), IntraVENous, PRN, Polo Lopez MD    fentaNYL (PF) 1,500 mcg/30 mL (50 mcg/mL) infusion, 0-200 mcg/hr, IntraVENous, TITRATE, Karan Flower MD, Last Rate: 1 mL/hr at 01/30/22 0535, 50 mcg/hr at 01/30/22 0535    hydrOXYzine pamoate (VISTARIL) capsule 25 mg, 25 mg, Oral, Q4H PRN, Polo Lopez MD    propofol (DIPRIVAN) 10 mg/mL infusion, 0-50 mcg/kg/min, IntraVENous, TITRATE, Karan Flower MD, Last Rate: 10.9 mL/hr at 01/31/22 0523, 20 mcg/kg/min at 01/31/22 0523    dexmedeTOMidine (PRECEDEX) 400 mcg in 0.9% sodium chloride (MBP/ADV) 100 mL MBP, 0.1-1.5 mcg/kg/hr, IntraVENous, TITRATEMundo Tahir, MD, Stopped at 01/24/22 0824    NOREPINephrine (LEVOPHED) 8 mg in 0.9% NS 250ml infusion, 0.5-16 mcg/min, IntraVENous, TITRATE, Polo Lopez MD, Stopped at 01/25/22 1137    acetaminophen (TYLENOL) tablet 650 mg, 650 mg, Oral, Q6H PRN **OR** acetaminophen (TYLENOL) suppository 650 mg, 650 mg, Rectal, Q6H PRN, Polo Lopez MD    polyethylene glycol (MIRALAX) packet 17 g, 17 g, Oral, DAILY PRN, Rosalia Lopez MD    ondansetron (ZOFRAN ODT) tablet 4 mg, 4 mg, Oral, Q8H PRN **OR** [DISCONTINUED] ondansetron (ZOFRAN) injection 4 mg, 4 mg, IntraVENous, Q6H PRN, Alma Lopez MD    baricitinib (OLUMIANT) tablet 2 mg, 2 mg, Oral, DAILY, Polo Lopez MD, 2 mg at 01/30/22 1053    albuterol (PROVENTIL HFA, VENTOLIN HFA, PROAIR HFA) inhaler 2 Puff, 2 Puff, Inhalation, Q6H PRN, Polo Lopez MD    polyethylene glycol (MIRALAX) packet 17 g, 17 g, Oral, DAILY PRN, Polo Lopez MD, 17 g at 01/28/22 0844    [DISCONTINUED] ondansetron (ZOFRAN ODT) tablet 4 mg, 4 mg, Oral, Q8H PRN **OR** ondansetron (ZOFRAN) injection 4 mg, 4 mg, IntraVENous, Q6H PRN, Polo Lopez MD    meropenem (MERREM) 1 g in 0.9% sodium chloride 20 mL IV syringe, 1 g, IntraVENous, Q8H, Polo Lopez MD, 1 g at 01/31/22 0108    dexamethasone (DECADRON) 4 mg/mL injection 4 mg, 4 mg, IntraVENous, Q6H, Polo Lopez MD, 4 mg at 01/31/22 0524    hydrOXYzine (VISTARIL) injection 25 mg, 25 mg, IntraMUSCular, Q6H PRN, Polo Lopez MD, 25 mg at 01/22/22 0111

## 2022-01-31 NOTE — PROGRESS NOTES
CM reviewed clinical chart. Per attending physician's most recent note, patient's son would like for her to continue on her current medications, and move forward with vascular surgeon's recommendation for physiological testing. Patient has a DNR code status in place. Patient's discharge plan is to return home with home health services provided by [facility]. CM will continue to follow.

## 2022-01-31 NOTE — PROGRESS NOTES
IMPRESSION:   1. Acute hypoxic respiratory  2. Acute on chronic hypercapnic respiratory failure   3. COVID-19 pneumonia  4. Pulmonary edema  5. Sepsis with septic shock off pressors  6. Left hand ischemia  7. Arthritis hypertension by hx  8. Hypokalemia corrected  9. Hypernatremia corrected  10. Now hyponatremic      RECOMMENDATIONS/PLAN:   1. ICU monitoring  1. Patient condition got worse on 1/24 went to asystole subsequently got intubated now on ventilator assist control mode sedated with propofol and fentanyl Precedex was discontinued because of bradycardia arterial blood gases acceptable currently on a/C 12  PEEP 5 FiO2 100% decrease FiO2 to 85% PO2 still low  2. Patient is on dexamethasone and baricitinib  3. Patient is off Levophed  4. Potassium corrected  5. Pulmonary edema received Lasix chest x-ray shows bilateral infiltrate congestive changes  6. Left hand ischemia on IV heparin left ulnar artery occlusion radial is patent NATHALIE pending  7. Now hyponatremic will discontinue D5W  8. Troponin is elevated  9. Lactic acid 3.7  10. She is on meropenem and finished Zithromax     [x] High complexity decision making was performed  [x] See my orders for details  HPI  74-year-old lady came in because of shortness of breath and dyspnea she has significant past medical history of arthritis hypertension she was recently discharged from the hospital came back with worsening of hypoxia she was discharged on oxygen 2 L nasal cannula and patient condition got worse she was supposed to be discharged to skilled care but patient's son took her home where her condition got worse and she was transferred back to the hospital now she is on noninvasive ventilator BiPAP machine hemodynamically unstable hypotensive on vasopressors and not giving much history acutely ill so critical care consult was called  PMH:  has no past medical history on file.     PSH:   has a past surgical history that includes ir insert non tunl cvc over 5 yrs (1/21/2022). FHX: family history is not on file.      SHX:      ALL:   Allergies   Allergen Reactions    Penicillins Hives        MEDS:   [x] Reviewed - As Below   [] Not reviewed    Current Facility-Administered Medications   Medication    dilTIAZem IR (CARDIZEM) tablet 30 mg    heparin 25,000 units in  ml infusion    heparin (porcine) 1,000 unit/mL injection 6,080 Units    Or    heparin (porcine) 1,000 unit/mL injection 3,040 Units    fentaNYL (PF) 1,500 mcg/30 mL (50 mcg/mL) infusion    hydrOXYzine pamoate (VISTARIL) capsule 25 mg    propofol (DIPRIVAN) 10 mg/mL infusion    dexmedeTOMidine (PRECEDEX) 400 mcg in 0.9% sodium chloride (MBP/ADV) 100 mL MBP    NOREPINephrine (LEVOPHED) 8 mg in 0.9% NS 250ml infusion    acetaminophen (TYLENOL) tablet 650 mg    Or    acetaminophen (TYLENOL) suppository 650 mg    polyethylene glycol (MIRALAX) packet 17 g    ondansetron (ZOFRAN ODT) tablet 4 mg    baricitinib (OLUMIANT) tablet 2 mg    albuterol (PROVENTIL HFA, VENTOLIN HFA, PROAIR HFA) inhaler 2 Puff    polyethylene glycol (MIRALAX) packet 17 g    ondansetron (ZOFRAN) injection 4 mg    meropenem (MERREM) 1 g in 0.9% sodium chloride 20 mL IV syringe    dexamethasone (DECADRON) 4 mg/mL injection 4 mg    hydrOXYzine (VISTARIL) injection 25 mg      MAR reviewed and pertinent medications noted or modified as needed   Current Facility-Administered Medications   Medication    dilTIAZem IR (CARDIZEM) tablet 30 mg    heparin 25,000 units in  ml infusion    heparin (porcine) 1,000 unit/mL injection 6,080 Units    Or    heparin (porcine) 1,000 unit/mL injection 3,040 Units    fentaNYL (PF) 1,500 mcg/30 mL (50 mcg/mL) infusion    hydrOXYzine pamoate (VISTARIL) capsule 25 mg    propofol (DIPRIVAN) 10 mg/mL infusion    dexmedeTOMidine (PRECEDEX) 400 mcg in 0.9% sodium chloride (MBP/ADV) 100 mL MBP    NOREPINephrine (LEVOPHED) 8 mg in 0.9% NS 250ml infusion    acetaminophen (TYLENOL) tablet 650 mg    Or    acetaminophen (TYLENOL) suppository 650 mg    polyethylene glycol (MIRALAX) packet 17 g    ondansetron (ZOFRAN ODT) tablet 4 mg    baricitinib (OLUMIANT) tablet 2 mg    albuterol (PROVENTIL HFA, VENTOLIN HFA, PROAIR HFA) inhaler 2 Puff    polyethylene glycol (MIRALAX) packet 17 g    ondansetron (ZOFRAN) injection 4 mg    meropenem (MERREM) 1 g in 0.9% sodium chloride 20 mL IV syringe    dexamethasone (DECADRON) 4 mg/mL injection 4 mg    hydrOXYzine (VISTARIL) injection 25 mg      PMH:  has no past medical history on file. PSH:   has a past surgical history that includes ir insert non tunl cvc over 5 yrs (2022). FHX: family history is not on file. SHX:       ROS:  Unable to obtain    Hemodynamics:    CO:    CI:    CVP:    SVR:   PAP Systolic:    PAP Diastolic:    PVR:    ZL20:        Ventilator Settings:      Mode Rate TV Press PEEP FiO2 PIP Min. Vent   Assist control,Volume control    450 ml    5 cm H20 85 %  39 cm H2O  7.83 l/min        Vital Signs: Telemetry:    normal sinus rhythm Intake/Output:   Visit Vitals  BP (!) 98/50 (BP 1 Location: Right lower arm)   Pulse 74   Temp 98.9 °F (37.2 °C)   Resp 20   Ht 5' 5\" (1.651 m)   Wt 104.5 kg (230 lb 6.1 oz)   SpO2 100%   BMI 38.34 kg/m²       Temp (24hrs), Av.7 °F (37.1 °C), Min:98 °F (36.7 °C), Max:99.3 °F (37.4 °C)        O2 Device: Ventilator         Wt Readings from Last 4 Encounters:   22 104.5 kg (230 lb 6.1 oz)   22 90.7 kg (200 lb)          Intake/Output Summary (Last 24 hours) at 2022 0832  Last data filed at 2022 0527  Gross per 24 hour   Intake 1080 ml   Output 4450 ml   Net -3370 ml       Last shift:      No intake/output data recorded.   Last 3 shifts:  1901 -  0700  In: 4262.9 [I.V.:1822.9]  Out: 6200 [Urine:6200]       Physical Exam:     General: Intubated on ventilator unresponsive on propofol fentanyl  HEENT: NCAT,   Eyes: anicteric; conjunctiva clear no doll's eye reflex  Neck: no nodes, , trach midline; no accessory MM use. Questionable neck vein distention  Chest: no deformity,   Cardiac: R regular; no murmur;   Lungs: Diffuse wheezes and rales  Abd: soft, NT, hypoactive BS  Ext: Edema of the leg edema; no joint swelling;  No clubbing  : clear urine  Neuro: Sedated unresponsive on the ventilator no doll's eye reflex flaccid extremities  Psych-unable to assess  Skin: warm, dry, no cyanosis;   Pulses: Brachial radial pulses intact  Capillary: Normal capillary refill      DATA:    MAR reviewed and pertinent medications noted or modified as needed  MEDS:   Current Facility-Administered Medications   Medication    dilTIAZem IR (CARDIZEM) tablet 30 mg    heparin 25,000 units in  ml infusion    heparin (porcine) 1,000 unit/mL injection 6,080 Units    Or    heparin (porcine) 1,000 unit/mL injection 3,040 Units    fentaNYL (PF) 1,500 mcg/30 mL (50 mcg/mL) infusion    hydrOXYzine pamoate (VISTARIL) capsule 25 mg    propofol (DIPRIVAN) 10 mg/mL infusion    dexmedeTOMidine (PRECEDEX) 400 mcg in 0.9% sodium chloride (MBP/ADV) 100 mL MBP    NOREPINephrine (LEVOPHED) 8 mg in 0.9% NS 250ml infusion    acetaminophen (TYLENOL) tablet 650 mg    Or    acetaminophen (TYLENOL) suppository 650 mg    polyethylene glycol (MIRALAX) packet 17 g    ondansetron (ZOFRAN ODT) tablet 4 mg    baricitinib (OLUMIANT) tablet 2 mg    albuterol (PROVENTIL HFA, VENTOLIN HFA, PROAIR HFA) inhaler 2 Puff    polyethylene glycol (MIRALAX) packet 17 g    ondansetron (ZOFRAN) injection 4 mg    meropenem (MERREM) 1 g in 0.9% sodium chloride 20 mL IV syringe    dexamethasone (DECADRON) 4 mg/mL injection 4 mg    hydrOXYzine (VISTARIL) injection 25 mg        Labs:    Recent Labs     01/31/22  0330 01/30/22  0400 01/30/22  0015 01/29/22  1826 01/29/22  1055 01/29/22  0400   WBC 19.3* 16.9*  --   --   --  16.2*   HGB 9.6* 9.1*  --   --   --  9.8*    164  --   --   --  180   APTT 77.1*  -- 98.1* 98.3*   < > 126.0*    < > = values in this interval not displayed. Recent Labs     01/31/22  0330 01/30/22  0400 01/29/22  0400   * 133* 136   K 4.5 4.9 5.0   CL 92* 95* 100   CO2 36* 39* 34*   * 180* 173*   BUN 39* 31* 34*   CREA 0.60 0.51* 0.48*   CA 9.7 9.2 9.4   MG 2.1  --   --    PHOS 3.2  --   --    ALB 1.8* 1.6* 1.7*   ALT  --  65 88*     Recent Labs     01/31/22  0349 01/30/22  0420   PH 7.49* 7.43   PCO2 48* 60*   PO2 60* 96   HCO3 35* 38*   FIO2 85.0 100.0   1/30 AC 12  PEEP 5 FiO2 100%    Lab Results   Component Value Date/Time    Culture result: No growth 6 days 01/21/2022 07:55 AM    Culture result: No growth 6 days 01/18/2022 07:12 AM    Culture result: (A) 01/17/2022 07:12 AM     Staphylococcus species, coagulase negative growing in 1 of 4 bottles drawn NO SITE INDICATED    Culture result:  01/17/2022 07:12 AM     (NOTE) [facility] GROWING IN 1 OF 2 BOTTLES CALLED TO JOSE MIGUEL PAGAN AT 0831 ON 1/19/22. JF     No results found for: TSH, TSHEXT, TSHEXT     Imaging:    Results from Hospital Encounter encounter on 01/21/22    XR CHEST PORT    Narrative  Portable upright radiograph of the chest 2:26 a.m. compared with January 30, 2022. INDICATION: Pulmonary edema. Patient is rotated to the left. Heart size appears stable with an  atherosclerotic aorta. Endotracheal tube tip projects between the clavicles  approximately 2.3 cm above the maggie. Right IJ central line tip projects near  the junction of the SVC and right atrium. Nasogastric tube projects over the  stomach with distal tip not visualized. Bilateral airspace disease/edema appears essentially unchanged. Cannot exclude  effusions. No pneumothorax. Impression  No significant change. Results from [place] encounter on 01/21/22    CT HEAD WO CONT    Narrative  The study is a noncontrasted head CT examination dated 1/21/2022. HISTORY: Unresponsive.     TECHNIQUE: Thin section axial imaging was performed followed by sagittal and  coronal reconstructed imaging. Dose Reduction Technique was employed to reduce radiation exposure - This  includes reduction optimization techniques as appropriate to a performed exam  with automated exposure control adjustments of the mA and/or Kv according to  patient size, or use of iterative reconstruction technique. COMPARISON: CT head dated 1/17/2022. There is an appropriate size to the ventricles, the deep cortical sulci, and the  sylvian fissures for the patient's age. This examination is negative for  intracranial hemorrhage, mass effect or an extra axial collection. The  gray-white matter junctions are preserved without cytotoxic or vasogenic edema. An assessment of the white matter tracts demonstrates a mild decrease in the  density characteristics of the central periventricular white matter. These  findings are consistent with expected aging changes and milder microvascular  disease. There also is a region of diminished density within the right posterior  inferior cerebellar hemisphere which may represent an older cerebrovascular  insult. ORBITS: This examination is negative for acute orbital pathology. PARANASAL SINUSES: The paranasal sinuses are well pneumatized without acute  sinus disease. There is a decreased number of right sided mastoid air cells which can be  associated with chronic mastoiditis. The craniocervical junction images in a  normal fashion. Impression  1. There are milder microvascular changes within the central periventricular  white matter. 2.  There is an area of diminished density within the inferior aspect of the  right cerebellar hemisphere without change (series 201 image number 12). These  findings may represent an older ischemic insult within the right posterior  inferior cerebellar region.   3.  This examination is negative for acute intracranial pathology or short-term  interval changes dating back to 1/17/2022. 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 Chest x-ray looks like pulmonary edema.   She has peripheral edema we will give Lasix today discontinue D5W continue tube feedings we will check echocardiogram  · 1/31 off pressors remain intubated   · Time of care 30-minute

## 2022-02-01 NOTE — PROGRESS NOTES
At this time patient was intubated and no family was present.  offered deep breathing with patient to harmoniz our bodies and the room as a form of ministry of presence and support. Advised nurse to contact [facility] for any further referrals.     [address] [place] [address], [facility]    Please [facility]  in order to get in touch with  for any Spiritual Care Needs   [phone]   OR   Reach out to us on [facility]

## 2022-02-01 NOTE — PROGRESS NOTES
Notified patient's son Maria Esther Harris that two rings had to be removed d/t ischemia/swelling. Verbalized a family member would retrieve rings tomorrow 2/3 at the time of scheduled family meeting.

## 2022-02-01 NOTE — PROGRESS NOTES
PROGRESS NOTE      Chief Complaints:  No major event  HPI and  Objective:    Patient examined ICU. Patient still intubated sedated. Patient current blood pressure 130/53 heart rate 71 temperature 98.6. Oxygen 95%. Examined the left hand. Left hand is actually fairly warm. Mummified digits of the distal phalanx of the digit 5, 4 and 3 about the same. Patient will eventually need a partial finger amputation of the digits 5 and 4 and 3. Review of Systems:  Unable to assess. EXAM:  Visit Vitals  BP (!) 113/53 (BP 1 Location: Right lower arm, BP Patient Position: At rest)   Pulse 71   Temp 98.6 °F (37 °C)   Resp 19   Ht 5' 5\" (1.651 m)   Wt 230 lb 6.1 oz (104.5 kg)   SpO2 95%   BMI 38.34 kg/m²       Patient is intubated and sedated. Head and neck atraumatic, normocephalic. ENT: No hoarse voice  Cardiac system regular rate rhythm. Pulmonary no audible wheeze  Chest wall excursion normal with respiration cycle  Abdomen is soft not particularly distended. Neurologically unable to assess. Skin is warm and moist.  Psychosocial: Unable to assess. Vascular examination as previously noted no changes.     Recent Results (from the past 24 hour(s))   WRIST BRACHIAL INDEX AT REST    Collection Time: 01/31/22  9:59 AM   Result Value Ref Range    Left arm .0 mmHg    Left Prox Radial A  mmHg    Left Prox Ulnar A BP 99 mmHg    Left 1st Digit BP 83 mmHg    Left 2nd Digit  mmHg    Left WBI 0.89    PTT    Collection Time: 01/31/22  2:23 PM   Result Value Ref Range    aPTT 72.3 (H) 21.2 - 34.1 sec    aPTT, therapeutic range   82 - 109 sec   PTT    Collection Time: 02/01/22  4:14 AM   Result Value Ref Range    aPTT 63.1 (H) 21.2 - 34.1 sec    aPTT, therapeutic range   82 - 109 sec   CBC WITH AUTOMATED DIFF    Collection Time: 02/01/22  4:14 AM   Result Value Ref Range    WBC 21.4 (H) 3.6 - 11.0 K/uL    RBC 2.81 (L) 3.80 - 5.20 M/uL    HGB 8.4 (L) 11.5 - 16.0 g/dL    HCT 25.5 (L) 35.0 - 47.0 %    MCV 90.7 80.0 - 99.0 FL    MCH 29.9 26.0 - 34.0 PG    MCHC 32.9 30.0 - 36.5 g/dL    RDW 13.1 11.5 - 14.5 %    PLATELET 092 568 - 536 K/uL    MPV 12.7 8.9 - 12.9 FL    NRBC 0.0 0.0  WBC    ABSOLUTE NRBC 0.00 0.00 - 0.01 K/uL    NEUTROPHILS 90 (H) 32 - 75 %    LYMPHOCYTES 4 (L) 12 - 49 %    MONOCYTES 4 (L) 5 - 13 %    EOSINOPHILS 0 0 - 7 %    BASOPHILS 0 0 - 1 %    IMMATURE GRANULOCYTES 2 (H) 0 - 0.5 %    ABS. NEUTROPHILS 19.3 (H) 1.8 - 8.0 K/UL    ABS. LYMPHOCYTES 0.8 0.8 - 3.5 K/UL    ABS. MONOCYTES 0.9 0.0 - 1.0 K/UL    ABS. EOSINOPHILS 0.0 0.0 - 0.4 K/UL    ABS. BASOPHILS 0.0 0.0 - 0.1 K/UL    ABS. IMM. GRANS. 0.3 (H) 0.00 - 0.04 K/UL    DF AUTOMATED     METABOLIC PANEL, COMPREHENSIVE    Collection Time: 02/01/22  4:14 AM   Result Value Ref Range    Sodium 132 (L) 136 - 145 mmol/L    Potassium 4.5 3.5 - 5.1 mmol/L    Chloride 94 (L) 97 - 108 mmol/L    CO2 30 21 - 32 mmol/L    Anion gap 8 5 - 15 mmol/L    Glucose 231 (H) 65 - 100 mg/dL    BUN 69 (H) 6 - 20 mg/dL    Creatinine 0.70 0.55 - 1.02 mg/dL    BUN/Creatinine ratio 99 (H) 12 - 20      GFR est AA >60 >60 ml/min/1.73m2    GFR est non-AA >60 >60 ml/min/1.73m2    Calcium 9.6 8.5 - 10.1 mg/dL    Bilirubin, total 0.4 0.2 - 1.0 mg/dL    AST (SGOT) 49 (H) 15 - 37 U/L    ALT (SGPT) 56 12 - 78 U/L    Alk. phosphatase 77 45 - 117 U/L    Protein, total 5.8 (L) 6.4 - 8.2 g/dL    Albumin 1.7 (L) 3.5 - 5.0 g/dL    Globulin 4.1 (H) 2.0 - 4.0 g/dL    A-G Ratio 0.4 (L) 1.1 - 2.2     BLOOD GAS, ARTERIAL    Collection Time: 02/01/22  4:15 AM   Result Value Ref Range    pH 7.45 7.35 - 7.45      PCO2 46 (H) 35 - 45 mmHg    PO2 85 75 - 100 mmHg    O2 SAT 97 >95 %    BICARBONATE 31 (H) 22 - 26 mmol/L    BASE EXCESS 7.0 (H) 0 - 2 mmol/L    O2 METHOD VENT      FIO2 50.0 %    MODE Assist Control/Volume Control      Tidal volume 450      SET RATE 12      EPAP/CPAP/PEEP 5.0      SITE Right Radial      BEN'S TEST PASS         ASSESSMENT:   Patient is 80 y.o. with diagnosis of :  Active Problems:    COVID-19 (1/17/2022)      Respiratory failure with hypoxia ([address] (1/21/2022)      Hypernatremia (1/23/2022)      Hypokalemia (1/23/2022)        PLAN:                 Vascular status of the left hand and wrist about the same. No changes. Most of the left hand is warm. And wrist brachial index is normal and also distal brachial index mildly diseased at the thumb and a normal findings second digit. Continue local wound care with iodine swabs at mummified fingers. And also have the left hand elevated with 2 pillows. Once patient is more stable I will talk to the family about possibly multiple partial finger amputation.     Critical care time spent: 30 minutes

## 2022-02-01 NOTE — PROGRESS NOTES
IMPRESSION:   1. Acute hypoxic respiratory  2. Acute on chronic hypercapnic respiratory failure   3. COVID-19 pneumonia  4. Pulmonary edema  5. Sepsis with septic shock off pressors  6. Left hand ischemia  7. Arthritis hypertension by hx  8. Hypokalemia corrected  9. Hypernatremia corrected  10. Now hyponatremic      RECOMMENDATIONS/PLAN:   1. ICU monitoring  1. Patient condition got worse on 1/24 went to asystole subsequently got intubated now on ventilator assist control mode sedated with propofol and fentanyl Precedex was discontinued because of bradycardia arterial blood gases acceptable currently on A/C 12  PEEP 5 FiO2 100% decrease FiO2 to 85% PO2 acceptable  2. Patient is on dexamethasone and baricitinib  3. Patient is off Levophed  4. Potassium corrected  5. Pulmonary edema received Lasix chest x-ray shows bilateral infiltrate congestive changes  6. Left hand ischemia on IV heparin left ulnar artery occlusion radial is patent NATHALIE   7. Now hyponatremic   8. Troponin is elevated  9. Lactic acid 3.7  10. She is on meropenem and finished Zithromax     [x] High complexity decision making was performed  [x] See my orders for details  HPI  79-year-old lady came in because of shortness of breath and dyspnea she has significant past medical history of arthritis hypertension she was recently discharged from the hospital came back with worsening of hypoxia she was discharged on oxygen 2 L nasal cannula and patient condition got worse she was supposed to be discharged to skilled care but patient's son took her home where her condition got worse and she was transferred back to the hospital now she is on noninvasive ventilator BiPAP machine hemodynamically unstable hypotensive on vasopressors and not giving much history acutely ill so critical care consult was called    PMH:  has no past medical history on file. PSH:   has a past surgical history that includes ir insert non tunl cvc over 5 yrs (1/21/2022). FHX: family history is not on file.      SHX:      ALL:   Allergies   Allergen Reactions    Penicillins Hives        MEDS:   [x] Reviewed - As Below   [] Not reviewed    Current Facility-Administered Medications   Medication    perflutren lipid microspheres (DEFINITY) 1.1 mg/mL contrast injection    dilTIAZem IR (CARDIZEM) tablet 30 mg    heparin 25,000 units in  ml infusion    heparin (porcine) 1,000 unit/mL injection 6,080 Units    Or    heparin (porcine) 1,000 unit/mL injection 3,040 Units    fentaNYL (PF) 1,500 mcg/30 mL (50 mcg/mL) infusion    hydrOXYzine pamoate (VISTARIL) capsule 25 mg    propofol (DIPRIVAN) 10 mg/mL infusion    dexmedeTOMidine (PRECEDEX) 400 mcg in 0.9% sodium chloride (MBP/ADV) 100 mL MBP    NOREPINephrine (LEVOPHED) 8 mg in 0.9% NS 250ml infusion    acetaminophen (TYLENOL) tablet 650 mg    Or    acetaminophen (TYLENOL) suppository 650 mg    polyethylene glycol (MIRALAX) packet 17 g    ondansetron (ZOFRAN ODT) tablet 4 mg    baricitinib (OLUMIANT) tablet 2 mg    albuterol (PROVENTIL HFA, VENTOLIN HFA, PROAIR HFA) inhaler 2 Puff    polyethylene glycol (MIRALAX) packet 17 g    ondansetron (ZOFRAN) injection 4 mg    meropenem (MERREM) 1 g in 0.9% sodium chloride 20 mL IV syringe    dexamethasone (DECADRON) 4 mg/mL injection 4 mg    hydrOXYzine (VISTARIL) injection 25 mg      MAR reviewed and pertinent medications noted or modified as needed   Current Facility-Administered Medications   Medication    perflutren lipid microspheres (DEFINITY) 1.1 mg/mL contrast injection    dilTIAZem IR (CARDIZEM) tablet 30 mg    heparin 25,000 units in  ml infusion    heparin (porcine) 1,000 unit/mL injection 6,080 Units    Or    heparin (porcine) 1,000 unit/mL injection 3,040 Units    fentaNYL (PF) 1,500 mcg/30 mL (50 mcg/mL) infusion    hydrOXYzine pamoate (VISTARIL) capsule 25 mg    propofol (DIPRIVAN) 10 mg/mL infusion    dexmedeTOMidine (PRECEDEX) 400 mcg in 0.9% sodium chloride (MBP/ADV) 100 mL MBP    NOREPINephrine (LEVOPHED) 8 mg in 0.9% NS 250ml infusion    acetaminophen (TYLENOL) tablet 650 mg    Or    acetaminophen (TYLENOL) suppository 650 mg    polyethylene glycol (MIRALAX) packet 17 g    ondansetron (ZOFRAN ODT) tablet 4 mg    baricitinib (OLUMIANT) tablet 2 mg    albuterol (PROVENTIL HFA, VENTOLIN HFA, PROAIR HFA) inhaler 2 Puff    polyethylene glycol (MIRALAX) packet 17 g    ondansetron (ZOFRAN) injection 4 mg    meropenem (MERREM) 1 g in 0.9% sodium chloride 20 mL IV syringe    dexamethasone (DECADRON) 4 mg/mL injection 4 mg    hydrOXYzine (VISTARIL) injection 25 mg      PMH:  has no past medical history on file. PSH:   has a past surgical history that includes ir insert non tunl cvc over 5 yrs (2022). FHX: family history is not on file. SHX:       ROS:  Unable to obtain    Hemodynamics:    CO:    CI:    CVP:    SVR:   PAP Systolic:    PAP Diastolic:    PVR:    VM15:        Ventilator Settings:      Mode Rate TV Press PEEP FiO2 PIP Min. Vent   Assist control,Volume control    450 ml    5 cm H20 85 %  34 cm H2O  8.44 l/min        Vital Signs: Telemetry:    normal sinus rhythm Intake/Output:   Visit Vitals  BP (!) 113/53 (BP 1 Location: Right lower arm, BP Patient Position: At rest)   Pulse 71   Temp 98.6 °F (37 °C)   Resp 19   Ht 5' 5\" (1.651 m)   Wt 104.5 kg (230 lb 6.1 oz)   SpO2 95%   BMI 38.34 kg/m²       Temp (24hrs), Av.8 °F (37.1 °C), Min:97.8 °F (36.6 °C), Max:99.5 °F (37.5 °C)        O2 Device: Ventilator         Wt Readings from Last 4 Encounters:   22 104.5 kg (230 lb 6.1 oz)   22 90.7 kg (200 lb)          Intake/Output Summary (Last 24 hours) at 2022 0849  Last data filed at 2022 0300  Gross per 24 hour   Intake 800 ml   Output 1850 ml   Net -1050 ml       Last shift:      No intake/output data recorded.   Last 3 shifts: 1901 -  0700  In: 1680   Out: 3700 [Urine:3700]       Physical Exam: General: Intubated on ventilator unresponsive on propofol fentanyl  HEENT: NCAT,   Eyes: anicteric; conjunctiva clear no doll's eye reflex  Neck: no nodes, , trach midline; no accessory MM use. Questionable neck vein distention  Chest: no deformity,   Cardiac: R regular; no murmur;   Lungs: Diffuse wheezes and rales  Abd: soft, NT, hypoactive BS  Ext: Edema of the leg edema; no joint swelling;  No clubbing  : clear urine  Neuro: Sedated unresponsive on the ventilator no doll's eye reflex flaccid extremities  Psych-unable to assess  Skin: warm, dry, no cyanosis;   Pulses: Brachial radial pulses intact  Capillary: Normal capillary refill      DATA:    MAR reviewed and pertinent medications noted or modified as needed  MEDS:   Current Facility-Administered Medications   Medication    perflutren lipid microspheres (DEFINITY) 1.1 mg/mL contrast injection    dilTIAZem IR (CARDIZEM) tablet 30 mg    heparin 25,000 units in  ml infusion    heparin (porcine) 1,000 unit/mL injection 6,080 Units    Or    heparin (porcine) 1,000 unit/mL injection 3,040 Units    fentaNYL (PF) 1,500 mcg/30 mL (50 mcg/mL) infusion    hydrOXYzine pamoate (VISTARIL) capsule 25 mg    propofol (DIPRIVAN) 10 mg/mL infusion    dexmedeTOMidine (PRECEDEX) 400 mcg in 0.9% sodium chloride (MBP/ADV) 100 mL MBP    NOREPINephrine (LEVOPHED) 8 mg in 0.9% NS 250ml infusion    acetaminophen (TYLENOL) tablet 650 mg    Or    acetaminophen (TYLENOL) suppository 650 mg    polyethylene glycol (MIRALAX) packet 17 g    ondansetron (ZOFRAN ODT) tablet 4 mg    baricitinib (OLUMIANT) tablet 2 mg    albuterol (PROVENTIL HFA, VENTOLIN HFA, PROAIR HFA) inhaler 2 Puff    polyethylene glycol (MIRALAX) packet 17 g    ondansetron (ZOFRAN) injection 4 mg    meropenem (MERREM) 1 g in 0.9% sodium chloride 20 mL IV syringe    dexamethasone (DECADRON) 4 mg/mL injection 4 mg    hydrOXYzine (VISTARIL) injection 25 mg        Labs:    Recent Labs 02/01/22  0414 01/31/22  1423 01/31/22  0330 01/30/22  0400 01/30/22  0015   WBC 21.4*  --  19.3* 16.9*  --    HGB 8.4*  --  9.6* 9.1*  --      --  205 164  --    APTT 63.1* 72.3* 77.1*  --    < >    < > = values in this interval not displayed. Recent Labs     02/01/22  0414 01/31/22  0330 01/30/22  0400   * 130* 133*   K 4.5 4.5 4.9   CL 94* 92* 95*   CO2 30 36* 39*   * 185* 180*   BUN 69* 39* 31*   CREA 0.70 0.60 0.51*   CA 9.6 9.7 9.2   MG  --  2.1  --    PHOS  --  3.2  --    ALB 1.7* 1.8* 1.6*   ALT 56  --  65     Recent Labs     02/01/22  0415 01/31/22  0349 01/30/22  0420   PH 7.45 7.49* 7.43   PCO2 46* 48* 60*   PO2 85 60* 96   HCO3 31* 35* 38*   FIO2 50.0 85.0 100.0   1/30 AC 12  PEEP 5 FiO2 100%    Lab Results   Component Value Date/Time    Culture result: No growth 6 days 01/21/2022 07:55 AM    Culture result: No growth 6 days 01/18/2022 07:12 AM    Culture result: (A) 01/17/2022 07:12 AM     Staphylococcus species, coagulase negative growing in 1 of 4 bottles drawn NO SITE INDICATED    Culture result:  01/17/2022 07:12 AM     (NOTE) [facility] IN [facility] GROWING IN 1 OF 2 BOTTLES CALLED TO JOSE MIGUEL PAGAN AT 0831 ON 1/19/22. JF     No results found for: TSH, TSHEXT, TSHEXT     Imaging:    Results from Hospital Encounter encounter on 01/21/22    XR CHEST PORT    Narrative  Portable upright radiograph of the chest 2:26 a.m. compared with January 30, 2022. INDICATION: Pulmonary edema. Patient is rotated to the left. Heart size appears stable with an  atherosclerotic aorta. Endotracheal tube tip projects between the clavicles  approximately 2.3 cm above the maggie. Right IJ central line tip projects near  the junction of the SVC and right atrium. Nasogastric tube projects over the  stomach with distal tip not visualized. Bilateral airspace disease/edema appears essentially unchanged. Cannot exclude  effusions. No pneumothorax. Impression  No significant change.       Results from Hospital Encounter encounter on 01/21/22    CT HEAD WO CONT    Narrative  The study is a noncontrasted head CT examination dated 1/21/2022. HISTORY: Unresponsive. TECHNIQUE: Thin section axial imaging was performed followed by sagittal and  coronal reconstructed imaging. Dose Reduction Technique was employed to reduce radiation exposure - This  includes reduction optimization techniques as appropriate to a performed exam  with automated exposure control adjustments of the mA and/or Kv according to  patient size, or use of iterative reconstruction technique. COMPARISON: CT head dated 1/17/2022. There is an appropriate size to the ventricles, the deep cortical sulci, and the  sylvian fissures for the patient's age. This examination is negative for  intracranial hemorrhage, mass effect or an extra axial collection. The  gray-white matter junctions are preserved without cytotoxic or vasogenic edema. An assessment of the white matter tracts demonstrates a mild decrease in the  density characteristics of the central periventricular white matter. These  findings are consistent with expected aging changes and milder microvascular  disease. There also is a region of diminished density within the right posterior  inferior cerebellar hemisphere which may represent an older cerebrovascular  insult. ORBITS: This examination is negative for acute orbital pathology. PARANASAL SINUSES: The paranasal sinuses are well pneumatized without acute  sinus disease. There is a decreased number of right sided mastoid air cells which can be  associated with chronic mastoiditis. The craniocervical junction images in a  normal fashion. Impression  1. There are milder microvascular changes within the central periventricular  white matter. 2.  There is an area of diminished density within the inferior aspect of the  right cerebellar hemisphere without change (series 201 image number 12).  These  findings may represent an older ischemic insult within the right posterior  inferior cerebellar region. 3.  This examination is negative for acute intracranial pathology or short-term  interval changes dating back to 1/17/2022. 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 Chest x-ray looks like pulmonary edema.   She has peripheral edema we will give Lasix today discontinue D5W continue tube feedings we will check echocardiogram  · 1/31 off pressors remain intubated   · 2/1 remain intubated sedated on ventilator will do sedation vacation in a.m. still on 85%  · Time of care 30-minute

## 2022-02-01 NOTE — PROGRESS NOTES
Patient had two rings removed off of left ring finger per Dr. Tori Chris request, placed in bag at bedside.  Notified dayshift RN.      [id number] Bedside shift report given to [facility] [place]

## 2022-02-01 NOTE — PROGRESS NOTES
CM scheduled a goals of care meeting for tomorrow (2/2/22) at 10:00 with patient's son, Marylu Fontanez ([phone]).

## 2022-02-01 NOTE — PROGRESS NOTES
General Daily Progress Note          Patient Name:   Warner Cagle       YOB: 1940       Age:  80 y.o.       Admit Date: 1/21/2022      Subjective:     Patient is a 80y.o. year old female with signal past medical history of hypertension arthritis who discharged from the hospital yesterday in stable condition patient is admitted last admission with COVID-19 patient was asymptomatic all this time while in the hospital patient discharged on 2 L oxygen to skilled care but patient son want to go home with home health but is at home patient's saturations drops sent back to the ER seen by the ER physician patient placed on BiPAP patient was little hypotensive started on Levophed admitted for further work-up and treatment         p    Patient is DNR    Patient on ventilator 85% O2  On propofol and fentanyl drip    On heparin    NG tube in place for medication and feeding      Objective:     Visit Vitals  BP (!) 113/53 (BP 1 Location: Right lower arm, BP Patient Position: At rest)   Pulse 71   Temp 98.6 °F (37 °C)   Resp 19   Ht 5' 5\" (1.651 m)   Wt 104.5 kg (230 lb 6.1 oz)   SpO2 95%   BMI 38.34 kg/m²        Recent Results (from the past 24 hour(s))   PTT    Collection Time: 01/31/22  2:23 PM   Result Value Ref Range    aPTT 72.3 (H) 21.2 - 34.1 sec    aPTT, therapeutic range   82 - 109 sec   PTT    Collection Time: 02/01/22  4:14 AM   Result Value Ref Range    aPTT 63.1 (H) 21.2 - 34.1 sec    aPTT, therapeutic range   82 - 109 sec   CBC WITH AUTOMATED DIFF    Collection Time: 02/01/22  4:14 AM   Result Value Ref Range    WBC 21.4 (H) 3.6 - 11.0 K/uL    RBC 2.81 (L) 3.80 - 5.20 M/uL    HGB 8.4 (L) 11.5 - 16.0 g/dL    HCT 25.5 (L) 35.0 - 47.0 %    MCV 90.7 80.0 - 99.0 FL    MCH 29.9 26.0 - 34.0 PG    MCHC 32.9 30.0 - 36.5 g/dL    RDW 13.1 11.5 - 14.5 %    PLATELET 538 480 - 819 K/uL    MPV 12.7 8.9 - 12.9 FL    NRBC 0.0 0.0  WBC    ABSOLUTE NRBC 0.00 0.00 - 0.01 K/uL    NEUTROPHILS 90 (H) 32 - 75 % LYMPHOCYTES 4 (L) 12 - 49 %    MONOCYTES 4 (L) 5 - 13 %    EOSINOPHILS 0 0 - 7 %    BASOPHILS 0 0 - 1 %    IMMATURE GRANULOCYTES 2 (H) 0 - 0.5 %    ABS. NEUTROPHILS 19.3 (H) 1.8 - 8.0 K/UL    ABS. LYMPHOCYTES 0.8 0.8 - 3.5 K/UL    ABS. MONOCYTES 0.9 0.0 - 1.0 K/UL    ABS. EOSINOPHILS 0.0 0.0 - 0.4 K/UL    ABS. BASOPHILS 0.0 0.0 - 0.1 K/UL    ABS. IMM. GRANS. 0.3 (H) 0.00 - 0.04 K/UL    DF AUTOMATED     METABOLIC PANEL, COMPREHENSIVE    Collection Time: 02/01/22  4:14 AM   Result Value Ref Range    Sodium 132 (L) 136 - 145 mmol/L    Potassium 4.5 3.5 - 5.1 mmol/L    Chloride 94 (L) 97 - 108 mmol/L    CO2 30 21 - 32 mmol/L    Anion gap 8 5 - 15 mmol/L    Glucose 231 (H) 65 - 100 mg/dL    BUN 69 (H) 6 - 20 mg/dL    Creatinine 0.70 0.55 - 1.02 mg/dL    BUN/Creatinine ratio 99 (H) 12 - 20      GFR est AA >60 >60 ml/min/1.73m2    GFR est non-AA >60 >60 ml/min/1.73m2    Calcium 9.6 8.5 - 10.1 mg/dL    Bilirubin, total 0.4 0.2 - 1.0 mg/dL    AST (SGOT) 49 (H) 15 - 37 U/L    ALT (SGPT) 56 12 - 78 U/L    Alk. phosphatase 77 45 - 117 U/L    Protein, total 5.8 (L) 6.4 - 8.2 g/dL    Albumin 1.7 (L) 3.5 - 5.0 g/dL    Globulin 4.1 (H) 2.0 - 4.0 g/dL    A-G Ratio 0.4 (L) 1.1 - 2.2     BLOOD GAS, ARTERIAL    Collection Time: 02/01/22  4:15 AM   Result Value Ref Range    pH 7.45 7.35 - 7.45      PCO2 46 (H) 35 - 45 mmHg    PO2 85 75 - 100 mmHg    O2 SAT 97 >95 %    BICARBONATE 31 (H) 22 - 26 mmol/L    BASE EXCESS 7.0 (H) 0 - 2 mmol/L    O2 METHOD VENT      FIO2 50.0 %    MODE Assist Control/Volume Control      Tidal volume 450      SET RATE 12      EPAP/CPAP/PEEP 5.0      SITE Right Radial      BEN'S TEST PASS       [unfilled]      Review of Systems    Unable to obtain. Physical Exam:      Constitutional: Awake on BiPAP  HENT:   Head: Normocephalic and atraumatic. Eyes: Pupils are equal, round, and reactive to light. EOM are normal.   Cardiovascular: Normal rate, regular rhythm and normal heart sounds. Pulmonary/Chest: Breath sounds normal. No wheezes. No rales. Exhibits no tenderness. Abdominal: Soft. Bowel sounds are normal. There is no abdominal tenderness. There is no rebound and no guarding. Musculoskeletal: Normal range of motion. Neurological: pt is alert and oriented to person, place, and time. XR CHEST PORT   Final Result      WRIST BRACHIAL INDEX AT REST   Final Result      XR CHEST PORT   Final Result   No significant change. XR CHEST PORT   Final Result   No interval change. DUPLEX UPPER EXT ARTERY LEFT   Final Result      XR CHEST PORT   Final Result      XR CHEST PORT   Final Result   Endotracheal tube tip still at the maggie. Diffuse opacities in the   lungs, not significantly changed. XR CHEST PORT   Final Result      XR CHEST PORT   Final Result   Endotracheal tube tip at the maggie. Diffuse opacities in the   lungs, accentuated by lower lung volumes or increased. XR CHEST PORT   Final Result   Diffuse opacities in the lungs, not significantly changed. XR CHEST PORT   Final Result   1. ETT terminating 2 cm above the maggie. Remaining support apparatus as above. 2.  Worsening bilateral airspace disease with developing ARDS not excluded. XR CHEST PORT   Final Result   Moderate patchy diffuse bilateral airspace opacities, waxing and waning from the   prior exam.      XR CHEST PORT   Final Result   Moderate diffuse bilateral airspace opacities, likely a combination of edema and   airspace disease, mildly increased from the previous exam.      CT HEAD WO CONT   Final Result   1. There are milder microvascular changes within the central periventricular   white matter. 2.  There is an area of diminished density within the inferior aspect of the   right cerebellar hemisphere without change (series 201 image number 12). These   findings may represent an older ischemic insult within the right posterior   inferior cerebellar region.    3.  This examination is negative for acute intracranial pathology or short-term   interval changes dating back to 1/17/2022. XR CHEST PORT   Final Result   Patchy mixed asymmetric lung disease appears somewhat better but the   lungs are better inflated. No effusion or pneumothorax. Normal heart and   mediastinum      IR INSERT NON TUNL CVC OVER 5 YRS   Final Result   Ultrasound of the right neck demonstrated patent IJV. Successful US-guided placement of a right internal jugular triple lumen central   venous catheter as described above. The catheter is functioning. PLAN:   Catheter position was confirmed by follow-up chest x-ray: catheter tip in the   lower SVC and ready to use. IR US GUIDED VASCULAR ACCESS   Final Result   Ultrasound of the right neck demonstrated patent IJV. Successful US-guided placement of a right internal jugular triple lumen central   venous catheter as described above. The catheter is functioning. PLAN:   Catheter position was confirmed by follow-up chest x-ray: catheter tip in the   lower SVC and ready to use. XR CHEST PORT   Final Result   Findings/impression:      Diffuse interstitial airspace disease/edema. No definite pleural effusion or   pneumothorax. Cardiac contours are partially obscured. No acute osseous abnormality identified.       XR CHEST PORT    (Results Pending)        Recent Results (from the past 24 hour(s))   PTT    Collection Time: 01/31/22  2:23 PM   Result Value Ref Range    aPTT 72.3 (H) 21.2 - 34.1 sec    aPTT, therapeutic range   82 - 109 sec   PTT    Collection Time: 02/01/22  4:14 AM   Result Value Ref Range    aPTT 63.1 (H) 21.2 - 34.1 sec    aPTT, therapeutic range   82 - 109 sec   CBC WITH AUTOMATED DIFF    Collection Time: 02/01/22  4:14 AM   Result Value Ref Range    WBC 21.4 (H) 3.6 - 11.0 K/uL    RBC 2.81 (L) 3.80 - 5.20 M/uL    HGB 8.4 (L) 11.5 - 16.0 g/dL    HCT 25.5 (L) 35.0 - 47.0 %    MCV 90.7 80.0 - 99.0 FL    MCH 29.9 26.0 - 34.0 PG    MCHC 32.9 30.0 - 36.5 g/dL    RDW 13.1 11.5 - 14.5 %    PLATELET 672 986 - 204 K/uL    MPV 12.7 8.9 - 12.9 FL    NRBC 0.0 0.0  WBC    ABSOLUTE NRBC 0.00 0.00 - 0.01 K/uL    NEUTROPHILS 90 (H) 32 - 75 %    LYMPHOCYTES 4 (L) 12 - 49 %    MONOCYTES 4 (L) 5 - 13 %    EOSINOPHILS 0 0 - 7 %    BASOPHILS 0 0 - 1 %    IMMATURE GRANULOCYTES 2 (H) 0 - 0.5 %    ABS. NEUTROPHILS 19.3 (H) 1.8 - 8.0 K/UL    ABS. LYMPHOCYTES 0.8 0.8 - 3.5 K/UL    ABS. MONOCYTES 0.9 0.0 - 1.0 K/UL    ABS. EOSINOPHILS 0.0 0.0 - 0.4 K/UL    ABS. BASOPHILS 0.0 0.0 - 0.1 K/UL    ABS. IMM. GRANS. 0.3 (H) 0.00 - 0.04 K/UL    DF AUTOMATED     METABOLIC PANEL, COMPREHENSIVE    Collection Time: 02/01/22  4:14 AM   Result Value Ref Range    Sodium 132 (L) 136 - 145 mmol/L    Potassium 4.5 3.5 - 5.1 mmol/L    Chloride 94 (L) 97 - 108 mmol/L    CO2 30 21 - 32 mmol/L    Anion gap 8 5 - 15 mmol/L    Glucose 231 (H) 65 - 100 mg/dL    BUN 69 (H) 6 - 20 mg/dL    Creatinine 0.70 0.55 - 1.02 mg/dL    BUN/Creatinine ratio 99 (H) 12 - 20      GFR est AA >60 >60 ml/min/1.73m2    GFR est non-AA >60 >60 ml/min/1.73m2    Calcium 9.6 8.5 - 10.1 mg/dL    Bilirubin, total 0.4 0.2 - 1.0 mg/dL    AST (SGOT) 49 (H) 15 - 37 U/L    ALT (SGPT) 56 12 - 78 U/L    Alk.  phosphatase 77 45 - 117 U/L    Protein, total 5.8 (L) 6.4 - 8.2 g/dL    Albumin 1.7 (L) 3.5 - 5.0 g/dL    Globulin 4.1 (H) 2.0 - 4.0 g/dL    A-G Ratio 0.4 (L) 1.1 - 2.2     BLOOD GAS, ARTERIAL    Collection Time: 02/01/22  4:15 AM   Result Value Ref Range    pH 7.45 7.35 - 7.45      PCO2 46 (H) 35 - 45 mmHg    PO2 85 75 - 100 mmHg    O2 SAT 97 >95 %    BICARBONATE 31 (H) 22 - 26 mmol/L    BASE EXCESS 7.0 (H) 0 - 2 mmol/L    O2 METHOD VENT      FIO2 50.0 %    MODE Assist Control/Volume Control      Tidal volume 450      SET RATE 12      EPAP/CPAP/PEEP 5.0      SITE Right Radial      BEN'S TEST PASS         Results     Procedure Component Value Units Date/Time    MRSA SCREEN - PCR (NASAL) [[id number]] Collected: 01/22/22 0440    Order Status: Canceled Specimen: Swab     MRSA SCREEN - PCR (NASAL) [[id number]] Collected: 01/22/22 0300    Order Status: Canceled Specimen: Swab     CULTURE, BLOOD #1 [[id number]]     Order Status: Canceled Specimen: Blood     CULTURE, BLOOD #2 [[id number]]     Order Status: Canceled Specimen: Blood     CULTURE, BLOOD, PAIRED [[id number]] Collected: 01/21/22 0755    Order Status: Completed Specimen: Blood Updated: 01/28/22 1103     Special Requests: No Special Requests        Culture result: No growth 6 days              Labs:     Recent Labs     02/01/22 0414 01/31/22  0330   WBC 21.4* 19.3*   HGB 8.4* 9.6*   HCT 25.5* 30.2*    205     Recent Labs     02/01/22 0414 01/31/22  0330 01/30/22  0400   * 130* 133*   K 4.5 4.5 4.9   CL 94* 92* 95*   CO2 30 36* 39*   BUN 69* 39* 31*   CREA 0.70 0.60 0.51*   * 185* 180*   CA 9.6 9.7 9.2   MG  --  2.1  --    PHOS  --  3.2  --      Recent Labs     02/01/22 0414 01/31/22  0330 01/30/22  0400   ALT 56  --  65   AP 77  --  70   TBILI 0.4  --  0.4   TP 5.8*  --  5.8*   ALB 1.7* 1.8* 1.6*   GLOB 4.1*  --  4.2*     Recent Labs     02/01/22 0414 01/31/22  1423 01/31/22  0330   APTT 63.1* 72.3* 77.1*      No results for input(s): FE, TIBC, PSAT, FERR in the last 72 hours. No results found for: FOL, RBCF   Recent Labs     02/01/22 0415 01/31/22  0349   PH 7.45 7.49*   PCO2 46* 48*   PO2 85 60*     No results for input(s): CPK, CKNDX, TROIQ in the last 72 hours.     No lab exists for component: CPKMB  No results found for: CHOL, CHOLX, CHLST, CHOLV, HDL, HDLP, LDL, LDLC, DLDLP, TGLX, TRIGL, TRIGP, CHHD, CHHDX  Lab Results   Component Value Date/Time    Glucose (POC) 157 (H) 01/31/2022 01:12 AM    Glucose (POC) 179 (H) 01/30/2022 04:57 AM    Glucose (POC) 169 (H) 01/28/2022 05:11 AM    Glucose (POC) 168 (H) 01/27/2022 11:33 AM    Glucose (POC) 182 (H) 01/27/2022 05:20 AM     Lab Results   Component Value Date/Time    Color Yellow/Straw 01/23/2022 10:30 AM    Appearance Turbid (A) 01/23/2022 10:30 AM    Specific gravity 1.027 01/23/2022 10:30 AM    pH (UA) 6.0 01/23/2022 10:30 AM    Protein 100 (A) 01/23/2022 10:30 AM    Glucose 50 (A) 01/23/2022 10:30 AM    Ketone 5 (A) 01/23/2022 10:30 AM    Bilirubin Negative 01/23/2022 10:30 AM    Urobilinogen 4.0 (H) 01/23/2022 10:30 AM    Nitrites Negative 01/23/2022 10:30 AM    Leukocyte Esterase Negative 01/23/2022 10:30 AM    Bacteria Negative 01/23/2022 10:30 AM    WBC 0-4 01/23/2022 10:30 AM    RBC 0-5 01/23/2022 10:30 AM         Assessment:     Acute hypoxemic respiratory failure on on ventilator 85% of   COVID-19 pneumonitis   hypotension  Thrombocytopenia  Hypertension  Arthritis  Hyperkalemia  Hyponatremia  Moderate protein calorie malnutrition  Patient need multiple partial finger amputation once stable as per vascular surgeon    Plan:     Seen by the vascular surgeon he wanted to physiological testing with brachial wrist index and brachial digital index      Olumiant 2 mg daily  Decadron 4 mg every 6 hours  Meropenem 1 g every 8 hours  Diltiazem 30 mg 3 times  Follow-up with pulmonologist and nephrologist    Overall prognosis        Patient is DNR  He want to continue current medication  Continue heparin drip    Seen by vascular surgeon he will plan to do multiple partial finger amputation 1 patient's stable  Repeat the lab      Current Facility-Administered Medications:     perflutren lipid microspheres (DEFINITY) 1.1 mg/mL contrast injection, , , ,     dilTIAZem IR (CARDIZEM) tablet 30 mg, 30 mg, Oral, TID, Karan Flower MD, 30 mg at 01/31/22 2214    heparin 25,000 units in  ml infusion, 18-36 Units/kg/hr (Adjusted), IntraVENous, TITRATE, Polo Lopez MD, Last Rate: 12.2 mL/hr at 02/01/22 0719, 16 Units/kg/hr at 02/01/22 0719    heparin (porcine) 1,000 unit/mL injection 6,080 Units, 80 Units/kg (Adjusted), IntraVENous, PRN **OR** heparin (porcine) 1,000 unit/mL injection 3,040 Units, 40 Units/kg (Adjusted), IntraVENous, PRN, Polo Lopez MD, 3,040 Units at 02/01/22 0645    fentaNYL (PF) 1,500 mcg/30 mL (50 mcg/mL) infusion, 0-200 mcg/hr, IntraVENous, TITRATE, Dale Flower MD, Last Rate: 1.5 mL/hr at 02/01/22 0719, 75 mcg/hr at 02/01/22 0719    hydrOXYzine pamoate (VISTARIL) capsule 25 mg, 25 mg, Oral, Q4H PRN, Sandeep Lopez MD    propofol (DIPRIVAN) 10 mg/mL infusion, 0-50 mcg/kg/min, IntraVENous, TITRATE, Dale Flower MD, Last Rate: 16.3 mL/hr at 02/01/22 0455, 30 mcg/kg/min at 02/01/22 0455    dexmedeTOMidine (PRECEDEX) 400 mcg in 0.9% sodium chloride (MBP/ADV) 100 mL MBP, 0.1-1.5 mcg/kg/hr, IntraVENous, TITRATE, Karan Flower MD, Stopped at 01/24/22 0824    NOREPINephrine (LEVOPHED) 8 mg in 0.9% NS 250ml infusion, 0.5-16 mcg/min, IntraVENous, TITRATE, Sandeep Lopez MD, Stopped at 01/25/22 1137    acetaminophen (TYLENOL) tablet 650 mg, 650 mg, Oral, Q6H PRN **OR** acetaminophen (TYLENOL) suppository 650 mg, 650 mg, Rectal, Q6H PRN, Sandeep Lopez MD    polyethylene glycol (MIRALAX) packet 17 g, 17 g, Oral, DAILY PRN, Sandeep Lopez MD    ondansetron (ZOFRAN ODT) tablet 4 mg, 4 mg, Oral, Q8H PRN **OR** [DISCONTINUED] ondansetron (ZOFRAN) injection 4 mg, 4 mg, IntraVENous, Q6H PRN, Polo Lopez MD    baricitinib (OLUMIANT) tablet 2 mg, 2 mg, Oral, DAILY, Polo Lopez MD, 2 mg at 01/31/22 1055    albuterol (PROVENTIL HFA, VENTOLIN HFA, PROAIR HFA) inhaler 2 Puff, 2 Puff, Inhalation, Q6H PRN, Polo Lopez MD    polyethylene glycol (MIRALAX) packet 17 g, 17 g, Oral, DAILY PRN, Polo Lopez MD, 17 g at 01/28/22 0844    [DISCONTINUED] ondansetron (ZOFRAN ODT) tablet 4 mg, 4 mg, Oral, Q8H PRN **OR** ondansetron (ZOFRAN) injection 4 mg, 4 mg, IntraVENous, Q6H PRN, Polo Lopez MD    meropenem (MERREM) 1 g in 0.9% sodium chloride 20 mL IV syringe, 1 g, IntraVENous, Q8H, Polo Lopez MD, 1 g at 01/31/22 2348    dexamethasone (DECADRON) 4 mg/mL injection 4 mg, 4 mg, IntraVENous, Q6H, Polo Lopez MD, 4 mg at 02/01/22 0503    hydrOXYzine (VISTARIL) injection 25 mg, 25 mg, IntraMUSCular, Q6H PRN, Polo Lopez MD, 25 mg at 01/22/22 0111

## 2022-02-02 NOTE — PROGRESS NOTES
Goals of care meeting scheduled for today at 10:00.  and CM present. Doctor was called at 10:05, and he stated that he will not be participating in the meeting,because he speaks with family daily. Based on this statement, family is aware and informed of patient's current condition. Discharge plan is to return home with home health services. Referrals sent via [facility]. CM will continue to follow for any additional needs.

## 2022-02-02 NOTE — PROGRESS NOTES
IMPRESSION:   1. Acute hypoxic respiratory  2. Acute on chronic hypercapnic respiratory failure   3. COVID-19 pneumonia  4. Pulmonary edema  5. Sepsis with septic shock off pressors  6. Left hand ischemia  7. Arthritis hypertension by hx  8. Hyperkalemia   9. Hypernatremia corrected  10. Now hyponatremic      RECOMMENDATIONS/PLAN:   1. ICU monitoring  1. Patient condition got worse on 1/24 went to asystole subsequently got intubated now on ventilator assist control mode sedated with propofol and fentanyl Precedex was discontinued because of bradycardia arterial blood gases acceptable currently on A/C 12  PEEP 5 FiO2 100% decrease FiO2 to 85% PO2 acceptable we will increase the rate on fentanyl and propofol   2. Patient is on dexamethasone and baricitinib  3. Patient is off Levophed  4. Potassium corrected  5. Left hand NATHALIE normal continue local wound care possible partial amputation  6. Pulmonary edema received Lasix chest x-ray shows bilateral infiltrate congestive changes  7. Left hand ischemia off IV heparin left ulnar artery occlusion radial is patent NATHALIE   8. Now hyponatremic   9. Troponin is elevated  10. Lactic acid 3.7  11. She is on meropenem and finished Zithromax     [x] High complexity decision making was performed  [x] See my orders for details  HPI  77-year-old lady came in because of shortness of breath and dyspnea she has significant past medical history of arthritis hypertension she was recently discharged from the hospital came back with worsening of hypoxia she was discharged on oxygen 2 L nasal cannula and patient condition got worse she was supposed to be discharged to skilled care but patient's son took her home where her condition got worse and she was transferred back to the hospital now she is on noninvasive ventilator BiPAP machine hemodynamically unstable hypotensive on vasopressors and not giving much history     PMH:  has no past medical history on file.     PSH:   has a past surgical history that includes ir insert non tunl cvc over 5 yrs (1/21/2022). FHX: family history is not on file.      SHX:      ALL:   Allergies   Allergen Reactions    Penicillins Hives        MEDS:   [x] Reviewed - As Below   [] Not reviewed    Current Facility-Administered Medications   Medication    dilTIAZem IR (CARDIZEM) tablet 30 mg    heparin 25,000 units in  ml infusion    heparin (porcine) 1,000 unit/mL injection 6,080 Units    Or    heparin (porcine) 1,000 unit/mL injection 3,040 Units    fentaNYL (PF) 1,500 mcg/30 mL (50 mcg/mL) infusion    hydrOXYzine pamoate (VISTARIL) capsule 25 mg    propofol (DIPRIVAN) 10 mg/mL infusion    dexmedeTOMidine (PRECEDEX) 400 mcg in 0.9% sodium chloride (MBP/ADV) 100 mL MBP    NOREPINephrine (LEVOPHED) 8 mg in 0.9% NS 250ml infusion    acetaminophen (TYLENOL) tablet 650 mg    Or    acetaminophen (TYLENOL) suppository 650 mg    polyethylene glycol (MIRALAX) packet 17 g    ondansetron (ZOFRAN ODT) tablet 4 mg    baricitinib (OLUMIANT) tablet 2 mg    albuterol (PROVENTIL HFA, VENTOLIN HFA, PROAIR HFA) inhaler 2 Puff    polyethylene glycol (MIRALAX) packet 17 g    ondansetron (ZOFRAN) injection 4 mg    meropenem (MERREM) 1 g in 0.9% sodium chloride 20 mL IV syringe    dexamethasone (DECADRON) 4 mg/mL injection 4 mg    hydrOXYzine (VISTARIL) injection 25 mg      MAR reviewed and pertinent medications noted or modified as needed   Current Facility-Administered Medications   Medication    dilTIAZem IR (CARDIZEM) tablet 30 mg    heparin 25,000 units in  ml infusion    heparin (porcine) 1,000 unit/mL injection 6,080 Units    Or    heparin (porcine) 1,000 unit/mL injection 3,040 Units    fentaNYL (PF) 1,500 mcg/30 mL (50 mcg/mL) infusion    hydrOXYzine pamoate (VISTARIL) capsule 25 mg    propofol (DIPRIVAN) 10 mg/mL infusion    dexmedeTOMidine (PRECEDEX) 400 mcg in 0.9% sodium chloride (MBP/ADV) 100 mL MBP    NOREPINephrine (LEVOPHED) 8 mg in 0.9% NS 250ml infusion    acetaminophen (TYLENOL) tablet 650 mg    Or    acetaminophen (TYLENOL) suppository 650 mg    polyethylene glycol (MIRALAX) packet 17 g    ondansetron (ZOFRAN ODT) tablet 4 mg    baricitinib (OLUMIANT) tablet 2 mg    albuterol (PROVENTIL HFA, VENTOLIN HFA, PROAIR HFA) inhaler 2 Puff    polyethylene glycol (MIRALAX) packet 17 g    ondansetron (ZOFRAN) injection 4 mg    meropenem (MERREM) 1 g in 0.9% sodium chloride 20 mL IV syringe    dexamethasone (DECADRON) 4 mg/mL injection 4 mg    hydrOXYzine (VISTARIL) injection 25 mg      PMH:  has no past medical history on file. PSH:   has a past surgical history that includes ir insert non tunl cvc over 5 yrs (2022). FHX: family history is not on file. SHX:       ROS:  Unable to obtain    Hemodynamics:    CO:    CI:    CVP:    SVR:   PAP Systolic:    PAP Diastolic:    PVR:    AJ41:        Ventilator Settings:      Mode Rate TV Press PEEP FiO2 PIP Min.  Vent   Assist control,Volume control    450 ml    5 cm H20 85 %  34 cm H2O  10.1 l/min        Vital Signs: Telemetry:    normal sinus rhythm Intake/Output:   Visit Vitals  BP (!) 123/58   Pulse 96   Temp 98.3 °F (36.8 °C)   Resp 23   Ht 5' 5\" (1.651 m)   Wt 104.5 kg (230 lb 6.1 oz)   SpO2 94%   BMI 38.34 kg/m²       Temp (24hrs), Av °F (36.7 °C), Min:97.6 °F (36.4 °C), Max:98.7 °F (37.1 °C)        O2 Device: Ventilator         Wt Readings from Last 4 Encounters:   22 104.5 kg (230 lb 6.1 oz)   22 90.7 kg (200 lb)          Intake/Output Summary (Last 24 hours) at 2022 0810  Last data filed at 2022 0755  Gross per 24 hour   Intake 2120.55 ml   Output 1690 ml   Net 430.55 ml       Last shift:       0701 -  1900  In: -   Out: 165 [Urine:165]  Last 3 shifts: 1901 -  0700  In: 2920.6 [I.V.:360.6]  Out: 2175 [Urine:2175]       Physical Exam:     General: Intubated on ventilator unresponsive on propofol fentanyl  HEENT: NCAT,   Eyes: anicteric; conjunctiva clear no doll's eye reflex  Neck: no nodes, , trach midline; no accessory MM use. Questionable neck vein distention  Chest: no deformity,   Cardiac: R regular; no murmur;   Lungs: Diffuse wheezes and rales  Abd: soft, NT, hypoactive BS  Ext: Edema of the leg edema; no joint swelling;  No clubbing  : clear urine  Neuro: Sedated unresponsive on the ventilator no doll's eye reflex flaccid extremities  Psych-unable to assess  Skin: warm, dry, no cyanosis;   Pulses: Brachial radial pulses intact  Capillary: Normal capillary refill      DATA:    MAR reviewed and pertinent medications noted or modified as needed  MEDS:   Current Facility-Administered Medications   Medication    dilTIAZem IR (CARDIZEM) tablet 30 mg    heparin 25,000 units in  ml infusion    heparin (porcine) 1,000 unit/mL injection 6,080 Units    Or    heparin (porcine) 1,000 unit/mL injection 3,040 Units    fentaNYL (PF) 1,500 mcg/30 mL (50 mcg/mL) infusion    hydrOXYzine pamoate (VISTARIL) capsule 25 mg    propofol (DIPRIVAN) 10 mg/mL infusion    dexmedeTOMidine (PRECEDEX) 400 mcg in 0.9% sodium chloride (MBP/ADV) 100 mL MBP    NOREPINephrine (LEVOPHED) 8 mg in 0.9% NS 250ml infusion    acetaminophen (TYLENOL) tablet 650 mg    Or    acetaminophen (TYLENOL) suppository 650 mg    polyethylene glycol (MIRALAX) packet 17 g    ondansetron (ZOFRAN ODT) tablet 4 mg    baricitinib (OLUMIANT) tablet 2 mg    albuterol (PROVENTIL HFA, VENTOLIN HFA, PROAIR HFA) inhaler 2 Puff    polyethylene glycol (MIRALAX) packet 17 g    ondansetron (ZOFRAN) injection 4 mg    meropenem (MERREM) 1 g in 0.9% sodium chloride 20 mL IV syringe    dexamethasone (DECADRON) 4 mg/mL injection 4 mg    hydrOXYzine (VISTARIL) injection 25 mg        Labs:    Recent Labs     02/02/22  0540 02/01/22  2126 02/01/22  1230 02/01/22  0414 02/01/22  0414 01/31/22  1423 01/31/22  0330   WBC 34.5*  --   --   --  21.4*  --  19.3*   HGB 7.0*  --   --   -- 8.4*  --  9.6*     --   --   --  197  --  205   APTT >153.0* 41.2* >153.0*   < > 63.1*   < > 77.1*    < > = values in this interval not displayed. Recent Labs     02/02/22  0540 02/01/22  0414 01/31/22  0330   * 132* 130*   K 5.2* 4.5 4.5   CL 93* 94* 92*   CO2 30 30 36*   * 231* 185*   * 69* 39*   CREA 0.84 0.70 0.60   CA 8.8 9.6 9.7   MG  --   --  2.1   PHOS  --   --  3.2   ALB 1.6* 1.7* 1.8*   ALT 51 56  --      Recent Labs     02/02/22  0300 02/01/22  0415 01/31/22  0349   PH 7.36 7.45 7.49*   PCO2 55* 46* 48*   PO2 70* 85 60*   HCO3 29* 31* 35*   FIO2 85.0 50.0 85.0   1/30 AC 12  PEEP 5 FiO2 100%    Lab Results   Component Value Date/Time    Culture result: No growth 6 days 01/21/2022 07:55 AM    Culture result: No growth 6 days 01/18/2022 07:12 AM    Culture result: (A) 01/17/2022 07:12 AM     Staphylococcus species, coagulase negative growing in 1 of 4 bottles drawn NO SITE INDICATED    Culture result:  01/17/2022 07:12 AM     (NOTE) [facility] GROWING IN 1 OF 2 BOTTLES CALLED TO JOSE MIGUEL PAGAN AT 0831 ON 1/19/22.      No results found for: TSH, TSHEXT, TSHEXT     Imaging:    Results from Hospital Encounter encounter on 01/21/22    XR CHEST PORT    Narrative  Chest single view. Comparison single view chest January 31, 2022. Stable ET tube, NG tube, and right neck CVC. Persistent hazy reticular markings through lungs. Cardiac and mediastinal  structures unchanged. Thoracic aorta atherosclerosis. No pneumothorax. Probable  small dependent pleural effusions. Results from [place] encounter on 01/21/22    CT HEAD WO CONT    Narrative  The study is a noncontrasted head CT examination dated 1/21/2022. HISTORY: Unresponsive. TECHNIQUE: Thin section axial imaging was performed followed by sagittal and  coronal reconstructed imaging.     Dose Reduction Technique was employed to reduce radiation exposure - This  includes reduction optimization techniques as appropriate to a performed exam  with automated exposure control adjustments of the mA and/or Kv according to  patient size, or use of iterative reconstruction technique. COMPARISON: CT head dated 1/17/2022. There is an appropriate size to the ventricles, the deep cortical sulci, and the  sylvian fissures for the patient's age. This examination is negative for  intracranial hemorrhage, mass effect or an extra axial collection. The  gray-white matter junctions are preserved without cytotoxic or vasogenic edema. An assessment of the white matter tracts demonstrates a mild decrease in the  density characteristics of the central periventricular white matter. These  findings are consistent with expected aging changes and milder microvascular  disease. There also is a region of diminished density within the right posterior  inferior cerebellar hemisphere which may represent an older cerebrovascular  insult. ORBITS: This examination is negative for acute orbital pathology. PARANASAL SINUSES: The paranasal sinuses are well pneumatized without acute  sinus disease. There is a decreased number of right sided mastoid air cells which can be  associated with chronic mastoiditis. The craniocervical junction images in a  normal fashion. Impression  1. There are milder microvascular changes within the central periventricular  white matter. 2.  There is an area of diminished density within the inferior aspect of the  right cerebellar hemisphere without change (series 201 image number 12). These  findings may represent an older ischemic insult within the right posterior  inferior cerebellar region. 3.  This examination is negative for acute intracranial pathology or short-term  interval changes dating back to 1/17/2022.       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 Chest x-ray looks like pulmonary edema.   She has peripheral edema we will give Lasix today discontinue D5W continue tube feedings we will check echocardiogram  · 1/31 off pressors remain intubated   · 2/1 remain intubated sedated on ventilator will do sedation vacation in a.m. still on 85%  · 2/2 on ventilator sedated elevated PCO2  · Time of care 30-minute

## 2022-02-02 NOTE — PROGRESS NOTES
Renal Daily Progress Note    Admit Date: 1/21/2022      Subjective:   60-year-old lady with history of COVID-19 infection who we are following for hypernatremia and hypokalemia. We were reconsulted for rising BUN of 103 and potassium of 5.2. Sodium today is 131 mEq. The patient is intubated and was on Levophed. She is sedated. No history is elicitable for the patient.       Current Facility-Administered Medications   Medication Dose Route Frequency    dexamethasone (DECADRON) 4 mg/mL injection 4 mg  4 mg IntraVENous Q12H    0.9% sodium chloride infusion  50 mL/hr IntraVENous CONTINUOUS    dilTIAZem IR (CARDIZEM) tablet 30 mg  30 mg Oral TID    heparin 25,000 units in  ml infusion  18-36 Units/kg/hr (Adjusted) IntraVENous TITRATE    heparin (porcine) 1,000 unit/mL injection 6,080 Units  80 Units/kg (Adjusted) IntraVENous PRN    Or    heparin (porcine) 1,000 unit/mL injection 3,040 Units  40 Units/kg (Adjusted) IntraVENous PRN    fentaNYL (PF) 1,500 mcg/30 mL (50 mcg/mL) infusion  0-200 mcg/hr IntraVENous TITRATE    hydrOXYzine pamoate (VISTARIL) capsule 25 mg  25 mg Oral Q4H PRN    propofol (DIPRIVAN) 10 mg/mL infusion  0-50 mcg/kg/min IntraVENous TITRATE    dexmedeTOMidine (PRECEDEX) 400 mcg in 0.9% sodium chloride (MBP/ADV) 100 mL MBP  0.1-1.5 mcg/kg/hr IntraVENous TITRATE    NOREPINephrine (LEVOPHED) 8 mg in 0.9% NS 250ml infusion  0.5-16 mcg/min IntraVENous TITRATE    acetaminophen (TYLENOL) tablet 650 mg  650 mg Oral Q6H PRN    Or    acetaminophen (TYLENOL) suppository 650 mg  650 mg Rectal Q6H PRN    polyethylene glycol (MIRALAX) packet 17 g  17 g Oral DAILY PRN    ondansetron (ZOFRAN ODT) tablet 4 mg  4 mg Oral Q8H PRN    baricitinib (OLUMIANT) tablet 2 mg  2 mg Oral DAILY    albuterol (PROVENTIL HFA, VENTOLIN HFA, PROAIR HFA) inhaler 2 Puff  2 Puff Inhalation Q6H PRN    polyethylene glycol (MIRALAX) packet 17 g  17 g Oral DAILY PRN    ondansetron (ZOFRAN) injection 4 mg  4 mg IntraVENous Q6H PRN    meropenem (MERREM) 1 g in 0.9% sodium chloride 20 mL IV syringe  1 g IntraVENous Q8H    hydrOXYzine (VISTARIL) injection 25 mg  25 mg IntraMUSCular Q6H PRN        Review of Systems    ROS   Not obtainable    Objective:     Patient Vitals for the past 8 hrs:   BP Temp Pulse Resp SpO2   02/02/22 1200 (!) 110/59  100 25 97 %   02/02/22 1156   100 25 95 %   02/02/22 1100 (!) 127/53 97.8 °F (36.6 °C) 100 23 94 %   02/02/22 1000 (!) 120/58  100 22 94 %   02/02/22 0900 (!) 117/56  99 21 93 %   02/02/22 0800 (!) 122/57  99 21 94 %   02/02/22 0714   96 23 94 %   02/02/22 0700 (!) 123/58 98.3 °F (36.8 °C) 94 23 94 %     02/02 0701 - 02/02 1900  In: 796.2 [I.V.:216.2]  Out: 845 [Urine:845]  01/31 1901 - 02/02 0700  In: 3262.5 [I.V.:702.5]  Out: 2175 [Urine:2175]    Physical Exam:   Physical Exam  Constitutional:       Comments: Intubated and sedated   Cardiovascular:      Rate and Rhythm: Regular rhythm. Pulmonary:      Comments: Rhonchi with basal crackles  Abdominal:      Palpations: Abdomen is soft. Mummified digits 3 4 and 5 distally on the left        XR CHEST PORT   Final Result      XR CHEST PORT   Final Result      WRIST BRACHIAL INDEX AT REST   Final Result      XR CHEST PORT   Final Result   No significant change. XR CHEST PORT   Final Result   No interval change. DUPLEX UPPER EXT ARTERY LEFT   Final Result      XR CHEST PORT   Final Result      XR CHEST PORT   Final Result   Endotracheal tube tip still at the maggie. Diffuse opacities in the   lungs, not significantly changed. XR CHEST PORT   Final Result      XR CHEST PORT   Final Result   Endotracheal tube tip at the maggie. Diffuse opacities in the   lungs, accentuated by lower lung volumes or increased. XR CHEST PORT   Final Result   Diffuse opacities in the lungs, not significantly changed. XR CHEST PORT   Final Result   1. ETT terminating 2 cm above the maggie.  Remaining support apparatus as above.   2.  Worsening bilateral airspace disease with developing ARDS not excluded. XR CHEST PORT   Final Result   Moderate patchy diffuse bilateral airspace opacities, waxing and waning from the   prior exam.      XR CHEST PORT   Final Result   Moderate diffuse bilateral airspace opacities, likely a combination of edema and   airspace disease, mildly increased from the previous exam.      CT HEAD WO CONT   Final Result   1. There are milder microvascular changes within the central periventricular   white matter. 2.  There is an area of diminished density within the inferior aspect of the   right cerebellar hemisphere without change (series 201 image number 12). These   findings may represent an older ischemic insult within the right posterior   inferior cerebellar region. 3.  This examination is negative for acute intracranial pathology or short-term   interval changes dating back to 1/17/2022. XR CHEST PORT   Final Result   Patchy mixed asymmetric lung disease appears somewhat better but the   lungs are better inflated. No effusion or pneumothorax. Normal heart and   mediastinum      IR INSERT NON TUNL CVC OVER 5 YRS   Final Result   Ultrasound of the right neck demonstrated patent IJV. Successful US-guided placement of a right internal jugular triple lumen central   venous catheter as described above. The catheter is functioning. PLAN:   Catheter position was confirmed by follow-up chest x-ray: catheter tip in the   lower SVC and ready to use. IR US GUIDED VASCULAR ACCESS   Final Result   Ultrasound of the right neck demonstrated patent IJV. Successful US-guided placement of a right internal jugular triple lumen central   venous catheter as described above. The catheter is functioning. PLAN:   Catheter position was confirmed by follow-up chest x-ray: catheter tip in the   lower SVC and ready to use.       XR CHEST PORT   Final Result   Findings/impression: Diffuse interstitial airspace disease/edema. No definite pleural effusion or   pneumothorax. Cardiac contours are partially obscured. No acute osseous abnormality identified. XR CHEST PORT    (Results Pending)   XR CHEST PORT    (Results Pending)        Data Review   Recent Labs     02/02/22  0540 02/01/22  0414 01/31/22  0330   WBC 34.5* 21.4* 19.3*   HGB 7.0* 8.4* 9.6*   HCT 21.8* 25.5* 30.2*    197 205     Recent Labs     02/02/22  0540 02/01/22  0414 01/31/22  0330   * 132* 130*   K 5.2* 4.5 4.5   CL 93* 94* 92*   CO2 30 30 36*   * 231* 185*   * 69* 39*   CREA 0.84 0.70 0.60   CA 8.8 9.6 9.7   MG  --   --  2.1   PHOS  --   --  3.2   ALB 1.6* 1.7* 1.8*   ALT 51 56  --      No components found for: [place]  Recent Labs     02/02/22  0300 02/01/22  0415 01/31/22  0349   PH 7.36 7.45 7.49*   PCO2 55* 46* 48*   PO2 70* 85 60*   HCO3 29* 31* 35*   FIO2 85.0 50.0 85.0     No results for input(s): INR, INREXT, INREXT in the last 72 hours. Assessment:           Active Problems:    COVID-19 (1/17/2022)      Respiratory failure with hypoxia (HCC) (1/21/2022)      Hypernatremia (1/23/2022)      Hypokalemia (1/23/2022)    Nonoliguric renal failure. She is predominantly prerenal.  The disproportionate increase in BUN secondary to steroids and likely GI bleed with hemoglobin dropping from 9.6G and to 7 g over the last 48 hours. Modest hyperkalemia. Respiratory acidosis    Modest hyponatremia with a corrected sodium of 133 mEq based on a blood sugar of 335 mg. Plan:   Check stool for occult blood  Keep fluids isotonic  Control sugars  We will follow electrolytes and renal function.

## 2022-02-02 NOTE — PROGRESS NOTES
PROGRESS NOTE      Chief Complaints:  Patient is in the ICU. HPI and  Objective:    No major events overnight. Patient currently intubated. Vital signs stable. Blood pressure 100/48. Temperature 97.9. Heart rate is 98. Vascular examination left hand shows adequate flow through the patent radial artery. Hand is warm. No changes in ischemic digits 4, 5, and 3. Review of Systems:  C as patient is intubated. EXAM:  Visit Vitals  BP (!) 119/48   Pulse 98   Temp 97.9 °F (36.6 °C)   Resp 20   Ht 5' 5\" (1.651 m)   Wt 230 lb 6.1 oz (104.5 kg)   SpO2 95%   BMI 38.34 kg/m²       Patient is intubated. Head and neck atraumatic, normocephalic. ENT: No hoarse voice  Cardiac system regular rate rhythm. Pulmonary no audible wheeze  Chest wall excursion normal with respiration cycle  Abdomen is soft not particularly distended. Neurologically unable to assess  Vascular examination as previously noted no changes.     Recent Results (from the past 24 hour(s))   PTT    Collection Time: 02/01/22  9:26 PM   Result Value Ref Range    aPTT 41.2 (H) 21.2 - 34.1 sec    aPTT, therapeutic range   82 - 109 sec   BLOOD GAS, ARTERIAL    Collection Time: 02/02/22  3:00 AM   Result Value Ref Range    pH 7.36 7.35 - 7.45      PCO2 55 (H) 35 - 45 mmHg    PO2 70 (L) 75 - 100 mmHg    O2 SAT 95 (L) >95 %    BICARBONATE 29 (H) 22 - 26 mmol/L    BASE EXCESS 4.7 (H) 0 - 2 mmol/L    O2 METHOD VENT      FIO2 85.0 %    MODE Assist Control/Volume Control      Tidal volume 450      SET RATE 12      IPAP/PIP 0      EPAP/CPAP/PEEP 5.0      SITE Left Radial      BEN'S TEST PASS     PTT    Collection Time: 02/02/22  5:40 AM   Result Value Ref Range    aPTT >153.0 (HH) 21.2 - 34.1 sec    aPTT, therapeutic range   82 - 109 sec   CBC W/O DIFF    Collection Time: 02/02/22  5:40 AM   Result Value Ref Range    WBC 34.5 (H) 3.6 - 11.0 K/uL    RBC 2.36 (L) 3.80 - 5.20 M/uL    HGB 7.0 (L) 11.5 - 16.0 g/dL    HCT 21.8 (L) 35.0 - 47.0 %    MCV 92.4 80.0 - 99.0 FL    MCH 29.7 26.0 - 34.0 PG    MCHC 32.1 30.0 - 36.5 g/dL    RDW 13.2 11.5 - 14.5 %    PLATELET 648 614 - 529 K/uL    MPV 13.1 (H) 8.9 - 12.9 FL    NRBC 0.0 0.0  WBC    ABSOLUTE NRBC 0.00 0.00 - 6.00 K/uL   METABOLIC PANEL, COMPREHENSIVE    Collection Time: 02/02/22  5:40 AM   Result Value Ref Range    Sodium 131 (L) 136 - 145 mmol/L    Potassium 5.2 (H) 3.5 - 5.1 mmol/L    Chloride 93 (L) 97 - 108 mmol/L    CO2 30 21 - 32 mmol/L    Anion gap 8 5 - 15 mmol/L    Glucose 335 (H) 65 - 100 mg/dL     (H) 6 - 20 mg/dL    Creatinine 0.84 0.55 - 1.02 mg/dL    BUN/Creatinine ratio 123 (H) 12 - 20      GFR est AA >60 >60 ml/min/1.73m2    GFR est non-AA >60 >60 ml/min/1.73m2    Calcium 8.8 8.5 - 10.1 mg/dL    Bilirubin, total 0.3 0.2 - 1.0 mg/dL    AST (SGOT) 38 (H) 15 - 37 U/L    ALT (SGPT) 51 12 - 78 U/L    Alk. phosphatase 62 45 - 117 U/L    Protein, total 5.4 (L) 6.4 - 8.2 g/dL    Albumin 1.6 (L) 3.5 - 5.0 g/dL    Globulin 3.8 2.0 - 4.0 g/dL    A-G Ratio 0.4 (L) 1.1 - 2.2     PTT    Collection Time: 02/02/22  4:00 PM   Result Value Ref Range    aPTT 104.9 (H) 21.2 - 34.1 sec    aPTT, therapeutic range   82 - 109 sec       ASSESSMENT:   Patient is 80 y.o. with diagnosis of : Active Problems:    COVID-19 (1/17/2022)      Respiratory failure with hypoxia (HCC) (1/21/2022)      Hypernatremia (1/23/2022)      Hypokalemia (1/23/2022)      Ischemic limb left hand. PLAN:                 No changes in vascular status left hand. No changes in demarcation of the left hand digits. At some point patient will need multiple partial finger amputation left hand once patient is more stable. I will continue monitor her progress   Critical care time spent: 30 minutes.

## 2022-02-02 NOTE — PROGRESS NOTES
Received consultation, patient was seen last week by Dr. Ran Chin. We will change consult to Dr. Mariana Coe. I have notified them.

## 2022-02-02 NOTE — PROGRESS NOTES
General Daily Progress Note          Patient Name:   Lizzette Rebolledo       YOB: 1940       Age:  80 y.o.       Admit Date: 1/21/2022      Subjective:     Patient is a 80y.o. year old female with signal past medical history of hypertension arthritis who discharged from the hospital yesterday in stable condition patient is admitted last admission with COVID-19 patient was asymptomatic all this time while in the hospital patient discharged on 2 L oxygen to skilled care but patient son want to go home with home health but is at home patient's saturations drops sent back to the ER seen by the ER physician patient placed on BiPAP patient was little hypotensive started on Levophed admitted for further work-up and treatment           Patient is DNR    Patient on ventilator 85% O2  On propofol and Precedex drip l drip    On heparin drip    NG tube in place for medication and feeding      Objective:     Visit Vitals  BP (!) 122/57   Pulse 99   Temp 98.3 °F (36.8 °C)   Resp 21   Ht 5' 5\" (1.651 m)   Wt 104.5 kg (230 lb 6.1 oz)   SpO2 94%   BMI 38.34 kg/m²        Recent Results (from the past 24 hour(s))   ECHO ADULT COMPLETE    Collection Time: 02/01/22  9:45 AM   Result Value Ref Range    LA Major [place] 6.2 cm    LA Area 4C 17.0 cm2    LA Diameter 3.6 cm    AV Mean Gradient 7 mmHg    AV VTI 29.1 cm    AV Mean Velocity 1.2 m/s    AV Peak Velocity 1.9 m/s    AV Peak Gradient 15 mmHg    AV Area by VTI 1.5 cm2    AV Area by Peak Velocity 1.3 cm2    Aortic Root 2.9 cm    Ascending Aorta 2.9 cm    Descending Aorta 1.8 cm    IVSd 1.6 (A) 0.6 - 0.9 cm    LVIDd 3.7 (A) 3.9 - 5.3 cm    LVIDs 2.3 cm    LVOT Diameter 1.8 cm    LVOT Mean Gradient 2 mmHg    LVOT VTI 17.5 cm    LVOT Peak Velocity 1.0 m/s    LVOT Peak Gradient 4 mmHg    LVPWd 1.3 (A) 0.6 - 0.9 cm    LV E' Lateral Velocity 7 cm/s    LV E' Septal Velocity 4 cm/s    LVOT Area 2.5 cm2    LVOT SV 44.5 ml    MR Peak Velocity 1.0 m/s    MR Peak Gradient 4 mmHg    MV Max Velocity 1.0 m/s    MV Peak Gradient 4 mmHg    MV E Wave Deceleration Time 264.0 ms    MV A Velocity 0.74 m/s    MV E Velocity 0.45 m/s    MV Mean Gradient 1 mmHg    MV VTI 24.3 cm    MV Mean Velocity 0.5 m/s    MV Area by VTI 1.8 cm2    Est. RA Pressure 3 mmHg    TR Max Velocity 2.23 m/s    TR Peak Gradient 20 mmHg    Fractional Shortening 2D 38 28 - 44 %    LVIDd Index 1.76 cm/m2    LVIDs Index 1.10 cm/m2    LV RWT Ratio 0.70     LV Mass 2D 197.7 (A) 67 - 162 g    LV Mass 2D Index 94.1 43 - 95 g/m2    MV E/A 0.61     E/E' Ratio (Averaged) 8.84     E/E' Lateral 6.43     E/E' Septal 11.25     LVOT Stroke Volume Index 21.2 mL/m2    LA Size Index 1.71 cm/m2    LA/AO Root Ratio 1.24     Ao Root Index 1.38 cm/m2    Ascending Aorta Index 1.38 cm/m2    AV Velocity Ratio 0.53     LVOT:AV VTI Index 0.60     KRYSTAL/BSA VTI 0.7 cm2/m2    KRYSTAL/BSA Peak Velocity 0.6 cm2/m2    MV:LVOT VTI Index 1.39     RVSP 23 mmHg    Descending Aorta Index 0.86 cm/m2    EF BP 69 55 - 100 %   PTT    Collection Time: 02/01/22 12:30 PM   Result Value Ref Range    aPTT >153.0 (HH) 21.2 - 34.1 sec    aPTT, therapeutic range   82 - 109 sec   PTT    Collection Time: 02/01/22  9:26 PM   Result Value Ref Range    aPTT 41.2 (H) 21.2 - 34.1 sec    aPTT, therapeutic range   82 - 109 sec   BLOOD GAS, ARTERIAL    Collection Time: 02/02/22  3:00 AM   Result Value Ref Range    pH 7.36 7.35 - 7.45      PCO2 55 (H) 35 - 45 mmHg    PO2 70 (L) 75 - 100 mmHg    O2 SAT 95 (L) >95 %    BICARBONATE 29 (H) 22 - 26 mmol/L    BASE EXCESS 4.7 (H) 0 - 2 mmol/L    O2 METHOD VENT      FIO2 85.0 %    MODE Assist Control/Volume Control      Tidal volume 450      SET RATE 12      IPAP/PIP 0      EPAP/CPAP/PEEP 5.0      SITE Left Radial      BEN'S TEST PASS     PTT    Collection Time: 02/02/22  5:40 AM   Result Value Ref Range    aPTT >153.0 () 21.2 - 34.1 sec    aPTT, therapeutic range   82 - 109 sec   CBC W/O DIFF    Collection Time: 02/02/22  5:40 AM   Result Value Ref Range    WBC 34.5 (H) 3.6 - 11.0 K/uL    RBC 2.36 (L) 3.80 - 5.20 M/uL    HGB 7.0 (L) 11.5 - 16.0 g/dL    HCT 21.8 (L) 35.0 - 47.0 %    MCV 92.4 80.0 - 99.0 FL    MCH 29.7 26.0 - 34.0 PG    MCHC 32.1 30.0 - 36.5 g/dL    RDW 13.2 11.5 - 14.5 %    PLATELET 179 868 - 859 K/uL    MPV 13.1 (H) 8.9 - 12.9 FL    NRBC 0.0 0.0  WBC    ABSOLUTE NRBC 0.00 0.00 - 9.70 K/uL   METABOLIC PANEL, COMPREHENSIVE    Collection Time: 02/02/22  5:40 AM   Result Value Ref Range    Sodium 131 (L) 136 - 145 mmol/L    Potassium 5.2 (H) 3.5 - 5.1 mmol/L    Chloride 93 (L) 97 - 108 mmol/L    CO2 30 21 - 32 mmol/L    Anion gap 8 5 - 15 mmol/L    Glucose 335 (H) 65 - 100 mg/dL     (H) 6 - 20 mg/dL    Creatinine 0.84 0.55 - 1.02 mg/dL    BUN/Creatinine ratio 123 (H) 12 - 20      GFR est AA >60 >60 ml/min/1.73m2    GFR est non-AA >60 >60 ml/min/1.73m2    Calcium 8.8 8.5 - 10.1 mg/dL    Bilirubin, total 0.3 0.2 - 1.0 mg/dL    AST (SGOT) 38 (H) 15 - 37 U/L    ALT (SGPT) 51 12 - 78 U/L    Alk. phosphatase 62 45 - 117 U/L    Protein, total 5.4 (L) 6.4 - 8.2 g/dL    Albumin 1.6 (L) 3.5 - 5.0 g/dL    Globulin 3.8 2.0 - 4.0 g/dL    A-G Ratio 0.4 (L) 1.1 - 2.2       [unfilled]      Review of Systems    Unable to obtain. Physical Exam:      Constitutional: Awake on BiPAP  HENT:   Head: Normocephalic and atraumatic. Eyes: Pupils are equal, round, and reactive to light. EOM are normal.   Cardiovascular: Normal rate, regular rhythm and normal heart sounds. Pulmonary/Chest: Breath sounds normal. No wheezes. No rales. Exhibits no tenderness. Abdominal: Soft. Bowel sounds are normal. There is no abdominal tenderness. There is no rebound and no guarding. Musculoskeletal: Normal range of motion. Neurological: pt is alert and oriented to person, place, and time.      XR CHEST PORT   Final Result      XR CHEST PORT   Final Result      WRIST BRACHIAL INDEX AT REST   Final Result      XR CHEST PORT   Final Result   No significant change. XR CHEST PORT   Final Result   No interval change. DUPLEX UPPER EXT ARTERY LEFT   Final Result      XR CHEST PORT   Final Result      XR CHEST PORT   Final Result   Endotracheal tube tip still at the maggie. Diffuse opacities in the   lungs, not significantly changed. XR CHEST PORT   Final Result      XR CHEST PORT   Final Result   Endotracheal tube tip at the maggie. Diffuse opacities in the   lungs, accentuated by lower lung volumes or increased. XR CHEST PORT   Final Result   Diffuse opacities in the lungs, not significantly changed. XR CHEST PORT   Final Result   1. ETT terminating 2 cm above the maggie. Remaining support apparatus as above. 2.  Worsening bilateral airspace disease with developing ARDS not excluded. XR CHEST PORT   Final Result   Moderate patchy diffuse bilateral airspace opacities, waxing and waning from the   prior exam.      XR CHEST PORT   Final Result   Moderate diffuse bilateral airspace opacities, likely a combination of edema and   airspace disease, mildly increased from the previous exam.      CT HEAD WO CONT   Final Result   1. There are milder microvascular changes within the central periventricular   white matter. 2.  There is an area of diminished density within the inferior aspect of the   right cerebellar hemisphere without change (series 201 image number 12). These   findings may represent an older ischemic insult within the right posterior   inferior cerebellar region. 3.  This examination is negative for acute intracranial pathology or short-term   interval changes dating back to 1/17/2022. XR CHEST PORT   Final Result   Patchy mixed asymmetric lung disease appears somewhat better but the   lungs are better inflated. No effusion or pneumothorax. Normal heart and   mediastinum      IR INSERT NON TUNL CVC OVER 5 YRS   Final Result   Ultrasound of the right neck demonstrated patent IJV.       Successful US-guided placement of a right internal jugular triple lumen central   venous catheter as described above. The catheter is functioning. PLAN:   Catheter position was confirmed by follow-up chest x-ray: catheter tip in the   lower SVC and ready to use. IR US GUIDED VASCULAR ACCESS   Final Result   Ultrasound of the right neck demonstrated patent IJV. Successful US-guided placement of a right internal jugular triple lumen central   venous catheter as described above. The catheter is functioning. PLAN:   Catheter position was confirmed by follow-up chest x-ray: catheter tip in the   lower SVC and ready to use. XR CHEST PORT   Final Result   Findings/impression:      Diffuse interstitial airspace disease/edema. No definite pleural effusion or   pneumothorax. Cardiac contours are partially obscured. No acute osseous abnormality identified.       XR CHEST PORT    (Results Pending)   XR CHEST PORT    (Results Pending)        Recent Results (from the past 24 hour(s))   ECHO ADULT COMPLETE    Collection Time: 02/01/22  9:45 AM   Result Value Ref Range    LA Major [place] 6.2 cm    LA Area 4C 17.0 cm2    LA Diameter 3.6 cm    AV Mean Gradient 7 mmHg    AV VTI 29.1 cm    AV Mean Velocity 1.2 m/s    AV Peak Velocity 1.9 m/s    AV Peak Gradient 15 mmHg    AV Area by VTI 1.5 cm2    AV Area by Peak Velocity 1.3 cm2    Aortic Root 2.9 cm    Ascending Aorta 2.9 cm    Descending Aorta 1.8 cm    IVSd 1.6 (A) 0.6 - 0.9 cm    LVIDd 3.7 (A) 3.9 - 5.3 cm    LVIDs 2.3 cm    LVOT Diameter 1.8 cm    LVOT Mean Gradient 2 mmHg    LVOT VTI 17.5 cm    LVOT Peak Velocity 1.0 m/s    LVOT Peak Gradient 4 mmHg    LVPWd 1.3 (A) 0.6 - 0.9 cm    LV E' Lateral Velocity 7 cm/s    LV E' Septal Velocity 4 cm/s    LVOT Area 2.5 cm2    LVOT SV 44.5 ml    MR Peak Velocity 1.0 m/s    MR Peak Gradient 4 mmHg    MV Max Velocity 1.0 m/s    MV Peak Gradient 4 mmHg    MV E Wave Deceleration Time 264.0 ms    MV A Velocity 0.74 m/s    MV E Velocity 0.45 m/s    MV Mean Gradient 1 mmHg    MV VTI 24.3 cm    MV Mean Velocity 0.5 m/s    MV Area by VTI 1.8 cm2    Est. RA Pressure 3 mmHg    TR Max Velocity 2.23 m/s    TR Peak Gradient 20 mmHg    Fractional Shortening 2D 38 28 - 44 %    LVIDd Index 1.76 cm/m2    LVIDs Index 1.10 cm/m2    LV RWT Ratio 0.70     LV Mass 2D 197.7 (A) 67 - 162 g    LV Mass 2D Index 94.1 43 - 95 g/m2    MV E/A 0.61     E/E' Ratio (Averaged) 8.84     E/E' Lateral 6.43     E/E' Septal 11.25     LVOT Stroke Volume Index 21.2 mL/m2    LA Size Index 1.71 cm/m2    LA/AO Root Ratio 1.24     Ao Root Index 1.38 cm/m2    Ascending Aorta Index 1.38 cm/m2    AV Velocity Ratio 0.53     LVOT:AV VTI Index 0.60     KRYSTAL/BSA VTI 0.7 cm2/m2    KRYSTAL/BSA Peak Velocity 0.6 cm2/m2    MV:LVOT VTI Index 1.39     RVSP 23 mmHg    Descending Aorta Index 0.86 cm/m2    EF BP 69 55 - 100 %   PTT    Collection Time: 02/01/22 12:30 PM   Result Value Ref Range    aPTT >153.0 (HH) 21.2 - 34.1 sec    aPTT, therapeutic range   82 - 109 sec   PTT    Collection Time: 02/01/22  9:26 PM   Result Value Ref Range    aPTT 41.2 (H) 21.2 - 34.1 sec    aPTT, therapeutic range   82 - 109 sec   BLOOD GAS, ARTERIAL    Collection Time: 02/02/22  3:00 AM   Result Value Ref Range    pH 7.36 7.35 - 7.45      PCO2 55 (H) 35 - 45 mmHg    PO2 70 (L) 75 - 100 mmHg    O2 SAT 95 (L) >95 %    BICARBONATE 29 (H) 22 - 26 mmol/L    BASE EXCESS 4.7 (H) 0 - 2 mmol/L    O2 METHOD VENT      FIO2 85.0 %    MODE Assist Control/Volume Control      Tidal volume 450      SET RATE 12      IPAP/PIP 0      EPAP/CPAP/PEEP 5.0      SITE Left Radial      BEN'S TEST PASS     PTT    Collection Time: 02/02/22  5:40 AM   Result Value Ref Range    aPTT >153.0 (HH) 21.2 - 34.1 sec    aPTT, therapeutic range   82 - 109 sec   CBC W/O DIFF    Collection Time: 02/02/22  5:40 AM   Result Value Ref Range    WBC 34.5 (H) 3.6 - 11.0 K/uL    RBC 2.36 (L) 3.80 - 5.20 M/uL    HGB 7.0 (L) 11.5 - 16.0 g/dL    HCT 21.8 (L) 35.0 - 47.0 %    MCV 92.4 80.0 - 99.0 FL    MCH 29.7 26.0 - 34.0 PG    MCHC 32.1 30.0 - 36.5 g/dL    RDW 13.2 11.5 - 14.5 %    PLATELET 456 853 - 447 K/uL    MPV 13.1 (H) 8.9 - 12.9 FL    NRBC 0.0 0.0  WBC    ABSOLUTE NRBC 0.00 0.00 - 4.47 K/uL   METABOLIC PANEL, COMPREHENSIVE    Collection Time: 02/02/22  5:40 AM   Result Value Ref Range    Sodium 131 (L) 136 - 145 mmol/L    Potassium 5.2 (H) 3.5 - 5.1 mmol/L    Chloride 93 (L) 97 - 108 mmol/L    CO2 30 21 - 32 mmol/L    Anion gap 8 5 - 15 mmol/L    Glucose 335 (H) 65 - 100 mg/dL     (H) 6 - 20 mg/dL    Creatinine 0.84 0.55 - 1.02 mg/dL    BUN/Creatinine ratio 123 (H) 12 - 20      GFR est AA >60 >60 ml/min/1.73m2    GFR est non-AA >60 >60 ml/min/1.73m2    Calcium 8.8 8.5 - 10.1 mg/dL    Bilirubin, total 0.3 0.2 - 1.0 mg/dL    AST (SGOT) 38 (H) 15 - 37 U/L    ALT (SGPT) 51 12 - 78 U/L    Alk.  phosphatase 62 45 - 117 U/L    Protein, total 5.4 (L) 6.4 - 8.2 g/dL    Albumin 1.6 (L) 3.5 - 5.0 g/dL    Globulin 3.8 2.0 - 4.0 g/dL    A-G Ratio 0.4 (L) 1.1 - 2.2         Results     Procedure Component Value Units Date/Time    MRSA SCREEN - PCR (NASAL) [[id number]] Collected: 01/22/22 0440    Order Status: Canceled Specimen: Swab     MRSA SCREEN - PCR (NASAL) [[id number]] Collected: 01/22/22 0300    Order Status: Canceled Specimen: Swab     CULTURE, BLOOD #1 [[id number]]     Order Status: Canceled Specimen: Blood     CULTURE, BLOOD #2 [[id number]]     Order Status: Canceled Specimen: Blood     CULTURE, BLOOD, PAIRED [[id number]] Collected: 01/21/22 0755    Order Status: Completed Specimen: Blood Updated: 01/28/22 1104     Special Requests: No Special Requests        Culture result: No growth 6 days              Labs:     Recent Labs     02/02/22  0540 02/01/22  0414   WBC 34.5* 21.4*   HGB 7.0* 8.4*   HCT 21.8* 25.5*    197     Recent Labs     02/02/22  0540 02/01/22 0414 01/31/22  0330   * 132* 130*   K 5.2* 4.5 4.5   CL 93* 94* 92*   CO2 30 30 36*   BUN 103* 69* 39*   CREA 0.84 0.70 0.60   * 231* 185*   CA 8.8 9.6 9.7   MG  --   --  2.1   PHOS  --   --  3.2     Recent Labs     02/02/22  0540 02/01/22  0414 01/31/22  0330   ALT 51 56  --    AP 62 77  --    TBILI 0.3 0.4  --    TP 5.4* 5.8*  --    ALB 1.6* 1.7* 1.8*   GLOB 3.8 4.1*  --      Recent Labs     02/02/22  0540 02/01/22 2126 02/01/22  1230   APTT >153.0* 41.2* >153.0*      No results for input(s): FE, TIBC, PSAT, FERR in the last 72 hours. No results found for: FOL, RBCF   Recent Labs     02/02/22  0300 02/01/22  0415   PH 7.36 7.45   PCO2 55* 46*   PO2 70* 85     No results for input(s): CPK, CKNDX, TROIQ in the last 72 hours.     No lab exists for component: CPKMB  No results found for: CHOL, CHOLX, CHLST, CHOLV, HDL, HDLP, LDL, LDLC, DLDLP, TGLX, TRIGL, TRIGP, CHHD, CHHDX  Lab Results   Component Value Date/Time    Glucose (POC) 157 (H) 01/31/2022 01:12 AM    Glucose (POC) 179 (H) 01/30/2022 04:57 AM    Glucose (POC) 169 (H) 01/28/2022 05:11 AM    Glucose (POC) 168 (H) 01/27/2022 11:33 AM    Glucose (POC) 182 (H) 01/27/2022 05:20 AM     Lab Results   Component Value Date/Time    Color Yellow/Straw 01/23/2022 10:30 AM    Appearance Turbid (A) 01/23/2022 10:30 AM    Specific gravity 1.027 01/23/2022 10:30 AM    pH (UA) 6.0 01/23/2022 10:30 AM    Protein 100 (A) 01/23/2022 10:30 AM    Glucose 50 (A) 01/23/2022 10:30 AM    Ketone 5 (A) 01/23/2022 10:30 AM    Bilirubin Negative 01/23/2022 10:30 AM    Urobilinogen 4.0 (H) 01/23/2022 10:30 AM    Nitrites Negative 01/23/2022 10:30 AM    Leukocyte Esterase Negative 01/23/2022 10:30 AM    Bacteria Negative 01/23/2022 10:30 AM    WBC 0-4 01/23/2022 10:30 AM    RBC 0-5 01/23/2022 10:30 AM         Assessment:     Acute hypoxemic respiratory failure on on ventilator 85% of   COVID-19 pneumonitis   hypotension  Thrombocytopenia  Hypertension  Arthritis  Hyperkalemia  Hyponatremia  Moderate protein calorie malnutrition  Patient need multiple partial finger amputation once stable as per vascular surgeon    Plan:         Olumiant 2 mg daily  Decadron 4 mg every 6 hours  Meropenem 1 g every 8 hours  Diltiazem 30 mg 3 times  Follow-up with pulmonologist and nephrologist  Kayexalate 30    Monitor sodium and potassium    Overall prognosis  Normal saline 50 cc/h      Patient is DNR  He want to continue current medication  Continue heparin drip    Seen by vascular surgeon he will plan to do multiple partial finger amputation 1 patient's stable  Repeat the lab    D/w  Pt son he want to continue present treatment plan  If patient condition get more worse he want to call him back      Current Facility-Administered Medications:     dexamethasone (DECADRON) 4 mg/mL injection 4 mg, 4 mg, IntraVENous, Q12H, Karan Flower MD    dilTIAZem IR (CARDIZEM) tablet 30 mg, 30 mg, Oral, TID, Karan Flower MD, 30 mg at 02/02/22 0823    heparin 25,000 units in  ml infusion, 18-36 Units/kg/hr (Adjusted), IntraVENous, TITRATE, Polo Lopez MD, Stopped at 02/02/22 0752    heparin (porcine) 1,000 unit/mL injection 6,080 Units, 80 Units/kg (Adjusted), IntraVENous, PRN, 6,080 Units at 02/02/22 0032 **OR** heparin (porcine) 1,000 unit/mL injection 3,040 Units, 40 Units/kg (Adjusted), IntraVENous, PRN, Rao Chaparro MD, 3,040 Units at 02/01/22 0645    fentaNYL (PF) 1,500 mcg/30 mL (50 mcg/mL) infusion, 0-200 mcg/hr, IntraVENous, TITRATE, Karan Flower MD, Last Rate: 1 mL/hr at 02/01/22 1646, 50 mcg/hr at 02/01/22 1646    hydrOXYzine pamoate (VISTARIL) capsule 25 mg, 25 mg, Oral, Q4H PRN, Polo Lopez MD    propofol (DIPRIVAN) 10 mg/mL infusion, 0-50 mcg/kg/min, IntraVENous, TITRATE, Karan Flower MD, Last Rate: 16.3 mL/hr at 02/02/22 0602, 30 mcg/kg/min at 02/02/22 0602    dexmedeTOMidine (PRECEDEX) 400 mcg in 0.9% sodium chloride (MBP/ADV) 100 mL MBP, 0.1-1.5 mcg/kg/hr, IntraVENous, TITRATE, Sho Flower MD, Stopped at 01/24/22 0824    NOREPINephrine (LEVOPHED) 8 mg in 0.9% NS 250ml infusion, 0.5-16 mcg/min, IntraVENous, TITRATE, Polo Lopez MD, Stopped at 01/25/22 1137    acetaminophen (TYLENOL) tablet 650 mg, 650 mg, Oral, Q6H PRN **OR** acetaminophen (TYLENOL) suppository 650 mg, 650 mg, Rectal, Q6H PRN, Alek Lopez MD    polyethylene glycol (MIRALAX) packet 17 g, 17 g, Oral, DAILY PRN, Alek Lopez MD    ondansetron (ZOFRAN ODT) tablet 4 mg, 4 mg, Oral, Q8H PRN **OR** [DISCONTINUED] ondansetron (ZOFRAN) injection 4 mg, 4 mg, IntraVENous, Q6H PRN, Polo Lopez MD    baricitinib (OLUMIANT) tablet 2 mg, 2 mg, Oral, DAILY, Polo Lopez MD, 2 mg at 02/02/22 0823    albuterol (PROVENTIL HFA, VENTOLIN HFA, PROAIR HFA) inhaler 2 Puff, 2 Puff, Inhalation, Q6H PRN, Alek Lopez MD    polyethylene glycol (MIRALAX) packet 17 g, 17 g, Oral, DAILY PRN, Polo Lopez MD, 17 g at 01/28/22 0844    [DISCONTINUED] ondansetron (ZOFRAN ODT) tablet 4 mg, 4 mg, Oral, Q8H PRN **OR** ondansetron (ZOFRAN) injection 4 mg, 4 mg, IntraVENous, Q6H PRN, Polo Lopez MD    meropenem (MERREM) 1 g in 0.9% sodium chloride 20 mL IV syringe, 1 g, IntraVENous, Q8H, Polo Lopez MD, 1 g at 02/02/22 9875    hydrOXYzine (VISTARIL) injection 25 mg, 25 mg, IntraMUSCular, Q6H PRN, Polo Lopez MD, 25 mg at 01/22/22 0111

## 2022-02-02 NOTE — PROGRESS NOTES
and  were present at 8 am for a family meeting/ goals of care.  call Dr at 200 and Dr stated that family received updates daily at this time. Family meeting did not take placce at this item. Advised nurse to contact [facility] for any further referrals.     [address], [facility]    Please [place] [facility]  in order to get in touch with  for any Spiritual Care Needs   [phone]   OR   Reach out to us on [address] [place] [address]

## 2022-02-03 NOTE — PROGRESS NOTES
IMPRESSION:   1. Acute hypoxic respiratory  2. Acute on chronic hypercapnic respiratory failure   3. COVID-19 pneumonia  4. Pulmonary edema  5. Severe Anemia hemoglobin dropped to 4 yesterday was 7  6. Sepsis with septic shock off pressors  7. Left hand ischemia  8. Arthritis hypertension by hx  9. Hyperkalemia   10. Hypernatremia corrected  11. Now hyponatremic      RECOMMENDATIONS/PLAN:   1. ICU monitoring  1. Patient condition got worse on 1/24 went to asystole subsequently got intubated now on ventilator assist control mode sedated with propofol and fentanyl Precedex was discontinued because of bradycardia arterial blood gases acceptable currently on A/C 12  PEEP 5 FiO2  85% PO2 acceptable will increase the rate on fentanyl and propofol   2. Patient is on dexamethasone and baricitinib  3. Significant drop in hemoglobin we will repeat CBC for COVID will transfuse 2 unit packed RBC  4. Patient is off Levophed  5. Leukocytosis we will repeat CBC  6. Potassium corrected  7. Left hand NATHALIE normal continue local wound care possible partial amputation  8. Pulmonary edema received Lasix chest x-ray shows bilateral infiltrate congestive changes  9. Left hand ischemia off IV heparin left ulnar artery occlusion radial is patent NATHALIE   10. Now hyponatremic   11. Troponin is elevated  12. Lactic acid 3.7  13.  She is on meropenem and finished Zithromax     [x] High complexity decision making was performed  [x] See my orders for details  HPI  68-year-old lady came in because of shortness of breath and dyspnea she has significant past medical history of arthritis hypertension she was recently discharged from the hospital came back with worsening of hypoxia she was discharged on oxygen 2 L nasal cannula and patient condition got worse she was supposed to be discharged to skilled care but patient's son took her home where her condition got worse and she was transferred back to the hospital now she is on noninvasive ventilator BiPAP machine hemodynamically unstable hypotensive on vasopressors and not giving much history. PMH:  has no past medical history on file. PSH:   has a past surgical history that includes ir insert non tunl cvc over 5 yrs (1/21/2022). FHX: family history is not on file.      SHX:      ALL:   Allergies   Allergen Reactions    Penicillins Hives        MEDS:   [x] Reviewed - As Below   [] Not reviewed    Current Facility-Administered Medications   Medication    dexamethasone (DECADRON) 4 mg/mL injection 4 mg    0.9% sodium chloride infusion    albumin human 25% (BUMINATE) solution 25 g    dilTIAZem IR (CARDIZEM) tablet 30 mg    heparin 25,000 units in  ml infusion    heparin (porcine) 1,000 unit/mL injection 6,080 Units    Or    heparin (porcine) 1,000 unit/mL injection 3,040 Units    fentaNYL (PF) 1,500 mcg/30 mL (50 mcg/mL) infusion    hydrOXYzine pamoate (VISTARIL) capsule 25 mg    propofol (DIPRIVAN) 10 mg/mL infusion    dexmedeTOMidine (PRECEDEX) 400 mcg in 0.9% sodium chloride (MBP/ADV) 100 mL MBP    NOREPINephrine (LEVOPHED) 8 mg in 0.9% NS 250ml infusion    acetaminophen (TYLENOL) tablet 650 mg    Or    acetaminophen (TYLENOL) suppository 650 mg    polyethylene glycol (MIRALAX) packet 17 g    ondansetron (ZOFRAN ODT) tablet 4 mg    baricitinib (OLUMIANT) tablet 2 mg    albuterol (PROVENTIL HFA, VENTOLIN HFA, PROAIR HFA) inhaler 2 Puff    polyethylene glycol (MIRALAX) packet 17 g    ondansetron (ZOFRAN) injection 4 mg    meropenem (MERREM) 1 g in 0.9% sodium chloride 20 mL IV syringe    hydrOXYzine (VISTARIL) injection 25 mg      MAR reviewed and pertinent medications noted or modified as needed   Current Facility-Administered Medications   Medication    dexamethasone (DECADRON) 4 mg/mL injection 4 mg    0.9% sodium chloride infusion    albumin human 25% (BUMINATE) solution 25 g    dilTIAZem IR (CARDIZEM) tablet 30 mg    heparin 25,000 units in  ml infusion    heparin (porcine) 1,000 unit/mL injection 6,080 Units    Or    heparin (porcine) 1,000 unit/mL injection 3,040 Units    fentaNYL (PF) 1,500 mcg/30 mL (50 mcg/mL) infusion    hydrOXYzine pamoate (VISTARIL) capsule 25 mg    propofol (DIPRIVAN) 10 mg/mL infusion    dexmedeTOMidine (PRECEDEX) 400 mcg in 0.9% sodium chloride (MBP/ADV) 100 mL MBP    NOREPINephrine (LEVOPHED) 8 mg in 0.9% NS 250ml infusion    acetaminophen (TYLENOL) tablet 650 mg    Or    acetaminophen (TYLENOL) suppository 650 mg    polyethylene glycol (MIRALAX) packet 17 g    ondansetron (ZOFRAN ODT) tablet 4 mg    baricitinib (OLUMIANT) tablet 2 mg    albuterol (PROVENTIL HFA, VENTOLIN HFA, PROAIR HFA) inhaler 2 Puff    polyethylene glycol (MIRALAX) packet 17 g    ondansetron (ZOFRAN) injection 4 mg    meropenem (MERREM) 1 g in 0.9% sodium chloride 20 mL IV syringe    hydrOXYzine (VISTARIL) injection 25 mg      PMH:  has no past medical history on file. PSH:   has a past surgical history that includes ir insert non tunl cvc over 5 yrs (2022). FHX: family history is not on file. SHX:       ROS:  Unable to obtain    Hemodynamics:    CO:    CI:    CVP:    SVR:   PAP Systolic:    PAP Diastolic:    PVR:    GC58:        Ventilator Settings:      Mode Rate TV Press PEEP FiO2 PIP Min.  Vent   Assist control,Volume control    450 ml    5 cm H20 85 %  29 cm H2O  10.2 l/min        Vital Signs: Telemetry:    normal sinus rhythm Intake/Output:   Visit Vitals  BP (!) 111/43   Pulse (!) 104   Temp 98.3 °F (36.8 °C)   Resp 25   Ht 5' 5\" (1.651 m)   Wt 104.5 kg (230 lb 6.1 oz)   SpO2 92%   BMI 38.34 kg/m²       Temp (24hrs), Av.2 °F (36.8 °C), Min:97.8 °F (36.6 °C), Max:98.6 °F (37 °C)        O2 Device: Ventilator         Wt Readings from Last 4 Encounters:   22 104.5 kg (230 lb 6.1 oz)   22 90.7 kg (200 lb)          Intake/Output Summary (Last 24 hours) at 2/3/2022 0826  Last data filed at 2/3/2022 1435  Gross per 24 hour   Intake 997.79 ml   Output 2380 ml   Net -1382.21 ml       Last shift:      02/03 0701 - 02/03 1900  In: -   Out: 300 [Urine:300]  Last 3 shifts: 02/01 1901 - 02/03 0700  In: 2169.7 [I.V.:869.7]  Out: 3070 [Urine:3070]       Physical Exam:     General: Intubated on ventilator unresponsive on propofol fentanyl  HEENT: NCAT,   Eyes: anicteric; conjunctiva clear no doll's eye reflex  Neck: no nodes, , trach midline; no accessory MM use.   Questionable neck vein distention  Chest: no deformity,   Cardiac: R regular; no murmur;   Lungs: Diffuse wheezes and rales  Abd: soft, NT, hypoactive BS  Ext: Edema of the leg edema; left hand finger blackish discoloration  : clear urine  Neuro: Sedated unresponsive on the ventilator no doll's eye reflex flaccid extremities  Psych-unable to assess  Skin: warm, dry, no cyanosis;   Pulses: Brachial radial pulses intact  Capillary: Normal capillary refill      DATA:    MAR reviewed and pertinent medications noted or modified as needed  MEDS:   Current Facility-Administered Medications   Medication    dexamethasone (DECADRON) 4 mg/mL injection 4 mg    0.9% sodium chloride infusion    albumin human 25% (BUMINATE) solution 25 g    dilTIAZem IR (CARDIZEM) tablet 30 mg    heparin 25,000 units in  ml infusion    heparin (porcine) 1,000 unit/mL injection 6,080 Units    Or    heparin (porcine) 1,000 unit/mL injection 3,040 Units    fentaNYL (PF) 1,500 mcg/30 mL (50 mcg/mL) infusion    hydrOXYzine pamoate (VISTARIL) capsule 25 mg    propofol (DIPRIVAN) 10 mg/mL infusion    dexmedeTOMidine (PRECEDEX) 400 mcg in 0.9% sodium chloride (MBP/ADV) 100 mL MBP    NOREPINephrine (LEVOPHED) 8 mg in 0.9% NS 250ml infusion    acetaminophen (TYLENOL) tablet 650 mg    Or    acetaminophen (TYLENOL) suppository 650 mg    polyethylene glycol (MIRALAX) packet 17 g    ondansetron (ZOFRAN ODT) tablet 4 mg    baricitinib (OLUMIANT) tablet 2 mg    albuterol (PROVENTIL HFA, VENTOLIN HFA, PROAIR HFA) inhaler 2 Puff    polyethylene glycol (MIRALAX) packet 17 g    ondansetron (ZOFRAN) injection 4 mg    meropenem (MERREM) 1 g in 0.9% sodium chloride 20 mL IV syringe    hydrOXYzine (VISTARIL) injection 25 mg        Labs:    Recent Labs     02/03/22  0515 02/02/22  2250 02/02/22  1600 02/02/22  0540 02/02/22  0540 02/01/22  1230 02/01/22  0414   WBC  --   --   --   --  34.5*  --  21.4*   HGB  --   --   --   --  7.0*  --  8.4*   PLT  --   --   --   --  231  --  197   APTT 91.3* 94.7* 104.9*   < > >153.0*   < > 63.1*    < > = values in this interval not displayed. Recent Labs     02/03/22  0515 02/02/22  0540 02/01/22  0414   * 131* 132*   K 5.3* 5.2* 4.5   CL 95* 93* 94*   CO2 28 30 30   * 335* 231*   * 103* 69*   CREA 1.14* 0.84 0.70   CA 8.8 8.8 9.6   ALB 2.4* 1.6* 1.7*   * 51 56     Recent Labs     02/03/22  0325 02/02/22  0300 02/01/22  0415   PH 7.35 7.36 7.45   PCO2 48* 55* 46*   PO2 65* 70* 85   HCO3 25 29* 31*   FIO2 85.0 85.0 50.0   1/30 AC 12  PEEP 5 FiO2 100%    Lab Results   Component Value Date/Time    Culture result: No growth 6 days 01/21/2022 07:55 AM    Culture result: No growth 6 days 01/18/2022 07:12 AM    Culture result: (A) 01/17/2022 07:12 AM     Staphylococcus species, coagulase negative growing in 1 of 4 bottles drawn NO SITE INDICATED    Culture result:  01/17/2022 07:12 AM     (NOTE) [facility] GROWING IN 1 OF 2 BOTTLES CALLED TO JOSE MIGUEL PAGAN AT 0831 ON 1/19/22. JF     No results found for: TSH, TSHEXT, TSHEXT     Imaging:    Results from Hospital Encounter encounter on 01/21/22    XR CHEST PORT    Narrative  Chest single view. Comparison single view chest February 2, 2022. Stable ET tube, NG tube, and right neck central venous catheter. Diffuse hazy reticular markings through the lungs unchanged compared to prior  imaging. Cardiac and mediastinal structures unchanged. Thoracic aorta  atherosclerosis.  No pneumothorax or sizable pleural effusion. Results from [place] encounter on 01/21/22    CT HEAD WO CONT    Narrative  The study is a noncontrasted head CT examination dated 1/21/2022. HISTORY: Unresponsive. TECHNIQUE: Thin section axial imaging was performed followed by sagittal and  coronal reconstructed imaging. Dose Reduction Technique was employed to reduce radiation exposure - This  includes reduction optimization techniques as appropriate to a performed exam  with automated exposure control adjustments of the mA and/or Kv according to  patient size, or use of iterative reconstruction technique. COMPARISON: CT head dated 1/17/2022. There is an appropriate size to the ventricles, the deep cortical sulci, and the  sylvian fissures for the patient's age. This examination is negative for  intracranial hemorrhage, mass effect or an extra axial collection. The  gray-white matter junctions are preserved without cytotoxic or vasogenic edema. An assessment of the white matter tracts demonstrates a mild decrease in the  density characteristics of the central periventricular white matter. These  findings are consistent with expected aging changes and milder microvascular  disease. There also is a region of diminished density within the right posterior  inferior cerebellar hemisphere which may represent an older cerebrovascular  insult. ORBITS: This examination is negative for acute orbital pathology. PARANASAL SINUSES: The paranasal sinuses are well pneumatized without acute  sinus disease. There is a decreased number of right sided mastoid air cells which can be  associated with chronic mastoiditis. The craniocervical junction images in a  normal fashion. Impression  1. There are milder microvascular changes within the central periventricular  white matter.   2.  There is an area of diminished density within the inferior aspect of the  right cerebellar hemisphere without change (series 201 image number 12). These  findings may represent an older ischemic insult within the right posterior  inferior cerebellar region. 3.  This examination is negative for acute intracranial pathology or short-term  interval changes dating back to 1/17/2022. 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 Chest x-ray looks like pulmonary edema.   She has peripheral edema we will give Lasix today discontinue D5W continue tube feedings we will check echocardiogram  · 1/31 off pressors remain intubated   · 2/1 remain intubated sedated on ventilator will do sedation vacation in a.m. still on 85%  · 2/2 on ventilator sedated elevated PCO2  · 2/3 remain intubated off pressors significant drop in hemoglobin and increasing white count we will repeat CBC and work accordingly  · Time of care 30-minute

## 2022-02-03 NOTE — PROGRESS NOTES
PROGRESS NOTE      Chief Complaints:  Patient examined ICU. HPI and  Objective:    Patient still on Levophed drip. Blood pressure is 108/44. Temperature 98.3. Patient currently getting also IV heparin. Vascular examination left arm shows strong Doppler signal on left radial artery. Left hand is warm. Demarcated area of the left hand digit 5, 4, 3 about the same. Review of Systems:  Unable to assess secondary to intubation. EXAM:  Visit Vitals  BP (!) 109/44 (BP 1 Location: Right lower arm, BP Patient Position: At rest)   Pulse (!) 104   Temp 98.3 °F (36.8 °C)   Resp 25   Ht 5' 5\" (1.651 m)   Wt 230 lb 6.1 oz (104.5 kg)   SpO2 92%   BMI 38.34 kg/m²       Patient is intubated. Head and neck atraumatic, normocephalic. ENT: No hoarse voice  Cardiac system regular rate rhythm. Pulmonary no audible wheeze  Chest wall excursion normal with respiration cycle  Abdomen is soft not particularly distended. Neurologically unable to assess  Skin is warm and moist.  Psychosocial: Unable to assess. Vascular examination as previously noted no changes.     Recent Results (from the past 24 hour(s))   PTT    Collection Time: 02/02/22  4:00 PM   Result Value Ref Range    aPTT 104.9 (H) 21.2 - 34.1 sec    aPTT, therapeutic range   82 - 109 sec   PTT    Collection Time: 02/02/22 10:50 PM   Result Value Ref Range    aPTT 94.7 (H) 21.2 - 34.1 sec    aPTT, therapeutic range   82 - 109 sec   BLOOD GAS, ARTERIAL    Collection Time: 02/03/22  3:25 AM   Result Value Ref Range    pH 7.35 7.35 - 7.45      PCO2 48 (H) 35 - 45 mmHg    PO2 65 (L) 75 - 100 mmHg    O2 SAT 92 (L) >95 %    BICARBONATE 25 22 - 26 mmol/L    BASE EXCESS 0.9 0 - 2 mmol/L    O2 METHOD VENT      FIO2 85.0 %    MODE Assist Control/Volume Control      Tidal volume 450      SET RATE 12      EPAP/CPAP/PEEP 5.0      SITE Right Radial      BEN'S TEST PASS     PTT    Collection Time: 02/03/22  5:15 AM   Result Value Ref Range    aPTT 91.3 (H) 21.2 - 34.1 sec    aPTT, therapeutic range   82 - 357 sec   METABOLIC PANEL, COMPREHENSIVE    Collection Time: 02/03/22  5:15 AM   Result Value Ref Range    Sodium 130 (L) 136 - 145 mmol/L    Potassium 5.3 (H) 3.5 - 5.1 mmol/L    Chloride 95 (L) 97 - 108 mmol/L    CO2 28 21 - 32 mmol/L    Anion gap 7 5 - 15 mmol/L    Glucose 434 (H) 65 - 100 mg/dL     (H) 6 - 20 mg/dL    Creatinine 1.14 (H) 0.55 - 1.02 mg/dL    BUN/Creatinine ratio 127 (H) 12 - 20      GFR est AA 56 (L) >60 ml/min/1.73m2    GFR est non-AA 46 (L) >60 ml/min/1.73m2    Calcium 8.8 8.5 - 10.1 mg/dL    Bilirubin, total 0.4 0.2 - 1.0 mg/dL    AST (SGOT) 323 (H) 15 - 37 U/L    ALT (SGPT) 307 (H) 12 - 78 U/L    Alk. phosphatase 48 45 - 117 U/L    Protein, total 4.8 (L) 6.4 - 8.2 g/dL    Albumin 2.4 (L) 3.5 - 5.0 g/dL    Globulin 2.4 2.0 - 4.0 g/dL    A-G Ratio 1.0 (L) 1.1 - 2.2         ASSESSMENT:   Patient is 80 y.o. with diagnosis of : Active Problems:    COVID-19 (1/17/2022)      Respiratory failure with hypoxia (HCC) (1/21/2022)      Hypernatremia (1/23/2022)      Hypokalemia (1/23/2022)    Limb ischemia. Hypoperfusion syndrome with a localized thrombosis on left hand. PLAN:      Continue wean off pressors if possible. Vascular examination shows no changes in the left hand. Apply iodine swabs to mummified area of the left finger 5, 4, and 3. Keep left hand elevated. At some point patient will need left finger 5, 4, and 3 partial digit amputation. I will continue monitor her progress. Critical care time spent: 30 minutes.

## 2022-02-03 NOTE — PROGRESS NOTES
Renal Daily Progress Note    Admit Date: 1/21/2022      Subjective:   She is intubated. She is on  sedation holiday for the last 4 hours. She is not responsive. Urine output 2245 mL. She has not had a bowel movement.       Current Facility-Administered Medications   Medication Dose Route Frequency    0.9% sodium chloride infusion 250 mL  250 mL IntraVENous PRN    dexamethasone (DECADRON) 4 mg/mL injection 4 mg  4 mg IntraVENous Q12H    0.9% sodium chloride infusion  50 mL/hr IntraVENous CONTINUOUS    albumin human 25% (BUMINATE) solution 25 g  25 g IntraVENous BID    dilTIAZem IR (CARDIZEM) tablet 30 mg  30 mg Oral TID    heparin 25,000 units in  ml infusion  18-36 Units/kg/hr (Adjusted) IntraVENous TITRATE    heparin (porcine) 1,000 unit/mL injection 6,080 Units  80 Units/kg (Adjusted) IntraVENous PRN    Or    heparin (porcine) 1,000 unit/mL injection 3,040 Units  40 Units/kg (Adjusted) IntraVENous PRN    fentaNYL (PF) 1,500 mcg/30 mL (50 mcg/mL) infusion  0-200 mcg/hr IntraVENous TITRATE    hydrOXYzine pamoate (VISTARIL) capsule 25 mg  25 mg Oral Q4H PRN    propofol (DIPRIVAN) 10 mg/mL infusion  0-50 mcg/kg/min IntraVENous TITRATE    dexmedeTOMidine (PRECEDEX) 400 mcg in 0.9% sodium chloride (MBP/ADV) 100 mL MBP  0.1-1.5 mcg/kg/hr IntraVENous TITRATE    NOREPINephrine (LEVOPHED) 8 mg in 0.9% NS 250ml infusion  0.5-16 mcg/min IntraVENous TITRATE    acetaminophen (TYLENOL) tablet 650 mg  650 mg Oral Q6H PRN    Or    acetaminophen (TYLENOL) suppository 650 mg  650 mg Rectal Q6H PRN    polyethylene glycol (MIRALAX) packet 17 g  17 g Oral DAILY PRN    ondansetron (ZOFRAN ODT) tablet 4 mg  4 mg Oral Q8H PRN    baricitinib (OLUMIANT) tablet 2 mg  2 mg Oral DAILY    albuterol (PROVENTIL HFA, VENTOLIN HFA, PROAIR HFA) inhaler 2 Puff  2 Puff Inhalation Q6H PRN    polyethylene glycol (MIRALAX) packet 17 g  17 g Oral DAILY PRN    ondansetron (ZOFRAN) injection 4 mg  4 mg IntraVENous Q6H PRN    meropenem (MERREM) 1 g in 0.9% sodium chloride 20 mL IV syringe  1 g IntraVENous Q8H    hydrOXYzine (VISTARIL) injection 25 mg  25 mg IntraMUSCular Q6H PRN        Review of Systems    ROS   Not obtainable    Objective:     Patient Vitals for the past 8 hrs:   BP Temp Pulse Resp SpO2 Height   02/03/22 1502   96 28 98 %    02/03/22 1500  98.4 °F (36.9 °C)       02/03/22 1415 (!) 114/47  96 27 98 % 5' 5\" (1.651 m)   02/03/22 1400   98 29 99 %    02/03/22 1300 (!) 118/38  100 29 99 %    02/03/22 1240 (!) 129/36 98.7 °F (37.1 °C) (!) 103 28 96 %    02/03/22 1225 (!) 131/33 98.8 °F (37.1 °C) (!) 106 29 95 %    02/03/22 1208 (!) 120/38 98.8 °F (37.1 °C) (!) 101 28 97 %    02/03/22 1200 (!) 131/33  (!) 103 28 96 %    02/03/22 1130 (!) 124/38 98.7 °F (37.1 °C) (!) 101 28 95 %    02/03/22 1107   100 28 93 %    02/03/22 1100 (!) 117/36 98.5 °F (36.9 °C) (!) 104 27 93 %    02/03/22 1045 (!) 122/33 98.8 °F (37.1 °C) (!) 108 26 93 %    02/03/22 1030 (!) 113/44 98.7 °F (37.1 °C) (!) 104 27 94 %    02/03/22 1000 (!) 113/44  (!) 103 27 94 %    02/03/22 0900 (!) 118/37  (!) 103 26 94 %      02/03 0701 - 02/03 1900  In: 3622 [I.V.:384]  Out: 1280 [Urine:1280]  02/01 1901 - 02/03 0700  In: 3547.4 [I.V.:2017.4]  Out: 3070 [Urine:3070]    Physical Exam:   Physical Exam  Constitutional:       Comments: Intubated and sedated   Cardiovascular:      Rate and Rhythm: Regular rhythm. Pulmonary:      Comments: Scattered rhonchi  Abdominal:      Palpations: Abdomen is soft. Mummified digits 3 4 and 5 distally on the left  She was not responsive to noxious stimuli and she was not blinking to threat. XR CHEST PORT   Final Result      XR CHEST PORT   Final Result      XR CHEST PORT   Final Result      WRIST BRACHIAL INDEX AT REST   Final Result      XR CHEST PORT   Final Result   No significant change. XR CHEST PORT   Final Result   No interval change.       DUPLEX UPPER EXT ARTERY LEFT   Final Result XR CHEST PORT   Final Result      XR CHEST PORT   Final Result   Endotracheal tube tip still at the maggie. Diffuse opacities in the   lungs, not significantly changed. XR CHEST PORT   Final Result      XR CHEST PORT   Final Result   Endotracheal tube tip at the maggie. Diffuse opacities in the   lungs, accentuated by lower lung volumes or increased. XR CHEST PORT   Final Result   Diffuse opacities in the lungs, not significantly changed. XR CHEST PORT   Final Result   1. ETT terminating 2 cm above the maggie. Remaining support apparatus as above. 2.  Worsening bilateral airspace disease with developing ARDS not excluded. XR CHEST PORT   Final Result   Moderate patchy diffuse bilateral airspace opacities, waxing and waning from the   prior exam.      XR CHEST PORT   Final Result   Moderate diffuse bilateral airspace opacities, likely a combination of edema and   airspace disease, mildly increased from the previous exam.      CT HEAD WO CONT   Final Result   1. There are milder microvascular changes within the central periventricular   white matter. 2.  There is an area of diminished density within the inferior aspect of the   right cerebellar hemisphere without change (series 201 image number 12). These   findings may represent an older ischemic insult within the right posterior   inferior cerebellar region. 3.  This examination is negative for acute intracranial pathology or short-term   interval changes dating back to 1/17/2022. XR CHEST PORT   Final Result   Patchy mixed asymmetric lung disease appears somewhat better but the   lungs are better inflated. No effusion or pneumothorax. Normal heart and   mediastinum      IR INSERT NON TUNL CVC OVER 5 YRS   Final Result   Ultrasound of the right neck demonstrated patent IJV. Successful US-guided placement of a right internal jugular triple lumen central   venous catheter as described above.  The catheter is functioning. PLAN:   Catheter position was confirmed by follow-up chest x-ray: catheter tip in the   lower SVC and ready to use. IR US GUIDED VASCULAR ACCESS   Final Result   Ultrasound of the right neck demonstrated patent IJV. Successful US-guided placement of a right internal jugular triple lumen central   venous catheter as described above. The catheter is functioning. PLAN:   Catheter position was confirmed by follow-up chest x-ray: catheter tip in the   lower SVC and ready to use. XR CHEST PORT   Final Result   Findings/impression:      Diffuse interstitial airspace disease/edema. No definite pleural effusion or   pneumothorax. Cardiac contours are partially obscured. No acute osseous abnormality identified. XR CHEST PORT    (Results Pending)        Data Review   Recent Labs     02/03/22  0840 02/02/22  0540 02/01/22  0414   WBC 36.8* 34.5* 21.4*   HGB 4.1* 7.0* 8.4*   HCT 12.4* 21.8* 25.5*    231 197     Recent Labs     02/03/22  0515 02/02/22  0540 02/01/22  0414   * 131* 132*   K 5.3* 5.2* 4.5   CL 95* 93* 94*   CO2 28 30 30   * 335* 231*   * 103* 69*   CREA 1.14* 0.84 0.70   CA 8.8 8.8 9.6   ALB 2.4* 1.6* 1.7*   * 51 56     No components found for: [place]  Recent Labs     02/03/22  0325 02/02/22  0300 02/01/22  0415   PH 7.35 7.36 7.45   PCO2 48* 55* 46*   PO2 65* 70* 85   HCO3 25 29* 31*   FIO2 85.0 85.0 50.0     No results for input(s): INR, INREXT, INREXT, INREXT in the last 72 hours. Assessment:           Active Problems:    COVID-19 (1/17/2022)      Respiratory failure with hypoxia (HCC) (1/21/2022)      Hypernatremia (1/23/2022)      Hypokalemia (1/23/2022)    Nonoliguric renal failure. She is predominantly prerenal.  The disproportionate increase in BUN secondary to steroids and  GI bleed . Modest hyperkalemia.     Respiratory acidosis    Modest hyponatremia with a corrected sodium of 134 mEq based on a blood sugar of 434 mg. Anemia with hemoglobin of 4.1 g. She is to be transfused today.   Plan:   Keep fluids isotonic  She is currently off heparin  Follow hemoglobin  No indication for extracorporeal therapy

## 2022-02-03 NOTE — PROGRESS NOTES
Comprehensive Nutrition Assessment    Type and Reason for Visit: Reassess (goal)    Nutrition Recommendations/Plan:   Hold TF to seek source of bleeding, possibly GI     As medically appropriate, consider restarting as follows:  TF via OGT of Nepro at goal of 12 ml/hr  Flush with 325ml water q6hrs or per nephrology  Please provide 2 pkts Prosource q6h (8pkt/d; 480kcals, 120g pro)  Provides 998kcals (78%), 143g pro (105%), and 1509ml water (100%)      Propofol 30mcg/kg/min providing 430kcals/d (110%)     Please document TF initiation, rate, flushes, prosource provision, and GRVs    Nutrition Assessment:  Admitted for hypoxia, hypotension, intubated (1/23). +COVID-19. Noted recent re-admit, inpatient x5d with COVID-19 but asymptomatic. (1/24) TF ordered by JOSE WILLIAMSON adjusted. TF continues today, well tolerated, rate and flushes adjusted by Neph and attending, currently providing 2146kcals (169%), and 163g pro (128%), and 2923ml water (195%)--excessive. With new drop in Hgb, concerns for GIB arise, discussed with RN to hold TF until GI consult vs source of bleeding can be identified. Unable to get in contact with attending at this time. Will leave recs for TF resumption once medically able. Labs: H/H H/H 4.1/12.4, N 10, K+ 5.3, , Cr 1.14, , LFT elevated, Alb 2.4. Meds: NS, IV albumin, Baricitinib, dexamethasone, fentanyl, meropenem, Propofol at 30mcg/kg/min. Malnutrition Assessment:  Malnutrition Status:  No malnutrition    Context:  Acute illness     Findings of the 6 clinical characteristics of malnutrition:   Energy Intake:  No significant decrease in energy intake  Weight Loss:  No significant weight loss     Body Fat Loss:  Unable to assess,     Muscle Mass Loss:  Unable to assess,    Fluid Accumulation:  No significant fluid accumulation,          Estimated Daily Nutrient Needs:  Energy (kcal): 1273kcals (14kcals/kg per [facility]);  Weight Used for Energy Requirements: Admission (90.9kg EDW/admit wt)  Protein (g): 136kcals (2.0g/kg per [facility]); Weight Used for Protein Requirements: Ideal (Geriatric IBW using BMI 25 is 68.1kg)  Fluid (ml/day): 1500ml; Method Used for Fluid Requirements: Other (comment) (adult minimum)      Nutrition Related Findings:  Unable to perform NFPE d/t COVID-19 precautions. No hx dysphagia. Last BM 1/21, new new. Non-pitting generalized edema and to x4 extremities. 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 .. Anthropometric Measures:  · Height:  5' 5\" (165.1 cm)  · Current Body Wt:  104.5 kg (230 lb 6.1 oz) (1/28)   · Admission Body Wt:  199 lb 15.3 oz (1/12)    · Usual Body Wt:   (stefan)     · Ideal Body Wt:  125 lbs:  184.3 %   · BMI Category:  Obese class 2 (BMI 35.0-39. 9)       Nutrition Diagnosis:   · Inadequate oral intake related to impaired respiratory function as evidenced by intubation,nutrition support-enteral nutrition      Nutrition Interventions:   Food and/or Nutrient Delivery: Continue NPO,Modify tube feeding  Nutrition Education and Counseling: No recommendations at this time,Education not appropriate  Coordination of Nutrition Care: Continue to monitor while inpatient    Goals:  Intakes >/=75% of EENs in >5 days, Wt maintenance within +/-0.5kg in >5 days       Nutrition Monitoring and Evaluation:   Behavioral-Environmental Outcomes: None identified  Food/Nutrient Intake Outcomes: Enteral nutrition intake/tolerance  Physical Signs/Symptoms Outcomes: Biochemical data,Fluid status or edema,Weight,Skin    Discharge Planning:     Too soon to determine     Electronically signed by Naomi Vazquez on 2/3/2022 at 2:01 PM    Contact: Ext [phone], or via [employer]

## 2022-02-03 NOTE — PROGRESS NOTES
Clinical chart reviewed by CM. Patient's discharge plan is to return home with home health services provided by [facility]. Referral sent via [facility]. CM will continue to follow.

## 2022-02-03 NOTE — PROGRESS NOTES
General Daily Progress Note          Patient Name:   Noel Moore       YOB: 1940       Age:  80 y.o.       Admit Date: 1/21/2022      Subjective:     Patient is a 80y.o. year old female with signal past medical history of hypertension arthritis who discharged from the hospital yesterday in stable condition patient is admitted last admission with COVID-19 patient was asymptomatic all this time while in the hospital patient discharged on 2 L oxygen to skilled care but patient son want to go home with home health but is at home patient's saturations drops sent back to the ER seen by the ER physician patient placed on BiPAP patient was little hypotensive started on Levophed admitted for further work-up and treatment           Patient is DNR    Patient on ventilator 85% O2  On propofol and fentanyl  drip    On heparin drip    NG tube in place for medication and feeding      Objective:     Visit Vitals  BP (!) 111/43   Pulse (!) 104   Temp 98.3 °F (36.8 °C)   Resp 25   Ht 5' 5\" (1.651 m)   Wt 104.5 kg (230 lb 6.1 oz)   SpO2 92%   BMI 38.34 kg/m²        Recent Results (from the past 24 hour(s))   PTT    Collection Time: 02/02/22  4:00 PM   Result Value Ref Range    aPTT 104.9 (H) 21.2 - 34.1 sec    aPTT, therapeutic range   82 - 109 sec   PTT    Collection Time: 02/02/22 10:50 PM   Result Value Ref Range    aPTT 94.7 (H) 21.2 - 34.1 sec    aPTT, therapeutic range   82 - 109 sec   BLOOD GAS, ARTERIAL    Collection Time: 02/03/22  3:25 AM   Result Value Ref Range    pH 7.35 7.35 - 7.45      PCO2 48 (H) 35 - 45 mmHg    PO2 65 (L) 75 - 100 mmHg    O2 SAT 92 (L) >95 %    BICARBONATE 25 22 - 26 mmol/L    BASE EXCESS 0.9 0 - 2 mmol/L    O2 METHOD VENT      FIO2 85.0 %    MODE Assist Control/Volume Control      Tidal volume 450      SET RATE 12      EPAP/CPAP/PEEP 5.0      SITE Right Radial      BEN'S TEST PASS     PTT    Collection Time: 02/03/22  5:15 AM   Result Value Ref Range    aPTT 91.3 (H) 21.2 - 34.1 sec    aPTT, therapeutic range   82 - 074 sec   METABOLIC PANEL, COMPREHENSIVE    Collection Time: 02/03/22  5:15 AM   Result Value Ref Range    Sodium 130 (L) 136 - 145 mmol/L    Potassium 5.3 (H) 3.5 - 5.1 mmol/L    Chloride 95 (L) 97 - 108 mmol/L    CO2 28 21 - 32 mmol/L    Anion gap 7 5 - 15 mmol/L    Glucose 434 (H) 65 - 100 mg/dL     (H) 6 - 20 mg/dL    Creatinine 1.14 (H) 0.55 - 1.02 mg/dL    BUN/Creatinine ratio 127 (H) 12 - 20      GFR est AA 56 (L) >60 ml/min/1.73m2    GFR est non-AA 46 (L) >60 ml/min/1.73m2    Calcium 8.8 8.5 - 10.1 mg/dL    Bilirubin, total 0.4 0.2 - 1.0 mg/dL    AST (SGOT) 323 (H) 15 - 37 U/L    ALT (SGPT) 307 (H) 12 - 78 U/L    Alk. phosphatase 48 45 - 117 U/L    Protein, total 4.8 (L) 6.4 - 8.2 g/dL    Albumin 2.4 (L) 3.5 - 5.0 g/dL    Globulin 2.4 2.0 - 4.0 g/dL    A-G Ratio 1.0 (L) 1.1 - 2.2       [unfilled]      Review of Systems    Unable to obtain. Physical Exam:      Constitutional: Awake on BiPAP  HENT:   Head: Normocephalic and atraumatic. Eyes: Pupils are equal, round, and reactive to light. EOM are normal.   Cardiovascular: Normal rate, regular rhythm and normal heart sounds. Pulmonary/Chest: Breath sounds normal. No wheezes. No rales. Exhibits no tenderness. Abdominal: Soft. Bowel sounds are normal. There is no abdominal tenderness. There is no rebound and no guarding. Musculoskeletal: Normal range of motion. Neurological: pt is alert and oriented to person, place, and time. XR CHEST PORT   Final Result      XR CHEST PORT   Final Result      XR CHEST PORT   Final Result      WRIST BRACHIAL INDEX AT REST   Final Result      XR CHEST PORT   Final Result   No significant change. XR CHEST PORT   Final Result   No interval change. DUPLEX UPPER EXT ARTERY LEFT   Final Result      XR CHEST PORT   Final Result      XR CHEST PORT   Final Result   Endotracheal tube tip still at the maggie.   Diffuse opacities in the   lungs, not significantly changed. XR CHEST PORT   Final Result      XR CHEST PORT   Final Result   Endotracheal tube tip at the maggie. Diffuse opacities in the   lungs, accentuated by lower lung volumes or increased. XR CHEST PORT   Final Result   Diffuse opacities in the lungs, not significantly changed. XR CHEST PORT   Final Result   1. ETT terminating 2 cm above the maggie. Remaining support apparatus as above. 2.  Worsening bilateral airspace disease with developing ARDS not excluded. XR CHEST PORT   Final Result   Moderate patchy diffuse bilateral airspace opacities, waxing and waning from the   prior exam.      XR CHEST PORT   Final Result   Moderate diffuse bilateral airspace opacities, likely a combination of edema and   airspace disease, mildly increased from the previous exam.      CT HEAD WO CONT   Final Result   1. There are milder microvascular changes within the central periventricular   white matter. 2.  There is an area of diminished density within the inferior aspect of the   right cerebellar hemisphere without change (series 201 image number 12). These   findings may represent an older ischemic insult within the right posterior   inferior cerebellar region. 3.  This examination is negative for acute intracranial pathology or short-term   interval changes dating back to 1/17/2022. XR CHEST PORT   Final Result   Patchy mixed asymmetric lung disease appears somewhat better but the   lungs are better inflated. No effusion or pneumothorax. Normal heart and   mediastinum      IR INSERT NON TUNL CVC OVER 5 YRS   Final Result   Ultrasound of the right neck demonstrated patent IJV. Successful US-guided placement of a right internal jugular triple lumen central   venous catheter as described above. The catheter is functioning. PLAN:   Catheter position was confirmed by follow-up chest x-ray: catheter tip in the   lower SVC and ready to use.       MANOLO Davidson Final Result   Ultrasound of the right neck demonstrated patent IJV. Successful US-guided placement of a right internal jugular triple lumen central   venous catheter as described above. The catheter is functioning. PLAN:   Catheter position was confirmed by follow-up chest x-ray: catheter tip in the   lower SVC and ready to use. XR CHEST PORT   Final Result   Findings/impression:      Diffuse interstitial airspace disease/edema. No definite pleural effusion or   pneumothorax. Cardiac contours are partially obscured. No acute osseous abnormality identified.       XR CHEST PORT    (Results Pending)        Recent Results (from the past 24 hour(s))   PTT    Collection Time: 02/02/22  4:00 PM   Result Value Ref Range    aPTT 104.9 (H) 21.2 - 34.1 sec    aPTT, therapeutic range   82 - 109 sec   PTT    Collection Time: 02/02/22 10:50 PM   Result Value Ref Range    aPTT 94.7 (H) 21.2 - 34.1 sec    aPTT, therapeutic range   82 - 109 sec   BLOOD GAS, ARTERIAL    Collection Time: 02/03/22  3:25 AM   Result Value Ref Range    pH 7.35 7.35 - 7.45      PCO2 48 (H) 35 - 45 mmHg    PO2 65 (L) 75 - 100 mmHg    O2 SAT 92 (L) >95 %    BICARBONATE 25 22 - 26 mmol/L    BASE EXCESS 0.9 0 - 2 mmol/L    O2 METHOD VENT      FIO2 85.0 %    MODE Assist Control/Volume Control      Tidal volume 450      SET RATE 12      EPAP/CPAP/PEEP 5.0      SITE Right Radial      BEN'S TEST PASS     PTT    Collection Time: 02/03/22  5:15 AM   Result Value Ref Range    aPTT 91.3 (H) 21.2 - 34.1 sec    aPTT, therapeutic range   82 - 388 sec   METABOLIC PANEL, COMPREHENSIVE    Collection Time: 02/03/22  5:15 AM   Result Value Ref Range    Sodium 130 (L) 136 - 145 mmol/L    Potassium 5.3 (H) 3.5 - 5.1 mmol/L    Chloride 95 (L) 97 - 108 mmol/L    CO2 28 21 - 32 mmol/L    Anion gap 7 5 - 15 mmol/L    Glucose 434 (H) 65 - 100 mg/dL     (H) 6 - 20 mg/dL    Creatinine 1.14 (H) 0.55 - 1.02 mg/dL    BUN/Creatinine ratio 127 (H) 12 - 20      GFR est AA 56 (L) >60 ml/min/1.73m2    GFR est non-AA 46 (L) >60 ml/min/1.73m2    Calcium 8.8 8.5 - 10.1 mg/dL    Bilirubin, total 0.4 0.2 - 1.0 mg/dL    AST (SGOT) 323 (H) 15 - 37 U/L    ALT (SGPT) 307 (H) 12 - 78 U/L    Alk. phosphatase 48 45 - 117 U/L    Protein, total 4.8 (L) 6.4 - 8.2 g/dL    Albumin 2.4 (L) 3.5 - 5.0 g/dL    Globulin 2.4 2.0 - 4.0 g/dL    A-G Ratio 1.0 (L) 1.1 - 2.2         Results     Procedure Component Value Units Date/Time    MRSA SCREEN - PCR (NASAL) [[id number]] Collected: 01/22/22 0440    Order Status: Canceled Specimen: Swab     MRSA SCREEN - PCR (NASAL) [[id number]] Collected: 01/22/22 0300    Order Status: Canceled Specimen: Swab     CULTURE, BLOOD #1 [[id number]]     Order Status: Canceled Specimen: Blood     CULTURE, BLOOD #2 [[id number]]     Order Status: Canceled Specimen: Blood     CULTURE, BLOOD, PAIRED [[id number]] Collected: 01/21/22 0755    Order Status: Completed Specimen: Blood Updated: 01/28/22 1104     Special Requests: No Special Requests        Culture result: No growth 6 days              Labs:     Recent Labs     02/02/22  0540 02/01/22  0414   WBC 34.5* 21.4*   HGB 7.0* 8.4*   HCT 21.8* 25.5*    197     Recent Labs     02/03/22  0515 02/02/22  0540 02/01/22  0414   * 131* 132*   K 5.3* 5.2* 4.5   CL 95* 93* 94*   CO2 28 30 30   * 103* 69*   CREA 1.14* 0.84 0.70   * 335* 231*   CA 8.8 8.8 9.6     Recent Labs     02/03/22  0515 02/02/22  0540 02/01/22  0414   * 51 56   AP 48 62 77   TBILI 0.4 0.3 0.4   TP 4.8* 5.4* 5.8*   ALB 2.4* 1.6* 1.7*   GLOB 2.4 3.8 4.1*     Recent Labs     02/03/22  0515 02/02/22  2250 02/02/22  1600   APTT 91.3* 94.7* 104.9*      No results for input(s): FE, TIBC, PSAT, FERR in the last 72 hours. No results found for: FOL, RBCF   Recent Labs     02/03/22  0325 02/02/22  0300   PH 7.35 7.36   PCO2 48* 55*   PO2 65* 70*     No results for input(s): CPK, CKNDX, TROIQ in the last 72 hours.     No lab exists for component: CPKMB  No results found for: CHOL, CHOLX, CHLST, CHOLV, HDL, HDLP, LDL, LDLC, DLDLP, TGLX, TRIGL, TRIGP, CHHD, CHHDX  Lab Results   Component Value Date/Time    Glucose (POC) 157 (H) 01/31/2022 01:12 AM    Glucose (POC) 179 (H) 01/30/2022 04:57 AM    Glucose (POC) 169 (H) 01/28/2022 05:11 AM    Glucose (POC) 168 (H) 01/27/2022 11:33 AM    Glucose (POC) 182 (H) 01/27/2022 05:20 AM     Lab Results   Component Value Date/Time    Color Yellow/Straw 01/23/2022 10:30 AM    Appearance Turbid (A) 01/23/2022 10:30 AM    Specific gravity 1.027 01/23/2022 10:30 AM    pH (UA) 6.0 01/23/2022 10:30 AM    Protein 100 (A) 01/23/2022 10:30 AM    Glucose 50 (A) 01/23/2022 10:30 AM    Ketone 5 (A) 01/23/2022 10:30 AM    Bilirubin Negative 01/23/2022 10:30 AM    Urobilinogen 4.0 (H) 01/23/2022 10:30 AM    Nitrites Negative 01/23/2022 10:30 AM    Leukocyte Esterase Negative 01/23/2022 10:30 AM    Bacteria Negative 01/23/2022 10:30 AM    WBC 0-4 01/23/2022 10:30 AM    RBC 0-5 01/23/2022 10:30 AM         Assessment:     Acute hypoxemic respiratory failure on on ventilator 85% of   COVID-19 pneumonitis   hypotension  Thrombocytopenia  Hypertension  Arthritis  Hyperkalemia  Hyponatremia  Moderate protein calorie malnutrition  Patient need multiple partial finger amputation once stable as per vascular surgeon    Acute kidney injury worsening follow-up with nephrology  Plan:     Monitor H&H  Kayexalate 30 g by G-tube    Olumiant 2 mg daily  Decadron 4 mg every 6 hours  Meropenem 1 g every 8 hours  Diltiazem 30 mg 3 times  Follow-up with pulmonologist and nephrologist      Monitor sodium and potassium    Overall prognosis  Normal saline 50 cc/h      Patient is DNR  He want to continue current medication  Continue heparin drip    Seen by vascular surgeon he will plan to do multiple partial finger amputation 1 patient's stable  Repeat the lab    D/w  Pt son he want to continue present treatment plan  If patient condition get more worse he want to call him back      Current Facility-Administered Medications:     dexamethasone (DECADRON) 4 mg/mL injection 4 mg, 4 mg, IntraVENous, Q12H, Karan Flower MD, 4 mg at 02/02/22 2025    0.9% sodium chloride infusion, 50 mL/hr, IntraVENous, CONTINUOUS, Polo Lopez MD, Last Rate: 50 mL/hr at 02/03/22 0745, 50 mL/hr at 02/03/22 0745    albumin human 25% (BUMINATE) solution 25 g, 25 g, IntraVENous, BID, ReltonJosé Miguel MD, Last Rate: 50 mL/hr at 02/02/22 2023, 25 g at 02/02/22 2023    dilTIAZem IR (CARDIZEM) tablet 30 mg, 30 mg, Oral, TID, Karan Flower MD, 30 mg at 02/02/22 2245    heparin 25,000 units in  ml infusion, 18-36 Units/kg/hr (Adjusted), IntraVENous, TITRATE, Polo Lopez MD, Last Rate: 9.1 mL/hr at 02/03/22 0745, 12 Units/kg/hr at 02/03/22 0745    heparin (porcine) 1,000 unit/mL injection 6,080 Units, 80 Units/kg (Adjusted), IntraVENous, PRN, 6,080 Units at 02/02/22 0032 **OR** heparin (porcine) 1,000 unit/mL injection 3,040 Units, 40 Units/kg (Adjusted), IntraVENous, PRN, Modesto Ramirez MD, 3,040 Units at 02/01/22 0645    fentaNYL (PF) 1,500 mcg/30 mL (50 mcg/mL) infusion, 0-200 mcg/hr, IntraVENous, TITRATE, Karan Flower MD, Last Rate: 1 mL/hr at 02/02/22 1837, 50 mcg/hr at 02/02/22 1837    hydrOXYzine pamoate (VISTARIL) capsule 25 mg, 25 mg, Oral, Q4H PRN, Polo Lopez MD    propofol (DIPRIVAN) 10 mg/mL infusion, 0-50 mcg/kg/min, IntraVENous, TITRATE, Karan Flower MD, Last Rate: 16.3 mL/hr at 02/03/22 0751, 30 mcg/kg/min at 02/03/22 0751    dexmedeTOMidine (PRECEDEX) 400 mcg in 0.9% sodium chloride (MBP/ADV) 100 mL MBP, 0.1-1.5 mcg/kg/hr, IntraVENous, TITRATE, Karan Flower MD, Stopped at 01/24/22 0824    NOREPINephrine (LEVOPHED) 8 mg in 0.9% NS 250ml infusion, 0.5-16 mcg/min, IntraVENous, TITRATE, Polo Lopez MD, Stopped at 01/25/22 1137    acetaminophen (TYLENOL) tablet 650 mg, 650 mg, Oral, Q6H PRN **OR** acetaminophen (TYLENOL) suppository 650 mg, 650 mg, Rectal, Q6H PRN, Mohiuddin, Gibson Babinski, MD    polyethylene glycol (MIRALAX) packet 17 g, 17 g, Oral, DAILY PRN, Mohiuddin, Gibson Babinski, MD    ondansetron (ZOFRAN ODT) tablet 4 mg, 4 mg, Oral, Q8H PRN **OR** [DISCONTINUED] ondansetron (ZOFRAN) injection 4 mg, 4 mg, IntraVENous, Q6H PRN, Polo Lopez MD    baricitinib (OLUMIANT) tablet 2 mg, 2 mg, Oral, DAILY, Polo Lopez MD, 2 mg at 02/02/22 0823    albuterol (PROVENTIL HFA, VENTOLIN HFA, PROAIR HFA) inhaler 2 Puff, 2 Puff, Inhalation, Q6H PRN, Polo Lopez MD    polyethylene glycol (MIRALAX) packet 17 g, 17 g, Oral, DAILY PRN, Polo Lopez MD, 17 g at 01/28/22 0844    [DISCONTINUED] ondansetron (ZOFRAN ODT) tablet 4 mg, 4 mg, Oral, Q8H PRN **OR** ondansetron (ZOFRAN) injection 4 mg, 4 mg, IntraVENous, Q6H PRN, Polo Lopez MD    meropenem (MERREM) 1 g in 0.9% sodium chloride 20 mL IV syringe, 1 g, IntraVENous, Q8H, Polo Lopez MD, 1 g at 02/03/22 0013    hydrOXYzine (VISTARIL) injection 25 mg, 25 mg, IntraMUSCular, Q6H PRN, Polo Lopez MD, 25 mg at 01/22/22 0111

## 2022-02-04 NOTE — PROGRESS NOTES
PROGRESS NOTE      Chief Complaints:  Patient examined ICU. HPI and  Objective:    Patient currently vented. Current blood pressure 140/39. Heart rate 86 temperature 98. Oxygen is 95%. 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 Strong Doppler signals noted left radial artery. Review of Systems:  Unable to assess due to intubation. EXAM:  Visit Vitals  BP (!) 149/35   Pulse 86   Temp 98 °F (36.7 °C)   Resp 18   Ht 5' 5\" (1.651 m)   Wt 230 lb 6.1 oz (104.5 kg)   SpO2 95%   BMI 38.34 kg/m²       Patient is intubated. Head and neck atraumatic, normocephalic. ENT: No hoarse voice  Cardiac system regular rate rhythm. Pulmonary no audible wheeze  Chest wall excursion normal with respiration cycle  Abdomen is soft not particularly distended. Neurologically unable to assess  Skin is warm and moist.  Psychosocial: Unable to assess  Vascular examination as previously noted no changes. Recent Results (from the past 24 hour(s))   CBC WITH AUTOMATED DIFF    Collection Time: 02/03/22  8:40 AM   Result Value Ref Range    WBC 36.8 (H) 3.6 - 11.0 K/uL    RBC 1.31 (L) 3.80 - 5.20 M/uL    HGB 4.1 (LL) 11.5 - 16.0 g/dL    HCT 12.4 (LL) 35.0 - 47.0 %    MCV 94.7 80.0 - 99.0 FL    MCH 31.3 26.0 - 34.0 PG    MCHC 33.1 30.0 - 36.5 g/dL    RDW 13.7 11.5 - 14.5 %    PLATELET 143 491 - 203 K/uL    MPV 13.0 (H) 8.9 - 12.9 FL    NRBC 0.3 (H) 0.0  WBC    ABSOLUTE NRBC 0.12 (H) 0.00 - 0.01 K/uL    NEUTROPHILS 90 (H) 32 - 75 %    BAND NEUTROPHILS 1 0 - 6 %    LYMPHOCYTES 5 (L) 12 - 49 %    MONOCYTES 4 (L) 5 - 13 %    EOSINOPHILS 0 0 - 7 %    BASOPHILS 0 0 - 1 %    IMMATURE GRANULOCYTES 0 %    ABS. NEUTROPHILS 33.5 (H) 1.8 - 8.0 K/UL    ABS. LYMPHOCYTES 1.8 0.8 - 3.5 K/UL    ABS. MONOCYTES 1.5 (H) 0.0 - 1.0 K/UL    ABS. EOSINOPHILS 0.0 0.0 - 0.4 K/UL    ABS. BASOPHILS 0.0 0.0 - 0.1 K/UL    ABS. IMM.  GRANS. 0.0 K/UL    DF Manual      RBC COMMENTS Normocytic, Normochromic     TYPE & SCREEN    Collection Time: 02/03/22  9:00 AM   Result Value Ref Range    Crossmatch Expiration 02/06/2022,2359     ABO/Rh(D) O Positive     Antibody screen Negative     Unit number [id number]     Blood component type RC LR,1     Unit division 00     Status of unit Issued,final     TRANSFUSION STATUS Ok to transfuse     Crossmatch result Compatible     Unit number [id number]     Blood component type RC LR,2     Unit division 00     Status of unit Issued,final     TRANSFUSION STATUS Ok to transfuse     Crossmatch result Compatible     Unit number [id number]     Blood component type RC LR     Unit division 00     Status of unit Issued,final     TRANSFUSION STATUS Ok to transfuse     Crossmatch result Compatible    HGB & HCT    Collection Time: 02/03/22  5:00 PM   Result Value Ref Range    HGB 6.6 (L) 11.5 - 16.0 g/dL    HCT 19.0 (L) 35.0 - 47.0 %   BLOOD GAS, ARTERIAL    Collection Time: 02/04/22  3:45 AM   Result Value Ref Range    pH 7.40 7.35 - 7.45      PCO2 49 (H) 35 - 45 mmHg    PO2 71 (L) 75 - 100 mmHg    O2 SAT 95 (L) >95 %    BICARBONATE 29 (H) 22 - 26 mmol/L    BASE EXCESS 4.5 (H) 0 - 2 mmol/L    O2 METHOD VENT      FIO2 85.0 %    MODE Assist Control/Volume Control      Tidal volume 450      SET RATE 12      EPAP/CPAP/PEEP 5.0      SITE Right Radial      BEN'S TEST PASS     PTT    Collection Time: 02/04/22  3:55 AM   Result Value Ref Range    aPTT 26.2 21.2 - 34.1 sec    aPTT, therapeutic range   82 - 109 sec   CBC WITH AUTOMATED DIFF    Collection Time: 02/04/22  3:55 AM   Result Value Ref Range    WBC 28.1 (H) 3.6 - 11.0 K/uL    RBC 2.53 (L) 3.80 - 5.20 M/uL    HGB 8.2 (L) 11.5 - 16.0 g/dL    HCT 24.2 (L) 35.0 - 47.0 %    MCV 95.7 80.0 - 99.0 FL    MCH 32.4 26.0 - 34.0 PG    MCHC 33.9 30.0 - 36.5 g/dL    RDW 14.5 11.5 - 14.5 %    PLATELET 702 (L) 382 - 400 K/uL    MPV 12.2 8.9 - 12.9 FL    NRBC 0.6 (H) 0.0  WBC    ABSOLUTE NRBC 0.16 (H) 0.00 - 0.01 K/uL    NEUTROPHILS 90 (H) 32 - 75 %    LYMPHOCYTES 3 (L) 12 - 49 %    MONOCYTES 5 5 - 13 %    EOSINOPHILS 0 0 - 7 %    BASOPHILS 0 0 - 1 %    IMMATURE GRANULOCYTES 2 (H) 0 - 0.5 %    ABS. NEUTROPHILS 25.2 (H) 1.8 - 8.0 K/UL    ABS. LYMPHOCYTES 0.8 0.8 - 3.5 K/UL    ABS. MONOCYTES 1.5 (H) 0.0 - 1.0 K/UL    ABS. EOSINOPHILS 0.0 0.0 - 0.4 K/UL    ABS. BASOPHILS 0.0 0.0 - 0.1 K/UL    ABS. IMM. GRANS. 0.6 (H) 0.00 - 0.04 K/UL    DF AUTOMATED     METABOLIC PANEL, COMPREHENSIVE    Collection Time: 02/04/22  3:55 AM   Result Value Ref Range    Sodium 140 136 - 145 mmol/L    Potassium 5.3 (H) 3.5 - 5.1 mmol/L    Chloride 105 97 - 108 mmol/L    CO2 30 21 - 32 mmol/L    Anion gap 5 5 - 15 mmol/L    Glucose 418 (H) 65 - 100 mg/dL     (H) 6 - 20 mg/dL    Creatinine 0.89 0.55 - 1.02 mg/dL    BUN/Creatinine ratio 133 (H) 12 - 20      GFR est AA >60 >60 ml/min/1.73m2    GFR est non-AA >60 >60 ml/min/1.73m2    Calcium 9.4 8.5 - 10.1 mg/dL    Bilirubin, total 0.7 0.2 - 1.0 mg/dL    AST (SGOT) 305 (H) 15 - 37 U/L    ALT (SGPT) 403 (H) 12 - 78 U/L    Alk. phosphatase 82 45 - 117 U/L    Protein, total 5.5 (L) 6.4 - 8.2 g/dL    Albumin 3.1 (L) 3.5 - 5.0 g/dL    Globulin 2.4 2.0 - 4.0 g/dL    A-G Ratio 1.3 1.1 - 2.2         ASSESSMENT:   Patient is 80 y.o. with diagnosis of : Active Problems:    COVID-19 (1/17/2022)      Respiratory failure with hypoxia ([address] (1/21/2022)      Hypernatremia (1/23/2022)      Hypokalemia (1/23/2022)      Limb ischemia left hand with localized thrombosis from hypoperfusion. PLAN:                 Please apply iodine swabs daily twice on the left hand digits 5, 4, and 3. Keep left hand elevated. Vascular status is about the same. Continue optimize blood pressure and meanwhile try to get off pressors if possible. I will continue monitor her progress. Critical care time spent: 30 minutes.

## 2022-02-04 NOTE — PROGRESS NOTES
IMPRESSION:   1. Acute hypoxic respiratory  2. Acute on chronic hypercapnic respiratory failure   3. COVID-19 pneumonia  4. Pulmonary edema  5. Severe Anemia hemoglobin dropped to 4 yesterday was 7  6. Sepsis with septic shock now back on pressors  7. Left hand ischemia  8. Arthritis hypertension by hx  9. Hyperkalemia   10. Hypernatremia corrected  11. Now hyponatremic      RECOMMENDATIONS/PLAN:   1. ICU monitoring  1. Patient condition got worse on 1/24 went to asystole subsequently got intubated now on ventilator assist control mode sedated with propofol and fentanyl Precedex was discontinued because of bradycardia arterial blood gases acceptable currently on A/C 12  PEEP 5 FiO2  85% PO2 acceptable will increase the rate on fentanyl and propofol   2. Patient is on dexamethasone and baricitinib  3. Significant drop in hemoglobin we will repeat CBC for COVID recieved 3 unit packed RBC hemoglobin improved  4. Patient is back on Levophed hemodynamically unstable will continue to wean  5. Leukocytosis will repeat CBC   6. Potassium corrected  7. Left hand NATHALIE normal continue local wound care possible partial amputation  8. Pulmonary edema received Lasix chest x-ray shows bilateral infiltrate congestive changes  9. Left hand ischemia off IV heparin left ulnar artery occlusion radial  patent NATHALIE normal  10. Now hyponatremic   11. Troponin is elevated  12. Lactic acid 3.7  13.  She is on meropenem and finished Zithromax     [x] High complexity decision making was performed  [x] See my orders for details  HPI  68-year-old lady came in because of shortness of breath and dyspnea she has significant past medical history of arthritis hypertension she was recently discharged from the hospital came back with worsening of hypoxia she was discharged on oxygen 2 L nasal cannula and patient condition got worse she was supposed to be discharged to skilled care but patient's son took her home where her condition got worse and she was transferred back to the hospital now she is on noninvasive ventilator BiPAP machine hemodynamically unstable hypotensive on vasopressors and not giving much history. PMH:  has no past medical history on file. PSH:   has a past surgical history that includes ir insert non tunl cvc over 5 yrs (1/21/2022). FHX: family history is not on file.      SHX:      ALL:   Allergies   Allergen Reactions    Penicillins Hives        MEDS:   [x] Reviewed - As Below   [] Not reviewed    Current Facility-Administered Medications   Medication    0.9% sodium chloride infusion 250 mL    0.9% sodium chloride infusion 250 mL    dexamethasone (DECADRON) 4 mg/mL injection 4 mg    0.9% sodium chloride infusion    dilTIAZem IR (CARDIZEM) tablet 30 mg    heparin 25,000 units in  ml infusion    heparin (porcine) 1,000 unit/mL injection 6,080 Units    Or    heparin (porcine) 1,000 unit/mL injection 3,040 Units    fentaNYL (PF) 1,500 mcg/30 mL (50 mcg/mL) infusion    hydrOXYzine pamoate (VISTARIL) capsule 25 mg    propofol (DIPRIVAN) 10 mg/mL infusion    dexmedeTOMidine (PRECEDEX) 400 mcg in 0.9% sodium chloride (MBP/ADV) 100 mL MBP    NOREPINephrine (LEVOPHED) 8 mg in 0.9% NS 250ml infusion    acetaminophen (TYLENOL) tablet 650 mg    Or    acetaminophen (TYLENOL) suppository 650 mg    polyethylene glycol (MIRALAX) packet 17 g    ondansetron (ZOFRAN ODT) tablet 4 mg    baricitinib (OLUMIANT) tablet 2 mg    albuterol (PROVENTIL HFA, VENTOLIN HFA, PROAIR HFA) inhaler 2 Puff    polyethylene glycol (MIRALAX) packet 17 g    ondansetron (ZOFRAN) injection 4 mg    meropenem (MERREM) 1 g in 0.9% sodium chloride 20 mL IV syringe    hydrOXYzine (VISTARIL) injection 25 mg      MAR reviewed and pertinent medications noted or modified as needed   Current Facility-Administered Medications   Medication    0.9% sodium chloride infusion 250 mL    0.9% sodium chloride infusion 250 mL    dexamethasone (DECADRON) 4 mg/mL injection 4 mg    0.9% sodium chloride infusion    dilTIAZem IR (CARDIZEM) tablet 30 mg    heparin 25,000 units in  ml infusion    heparin (porcine) 1,000 unit/mL injection 6,080 Units    Or    heparin (porcine) 1,000 unit/mL injection 3,040 Units    fentaNYL (PF) 1,500 mcg/30 mL (50 mcg/mL) infusion    hydrOXYzine pamoate (VISTARIL) capsule 25 mg    propofol (DIPRIVAN) 10 mg/mL infusion    dexmedeTOMidine (PRECEDEX) 400 mcg in 0.9% sodium chloride (MBP/ADV) 100 mL MBP    NOREPINephrine (LEVOPHED) 8 mg in 0.9% NS 250ml infusion    acetaminophen (TYLENOL) tablet 650 mg    Or    acetaminophen (TYLENOL) suppository 650 mg    polyethylene glycol (MIRALAX) packet 17 g    ondansetron (ZOFRAN ODT) tablet 4 mg    baricitinib (OLUMIANT) tablet 2 mg    albuterol (PROVENTIL HFA, VENTOLIN HFA, PROAIR HFA) inhaler 2 Puff    polyethylene glycol (MIRALAX) packet 17 g    ondansetron (ZOFRAN) injection 4 mg    meropenem (MERREM) 1 g in 0.9% sodium chloride 20 mL IV syringe    hydrOXYzine (VISTARIL) injection 25 mg      PMH:  has no past medical history on file. PSH:   has a past surgical history that includes ir insert non tunl cvc over 5 yrs (2022). FHX: family history is not on file. SHX:       ROS:  Unable to obtain    Hemodynamics:    CO:    CI:    CVP:    SVR:   PAP Systolic:    PAP Diastolic:    PVR:    UM49:        Ventilator Settings:      Mode Rate TV Press PEEP FiO2 PIP Min.  Vent   Assist control,Volume control    450 ml    5 cm H20 85 %  24 cm H2O  7.84 l/min        Vital Signs: Telemetry:    normal sinus rhythm Intake/Output:   Visit Vitals  BP (!) 149/35   Pulse 86   Temp 98 °F (36.7 °C)   Resp 18   Ht 5' 5\" (1.651 m)   Wt 104.5 kg (230 lb 6.1 oz)   SpO2 95%   BMI 38.34 kg/m²       Temp (24hrs), Av.6 °F (37 °C), Min:97.7 °F (36.5 °C), Max:103.5 °F (39.7 °C)        O2 Device: Ventilator         Wt Readings from Last 4 Encounters:   22 104.5 kg (230 lb 6.1 oz)   22 90.7 kg (200 lb)          Intake/Output Summary (Last 24 hours) at 2/4/2022 0833  Last data filed at 2/4/2022 0811  Gross per 24 hour   Intake 2584.35 ml   Output 1980 ml   Net 604.35 ml       Last shift:      02/04 0701 - 02/04 1900  In: 310   Out: 325 [Urine:325]  Last 3 shifts: 02/02 1901 - 02/04 0700  In: 3652 [I.V.:1921.3]  Out: 4599 [Urine:3005]       Physical Exam:     General: Intubated on ventilator unresponsive on propofol fentanyl  HEENT: NCAT,   Eyes: anicteric; conjunctiva clear no doll's eye reflex  Neck: no nodes, , trach midline; no accessory MM use.   Questionable neck vein distention  Chest: no deformity,   Cardiac: R regular; no murmur;   Lungs: Diffuse wheezes and rales  Abd: soft, NT, hypoactive BS  Ext: Edema of the leg edema; left hand finger blackish discoloration  : clear urine  Neuro: Sedated unresponsive on the ventilator no doll's eye reflex flaccid extremities  Psych-unable to assess  Skin: warm, dry, no cyanosis;   Pulses: Brachial radial pulses intact  Capillary: Normal capillary refill      DATA:    MAR reviewed and pertinent medications noted or modified as needed  MEDS:   Current Facility-Administered Medications   Medication    0.9% sodium chloride infusion 250 mL    0.9% sodium chloride infusion 250 mL    dexamethasone (DECADRON) 4 mg/mL injection 4 mg    0.9% sodium chloride infusion    dilTIAZem IR (CARDIZEM) tablet 30 mg    heparin 25,000 units in  ml infusion    heparin (porcine) 1,000 unit/mL injection 6,080 Units    Or    heparin (porcine) 1,000 unit/mL injection 3,040 Units    fentaNYL (PF) 1,500 mcg/30 mL (50 mcg/mL) infusion    hydrOXYzine pamoate (VISTARIL) capsule 25 mg    propofol (DIPRIVAN) 10 mg/mL infusion    dexmedeTOMidine (PRECEDEX) 400 mcg in 0.9% sodium chloride (MBP/ADV) 100 mL MBP    NOREPINephrine (LEVOPHED) 8 mg in 0.9% NS 250ml infusion    acetaminophen (TYLENOL) tablet 650 mg    Or    acetaminophen (TYLENOL) suppository 650 mg    polyethylene glycol (MIRALAX) packet 17 g    ondansetron (ZOFRAN ODT) tablet 4 mg    baricitinib (OLUMIANT) tablet 2 mg    albuterol (PROVENTIL HFA, VENTOLIN HFA, PROAIR HFA) inhaler 2 Puff    polyethylene glycol (MIRALAX) packet 17 g    ondansetron (ZOFRAN) injection 4 mg    meropenem (MERREM) 1 g in 0.9% sodium chloride 20 mL IV syringe    hydrOXYzine (VISTARIL) injection 25 mg        Labs:    Recent Labs     02/04/22  0355 02/03/22  1700 02/03/22  0840 02/03/22  0515 02/02/22  2250 02/02/22  1600 02/02/22  0540 02/02/22  0540   WBC 28.1*  --  36.8*  --   --   --   --  34.5*   HGB 8.2* 6.6* 4.1*  --   --   --    < > 7.0*   *  --  186  --   --   --   --  231   APTT 26.2  --   --  91.3* 94.7*   < >  --  >153.0*    < > = values in this interval not displayed. Recent Labs     02/04/22  0355 02/03/22  0515 02/02/22  0540    130* 131*   K 5.3* 5.3* 5.2*    95* 93*   CO2 30 28 30   * 434* 335*   * 145* 103*   CREA 0.89 1.14* 0.84   CA 9.4 8.8 8.8   ALB 3.1* 2.4* 1.6*   * 307* 51     Recent Labs     02/04/22  0345 02/03/22  0325 02/02/22  0300   PH 7.40 7.35 7.36   PCO2 49* 48* 55*   PO2 71* 65* 70*   HCO3 29* 25 29*   FIO2 85.0 85.0 85.0   1/30 AC 12  PEEP 5 FiO2 100%    Lab Results   Component Value Date/Time    Culture result: No growth 6 days 01/21/2022 07:55 AM    Culture result: No growth 6 days 01/18/2022 07:12 AM    Culture result: (A) 01/17/2022 07:12 AM     Staphylococcus species, coagulase negative growing in 1 of 4 bottles drawn NO SITE INDICATED    Culture result:  01/17/2022 07:12 AM     (NOTE) [facility] GROWING IN 1 OF 2 BOTTLES CALLED TO JOSE MIGUEL PAGAN AT 0831 ON 1/19/22.  JF     No results found for: TSH, TSHEXT, TSHEXT     Imaging:    Results from Hospital Encounter encounter on 01/21/22    XR CHEST PORT    Narrative  1 view comparison yesterday    ET and NG tube and central line remain    Continued hypoinflation with unchanged interstitial edema. There may be small  left effusion. Normal heart and no congestion. No pneumothorax      Results from Hospital Encounter encounter on 01/21/22    CT HEAD WO CONT    Narrative  The study is a noncontrasted head CT examination dated 1/21/2022. HISTORY: Unresponsive. TECHNIQUE: Thin section axial imaging was performed followed by sagittal and  coronal reconstructed imaging. Dose Reduction Technique was employed to reduce radiation exposure - This  includes reduction optimization techniques as appropriate to a performed exam  with automated exposure control adjustments of the mA and/or Kv according to  patient size, or use of iterative reconstruction technique. COMPARISON: CT head dated 1/17/2022. There is an appropriate size to the ventricles, the deep cortical sulci, and the  sylvian fissures for the patient's age. This examination is negative for  intracranial hemorrhage, mass effect or an extra axial collection. The  gray-white matter junctions are preserved without cytotoxic or vasogenic edema. An assessment of the white matter tracts demonstrates a mild decrease in the  density characteristics of the central periventricular white matter. These  findings are consistent with expected aging changes and milder microvascular  disease. There also is a region of diminished density within the right posterior  inferior cerebellar hemisphere which may represent an older cerebrovascular  insult. ORBITS: This examination is negative for acute orbital pathology. PARANASAL SINUSES: The paranasal sinuses are well pneumatized without acute  sinus disease. There is a decreased number of right sided mastoid air cells which can be  associated with chronic mastoiditis. The craniocervical junction images in a  normal fashion. Impression  1. There are milder microvascular changes within the central periventricular  white matter.   2.  There is an area of diminished density within the inferior aspect of the  right cerebellar hemisphere without change (series 201 image number 12). These  findings may represent an older ischemic insult within the right posterior  inferior cerebellar region. 3.  This examination is negative for acute intracranial pathology or short-term  interval changes dating back to 1/17/2022. 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 Chest x-ray looks like pulmonary edema.   She has peripheral edema we will give Lasix today discontinue D5W continue tube feedings we will check echocardiogram  · 1/31 off pressors remain intubated   · 2/1 remain intubated sedated on ventilator will do sedation vacation in a.m. still on 85%  · 2/2 on ventilator sedated elevated PCO2  · 2/3 remain intubated off pressors significant drop in hemoglobin and increasing white count we will repeat CBC and work accordingly  · 2/4 patient is back on Levophed hemodynamically unstable still on ventilator 85% FiO2  · Time of care 30-minute

## 2022-02-04 NOTE — PROGRESS NOTES
Renal Daily Progress Note    Admit Date: 1/21/2022      Subjective:   She is intubated. Chart reviewed. Urine output 1955 mL.     Current Facility-Administered Medications   Medication Dose Route Frequency    0.9% sodium chloride infusion 250 mL  250 mL IntraVENous PRN    0.9% sodium chloride infusion 250 mL  250 mL IntraVENous PRN    dexamethasone (DECADRON) 4 mg/mL injection 4 mg  4 mg IntraVENous Q12H    0.9% sodium chloride infusion  50 mL/hr IntraVENous CONTINUOUS    dilTIAZem IR (CARDIZEM) tablet 30 mg  30 mg Oral TID    heparin 25,000 units in  ml infusion  18-36 Units/kg/hr (Adjusted) IntraVENous TITRATE    heparin (porcine) 1,000 unit/mL injection 6,080 Units  80 Units/kg (Adjusted) IntraVENous PRN    Or    heparin (porcine) 1,000 unit/mL injection 3,040 Units  40 Units/kg (Adjusted) IntraVENous PRN    fentaNYL (PF) 1,500 mcg/30 mL (50 mcg/mL) infusion  0-200 mcg/hr IntraVENous TITRATE    hydrOXYzine pamoate (VISTARIL) capsule 25 mg  25 mg Oral Q4H PRN    propofol (DIPRIVAN) 10 mg/mL infusion  0-50 mcg/kg/min IntraVENous TITRATE    dexmedeTOMidine (PRECEDEX) 400 mcg in 0.9% sodium chloride (MBP/ADV) 100 mL MBP  0.1-1.5 mcg/kg/hr IntraVENous TITRATE    NOREPINephrine (LEVOPHED) 8 mg in 0.9% NS 250ml infusion  0.5-16 mcg/min IntraVENous TITRATE    acetaminophen (TYLENOL) tablet 650 mg  650 mg Oral Q6H PRN    Or    acetaminophen (TYLENOL) suppository 650 mg  650 mg Rectal Q6H PRN    polyethylene glycol (MIRALAX) packet 17 g  17 g Oral DAILY PRN    ondansetron (ZOFRAN ODT) tablet 4 mg  4 mg Oral Q8H PRN    baricitinib (OLUMIANT) tablet 2 mg  2 mg Oral DAILY    albuterol (PROVENTIL HFA, VENTOLIN HFA, PROAIR HFA) inhaler 2 Puff  2 Puff Inhalation Q6H PRN    polyethylene glycol (MIRALAX) packet 17 g  17 g Oral DAILY PRN    ondansetron (ZOFRAN) injection 4 mg  4 mg IntraVENous Q6H PRN    meropenem (MERREM) 1 g in 0.9% sodium chloride 20 mL IV syringe  1 g IntraVENous Q8H    hydrOXYzine (VISTARIL) injection 25 mg  25 mg IntraMUSCular Q6H PRN        Review of Systems    ROS   Not obtainable    Objective:     Patient Vitals for the past 8 hrs:   BP Temp Pulse Resp SpO2   02/04/22 1214   79 16 96 %   02/04/22 1100 (!) 143/41 98.1 °F (36.7 °C) 80 15 95 %   02/04/22 1000 (!) 141/39  79 15 95 %   02/04/22 0900 (!) 131/35  83 16 95 %   02/04/22 0800 (!) 144/36  85 15 95 %     02/04 0701 - 02/04 1900  In: 4739 [I.V.:389]  Out: 800 [Urine:800]  02/02 1901 - 02/04 0700  In: 4537.2 [I.V.:2806.5]  Out: 3005 [Urine:3005]    Physical Exam:   Physical Exam  Constitutional:       Comments: Intubated and sedated   Cardiovascular:      Rate and Rhythm: Regular rhythm. Pulmonary:      Comments: Scattered rhonchi  Abdominal:      Palpations: Abdomen is soft. Mummified digits 3 4 and 5 distally on the left        XR CHEST PORT   Final Result      XR CHEST PORT   Final Result      XR CHEST PORT   Final Result      XR CHEST PORT   Final Result      WRIST BRACHIAL INDEX AT REST   Final Result      XR CHEST PORT   Final Result   No significant change. XR CHEST PORT   Final Result   No interval change. DUPLEX UPPER EXT ARTERY LEFT   Final Result      XR CHEST PORT   Final Result      XR CHEST PORT   Final Result   Endotracheal tube tip still at the maggie. Diffuse opacities in the   lungs, not significantly changed. XR CHEST PORT   Final Result      XR CHEST PORT   Final Result   Endotracheal tube tip at the maggie. Diffuse opacities in the   lungs, accentuated by lower lung volumes or increased. XR CHEST PORT   Final Result   Diffuse opacities in the lungs, not significantly changed. XR CHEST PORT   Final Result   1. ETT terminating 2 cm above the maggie. Remaining support apparatus as above. 2.  Worsening bilateral airspace disease with developing ARDS not excluded.       XR CHEST PORT   Final Result   Moderate patchy diffuse bilateral airspace opacities, waxing and waning from the prior exam.      XR CHEST PORT   Final Result   Moderate diffuse bilateral airspace opacities, likely a combination of edema and   airspace disease, mildly increased from the previous exam.      CT HEAD WO CONT   Final Result   1. There are milder microvascular changes within the central periventricular   white matter. 2.  There is an area of diminished density within the inferior aspect of the   right cerebellar hemisphere without change (series 201 image number 12). These   findings may represent an older ischemic insult within the right posterior   inferior cerebellar region. 3.  This examination is negative for acute intracranial pathology or short-term   interval changes dating back to 1/17/2022. XR CHEST PORT   Final Result   Patchy mixed asymmetric lung disease appears somewhat better but the   lungs are better inflated. No effusion or pneumothorax. Normal heart and   mediastinum      IR INSERT NON TUNL CVC OVER 5 YRS   Final Result   Ultrasound of the right neck demonstrated patent IJV. Successful US-guided placement of a right internal jugular triple lumen central   venous catheter as described above. The catheter is functioning. PLAN:   Catheter position was confirmed by follow-up chest x-ray: catheter tip in the   lower SVC and ready to use. IR US GUIDED VASCULAR ACCESS   Final Result   Ultrasound of the right neck demonstrated patent IJV. Successful US-guided placement of a right internal jugular triple lumen central   venous catheter as described above. The catheter is functioning. PLAN:   Catheter position was confirmed by follow-up chest x-ray: catheter tip in the   lower SVC and ready to use. XR CHEST PORT   Final Result   Findings/impression:      Diffuse interstitial airspace disease/edema. No definite pleural effusion or   pneumothorax. Cardiac contours are partially obscured. No acute osseous abnormality identified.       XR CHEST PORT (Results Pending)        Data Review   Recent Labs     02/04/22  0355 02/03/22  1700 02/03/22  0840 02/02/22  0540 02/02/22  0540   WBC 28.1*  --  36.8*  --  34.5*   HGB 8.2* 6.6* 4.1*   < > 7.0*   HCT 24.2* 19.0* 12.4*   < > 21.8*   *  --  186  --  231    < > = values in this interval not displayed. Recent Labs     02/04/22  0355 02/03/22  0515 02/02/22  0540    130* 131*   K 5.3* 5.3* 5.2*    95* 93*   CO2 30 28 30   * 434* 335*   * 145* 103*   CREA 0.89 1.14* 0.84   CA 9.4 8.8 8.8   ALB 3.1* 2.4* 1.6*   * 307* 51     No components found for: [place]  Recent Labs     02/04/22  0345 02/03/22  0325 02/02/22  0300   PH 7.40 7.35 7.36   PCO2 49* 48* 55*   PO2 71* 65* 70*   HCO3 29* 25 29*   FIO2 85.0 85.0 85.0     No results for input(s): INR, INREXT, INREXT, INREXT in the last 72 hours. Assessment:           Active Problems:    COVID-19 (1/17/2022)      Respiratory failure with hypoxia (HCC) (1/21/2022)      Hypernatremia (1/23/2022)      Hypokalemia (1/23/2022)    Nonoliguric renal failure. She is predominantly prerenal.  The disproportionate increase in BUN secondary to steroids and  GI bleed . Renal function is better today with BUN and creatinine both improving. Modest hyperkalemia. Respiratory acidosis    Modest hyponatremia-resolved    Anemia with hemoglobin of 4.1 g yesterday. Today the hemoglobin is 8.1 g posttransfusion. Plan:   Keep fluids isotonic. We will follow renal function and electrolytes.

## 2022-02-04 NOTE — PROGRESS NOTES
General Daily Progress Note          Patient Name:   Carlton Mak       YOB: 1940       Age:  80 y.o.       Admit Date: 1/21/2022      Subjective:     Patient is a 80y.o. year old female with signal past medical history of hypertension arthritis who discharged from the hospital yesterday in stable condition patient is admitted last admission with COVID-19 patient was asymptomatic all this time while in the hospital patient discharged on 2 L oxygen to skilled care but patient son want to go home with home health but is at home patient's saturations drops sent back to the ER seen by the ER physician patient placed on BiPAP patient was little hypotensive started on Levophed admitted for further work-up and treatment           Patient is DNR    Patient on ventilator 85% O2  On propofol and fentanyl  drip    On heparin drip    NG tube in place for medication and feeding      Objective:     Visit Vitals  BP (!) 149/35   Pulse 86   Temp 98 °F (36.7 °C)   Resp 18   Ht 5' 5\" (1.651 m)   Wt 104.5 kg (230 lb 6.1 oz)   SpO2 95%   BMI 38.34 kg/m²        Recent Results (from the past 24 hour(s))   HGB & HCT    Collection Time: 02/03/22  5:00 PM   Result Value Ref Range    HGB 6.6 (L) 11.5 - 16.0 g/dL    HCT 19.0 (L) 35.0 - 47.0 %   BLOOD GAS, ARTERIAL    Collection Time: 02/04/22  3:45 AM   Result Value Ref Range    pH 7.40 7.35 - 7.45      PCO2 49 (H) 35 - 45 mmHg    PO2 71 (L) 75 - 100 mmHg    O2 SAT 95 (L) >95 %    BICARBONATE 29 (H) 22 - 26 mmol/L    BASE EXCESS 4.5 (H) 0 - 2 mmol/L    O2 METHOD VENT      FIO2 85.0 %    MODE Assist Control/Volume Control      Tidal volume 450      SET RATE 12      EPAP/CPAP/PEEP 5.0      SITE Right Radial      BEN'S TEST PASS     PTT    Collection Time: 02/04/22  3:55 AM   Result Value Ref Range    aPTT 26.2 21.2 - 34.1 sec    aPTT, therapeutic range   82 - 109 sec   CBC WITH AUTOMATED DIFF    Collection Time: 02/04/22  3:55 AM   Result Value Ref Range    WBC 28.1 (H) 3.6 - 11.0 K/uL    RBC 2.53 (L) 3.80 - 5.20 M/uL    HGB 8.2 (L) 11.5 - 16.0 g/dL    HCT 24.2 (L) 35.0 - 47.0 %    MCV 95.7 80.0 - 99.0 FL    MCH 32.4 26.0 - 34.0 PG    MCHC 33.9 30.0 - 36.5 g/dL    RDW 14.5 11.5 - 14.5 %    PLATELET 638 (L) 047 - 400 K/uL    MPV 12.2 8.9 - 12.9 FL    NRBC 0.6 (H) 0.0  WBC    ABSOLUTE NRBC 0.16 (H) 0.00 - 0.01 K/uL    NEUTROPHILS 90 (H) 32 - 75 %    LYMPHOCYTES 3 (L) 12 - 49 %    MONOCYTES 5 5 - 13 %    EOSINOPHILS 0 0 - 7 %    BASOPHILS 0 0 - 1 %    IMMATURE GRANULOCYTES 2 (H) 0 - 0.5 %    ABS. NEUTROPHILS 25.2 (H) 1.8 - 8.0 K/UL    ABS. LYMPHOCYTES 0.8 0.8 - 3.5 K/UL    ABS. MONOCYTES 1.5 (H) 0.0 - 1.0 K/UL    ABS. EOSINOPHILS 0.0 0.0 - 0.4 K/UL    ABS. BASOPHILS 0.0 0.0 - 0.1 K/UL    ABS. IMM. GRANS. 0.6 (H) 0.00 - 0.04 K/UL    DF AUTOMATED     METABOLIC PANEL, COMPREHENSIVE    Collection Time: 02/04/22  3:55 AM   Result Value Ref Range    Sodium 140 136 - 145 mmol/L    Potassium 5.3 (H) 3.5 - 5.1 mmol/L    Chloride 105 97 - 108 mmol/L    CO2 30 21 - 32 mmol/L    Anion gap 5 5 - 15 mmol/L    Glucose 418 (H) 65 - 100 mg/dL     (H) 6 - 20 mg/dL    Creatinine 0.89 0.55 - 1.02 mg/dL    BUN/Creatinine ratio 133 (H) 12 - 20      GFR est AA >60 >60 ml/min/1.73m2    GFR est non-AA >60 >60 ml/min/1.73m2    Calcium 9.4 8.5 - 10.1 mg/dL    Bilirubin, total 0.7 0.2 - 1.0 mg/dL    AST (SGOT) 305 (H) 15 - 37 U/L    ALT (SGPT) 403 (H) 12 - 78 U/L    Alk. phosphatase 82 45 - 117 U/L    Protein, total 5.5 (L) 6.4 - 8.2 g/dL    Albumin 3.1 (L) 3.5 - 5.0 g/dL    Globulin 2.4 2.0 - 4.0 g/dL    A-G Ratio 1.3 1.1 - 2.2       [unfilled]      Review of Systems    Unable to obtain. Physical Exam:      Constitutional: Awake on BiPAP  HENT:   Head: Normocephalic and atraumatic. Eyes: Pupils are equal, round, and reactive to light. EOM are normal.   Cardiovascular: Normal rate, regular rhythm and normal heart sounds. Pulmonary/Chest: Breath sounds normal. No wheezes. No rales. Exhibits no tenderness. Abdominal: Soft. Bowel sounds are normal. There is no abdominal tenderness. There is no rebound and no guarding. Musculoskeletal: Normal range of motion. Neurological: pt is alert and oriented to person, place, and time. XR CHEST PORT   Final Result      XR CHEST PORT   Final Result      XR CHEST PORT   Final Result      XR CHEST PORT   Final Result      WRIST BRACHIAL INDEX AT REST   Final Result      XR CHEST PORT   Final Result   No significant change. XR CHEST PORT   Final Result   No interval change. DUPLEX UPPER EXT ARTERY LEFT   Final Result      XR CHEST PORT   Final Result      XR CHEST PORT   Final Result   Endotracheal tube tip still at the maggie. Diffuse opacities in the   lungs, not significantly changed. XR CHEST PORT   Final Result      XR CHEST PORT   Final Result   Endotracheal tube tip at the maggie. Diffuse opacities in the   lungs, accentuated by lower lung volumes or increased. XR CHEST PORT   Final Result   Diffuse opacities in the lungs, not significantly changed. XR CHEST PORT   Final Result   1. ETT terminating 2 cm above the maggie. Remaining support apparatus as above. 2.  Worsening bilateral airspace disease with developing ARDS not excluded. XR CHEST PORT   Final Result   Moderate patchy diffuse bilateral airspace opacities, waxing and waning from the   prior exam.      XR CHEST PORT   Final Result   Moderate diffuse bilateral airspace opacities, likely a combination of edema and   airspace disease, mildly increased from the previous exam.      CT HEAD WO CONT   Final Result   1. There are milder microvascular changes within the central periventricular   white matter. 2.  There is an area of diminished density within the inferior aspect of the   right cerebellar hemisphere without change (series 201 image number 12).  These   findings may represent an older ischemic insult within the right posterior   inferior cerebellar region. 3.  This examination is negative for acute intracranial pathology or short-term   interval changes dating back to 1/17/2022. XR CHEST PORT   Final Result   Patchy mixed asymmetric lung disease appears somewhat better but the   lungs are better inflated. No effusion or pneumothorax. Normal heart and   mediastinum      IR INSERT NON TUNL CVC OVER 5 YRS   Final Result   Ultrasound of the right neck demonstrated patent IJV. Successful US-guided placement of a right internal jugular triple lumen central   venous catheter as described above. The catheter is functioning. PLAN:   Catheter position was confirmed by follow-up chest x-ray: catheter tip in the   lower SVC and ready to use. IR US GUIDED VASCULAR ACCESS   Final Result   Ultrasound of the right neck demonstrated patent IJV. Successful US-guided placement of a right internal jugular triple lumen central   venous catheter as described above. The catheter is functioning. PLAN:   Catheter position was confirmed by follow-up chest x-ray: catheter tip in the   lower SVC and ready to use. XR CHEST PORT   Final Result   Findings/impression:      Diffuse interstitial airspace disease/edema. No definite pleural effusion or   pneumothorax. Cardiac contours are partially obscured. No acute osseous abnormality identified.       XR CHEST PORT    (Results Pending)        Recent Results (from the past 24 hour(s))   HGB & HCT    Collection Time: 02/03/22  5:00 PM   Result Value Ref Range    HGB 6.6 (L) 11.5 - 16.0 g/dL    HCT 19.0 (L) 35.0 - 47.0 %   BLOOD GAS, ARTERIAL    Collection Time: 02/04/22  3:45 AM   Result Value Ref Range    pH 7.40 7.35 - 7.45      PCO2 49 (H) 35 - 45 mmHg    PO2 71 (L) 75 - 100 mmHg    O2 SAT 95 (L) >95 %    BICARBONATE 29 (H) 22 - 26 mmol/L    BASE EXCESS 4.5 (H) 0 - 2 mmol/L    O2 METHOD VENT      FIO2 85.0 %    MODE Assist Control/Volume Control      Tidal volume 450      SET RATE 12      EPAP/CPAP/PEEP 5.0      SITE Right Radial      BEN'S TEST PASS     PTT    Collection Time: 02/04/22  3:55 AM   Result Value Ref Range    aPTT 26.2 21.2 - 34.1 sec    aPTT, therapeutic range   82 - 109 sec   CBC WITH AUTOMATED DIFF    Collection Time: 02/04/22  3:55 AM   Result Value Ref Range    WBC 28.1 (H) 3.6 - 11.0 K/uL    RBC 2.53 (L) 3.80 - 5.20 M/uL    HGB 8.2 (L) 11.5 - 16.0 g/dL    HCT 24.2 (L) 35.0 - 47.0 %    MCV 95.7 80.0 - 99.0 FL    MCH 32.4 26.0 - 34.0 PG    MCHC 33.9 30.0 - 36.5 g/dL    RDW 14.5 11.5 - 14.5 %    PLATELET 125 (L) 201 - 400 K/uL    MPV 12.2 8.9 - 12.9 FL    NRBC 0.6 (H) 0.0  WBC    ABSOLUTE NRBC 0.16 (H) 0.00 - 0.01 K/uL    NEUTROPHILS 90 (H) 32 - 75 %    LYMPHOCYTES 3 (L) 12 - 49 %    MONOCYTES 5 5 - 13 %    EOSINOPHILS 0 0 - 7 %    BASOPHILS 0 0 - 1 %    IMMATURE GRANULOCYTES 2 (H) 0 - 0.5 %    ABS. NEUTROPHILS 25.2 (H) 1.8 - 8.0 K/UL    ABS. LYMPHOCYTES 0.8 0.8 - 3.5 K/UL    ABS. MONOCYTES 1.5 (H) 0.0 - 1.0 K/UL    ABS. EOSINOPHILS 0.0 0.0 - 0.4 K/UL    ABS. BASOPHILS 0.0 0.0 - 0.1 K/UL    ABS. IMM. GRANS. 0.6 (H) 0.00 - 0.04 K/UL    DF AUTOMATED     METABOLIC PANEL, COMPREHENSIVE    Collection Time: 02/04/22  3:55 AM   Result Value Ref Range    Sodium 140 136 - 145 mmol/L    Potassium 5.3 (H) 3.5 - 5.1 mmol/L    Chloride 105 97 - 108 mmol/L    CO2 30 21 - 32 mmol/L    Anion gap 5 5 - 15 mmol/L    Glucose 418 (H) 65 - 100 mg/dL     (H) 6 - 20 mg/dL    Creatinine 0.89 0.55 - 1.02 mg/dL    BUN/Creatinine ratio 133 (H) 12 - 20      GFR est AA >60 >60 ml/min/1.73m2    GFR est non-AA >60 >60 ml/min/1.73m2    Calcium 9.4 8.5 - 10.1 mg/dL    Bilirubin, total 0.7 0.2 - 1.0 mg/dL    AST (SGOT) 305 (H) 15 - 37 U/L    ALT (SGPT) 403 (H) 12 - 78 U/L    Alk.  phosphatase 82 45 - 117 U/L    Protein, total 5.5 (L) 6.4 - 8.2 g/dL    Albumin 3.1 (L) 3.5 - 5.0 g/dL    Globulin 2.4 2.0 - 4.0 g/dL    A-G Ratio 1.3 1.1 - 2.2         Results     Procedure Component Value Units Date/Time    MRSA SCREEN - PCR (NASAL) [[id number]] Collected: 01/22/22 0440    Order Status: Canceled Specimen: Swab     MRSA SCREEN - PCR (NASAL) [[id number]] Collected: 01/22/22 0300    Order Status: Canceled Specimen: Swab            Labs:     Recent Labs     02/04/22  0355 02/03/22  1700 02/03/22  0840 02/03/22  0840   WBC 28.1*  --   --  36.8*   HGB 8.2* 6.6*   < > 4.1*   HCT 24.2* 19.0*   < > 12.4*   *  --   --  186    < > = values in this interval not displayed. Recent Labs     02/04/22  0355 02/03/22  0515 02/02/22  0540    130* 131*   K 5.3* 5.3* 5.2*    95* 93*   CO2 30 28 30   * 145* 103*   CREA 0.89 1.14* 0.84   * 434* 335*   CA 9.4 8.8 8.8     Recent Labs     02/04/22  0355 02/03/22  0515 02/02/22  0540   * 307* 51   AP 82 48 62   TBILI 0.7 0.4 0.3   TP 5.5* 4.8* 5.4*   ALB 3.1* 2.4* 1.6*   GLOB 2.4 2.4 3.8     Recent Labs     02/04/22  0355 02/03/22  0515 02/02/22  2250   APTT 26.2 91.3* 94.7*      No results for input(s): FE, TIBC, PSAT, FERR in the last 72 hours. No results found for: FOL, RBCF   Recent Labs     02/04/22  0345 02/03/22  0325   PH 7.40 7.35   PCO2 49* 48*   PO2 71* 65*     No results for input(s): CPK, CKNDX, TROIQ in the last 72 hours.     No lab exists for component: CPKMB  No results found for: CHOL, CHOLX, CHLST, CHOLV, HDL, HDLP, LDL, LDLC, DLDLP, TGLX, TRIGL, TRIGP, CHHD, CHHDX  Lab Results   Component Value Date/Time    Glucose (POC) 157 (H) 01/31/2022 01:12 AM    Glucose (POC) 179 (H) 01/30/2022 04:57 AM    Glucose (POC) 169 (H) 01/28/2022 05:11 AM    Glucose (POC) 168 (H) 01/27/2022 11:33 AM    Glucose (POC) 182 (H) 01/27/2022 05:20 AM     Lab Results   Component Value Date/Time    Color Yellow/Straw 01/23/2022 10:30 AM    Appearance Turbid (A) 01/23/2022 10:30 AM    Specific gravity 1.027 01/23/2022 10:30 AM    pH (UA) 6.0 01/23/2022 10:30 AM    Protein 100 (A) 01/23/2022 10:30 AM    Glucose 50 (A) 01/23/2022 10:30 AM    Ketone 5 (A) 01/23/2022 10:30 AM    Bilirubin Negative 01/23/2022 10:30 AM    Urobilinogen 4.0 (H) 01/23/2022 10:30 AM    Nitrites Negative 01/23/2022 10:30 AM    Leukocyte Esterase Negative 01/23/2022 10:30 AM    Bacteria Negative 01/23/2022 10:30 AM    WBC 0-4 01/23/2022 10:30 AM    RBC 0-5 01/23/2022 10:30 AM         Assessment:     Acute hypoxemic respiratory failure on on ventilator 85% of   COVID-19 pneumonitis   hypotension  Thrombocytopenia  Hypertension  Arthritis  Hyperkalemia  Hyponatremia  Moderate protein calorie malnutrition  Patient need multiple partial finger amputation once stable as per vascular surgeon    Acute kidney injury worsening follow-up with nephrology  Plan:     Monitor H&H  Kayexalate 30 g by G-tube    Olumiant 2 mg daily  Decadron 4 mg every 6 hours  Meropenem 1 g every 8 hours  Diltiazem 30 mg 3 times  Follow-up with pulmonologist and nephrologist      Monitor sodium and potassium    Overall prognosis  Normal saline 50 cc/h      Patient is DNR  He want to continue current medication  Continue heparin drip    Seen by vascular surgeon he will plan to do multiple partial finger amputation 1 patient's stable  Repeat the lab      Discussed with the patient son today want to wait till Monday for any decision      Current Facility-Administered Medications:     0.9% sodium chloride infusion 250 mL, 250 mL, IntraVENous, PRN, Karan Flower MD    0.9% sodium chloride infusion 250 mL, 250 mL, IntraVENous, PRN, Karan Flower MD    dexamethasone (DECADRON) 4 mg/mL injection 4 mg, 4 mg, IntraVENous, Q12H, Josi Flower MD, 4 mg at 02/04/22 0821    0.9% sodium chloride infusion, 50 mL/hr, IntraVENous, CONTINUOUS, Polo Lopez MD, Last Rate: 50 mL/hr at 02/03/22 0745, 50 mL/hr at 02/03/22 0745    dilTIAZem IR (CARDIZEM) tablet 30 mg, 30 mg, Oral, TID, Karan Flower MD, 30 mg at 02/03/22 2116    heparin 25,000 units in  ml infusion, 18-36 Units/kg/hr (Adjusted), IntraVENous, TITRATE, Manan Lopez MD, Stopped at 02/03/22 0850    heparin (porcine) 1,000 unit/mL injection 6,080 Units, 80 Units/kg (Adjusted), IntraVENous, PRN, 6,080 Units at 02/02/22 0032 **OR** heparin (porcine) 1,000 unit/mL injection 3,040 Units, 40 Units/kg (Adjusted), IntraVENous, PRN, Polo Lopez MD, 3,040 Units at 02/01/22 0645    fentaNYL (PF) 1,500 mcg/30 mL (50 mcg/mL) infusion, 0-200 mcg/hr, IntraVENous, TITRATE, Karan Flower MD, Last Rate: 1 mL/hr at 02/03/22 1249, 50 mcg/hr at 02/03/22 1249    hydrOXYzine pamoate (VISTARIL) capsule 25 mg, 25 mg, Oral, Q4H PRN, oPlo Lopez MD    propofol (DIPRIVAN) 10 mg/mL infusion, 0-50 mcg/kg/min, IntraVENous, TITRATE, Higinio Flower MD, Last Rate: 16.3 mL/hr at 02/03/22 2321, 30 mcg/kg/min at 02/03/22 2321    dexmedeTOMidine (PRECEDEX) 400 mcg in 0.9% sodium chloride (MBP/ADV) 100 mL MBP, 0.1-1.5 mcg/kg/hr, IntraVENous, TITRATE, Karan Flower MD, Stopped at 01/24/22 0824    NOREPINephrine (LEVOPHED) 8 mg in 0.9% NS 250ml infusion, 0.5-16 mcg/min, IntraVENous, TITRATE, Polo Lopez MD, Last Rate: 15 mL/hr at 02/04/22 0811, 8 mcg/min at 02/04/22 0811    acetaminophen (TYLENOL) tablet 650 mg, 650 mg, Oral, Q6H PRN **OR** acetaminophen (TYLENOL) suppository 650 mg, 650 mg, Rectal, Q6H PRN, Polo Lopez MD    polyethylene glycol (MIRALAX) packet 17 g, 17 g, Oral, DAILY PRN, Manan Lopezshorn, MD    ondansetron (ZOFRAN ODT) tablet 4 mg, 4 mg, Oral, Q8H PRN **OR** [DISCONTINUED] ondansetron (ZOFRAN) injection 4 mg, 4 mg, IntraVENous, Q6H PRN, Polo Lopez MD    baricitinib (OLUMIANT) tablet 2 mg, 2 mg, Oral, DAILY, Polo Lopez MD, 2 mg at 02/04/22 0821    albuterol (PROVENTIL HFA, VENTOLIN HFA, PROAIR HFA) inhaler 2 Puff, 2 Puff, Inhalation, Q6H PRN, Polo Lopez MD    polyethylene glycol (MIRALAX) packet 17 g, 17 g, Oral, DAILY PRN, Polo Lopez MD, 17 g at 01/28/22 0844    [DISCONTINUED] ondansetron [facility] ODT) tablet 4 mg, 4 mg, Oral, Q8H PRN **OR** ondansetron (ZOFRAN) injection 4 mg, 4 mg, IntraVENous, Q6H PRN, Polo Lopez MD    meropenem (MERREM) 1 g in 0.9% sodium chloride 20 mL IV syringe, 1 g, IntraVENous, Q8H, Polo Lopez MD, 1 g at 02/04/22 4272    hydrOXYzine (VISTARIL) injection 25 mg, 25 mg, IntraMUSCular, Q6H PRN, Polo Lopez MD, 25 mg at 01/22/22 0111

## 2022-02-05 NOTE — PROGRESS NOTES
Spoke with Dr. Yissel Barr regarding patients increase in blood glucose 450 on morning labs, he states will decrease steroid.

## 2022-02-05 NOTE — PROGRESS NOTES
Problem: Risk for Spread of Infection  Goal: Prevent transmission of infectious organism to others  Description: Prevent the transmission of infectious organisms to other patients, staff members, and visitors. Outcome: Progressing Towards Goal     Problem: Patient Education:  Go to Education Activity  Goal: Patient/Family Education  Outcome: Progressing Towards Goal     Problem:  Body Temperature -  Risk of, Imbalanced  Goal: Ability to maintain a body temperature within defined limits  Outcome: Progressing Towards Goal     Problem: Isolation Precautions - Risk of Spread of Infection  Goal: Prevent transmission of infectious organism to others  Outcome: Progressing Towards Goal

## 2022-02-05 NOTE — PROGRESS NOTES
PROGRESS NOTE      Chief Complaints:  Patient is in ICU. HPI and  Objective:    Patient currently vented. No major events overnight. Patient blood pressure 143/49, heart rate 91 temperature 97.8. Patient still on pressors. Left hand examination shows left hand is warm edema is moderate. Demarcated area of the hand digit 5, 4, 3 about the same. Review of Systems: Unable to assess due to intubation  EXAM:  Visit Vitals  BP (!) 143/49 (BP 1 Location: Right upper arm, BP Patient Position: At rest)   Pulse 91   Temp 97.8 °F (36.6 °C)   Resp 18   Ht 5' 5\" (1.651 m)   Wt 230 lb 6.1 oz (104.5 kg)   SpO2 98%   BMI 38.34 kg/m²       Patient is intubated  Head and neck atraumatic, normocephalic. ENT: No hoarse voice  Cardiac system regular rate rhythm. Pulmonary no audible wheeze  Chest wall excursion normal with respiration cycle  Abdomen is soft not particularly distended. Neurologically unable to assess  Skin is warm and moist.  Psychosocial: Unable to assess  Vascular examination as previously noted no changes.     Recent Results (from the past 24 hour(s))   PTT    Collection Time: 02/05/22 12:00 AM   Result Value Ref Range    aPTT 26.3 21.2 - 34.1 sec    aPTT, therapeutic range   82 - 109 sec   BLOOD GAS, ARTERIAL    Collection Time: 02/05/22  2:05 AM   Result Value Ref Range    pH 7.41 7.35 - 7.45      PCO2 52 (H) 35 - 45 mmHg    PO2 74 (L) 75 - 100 mmHg    O2 SAT 96 >95 %    BICARBONATE 31 (H) 22 - 26 mmol/L    BASE EXCESS 7.4 (H) 0 - 2 mmol/L    O2 METHOD VENT      FIO2 85.0 %    MODE Assist Control/Volume Control      Tidal volume 450      SET RATE 12      EPAP/CPAP/PEEP 5.0      SITE Right Radial      BEN'S TEST PASS     PTT    Collection Time: 02/05/22  4:00 AM   Result Value Ref Range    aPTT 25.2 21.2 - 34.1 sec    aPTT, therapeutic range   82 - 109 sec   CBC WITH AUTOMATED DIFF    Collection Time: 02/05/22  4:00 AM   Result Value Ref Range    WBC 31.3 (H) 3.6 - 11.0 K/uL    RBC 2.59 (L) 3.80 - 5.20 M/uL    HGB 8.5 (L) 11.5 - 16.0 g/dL    HCT 25.6 (L) 35.0 - 47.0 %    MCV 98.8 80.0 - 99.0 FL    MCH 32.8 26.0 - 34.0 PG    MCHC 33.2 30.0 - 36.5 g/dL    RDW 15.1 (H) 11.5 - 14.5 %    PLATELET 145 (L) 893 - 400 K/uL    MPV 12.0 8.9 - 12.9 FL    NRBC 0.7 (H) 0.0  WBC    ABSOLUTE NRBC 0.22 (H) 0.00 - 0.01 K/uL    NEUTROPHILS 89 (H) 32 - 75 %    LYMPHOCYTES 3 (L) 12 - 49 %    MONOCYTES 5 5 - 13 %    EOSINOPHILS 0 0 - 7 %    BASOPHILS 0 0 - 1 %    IMMATURE GRANULOCYTES 3 (H) 0 - 0.5 %    ABS. NEUTROPHILS 28.1 (H) 1.8 - 8.0 K/UL    ABS. LYMPHOCYTES 0.9 0.8 - 3.5 K/UL    ABS. MONOCYTES 1.4 (H) 0.0 - 1.0 K/UL    ABS. EOSINOPHILS 0.0 0.0 - 0.4 K/UL    ABS. BASOPHILS 0.0 0.0 - 0.1 K/UL    ABS. IMM. GRANS. 0.9 (H) 0.00 - 0.04 K/UL    DF AUTOMATED     METABOLIC PANEL, COMPREHENSIVE    Collection Time: 02/05/22  4:00 AM   Result Value Ref Range    Sodium 144 136 - 145 mmol/L    Potassium 4.9 3.5 - 5.1 mmol/L    Chloride 109 (H) 97 - 108 mmol/L    CO2 34 (H) 21 - 32 mmol/L    Anion gap 1 (L) 5 - 15 mmol/L    Glucose 450 (H) 65 - 100 mg/dL    BUN 89 (H) 6 - 20 mg/dL    Creatinine 0.83 0.55 - 1.02 mg/dL    BUN/Creatinine ratio 107 (H) 12 - 20      GFR est AA >60 >60 ml/min/1.73m2    GFR est non-AA >60 >60 ml/min/1.73m2    Calcium 9.6 8.5 - 10.1 mg/dL    Bilirubin, total 0.6 0.2 - 1.0 mg/dL    AST (SGOT) 126 (H) 15 - 37 U/L    ALT (SGPT) 317 (H) 12 - 78 U/L    Alk. phosphatase 135 (H) 45 - 117 U/L    Protein, total 5.6 (L) 6.4 - 8.2 g/dL    Albumin 3.0 (L) 3.5 - 5.0 g/dL    Globulin 2.6 2.0 - 4.0 g/dL    A-G Ratio 1.2 1.1 - 2.2         ASSESSMENT:   Patient is 80 y.o. with diagnosis of : Active Problems:    COVID-19 (1/17/2022)      Respiratory failure with hypoxia ([address] (1/21/2022)      Hypernatremia (1/23/2022)      Hypokalemia (1/23/2022)        PLAN:                 Continue wound care on the digits of the left hand. And to keep left hand elevated. Vascular status of the left hand has not changed.   Patient has localized thrombosis secondary to hypoperfusion with a small vessel disease. Unlikely embolic etiology. Patient has a compromised ulnar artery. Most of the hand on the left hand is perfused. Patient will need partial multiple finger amputation at some point if she recovers from other systemic illnesses. Critical care time spent: 30 minutes.

## 2022-02-05 NOTE — PROGRESS NOTES
IMPRESSION:   1. Acute hypoxic respiratory  2. Acute on chronic hypercapnic respiratory failure   3. COVID-19 pneumonia  4. Pulmonary edema  5. Severe Anemia hemoglobin dropped to 4 after blood transfusion is accepted  6. Sepsis with septic shock now back on pressors  7. Left hand ischemia  8. Arthritis hypertension by hx  9. Hyperkalemia   10. Hypernatremia corrected  11. Now hyponatremic      RECOMMENDATIONS/PLAN:   1. ICU monitoring  1. Patient condition got worse on 1/24 went to asystole subsequently got intubated now on ventilator assist control mode sedated with propofol and fentanyl Precedex was discontinued because of bradycardia arterial blood gases acceptable currently on A/C 12  PEEP 5 FiO2  85% PO2 acceptable   2. Patient is on dexamethasone and baricitinib  3. Significant drop in hemoglobin recieved 3 unit packed RBC hemoglobin improved  4. Patient is back on Levophed hemodynamically unstable will continue to wean  5. Leukocytosis will repeat CBC   6. Potassium corrected  7. Left hand NATHALIE normal continue local wound care possible partial amputation  8. Pulmonary edema received Lasix chest x-ray shows bilateral infiltrate congestive changes  9. Left hand ischemia off IV heparin left ulnar artery occlusion radial  patent NATHALIE normal  10. Now hyponatremic   11. Troponin is elevated  12. Lactic acid 3.7  13.  She is on meropenem and finished Zithromax     [x] High complexity decision making was performed  [x] See my orders for details  HPI  73-year-old lady came in because of shortness of breath and dyspnea she has significant past medical history of arthritis hypertension she was recently discharged from the hospital came back with worsening of hypoxia she was discharged on oxygen 2 L nasal cannula and patient condition got worse she was supposed to be discharged to skilled care but patient's son took her home where her condition got worse and she was transferred back to the hospital now she is on noninvasive ventilator BiPAP machine hemodynamically unstable hypotensive on vasopressors and not giving much history. PMH:  has no past medical history on file. PSH:   has a past surgical history that includes ir insert non tunl cvc over 5 yrs (1/21/2022). FHX: family history is not on file.      SHX:      ALL:   Allergies   Allergen Reactions    Penicillins Hives        MEDS:   [x] Reviewed - As Below   [] Not reviewed    Current Facility-Administered Medications   Medication    0.9% sodium chloride infusion 250 mL    0.9% sodium chloride infusion 250 mL    dexamethasone (DECADRON) 4 mg/mL injection 4 mg    0.9% sodium chloride infusion    dilTIAZem IR (CARDIZEM) tablet 30 mg    heparin 25,000 units in  ml infusion    heparin (porcine) 1,000 unit/mL injection 6,080 Units    Or    heparin (porcine) 1,000 unit/mL injection 3,040 Units    fentaNYL (PF) 1,500 mcg/30 mL (50 mcg/mL) infusion    hydrOXYzine pamoate (VISTARIL) capsule 25 mg    propofol (DIPRIVAN) 10 mg/mL infusion    dexmedeTOMidine (PRECEDEX) 400 mcg in 0.9% sodium chloride (MBP/ADV) 100 mL MBP    NOREPINephrine (LEVOPHED) 8 mg in 0.9% NS 250ml infusion    acetaminophen (TYLENOL) tablet 650 mg    Or    acetaminophen (TYLENOL) suppository 650 mg    polyethylene glycol (MIRALAX) packet 17 g    ondansetron (ZOFRAN ODT) tablet 4 mg    baricitinib (OLUMIANT) tablet 2 mg    albuterol (PROVENTIL HFA, VENTOLIN HFA, PROAIR HFA) inhaler 2 Puff    polyethylene glycol (MIRALAX) packet 17 g    ondansetron (ZOFRAN) injection 4 mg    meropenem (MERREM) 1 g in 0.9% sodium chloride 20 mL IV syringe    hydrOXYzine (VISTARIL) injection 25 mg      MAR reviewed and pertinent medications noted or modified as needed   Current Facility-Administered Medications   Medication    0.9% sodium chloride infusion 250 mL    0.9% sodium chloride infusion 250 mL    dexamethasone (DECADRON) 4 mg/mL injection 4 mg    0.9% sodium chloride infusion  dilTIAZem IR (CARDIZEM) tablet 30 mg    heparin 25,000 units in  ml infusion    heparin (porcine) 1,000 unit/mL injection 6,080 Units    Or    heparin (porcine) 1,000 unit/mL injection 3,040 Units    fentaNYL (PF) 1,500 mcg/30 mL (50 mcg/mL) infusion    hydrOXYzine pamoate (VISTARIL) capsule 25 mg    propofol (DIPRIVAN) 10 mg/mL infusion    dexmedeTOMidine (PRECEDEX) 400 mcg in 0.9% sodium chloride (MBP/ADV) 100 mL MBP    NOREPINephrine (LEVOPHED) 8 mg in 0.9% NS 250ml infusion    acetaminophen (TYLENOL) tablet 650 mg    Or    acetaminophen (TYLENOL) suppository 650 mg    polyethylene glycol (MIRALAX) packet 17 g    ondansetron (ZOFRAN ODT) tablet 4 mg    baricitinib (OLUMIANT) tablet 2 mg    albuterol (PROVENTIL HFA, VENTOLIN HFA, PROAIR HFA) inhaler 2 Puff    polyethylene glycol (MIRALAX) packet 17 g    ondansetron (ZOFRAN) injection 4 mg    meropenem (MERREM) 1 g in 0.9% sodium chloride 20 mL IV syringe    hydrOXYzine (VISTARIL) injection 25 mg      PMH:  has no past medical history on file. PSH:   has a past surgical history that includes ir insert non tunl cvc over 5 yrs (2022). FHX: family history is not on file. SHX:       ROS:  Unable to obtain    Hemodynamics:    CO:    CI:    CVP:    SVR:   PAP Systolic:    PAP Diastolic:    PVR:    WT74:        Ventilator Settings:      Mode Rate TV Press PEEP FiO2 PIP Min.  Vent   Assist control,Volume control    450 ml    5 cm H20 85 %  23 cm H2O  9.7 l/min        Vital Signs: Telemetry:    normal sinus rhythm Intake/Output:   Visit Vitals  BP (!) 143/49 (BP 1 Location: Right upper arm, BP Patient Position: At rest)   Pulse 91   Temp 97.8 °F (36.6 °C)   Resp 18   Ht 5' 5\" (1.651 m)   Wt 104.5 kg (230 lb 6.1 oz)   SpO2 98%   BMI 38.34 kg/m²       Temp (24hrs), Av °F (36.7 °C), Min:97.7 °F (36.5 °C), Max:98.5 °F (36.9 °C)        O2 Device: Ventilator         Wt Readings from Last 4 Encounters:   22 104.5 kg (230 lb 6.1 oz) 01/17/22 90.7 kg (200 lb)          Intake/Output Summary (Last 24 hours) at 2/5/2022 0829  Last data filed at 2/5/2022 0530  Gross per 24 hour   Intake 2076.48 ml   Output 2775 ml   Net -698.52 ml       Last shift:      No intake/output data recorded. Last 3 shifts: 02/03 1901 - 02/05 0700  In: 3588.3 [I.V.:2411.6]  Out: 3100 [Urine:3100]       Physical Exam:     General: Intubated on ventilator unresponsive on propofol fentanyl  HEENT: NCAT,   Eyes: anicteric; conjunctiva clear no doll's eye reflex  Neck: no nodes, , trach midline; no accessory MM use.   Questionable neck vein distention  Chest: no deformity,   Cardiac: R regular; no murmur;   Lungs: Diffuse wheezes and rales  Abd: soft, NT, hypoactive BS  Ext: Edema of the leg edema; left hand finger blackish discoloration  : clear urine  Neuro: Sedated unresponsive on the ventilator no doll's eye reflex flaccid extremities  Psych-unable to assess  Skin: warm, dry, no cyanosis;   Pulses: Brachial radial pulses intact  Capillary: Normal capillary refill      DATA:    MAR reviewed and pertinent medications noted or modified as needed  MEDS:   Current Facility-Administered Medications   Medication    0.9% sodium chloride infusion 250 mL    0.9% sodium chloride infusion 250 mL    dexamethasone (DECADRON) 4 mg/mL injection 4 mg    0.9% sodium chloride infusion    dilTIAZem IR (CARDIZEM) tablet 30 mg    heparin 25,000 units in  ml infusion    heparin (porcine) 1,000 unit/mL injection 6,080 Units    Or    heparin (porcine) 1,000 unit/mL injection 3,040 Units    fentaNYL (PF) 1,500 mcg/30 mL (50 mcg/mL) infusion    hydrOXYzine pamoate (VISTARIL) capsule 25 mg    propofol (DIPRIVAN) 10 mg/mL infusion    dexmedeTOMidine (PRECEDEX) 400 mcg in 0.9% sodium chloride (MBP/ADV) 100 mL MBP    NOREPINephrine (LEVOPHED) 8 mg in 0.9% NS 250ml infusion    acetaminophen (TYLENOL) tablet 650 mg    Or    acetaminophen (TYLENOL) suppository 650 mg    polyethylene glycol (MIRALAX) packet 17 g    ondansetron (ZOFRAN ODT) tablet 4 mg    baricitinib (OLUMIANT) tablet 2 mg    albuterol (PROVENTIL HFA, VENTOLIN HFA, PROAIR HFA) inhaler 2 Puff    polyethylene glycol (MIRALAX) packet 17 g    ondansetron (ZOFRAN) injection 4 mg    meropenem (MERREM) 1 g in 0.9% sodium chloride 20 mL IV syringe    hydrOXYzine (VISTARIL) injection 25 mg        Labs:    Recent Labs     02/05/22  0400 02/05/22  0000 02/04/22  0355 02/03/22  1700 02/03/22  0840 02/03/22  0840 02/03/22  0515   WBC 31.3*  --  28.1*  --   --  36.8*  --    HGB 8.5*  --  8.2* 6.6*   < > 4.1*  --    *  --  132*  --   --  186  --    APTT 25.2 26.3 26.2  --   --   --    < >    < > = values in this interval not displayed. Recent Labs     02/05/22  0400 02/04/22  0355 02/03/22  0515    140 130*   K 4.9 5.3* 5.3*   * 105 95*   CO2 34* 30 28   * 418* 434*   BUN 89* 118* 145*   CREA 0.83 0.89 1.14*   CA 9.6 9.4 8.8   ALB 3.0* 3.1* 2.4*   * 403* 307*     Recent Labs     02/05/22  0205 02/04/22  0345 02/03/22  0325   PH 7.41 7.40 7.35   PCO2 52* 49* 48*   PO2 74* 71* 65*   HCO3 31* 29* 25   FIO2 85.0 85.0 85.0   1/30 AC 12  PEEP 5 FiO2 100%    Lab Results   Component Value Date/Time    Culture result: No growth 6 days 01/21/2022 07:55 AM    Culture result: No growth 6 days 01/18/2022 07:12 AM    Culture result: (A) 01/17/2022 07:12 AM     Staphylococcus species, coagulase negative growing in 1 of 4 bottles drawn NO SITE INDICATED    Culture result:  01/17/2022 07:12 AM     (NOTE) [facility] GROWING IN 1 OF 2 BOTTLES CALLED TO JOSE MIGUEL PAGAN AT 0831 ON 1/19/22. JF     No results found for: TSH, TSHEXT, TSHEXT     Imaging:    Results from Hospital Encounter encounter on 01/21/22    XR CHEST PORT    Narrative  1 view comparison yesterday    ET and NG tube and central line remain    Continued hypoinflation with unchanged interstitial edema. There may be small  left effusion.  Normal heart and no congestion. No pneumothorax      Results from Hospital Encounter encounter on 01/21/22    CT HEAD WO CONT    Narrative  The study is a noncontrasted head CT examination dated 1/21/2022. HISTORY: Unresponsive. TECHNIQUE: Thin section axial imaging was performed followed by sagittal and  coronal reconstructed imaging. Dose Reduction Technique was employed to reduce radiation exposure - This  includes reduction optimization techniques as appropriate to a performed exam  with automated exposure control adjustments of the mA and/or Kv according to  patient size, or use of iterative reconstruction technique. COMPARISON: CT head dated 1/17/2022. There is an appropriate size to the ventricles, the deep cortical sulci, and the  sylvian fissures for the patient's age. This examination is negative for  intracranial hemorrhage, mass effect or an extra axial collection. The  gray-white matter junctions are preserved without cytotoxic or vasogenic edema. An assessment of the white matter tracts demonstrates a mild decrease in the  density characteristics of the central periventricular white matter. These  findings are consistent with expected aging changes and milder microvascular  disease. There also is a region of diminished density within the right posterior  inferior cerebellar hemisphere which may represent an older cerebrovascular  insult. ORBITS: This examination is negative for acute orbital pathology. PARANASAL SINUSES: The paranasal sinuses are well pneumatized without acute  sinus disease. There is a decreased number of right sided mastoid air cells which can be  associated with chronic mastoiditis. The craniocervical junction images in a  normal fashion. Impression  1. There are milder microvascular changes within the central periventricular  white matter.   2.  There is an area of diminished density within the inferior aspect of the  right cerebellar hemisphere without change (series 201 image number 12). These  findings may represent an older ischemic insult within the right posterior  inferior cerebellar region. 3.  This examination is negative for acute intracranial pathology or short-term  interval changes dating back to 1/17/2022. 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 Chest x-ray looks like pulmonary edema.   She has peripheral edema we will give Lasix today discontinue D5W continue tube feedings we will check echocardiogram  · 1/31 off pressors remain intubated   · 2/1 remain intubated sedated on ventilator will do sedation vacation in a.m. still on 85%  · 2/2 on ventilator sedated elevated PCO2  · 2/3 remain intubated off pressors significant drop in hemoglobin and increasing white count we will repeat CBC and work accordingly  · 2/4 patient is back on Levophed hemodynamically unstable still on ventilator 85% FiO2  · 2/5 remain on ventilator condition unstable  · Time of care 30-minute

## 2022-02-05 NOTE — PROGRESS NOTES
General Daily Progress Note          Patient Name:   Luba Mcgowan       YOB: 1940       Age:  80 y.o.       Admit Date: 1/21/2022      Subjective:     Patient is a 80y.o. year old female with signal past medical history of hypertension arthritis who discharged from the hospital yesterday in stable condition patient is admitted last admission with COVID-19 patient was asymptomatic all this time while in the hospital patient discharged on 2 L oxygen to skilled care but patient son want to go home with home health but is at home patient's saturations drops sent back to the ER seen by the ER physician patient placed on BiPAP patient was little hypotensive started on Levophed admitted for further work-up and treatment           Patient is DNR    Patient on ventilator 85% O2  Off sedation    No response      NG tube in place for medication and feeding      Objective:     Visit Vitals  BP (!) 143/49 (BP 1 Location: Right upper arm, BP Patient Position: At rest)   Pulse 91   Temp 97.8 °F (36.6 °C)   Resp 18   Ht 5' 5\" (1.651 m)   Wt 104.5 kg (230 lb 6.1 oz)   SpO2 98%   BMI 38.34 kg/m²        Recent Results (from the past 24 hour(s))   PTT    Collection Time: 02/05/22 12:00 AM   Result Value Ref Range    aPTT 26.3 21.2 - 34.1 sec    aPTT, therapeutic range   82 - 109 sec   BLOOD GAS, ARTERIAL    Collection Time: 02/05/22  2:05 AM   Result Value Ref Range    pH 7.41 7.35 - 7.45      PCO2 52 (H) 35 - 45 mmHg    PO2 74 (L) 75 - 100 mmHg    O2 SAT 96 >95 %    BICARBONATE 31 (H) 22 - 26 mmol/L    BASE EXCESS 7.4 (H) 0 - 2 mmol/L    O2 METHOD VENT      FIO2 85.0 %    MODE Assist Control/Volume Control      Tidal volume 450      SET RATE 12      EPAP/CPAP/PEEP 5.0      SITE Right Radial      BEN'S TEST PASS     PTT    Collection Time: 02/05/22  4:00 AM   Result Value Ref Range    aPTT 25.2 21.2 - 34.1 sec    aPTT, therapeutic range   82 - 109 sec   CBC WITH AUTOMATED DIFF    Collection Time: 02/05/22  4:00 AM Result Value Ref Range    WBC 31.3 (H) 3.6 - 11.0 K/uL    RBC 2.59 (L) 3.80 - 5.20 M/uL    HGB 8.5 (L) 11.5 - 16.0 g/dL    HCT 25.6 (L) 35.0 - 47.0 %    MCV 98.8 80.0 - 99.0 FL    MCH 32.8 26.0 - 34.0 PG    MCHC 33.2 30.0 - 36.5 g/dL    RDW 15.1 (H) 11.5 - 14.5 %    PLATELET 101 (L) 228 - 400 K/uL    MPV 12.0 8.9 - 12.9 FL    NRBC 0.7 (H) 0.0  WBC    ABSOLUTE NRBC 0.22 (H) 0.00 - 0.01 K/uL    NEUTROPHILS 89 (H) 32 - 75 %    LYMPHOCYTES 3 (L) 12 - 49 %    MONOCYTES 5 5 - 13 %    EOSINOPHILS 0 0 - 7 %    BASOPHILS 0 0 - 1 %    IMMATURE GRANULOCYTES 3 (H) 0 - 0.5 %    ABS. NEUTROPHILS 28.1 (H) 1.8 - 8.0 K/UL    ABS. LYMPHOCYTES 0.9 0.8 - 3.5 K/UL    ABS. MONOCYTES 1.4 (H) 0.0 - 1.0 K/UL    ABS. EOSINOPHILS 0.0 0.0 - 0.4 K/UL    ABS. BASOPHILS 0.0 0.0 - 0.1 K/UL    ABS. IMM. GRANS. 0.9 (H) 0.00 - 0.04 K/UL    DF AUTOMATED     METABOLIC PANEL, COMPREHENSIVE    Collection Time: 02/05/22  4:00 AM   Result Value Ref Range    Sodium 144 136 - 145 mmol/L    Potassium 4.9 3.5 - 5.1 mmol/L    Chloride 109 (H) 97 - 108 mmol/L    CO2 34 (H) 21 - 32 mmol/L    Anion gap 1 (L) 5 - 15 mmol/L    Glucose 450 (H) 65 - 100 mg/dL    BUN 89 (H) 6 - 20 mg/dL    Creatinine 0.83 0.55 - 1.02 mg/dL    BUN/Creatinine ratio 107 (H) 12 - 20      GFR est AA >60 >60 ml/min/1.73m2    GFR est non-AA >60 >60 ml/min/1.73m2    Calcium 9.6 8.5 - 10.1 mg/dL    Bilirubin, total 0.6 0.2 - 1.0 mg/dL    AST (SGOT) 126 (H) 15 - 37 U/L    ALT (SGPT) 317 (H) 12 - 78 U/L    Alk. phosphatase 135 (H) 45 - 117 U/L    Protein, total 5.6 (L) 6.4 - 8.2 g/dL    Albumin 3.0 (L) 3.5 - 5.0 g/dL    Globulin 2.6 2.0 - 4.0 g/dL    A-G Ratio 1.2 1.1 - 2.2       [unfilled]      Review of Systems    Unable to obtain. Physical Exam:      Constitutional: Awake on BiPAP  HENT:   Head: Normocephalic and atraumatic. Eyes: Pupils are equal, round, and reactive to light. EOM are normal.   Cardiovascular: Normal rate, regular rhythm and normal heart sounds. Pulmonary/Chest: Breath sounds normal. No wheezes. No rales. Exhibits no tenderness. Abdominal: Soft. Bowel sounds are normal. There is no abdominal tenderness. There is no rebound and no guarding. Musculoskeletal: Normal range of motion. Neurological: pt is alert and oriented to person, place, and time. XR CHEST PORT   Final Result      XR CHEST PORT   Final Result      XR CHEST PORT   Final Result      XR CHEST PORT   Final Result      XR CHEST PORT   Final Result      WRIST BRACHIAL INDEX AT REST   Final Result      XR CHEST PORT   Final Result   No significant change. XR CHEST PORT   Final Result   No interval change. DUPLEX UPPER EXT ARTERY LEFT   Final Result      XR CHEST PORT   Final Result      XR CHEST PORT   Final Result   Endotracheal tube tip still at the maggie. Diffuse opacities in the   lungs, not significantly changed. XR CHEST PORT   Final Result      XR CHEST PORT   Final Result   Endotracheal tube tip at the maggie. Diffuse opacities in the   lungs, accentuated by lower lung volumes or increased. XR CHEST PORT   Final Result   Diffuse opacities in the lungs, not significantly changed. XR CHEST PORT   Final Result   1. ETT terminating 2 cm above the maggie. Remaining support apparatus as above. 2.  Worsening bilateral airspace disease with developing ARDS not excluded. XR CHEST PORT   Final Result   Moderate patchy diffuse bilateral airspace opacities, waxing and waning from the   prior exam.      XR CHEST PORT   Final Result   Moderate diffuse bilateral airspace opacities, likely a combination of edema and   airspace disease, mildly increased from the previous exam.      CT HEAD WO CONT   Final Result   1. There are milder microvascular changes within the central periventricular   white matter. 2.  There is an area of diminished density within the inferior aspect of the   right cerebellar hemisphere without change (series 201 image number 12). These   findings may represent an older ischemic insult within the right posterior   inferior cerebellar region. 3.  This examination is negative for acute intracranial pathology or short-term   interval changes dating back to 1/17/2022. XR CHEST PORT   Final Result   Patchy mixed asymmetric lung disease appears somewhat better but the   lungs are better inflated. No effusion or pneumothorax. Normal heart and   mediastinum      IR INSERT NON TUNL CVC OVER 5 YRS   Final Result   Ultrasound of the right neck demonstrated patent IJV. Successful US-guided placement of a right internal jugular triple lumen central   venous catheter as described above. The catheter is functioning. PLAN:   Catheter position was confirmed by follow-up chest x-ray: catheter tip in the   lower SVC and ready to use. IR US GUIDED VASCULAR ACCESS   Final Result   Ultrasound of the right neck demonstrated patent IJV. Successful US-guided placement of a right internal jugular triple lumen central   venous catheter as described above. The catheter is functioning. PLAN:   Catheter position was confirmed by follow-up chest x-ray: catheter tip in the   lower SVC and ready to use. XR CHEST PORT   Final Result   Findings/impression:      Diffuse interstitial airspace disease/edema. No definite pleural effusion or   pneumothorax. Cardiac contours are partially obscured. No acute osseous abnormality identified.       XR CHEST PORT    (Results Pending)        Recent Results (from the past 24 hour(s))   PTT    Collection Time: 02/05/22 12:00 AM   Result Value Ref Range    aPTT 26.3 21.2 - 34.1 sec    aPTT, therapeutic range   82 - 109 sec   BLOOD GAS, ARTERIAL    Collection Time: 02/05/22  2:05 AM   Result Value Ref Range    pH 7.41 7.35 - 7.45      PCO2 52 (H) 35 - 45 mmHg    PO2 74 (L) 75 - 100 mmHg    O2 SAT 96 >95 %    BICARBONATE 31 (H) 22 - 26 mmol/L    BASE EXCESS 7.4 (H) 0 - 2 mmol/L    O2 METHOD VENT      FIO2 85.0 %    MODE Assist Control/Volume Control      Tidal volume 450      SET RATE 12      EPAP/CPAP/PEEP 5.0      SITE Right Radial      BEN'S TEST PASS     PTT    Collection Time: 02/05/22  4:00 AM   Result Value Ref Range    aPTT 25.2 21.2 - 34.1 sec    aPTT, therapeutic range   82 - 109 sec   CBC WITH AUTOMATED DIFF    Collection Time: 02/05/22  4:00 AM   Result Value Ref Range    WBC 31.3 (H) 3.6 - 11.0 K/uL    RBC 2.59 (L) 3.80 - 5.20 M/uL    HGB 8.5 (L) 11.5 - 16.0 g/dL    HCT 25.6 (L) 35.0 - 47.0 %    MCV 98.8 80.0 - 99.0 FL    MCH 32.8 26.0 - 34.0 PG    MCHC 33.2 30.0 - 36.5 g/dL    RDW 15.1 (H) 11.5 - 14.5 %    PLATELET 292 (L) 340 - 400 K/uL    MPV 12.0 8.9 - 12.9 FL    NRBC 0.7 (H) 0.0  WBC    ABSOLUTE NRBC 0.22 (H) 0.00 - 0.01 K/uL    NEUTROPHILS 89 (H) 32 - 75 %    LYMPHOCYTES 3 (L) 12 - 49 %    MONOCYTES 5 5 - 13 %    EOSINOPHILS 0 0 - 7 %    BASOPHILS 0 0 - 1 %    IMMATURE GRANULOCYTES 3 (H) 0 - 0.5 %    ABS. NEUTROPHILS 28.1 (H) 1.8 - 8.0 K/UL    ABS. LYMPHOCYTES 0.9 0.8 - 3.5 K/UL    ABS. MONOCYTES 1.4 (H) 0.0 - 1.0 K/UL    ABS. EOSINOPHILS 0.0 0.0 - 0.4 K/UL    ABS. BASOPHILS 0.0 0.0 - 0.1 K/UL    ABS. IMM. GRANS. 0.9 (H) 0.00 - 0.04 K/UL    DF AUTOMATED     METABOLIC PANEL, COMPREHENSIVE    Collection Time: 02/05/22  4:00 AM   Result Value Ref Range    Sodium 144 136 - 145 mmol/L    Potassium 4.9 3.5 - 5.1 mmol/L    Chloride 109 (H) 97 - 108 mmol/L    CO2 34 (H) 21 - 32 mmol/L    Anion gap 1 (L) 5 - 15 mmol/L    Glucose 450 (H) 65 - 100 mg/dL    BUN 89 (H) 6 - 20 mg/dL    Creatinine 0.83 0.55 - 1.02 mg/dL    BUN/Creatinine ratio 107 (H) 12 - 20      GFR est AA >60 >60 ml/min/1.73m2    GFR est non-AA >60 >60 ml/min/1.73m2    Calcium 9.6 8.5 - 10.1 mg/dL    Bilirubin, total 0.6 0.2 - 1.0 mg/dL    AST (SGOT) 126 (H) 15 - 37 U/L    ALT (SGPT) 317 (H) 12 - 78 U/L    Alk.  phosphatase 135 (H) 45 - 117 U/L    Protein, total 5.6 (L) 6.4 - 8.2 g/dL    Albumin 3.0 (L) 3.5 - 5.0 g/dL    Globulin 2.6 2.0 - 4.0 g/dL    A-G Ratio 1.2 1.1 - 2.2         Results     ** No results found for the last 336 hours. **           Labs:     Recent Labs     02/05/22  0400 02/04/22  0355   WBC 31.3* 28.1*   HGB 8.5* 8.2*   HCT 25.6* 24.2*   * 132*     Recent Labs     02/05/22  0400 02/04/22  0355 02/03/22  0515    140 130*   K 4.9 5.3* 5.3*   * 105 95*   CO2 34* 30 28   BUN 89* 118* 145*   CREA 0.83 0.89 1.14*   * 418* 434*   CA 9.6 9.4 8.8     Recent Labs     02/05/22  0400 02/04/22  0355 02/03/22  0515   * 403* 307*   * 82 48   TBILI 0.6 0.7 0.4   TP 5.6* 5.5* 4.8*   ALB 3.0* 3.1* 2.4*   GLOB 2.6 2.4 2.4     Recent Labs     02/05/22  0400 02/05/22  0000 02/04/22 0355   APTT 25.2 26.3 26.2      No results for input(s): FE, TIBC, PSAT, FERR in the last 72 hours. No results found for: FOL, RBCF   Recent Labs     02/05/22 0205 02/04/22  0345   PH 7.41 7.40   PCO2 52* 49*   PO2 74* 71*     No results for input(s): CPK, CKNDX, TROIQ in the last 72 hours.     No lab exists for component: CPKMB  No results found for: CHOL, CHOLX, CHLST, CHOLV, HDL, HDLP, LDL, LDLC, DLDLP, TGLX, TRIGL, TRIGP, CHHD, CHHDX  Lab Results   Component Value Date/Time    Glucose (POC) 157 (H) 01/31/2022 01:12 AM    Glucose (POC) 179 (H) 01/30/2022 04:57 AM    Glucose (POC) 169 (H) 01/28/2022 05:11 AM    Glucose (POC) 168 (H) 01/27/2022 11:33 AM    Glucose (POC) 182 (H) 01/27/2022 05:20 AM     Lab Results   Component Value Date/Time    Color Yellow/Straw 01/23/2022 10:30 AM    Appearance Turbid (A) 01/23/2022 10:30 AM    Specific gravity 1.027 01/23/2022 10:30 AM    pH (UA) 6.0 01/23/2022 10:30 AM    Protein 100 (A) 01/23/2022 10:30 AM    Glucose 50 (A) 01/23/2022 10:30 AM    Ketone 5 (A) 01/23/2022 10:30 AM    Bilirubin Negative 01/23/2022 10:30 AM    Urobilinogen 4.0 (H) 01/23/2022 10:30 AM    Nitrites Negative 01/23/2022 10:30 AM    Leukocyte Esterase Negative 01/23/2022 10:30 AM    Bacteria Negative 01/23/2022 10:30 AM    WBC 0-4 01/23/2022 10:30 AM    RBC 0-5 01/23/2022 10:30 AM         Assessment:     Acute hypoxemic respiratory failure on on ventilator 85% of   COVID-19 pneumonitis   hypotension  Thrombocytopenia  Hypertension  Arthritis  Hyperkalemia  Hyponatremia  Moderate protein calorie malnutrition  Patient need multiple partial finger amputation once stable as per vascular surgeon    Acute kidney injury worsening follow-up with nephrology  Plan:         Olumiant 2 mg daily  Decadron 4 mg every 6 hours  Meropenem 1 g every 8 hours  Diltiazem 30 mg 3 times  Follow-up with pulmonologist and nephrologist      Monitor sodium and potassium    Overall prognosis  Normal saline 50 cc/h      Patient is DNR  He want to continue current medication    Seen by vascular surgeon he will plan to do multiple partial finger amputation 1 patient's stable  Repeat the lab      Discussed with the patient son today want to wait till Monday for any decision      Current Facility-Administered Medications:     0.9% sodium chloride infusion 250 mL, 250 mL, IntraVENous, PRN, Karan Flower MD    0.9% sodium chloride infusion 250 mL, 250 mL, IntraVENous, PRN, Karan Flower MD    dexamethasone (DECADRON) 4 mg/mL injection 4 mg, 4 mg, IntraVENous, Q12H, Julianne Flower MD, 4 mg at 02/05/22 0835    0.9% sodium chloride infusion, 50 mL/hr, IntraVENous, CONTINUOUS, Polo Lopez MD, Last Rate: 50 mL/hr at 02/05/22 0521, 50 mL/hr at 02/05/22 0521    dilTIAZem IR (CARDIZEM) tablet 30 mg, 30 mg, Oral, TID, Karan Flower MD, 30 mg at 02/05/22 0835    heparin 25,000 units in  ml infusion, 18-36 Units/kg/hr (Adjusted), IntraVENous, TITRATE, Polo Lopez MD, Stopped at 02/03/22 0850    heparin (porcine) 1,000 unit/mL injection 6,080 Units, 80 Units/kg (Adjusted), IntraVENous, PRN, 6,080 Units at 02/02/22 0032 **OR** heparin (porcine) 1,000 unit/mL injection 3,040 Units, 40 Units/kg (Adjusted), IntraVENous, PRN, Janeth Dolan MD, 3,040 Units at 02/01/22 0645    fentaNYL (PF) 1,500 mcg/30 mL (50 mcg/mL) infusion, 0-200 mcg/hr, IntraVENous, TITRATE, Neda Flower MD, Last Rate: 1 mL/hr at 02/04/22 1921, 50 mcg/hr at 02/04/22 1921    hydrOXYzine pamoate (VISTARIL) capsule 25 mg, 25 mg, Oral, Q4H PRN, Eder Lopez MD    propofol (DIPRIVAN) 10 mg/mL infusion, 0-50 mcg/kg/min, IntraVENous, TITRATE, Karan Flower MD, Stopped at 02/05/22 0120    dexmedeTOMidine (PRECEDEX) 400 mcg in 0.9% sodium chloride (MBP/ADV) 100 mL MBP, 0.1-1.5 mcg/kg/hr, IntraVENous, TITRATE, Karan Flower MD, Stopped at 01/24/22 0824    NOREPINephrine (LEVOPHED) 8 mg in 0.9% NS 250ml infusion, 0.5-16 mcg/min, IntraVENous, TITRATE, Eder Lopez MD, Stopped at 02/05/22 2146    acetaminophen (TYLENOL) tablet 650 mg, 650 mg, Oral, Q6H PRN **OR** acetaminophen (TYLENOL) suppository 650 mg, 650 mg, Rectal, Q6H PRN, Eder Lopez MD    polyethylene glycol (MIRALAX) packet 17 g, 17 g, Oral, DAILY PRN, dEer Lopez MD    ondansetron (ZOFRAN ODT) tablet 4 mg, 4 mg, Oral, Q8H PRN **OR** [DISCONTINUED] ondansetron (ZOFRAN) injection 4 mg, 4 mg, IntraVENous, Q6H PRN, Polo Lopez MD    baricitinib (OLUMIANT) tablet 2 mg, 2 mg, Oral, DAILY, Polo Lopez MD, 2 mg at 02/05/22 0835    albuterol (PROVENTIL HFA, VENTOLIN HFA, PROAIR HFA) inhaler 2 Puff, 2 Puff, Inhalation, Q6H PRN, Polo Lopez MD    polyethylene glycol (MIRALAX) packet 17 g, 17 g, Oral, DAILY PRN, Polo Lopez MD, 17 g at 01/28/22 0844    [DISCONTINUED] ondansetron (ZOFRAN ODT) tablet 4 mg, 4 mg, Oral, Q8H PRN **OR** ondansetron (ZOFRAN) injection 4 mg, 4 mg, IntraVENous, Q6H PRN, Polo Lopez MD    meropenem (MERREM) 1 g in 0.9% sodium chloride 20 mL IV syringe, 1 g, IntraVENous, Q8H, Polo Lopez MD, 1 g at 02/05/22 0837    hydrOXYzine (VISTARIL) injection 25 mg, 25 mg, IntraMUSCular, Q6H PRN, Eder Lopez, MD, 25 mg at 01/22/22 0118

## 2022-02-06 NOTE — PROGRESS NOTES
General Daily Progress Note          Patient Name:   Natalie Hoover       YOB: 1940       Age:  80 y.o. Admit Date: 1/21/2022      Subjective:     Patient is a 80y.o. year old female with signal past medical history of hypertension arthritis who discharged from the hospital yesterday in stable condition patient is admitted last admission with COVID-19 patient was asymptomatic all this time while in the hospital patient discharged on 2 L oxygen to skilled care but patient son want to go home with home health but is at home patient's saturations drops sent back to the ER seen by the ER physician patient placed on BiPAP patient was little hypotensive started on Levophed admitted for further work-up and treatment           Patient is DNR    Patient on ventilator 85% O2  Off sedation    No response      NG tube in place for medication and feeding      Objective:     Visit Vitals  BP (!) 149/65 (BP 1 Location: Right lower arm, BP Patient Position: At rest)   Pulse 89   Temp 97.1 °F (36.2 °C)   Resp 22   Ht 5' 5\" (1.651 m)   Wt 104.5 kg (230 lb 6.1 oz)   SpO2 94%   BMI 38.34 kg/m²        Recent Results (from the past 24 hour(s))   CBC WITH AUTOMATED DIFF    Collection Time: 02/06/22  2:56 AM   Result Value Ref Range    WBC 26.9 (H) 3.6 - 11.0 K/uL    RBC 2.65 (L) 3.80 - 5.20 M/uL    HGB 8.6 (L) 11.5 - 16.0 g/dL    HCT 26.8 (L) 35.0 - 47.0 %    .1 (H) 80.0 - 99.0 FL    MCH 32.5 26.0 - 34.0 PG    MCHC 32.1 30.0 - 36.5 g/dL    RDW 15.7 (H) 11.5 - 14.5 %    PLATELET 134 (L) 484 - 400 K/uL    MPV 11.9 8.9 - 12.9 FL    NRBC 0.9 (H) 0.0  WBC    ABSOLUTE NRBC 0.24 (H) 0.00 - 0.01 K/uL    NEUTROPHILS 90 (H) 32 - 75 %    LYMPHOCYTES 4 (L) 12 - 49 %    MONOCYTES 3 (L) 5 - 13 %    EOSINOPHILS 1 0 - 7 %    BASOPHILS 0 0 - 1 %    IMMATURE GRANULOCYTES 2 (H) 0 - 0.5 %    ABS. NEUTROPHILS 24.1 (H) 1.8 - 8.0 K/UL    ABS. LYMPHOCYTES 1.2 0.8 - 3.5 K/UL    ABS. MONOCYTES 0.8 0.0 - 1.0 K/UL    ABS. EOSINOPHILS 0.2 0.0 - 0.4 K/UL    ABS. BASOPHILS 0.0 0.0 - 0.1 K/UL    ABS. IMM. GRANS. 0.5 (H) 0.00 - 0.04 K/UL    DF AUTOMATED     METABOLIC PANEL, COMPREHENSIVE    Collection Time: 02/06/22  2:56 AM   Result Value Ref Range    Sodium 148 (H) 136 - 145 mmol/L    Potassium 4.6 3.5 - 5.1 mmol/L    Chloride 113 (H) 97 - 108 mmol/L    CO2 31 21 - 32 mmol/L    Anion gap 4 (L) 5 - 15 mmol/L    Glucose 375 (H) 65 - 100 mg/dL    BUN 71 (H) 6 - 20 mg/dL    Creatinine 0.64 0.55 - 1.02 mg/dL    BUN/Creatinine ratio 111 (H) 12 - 20      GFR est AA >60 >60 ml/min/1.73m2    GFR est non-AA >60 >60 ml/min/1.73m2    Calcium 9.8 8.5 - 10.1 mg/dL    Bilirubin, total 0.6 0.2 - 1.0 mg/dL    AST (SGOT) 60 (H) 15 - 37 U/L    ALT (SGPT) 223 (H) 12 - 78 U/L    Alk. phosphatase 171 (H) 45 - 117 U/L    Protein, total 5.5 (L) 6.4 - 8.2 g/dL    Albumin 2.6 (L) 3.5 - 5.0 g/dL    Globulin 2.9 2.0 - 4.0 g/dL    A-G Ratio 0.9 (L) 1.1 - 2.2     BLOOD GAS, ARTERIAL    Collection Time: 02/06/22  3:00 AM   Result Value Ref Range    pH 7.45 7.35 - 7.45      PCO2 48 (H) 35 - 45 mmHg    PO2 60 (L) 75 - 100 mmHg    O2 SAT 93 (L) >95 %    BICARBONATE 31 (H) 22 - 26 mmol/L    BASE EXCESS 7.7 (H) 0 - 2 mmol/L    O2 METHOD VENT      FIO2 85.0 %    MODE Assist Control/Volume Control      Tidal volume 450      SET RATE 12      EPAP/CPAP/PEEP 5.0      SITE Right Radial      BEN'S TEST PASS       [unfilled]      Review of Systems    Unable to obtain. Physical Exam:      Constitutional: Awake on BiPAP  HENT:   Head: Normocephalic and atraumatic. Eyes: Pupils are equal, round, and reactive to light. EOM are normal.   Cardiovascular: Normal rate, regular rhythm and normal heart sounds. Pulmonary/Chest: Breath sounds normal. No wheezes. No rales. Exhibits no tenderness. Abdominal: Soft. Bowel sounds are normal. There is no abdominal tenderness. There is no rebound and no guarding. Musculoskeletal: Normal range of motion.    Neurological: pt is alert and oriented to person, place, and time. XR CHEST PORT   Final Result   Persistent bilateral airspace disease and with decreased aeration. XR CHEST PORT   Final Result      XR CHEST PORT   Final Result      XR CHEST PORT   Final Result      XR CHEST PORT   Final Result      XR CHEST PORT   Final Result      WRIST BRACHIAL INDEX AT REST   Final Result      XR CHEST PORT   Final Result   No significant change. XR CHEST PORT   Final Result   No interval change. DUPLEX UPPER EXT ARTERY LEFT   Final Result      XR CHEST PORT   Final Result      XR CHEST PORT   Final Result   Endotracheal tube tip still at the maggie. Diffuse opacities in the   lungs, not significantly changed. XR CHEST PORT   Final Result      XR CHEST PORT   Final Result   Endotracheal tube tip at the maggie. Diffuse opacities in the   lungs, accentuated by lower lung volumes or increased. XR CHEST PORT   Final Result   Diffuse opacities in the lungs, not significantly changed. XR CHEST PORT   Final Result   1. ETT terminating 2 cm above the maggie. Remaining support apparatus as above. 2.  Worsening bilateral airspace disease with developing ARDS not excluded. XR CHEST PORT   Final Result   Moderate patchy diffuse bilateral airspace opacities, waxing and waning from the   prior exam.      XR CHEST PORT   Final Result   Moderate diffuse bilateral airspace opacities, likely a combination of edema and   airspace disease, mildly increased from the previous exam.      CT HEAD WO CONT   Final Result   1. There are milder microvascular changes within the central periventricular   white matter. 2.  There is an area of diminished density within the inferior aspect of the   right cerebellar hemisphere without change (series 201 image number 12). These   findings may represent an older ischemic insult within the right posterior   inferior cerebellar region.    3.  This examination is negative for acute intracranial pathology or short-term   interval changes dating back to 1/17/2022. XR CHEST PORT   Final Result   Patchy mixed asymmetric lung disease appears somewhat better but the   lungs are better inflated. No effusion or pneumothorax. Normal heart and   mediastinum      IR INSERT NON TUNL CVC OVER 5 YRS   Final Result   Ultrasound of the right neck demonstrated patent IJV. Successful US-guided placement of a right internal jugular triple lumen central   venous catheter as described above. The catheter is functioning. PLAN:   Catheter position was confirmed by follow-up chest x-ray: catheter tip in the   lower SVC and ready to use. IR US GUIDED VASCULAR ACCESS   Final Result   Ultrasound of the right neck demonstrated patent IJV. Successful US-guided placement of a right internal jugular triple lumen central   venous catheter as described above. The catheter is functioning. PLAN:   Catheter position was confirmed by follow-up chest x-ray: catheter tip in the   lower SVC and ready to use. XR CHEST PORT   Final Result   Findings/impression:      Diffuse interstitial airspace disease/edema. No definite pleural effusion or   pneumothorax. Cardiac contours are partially obscured. No acute osseous abnormality identified.       XR CHEST PORT    (Results Pending)        Recent Results (from the past 24 hour(s))   CBC WITH AUTOMATED DIFF    Collection Time: 02/06/22  2:56 AM   Result Value Ref Range    WBC 26.9 (H) 3.6 - 11.0 K/uL    RBC 2.65 (L) 3.80 - 5.20 M/uL    HGB 8.6 (L) 11.5 - 16.0 g/dL    HCT 26.8 (L) 35.0 - 47.0 %    .1 (H) 80.0 - 99.0 FL    MCH 32.5 26.0 - 34.0 PG    MCHC 32.1 30.0 - 36.5 g/dL    RDW 15.7 (H) 11.5 - 14.5 %    PLATELET 106 (L) 341 - 400 K/uL    MPV 11.9 8.9 - 12.9 FL    NRBC 0.9 (H) 0.0  WBC    ABSOLUTE NRBC 0.24 (H) 0.00 - 0.01 K/uL    NEUTROPHILS 90 (H) 32 - 75 %    LYMPHOCYTES 4 (L) 12 - 49 %    MONOCYTES 3 (L) 5 - 13 % EOSINOPHILS 1 0 - 7 %    BASOPHILS 0 0 - 1 %    IMMATURE GRANULOCYTES 2 (H) 0 - 0.5 %    ABS. NEUTROPHILS 24.1 (H) 1.8 - 8.0 K/UL    ABS. LYMPHOCYTES 1.2 0.8 - 3.5 K/UL    ABS. MONOCYTES 0.8 0.0 - 1.0 K/UL    ABS. EOSINOPHILS 0.2 0.0 - 0.4 K/UL    ABS. BASOPHILS 0.0 0.0 - 0.1 K/UL    ABS. IMM. GRANS. 0.5 (H) 0.00 - 0.04 K/UL    DF AUTOMATED     METABOLIC PANEL, COMPREHENSIVE    Collection Time: 02/06/22  2:56 AM   Result Value Ref Range    Sodium 148 (H) 136 - 145 mmol/L    Potassium 4.6 3.5 - 5.1 mmol/L    Chloride 113 (H) 97 - 108 mmol/L    CO2 31 21 - 32 mmol/L    Anion gap 4 (L) 5 - 15 mmol/L    Glucose 375 (H) 65 - 100 mg/dL    BUN 71 (H) 6 - 20 mg/dL    Creatinine 0.64 0.55 - 1.02 mg/dL    BUN/Creatinine ratio 111 (H) 12 - 20      GFR est AA >60 >60 ml/min/1.73m2    GFR est non-AA >60 >60 ml/min/1.73m2    Calcium 9.8 8.5 - 10.1 mg/dL    Bilirubin, total 0.6 0.2 - 1.0 mg/dL    AST (SGOT) 60 (H) 15 - 37 U/L    ALT (SGPT) 223 (H) 12 - 78 U/L    Alk. phosphatase 171 (H) 45 - 117 U/L    Protein, total 5.5 (L) 6.4 - 8.2 g/dL    Albumin 2.6 (L) 3.5 - 5.0 g/dL    Globulin 2.9 2.0 - 4.0 g/dL    A-G Ratio 0.9 (L) 1.1 - 2.2     BLOOD GAS, ARTERIAL    Collection Time: 02/06/22  3:00 AM   Result Value Ref Range    pH 7.45 7.35 - 7.45      PCO2 48 (H) 35 - 45 mmHg    PO2 60 (L) 75 - 100 mmHg    O2 SAT 93 (L) >95 %    BICARBONATE 31 (H) 22 - 26 mmol/L    BASE EXCESS 7.7 (H) 0 - 2 mmol/L    O2 METHOD VENT      FIO2 85.0 %    MODE Assist Control/Volume Control      Tidal volume 450      SET RATE 12      EPAP/CPAP/PEEP 5.0      SITE Right Radial      BEN'S TEST PASS         Results     ** No results found for the last 336 hours.  **           Labs:     Recent Labs     02/06/22  0256 02/05/22  0400   WBC 26.9* 31.3*   HGB 8.6* 8.5*   HCT 26.8* 25.6*   * 132*     Recent Labs     02/06/22  0256 02/05/22  0400 02/04/22  0355   * 144 140   K 4.6 4.9 5.3*   * 109* 105   CO2 31 34* 30   BUN 71* 89* 118*   CREA 0. 64 0.83 0.89   * 450* 418*   CA 9.8 9.6 9.4     Recent Labs     02/06/22  0256 02/05/22  0400 02/04/22  0355   * 317* 403*   * 135* 82   TBILI 0.6 0.6 0.7   TP 5.5* 5.6* 5.5*   ALB 2.6* 3.0* 3.1*   GLOB 2.9 2.6 2.4     Recent Labs     02/05/22  0400 02/05/22  0000 02/04/22  0355   APTT 25.2 26.3 26.2      No results for input(s): FE, TIBC, PSAT, FERR in the last 72 hours. No results found for: FOL, RBCF   Recent Labs     02/06/22  0300 02/05/22  0205   PH 7.45 7.41   PCO2 48* 52*   PO2 60* 74*     No results for input(s): CPK, CKNDX, TROIQ in the last 72 hours.     No lab exists for component: CPKMB  No results found for: CHOL, CHOLX, CHLST, CHOLV, HDL, HDLP, LDL, LDLC, DLDLP, TGLX, TRIGL, TRIGP, CHHD, CHHDX  Lab Results   Component Value Date/Time    Glucose (POC) 157 (H) 01/31/2022 01:12 AM    Glucose (POC) 179 (H) 01/30/2022 04:57 AM    Glucose (POC) 169 (H) 01/28/2022 05:11 AM    Glucose (POC) 168 (H) 01/27/2022 11:33 AM    Glucose (POC) 182 (H) 01/27/2022 05:20 AM     Lab Results   Component Value Date/Time    Color Yellow/Straw 01/23/2022 10:30 AM    Appearance Turbid (A) 01/23/2022 10:30 AM    Specific gravity 1.027 01/23/2022 10:30 AM    pH (UA) 6.0 01/23/2022 10:30 AM    Protein 100 (A) 01/23/2022 10:30 AM    Glucose 50 (A) 01/23/2022 10:30 AM    Ketone 5 (A) 01/23/2022 10:30 AM    Bilirubin Negative 01/23/2022 10:30 AM    Urobilinogen 4.0 (H) 01/23/2022 10:30 AM    Nitrites Negative 01/23/2022 10:30 AM    Leukocyte Esterase Negative 01/23/2022 10:30 AM    Bacteria Negative 01/23/2022 10:30 AM    WBC 0-4 01/23/2022 10:30 AM    RBC 0-5 01/23/2022 10:30 AM         Assessment:     Acute hypoxemic respiratory failure on on ventilator 85% of   COVID-19 pneumonitis   hypotension  Thrombocytopenia  Hypertension  Arthritis  Hyperkalemia  Hyponatremia  Moderate protein calorie malnutrition  Patient need multiple partial finger amputation once stable as per vascular surgeon    Acute kidney injury worsening follow-up with nephrology  Plan:         Olumiant 2 mg daily  Decadron 4 mg every 6 hours  Meropenem 1 g every 8 hours  Diltiazem 30 mg 3 times  Follow-up with pulmonologist and nephrologist      Monitor sodium and potassium    Overall prognosis  Normal saline 50 cc/h      Patient is DNR  He want to continue current medication    Seen by vascular surgeon he will plan to do multiple partial finger amputation 1 patient's stable  Repeat the lab      Discussed with the patient son today want to wait till Monday for any decision      Current Facility-Administered Medications:     dextrose 5% infusion, 50 mL/hr, IntraVENous, CONTINUOUS, Mamta Fox MD    dexamethasone (DECADRON) 4 mg/mL injection 4 mg, 4 mg, IntraVENous, Q24H, Karan Flower MD, 4 mg at 02/06/22 0801    0.9% sodium chloride infusion 250 mL, 250 mL, IntraVENous, PRN, Karan Flower MD    0.9% sodium chloride infusion 250 mL, 250 mL, IntraVENous, PRN, Karan Flower MD    dilTIAZem IR (CARDIZEM) tablet 30 mg, 30 mg, Oral, TID, Karan Flower MD, 30 mg at 02/06/22 0801    heparin 25,000 units in  ml infusion, 18-36 Units/kg/hr (Adjusted), IntraVENous, TITRATE, Polo Lopez MD, Stopped at 02/03/22 0850    heparin (porcine) 1,000 unit/mL injection 6,080 Units, 80 Units/kg (Adjusted), IntraVENous, PRN, 6,080 Units at 02/02/22 0032 **OR** heparin (porcine) 1,000 unit/mL injection 3,040 Units, 40 Units/kg (Adjusted), IntraVENous, PRN, Lysbeth Angelucci, MD, 3,040 Units at 02/01/22 0645    fentaNYL (PF) 1,500 mcg/30 mL (50 mcg/mL) infusion, 0-200 mcg/hr, IntraVENous, TITRATE, Karan Flower MD, Last Rate: 1 mL/hr at 02/05/22 1815, 50 mcg/hr at 02/05/22 1815    hydrOXYzine pamoate (VISTARIL) capsule 25 mg, 25 mg, Oral, Q4H PRN, Polo Lopez MD    propofol (DIPRIVAN) 10 mg/mL infusion, 0-50 mcg/kg/min, IntraVENous, TITRATE, Karan Flower MD, Last Rate: 10.9 mL/hr at 02/06/22 0820, 20 mcg/kg/min at 02/06/22 0820    dexmedeTOMidine (PRECEDEX) 400 mcg in 0.9% sodium chloride (MBP/ADV) 100 mL MBP, 0.1-1.5 mcg/kg/hr, IntraVENous, TITRATE, Karan Flower MD, Stopped at 01/24/22 0824    NOREPINephrine (LEVOPHED) 8 mg in 0.9% NS 250ml infusion, 0.5-16 mcg/min, IntraVENous, TITRATE, Camille Lopez MD, Stopped at 02/05/22 4554    acetaminophen (TYLENOL) tablet 650 mg, 650 mg, Oral, Q6H PRN **OR** acetaminophen (TYLENOL) suppository 650 mg, 650 mg, Rectal, Q6H PRN, Camille Lopez MD    polyethylene glycol (MIRALAX) packet 17 g, 17 g, Oral, DAILY PRN, Camille Lopez MD    ondansetron (ZOFRAN ODT) tablet 4 mg, 4 mg, Oral, Q8H PRN **OR** [DISCONTINUED] ondansetron (ZOFRAN) injection 4 mg, 4 mg, IntraVENous, Q6H PRN, Polo Lopez MD    baricitinib (OLUMIANT) tablet 2 mg, 2 mg, Oral, DAILY, Polo Lopez MD, 2 mg at 02/06/22 0801    albuterol (PROVENTIL HFA, VENTOLIN HFA, PROAIR HFA) inhaler 2 Puff, 2 Puff, Inhalation, Q6H PRN, Polo Lopez MD    polyethylene glycol (MIRALAX) packet 17 g, 17 g, Oral, DAILY PRN, Polo Lopez MD, 17 g at 01/28/22 0844    [DISCONTINUED] ondansetron (ZOFRAN ODT) tablet 4 mg, 4 mg, Oral, Q8H PRN **OR** ondansetron (ZOFRAN) injection 4 mg, 4 mg, IntraVENous, Q6H PRN, Polo Lopez MD    meropenem (MERREM) 1 g in 0.9% sodium chloride 20 mL IV syringe, 1 g, IntraVENous, Q8H, Polo Lopez MD, 1 g at 02/06/22 0800    hydrOXYzine (VISTARIL) injection 25 mg, 25 mg, IntraMUSCular, Q6H PRN, Polo Lopez MD, 25 mg at 01/22/22 0111

## 2022-02-06 NOTE — PROGRESS NOTES
IMPRESSION:   1. Acute hypoxic respiratory  2. Acute on chronic hypercapnic respiratory failure   3. COVID-19 pneumonia  4. Pulmonary edema  5. Severe Anemia hemoglobin dropped to 4 after blood transfusion is accepted  6. Sepsis with septic shock now back on pressors  7. Left hand ischemia  8. Arthritis hypertension by hx  9. Hyperkalemia   10. Hypernatremia corrected  11. Now hyponatremic      RECOMMENDATIONS/PLAN:   1. ICU monitoring  1. Patient condition got worse on 1/24 went to asystole subsequently got intubated now on ventilator assist control mode sedated with propofol and fentanyl Precedex was discontinued because of bradycardia arterial blood gases acceptable currently on A/C 12  PEEP 5 FiO2  85% PO2 acceptable   2. Patient is on dexamethasone and baricitinib  3. Significant drop in hemoglobin recieved 3 unit packed RBC hemoglobin improved  4. Patient is back on Levophed hemodynamically unstable will continue to wean  5. Leukocytosis will repeat CBC   6. Elevated blood sugar at steroid was decreased blood sugar trending down if not then we will start patient on sliding scale  7. Potassium corrected  8. Left hand NATHALIE normal continue local wound care possible partial amputation  9. Pulmonary edema received Lasix chest x-ray shows bilateral infiltrate congestive changes  10. Left hand ischemia off IV heparin left ulnar artery occlusion radial  patent NATHALIE normal  11. Troponin is elevated  12. Lactic acid 3.7  13.  She is on meropenem and finished Zithromax     [x] High complexity decision making was performed  [x] See my orders for details  HPI  72-year-old lady came in because of shortness of breath and dyspnea she has significant past medical history of arthritis hypertension she was recently discharged from the hospital came back with worsening of hypoxia she was discharged on oxygen 2 L nasal cannula and patient condition got worse she was supposed to be discharged to skilled care but patient's son took her home where her condition got worse and she was transferred back to the hospital now she is on noninvasive ventilator BiPAP machine hemodynamically unstable hypotensive on vasopressors and not giving much history. PMH:  has no past medical history on file. PSH:   has a past surgical history that includes ir insert non tunl cvc over 5 yrs (1/21/2022). FHX: family history is not on file.      SHX:      ALL:   Allergies   Allergen Reactions    Penicillins Hives        MEDS:   [x] Reviewed - As Below   [] Not reviewed    Current Facility-Administered Medications   Medication    dexamethasone (DECADRON) 4 mg/mL injection 4 mg    0.9% sodium chloride infusion 250 mL    0.9% sodium chloride infusion 250 mL    0.9% sodium chloride infusion    dilTIAZem IR (CARDIZEM) tablet 30 mg    heparin 25,000 units in  ml infusion    heparin (porcine) 1,000 unit/mL injection 6,080 Units    Or    heparin (porcine) 1,000 unit/mL injection 3,040 Units    fentaNYL (PF) 1,500 mcg/30 mL (50 mcg/mL) infusion    hydrOXYzine pamoate (VISTARIL) capsule 25 mg    propofol (DIPRIVAN) 10 mg/mL infusion    dexmedeTOMidine (PRECEDEX) 400 mcg in 0.9% sodium chloride (MBP/ADV) 100 mL MBP    NOREPINephrine (LEVOPHED) 8 mg in 0.9% NS 250ml infusion    acetaminophen (TYLENOL) tablet 650 mg    Or    acetaminophen (TYLENOL) suppository 650 mg    polyethylene glycol (MIRALAX) packet 17 g    ondansetron (ZOFRAN ODT) tablet 4 mg    baricitinib (OLUMIANT) tablet 2 mg    albuterol (PROVENTIL HFA, VENTOLIN HFA, PROAIR HFA) inhaler 2 Puff    polyethylene glycol (MIRALAX) packet 17 g    ondansetron (ZOFRAN) injection 4 mg    meropenem (MERREM) 1 g in 0.9% sodium chloride 20 mL IV syringe    hydrOXYzine (VISTARIL) injection 25 mg      MAR reviewed and pertinent medications noted or modified as needed   Current Facility-Administered Medications   Medication    dexamethasone (DECADRON) 4 mg/mL injection 4 mg    0.9% sodium chloride infusion 250 mL    0.9% sodium chloride infusion 250 mL    0.9% sodium chloride infusion    dilTIAZem IR (CARDIZEM) tablet 30 mg    heparin 25,000 units in  ml infusion    heparin (porcine) 1,000 unit/mL injection 6,080 Units    Or    heparin (porcine) 1,000 unit/mL injection 3,040 Units    fentaNYL (PF) 1,500 mcg/30 mL (50 mcg/mL) infusion    hydrOXYzine pamoate (VISTARIL) capsule 25 mg    propofol (DIPRIVAN) 10 mg/mL infusion    dexmedeTOMidine (PRECEDEX) 400 mcg in 0.9% sodium chloride (MBP/ADV) 100 mL MBP    NOREPINephrine (LEVOPHED) 8 mg in 0.9% NS 250ml infusion    acetaminophen (TYLENOL) tablet 650 mg    Or    acetaminophen (TYLENOL) suppository 650 mg    polyethylene glycol (MIRALAX) packet 17 g    ondansetron (ZOFRAN ODT) tablet 4 mg    baricitinib (OLUMIANT) tablet 2 mg    albuterol (PROVENTIL HFA, VENTOLIN HFA, PROAIR HFA) inhaler 2 Puff    polyethylene glycol (MIRALAX) packet 17 g    ondansetron (ZOFRAN) injection 4 mg    meropenem (MERREM) 1 g in 0.9% sodium chloride 20 mL IV syringe    hydrOXYzine (VISTARIL) injection 25 mg      PMH:  has no past medical history on file. PSH:   has a past surgical history that includes ir insert non tunl cvc over 5 yrs (2022). FHX: family history is not on file. SHX:       ROS:  Unable to obtain sedated on ventilator    Hemodynamics:    CO:    CI:    CVP:    SVR:   PAP Systolic:    PAP Diastolic:    PVR:    BL75:        Ventilator Settings:      Mode Rate TV Press PEEP FiO2 PIP Min.  Vent   Assist control,Volume control    450 ml    5 cm H20 85 %  40 cm H2O  12.5 l/min        Vital Signs: Telemetry:    normal sinus rhythm Intake/Output:   Visit Vitals  BP (!) 149/65 (BP 1 Location: Right lower arm, BP Patient Position: At rest)   Pulse (!) 113   Temp 97.1 °F (36.2 °C)   Resp 28   Ht 5' 5\" (1.651 m)   Wt 104.5 kg (230 lb 6.1 oz)   SpO2 91%   BMI 38.34 kg/m²       Temp (24hrs), Av °F (36.7 °C), Min:97.1 °F (36.2 °C), Max:98.4 °F (36.9 °C)        O2 Device: Ventilator         Wt Readings from Last 4 Encounters:   01/28/22 104.5 kg (230 lb 6.1 oz)   01/17/22 90.7 kg (200 lb)          Intake/Output Summary (Last 24 hours) at 2/6/2022 0849  Last data filed at 2/6/2022 0556  Gross per 24 hour   Intake 1245.43 ml   Output 2950 ml   Net -1704.57 ml       Last shift:      No intake/output data recorded. Last 3 shifts: 02/04 1901 - 02/06 0700  In: 2298.6 [I.V.:2098.6]  Out: 7477 [Urine:4550]       Physical Exam:     General: Intubated on ventilator unresponsive on propofol fentanyl  HEENT: NCAT,   Eyes: anicteric; conjunctiva clear no doll's eye reflex  Neck: no nodes, , trach midline; no accessory MM use.   Questionable neck vein distention  Chest: no deformity,   Cardiac: R regular; no murmur;   Lungs: Diffuse wheezes and rales  Abd: soft, NT, hypoactive BS  Ext: Edema of the leg edema; left hand finger blackish discoloration  : clear urine  Neuro: Sedated unresponsive on the ventilator no doll's eye reflex flaccid extremities  Psych-unable to assess  Skin: warm, dry, no cyanosis;   Pulses: Brachial radial pulses intact  Capillary: Normal capillary refill      DATA:    MAR reviewed and pertinent medications noted or modified as needed  MEDS:   Current Facility-Administered Medications   Medication    dexamethasone (DECADRON) 4 mg/mL injection 4 mg    0.9% sodium chloride infusion 250 mL    0.9% sodium chloride infusion 250 mL    0.9% sodium chloride infusion    dilTIAZem IR (CARDIZEM) tablet 30 mg    heparin 25,000 units in  ml infusion    heparin (porcine) 1,000 unit/mL injection 6,080 Units    Or    heparin (porcine) 1,000 unit/mL injection 3,040 Units    fentaNYL (PF) 1,500 mcg/30 mL (50 mcg/mL) infusion    hydrOXYzine pamoate (VISTARIL) capsule 25 mg    propofol (DIPRIVAN) 10 mg/mL infusion    dexmedeTOMidine (PRECEDEX) 400 mcg in 0.9% sodium chloride (MBP/ADV) 100 mL MBP    NOREPINephrine (LEVOPHED) 8 mg in 0.9% NS 250ml infusion    acetaminophen (TYLENOL) tablet 650 mg    Or    acetaminophen (TYLENOL) suppository 650 mg    polyethylene glycol (MIRALAX) packet 17 g    ondansetron (ZOFRAN ODT) tablet 4 mg    baricitinib (OLUMIANT) tablet 2 mg    albuterol (PROVENTIL HFA, VENTOLIN HFA, PROAIR HFA) inhaler 2 Puff    polyethylene glycol (MIRALAX) packet 17 g    ondansetron (ZOFRAN) injection 4 mg    meropenem (MERREM) 1 g in 0.9% sodium chloride 20 mL IV syringe    hydrOXYzine (VISTARIL) injection 25 mg        Labs:    Recent Labs     02/06/22  0256 02/05/22  0400 02/05/22  0000 02/04/22  0355   WBC 26.9* 31.3*  --  28.1*   HGB 8.6* 8.5*  --  8.2*   * 132*  --  132*   APTT  --  25.2 26.3 26.2     Recent Labs     02/06/22  0256 02/05/22  0400 02/04/22  0355   * 144 140   K 4.6 4.9 5.3*   * 109* 105   CO2 31 34* 30   * 450* 418*   BUN 71* 89* 118*   CREA 0.64 0.83 0.89   CA 9.8 9.6 9.4   ALB 2.6* 3.0* 3.1*   * 317* 403*     Recent Labs     02/06/22  0300 02/05/22  0205 02/04/22  0345   PH 7.45 7.41 7.40   PCO2 48* 52* 49*   PO2 60* 74* 71*   HCO3 31* 31* 29*   FIO2 85.0 85.0 85.0   1/30 AC 12  PEEP 5 FiO2 100%    Lab Results   Component Value Date/Time    Culture result: No growth 6 days 01/21/2022 07:55 AM    Culture result: No growth 6 days 01/18/2022 07:12 AM    Culture result: (A) 01/17/2022 07:12 AM     Staphylococcus species, coagulase negative growing in 1 of 4 bottles drawn NO SITE INDICATED    Culture result:  01/17/2022 07:12 AM     (NOTE) [facility] GROWING IN 1 OF 2 BOTTLES CALLED TO JOSE MIGUEL PAGAN AT 0831 ON 1/19/22. JF     No results found for: TSH, TSHEXT, TSHEXT     Imaging:    Results from Hospital Encounter encounter on 01/21/22    XR CHEST PORT    Narrative  Exam: XR CHEST PORT    Indication:  chf;    Comparison: Chest radiograph from yesterday    Findings:    Endotracheal tube terminates approximately 3.1 cm from the maggie.  Right  internal jugular central venous catheter projects the region of the distal SVC. Gastric decompression tube terminates below the diaphragm and field-of-view. Stable cardiac mediastinal silhouette. Bilateral interstitial and alveolar  pulmonary opacities with subjectively decreased aeration and diminished lung  volumes. No large pleural effusion. Negative for radiographically apparent  pneumothorax. Impression  Persistent bilateral airspace disease and with decreased aeration. Results from [place] encounter on 01/21/22    CT HEAD WO CONT    Narrative  The study is a noncontrasted head CT examination dated 1/21/2022. HISTORY: Unresponsive. TECHNIQUE: Thin section axial imaging was performed followed by sagittal and  coronal reconstructed imaging. Dose Reduction Technique was employed to reduce radiation exposure - This  includes reduction optimization techniques as appropriate to a performed exam  with automated exposure control adjustments of the mA and/or Kv according to  patient size, or use of iterative reconstruction technique. COMPARISON: CT head dated 1/17/2022. There is an appropriate size to the ventricles, the deep cortical sulci, and the  sylvian fissures for the patient's age. This examination is negative for  intracranial hemorrhage, mass effect or an extra axial collection. The  gray-white matter junctions are preserved without cytotoxic or vasogenic edema. An assessment of the white matter tracts demonstrates a mild decrease in the  density characteristics of the central periventricular white matter. These  findings are consistent with expected aging changes and milder microvascular  disease. There also is a region of diminished density within the right posterior  inferior cerebellar hemisphere which may represent an older cerebrovascular  insult. ORBITS: This examination is negative for acute orbital pathology.     PARANASAL SINUSES: The paranasal sinuses are well pneumatized without acute  sinus disease. There is a decreased number of right sided mastoid air cells which can be  associated with chronic mastoiditis. The craniocervical junction images in a  normal fashion. Impression  1. There are milder microvascular changes within the central periventricular  white matter. 2.  There is an area of diminished density within the inferior aspect of the  right cerebellar hemisphere without change (series 201 image number 12). These  findings may represent an older ischemic insult within the right posterior  inferior cerebellar region. 3.  This examination is negative for acute intracranial pathology or short-term  interval changes dating back to 1/17/2022. 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 Chest x-ray looks like pulmonary edema.   She has peripheral edema we will give Lasix today discontinue D5W continue tube feedings we will check echocardiogram  · 1/31 off pressors remain intubated   · 2/1 remain intubated sedated on ventilator will do sedation vacation in a.m. still on 85%  · 2/2 on ventilator sedated elevated PCO2  · 2/3 remain intubated off pressors significant drop in hemoglobin and increasing white count we will repeat CBC and work accordingly  · 2/4 patient is back on Levophed hemodynamically unstable still on ventilator 85% FiO2  · 2/5 remain on ventilator condition unstable  · 2/6 condition unstable sedated on ventilator  · Time of care 30-minute

## 2022-02-06 NOTE — PROGRESS NOTES
PROGRESS NOTE      Chief Complaints:  Examined ICU. HPI and  Objective:    Patient currently intubated. Patient took worse clinical course overnight. Patient's FiO2 requirement raised up to 80%. Patient's left arm examined vascular status no changes. Demarcated area of the left 5, 4, 3 fingers no changes. Doppler signal noted on the left radial artery. Review of Systems:  Unable to assess secondary to intubation. EXAM:  Visit Vitals  BP (!) 149/65 (BP 1 Location: Right lower arm, BP Patient Position: At rest)   Pulse 89   Temp 97.1 °F (36.2 °C)   Resp 22   Ht 5' 5\" (1.651 m)   Wt 230 lb 6.1 oz (104.5 kg)   SpO2 94%   BMI 38.34 kg/m²       Patient is intubated. Head and neck atraumatic, normocephalic. ENT: No hoarse voice  Cardiac system regular rate rhythm. Pulmonary no audible wheeze  Chest wall excursion normal with respiration cycle  Abdomen is soft not particularly distended. Neurologically unable to assess  Skin is warm and moist.  Psychosocial: Unable to assess  Vascular examination as previously noted no changes. Recent Results (from the past 24 hour(s))   CBC WITH AUTOMATED DIFF    Collection Time: 02/06/22  2:56 AM   Result Value Ref Range    WBC 26.9 (H) 3.6 - 11.0 K/uL    RBC 2.65 (L) 3.80 - 5.20 M/uL    HGB 8.6 (L) 11.5 - 16.0 g/dL    HCT 26.8 (L) 35.0 - 47.0 %    .1 (H) 80.0 - 99.0 FL    MCH 32.5 26.0 - 34.0 PG    MCHC 32.1 30.0 - 36.5 g/dL    RDW 15.7 (H) 11.5 - 14.5 %    PLATELET 598 (L) 233 - 400 K/uL    MPV 11.9 8.9 - 12.9 FL    NRBC 0.9 (H) 0.0  WBC    ABSOLUTE NRBC 0.24 (H) 0.00 - 0.01 K/uL    NEUTROPHILS 90 (H) 32 - 75 %    LYMPHOCYTES 4 (L) 12 - 49 %    MONOCYTES 3 (L) 5 - 13 %    EOSINOPHILS 1 0 - 7 %    BASOPHILS 0 0 - 1 %    IMMATURE GRANULOCYTES 2 (H) 0 - 0.5 %    ABS. NEUTROPHILS 24.1 (H) 1.8 - 8.0 K/UL    ABS. LYMPHOCYTES 1.2 0.8 - 3.5 K/UL    ABS. MONOCYTES 0.8 0.0 - 1.0 K/UL    ABS. EOSINOPHILS 0.2 0.0 - 0.4 K/UL    ABS. BASOPHILS 0.0 0.0 - 0.1 K/UL    ABS. IMMArpan Alcaraz. 0.5 (H) 0.00 - 0.04 K/UL    DF AUTOMATED     METABOLIC PANEL, COMPREHENSIVE    Collection Time: 02/06/22  2:56 AM   Result Value Ref Range    Sodium 148 (H) 136 - 145 mmol/L    Potassium 4.6 3.5 - 5.1 mmol/L    Chloride 113 (H) 97 - 108 mmol/L    CO2 31 21 - 32 mmol/L    Anion gap 4 (L) 5 - 15 mmol/L    Glucose 375 (H) 65 - 100 mg/dL    BUN 71 (H) 6 - 20 mg/dL    Creatinine 0.64 0.55 - 1.02 mg/dL    BUN/Creatinine ratio 111 (H) 12 - 20      GFR est AA >60 >60 ml/min/1.73m2    GFR est non-AA >60 >60 ml/min/1.73m2    Calcium 9.8 8.5 - 10.1 mg/dL    Bilirubin, total 0.6 0.2 - 1.0 mg/dL    AST (SGOT) 60 (H) 15 - 37 U/L    ALT (SGPT) 223 (H) 12 - 78 U/L    Alk. phosphatase 171 (H) 45 - 117 U/L    Protein, total 5.5 (L) 6.4 - 8.2 g/dL    Albumin 2.6 (L) 3.5 - 5.0 g/dL    Globulin 2.9 2.0 - 4.0 g/dL    A-G Ratio 0.9 (L) 1.1 - 2.2     BLOOD GAS, ARTERIAL    Collection Time: 02/06/22  3:00 AM   Result Value Ref Range    pH 7.45 7.35 - 7.45      PCO2 48 (H) 35 - 45 mmHg    PO2 60 (L) 75 - 100 mmHg    O2 SAT 93 (L) >95 %    BICARBONATE 31 (H) 22 - 26 mmol/L    BASE EXCESS 7.7 (H) 0 - 2 mmol/L    O2 METHOD VENT      FIO2 85.0 %    MODE Assist Control/Volume Control      Tidal volume 450      SET RATE 12      EPAP/CPAP/PEEP 5.0      SITE Right Radial      BEN'S TEST PASS         ASSESSMENT:   Patient is 80 y.o. with diagnosis of : Active Problems:    COVID-19 (1/17/2022)      Respiratory failure with hypoxia ([address] (1/21/2022)      Hypernatremia (1/23/2022)      Hypokalemia (1/23/2022)        PLAN:                 Patient took worsening clinical course overnight. Patient is hypotensive and hypoxic. Vascular status of the left hand no changes. Continue optimize hypoxia and hypotension. Patient has localized thrombosis of the left hand with hypoperfusion, unlikely embolic etiology. Continue local wound care with iodine swabs on the left hand digits keep left hand elevated.   I will continue monitor her progress. Critical care time spent: 30 minutes.

## 2022-02-06 NOTE — PROGRESS NOTES
Progress Note  Date:2022       Room:[facility]  Patient Name:Ileana Chou     Date of Birth:[birth year]     Age:81 y.o. Subjective    Subjective:  Symptoms:  Stable. Review of Systems   Unable to perform ROS: Acuity of condition     Objective         Vitals Last 24 Hours:  TEMPERATURE:  Temp  Av °F (36.7 °C)  Min: 97.1 °F (36.2 °C)  Max: 98.4 °F (36.9 °C)  RESPIRATIONS RANGE: Resp  Av.7  Min: 17  Max: 28  PULSE OXIMETRY RANGE: SpO2  Av.9 %  Min: 91 %  Max: 98 %  PULSE RANGE: Pulse  Av.2  Min: 81  Max: 113  BLOOD PRESSURE RANGE: Systolic (78KJD), HUS:857 , Min:126 , IGN:399   ; Diastolic (32FBY), EFM:31, Min:37, Max:65    I/O (24Hr): Intake/Output Summary (Last 24 hours) at 2022 1047  Last data filed at 2022 0556  Gross per 24 hour   Intake 1245.43 ml   Output 2950 ml   Net -1704.57 ml     Objective  Labs/Imaging/Diagnostics    Labs:  CBC:  Recent Labs     22  0256 22  0400 22  0355   WBC 26.9* 31.3* 28.1*   RBC 2.65* 2.59* 2.53*   HGB 8.6* 8.5* 8.2*   HCT 26.8* 25.6* 24.2*   .1* 98.8 95.7   RDW 15.7* 15.1* 14.5   * 132* 132*     CHEMISTRIES:  Recent Labs     22  0256 22  0400 22  0355   * 144 140   K 4.6 4.9 5.3*   * 109* 105   CO2 31 34* 30   BUN 71* 89* 118*   CA 9.8 9.6 9.4   PT/INR:No results for input(s): INR, INREXT, INREXT in the last 72 hours. No lab exists for component: PROTIME  APTT:  Recent Labs     22  0400 22  0000 22  0355   APTT 25.2 26.3 26.2     LIVER PROFILE:  Recent Labs     22  0256 22  0400 22  0355   AST 60* 126* 305*   * 317* 403*     Lab Results   Component Value Date/Time    ALT (SGPT) 223 (H) 2022 02:56 AM    AST (SGOT) 60 (H) 2022 02:56 AM    Alk.  phosphatase 171 (H) 2022 02:56 AM    Bilirubin, total 0.6 2022 02:56 AM       Imaging Last 24 Hours:  XR CHEST PORT    Result Date: 2/6/2022  Exam: XR CHEST PORT  Indication:  chf; Comparison: Chest radiograph from yesterday Findings: Endotracheal tube terminates approximately 3.1 cm from the maggie. Right internal jugular central venous catheter projects the region of the distal SVC. Gastric decompression tube terminates below the diaphragm and field-of-view. Stable cardiac mediastinal silhouette. Bilateral interstitial and alveolar pulmonary opacities with subjectively decreased aeration and diminished lung volumes. No large pleural effusion. Negative for radiographically apparent pneumothorax. Persistent bilateral airspace disease and with decreased aeration.      Assessment//Plan   Active Problems:    COVID-19 (1/17/2022)      Respiratory failure with hypoxia (HCC) (1/21/2022)      Hypernatremia (1/23/2022)      Hypokalemia (1/23/2022)    Physical exam  Generalappears calm and sedate      Assessment & Plan  Hypernatremia  - Nephrology consultation initially obtained on account of hyponatremia and hypokalemia  -After initial correction, patient was noted to have further worsening of serum sodium now up to [address] patient's maintenance fluids from normal saline at 50 cc an hour, to D5W at 50 cc an hour  Continued monitoring for serum sodium level of daily labs    Hypokalemia  - resolved      Electronically signed by Hermann Fairbanks MD on 2/6/2022 at 10:46 AM

## 2022-02-06 NOTE — PROGRESS NOTES
Problem: Risk for Spread of Infection  Goal: Prevent transmission of infectious organism to others  Description: Prevent the transmission of infectious organisms to other patients, staff members, and visitors. Outcome: Progressing Towards Goal     Problem: Airway Clearance - Ineffective  Goal: Achieve or maintain patent airway  Outcome: Progressing Towards Goal     Problem: Body Temperature -  Risk of, Imbalanced  Goal: Ability to maintain a body temperature within defined limits  Outcome: Progressing Towards Goal     Problem: Nutrition Deficits  Goal: Optimize nutrtional status  Outcome: Progressing Towards Goal     Problem: Risk for Fluid Volume Deficit  Goal: Maintain normal heart rhythm  Outcome: Progressing Towards Goal     Problem: Ventilator Management  Goal: *Adequate oxygenation and ventilation  Outcome: Progressing Towards Goal  Goal: *Patient maintains clear airway/free of aspiration  Outcome: Progressing Towards Goal  Goal: *Absence of infection signs and symptoms  Outcome: Progressing Towards Goal     Problem: Falls - Risk of  Goal: *Absence of Falls  Description: Document Kade Fall Risk and appropriate interventions in the flowsheet.   Outcome: Progressing Towards Goal  Note: Fall Risk Interventions:  Mobility Interventions: Bed/chair exit alarm    Mentation Interventions: Bed/chair exit alarm    Medication Interventions: Bed/chair exit alarm    Elimination Interventions: Bed/chair exit alarm    History of Falls Interventions: Bed/chair exit alarm

## 2022-02-07 NOTE — PROGRESS NOTES
Problem: Risk for Spread of Infection  Goal: Prevent transmission of infectious organism to others  Description: Prevent the transmission of infectious organisms to other patients, staff members, and visitors. Outcome: Progressing Towards Goal     Problem: Patient Education:  Go to Education Activity  Goal: Patient/Family Education  Outcome: Progressing Towards Goal     Problem: Airway Clearance - Ineffective  Goal: Achieve or maintain patent airway  Outcome: Progressing Towards Goal     Problem: Gas Exchange - Impaired  Goal: Absence of hypoxia  Outcome: Progressing Towards Goal  Goal: Promote optimal lung function  Outcome: Progressing Towards Goal     Problem: Breathing Pattern - Ineffective  Goal: Ability to achieve and maintain a regular respiratory rate  Outcome: Progressing Towards Goal     Problem:  Body Temperature -  Risk of, Imbalanced  Goal: Ability to maintain a body temperature within defined limits  Outcome: Progressing Towards Goal  Goal: Will regain or maintain usual level of consciousness  Outcome: Progressing Towards Goal  Goal: Complications related to the disease process, condition or treatment will be avoided or minimized  Outcome: Progressing Towards Goal     Problem: Isolation Precautions - Risk of Spread of Infection  Goal: Prevent transmission of infectious organism to others  Outcome: Progressing Towards Goal     Problem: Nutrition Deficits  Goal: Optimize nutrtional status  Outcome: Progressing Towards Goal     Problem: Risk for Fluid Volume Deficit  Goal: Maintain normal heart rhythm  Outcome: Progressing Towards Goal  Goal: Maintain absence of muscle cramping  Outcome: Progressing Towards Goal  Goal: Maintain normal serum potassium, sodium, calcium, phosphorus, and pH  Outcome: Progressing Towards Goal     Problem: Loneliness or Risk for Loneliness  Goal: Demonstrate positive use of time alone when socialization is not possible  Outcome: Progressing Towards Goal     Problem: Fatigue  Goal: Verbalize increase energy and improved vitality  Outcome: Progressing Towards Goal     Problem: Patient Education: Go to Patient Education Activity  Goal: Patient/Family Education  Outcome: Progressing Towards Goal     Problem: Pressure Injury - Risk of  Goal: *Prevention of pressure injury  Description: Document Samuel Scale and appropriate interventions in the flowsheet. Outcome: Progressing Towards Goal  Note: Pressure Injury Interventions:  Sensory Interventions: Assess changes in LOC,Assess need for specialty bed,Monitor skin under medical devices,Pressure redistribution bed/mattress (bed type),Turn and reposition approx. every two hours (pillows and wedges if needed)    Moisture Interventions: Absorbent underpads,Apply protective barrier, creams and emollients,Assess need for specialty bed,Maintain skin hydration (lotion/cream)    Activity Interventions: Pressure redistribution bed/mattress(bed type)    Mobility Interventions: Assess need for specialty bed,Pressure redistribution bed/mattress (bed type),Turn and reposition approx.  every two hours(pillow and wedges)    Nutrition Interventions:  (TF W/ PROSOURCE)    Friction and Shear Interventions: Apply protective barrier, creams and emollients,HOB 30 degrees or less                Problem: Patient Education: Go to Patient Education Activity  Goal: Patient/Family Education  Outcome: Progressing Towards Goal     Problem: Ventilator Management  Goal: *Adequate oxygenation and ventilation  Outcome: Progressing Towards Goal  Goal: *Patient maintains clear airway/free of aspiration  Outcome: Progressing Towards Goal  Goal: *Absence of infection signs and symptoms  Outcome: Progressing Towards Goal  Goal: *Normal spontaneous ventilation  Outcome: Progressing Towards Goal     Problem: Patient Education: Go to Patient Education Activity  Goal: Patient/Family Education  Outcome: Progressing Towards Goal     Problem: Falls - Risk of  Goal: *Absence of Falls  Description: Document Milly Nap Fall Risk and appropriate interventions in the flowsheet.   Outcome: Progressing Towards Goal     Problem: Patient Education: Go to Patient Education Activity  Goal: Patient/Family Education  Outcome: Progressing Towards Goal     Problem: Aspiration - Risk of  Goal: *Absence of aspiration  Outcome: Progressing Towards Goal     Problem: Airway Clearance - Ineffective  Goal: *Patent airway  Outcome: Progressing Towards Goal  Goal: *Absence of airway secretions  Outcome: Progressing Towards Goal  Goal: *Able to cough effectively  Outcome: Progressing Towards Goal     Problem: Patient Education: Go to Patient Education Activity  Goal: Patient/Family Education  Outcome: Progressing Towards Goal     Problem: Discharge Planning  Goal: *Discharge to safe environment  Outcome: Progressing Towards Goal  Goal: *Knowledge of medication management  Outcome: Progressing Towards Goal  Goal: *Knowledge of discharge instructions  Outcome: Progressing Towards Goal     Problem: Patient Education: Go to Patient Education Activity  Goal: Patient/Family Education  Outcome: Progressing Towards Goal

## 2022-02-07 NOTE — PROGRESS NOTES
IMPRESSION:   1. Acute hypoxic respiratory  2. Acute on chronic hypercapnic respiratory failure   3. COVID-19 pneumonia  4. Pulmonary edema  5. Severe Anemia hemoglobin dropped to 4 after blood transfusion is accepted  6. Sepsis with septic shock now back on pressors  7. Left hand ischemia  8. Arthritis hypertension by hx  9. Hyperkalemia   10. Hypernatremia corrected  11. Now hyponatremic      RECOMMENDATIONS/PLAN:   1. ICU monitoring  1. Patient condition got worse on 1/24 went to asystole subsequently got intubated now on ventilator assist control mode sedated with propofol and fentanyl Precedex was discontinued because of bradycardia arterial blood gases acceptable currently on A/C 12  PEEP 5 FiO2  85% PO2 acceptable PCO2 elevated will increase the rate  2. Patient is on dexamethasone and baricitinib  3. Significant drop in hemoglobin recieved 3 unit packed RBC hemoglobin improved  4. Patient is back on Levophed hemodynamically unstable will continue to wean  5. Leukocytosis will repeat CBC   6. Elevated blood sugar at steroid was decreased blood sugar trending down will start patient on sliding scale  7. Potassium corrected  8. Left hand NATHALIE normal continue local wound care possible partial amputation  9. Pulmonary edema received Lasix chest x-ray shows bilateral infiltrate congestive changes  10. Left hand ischemia off IV heparin left ulnar artery occlusion radial  patent NATHALIE normal  11. Troponin is elevated  12. Lactic acid 3.7  13.  She is on meropenem and finished Zithromax     [x] High complexity decision making was performed  [x] See my orders for details  HPI  77-year-old lady came in because of shortness of breath and dyspnea she has significant past medical history of arthritis hypertension she was recently discharged from the hospital came back with worsening of hypoxia she was discharged on oxygen 2 L nasal cannula and patient condition got worse she was supposed to be discharged to skilled care but patient's son took her home where her condition got worse and she was transferred back to the hospital now she is on noninvasive ventilator BiPAP machine hemodynamically unstable hypotensive on vasopressors and not giving much history. PMH:  has no past medical history on file. PSH:   has a past surgical history that includes ir insert non tunl cvc over 5 yrs (1/21/2022). FHX: family history is not on file.      SHX:      ALL:   Allergies   Allergen Reactions    Penicillins Hives        MEDS:   [x] Reviewed - As Below   [] Not reviewed    Current Facility-Administered Medications   Medication    dextrose 5% infusion    dexamethasone (DECADRON) 4 mg/mL injection 4 mg    0.9% sodium chloride infusion 250 mL    0.9% sodium chloride infusion 250 mL    dilTIAZem IR (CARDIZEM) tablet 30 mg    heparin 25,000 units in  ml infusion    heparin (porcine) 1,000 unit/mL injection 6,080 Units    Or    heparin (porcine) 1,000 unit/mL injection 3,040 Units    fentaNYL (PF) 1,500 mcg/30 mL (50 mcg/mL) infusion    hydrOXYzine pamoate (VISTARIL) capsule 25 mg    propofol (DIPRIVAN) 10 mg/mL infusion    dexmedeTOMidine (PRECEDEX) 400 mcg in 0.9% sodium chloride (MBP/ADV) 100 mL MBP    NOREPINephrine (LEVOPHED) 8 mg in 0.9% NS 250ml infusion    acetaminophen (TYLENOL) tablet 650 mg    Or    acetaminophen (TYLENOL) suppository 650 mg    polyethylene glycol (MIRALAX) packet 17 g    ondansetron (ZOFRAN ODT) tablet 4 mg    baricitinib (OLUMIANT) tablet 2 mg    albuterol (PROVENTIL HFA, VENTOLIN HFA, PROAIR HFA) inhaler 2 Puff    polyethylene glycol (MIRALAX) packet 17 g    ondansetron (ZOFRAN) injection 4 mg    meropenem (MERREM) 1 g in 0.9% sodium chloride 20 mL IV syringe    hydrOXYzine (VISTARIL) injection 25 mg      MAR reviewed and pertinent medications noted or modified as needed   Current Facility-Administered Medications   Medication    dextrose 5% infusion    dexamethasone (DECADRON) 4 mg/mL injection 4 mg    0.9% sodium chloride infusion 250 mL    0.9% sodium chloride infusion 250 mL    dilTIAZem IR (CARDIZEM) tablet 30 mg    heparin 25,000 units in  ml infusion    heparin (porcine) 1,000 unit/mL injection 6,080 Units    Or    heparin (porcine) 1,000 unit/mL injection 3,040 Units    fentaNYL (PF) 1,500 mcg/30 mL (50 mcg/mL) infusion    hydrOXYzine pamoate (VISTARIL) capsule 25 mg    propofol (DIPRIVAN) 10 mg/mL infusion    dexmedeTOMidine (PRECEDEX) 400 mcg in 0.9% sodium chloride (MBP/ADV) 100 mL MBP    NOREPINephrine (LEVOPHED) 8 mg in 0.9% NS 250ml infusion    acetaminophen (TYLENOL) tablet 650 mg    Or    acetaminophen (TYLENOL) suppository 650 mg    polyethylene glycol (MIRALAX) packet 17 g    ondansetron (ZOFRAN ODT) tablet 4 mg    baricitinib (OLUMIANT) tablet 2 mg    albuterol (PROVENTIL HFA, VENTOLIN HFA, PROAIR HFA) inhaler 2 Puff    polyethylene glycol (MIRALAX) packet 17 g    ondansetron (ZOFRAN) injection 4 mg    meropenem (MERREM) 1 g in 0.9% sodium chloride 20 mL IV syringe    hydrOXYzine (VISTARIL) injection 25 mg      PMH:  has no past medical history on file. PSH:   has a past surgical history that includes ir insert non tunl cvc over 5 yrs (2022). FHX: family history is not on file. SHX:       ROS:  Unable to obtain sedated on ventilator    Hemodynamics:    CO:    CI:    CVP:    SVR:   PAP Systolic:    PAP Diastolic:    PVR:    SK36:        Ventilator Settings:      Mode Rate TV Press PEEP FiO2 PIP Min.  Vent   Assist control,Volume control    450 ml    5 cm H20 85 %  28 cm H2O  6.84 l/min        Vital Signs: Telemetry:    normal sinus rhythm Intake/Output:   Visit Vitals  BP (!) 142/44 (BP 1 Location: Right lower arm, BP Patient Position: At rest)   Pulse 78   Temp 98.4 °F (36.9 °C)   Resp 20   Ht 5' 5\" (1.651 m)   Wt 103.3 kg (227 lb 11.8 oz)   SpO2 91%   BMI 37.90 kg/m²       Temp (24hrs), Av.8 °F (36.6 °C), Min:97.1 °F (36.2 °C), Max:98.4 °F (36.9 °C)        O2 Device: Ventilator         Wt Readings from Last 4 Encounters:   02/07/22 103.3 kg (227 lb 11.8 oz)   01/17/22 90.7 kg (200 lb)          Intake/Output Summary (Last 24 hours) at 2/7/2022 0844  Last data filed at 2/7/2022 0555  Gross per 24 hour   Intake 360 ml   Output 2550 ml   Net -2190 ml       Last shift:      No intake/output data recorded. Last 3 shifts: 02/05 1901 - 02/07 0700  In: 1605.4 [I.V.:1245.4]  Out: 4322 [Urine:3550]       Physical Exam:     General: Intubated on ventilator unresponsive on propofol fentanyl  HEENT: NCAT,   Eyes: anicteric; conjunctiva clear no doll's eye reflex  Neck: no nodes, , trach midline; no accessory MM use.   Questionable neck vein distention  Chest: no deformity,   Cardiac: R regular; no murmur;   Lungs: Diffuse wheezes and rales  Abd: soft, NT, hypoactive BS  Ext: Edema of the leg edema; left hand finger blackish discoloration  : clear urine  Neuro: Sedated unresponsive on the ventilator no doll's eye reflex flaccid extremities  Psych-unable to assess  Skin: warm, dry, no cyanosis;   Pulses: Brachial radial pulses intact  Capillary: Normal capillary refill      DATA:    MAR reviewed and pertinent medications noted or modified as needed  MEDS:   Current Facility-Administered Medications   Medication    dextrose 5% infusion    dexamethasone (DECADRON) 4 mg/mL injection 4 mg    0.9% sodium chloride infusion 250 mL    0.9% sodium chloride infusion 250 mL    dilTIAZem IR (CARDIZEM) tablet 30 mg    heparin 25,000 units in  ml infusion    heparin (porcine) 1,000 unit/mL injection 6,080 Units    Or    heparin (porcine) 1,000 unit/mL injection 3,040 Units    fentaNYL (PF) 1,500 mcg/30 mL (50 mcg/mL) infusion    hydrOXYzine pamoate (VISTARIL) capsule 25 mg    propofol (DIPRIVAN) 10 mg/mL infusion    dexmedeTOMidine (PRECEDEX) 400 mcg in 0.9% sodium chloride (MBP/ADV) 100 mL MBP    NOREPINephrine (LEVOPHED) 8 mg in 0.9% NS 250ml infusion    acetaminophen (TYLENOL) tablet 650 mg    Or    acetaminophen (TYLENOL) suppository 650 mg    polyethylene glycol (MIRALAX) packet 17 g    ondansetron (ZOFRAN ODT) tablet 4 mg    baricitinib (OLUMIANT) tablet 2 mg    albuterol (PROVENTIL HFA, VENTOLIN HFA, PROAIR HFA) inhaler 2 Puff    polyethylene glycol (MIRALAX) packet 17 g    ondansetron (ZOFRAN) injection 4 mg    meropenem (MERREM) 1 g in 0.9% sodium chloride 20 mL IV syringe    hydrOXYzine (VISTARIL) injection 25 mg        Labs:    Recent Labs     02/07/22  0632 02/06/22  0256 02/05/22  0400 02/05/22  0000   WBC 23.9* 26.9* 31.3*  --    HGB 8.4* 8.6* 8.5*  --    * 131* 132*  --    APTT  --   --  25.2 26.3     Recent Labs     02/07/22  0632 02/06/22  0256 02/05/22  0400    148* 144   K 4.3 4.6 4.9   * 113* 109*   CO2 30 31 34*   * 375* 450*   BUN 56* 71* 89*   CREA 0.45* 0.64 0.83   CA 9.4 9.8 9.6   ALB 2.4* 2.6* 3.0*   * 223* 317*     Recent Labs     02/07/22  0645 02/06/22  0300 02/05/22  0205   PH 7.38 7.45 7.41   PCO2 53* 48* 52*   PO2 67* 60* 74*   HCO3 29* 31* 31*   FIO2 85.0 85.0 85.0   1/30 AC 12  PEEP 5 FiO2 100%    Lab Results   Component Value Date/Time    Culture result: No growth 6 days 01/21/2022 07:55 AM    Culture result: No growth 6 days 01/18/2022 07:12 AM    Culture result: (A) 01/17/2022 07:12 AM     Staphylococcus species, coagulase negative growing in 1 of 4 bottles drawn NO SITE INDICATED    Culture result:  01/17/2022 07:12 AM     (NOTE) [facility] GROWING IN 1 OF 2 BOTTLES CALLED TO JOSE MIGUEL PAGAN AT 0831 ON 1/19/22. JF     No results found for: TSH, TSHEXT, TSHEXT     Imaging:    Results from Hospital Encounter encounter on 01/21/22    XR CHEST PORT    Narrative  Exam: XR CHEST PORT    Indication:  chf;    Comparison: Chest radiograph from yesterday    Findings:    Endotracheal tube terminates approximately 3.1 cm from the maggie.  Right  internal jugular central venous catheter projects the region of the distal SVC. Gastric decompression tube terminates below the diaphragm and field-of-view. Stable cardiac mediastinal silhouette. Bilateral interstitial and alveolar  pulmonary opacities with subjectively decreased aeration and diminished lung  volumes. No large pleural effusion. Negative for radiographically apparent  pneumothorax. Impression  Persistent bilateral airspace disease and with decreased aeration. Results from [place] encounter on 01/21/22    CT HEAD WO CONT    Narrative  The study is a noncontrasted head CT examination dated 1/21/2022. HISTORY: Unresponsive. TECHNIQUE: Thin section axial imaging was performed followed by sagittal and  coronal reconstructed imaging. Dose Reduction Technique was employed to reduce radiation exposure - This  includes reduction optimization techniques as appropriate to a performed exam  with automated exposure control adjustments of the mA and/or Kv according to  patient size, or use of iterative reconstruction technique. COMPARISON: CT head dated 1/17/2022. There is an appropriate size to the ventricles, the deep cortical sulci, and the  sylvian fissures for the patient's age. This examination is negative for  intracranial hemorrhage, mass effect or an extra axial collection. The  gray-white matter junctions are preserved without cytotoxic or vasogenic edema. An assessment of the white matter tracts demonstrates a mild decrease in the  density characteristics of the central periventricular white matter. These  findings are consistent with expected aging changes and milder microvascular  disease. There also is a region of diminished density within the right posterior  inferior cerebellar hemisphere which may represent an older cerebrovascular  insult. ORBITS: This examination is negative for acute orbital pathology.     PARANASAL SINUSES: The paranasal sinuses are well pneumatized without acute  sinus disease. There is a decreased number of right sided mastoid air cells which can be  associated with chronic mastoiditis. The craniocervical junction images in a  normal fashion. Impression  1. There are milder microvascular changes within the central periventricular  white matter. 2.  There is an area of diminished density within the inferior aspect of the  right cerebellar hemisphere without change (series 201 image number 12). These  findings may represent an older ischemic insult within the right posterior  inferior cerebellar region. 3.  This examination is negative for acute intracranial pathology or short-term  interval changes dating back to 1/17/2022. 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 Chest x-ray looks like pulmonary edema.   She has peripheral edema we will give Lasix today discontinue D5W continue tube feedings we will check echocardiogram  · 1/31 off pressors remain intubated   · 2/1 remain intubated sedated on ventilator will do sedation vacation in a.m. still on 85%  · 2/2 on ventilator sedated elevated PCO2  · 2/3 remain intubated off pressors significant drop in hemoglobin and increasing white count we will repeat CBC and work accordingly  · 2/4 patient is back on Levophed hemodynamically unstable still on ventilator 85% FiO2  · 2/5 remain on ventilator condition unstable  · 2/6 condition unstable sedated on ventilator  · 2/7 we will change vent settings  · Time of care 30-minute

## 2022-02-07 NOTE — PROGRESS NOTES
Clinical chart reviewed by CM. Patient's discharge disposition is to return home with home health services provided by [facility]. Referral sent via natan. CM will continue to follow.

## 2022-02-07 NOTE — PROGRESS NOTES
Progress Note  Date:2022       [place]:[facility] [place]  Patient Name:Ileana Chou     Date of Birth:[birth year]     Age:81 y.o. Subjective    Subjective:  Symptoms:  Stable. Review of Systems   Unable to perform ROS: Intubated     Objective         Vitals Last 24 Hours:  TEMPERATURE:  Temp  Av.1 °F (36.7 °C)  Min: 97.1 °F (36.2 °C)  Max: 99.3 °F (37.4 °C)  RESPIRATIONS RANGE: Resp  Av.4  Min: 15  Max: 23  PULSE OXIMETRY RANGE: SpO2  Av.2 %  Min: 86 %  Max: 100 %  PULSE RANGE: Pulse  Av.8  Min: 75  Max: 93  BLOOD PRESSURE RANGE: Systolic (52LBD), HRK:411 , Min:102 , XDD:237   ; Diastolic (89FPN), RKI:01, Min:44, Max:112    I/O (24Hr): Intake/Output Summary (Last 24 hours) at 2022 1150  Last data filed at 2022 0555  Gross per 24 hour   Intake 360 ml   Output 2550 ml   Net -2190 ml     Objective:  Vital signs: (most recent): Blood pressure (!) 142/44, pulse 88, temperature 99.3 °F (37.4 °C), resp. rate 22, height 5' 5\" (1.651 m), weight 103.3 kg (227 lb 11.8 oz), SpO2 90 %. tube feeds  Ventilator fi02 85 %  Propofol  D5 @ 50 ml/hr  Labs/Imaging/Diagnostics    Labs:T  CBC:  Recent Labs     22  0632 22  0256 22  0400   WBC 23.9* 26.9* 31.3*   RBC 2.60* 2.65* 2.59*   HGB 8.4* 8.6* 8.5*   HCT 26.2* 26.8* 25.6*   .8* 101.1* 98.8   RDW 15.4* 15.7* 15.1*   * 131* 132*     CHEMISTRIES:  Recent Labs     22  0632 22  0256 22  0400    148* 144   K 4.3 4.6 4.9   * 113* 109*   CO2 30 31 34*   BUN 56* 71* 89*   CA 9.4 9.8 9.6   PT/INR:No results for input(s): INR, INREXT, INREXT in the last 72 hours.     No lab exists for component: PROTIME  APTT:  Recent Labs     22  0400 22  0000   APTT 25.2 26.3     LIVER PROFILE:  Recent Labs     22  0632 22  0256 22  0400   AST 45* 60* 126*   * 223* 317*     Lab Results   Component Value Date/Time    ALT (SGPT) 154 (H) 2022 06:32 AM    AST (SGOT) 45 (H) 02/07/2022 06:32 AM    Alk. phosphatase 100 02/07/2022 06:32 AM    Bilirubin, total 0.6 02/07/2022 06:32 AM       Imaging Last 24 Hours:  XR CHEST PORT    Result Date: 2/7/2022  Chest, frontal view, 2/7/2022 History: CHF. Comparison: Including chest the sixth 2022. Findings: Patient is rotated to the left. Right internal jugular catheter tip is near the SVC/right atrial junction. Endotracheal tube tip is 1.5 cm from the maggie. Feeding tube is seen to the GE junction; the tip is excluded from the field-of-view. The cardiac silhouette is stable. Lung volumes are not significantly changed. Diffuse opacities in the lungs are not significantly changed. No pneumothorax or pleural effusion is definitively identified. The osseous structures are stable. No significant interval change.     Assessment//Plan   Active Problems:    COVID-19 (1/17/2022)      Respiratory failure with hypoxia (HCC) (1/21/2022)      Hypernatremia (1/23/2022)      Hypokalemia (1/23/2022)        Hypernatremia improved Acute   VDRF   Hyperglycemia  Encephalopathy  Malnutrition moderate     Plan:  Corrected sodium after glucose is 146  Suggest DC D5  Suggest free water flushes to continue    Continue to monitor         Electronically signed by Tressa Vega MD on 2/7/2022 at 10:46 AM

## 2022-02-07 NOTE — PROGRESS NOTES
General Daily Progress Note          Patient Name:   Tamara Gruber       YOB: 1940       Age:  80 y.o.       Admit Date: 1/21/2022      Subjective:     Patient is a 80y.o. year old female with signal past medical history of hypertension arthritis who discharged from the hospital yesterday in stable condition patient is admitted last admission with COVID-19 patient was asymptomatic all this time while in the hospital patient discharged on 2 L oxygen to skilled care but patient son want to go home with home health but is at home patient's saturations drops sent back to the ER seen by the ER physician patient placed on BiPAP patient was little hypotensive started on Levophed admitted for further work-up and treatment           Patient is DNR    Patient on ventilator 85% O2      No response      NG tube in place for medication and feeding      Objective:     Visit Vitals  BP (!) 142/44 (BP 1 Location: Right lower arm, BP Patient Position: At rest)   Pulse 78   Temp 98.4 °F (36.9 °C)   Resp 20   Ht 5' 5\" (1.651 m)   Wt 103.3 kg (227 lb 11.8 oz)   SpO2 91%   BMI 37.90 kg/m²        Recent Results (from the past 24 hour(s))   GLUCOSE, POC    Collection Time: 02/06/22 11:11 PM   Result Value Ref Range    Glucose (POC) 303 (H) 65 - 117 mg/dL    Performed by [facility]    CBC WITH AUTOMATED DIFF    Collection Time: 02/07/22  6:32 AM   Result Value Ref Range    WBC 23.9 (H) 3.6 - 11.0 K/uL    RBC 2.60 (L) 3.80 - 5.20 M/uL    HGB 8.4 (L) 11.5 - 16.0 g/dL    HCT 26.2 (L) 35.0 - 47.0 %    .8 (H) 80.0 - 99.0 FL    MCH 32.3 26.0 - 34.0 PG    MCHC 32.1 30.0 - 36.5 g/dL    RDW 15.4 (H) 11.5 - 14.5 %    PLATELET 188 (L) 016 - 400 K/uL    MPV 11.6 8.9 - 12.9 FL    NRBC 0.3 (H) 0.0  WBC    ABSOLUTE NRBC 0.08 (H) 0.00 - 0.01 K/uL    NEUTROPHILS 91 (H) 32 - 75 %    LYMPHOCYTES 4 (L) 12 - 49 %    MONOCYTES 2 (L) 5 - 13 %    EOSINOPHILS 2 0 - 7 %    BASOPHILS 0 0 - 1 %    IMMATURE GRANULOCYTES 1 (H) 0 - 0.5 % ABS. NEUTROPHILS 21.9 (H) 1.8 - 8.0 K/UL    ABS. LYMPHOCYTES 0.9 0.8 - 3.5 K/UL    ABS. MONOCYTES 0.4 0.0 - 1.0 K/UL    ABS. EOSINOPHILS 0.6 (H) 0.0 - 0.4 K/UL    ABS. BASOPHILS 0.0 0.0 - 0.1 K/UL    ABS. IMM. GRANS. 0.2 (H) 0.00 - 0.04 K/UL    DF AUTOMATED     METABOLIC PANEL, COMPREHENSIVE    Collection Time: 02/07/22  6:32 AM   Result Value Ref Range    Sodium 143 136 - 145 mmol/L    Potassium 4.3 3.5 - 5.1 mmol/L    Chloride 109 (H) 97 - 108 mmol/L    CO2 30 21 - 32 mmol/L    Anion gap 4 (L) 5 - 15 mmol/L    Glucose 262 (H) 65 - 100 mg/dL    BUN 56 (H) 6 - 20 mg/dL    Creatinine 0.45 (L) 0.55 - 1.02 mg/dL    BUN/Creatinine ratio 124 (H) 12 - 20      GFR est AA >60 >60 ml/min/1.73m2    GFR est non-AA >60 >60 ml/min/1.73m2    Calcium 9.4 8.5 - 10.1 mg/dL    Bilirubin, total 0.6 0.2 - 1.0 mg/dL    AST (SGOT) 45 (H) 15 - 37 U/L    ALT (SGPT) 154 (H) 12 - 78 U/L    Alk. phosphatase 100 45 - 117 U/L    Protein, total 5.5 (L) 6.4 - 8.2 g/dL    Albumin 2.4 (L) 3.5 - 5.0 g/dL    Globulin 3.1 2.0 - 4.0 g/dL    A-G Ratio 0.8 (L) 1.1 - 2.2     BLOOD GAS, ARTERIAL    Collection Time: 02/07/22  6:45 AM   Result Value Ref Range    pH 7.38 7.35 - 7.45      PCO2 53 (H) 35 - 45 mmHg    PO2 67 (L) 75 - 100 mmHg    O2 SAT 95 (L) >95 %    BICARBONATE 29 (H) 22 - 26 mmol/L    BASE EXCESS 5.4 (H) 0 - 2 mmol/L    O2 METHOD VENT      FIO2 85.0 %    MODE Assist Control/Volume Control      Tidal volume 450      SET RATE 12      EPAP/CPAP/PEEP 5.0      SITE Right Radial      BEN'S TEST PASS       [unfilled]      Review of Systems    Unable to obtain. Physical Exam:      Constitutional: Awake on BiPAP  HENT:   Head: Normocephalic and atraumatic. Eyes: Pupils are equal, round, and reactive to light. EOM are normal.   Cardiovascular: Normal rate, regular rhythm and normal heart sounds. Pulmonary/Chest: Decreased breath sounds   abdominal: Soft. Bowel sounds are normal. There is no abdominal tenderness.  There is no rebound and no guarding. Musculoskeletal: Normal range of motion. Neurological: pt is alert and oriented to person, place, and time. XR CHEST PORT   Final Result   No significant interval change. XR CHEST PORT   Final Result   Persistent bilateral airspace disease and with decreased aeration. XR CHEST PORT   Final Result      XR CHEST PORT   Final Result      XR CHEST PORT   Final Result      XR CHEST PORT   Final Result      XR CHEST PORT   Final Result      WRIST BRACHIAL INDEX AT REST   Final Result      XR CHEST PORT   Final Result   No significant change. XR CHEST PORT   Final Result   No interval change. DUPLEX UPPER EXT ARTERY LEFT   Final Result      XR CHEST PORT   Final Result      XR CHEST PORT   Final Result   Endotracheal tube tip still at the maggie. Diffuse opacities in the   lungs, not significantly changed. XR CHEST PORT   Final Result      XR CHEST PORT   Final Result   Endotracheal tube tip at the maggie. Diffuse opacities in the   lungs, accentuated by lower lung volumes or increased. XR CHEST PORT   Final Result   Diffuse opacities in the lungs, not significantly changed. XR CHEST PORT   Final Result   1. ETT terminating 2 cm above the maggie. Remaining support apparatus as above. 2.  Worsening bilateral airspace disease with developing ARDS not excluded. XR CHEST PORT   Final Result   Moderate patchy diffuse bilateral airspace opacities, waxing and waning from the   prior exam.      XR CHEST PORT   Final Result   Moderate diffuse bilateral airspace opacities, likely a combination of edema and   airspace disease, mildly increased from the previous exam.      CT HEAD WO CONT   Final Result   1. There are milder microvascular changes within the central periventricular   white matter. 2.  There is an area of diminished density within the inferior aspect of the   right cerebellar hemisphere without change (series 201 image number 12).  These findings may represent an older ischemic insult within the right posterior   inferior cerebellar region. 3.  This examination is negative for acute intracranial pathology or short-term   interval changes dating back to 1/17/2022. XR CHEST PORT   Final Result   Patchy mixed asymmetric lung disease appears somewhat better but the   lungs are better inflated. No effusion or pneumothorax. Normal heart and   mediastinum      IR INSERT NON TUNL CVC OVER 5 YRS   Final Result   Ultrasound of the right neck demonstrated patent IJV. Successful US-guided placement of a right internal jugular triple lumen central   venous catheter as described above. The catheter is functioning. PLAN:   Catheter position was confirmed by follow-up chest x-ray: catheter tip in the   lower SVC and ready to use. IR US GUIDED VASCULAR ACCESS   Final Result   Ultrasound of the right neck demonstrated patent IJV. Successful US-guided placement of a right internal jugular triple lumen central   venous catheter as described above. The catheter is functioning. PLAN:   Catheter position was confirmed by follow-up chest x-ray: catheter tip in the   lower SVC and ready to use. XR CHEST PORT   Final Result   Findings/impression:      Diffuse interstitial airspace disease/edema. No definite pleural effusion or   pneumothorax. Cardiac contours are partially obscured. No acute osseous abnormality identified.       XR CHEST PORT    (Results Pending)        Recent Results (from the past 24 hour(s))   GLUCOSE, POC    Collection Time: 02/06/22 11:11 PM   Result Value Ref Range    Glucose (POC) 303 (H) 65 - 117 mg/dL    Performed by Jeffrey Felder    CBC WITH AUTOMATED DIFF    Collection Time: 02/07/22  6:32 AM   Result Value Ref Range    WBC 23.9 (H) 3.6 - 11.0 K/uL    RBC 2.60 (L) 3.80 - 5.20 M/uL    HGB 8.4 (L) 11.5 - 16.0 g/dL    HCT 26.2 (L) 35.0 - 47.0 %    .8 (H) 80.0 - 99.0 FL    MCH 32.3 26.0 - 34.0 PG    MCHC 32.1 30.0 - 36.5 g/dL    RDW 15.4 (H) 11.5 - 14.5 %    PLATELET 641 (L) 525 - 400 K/uL    MPV 11.6 8.9 - 12.9 FL    NRBC 0.3 (H) 0.0  WBC    ABSOLUTE NRBC 0.08 (H) 0.00 - 0.01 K/uL    NEUTROPHILS 91 (H) 32 - 75 %    LYMPHOCYTES 4 (L) 12 - 49 %    MONOCYTES 2 (L) 5 - 13 %    EOSINOPHILS 2 0 - 7 %    BASOPHILS 0 0 - 1 %    IMMATURE GRANULOCYTES 1 (H) 0 - 0.5 %    ABS. NEUTROPHILS 21.9 (H) 1.8 - 8.0 K/UL    ABS. LYMPHOCYTES 0.9 0.8 - 3.5 K/UL    ABS. MONOCYTES 0.4 0.0 - 1.0 K/UL    ABS. EOSINOPHILS 0.6 (H) 0.0 - 0.4 K/UL    ABS. BASOPHILS 0.0 0.0 - 0.1 K/UL    ABS. IMM. GRANS. 0.2 (H) 0.00 - 0.04 K/UL    DF AUTOMATED     METABOLIC PANEL, COMPREHENSIVE    Collection Time: 02/07/22  6:32 AM   Result Value Ref Range    Sodium 143 136 - 145 mmol/L    Potassium 4.3 3.5 - 5.1 mmol/L    Chloride 109 (H) 97 - 108 mmol/L    CO2 30 21 - 32 mmol/L    Anion gap 4 (L) 5 - 15 mmol/L    Glucose 262 (H) 65 - 100 mg/dL    BUN 56 (H) 6 - 20 mg/dL    Creatinine 0.45 (L) 0.55 - 1.02 mg/dL    BUN/Creatinine ratio 124 (H) 12 - 20      GFR est AA >60 >60 ml/min/1.73m2    GFR est non-AA >60 >60 ml/min/1.73m2    Calcium 9.4 8.5 - 10.1 mg/dL    Bilirubin, total 0.6 0.2 - 1.0 mg/dL    AST (SGOT) 45 (H) 15 - 37 U/L    ALT (SGPT) 154 (H) 12 - 78 U/L    Alk. phosphatase 100 45 - 117 U/L    Protein, total 5.5 (L) 6.4 - 8.2 g/dL    Albumin 2.4 (L) 3.5 - 5.0 g/dL    Globulin 3.1 2.0 - 4.0 g/dL    A-G Ratio 0.8 (L) 1.1 - 2.2     BLOOD GAS, ARTERIAL    Collection Time: 02/07/22  6:45 AM   Result Value Ref Range    pH 7.38 7.35 - 7.45      PCO2 53 (H) 35 - 45 mmHg    PO2 67 (L) 75 - 100 mmHg    O2 SAT 95 (L) >95 %    BICARBONATE 29 (H) 22 - 26 mmol/L    BASE EXCESS 5.4 (H) 0 - 2 mmol/L    O2 METHOD VENT      FIO2 85.0 %    MODE Assist Control/Volume Control      Tidal volume 450      SET RATE 12      EPAP/CPAP/PEEP 5.0      SITE Right Radial      BEN'S TEST PASS         Results     ** No results found for the last 336 hours.  ** Labs:     Recent Labs     02/07/22  0632 02/06/22  0256   WBC 23.9* 26.9*   HGB 8.4* 8.6*   HCT 26.2* 26.8*   * 131*     Recent Labs     02/07/22  0632 02/06/22  0256 02/05/22  0400    148* 144   K 4.3 4.6 4.9   * 113* 109*   CO2 30 31 34*   BUN 56* 71* 89*   CREA 0.45* 0.64 0.83   * 375* 450*   CA 9.4 9.8 9.6     Recent Labs     02/07/22  0632 02/06/22  0256 02/05/22  0400   * 223* 317*    171* 135*   TBILI 0.6 0.6 0.6   TP 5.5* 5.5* 5.6*   ALB 2.4* 2.6* 3.0*   GLOB 3.1 2.9 2.6     Recent Labs     02/05/22  0400 02/05/22  0000   APTT 25.2 26.3      No results for input(s): FE, TIBC, PSAT, FERR in the last 72 hours. No results found for: FOL, RBCF   Recent Labs     02/07/22  0645 02/06/22  0300   PH 7.38 7.45   PCO2 53* 48*   PO2 67* 60*     No results for input(s): CPK, CKNDX, TROIQ in the last 72 hours.     No lab exists for component: CPKMB  No results found for: CHOL, CHOLX, CHLST, CHOLV, HDL, HDLP, LDL, LDLC, DLDLP, TGLX, TRIGL, TRIGP, CHHD, CHHDX  Lab Results   Component Value Date/Time    Glucose (POC) 303 (H) 02/06/2022 11:11 PM    Glucose (POC) 157 (H) 01/31/2022 01:12 AM    Glucose (POC) 179 (H) 01/30/2022 04:57 AM    Glucose (POC) 169 (H) 01/28/2022 05:11 AM    Glucose (POC) 168 (H) 01/27/2022 11:33 AM     Lab Results   Component Value Date/Time    Color Yellow/Straw 01/23/2022 10:30 AM    Appearance Turbid (A) 01/23/2022 10:30 AM    Specific gravity 1.027 01/23/2022 10:30 AM    pH (UA) 6.0 01/23/2022 10:30 AM    Protein 100 (A) 01/23/2022 10:30 AM    Glucose 50 (A) 01/23/2022 10:30 AM    Ketone 5 (A) 01/23/2022 10:30 AM    Bilirubin Negative 01/23/2022 10:30 AM    Urobilinogen 4.0 (H) 01/23/2022 10:30 AM    Nitrites Negative 01/23/2022 10:30 AM    Leukocyte Esterase Negative 01/23/2022 10:30 AM    Bacteria Negative 01/23/2022 10:30 AM    WBC 0-4 01/23/2022 10:30 AM    RBC 0-5 01/23/2022 10:30 AM         Assessment:     Acute hypoxemic respiratory failure on on ventilator 85% of   COVID-19 pneumonitis   hypotension  Thrombocytopenia  Hypertension  Arthritis  Hyperkalemia  Hyponatremia  Moderate protein calorie malnutrition  Patient need multiple partial finger amputation once stable as per vascular surgeon    Acute kidney injury worsening follow-up with nephrology  Plan:         Olumiant 2 mg daily  Decadron 4 mg every 6 hours  Meropenem 1 g every 8 hours  Diltiazem 30 mg 3 times  Follow-up with pulmonologist and nephrologist      Monitor sodium and potassium    Overall prognosis  Normal saline 50 cc/h      Patient is DNR  He want to continue current medication    Seen by vascular surgeon he will plan to do multiple partial finger amputation 1 patient's stable  Repeat the lab      Discussed with the patient son today want to wait till Monday for any decision      Current Facility-Administered Medications:     dextrose 5% infusion, 50 mL/hr, IntraVENous, CONTINUOUS, Asia Gordon MD, Last Rate: 50 mL/hr at 02/07/22 0555, 50 mL/hr at 02/07/22 0555    dexamethasone (DECADRON) 4 mg/mL injection 4 mg, 4 mg, IntraVENous, Q24H, Karan Flower MD, 4 mg at 02/07/22 0820    0.9% sodium chloride infusion 250 mL, 250 mL, IntraVENous, PRN, Karan Flower MD    0.9% sodium chloride infusion 250 mL, 250 mL, IntraVENous, PRN, Karan Flower MD    dilTIAZem IR (CARDIZEM) tablet 30 mg, 30 mg, Oral, TID, Karan Flower MD, 30 mg at 02/07/22 0816    heparin 25,000 units in  ml infusion, 18-36 Units/kg/hr (Adjusted), IntraVENous, TITRATE, Polo Lopez MD, Stopped at 02/03/22 0850    heparin (porcine) 1,000 unit/mL injection 6,080 Units, 80 Units/kg (Adjusted), IntraVENous, PRN, 6,080 Units at 02/02/22 0032 **OR** heparin (porcine) 1,000 unit/mL injection 3,040 Units, 40 Units/kg (Adjusted), IntraVENous, PRN, Polo Lopez MD, 3,040 Units at 02/01/22 0645    fentaNYL (PF) 1,500 mcg/30 mL (50 mcg/mL) infusion, 0-200 mcg/hr, IntraVENous, TITRATE, Ander Lopes MD, Last Rate: 1 mL/hr at 02/05/22 1815, 50 mcg/hr at 02/05/22 1815    hydrOXYzine pamoate (VISTARIL) capsule 25 mg, 25 mg, Oral, Q4H PRN, Rosalia Lopez MD    propofol (DIPRIVAN) 10 mg/mL infusion, 0-50 mcg/kg/min, IntraVENous, TITRATE, Kevin Flower MD, Last Rate: 13.6 mL/hr at 02/07/22 0555, 25 mcg/kg/min at 02/07/22 0555    dexmedeTOMidine (PRECEDEX) 400 mcg in 0.9% sodium chloride (MBP/ADV) 100 mL MBP, 0.1-1.5 mcg/kg/hr, IntraVENous, TITRATE, Karan Flower MD, Stopped at 01/24/22 0824    NOREPINephrine (LEVOPHED) 8 mg in 0.9% NS 250ml infusion, 0.5-16 mcg/min, IntraVENous, TITRATE, Rosalia Lopez MD, Stopped at 02/05/22 6184    acetaminophen (TYLENOL) tablet 650 mg, 650 mg, Oral, Q6H PRN **OR** acetaminophen (TYLENOL) suppository 650 mg, 650 mg, Rectal, Q6H PRN, Rosalia Lopez MD    polyethylene glycol (MIRALAX) packet 17 g, 17 g, Oral, DAILY PRN, Rosalia Lopez MD    ondansetron (ZOFRAN ODT) tablet 4 mg, 4 mg, Oral, Q8H PRN **OR** [DISCONTINUED] ondansetron (ZOFRAN) injection 4 mg, 4 mg, IntraVENous, Q6H PRN, Polo Lopez MD    baricitinib (OLUMIANT) tablet 2 mg, 2 mg, Oral, DAILY, Polo Lopez MD, 2 mg at 02/07/22 0816    albuterol (PROVENTIL HFA, VENTOLIN HFA, PROAIR HFA) inhaler 2 Puff, 2 Puff, Inhalation, Q6H PRN, Polo Lopez MD    polyethylene glycol (MIRALAX) packet 17 g, 17 g, Oral, DAILY PRN, Polo Lopez MD, 17 g at 01/28/22 0844    [DISCONTINUED] ondansetron (ZOFRAN ODT) tablet 4 mg, 4 mg, Oral, Q8H PRN **OR** ondansetron (ZOFRAN) injection 4 mg, 4 mg, IntraVENous, Q6H PRN, Polo Lopez MD    meropenem (MERREM) 1 g in 0.9% sodium chloride 20 mL IV syringe, 1 g, IntraVENous, Q8H, Polo Lopez MD, 1 g at 02/07/22 0816    hydrOXYzine (VISTARIL) injection 25 mg, 25 mg, IntraMUSCular, Q6H PRN, Polo Lopez MD, 25 mg at 01/22/22 0111

## 2022-02-08 NOTE — PROGRESS NOTES
IMPRESSION:   1. Acute hypoxic respiratory  2. Acute on chronic hypercapnic respiratory failure   3. COVID-19 pneumonia  4. Pulmonary edema  5. Severe Anemia hemoglobin dropped to 4 recieved blood transfusion   6. Sepsis with septic shock off pressors  7. Left hand ischemia  8. Arthritis hypertension by hx  9. Hyperkalemia corrected  10. Hyponatremia resolved      RECOMMENDATIONS/PLAN:   1. ICU monitoring  1. Patient condition got worse on 1/24 went to asystole subsequently got intubated now on ventilator assist control mode sedated with propofol and fentanyl Precedex was discontinued because of bradycardia arterial blood gases acceptable currently on A/C 12  PEEP 5 FiO2  75% PO2 PCO2 much improved  2. Patient is on dexamethasone and baricitinib  3. Significant drop in hemoglobin recieved 3 unit packed RBC hemoglobin improved  4. Patient is back on Levophed hemodynamically unstable will continue to wean  5. Leukocytosis will repeat CBC   6. Elevated blood sugar at steroid was decreased blood sugar trending down will start patient on sliding scale  7. Potassium corrected  8. Left hand NATHALIE normal continue local wound care   9. Pulmonary edema received Lasix chest x-ray shows bilateral infiltrate congestive changes  10. Left hand ischemia off IV heparin left ulnar artery occlusion radial  patent NATHALIE normal  11. Troponin is elevated  12. Lactic acid 3.7 will repeat level and will send procalcitonin and C-reactive  13.  She is on meropenem and finished Zithromax     [x] High complexity decision making was performed  [x] See my orders for details  HPI  77-year-old lady came in because of shortness of breath and dyspnea she has significant past medical history of arthritis hypertension she was recently discharged from the hospital came back with worsening of hypoxia she was discharged on oxygen 2 L nasal cannula and patient condition got worse she was supposed to be discharged to skilled care but patient's son took her home where her condition got worse and she was transferred back to the hospital now she is on noninvasive ventilator BiPAP machine hemodynamically unstable hypotensive on vasopressors and not giving much history. PMH:  has no past medical history on file. PSH:   has a past surgical history that includes ir insert non tunl cvc over 5 yrs (1/21/2022). FHX: family history is not on file.      SHX:      ALL:   Allergies   Allergen Reactions    Penicillins Hives        MEDS:   [x] Reviewed - As Below   [] Not reviewed    Current Facility-Administered Medications   Medication    dextrose 5% infusion    dexamethasone (DECADRON) 4 mg/mL injection 4 mg    0.9% sodium chloride infusion 250 mL    0.9% sodium chloride infusion 250 mL    dilTIAZem IR (CARDIZEM) tablet 30 mg    heparin 25,000 units in  ml infusion    heparin (porcine) 1,000 unit/mL injection 6,080 Units    Or    heparin (porcine) 1,000 unit/mL injection 3,040 Units    fentaNYL (PF) 1,500 mcg/30 mL (50 mcg/mL) infusion    hydrOXYzine pamoate (VISTARIL) capsule 25 mg    propofol (DIPRIVAN) 10 mg/mL infusion    dexmedeTOMidine (PRECEDEX) 400 mcg in 0.9% sodium chloride (MBP/ADV) 100 mL MBP    NOREPINephrine (LEVOPHED) 8 mg in 0.9% NS 250ml infusion    acetaminophen (TYLENOL) tablet 650 mg    Or    acetaminophen (TYLENOL) suppository 650 mg    polyethylene glycol (MIRALAX) packet 17 g    ondansetron (ZOFRAN ODT) tablet 4 mg    baricitinib (OLUMIANT) tablet 2 mg    albuterol (PROVENTIL HFA, VENTOLIN HFA, PROAIR HFA) inhaler 2 Puff    polyethylene glycol (MIRALAX) packet 17 g    ondansetron (ZOFRAN) injection 4 mg    meropenem (MERREM) 1 g in 0.9% sodium chloride 20 mL IV syringe    hydrOXYzine (VISTARIL) injection 25 mg      MAR reviewed and pertinent medications noted or modified as needed   Current Facility-Administered Medications   Medication    dextrose 5% infusion    dexamethasone (DECADRON) 4 mg/mL injection 4 mg    0.9% sodium chloride infusion 250 mL    0.9% sodium chloride infusion 250 mL    dilTIAZem IR (CARDIZEM) tablet 30 mg    heparin 25,000 units in  ml infusion    heparin (porcine) 1,000 unit/mL injection 6,080 Units    Or    heparin (porcine) 1,000 unit/mL injection 3,040 Units    fentaNYL (PF) 1,500 mcg/30 mL (50 mcg/mL) infusion    hydrOXYzine pamoate (VISTARIL) capsule 25 mg    propofol (DIPRIVAN) 10 mg/mL infusion    dexmedeTOMidine (PRECEDEX) 400 mcg in 0.9% sodium chloride (MBP/ADV) 100 mL MBP    NOREPINephrine (LEVOPHED) 8 mg in 0.9% NS 250ml infusion    acetaminophen (TYLENOL) tablet 650 mg    Or    acetaminophen (TYLENOL) suppository 650 mg    polyethylene glycol (MIRALAX) packet 17 g    ondansetron (ZOFRAN ODT) tablet 4 mg    baricitinib (OLUMIANT) tablet 2 mg    albuterol (PROVENTIL HFA, VENTOLIN HFA, PROAIR HFA) inhaler 2 Puff    polyethylene glycol (MIRALAX) packet 17 g    ondansetron (ZOFRAN) injection 4 mg    meropenem (MERREM) 1 g in 0.9% sodium chloride 20 mL IV syringe    hydrOXYzine (VISTARIL) injection 25 mg      PMH:  has no past medical history on file. PSH:   has a past surgical history that includes ir insert non tunl cvc over 5 yrs (2022). FHX: family history is not on file. SHX:       ROS:  Unable to obtain sedated on ventilator    Hemodynamics:    CO:    CI:    CVP:    SVR:   PAP Systolic:    PAP Diastolic:    PVR:    VF52:        Ventilator Settings:      Mode Rate TV Press PEEP FiO2 PIP Min.  Vent   Assist control,Volume control    450 ml    5 cm H20 75 %  53 cm H2O  9.48 l/min        Vital Signs: Telemetry:    normal sinus rhythm Intake/Output:   Visit Vitals  BP (!) 95/44 (BP Patient Position: At rest)   Pulse 72   Temp 97.9 °F (36.6 °C)   Resp 23   Ht 5' 5\" (1.651 m)   Wt 103.4 kg (227 lb 15.3 oz)   SpO2 91%   BMI 37.93 kg/m²       Temp (24hrs), Av.5 °F (36.9 °C), Min:97.9 °F (36.6 °C), Max:99.3 °F (37.4 °C)        O2 Device: Ventilator         Wt Readings from Last 4 Encounters:   02/08/22 103.4 kg (227 lb 15.3 oz)   01/17/22 90.7 kg (200 lb)          Intake/Output Summary (Last 24 hours) at 2/8/2022 0840  Last data filed at 2/7/2022 2246  Gross per 24 hour   Intake 325 ml   Output 1700 ml   Net -1375 ml       Last shift:      No intake/output data recorded. Last 3 shifts: 02/06 1901 - 02/08 0700  In: 685   Out: 2500 [Urine:2500]       Physical Exam:     General: Intubated on ventilator unresponsive on propofol fentanyl  HEENT: NCAT,   Eyes: anicteric; conjunctiva clear no doll's eye reflex  Neck: no nodes, , trach midline; no accessory MM use.   Questionable neck vein distention  Chest: no deformity,   Cardiac: R regular; no murmur;   Lungs: Diffuse wheezes and rales  Abd: soft, NT, hypoactive BS  Ext: Edema of the leg edema; left hand finger blackish discoloration  : clear urine  Neuro: Sedated unresponsive on the ventilator no doll's eye reflex flaccid extremities  Psych-unable to assess  Skin: warm, dry, no cyanosis;   Pulses: Brachial radial pulses intact  Capillary: Normal capillary refill      DATA:    MAR reviewed and pertinent medications noted or modified as needed  MEDS:   Current Facility-Administered Medications   Medication    dextrose 5% infusion    dexamethasone (DECADRON) 4 mg/mL injection 4 mg    0.9% sodium chloride infusion 250 mL    0.9% sodium chloride infusion 250 mL    dilTIAZem IR (CARDIZEM) tablet 30 mg    heparin 25,000 units in  ml infusion    heparin (porcine) 1,000 unit/mL injection 6,080 Units    Or    heparin (porcine) 1,000 unit/mL injection 3,040 Units    fentaNYL (PF) 1,500 mcg/30 mL (50 mcg/mL) infusion    hydrOXYzine pamoate (VISTARIL) capsule 25 mg    propofol (DIPRIVAN) 10 mg/mL infusion    dexmedeTOMidine (PRECEDEX) 400 mcg in 0.9% sodium chloride (MBP/ADV) 100 mL MBP    NOREPINephrine (LEVOPHED) 8 mg in 0.9% NS 250ml infusion    acetaminophen (TYLENOL) tablet 650 mg    Or    acetaminophen (TYLENOL) suppository 650 mg    polyethylene glycol (MIRALAX) packet 17 g    ondansetron (ZOFRAN ODT) tablet 4 mg    baricitinib (OLUMIANT) tablet 2 mg    albuterol (PROVENTIL HFA, VENTOLIN HFA, PROAIR HFA) inhaler 2 Puff    polyethylene glycol (MIRALAX) packet 17 g    ondansetron (ZOFRAN) injection 4 mg    meropenem (MERREM) 1 g in 0.9% sodium chloride 20 mL IV syringe    hydrOXYzine (VISTARIL) injection 25 mg        Labs:    Recent Labs     02/08/22  0442 02/07/22  0632 02/06/22  0256   WBC 17.1* 23.9* 26.9*   HGB 7.3* 8.4* 8.6*   PLT 98* 111* 131*     Recent Labs     02/08/22 0442 02/07/22  0632 02/06/22  0256     138 143 148*   K 4.5  4.5 4.3 4.6     103 109* 113*   CO2 30  30 30 31   *  250* 262* 375*   BUN 51*  51* 56* 71*   CREA 0.52*  0.48* 0.45* 0.64   CA 8.7  8.6 9.4 9.8   PHOS 2.3*  --   --    ALB 2.0*  2.0* 2.4* 2.6*   * 154* 223*     Recent Labs     02/08/22  0430 02/07/22  0645 02/06/22  0300   PH 7.46* 7.38 7.45   PCO2 43 53* 48*   PO2 105* 67* 60*   HCO3 30* 29* 31*   FIO2 85.0 85.0 85.0   1/30 AC 12  PEEP 5 FiO2 100%    Lab Results   Component Value Date/Time    Culture result: No growth 6 days 01/21/2022 07:55 AM    Culture result: No growth 6 days 01/18/2022 07:12 AM    Culture result: (A) 01/17/2022 07:12 AM     Staphylococcus species, coagulase negative growing in 1 of 4 bottles drawn NO SITE INDICATED    Culture result:  01/17/2022 07:12 AM     (NOTE) [facility] GROWING IN 1 OF 2 BOTTLES CALLED TO JOSE MIGUEL PAGAN AT 0831 ON 1/19/22. JF     No results found for: TSH, TSHEXT, TSHEXT     Imaging:    Results from Hospital Encounter encounter on 01/21/22    XR CHEST PORT    Narrative  Chest single view. Comparison single view chest 2/7/2022. Stable ET tube, NG tube, and right neck CVC. Hazy interstitial edema, similar appearance compared to prior imaging. Cardiac  and mediastinal structures unchanged.  No pneumothorax or sizable pleural  effusion. Results from [place] encounter on 01/21/22    CT HEAD WO CONT    Narrative  The study is a noncontrasted head CT examination dated 1/21/2022. HISTORY: Unresponsive. TECHNIQUE: Thin section axial imaging was performed followed by sagittal and  coronal reconstructed imaging. Dose Reduction Technique was employed to reduce radiation exposure - This  includes reduction optimization techniques as appropriate to a performed exam  with automated exposure control adjustments of the mA and/or Kv according to  patient size, or use of iterative reconstruction technique. COMPARISON: CT head dated 1/17/2022. There is an appropriate size to the ventricles, the deep cortical sulci, and the  sylvian fissures for the patient's age. This examination is negative for  intracranial hemorrhage, mass effect or an extra axial collection. The  gray-white matter junctions are preserved without cytotoxic or vasogenic edema. An assessment of the white matter tracts demonstrates a mild decrease in the  density characteristics of the central periventricular white matter. These  findings are consistent with expected aging changes and milder microvascular  disease. There also is a region of diminished density within the right posterior  inferior cerebellar hemisphere which may represent an older cerebrovascular  insult. ORBITS: This examination is negative for acute orbital pathology. PARANASAL SINUSES: The paranasal sinuses are well pneumatized without acute  sinus disease. There is a decreased number of right sided mastoid air cells which can be  associated with chronic mastoiditis. The craniocervical junction images in a  normal fashion. Impression  1. There are milder microvascular changes within the central periventricular  white matter.   2.  There is an area of diminished density within the inferior aspect of the  right cerebellar hemisphere without change (series 201 image number 12). These  findings may represent an older ischemic insult within the right posterior  inferior cerebellar region. 3.  This examination is negative for acute intracranial pathology or short-term  interval changes dating back to 1/17/2022. 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 Chest x-ray looks like pulmonary edema.   She has peripheral edema we will give Lasix today discontinue D5W continue tube feedings we will check echocardiogram  · 1/31 off pressors remain intubated   · 2/1 remain intubated sedated on ventilator will do sedation vacation in a.m. still on 85%  · 2/2 on ventilator sedated elevated PCO2  · 2/3 remain intubated off pressors significant drop in hemoglobin and increasing white count we will repeat CBC and work accordingly  · 2/4 patient is back on Levophed hemodynamically unstable still on ventilator 85% FiO2  · 2/5 remain on ventilator condition unstable  · 2/6 condition unstable sedated on ventilator  · 2/7 we will change vent settings  · Time of care 30-minute

## 2022-02-08 NOTE — PROGRESS NOTES
1410: Chart reviewed. CM has attached today's clinicals via [address] to [facility]. Anticipated [facility] 02/10/2022. CM noted patient's son is adamant about her not going to a SNF.  CM will continue to follow patient and recs of medical team.

## 2022-02-08 NOTE — PROGRESS NOTES
Progress Note  Date:2022       Room:[facility]  Patient Name:Ileana Chou     Date of Birth:[birth year]     Age:81 y.o. Subjective    Subjective:  Symptoms:  Stable. Review of Systems   Unable to perform ROS: Intubated     Objective         Vitals Last 24 Hours:  TEMPERATURE:  Temp  Av.5 °F (36.9 °C)  Min: 97.9 °F (36.6 °C)  Max: 99.3 °F (37.4 °C)  RESPIRATIONS RANGE: Resp  Av.2  Min: 19  Max: 28  PULSE OXIMETRY RANGE: SpO2  Av.8 %  Min: 88 %  Max: 99 %  PULSE RANGE: Pulse  Av.7  Min: 68  Max: 94  BLOOD PRESSURE RANGE: Systolic (04HFK), LIA:361 , Min:93 , CAK:906   ; Diastolic (12WMS), QPS:06, Min:42, Max:99    I/O (24Hr): Intake/Output Summary (Last 24 hours) at 2022 1048  Last data filed at 2022 0700  Gross per 24 hour   Intake 3164.51 ml   Output 1700 ml   Net 1464.51 ml     Objective:  Vital signs: (most recent): Blood pressure (!) 95/44, pulse 72, temperature 97.9 °F (36.6 °C), resp. rate 23, height 5' 5\" (1.651 m), weight 103.4 kg (227 lb 15.3 oz), SpO2 91 %. tube feeds  Ventilator fi02 85 %  Propofol  D5 @ 50 ml/hr  Labs/Imaging/Diagnostics    Labs:T  CBC:  Recent Labs     22  0632 22  0256   WBC 17.1* 23.9* 26.9*   RBC 2.30* 2.60* 2.65*   HGB 7.3* 8.4* 8.6*   HCT 22.7* 26.2* 26.8*   MCV 98.7 100.8* 101.1*   RDW 14.8* 15.4* 15.7*   PLT 98* 111* 131*     CHEMISTRIES:  Recent Labs     22  0632 22  0256     138 143 148*   K 4.5  4.5 4.3 4.6     103 109* 113*   CO2 30  30 30 31   BUN 51*  51* 56* 71*   CA 8.7  8.6 9.4 9.8   PHOS 2.3*  --   --    PT/INR:No results for input(s): INR, INREXT, INREXT in the last 72 hours. No lab exists for component: PROTIME  APTT:  No results for input(s): APTT in the last 72 hours.   LIVER PROFILE:  Recent Labs     22  0442 22  0632 22  0256   AST 49* 45* 60*   * 154* 223*     Lab Results   Component Value Date/Time    ALT (SGPT) 107 (H) 02/08/2022 04:42 AM    AST (SGOT) 49 (H) 02/08/2022 04:42 AM    Alk. phosphatase 84 02/08/2022 04:42 AM    Bilirubin, total 0.4 02/08/2022 04:42 AM       Imaging Last 24 Hours:  XR CHEST PORT    Result Date: 2/8/2022  Chest single view. Comparison single view chest 2/7/2022. Stable ET tube, NG tube, and right neck CVC. Hazy interstitial edema, similar appearance compared to prior imaging. Cardiac and mediastinal structures unchanged. No pneumothorax or sizable pleural effusion. Assessment//Plan   Active Problems:    COVID-19 (1/17/2022)      Respiratory failure with hypoxia ([address] (1/21/2022)      Hypernatremia (1/23/2022)      Hypokalemia (1/23/2022)        1. Hypernatremia  resolved  2. VDRF   3. Hyperglycemia  4. Encephalopathy  5.  Malnutrition moderate     Plan:  Dc d5w   Free water flushes reduced  Sodium is corrected now  Hyperglycemic       Electronically signed by Jojo Smith MD on 2/8/2022 at 10:46 AM

## 2022-02-08 NOTE — PROGRESS NOTES
General Daily Progress Note          Patient Name:   Lizzette Rebolledo       YOB: 1940       Age:  80 y.o.       Admit Date: 1/21/2022      Subjective:     Patient is a 80y.o. year old female with signal past medical history of hypertension arthritis who discharged from the hospital yesterday in stable condition patient is admitted last admission with COVID-19 patient was asymptomatic all this time while in the hospital patient discharged on 2 L oxygen to skilled care but patient son want to go home with home health but is at home patient's saturations drops sent back to the ER seen by the ER physician patient placed on BiPAP patient was little hypotensive started on Levophed admitted for further work-up and treatment           Patient is DNR    Patient on ventilator 85% O2    On propofol and Precedex drip        NG tube in place for medication and feeding      Objective:     Visit Vitals  BP (!) 108/45 (BP 1 Location: Right lower arm, BP Patient Position: At rest)   Pulse 63   Temp 98 °F (36.7 °C)   Resp 18   Ht 5' 5\" (1.651 m)   Wt 103.4 kg (227 lb 15.3 oz)   SpO2 97%   BMI 37.93 kg/m²        Recent Results (from the past 24 hour(s))   BLOOD GAS, ARTERIAL    Collection Time: 02/08/22  4:30 AM   Result Value Ref Range    pH 7.46 (H) 7.35 - 7.45      PCO2 43 35 - 45 mmHg    PO2 105 (H) 75 - 100 mmHg    O2  >95 %    BICARBONATE 30 (H) 22 - 26 mmol/L    BASE EXCESS 5.9 (H) 0 - 2 mmol/L    O2 METHOD VENT      FIO2 85.0 %    MODE Assist Control/Volume Control      Tidal volume 450      SET RATE 12      EPAP/CPAP/PEEP 5.0      SITE Right Radial      BEN'S TEST PASS     CBC W/O DIFF    Collection Time: 02/08/22  4:42 AM   Result Value Ref Range    WBC 17.1 (H) 3.6 - 11.0 K/uL    RBC 2.30 (L) 3.80 - 5.20 M/uL    HGB 7.3 (L) 11.5 - 16.0 g/dL    HCT 22.7 (L) 35.0 - 47.0 %    MCV 98.7 80.0 - 99.0 FL    MCH 31.7 26.0 - 34.0 PG    MCHC 32.2 30.0 - 36.5 g/dL    RDW 14.8 (H) 11.5 - 14.5 %    PLATELET 98 (L) 150 - 400 K/uL    MPV 12.3 8.9 - 12.9 FL    NRBC 0.3 (H) 0.0  WBC    ABSOLUTE NRBC 0.05 (H) 0.00 - 3.97 K/uL   METABOLIC PANEL, COMPREHENSIVE    Collection Time: 02/08/22  4:42 AM   Result Value Ref Range    Sodium 138 136 - 145 mmol/L    Potassium 4.5 3.5 - 5.1 mmol/L    Chloride 103 97 - 108 mmol/L    CO2 30 21 - 32 mmol/L    Anion gap 5 5 - 15 mmol/L    Glucose 244 (H) 65 - 100 mg/dL    BUN 51 (H) 6 - 20 mg/dL    Creatinine 0.52 (L) 0.55 - 1.02 mg/dL    BUN/Creatinine ratio 98 (H) 12 - 20      GFR est AA >60 >60 ml/min/1.73m2    GFR est non-AA >60 >60 ml/min/1.73m2    Calcium 8.7 8.5 - 10.1 mg/dL    Bilirubin, total 0.4 0.2 - 1.0 mg/dL    AST (SGOT) 49 (H) 15 - 37 U/L    ALT (SGPT) 107 (H) 12 - 78 U/L    Alk. phosphatase 84 45 - 117 U/L    Protein, total 4.9 (L) 6.4 - 8.2 g/dL    Albumin 2.0 (L) 3.5 - 5.0 g/dL    Globulin 2.9 2.0 - 4.0 g/dL    A-G Ratio 0.7 (L) 1.1 - 2.2     RENAL FUNCTION PANEL    Collection Time: 02/08/22  4:42 AM   Result Value Ref Range    Sodium 138 136 - 145 mmol/L    Potassium 4.5 3.5 - 5.1 mmol/L    Chloride 103 97 - 108 mmol/L    CO2 30 21 - 32 mmol/L    Anion gap 5 5 - 15 mmol/L    Glucose 250 (H) 65 - 100 mg/dL    BUN 51 (H) 6 - 20 mg/dL    Creatinine 0.48 (L) 0.55 - 1.02 mg/dL    BUN/Creatinine ratio 106 (H) 12 - 20      GFR est AA >60 >60 ml/min/1.73m2    GFR est non-AA >60 >60 ml/min/1.73m2    Calcium 8.6 8.5 - 10.1 mg/dL    Phosphorus 2.3 (L) 2.6 - 4.7 mg/dL    Albumin 2.0 (L) 3.5 - 5.0 g/dL   GLUCOSE, POC    Collection Time: 02/08/22 11:48 AM   Result Value Ref Range    Glucose (POC) 321 (H) 65 - 117 mg/dL    Performed by Sherrill Fernando    LACTIC ACID    Collection Time: 02/08/22 12:00 PM   Result Value Ref Range    Lactic acid 1.4 0.4 - 2.0 mmol/L   C REACTIVE PROTEIN, QT    Collection Time: 02/08/22 12:00 PM   Result Value Ref Range    C-Reactive protein 3.11 (H) 0.00 - 0.60 mg/dL     [unfilled]      Review of Systems    Unable to obtain.         Physical Exam: Constitutional: Awake on BiPAP  HENT:   Head: Normocephalic and atraumatic. Eyes: Pupils are equal, round, and reactive to light. EOM are normal.   Cardiovascular: Normal rate, regular rhythm and normal heart sounds. Pulmonary/Chest: Decreased breath sounds   abdominal: Soft. Bowel sounds are normal. There is no abdominal tenderness. There is no rebound and no guarding. Musculoskeletal: Normal range of motion. Neurological: pt is alert and oriented to person, place, and time. XR CHEST PORT   Final Result      XR CHEST PORT   Final Result   No significant interval change. XR CHEST PORT   Final Result   Persistent bilateral airspace disease and with decreased aeration. XR CHEST PORT   Final Result      XR CHEST PORT   Final Result      XR CHEST PORT   Final Result      XR CHEST PORT   Final Result      XR CHEST PORT   Final Result      WRIST BRACHIAL INDEX AT REST   Final Result      XR CHEST PORT   Final Result   No significant change. XR CHEST PORT   Final Result   No interval change. DUPLEX UPPER EXT ARTERY LEFT   Final Result      XR CHEST PORT   Final Result      XR CHEST PORT   Final Result   Endotracheal tube tip still at the maggie. Diffuse opacities in the   lungs, not significantly changed. XR CHEST PORT   Final Result      XR CHEST PORT   Final Result   Endotracheal tube tip at the maggie. Diffuse opacities in the   lungs, accentuated by lower lung volumes or increased. XR CHEST PORT   Final Result   Diffuse opacities in the lungs, not significantly changed. XR CHEST PORT   Final Result   1. ETT terminating 2 cm above the maggie. Remaining support apparatus as above. 2.  Worsening bilateral airspace disease with developing ARDS not excluded.       XR CHEST PORT   Final Result   Moderate patchy diffuse bilateral airspace opacities, waxing and waning from the   prior exam.      XR CHEST PORT   Final Result   Moderate diffuse bilateral airspace opacities, likely a combination of edema and   airspace disease, mildly increased from the previous exam.      CT HEAD WO CONT   Final Result   1. There are milder microvascular changes within the central periventricular   white matter. 2.  There is an area of diminished density within the inferior aspect of the   right cerebellar hemisphere without change (series 201 image number 12). These   findings may represent an older ischemic insult within the right posterior   inferior cerebellar region. 3.  This examination is negative for acute intracranial pathology or short-term   interval changes dating back to 1/17/2022. XR CHEST PORT   Final Result   Patchy mixed asymmetric lung disease appears somewhat better but the   lungs are better inflated. No effusion or pneumothorax. Normal heart and   mediastinum      IR INSERT NON TUNL CVC OVER 5 YRS   Final Result   Ultrasound of the right neck demonstrated patent IJV. Successful US-guided placement of a right internal jugular triple lumen central   venous catheter as described above. The catheter is functioning. PLAN:   Catheter position was confirmed by follow-up chest x-ray: catheter tip in the   lower SVC and ready to use. IR US GUIDED VASCULAR ACCESS   Final Result   Ultrasound of the right neck demonstrated patent IJV. Successful US-guided placement of a right internal jugular triple lumen central   venous catheter as described above. The catheter is functioning. PLAN:   Catheter position was confirmed by follow-up chest x-ray: catheter tip in the   lower SVC and ready to use. XR CHEST PORT   Final Result   Findings/impression:      Diffuse interstitial airspace disease/edema. No definite pleural effusion or   pneumothorax. Cardiac contours are partially obscured. No acute osseous abnormality identified.       XR CHEST PORT    (Results Pending)        Recent Results (from the past 24 hour(s))   BLOOD GAS, ARTERIAL    Collection Time: 02/08/22  4:30 AM   Result Value Ref Range    pH 7.46 (H) 7.35 - 7.45      PCO2 43 35 - 45 mmHg    PO2 105 (H) 75 - 100 mmHg    O2  >95 %    BICARBONATE 30 (H) 22 - 26 mmol/L    BASE EXCESS 5.9 (H) 0 - 2 mmol/L    O2 METHOD VENT      FIO2 85.0 %    MODE Assist Control/Volume Control      Tidal volume 450      SET RATE 12      EPAP/CPAP/PEEP 5.0      SITE Right Radial      BEN'S TEST PASS     CBC W/O DIFF    Collection Time: 02/08/22  4:42 AM   Result Value Ref Range    WBC 17.1 (H) 3.6 - 11.0 K/uL    RBC 2.30 (L) 3.80 - 5.20 M/uL    HGB 7.3 (L) 11.5 - 16.0 g/dL    HCT 22.7 (L) 35.0 - 47.0 %    MCV 98.7 80.0 - 99.0 FL    MCH 31.7 26.0 - 34.0 PG    MCHC 32.2 30.0 - 36.5 g/dL    RDW 14.8 (H) 11.5 - 14.5 %    PLATELET 98 (L) 964 - 400 K/uL    MPV 12.3 8.9 - 12.9 FL    NRBC 0.3 (H) 0.0  WBC    ABSOLUTE NRBC 0.05 (H) 0.00 - 9.90 K/uL   METABOLIC PANEL, COMPREHENSIVE    Collection Time: 02/08/22  4:42 AM   Result Value Ref Range    Sodium 138 136 - 145 mmol/L    Potassium 4.5 3.5 - 5.1 mmol/L    Chloride 103 97 - 108 mmol/L    CO2 30 21 - 32 mmol/L    Anion gap 5 5 - 15 mmol/L    Glucose 244 (H) 65 - 100 mg/dL    BUN 51 (H) 6 - 20 mg/dL    Creatinine 0.52 (L) 0.55 - 1.02 mg/dL    BUN/Creatinine ratio 98 (H) 12 - 20      GFR est AA >60 >60 ml/min/1.73m2    GFR est non-AA >60 >60 ml/min/1.73m2    Calcium 8.7 8.5 - 10.1 mg/dL    Bilirubin, total 0.4 0.2 - 1.0 mg/dL    AST (SGOT) 49 (H) 15 - 37 U/L    ALT (SGPT) 107 (H) 12 - 78 U/L    Alk.  phosphatase 84 45 - 117 U/L    Protein, total 4.9 (L) 6.4 - 8.2 g/dL    Albumin 2.0 (L) 3.5 - 5.0 g/dL    Globulin 2.9 2.0 - 4.0 g/dL    A-G Ratio 0.7 (L) 1.1 - 2.2     RENAL FUNCTION PANEL    Collection Time: 02/08/22  4:42 AM   Result Value Ref Range    Sodium 138 136 - 145 mmol/L    Potassium 4.5 3.5 - 5.1 mmol/L    Chloride 103 97 - 108 mmol/L    CO2 30 21 - 32 mmol/L    Anion gap 5 5 - 15 mmol/L    Glucose 250 (H) 65 - 100 mg/dL    BUN 51 (H) 6 - 20 mg/dL    Creatinine 0.48 (L) 0.55 - 1.02 mg/dL    BUN/Creatinine ratio 106 (H) 12 - 20      GFR est AA >60 >60 ml/min/1.73m2    GFR est non-AA >60 >60 ml/min/1.73m2    Calcium 8.6 8.5 - 10.1 mg/dL    Phosphorus 2.3 (L) 2.6 - 4.7 mg/dL    Albumin 2.0 (L) 3.5 - 5.0 g/dL   GLUCOSE, POC    Collection Time: 02/08/22 11:48 AM   Result Value Ref Range    Glucose (POC) 321 (H) 65 - 117 mg/dL    Performed by Deana Delatorre    LACTIC ACID    Collection Time: 02/08/22 12:00 PM   Result Value Ref Range    Lactic acid 1.4 0.4 - 2.0 mmol/L   C REACTIVE PROTEIN, QT    Collection Time: 02/08/22 12:00 PM   Result Value Ref Range    C-Reactive protein 3.11 (H) 0.00 - 0.60 mg/dL       Results     ** No results found for the last 336 hours. **           Labs:     Recent Labs     02/08/22 0442 02/07/22  0632   WBC 17.1* 23.9*   HGB 7.3* 8.4*   HCT 22.7* 26.2*   PLT 98* 111*     Recent Labs     02/08/22 0442 02/07/22 0632 02/06/22  0256     138 143 148*   K 4.5  4.5 4.3 4.6     103 109* 113*   CO2 30  30 30 31   BUN 51*  51* 56* 71*   CREA 0.52*  0.48* 0.45* 0.64   *  250* 262* 375*   CA 8.7  8.6 9.4 9.8   PHOS 2.3*  --   --      Recent Labs     02/08/22 0442 02/07/22  0632 02/06/22  0256   * 154* 223*   AP 84 100 171*   TBILI 0.4 0.6 0.6   TP 4.9* 5.5* 5.5*   ALB 2.0*  2.0* 2.4* 2.6*   GLOB 2.9 3.1 2.9     No results for input(s): INR, PTP, APTT, INREXT, INREXT in the last 72 hours. No results for input(s): FE, TIBC, PSAT, FERR in the last 72 hours. No results found for: FOL, RBCF   Recent Labs     02/08/22  0430 02/07/22  0645   PH 7.46* 7.38   PCO2 43 53*   PO2 105* 67*     No results for input(s): CPK, CKNDX, TROIQ in the last 72 hours.     No lab exists for component: CPKMB  No results found for: CHOL, CHOLX, CHLST, CHOLV, HDL, HDLP, LDL, LDLC, DLDLP, TGLX, TRIGL, TRIGP, CHHD, CHHDX  Lab Results   Component Value Date/Time    Glucose (POC) 321 (H) 02/08/2022 11:48 AM    Glucose (POC) 303 (H) 02/06/2022 11:11 PM    Glucose (POC) 157 (H) 01/31/2022 01:12 AM    Glucose (POC) 179 (H) 01/30/2022 04:57 AM    Glucose (POC) 169 (H) 01/28/2022 05:11 AM     Lab Results   Component Value Date/Time    Color Yellow/Straw 01/23/2022 10:30 AM    Appearance Turbid (A) 01/23/2022 10:30 AM    Specific gravity 1.027 01/23/2022 10:30 AM    pH (UA) 6.0 01/23/2022 10:30 AM    Protein 100 (A) 01/23/2022 10:30 AM    Glucose 50 (A) 01/23/2022 10:30 AM    Ketone 5 (A) 01/23/2022 10:30 AM    Bilirubin Negative 01/23/2022 10:30 AM    Urobilinogen 4.0 (H) 01/23/2022 10:30 AM    Nitrites Negative 01/23/2022 10:30 AM    Leukocyte Esterase Negative 01/23/2022 10:30 AM    Bacteria Negative 01/23/2022 10:30 AM    WBC 0-4 01/23/2022 10:30 AM    RBC 0-5 01/23/2022 10:30 AM         Assessment:     Acute hypoxemic respiratory failure on on ventilator 85% of   COVID-19 pneumonitis   hypotension  Thrombocytopenia  Hypertension  Arthritis  High blood sugar secondary to steroid  Hyperkalemia  Hyponatremia  Moderate protein calorie malnutrition  Patient need multiple partial finger amputation once stable as per vascular surgeon      Plan:     Seen by nephrologist discontinue D5W  Recommend free water flushes    Olumiant 2 mg daily  Decadron 4 mg every 6 hours  Meropenem 1 g every 8 hours  Diltiazem 30 mg 3 times  Follow-up with pulmonologist and nephrologist  Start Lantus 10 units subcu day    Monitor sodium and potassium    Overall prognosis  Normal saline 50 cc/h      Patient is DNR    Discussed with the patient son he want to continue present treatment till February 13, he is discussing with the family to make a final decision                    Current Facility-Administered Medications:     dexamethasone (DECADRON) 4 mg/mL injection 4 mg, 4 mg, IntraVENous, Q24H, Karan Flower MD, 4 mg at 02/08/22 8680    0.9% sodium chloride infusion 250 mL, 250 mL, IntraVENous, PRN, Karan Flower MD    0.9% sodium chloride infusion 250 mL, 250 mL, IntraVENous, PRN, Karan Flower MD    dilTIAZem IR (CARDIZEM) tablet 30 mg, 30 mg, Oral, TID, Karan Flower MD, 30 mg at 02/08/22 0812    heparin 25,000 units in  ml infusion, 18-36 Units/kg/hr (Adjusted), IntraVENous, TITRATE, Margoth Lopez MD, Stopped at 02/03/22 0850    heparin (porcine) 1,000 unit/mL injection 6,080 Units, 80 Units/kg (Adjusted), IntraVENous, PRN, 6,080 Units at 02/02/22 0032 **OR** heparin (porcine) 1,000 unit/mL injection 3,040 Units, 40 Units/kg (Adjusted), IntraVENous, PRN, Polo Lopez MD, 3,040 Units at 02/01/22 0645    fentaNYL (PF) 1,500 mcg/30 mL (50 mcg/mL) infusion, 0-200 mcg/hr, IntraVENous, KATHERINE, Karan Flower MD, Last Rate: 1 mL/hr at 02/05/22 1815, 50 mcg/hr at 02/05/22 1815    hydrOXYzine pamoate (VISTARIL) capsule 25 mg, 25 mg, Oral, Q4H PRN, Polo Lopez MD    propofol (DIPRIVAN) 10 mg/mL infusion, 0-50 mcg/kg/min, IntraVENous, TITRATE, Karan Flower MD, Last Rate: 27.2 mL/hr at 02/08/22 1207, 50 mcg/kg/min at 02/08/22 1207    dexmedeTOMidine (PRECEDEX) 400 mcg in 0.9% sodium chloride (MBP/ADV) 100 mL MBP, 0.1-1.5 mcg/kg/hr, IntraVENous, TITRATEMundo Tahir, MD, Last Rate: 31.7 mL/hr at 02/08/22 1214, 1.4 mcg/kg/hr at 02/08/22 1214    NOREPINephrine (LEVOPHED) 8 mg in 0.9% NS 250ml infusion, 0.5-16 mcg/min, IntraVENous, TITRATE, JessicaMargoth nicolas MD, Stopped at 02/05/22 2810    acetaminophen (TYLENOL) tablet 650 mg, 650 mg, Oral, Q6H PRN **OR** acetaminophen (TYLENOL) suppository 650 mg, 650 mg, Rectal, Q6H PRN, Margoth Lopez MD    polyethylene glycol (MIRALAX) packet 17 g, 17 g, Oral, DAILY PRN, Margoth Lopez MD    ondansetron (ZOFRAN ODT) tablet 4 mg, 4 mg, Oral, Q8H PRN **OR** [DISCONTINUED] ondansetron (ZOFRAN) injection 4 mg, 4 mg, IntraVENous, Q6H PRN, Polo Lopez MD    baricitinib (OLUMIANT) tablet 2 mg, 2 mg, Oral, DAILY, Polo Lopez MD, 2 mg at 02/07/22 0816    albuterol (PROVENTIL HFA, VENTOLIN HFA, PROAIR HFA) inhaler 2 Puff, 2 Puff, Inhalation, Q6H PRN, Sandeep Lopez MD    polyethylene glycol (MIRALAX) packet 17 g, 17 g, Oral, DAILY PRN, Polo Lopez MD, 17 g at 01/28/22 0844    [DISCONTINUED] ondansetron (ZOFRAN ODT) tablet 4 mg, 4 mg, Oral, Q8H PRN **OR** ondansetron (ZOFRAN) injection 4 mg, 4 mg, IntraVENous, Q6H PRN, Polo Lopez MD    meropenem (MERREM) 1 g in 0.9% sodium chloride 20 mL IV syringe, 1 g, IntraVENous, Q8H, Polo Lopez MD, 1 g at 02/08/22 7996    hydrOXYzine (VISTARIL) injection 25 mg, 25 mg, IntraMUSCular, Q6H PRN, Polo Lopez MD, 25 mg at 01/22/22 0111

## 2022-02-08 NOTE — PROGRESS NOTES
PROGRESS NOTE      Chief Complaints:  No events overnight. HPI and  Objective:  Patient examined this morning. Patient is is on currently on Levophed drip. And also her oxygen requirement down to 75% FiO2. Patient grimaces on noxious stimulation. Temperature is 97.9 probably 93/52. Heart rate 70s. Vascular examination of the left hand shows Doppler signal noted on left radial artery. Palmar arch Doppler signal present. Left hand is warm swollen. Mummified digits 5, 4, 3 no changes. Review of Systems:  Unable to assess. EXAM:  Visit Vitals  BP (!) 93/52 (BP 1 Location: Right lower arm, BP Patient Position: At rest)   Pulse 70   Temp 97.9 °F (36.6 °C)   Resp 23   Ht 5' 5\" (1.651 m)   Wt 227 lb 15.3 oz (103.4 kg)   SpO2 90%   BMI 37.93 kg/m²       Patient is intubated. Head and neck atraumatic, normocephalic. ENT: No hoarse voice  Cardiac system regular rate rhythm. Pulmonary no audible wheeze  Chest wall excursion normal with respiration cycle  Abdomen is soft not particularly distended. Neurologically unable to assess  Skin is warm and moist.  Psychosocial: Unable to assess. Vascular examination as previously noted no changes.     Recent Results (from the past 24 hour(s))   BLOOD GAS, ARTERIAL    Collection Time: 02/08/22  4:30 AM   Result Value Ref Range    pH 7.46 (H) 7.35 - 7.45      PCO2 43 35 - 45 mmHg    PO2 105 (H) 75 - 100 mmHg    O2  >95 %    BICARBONATE 30 (H) 22 - 26 mmol/L    BASE EXCESS 5.9 (H) 0 - 2 mmol/L    O2 METHOD VENT      FIO2 85.0 %    MODE Assist Control/Volume Control      Tidal volume 450      SET RATE 12      EPAP/CPAP/PEEP 5.0      SITE Right Radial      BEN'S TEST PASS     CBC W/O DIFF    Collection Time: 02/08/22  4:42 AM   Result Value Ref Range    WBC 17.1 (H) 3.6 - 11.0 K/uL    RBC 2.30 (L) 3.80 - 5.20 M/uL    HGB 7.3 (L) 11.5 - 16.0 g/dL    HCT 22.7 (L) 35.0 - 47.0 %    MCV 98.7 80.0 - 99.0 FL    MCH 31.7 26.0 - 34.0 PG    MCHC 32.2 30.0 - 36.5 g/dL    RDW 14. 8 (H) 11.5 - 14.5 %    PLATELET 98 (L) 408 - 400 K/uL    MPV 12.3 8.9 - 12.9 FL    NRBC 0.3 (H) 0.0  WBC    ABSOLUTE NRBC 0.05 (H) 0.00 - 1.05 K/uL   METABOLIC PANEL, COMPREHENSIVE    Collection Time: 02/08/22  4:42 AM   Result Value Ref Range    Sodium 138 136 - 145 mmol/L    Potassium 4.5 3.5 - 5.1 mmol/L    Chloride 103 97 - 108 mmol/L    CO2 30 21 - 32 mmol/L    Anion gap 5 5 - 15 mmol/L    Glucose 244 (H) 65 - 100 mg/dL    BUN 51 (H) 6 - 20 mg/dL    Creatinine 0.52 (L) 0.55 - 1.02 mg/dL    BUN/Creatinine ratio 98 (H) 12 - 20      GFR est AA >60 >60 ml/min/1.73m2    GFR est non-AA >60 >60 ml/min/1.73m2    Calcium 8.7 8.5 - 10.1 mg/dL    Bilirubin, total 0.4 0.2 - 1.0 mg/dL    AST (SGOT) 49 (H) 15 - 37 U/L    ALT (SGPT) 107 (H) 12 - 78 U/L    Alk. phosphatase 84 45 - 117 U/L    Protein, total 4.9 (L) 6.4 - 8.2 g/dL    Albumin 2.0 (L) 3.5 - 5.0 g/dL    Globulin 2.9 2.0 - 4.0 g/dL    A-G Ratio 0.7 (L) 1.1 - 2.2     RENAL FUNCTION PANEL    Collection Time: 02/08/22  4:42 AM   Result Value Ref Range    Sodium 138 136 - 145 mmol/L    Potassium 4.5 3.5 - 5.1 mmol/L    Chloride 103 97 - 108 mmol/L    CO2 30 21 - 32 mmol/L    Anion gap 5 5 - 15 mmol/L    Glucose 250 (H) 65 - 100 mg/dL    BUN 51 (H) 6 - 20 mg/dL    Creatinine 0.48 (L) 0.55 - 1.02 mg/dL    BUN/Creatinine ratio 106 (H) 12 - 20      GFR est AA >60 >60 ml/min/1.73m2    GFR est non-AA >60 >60 ml/min/1.73m2    Calcium 8.6 8.5 - 10.1 mg/dL    Phosphorus 2.3 (L) 2.6 - 4.7 mg/dL    Albumin 2.0 (L) 3.5 - 5.0 g/dL       ASSESSMENT:   Patient is 80 y.o. with diagnosis of : Active Problems:    COVID-19 (1/17/2022)      Respiratory failure with hypoxia ([address] (1/21/2022)      Hypernatremia (1/23/2022)      Hypokalemia (1/23/2022)        PLAN:                 Patient was apparently made a DNR. So no aggressive measures such as PEG and trach. I will continue vascular status monitoring the left arm.   Please have the left hand elevated with iodine swabs daily on the mummified digits of the left hand 3, 4, and 5. Continue monitor her progress. Critical care time spent: 30 minutes.

## 2022-02-08 NOTE — PROGRESS NOTES
Comprehensive Nutrition Assessment    Type and Reason for Visit: Reassess (interim)    Nutrition Recommendations/Plan:   D/c TF formula    Provide 2pkt prosource q6hrs, +1 aditional (9pkt/day) with 150ml water flush q6hrs  Provides 540kcals (42%), 135g protein (99%), and 600ml water (40%)        Propofol 50mcg/kg/min providing 718kcals/d (99%)    Please document TF initiation, rate, flushes, prosource provision, and GRVs    Nutrition Assessment:  Admitted for hypoxia, hypotension, intubated (1/23). +COVID-19. Noted recent re-admit, inpatient x5d with COVID-19 but asymptomatic. (1/24) TF ordered by JOSE WILLIAMSON adjusted. TF ran w/o issue until 2/3 when hbg drop brought concerns for GIB, RD rec'd to hold TF but appears TF continued. Pt tolerating well, GRV low and pt having non-bloody BMs. D/t increase in propofol, TF formula can stop and just flush with prosource. Labs: H/H 7.3/22.7. BUN 51, Cr 0.52, -321, AST 49, , Phos 2.3. Meds: Baricitinib, dexamethasone, precedex, meropenem, Propofol at 50mcg/kg/min. Malnutrition Assessment:  Malnutrition Status:  No malnutrition    Context:  Acute illness         Estimated Daily Nutrient Needs:  Energy (kcal): 1273kcals (14kcals/kg per [facility]); Weight Used for Energy Requirements: Admission (90.9kg EDW/admit wt)  Protein (g): 136kcals (2.0g/kg per [facility]); Weight Used for Protein Requirements: Ideal (Geriatric IBW using BMI 25 is 68.1kg)  Fluid (ml/day): 1500ml; Method Used for Fluid Requirements: Other (comment) (adult minimum)      Nutrition Related Findings:  Unable to perform NFPE d/t COVID-19 precautions. No hx dysphagia. Last BM 2/7, green. Non-pitting generalized edema and to x4 extremities. Wounds:     (Mummified L hand 3-5th digits, will require amputation.)       Current Nutrition Therapies:  DIET NPO  ADULT ORAL NUTRITION SUPPLEMENT Lunch;  Other Supplement; 9pkt prosource/d    Anthropometric Measures:  · Height:  5' 5\" (165.1 cm)  · Current Body Wt: 104.5 kg (230 lb 6.1 oz) (1/28)   · Admission Body Wt:  199 lb 15.3 oz (1/12)    · Usual Body Wt:   (stefan)     · Ideal Body Wt:  125 lbs:  184.3 %   · BMI Category:  Obese class 2 (BMI 35.0-39. 9)       Nutrition Diagnosis:   · Inadequate oral intake related to impaired respiratory function as evidenced by intubation,nutrition support-enteral nutrition      Nutrition Interventions:   Food and/or Nutrient Delivery: Continue NPO,Modify tube feeding  Nutrition Education and Counseling: No recommendations at this time,Education not appropriate  Coordination of Nutrition Care: Continue to monitor while inpatient    Goals:  Intakes >/=75% of EENs in >5 days, Wt maintenance within +/-0.5kg in >5 days       Nutrition Monitoring and Evaluation:   Behavioral-Environmental Outcomes: None identified  Food/Nutrient Intake Outcomes: Enteral nutrition intake/tolerance  Physical Signs/Symptoms Outcomes: Weight,Hemodynamic status,Fluid status or edema,Biochemical data    Discharge Planning:     Too soon to determine     Electronically signed by [employer] on 2/8/2022 at 12:56 PM    Contact: Ext [phone], or via [employer]

## 2022-02-09 NOTE — PROGRESS NOTES
Patients peak pressures high and vent continuing to go off. Respiratory called.   Vent settings changed at this time

## 2022-02-09 NOTE — PROGRESS NOTES
IMPRESSION:   1. Acute hypoxic respiratory  2. Acute on chronic hypercapnic respiratory failure   3. COVID-19 pneumonia  4. Pulmonary edema  5. Severe Anemia hemoglobin dropped to 4 recieved blood transfusion   6. Sepsis with septic shock off pressors  7. Left hand ischemia  8. Arthritis hypertension by hx  9. Hyperkalemia corrected  10. Hyponatremia resolved      RECOMMENDATIONS/PLAN:   1. ICU monitoring  1. Patient condition got worse on 1/24 went to asystole subsequently got intubated now on ventilator assist control mode sedated with propofol and fentanyl Precedex was discontinued because of bradycardia arterial blood gases acceptable currently on A/C 12  PEEP 5 FiO2  90% PO2 PCO2 much improved, still not able to wean  2. Patient is on dexamethasone and baricitinib  3. Significant drop in hemoglobin recieved 3 unit packed RBC hemoglobin improved  4. Patient is back on Levophed hemodynamically unstable will continue to wean  5. Leukocytosis trending down hemoglobin still low 7.3  6. Elevated blood sugar at steroid was decreased blood sugar trending down will start patient on sliding scale  7. Potassium corrected  8. Left hand NATHALIE normal continue local wound care   9. Pulmonary edema received Lasix chest x-ray shows bilateral infiltrate congestive changes  10. Left hand ischemia off IV heparin left ulnar artery occlusion radial  patent NATHALIE normal  11. Troponin is elevated  12. Procalcitonin 0.06 C-reactive 3.1 lactic acid 1.4  13.  She is on meropenem and finished Zithromax     [x] High complexity decision making was performed  [x] See my orders for details  HPI  79-year-old lady came in because of shortness of breath and dyspnea she has significant past medical history of arthritis hypertension she was recently discharged from the hospital came back with worsening of hypoxia she was discharged on oxygen 2 L nasal cannula and patient condition got worse she was supposed to be discharged to skilled care but patient's son took her home where her condition got worse and she was transferred back to the hospital now she is on noninvasive ventilator BiPAP machine hemodynamically unstable hypotensive on vasopressors and not giving much history. PMH:  has no past medical history on file. PSH:   has a past surgical history that includes ir insert non tunl cvc over 5 yrs (1/21/2022). FHX: family history is not on file.      SHX:      ALL:   Allergies   Allergen Reactions    Penicillins Hives        MEDS:   [x] Reviewed - As Below   [] Not reviewed    Current Facility-Administered Medications   Medication    insulin glargine (LANTUS) injection 10 Units    heparin (porcine) injection 5,000 Units    insulin lispro (HUMALOG) injection    glucose chewable tablet 16 g    glucagon (GLUCAGEN) injection 1 mg    dextrose 10% infusion 125-250 mL    dexamethasone (DECADRON) 4 mg/mL injection 4 mg    0.9% sodium chloride infusion 250 mL    0.9% sodium chloride infusion 250 mL    dilTIAZem IR (CARDIZEM) tablet 30 mg    fentaNYL (PF) 1,500 mcg/30 mL (50 mcg/mL) infusion    hydrOXYzine pamoate (VISTARIL) capsule 25 mg    propofol (DIPRIVAN) 10 mg/mL infusion    dexmedeTOMidine (PRECEDEX) 400 mcg in 0.9% sodium chloride (MBP/ADV) 100 mL MBP    NOREPINephrine (LEVOPHED) 8 mg in 0.9% NS 250ml infusion    acetaminophen (TYLENOL) tablet 650 mg    Or    acetaminophen (TYLENOL) suppository 650 mg    polyethylene glycol (MIRALAX) packet 17 g    ondansetron (ZOFRAN ODT) tablet 4 mg    baricitinib (OLUMIANT) tablet 2 mg    albuterol (PROVENTIL HFA, VENTOLIN HFA, PROAIR HFA) inhaler 2 Puff    polyethylene glycol (MIRALAX) packet 17 g    ondansetron (ZOFRAN) injection 4 mg    meropenem (MERREM) 1 g in 0.9% sodium chloride 20 mL IV syringe    hydrOXYzine (VISTARIL) injection 25 mg      MAR reviewed and pertinent medications noted or modified as needed   Current Facility-Administered Medications   Medication    insulin glargine (LANTUS) injection 10 Units    heparin (porcine) injection 5,000 Units    insulin lispro (HUMALOG) injection    glucose chewable tablet 16 g    glucagon (GLUCAGEN) injection 1 mg    dextrose 10% infusion 125-250 mL    dexamethasone (DECADRON) 4 mg/mL injection 4 mg    0.9% sodium chloride infusion 250 mL    0.9% sodium chloride infusion 250 mL    dilTIAZem IR (CARDIZEM) tablet 30 mg    fentaNYL (PF) 1,500 mcg/30 mL (50 mcg/mL) infusion    hydrOXYzine pamoate (VISTARIL) capsule 25 mg    propofol (DIPRIVAN) 10 mg/mL infusion    dexmedeTOMidine (PRECEDEX) 400 mcg in 0.9% sodium chloride (MBP/ADV) 100 mL MBP    NOREPINephrine (LEVOPHED) 8 mg in 0.9% NS 250ml infusion    acetaminophen (TYLENOL) tablet 650 mg    Or    acetaminophen (TYLENOL) suppository 650 mg    polyethylene glycol (MIRALAX) packet 17 g    ondansetron (ZOFRAN ODT) tablet 4 mg    baricitinib (OLUMIANT) tablet 2 mg    albuterol (PROVENTIL HFA, VENTOLIN HFA, PROAIR HFA) inhaler 2 Puff    polyethylene glycol (MIRALAX) packet 17 g    ondansetron (ZOFRAN) injection 4 mg    meropenem (MERREM) 1 g in 0.9% sodium chloride 20 mL IV syringe    hydrOXYzine (VISTARIL) injection 25 mg      PMH:  has no past medical history on file. PSH:   has a past surgical history that includes ir insert non tunl cvc over 5 yrs (1/21/2022). FHX: family history is not on file. SHX:       ROS:  Unable to obtain sedated on ventilator    Hemodynamics:    CO:    CI:    CVP:    SVR:   PAP Systolic:    PAP Diastolic:    PVR:    LF00:        Ventilator Settings:      Mode Rate TV Press PEEP FiO2 PIP Min.  Vent   Assist control,Volume control    450 ml    5 cm H20 90 %  49 cm H2O  12 l/min        Vital Signs: Telemetry:    normal sinus rhythm Intake/Output:   Visit Vitals  BP (!) 103/45 (BP 1 Location: Right lower arm, BP Patient Position: At rest)   Pulse 61   Temp 97.7 °F (36.5 °C)   Resp 23   Ht 5' 5\" (1.651 m)   Wt 106.6 kg (235 lb 0.2 oz)   SpO2 94% BMI 39.11 kg/m²       Temp (24hrs), Av °F (36.7 °C), Min:97.1 °F (36.2 °C), Max:98.7 °F (37.1 °C)        O2 Device: Ventilator         Wt Readings from Last 4 Encounters:   22 106.6 kg (235 lb 0.2 oz)   22 90.7 kg (200 lb)          Intake/Output Summary (Last 24 hours) at 2022 0752  Last data filed at 2022 0530  Gross per 24 hour   Intake 2820.51 ml   Output 2650 ml   Net 170.51 ml       Last shift:      No intake/output data recorded. Last 3 shifts:  1901 -  0700  In: 5985 [I.V.:3640]  Out: 2950 [Urine:2950]       Physical Exam:     General: Intubated on ventilator unresponsive on propofol fentanyl  HEENT: NCAT,   Eyes: anicteric; conjunctiva clear no doll's eye reflex  Neck: no nodes, , trach midline; no accessory MM use.   Questionable neck vein distention  Chest: no deformity,   Cardiac: R regular; no murmur;   Lungs: Diffuse wheezes and rales  Abd: soft, NT, hypoactive BS  Ext: Edema of the leg edema; left hand finger blackish discoloration  : clear urine  Neuro: Sedated unresponsive on the ventilator no doll's eye reflex flaccid extremities  Psych-unable to assess  Skin: warm, dry, no cyanosis;   Pulses: Brachial radial pulses intact  Capillary: Normal capillary refill      DATA:    MAR reviewed and pertinent medications noted or modified as needed  MEDS:   Current Facility-Administered Medications   Medication    insulin glargine (LANTUS) injection 10 Units    heparin (porcine) injection 5,000 Units    insulin lispro (HUMALOG) injection    glucose chewable tablet 16 g    glucagon (GLUCAGEN) injection 1 mg    dextrose 10% infusion 125-250 mL    dexamethasone (DECADRON) 4 mg/mL injection 4 mg    0.9% sodium chloride infusion 250 mL    0.9% sodium chloride infusion 250 mL    dilTIAZem IR (CARDIZEM) tablet 30 mg    fentaNYL (PF) 1,500 mcg/30 mL (50 mcg/mL) infusion    hydrOXYzine pamoate (VISTARIL) capsule 25 mg    propofol (DIPRIVAN) 10 mg/mL infusion    dexmedeTOMidine (PRECEDEX) 400 mcg in 0.9% sodium chloride (MBP/ADV) 100 mL MBP    NOREPINephrine (LEVOPHED) 8 mg in 0.9% NS 250ml infusion    acetaminophen (TYLENOL) tablet 650 mg    Or    acetaminophen (TYLENOL) suppository 650 mg    polyethylene glycol (MIRALAX) packet 17 g    ondansetron (ZOFRAN ODT) tablet 4 mg    baricitinib (OLUMIANT) tablet 2 mg    albuterol (PROVENTIL HFA, VENTOLIN HFA, PROAIR HFA) inhaler 2 Puff    polyethylene glycol (MIRALAX) packet 17 g    ondansetron (ZOFRAN) injection 4 mg    meropenem (MERREM) 1 g in 0.9% sodium chloride 20 mL IV syringe    hydrOXYzine (VISTARIL) injection 25 mg        Labs:    Recent Labs     02/08/22  0442 02/07/22  0632   WBC 17.1* 23.9*   HGB 7.3* 8.4*   PLT 98* 111*     Recent Labs     02/08/22  1200 02/08/22  0442 02/07/22  0632   NA  --  138  138 143   K  --  4.5  4.5 4.3   CL  --  103  103 109*   CO2  --  30  30 30   GLU  --  244*  250* 262*   BUN  --  51*  51* 56*   CREA  --  0.52*  0.48* 0.45*   CA  --  8.7  8.6 9.4   PHOS  --  2.3*  --    LAC 1.4  --   --    ALB  --  2.0*  2.0* 2.4*   ALT  --  107* 154*     Recent Labs     02/09/22  0405 02/08/22  0430 02/07/22  0645   PH 7.42 7.46* 7.38   PCO2 45 43 53*   PO2 85 105* 67*   HCO3 28* 30* 29*   FIO2 90.0 85.0 85.0   1/30 AC 12  PEEP 5 FiO2 100%    Lab Results   Component Value Date/Time    Culture result: No growth 6 days 01/21/2022 07:55 AM    Culture result: No growth 6 days 01/18/2022 07:12 AM    Culture result: (A) 01/17/2022 07:12 AM     Staphylococcus species, coagulase negative growing in 1 of 4 bottles drawn NO SITE INDICATED    Culture result:  01/17/2022 07:12 AM     (NOTE) [facility] GROWING IN 1 OF 2 BOTTLES CALLED TO JOSE MIGUEL PAGAN AT 0831 ON 1/19/22. JF     No results found for: TSH, TSHEXT, TSHEXT     Imaging:    Results from Hospital Encounter encounter on 01/21/22    XR CHEST PORT    Narrative  Chest single view.     Comparison single view chest 2/7/2022. Stable ET tube, NG tube, and right neck CVC. Hazy interstitial edema, similar appearance compared to prior imaging. Cardiac  and mediastinal structures unchanged. No pneumothorax or sizable pleural  effusion. Results from [place] encounter on 01/21/22    CT HEAD WO CONT    Narrative  The study is a noncontrasted head CT examination dated 1/21/2022. HISTORY: Unresponsive. TECHNIQUE: Thin section axial imaging was performed followed by sagittal and  coronal reconstructed imaging. Dose Reduction Technique was employed to reduce radiation exposure - This  includes reduction optimization techniques as appropriate to a performed exam  with automated exposure control adjustments of the mA and/or Kv according to  patient size, or use of iterative reconstruction technique. COMPARISON: CT head dated 1/17/2022. There is an appropriate size to the ventricles, the deep cortical sulci, and the  sylvian fissures for the patient's age. This examination is negative for  intracranial hemorrhage, mass effect or an extra axial collection. The  gray-white matter junctions are preserved without cytotoxic or vasogenic edema. An assessment of the white matter tracts demonstrates a mild decrease in the  density characteristics of the central periventricular white matter. These  findings are consistent with expected aging changes and milder microvascular  disease. There also is a region of diminished density within the right posterior  inferior cerebellar hemisphere which may represent an older cerebrovascular  insult. ORBITS: This examination is negative for acute orbital pathology. PARANASAL SINUSES: The paranasal sinuses are well pneumatized without acute  sinus disease. There is a decreased number of right sided mastoid air cells which can be  associated with chronic mastoiditis. The craniocervical junction images in a  normal fashion. Impression  1.   There are milder microvascular changes within the central periventricular  white matter. 2.  There is an area of diminished density within the inferior aspect of the  right cerebellar hemisphere without change (series 201 image number 12). These  findings may represent an older ischemic insult within the right posterior  inferior cerebellar region. 3.  This examination is negative for acute intracranial pathology or short-term  interval changes dating back to 1/17/2022. 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 Chest x-ray looks like pulmonary edema.   She has peripheral edema we will give Lasix today discontinue D5W continue tube feedings we will check echocardiogram  · 1/31 off pressors remain intubated   · 2/1 remain intubated sedated on ventilator will do sedation vacation in a.m. still on 85%  · 2/2 on ventilator sedated elevated PCO2  · 2/3 remain intubated off pressors significant drop in hemoglobin and increasing white count we will repeat CBC and work accordingly  · 2/4 patient is back on Levophed hemodynamically unstable still on ventilator 85% FiO2  · 2/5 remain on ventilator condition unstable  · 2/6 condition unstable sedated on ventilator  · 2/7 we will change vent settings  · Time of care 30-minute

## 2022-02-09 NOTE — PROGRESS NOTES
offered deep breathing with patient to harmoniz our bodies and the room as a form of ministry of presence and support. No family was present at this time. Advised nurse to contact [facility] for any further referrals.     [address], [facility]    Please [facility]  in order to get in touch with  for any Spiritual Care Needs   [phone]   OR   Reach out to us on [place]

## 2022-02-09 NOTE — PROGRESS NOTES
General Daily Progress Note          Patient Name:   Celeste Garcia       YOB: 1940       Age:  80 y.o.       Admit Date: 1/21/2022      Subjective:     Patient is a 80y.o. year old female with signal past medical history of hypertension arthritis who discharged from the hospital yesterday in stable condition patient is admitted last admission with COVID-19 patient was asymptomatic all this time while in the hospital patient discharged on 2 L oxygen to skilled care but patient son want to go home with home health but is at home patient's saturations drops sent back to the ER seen by the ER physician patient placed on BiPAP patient was little hypotensive started on Levophed admitted for further work-up and treatment           Patient is DNR    Patient on ventilator 90% O2    On propofol and Precedex drip        NG tube in place for medication and feeding      Objective:     Visit Vitals  BP (!) 112/42 (BP 1 Location: Right lower arm, BP Patient Position: At rest)   Pulse 72   Temp 98.1 °F (36.7 °C)   Resp 22   Ht 5' 5\" (1.651 m)   Wt 106.6 kg (235 lb 0.2 oz)   SpO2 95%   BMI 39.11 kg/m²        Recent Results (from the past 24 hour(s))   GLUCOSE, POC    Collection Time: 02/08/22 11:48 AM   Result Value Ref Range    Glucose (POC) 321 (H) 65 - 117 mg/dL    Performed by Neville Mathew    LACTIC ACID    Collection Time: 02/08/22 12:00 PM   Result Value Ref Range    Lactic acid 1.4 0.4 - 2.0 mmol/L   PROCALCITONIN    Collection Time: 02/08/22 12:00 PM   Result Value Ref Range    Procalcitonin 0.06 (H) 0 ng/mL   C REACTIVE PROTEIN, QT    Collection Time: 02/08/22 12:00 PM   Result Value Ref Range    C-Reactive protein 3.11 (H) 0.00 - 0.60 mg/dL   GLUCOSE, POC    Collection Time: 02/08/22  5:49 PM   Result Value Ref Range    Glucose (POC) 360 (H) 65 - 117 mg/dL    Performed by Neville Mathew    GLUCOSE, POC    Collection Time: 02/08/22 11:35 PM   Result Value Ref Range    Glucose (POC) 224 (H) 65 - 117 mg/dL Performed by Jaspal Rene    BLOOD GAS, ARTERIAL    Collection Time: 02/09/22  4:05 AM   Result Value Ref Range    pH 7.42 7.35 - 7.45      PCO2 45 35 - 45 mmHg    PO2 85 75 - 100 mmHg    O2 SAT 98 >95 %    BICARBONATE 28 (H) 22 - 26 mmol/L    BASE EXCESS 4.3 (H) 0 - 2 mmol/L    O2 METHOD VENT      FIO2 90.0 %    MODE Assist Control/Volume Control      Tidal volume 450      SET RATE 12      EPAP/CPAP/PEEP 5.0      SITE Right Radial      BEN'S TEST PASS     GLUCOSE, POC    Collection Time: 02/09/22  4:56 AM   Result Value Ref Range    Glucose (POC) 128 (H) 65 - 117 mg/dL    Performed by Jaspal Rene      [unfilled]      Review of Systems    Unable to obtain. Physical Exam:      Constitutional: Awake on vent   HENT:   Head: Normocephalic and atraumatic. Eyes: Pupils are equal, round, and reactive to light. EOM are normal.   Cardiovascular: Normal rate, regular rhythm and normal heart sounds. Pulmonary/Chest: Decreased breath sounds   abdominal: Soft. Bowel sounds are normal. There is no abdominal tenderness. There is no rebound and no guarding. Musculoskeletal: Normal range of motion. Neurological: vent     XR CHEST PORT   Final Result   No significant interval change. XR CHEST PORT   Final Result      XR CHEST PORT   Final Result   No significant interval change. XR CHEST PORT   Final Result   Persistent bilateral airspace disease and with decreased aeration. XR CHEST PORT   Final Result      XR CHEST PORT   Final Result      XR CHEST PORT   Final Result      XR CHEST PORT   Final Result      XR CHEST PORT   Final Result      WRIST BRACHIAL INDEX AT REST   Final Result      XR CHEST PORT   Final Result   No significant change. XR CHEST PORT   Final Result   No interval change. DUPLEX UPPER EXT ARTERY LEFT   Final Result      XR CHEST PORT   Final Result      XR CHEST PORT   Final Result   Endotracheal tube tip still at the maggie.   Diffuse opacities in the lungs, not significantly changed. XR CHEST PORT   Final Result      XR CHEST PORT   Final Result   Endotracheal tube tip at the maggie. Diffuse opacities in the   lungs, accentuated by lower lung volumes or increased. XR CHEST PORT   Final Result   Diffuse opacities in the lungs, not significantly changed. XR CHEST PORT   Final Result   1. ETT terminating 2 cm above the maggie. Remaining support apparatus as above. 2.  Worsening bilateral airspace disease with developing ARDS not excluded. XR CHEST PORT   Final Result   Moderate patchy diffuse bilateral airspace opacities, waxing and waning from the   prior exam.      XR CHEST PORT   Final Result   Moderate diffuse bilateral airspace opacities, likely a combination of edema and   airspace disease, mildly increased from the previous exam.      CT HEAD WO CONT   Final Result   1. There are milder microvascular changes within the central periventricular   white matter. 2.  There is an area of diminished density within the inferior aspect of the   right cerebellar hemisphere without change (series 201 image number 12). These   findings may represent an older ischemic insult within the right posterior   inferior cerebellar region. 3.  This examination is negative for acute intracranial pathology or short-term   interval changes dating back to 1/17/2022. XR CHEST PORT   Final Result   Patchy mixed asymmetric lung disease appears somewhat better but the   lungs are better inflated. No effusion or pneumothorax. Normal heart and   mediastinum      IR INSERT NON TUNL CVC OVER 5 YRS   Final Result   Ultrasound of the right neck demonstrated patent IJV. Successful US-guided placement of a right internal jugular triple lumen central   venous catheter as described above. The catheter is functioning. PLAN:   Catheter position was confirmed by follow-up chest x-ray: catheter tip in the   lower SVC and ready to use.       [facility] GUIDED VASCULAR ACCESS   Final Result   Ultrasound of the right neck demonstrated patent IJV. Successful US-guided placement of a right internal jugular triple lumen central   venous catheter as described above. The catheter is functioning. PLAN:   Catheter position was confirmed by follow-up chest x-ray: catheter tip in the   lower SVC and ready to use. XR CHEST PORT   Final Result   Findings/impression:      Diffuse interstitial airspace disease/edema. No definite pleural effusion or   pneumothorax. Cardiac contours are partially obscured. No acute osseous abnormality identified.       XR CHEST PORT    (Results Pending)        Recent Results (from the past 24 hour(s))   GLUCOSE, POC    Collection Time: 02/08/22 11:48 AM   Result Value Ref Range    Glucose (POC) 321 (H) 65 - 117 mg/dL    Performed by Ellie Yates    LACTIC ACID    Collection Time: 02/08/22 12:00 PM   Result Value Ref Range    Lactic acid 1.4 0.4 - 2.0 mmol/L   PROCALCITONIN    Collection Time: 02/08/22 12:00 PM   Result Value Ref Range    Procalcitonin 0.06 (H) 0 ng/mL   C REACTIVE PROTEIN, QT    Collection Time: 02/08/22 12:00 PM   Result Value Ref Range    C-Reactive protein 3.11 (H) 0.00 - 0.60 mg/dL   GLUCOSE, POC    Collection Time: 02/08/22  5:49 PM   Result Value Ref Range    Glucose (POC) 360 (H) 65 - 117 mg/dL    Performed by Ellie Yates    GLUCOSE, POC    Collection Time: 02/08/22 11:35 PM   Result Value Ref Range    Glucose (POC) 224 (H) 65 - 117 mg/dL    Performed by Antonia Galvan    BLOOD GAS, ARTERIAL    Collection Time: 02/09/22  4:05 AM   Result Value Ref Range    pH 7.42 7.35 - 7.45      PCO2 45 35 - 45 mmHg    PO2 85 75 - 100 mmHg    O2 SAT 98 >95 %    BICARBONATE 28 (H) 22 - 26 mmol/L    BASE EXCESS 4.3 (H) 0 - 2 mmol/L    O2 METHOD VENT      FIO2 90.0 %    MODE Assist Control/Volume Control      Tidal volume 450      SET RATE 12      EPAP/CPAP/PEEP 5.0      SITE Right Radial      BEN'S TEST PASS GLUCOSE, POC    Collection Time: 02/09/22  4:56 AM   Result Value Ref Range    Glucose (POC) 128 (H) 65 - 117 mg/dL    Performed by Antonia Galvan        Results     ** No results found for the last 336 hours. **           Labs:     Recent Labs     02/08/22  0442 02/07/22  0632   WBC 17.1* 23.9*   HGB 7.3* 8.4*   HCT 22.7* 26.2*   PLT 98* 111*     Recent Labs     02/08/22  0442 02/07/22  0632     138 143   K 4.5  4.5 4.3     103 109*   CO2 30  30 30   BUN 51*  51* 56*   CREA 0.52*  0.48* 0.45*   *  250* 262*   CA 8.7  8.6 9.4   PHOS 2.3*  --      Recent Labs     02/08/22 0442 02/07/22  0632   * 154*   AP 84 100   TBILI 0.4 0.6   TP 4.9* 5.5*   ALB 2.0*  2.0* 2.4*   GLOB 2.9 3.1     No results for input(s): INR, PTP, APTT, INREXT, INREXT in the last 72 hours. No results for input(s): FE, TIBC, PSAT, FERR in the last 72 hours. No results found for: FOL, RBCF   Recent Labs     02/09/22  0405 02/08/22  0430   PH 7.42 7.46*   PCO2 45 43   PO2 85 105*     No results for input(s): CPK, CKNDX, TROIQ in the last 72 hours.     No lab exists for component: CPKMB  No results found for: CHOL, CHOLX, CHLST, CHOLV, HDL, HDLP, LDL, LDLC, DLDLP, TGLX, TRIGL, TRIGP, CHHD, CHHDX  Lab Results   Component Value Date/Time    Glucose (POC) 128 (H) 02/09/2022 04:56 AM    Glucose (POC) 224 (H) 02/08/2022 11:35 PM    Glucose (POC) 360 (H) 02/08/2022 05:49 PM    Glucose (POC) 321 (H) 02/08/2022 11:48 AM    Glucose (POC) 303 (H) 02/06/2022 11:11 PM     Lab Results   Component Value Date/Time    Color Yellow/Straw 01/23/2022 10:30 AM    Appearance Turbid (A) 01/23/2022 10:30 AM    Specific gravity 1.027 01/23/2022 10:30 AM    pH (UA) 6.0 01/23/2022 10:30 AM    Protein 100 (A) 01/23/2022 10:30 AM    Glucose 50 (A) 01/23/2022 10:30 AM    Ketone 5 (A) 01/23/2022 10:30 AM    Bilirubin Negative 01/23/2022 10:30 AM    Urobilinogen 4.0 (H) 01/23/2022 10:30 AM    Nitrites Negative 01/23/2022 10:30 AM    Leukocyte Esterase Negative 01/23/2022 10:30 AM    Bacteria Negative 01/23/2022 10:30 AM    WBC 0-4 01/23/2022 10:30 AM    RBC 0-5 01/23/2022 10:30 AM         Assessment:     Acute hypoxemic respiratory failure on on ventilator 90% of   COVID-19 pneumonitis   hypotension  Thrombocytopenia  Hypertension  Arthritis  High blood sugar secondary to steroid  Hyperkalemia  Hyponatremia  Moderate protein calorie malnutrition  Patient need multiple partial finger amputation once stable as per vascular surgeon      Plan:     Seen by nephrologist discontinue D5W  Recommend free water flushes    Olumiant 2 mg daily  Decadron 4 mg every 6 hours  Meropenem 1 g every 8 hours  Diltiazem 30 mg 3 times  Follow-up with pulmonologist and nephrologist  Start Lantus 10 units subcu day    Monitor sodium and potassium    Overall prognosis  Normal saline 50 cc/h      Patient is DNR    Discussed with the patient son he want to continue present treatment till February 13, he is discussing with the family to make a final decision                    Current Facility-Administered Medications:     insulin glargine (LANTUS) injection 10 Units, 10 Units, SubCUTAneous, DAILY, Polo Lopez MD, 10 Units at 02/09/22 0805    heparin (porcine) injection 5,000 Units, 5,000 Units, SubCUTAneous, Q8H, Ploo Lopez MD, 5,000 Units at 02/09/22 0519    insulin lispro (HUMALOG) injection, , SubCUTAneous, Q6H, Polo Lopez MD, 6 Units at 02/09/22 0003    glucose chewable tablet 16 g, 4 Tablet, Oral, PRN, Dorothea Lopez MD    glucagon (GLUCAGEN) injection 1 mg, 1 mg, IntraMUSCular, PRN, Dorothea Lopez MD    dextrose 10% infusion 125-250 mL, 125-250 mL, IntraVENous, PRN, Dorothea Lopez MD    dexamethasone (DECADRON) 4 mg/mL injection 4 mg, 4 mg, IntraVENous, Q24H, Karan Flower MD, 4 mg at 02/09/22 0805    0.9% sodium chloride infusion 250 mL, 250 mL, IntraVENous, PRN, Karan Flower MD    0.9% sodium chloride infusion 250 mL, 250 mL, IntraVENous, PRN, Karan Flower MD    dilTIAZem IR (CARDIZEM) tablet 30 mg, 30 mg, Oral, TID, Karan Flower MD, 30 mg at 02/09/22 0805    fentaNYL (PF) 1,500 mcg/30 mL (50 mcg/mL) infusion, 0-200 mcg/hr, IntraVENous, TITRATE, Kevin Flower MD, Last Rate: 1 mL/hr at 02/05/22 1815, 50 mcg/hr at 02/05/22 1815    hydrOXYzine pamoate (VISTARIL) capsule 25 mg, 25 mg, Oral, Q4H PRN, Polo Lopez MD    propofol (DIPRIVAN) 10 mg/mL infusion, 0-50 mcg/kg/min, IntraVENous, TITRATE, Kevin Flower MD, Last Rate: 21.8 mL/hr at 02/09/22 1041, 40 mcg/kg/min at 02/09/22 1041    dexmedeTOMidine (PRECEDEX) 400 mcg in 0.9% sodium chloride (MBP/ADV) 100 mL MBP, 0.1-1.5 mcg/kg/hr, IntraVENous, TITRATE, Kevin Flower MD, Last Rate: 11.3 mL/hr at 02/09/22 0526, 0.5 mcg/kg/hr at 02/09/22 0526    NOREPINephrine (LEVOPHED) 8 mg in 0.9% NS 250ml infusion, 0.5-16 mcg/min, IntraVENous, TITRATE, Rosalia Lopez MD, Stopped at 02/05/22 4695    acetaminophen (TYLENOL) tablet 650 mg, 650 mg, Oral, Q6H PRN **OR** acetaminophen (TYLENOL) suppository 650 mg, 650 mg, Rectal, Q6H PRN, Rosalia Lopez MD    polyethylene glycol (MIRALAX) packet 17 g, 17 g, Oral, DAILY PRN, Rosalia Lopez MD    ondansetron (ZOFRAN ODT) tablet 4 mg, 4 mg, Oral, Q8H PRN **OR** [DISCONTINUED] ondansetron (ZOFRAN) injection 4 mg, 4 mg, IntraVENous, Q6H PRN, Polo Lopez MD    baricitinib (OLUMIANT) tablet 2 mg, 2 mg, Oral, DAILY, Polo Lopez MD, 2 mg at 02/09/22 0805    albuterol (PROVENTIL HFA, VENTOLIN HFA, PROAIR HFA) inhaler 2 Puff, 2 Puff, Inhalation, Q6H PRN, Polo Lopez MD    polyethylene glycol (MIRALAX) packet 17 g, 17 g, Oral, DAILY PRN, Polo Lopez MD, 17 g at 01/28/22 0844    [DISCONTINUED] ondansetron (ZOFRAN ODT) tablet 4 mg, 4 mg, Oral, Q8H PRN **OR** ondansetron (ZOFRAN) injection 4 mg, 4 mg, IntraVENous, Q6H PRN, Polo Lopez MD    meropenem (MERREM) 1 g in 0.9% sodium chloride 20 mL IV syringe, 1 g, IntraVENous, Q8H, Polo Lopez MD, 1 g at 02/09/22 0804    hydrOXYzine (VISTARIL) injection 25 mg, 25 mg, IntraMUSCular, Q6H PRN, Polo Lopez MD, 25 mg at 01/22/22 0111

## 2022-02-09 NOTE — PROGRESS NOTES
Clinical chart reviewed by CM. Patient's son would like for treatment to be continued. Per attending physician's note, son is discussing options with other family members, and will be making a final decision on February 13th. CM will continue to follow.

## 2022-02-10 NOTE — PROGRESS NOTES
CM reviewed chart. Patient's son continues to seek treatment for patient. Per attending's notes, family will be coming together to make a final decision about her future treatment on February 13th. CM will continue to follow.

## 2022-02-10 NOTE — PROGRESS NOTES
Comprehensive Nutrition Assessment    Type and Reason for Visit: Reassess (goal)    Nutrition Recommendations/Plan:  Initiate TF of Nepro at 12ml/hr  Flush 100ml q4hrs  Provide 2pkt prosource TID +1 additional (7pkt/d; 420kcals, 105g pro)  Provides 938kcals (74%), 128g protein (94%), and 809ml water (54%)        Propofol 25mcg/kg/min providing 359kcals/d (102%)     Please document TF initiation, rate, flushes, prosource provision, and GRVs    Nutrition Assessment:  Admitted for hypoxia, hypotension, intubated (1/23). +COVID-19. Noted recent re-admit, inpatient x5d with COVID-19 but asymptomatic. (1/24) TF ordered by JOSE WILLIAMSON adjusted. TF ran w/o issue until 2/3 when hbg drop brought concerns for GIB, RD rec'd to hold TF but appears TF continued. Pt tolerating well, GRV low and pt having non-bloody BMs. Will adjust TF d/t changes in sedation. [address] conversation continues. Labs: H/H 8.2/24.7, BUN 44, Cr 0.32, BG , AST 51, ALT 83, Alb 1.8. Meds: Baricitinib, dexamethasone, precedex, fentanyl, lantus, SSI, levophed, Propofol at 25mcg/kg/min. Malnutrition Assessment:  Malnutrition Status:  No malnutrition        Estimated Daily Nutrient Needs:  Energy (kcal): 1273kcals (14kcals/kg per [facility]); Weight Used for Energy Requirements: Admission (90.9kg EDW/admit wt)  Protein (g): 136kcals (2.0g/kg per [facility]); Weight Used for Protein Requirements: Ideal (Geriatric IBW using BMI 25 is 68.1kg)  Fluid (ml/day): 1500ml; Method Used for Fluid Requirements: Other (comment) (adult minimum)      Nutrition Related Findings:  Unable to perform NFPE d/t COVID-19 precautions. No hx dysphagia. Last BM 2/8, green. Non-pitting generalized edema and to x4 extremities.        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 Anthropometric Measures:  · Height:  5' 5\" (165.1 cm)  · Current Body Wt:  106.6 kg (235 lb 0.2 oz) (2/9)   · Admission Body Wt:  199 lb 15.3 oz (1/12)    · Usual Body Wt:   (stefan)     · Ideal Body Wt:  125 lbs:  188 %   · Adjusted Body Weight:   ; Weight Adjustment for:  (90.9kg EDW/admit wt)   · Adjusted BMI:       · BMI Category:  Obese class 2 (BMI 35.0-39. 9)       Nutrition Diagnosis:   · Inadequate oral intake related to impaired respiratory function as evidenced by intubation,nutrition support-enteral nutrition      Nutrition Interventions:   Food and/or Nutrient Delivery: Modify tube feeding  Nutrition Education and Counseling: No recommendations at this time,Education not appropriate  Coordination of Nutrition Care: Continue to monitor while inpatient    Goals:  Intakes >/=75% of EENs in >5 days, Wt maintenance within +/-0.5kg in >5 days       Nutrition Monitoring and Evaluation:   Behavioral-Environmental Outcomes: None identified  Food/Nutrient Intake Outcomes: Enteral nutrition intake/tolerance  Physical Signs/Symptoms Outcomes: Weight,Hemodynamic status,Fluid status or edema,Biochemical data    Discharge Planning:     Too soon to determine     Electronically signed by Leonarda Mckeon on 2/10/2022 at 5:53 PM    Contact: Ext [phone], or via [employer]

## 2022-02-10 NOTE — PROGRESS NOTES
IMPRESSION:   1. Acute hypoxic respiratory  2. Acute on chronic hypercapnic respiratory failure   3. COVID-19 pneumonia  4. Pulmonary edema  5. Severe Anemia hemoglobin dropped to 4 recieved blood transfusion   6. Sepsis with septic shock off pressors  7. Left hand ischemia  8. Arthritis hypertension by hx  9. Hyperkalemia corrected  10. Hyponatremia resolved      RECOMMENDATIONS/PLAN:   1. ICU monitoring  1. Patient condition got worse on 1/24 went to asystole subsequently got intubated now on ventilator assist control mode sedated with propofol and fentanyl Precedex was discontinued because of bradycardia arterial blood gases acceptable currently on A/C 12  PEEP 5 FiO2  90% PO2 PCO2 much improved, still not able to wean  2. Patient is on dexamethasone and baricitinib  3. Significant drop in hemoglobin recieved 3 unit packed RBC hemoglobin improved  4. Patient is back on Levophed hemodynamically unstable will continue to wean  5. Leukocytosis normal hemoglobin improved  6. Elevated blood sugar at steroid was decreased blood sugar trending down patient on sliding scale  7. Potassium corrected  8. Left hand NATHALIE normal continue local wound care   9. Pulmonary edema received Lasix chest x-ray shows bilateral infiltrate congestive changes  10. Left hand ischemia off IV heparin left ulnar artery occlusion radial  patent NATHALIE normal  11. Troponin is elevated  12. Procalcitonin 0.06 C-reactive 3.1 lactic acid 1.4  13.  She is on meropenem and finished Zithromax will discontinue meropenem     [x] High complexity decision making was performed  [x] See my orders for details  HPI  68-year-old lady came in because of shortness of breath and dyspnea she has significant past medical history of arthritis hypertension she was recently discharged from the hospital came back with worsening of hypoxia she was discharged on oxygen 2 L nasal cannula and patient condition got worse she was supposed to be discharged to skilled care but patient's son took her home where her condition got worse and she was transferred back to the hospital now she is on noninvasive ventilator BiPAP machine hemodynamically unstable hypotensive on vasopressors and not giving much history. PMH:  has no past medical history on file. PSH:   has a past surgical history that includes ir insert non tunl cvc over 5 yrs (1/21/2022). FHX: family history is not on file.      SHX:      ALL:   Allergies   Allergen Reactions    Penicillins Hives        MEDS:   [x] Reviewed - As Below   [] Not reviewed    Current Facility-Administered Medications   Medication    insulin glargine (LANTUS) injection 10 Units    heparin (porcine) injection 5,000 Units    insulin lispro (HUMALOG) injection    glucose chewable tablet 16 g    glucagon (GLUCAGEN) injection 1 mg    dextrose 10% infusion 125-250 mL    dexamethasone (DECADRON) 4 mg/mL injection 4 mg    0.9% sodium chloride infusion 250 mL    0.9% sodium chloride infusion 250 mL    dilTIAZem IR (CARDIZEM) tablet 30 mg    fentaNYL (PF) 1,500 mcg/30 mL (50 mcg/mL) infusion    hydrOXYzine pamoate (VISTARIL) capsule 25 mg    propofol (DIPRIVAN) 10 mg/mL infusion    dexmedeTOMidine (PRECEDEX) 400 mcg in 0.9% sodium chloride (MBP/ADV) 100 mL MBP    NOREPINephrine (LEVOPHED) 8 mg in 0.9% NS 250ml infusion    acetaminophen (TYLENOL) tablet 650 mg    Or    acetaminophen (TYLENOL) suppository 650 mg    polyethylene glycol (MIRALAX) packet 17 g    ondansetron (ZOFRAN ODT) tablet 4 mg    baricitinib (OLUMIANT) tablet 2 mg    albuterol (PROVENTIL HFA, VENTOLIN HFA, PROAIR HFA) inhaler 2 Puff    polyethylene glycol (MIRALAX) packet 17 g    ondansetron (ZOFRAN) injection 4 mg    meropenem (MERREM) 1 g in 0.9% sodium chloride 20 mL IV syringe    hydrOXYzine (VISTARIL) injection 25 mg      MAR reviewed and pertinent medications noted or modified as needed   Current Facility-Administered Medications   Medication    insulin glargine (LANTUS) injection 10 Units    heparin (porcine) injection 5,000 Units    insulin lispro (HUMALOG) injection    glucose chewable tablet 16 g    glucagon (GLUCAGEN) injection 1 mg    dextrose 10% infusion 125-250 mL    dexamethasone (DECADRON) 4 mg/mL injection 4 mg    0.9% sodium chloride infusion 250 mL    0.9% sodium chloride infusion 250 mL    dilTIAZem IR (CARDIZEM) tablet 30 mg    fentaNYL (PF) 1,500 mcg/30 mL (50 mcg/mL) infusion    hydrOXYzine pamoate (VISTARIL) capsule 25 mg    propofol (DIPRIVAN) 10 mg/mL infusion    dexmedeTOMidine (PRECEDEX) 400 mcg in 0.9% sodium chloride (MBP/ADV) 100 mL MBP    NOREPINephrine (LEVOPHED) 8 mg in 0.9% NS 250ml infusion    acetaminophen (TYLENOL) tablet 650 mg    Or    acetaminophen (TYLENOL) suppository 650 mg    polyethylene glycol (MIRALAX) packet 17 g    ondansetron (ZOFRAN ODT) tablet 4 mg    baricitinib (OLUMIANT) tablet 2 mg    albuterol (PROVENTIL HFA, VENTOLIN HFA, PROAIR HFA) inhaler 2 Puff    polyethylene glycol (MIRALAX) packet 17 g    ondansetron (ZOFRAN) injection 4 mg    meropenem (MERREM) 1 g in 0.9% sodium chloride 20 mL IV syringe    hydrOXYzine (VISTARIL) injection 25 mg      PMH:  has no past medical history on file. PSH:   has a past surgical history that includes ir insert non tunl cvc over 5 yrs (1/21/2022). FHX: family history is not on file. SHX:       ROS:  Unable to obtain sedated on ventilator    Hemodynamics:    CO:    CI:    CVP:    SVR:   PAP Systolic:    PAP Diastolic:    PVR:    DU52:        Ventilator Settings:      Mode Rate TV Press PEEP FiO2 PIP Min.  Vent   Assist control,Pressure control    450 ml    5 cm H20 90 %  32 cm H2O  7.6 l/min        Vital Signs: Telemetry:    normal sinus rhythm Intake/Output:   Visit Vitals  /65 (BP 1 Location: Right lower arm, BP Patient Position: At rest)   Pulse 79   Temp 98.1 °F (36.7 °C)   Resp 25   Ht 5' 5\" (1.651 m)   Wt 106.6 kg (235 lb 0.2 oz) SpO2 98%   BMI 39.11 kg/m²       Temp (24hrs), Av.1 °F (36.7 °C), Min:97.9 °F (36.6 °C), Max:98.3 °F (36.8 °C)        O2 Device: Ventilator         Wt Readings from Last 4 Encounters:   22 106.6 kg (235 lb 0.2 oz)   22 90.7 kg (200 lb)          Intake/Output Summary (Last 24 hours) at 2/10/2022 0827  Last data filed at 2/10/2022 0600  Gross per 24 hour   Intake 1572.59 ml   Output 1775 ml   Net -202.41 ml       Last shift:      No intake/output data recorded. Last 3 shifts:  1901 - 02/10 0700  In: 2553 [I.V.:1379]  Out: 3125 [Urine:3125]       Physical Exam:     General: Intubated on ventilator unresponsive on propofol fentanyl  HEENT: NCAT,   Eyes: anicteric; conjunctiva clear no doll's eye reflex  Neck: no nodes, , trach midline; no accessory MM use.   Questionable neck vein distention  Chest: no deformity,   Cardiac: R regular; no murmur;   Lungs: Diffuse wheezes and rales  Abd: soft, NT, hypoactive BS  Ext: Edema of the leg edema; left hand finger blackish discoloration  : clear urine  Neuro: Sedated unresponsive on the ventilator no doll's eye reflex flaccid extremities  Psych-unable to assess  Skin: warm, dry, no cyanosis;   Pulses: Brachial radial pulses intact  Capillary: Normal capillary refill      DATA:    MAR reviewed and pertinent medications noted or modified as needed  MEDS:   Current Facility-Administered Medications   Medication    insulin glargine (LANTUS) injection 10 Units    heparin (porcine) injection 5,000 Units    insulin lispro (HUMALOG) injection    glucose chewable tablet 16 g    glucagon (GLUCAGEN) injection 1 mg    dextrose 10% infusion 125-250 mL    dexamethasone (DECADRON) 4 mg/mL injection 4 mg    0.9% sodium chloride infusion 250 mL    0.9% sodium chloride infusion 250 mL    dilTIAZem IR (CARDIZEM) tablet 30 mg    fentaNYL (PF) 1,500 mcg/30 mL (50 mcg/mL) infusion    hydrOXYzine pamoate (VISTARIL) capsule 25 mg    propofol (DIPRIVAN) 10 mg/mL infusion    dexmedeTOMidine (PRECEDEX) 400 mcg in 0.9% sodium chloride (MBP/ADV) 100 mL MBP    NOREPINephrine (LEVOPHED) 8 mg in 0.9% NS 250ml infusion    acetaminophen (TYLENOL) tablet 650 mg    Or    acetaminophen (TYLENOL) suppository 650 mg    polyethylene glycol (MIRALAX) packet 17 g    ondansetron (ZOFRAN ODT) tablet 4 mg    baricitinib (OLUMIANT) tablet 2 mg    albuterol (PROVENTIL HFA, VENTOLIN HFA, PROAIR HFA) inhaler 2 Puff    polyethylene glycol (MIRALAX) packet 17 g    ondansetron (ZOFRAN) injection 4 mg    meropenem (MERREM) 1 g in 0.9% sodium chloride 20 mL IV syringe    hydrOXYzine (VISTARIL) injection 25 mg        Labs:    Recent Labs     02/10/22  0400 02/08/22  0442   WBC 9.1 17.1*   HGB 8.2* 7.3*   * 98*     Recent Labs     02/10/22  0400 02/08/22  1200 02/08/22  0442     --  138  138   K 4.0  --  4.5  4.5     --  103  103   CO2 31  --  30  30   *  --  244*  250*   BUN 44*  --  51*  51*   CREA 0.32*  --  0.52*  0.48*   CA 9.0  --  8.7  8.6   PHOS  --   --  2.3*   LAC  --  1.4  --    ALB 1.8*  --  2.0*  2.0*   ALT 83*  --  107*     Recent Labs     02/10/22  0310 02/09/22  0405 02/08/22  0430   PH 7.46* 7.42 7.46*   PCO2 44 45 43   PO2 61* 85 105*   HCO3 30* 28* 30*   FIO2 90.0 90.0 85.0   1/30 AC 12  PEEP 5 FiO2 100%    Lab Results   Component Value Date/Time    Culture result: No growth 6 days 01/21/2022 07:55 AM    Culture result: No growth 6 days 01/18/2022 07:12 AM    Culture result: (A) 01/17/2022 07:12 AM     Staphylococcus species, coagulase negative growing in 1 of 4 bottles drawn NO SITE INDICATED    Culture result:  01/17/2022 07:12 AM     (NOTE) [facility] GROWING IN 1 OF 2 BOTTLES CALLED TO JOSE MIGUEL PAGAN AT 0831 ON 1/19/22.  JF     No results found for: TSH, TSHEXT, TSHEXT     Imaging:    Results from Hospital Encounter encounter on 01/21/22    XR CHEST PORT    Narrative  EXAM: XR CHEST PORT    INDICATION: chf    COMPARISON: September 8, 2020    FINDINGS: No significant interval change of support hardware. Redemonstration of bilateral diffuse opacities, more on the right. Unchanged  cardiomediastinal contours. No sizable pleural perfusion. No pneumothorax. Impression  No significant interval change. Results from [place] encounter on 01/21/22    CT HEAD WO CONT    Narrative  The study is a noncontrasted head CT examination dated 1/21/2022. HISTORY: Unresponsive. TECHNIQUE: Thin section axial imaging was performed followed by sagittal and  coronal reconstructed imaging. Dose Reduction Technique was employed to reduce radiation exposure - This  includes reduction optimization techniques as appropriate to a performed exam  with automated exposure control adjustments of the mA and/or Kv according to  patient size, or use of iterative reconstruction technique. COMPARISON: CT head dated 1/17/2022. There is an appropriate size to the ventricles, the deep cortical sulci, and the  sylvian fissures for the patient's age. This examination is negative for  intracranial hemorrhage, mass effect or an extra axial collection. The  gray-white matter junctions are preserved without cytotoxic or vasogenic edema. An assessment of the white matter tracts demonstrates a mild decrease in the  density characteristics of the central periventricular white matter. These  findings are consistent with expected aging changes and milder microvascular  disease. There also is a region of diminished density within the right posterior  inferior cerebellar hemisphere which may represent an older cerebrovascular  insult. ORBITS: This examination is negative for acute orbital pathology. PARANASAL SINUSES: The paranasal sinuses are well pneumatized without acute  sinus disease. There is a decreased number of right sided mastoid air cells which can be  associated with chronic mastoiditis.  The craniocervical junction images in a  normal fashion. Impression  1. There are milder microvascular changes within the central periventricular  white matter. 2.  There is an area of diminished density within the inferior aspect of the  right cerebellar hemisphere without change (series 201 image number 12). These  findings may represent an older ischemic insult within the right posterior  inferior cerebellar region. 3.  This examination is negative for acute intracranial pathology or short-term  interval changes dating back to 1/17/2022. 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 Chest x-ray looks like pulmonary edema.   She has peripheral edema we will give Lasix today discontinue D5W continue tube feedings we will check echocardiogram  · 1/31 off pressors remain intubated   · 2/1 remain intubated sedated on ventilator will do sedation vacation in a.m. still on 85%  · 2/2 on ventilator sedated elevated PCO2  · 2/3 remain intubated off pressors significant drop in hemoglobin and increasing white count we will repeat CBC and work accordingly  · 2/4 patient is back on Levophed hemodynamically unstable still on ventilator 85% FiO2  · 2/5 remain on ventilator condition unstable  · 2/6 condition unstable sedated on ventilator  · 2/7 we will change vent settings  · 2/10 intubated on ventilator on 90% FiO2  · Time of care 30-minute

## 2022-02-10 NOTE — PROGRESS NOTES
General Daily Progress Note          Patient Name:   Candi Kelley       YOB: 1940       Age:  80 y.o.       Admit Date: 1/21/2022      Subjective:     Patient is a 80y.o. year old female with signal past medical history of hypertension arthritis who discharged from the hospital yesterday in stable condition patient is admitted last admission with COVID-19 patient was asymptomatic all this time while in the hospital patient discharged on 2 L oxygen to skilled care but patient son want to go home with home health but is at home patient's saturations drops sent back to the ER seen by the ER physician patient placed on BiPAP patient was little hypotensive started on Levophed admitted for further work-up and treatment           Patient is DNR    Patient on ventilator 90% O2    On propofol and Precedex drip        NG tube in place for medication and feeding      Objective:     Visit Vitals  /62 (BP 1 Location: Right lower arm, BP Patient Position: At rest)   Pulse 88   Temp 98.3 °F (36.8 °C)   Resp 25   Ht 5' 5\" (1.651 m)   Wt 106.6 kg (235 lb 0.2 oz)   SpO2 98%   BMI 39.11 kg/m²        Recent Results (from the past 24 hour(s))   GLUCOSE, POC    Collection Time: 02/09/22  6:14 PM   Result Value Ref Range    Glucose (POC) 170 (H) 65 - 117 mg/dL    Performed by KAYLIE WILLINGHAM    GLUCOSE, POC    Collection Time: 02/09/22 11:59 PM   Result Value Ref Range    Glucose (POC) 141 (H) 65 - 117 mg/dL    Performed by Jose Armando Chavez    BLOOD GAS, ARTERIAL    Collection Time: 02/10/22  3:10 AM   Result Value Ref Range    pH 7.46 (H) 7.35 - 7.45      PCO2 44 35 - 45 mmHg    PO2 61 (L) 75 - 100 mmHg    O2 SAT 94 (L) >95 %    BICARBONATE 30 (H) 22 - 26 mmol/L    BASE EXCESS 6.3 (H) 0 - 2 mmol/L    O2 METHOD VENT      FIO2 90.0 %    MODE AssistControl/Pressure Control      SET RATE 12      IPAP/PIP 25      EPAP/CPAP/PEEP 5.0      SITE Right Radial      BEN'S TEST PASS     CBC WITH AUTOMATED DIFF    Collection Time: 02/10/22  4:00 AM   Result Value Ref Range    WBC 9.1 3.6 - 11.0 K/uL    RBC 2.54 (L) 3.80 - 5.20 M/uL    HGB 8.2 (L) 11.5 - 16.0 g/dL    HCT 24.7 (L) 35.0 - 47.0 %    MCV 97.2 80.0 - 99.0 FL    MCH 32.3 26.0 - 34.0 PG    MCHC 33.2 30.0 - 36.5 g/dL    RDW 14.8 (H) 11.5 - 14.5 %    PLATELET 824 (L) 260 - 400 K/uL    MPV 11.6 8.9 - 12.9 FL    NRBC 0.0 0.0  WBC    ABSOLUTE NRBC 0.00 0.00 - 0.01 K/uL    NEUTROPHILS 84 (H) 32 - 75 %    LYMPHOCYTES 9 (L) 12 - 49 %    MONOCYTES 3 (L) 5 - 13 %    EOSINOPHILS 4 0 - 7 %    BASOPHILS 0 0 - 1 %    IMMATURE GRANULOCYTES 0 0 - 0.5 %    ABS. NEUTROPHILS 7.6 1.8 - 8.0 K/UL    ABS. LYMPHOCYTES 0.8 0.8 - 3.5 K/UL    ABS. MONOCYTES 0.3 0.0 - 1.0 K/UL    ABS. EOSINOPHILS 0.4 0.0 - 0.4 K/UL    ABS. BASOPHILS 0.0 0.0 - 0.1 K/UL    ABS. IMM. GRANS. 0.0 0.00 - 0.04 K/UL    DF AUTOMATED     METABOLIC PANEL, COMPREHENSIVE    Collection Time: 02/10/22  4:00 AM   Result Value Ref Range    Sodium 138 136 - 145 mmol/L    Potassium 4.0 3.5 - 5.1 mmol/L    Chloride 104 97 - 108 mmol/L    CO2 31 21 - 32 mmol/L    Anion gap 3 (L) 5 - 15 mmol/L    Glucose 123 (H) 65 - 100 mg/dL    BUN 44 (H) 6 - 20 mg/dL    Creatinine 0.32 (L) 0.55 - 1.02 mg/dL    BUN/Creatinine ratio 138 (H) 12 - 20      GFR est AA >60 >60 ml/min/1.73m2    GFR est non-AA >60 >60 ml/min/1.73m2    Calcium 9.0 8.5 - 10.1 mg/dL    Bilirubin, total 0.5 0.2 - 1.0 mg/dL    AST (SGOT) 51 (H) 15 - 37 U/L    ALT (SGPT) 83 (H) 12 - 78 U/L    Alk.  phosphatase 72 45 - 117 U/L    Protein, total 5.2 (L) 6.4 - 8.2 g/dL    Albumin 1.8 (L) 3.5 - 5.0 g/dL    Globulin 3.4 2.0 - 4.0 g/dL    A-G Ratio 0.5 (L) 1.1 - 2.2     GLUCOSE, POC    Collection Time: 02/10/22  5:25 AM   Result Value Ref Range    Glucose (POC) 96 65 - 117 mg/dL    Performed by Madi Gómez    COVID-19 RAPID TEST    Collection Time: 02/10/22  9:23 AM   Result Value Ref Range    Specimen source Please find results under separate order      COVID-19 rapid test DETECTED (A) Not Detected     GLUCOSE, POC    Collection Time: 02/10/22 11:38 AM   Result Value Ref Range    Glucose (POC) 150 (H) 65 - 117 mg/dL    Performed by Marybeth Marie      [unfilled]      Review of Systems    Unable to obtain. Physical Exam:      Constitutional: Awake on vent   HENT:   Head: Normocephalic and atraumatic. Eyes: Pupils are equal, round, and reactive to light. EOM are normal.   Cardiovascular: Normal rate, regular rhythm and normal heart sounds. Pulmonary/Chest: Decreased breath sounds   abdominal: Soft. Bowel sounds are normal. There is no abdominal tenderness. There is no rebound and no guarding. Musculoskeletal: Normal range of motion. Neurological: vent     XR CHEST PORT   Final Result   Findings/impression:   1. Limitations: Rotated and angulated. 2.  Medical devices: Right internal jugular venous catheter, endotracheal tube   and NG tube in stable. 3.  Ongoing moderate bilateral edema similar to prior exam.   4.  Ongoing collapse/consolidation in the left lower lobe concerning for   underlying airspace disease. 5.  The cardiac and mediastinal contours are stable though near completely   obscured by adjacent airspace disease. XR CHEST PORT   Final Result   No significant interval change. XR CHEST PORT   Final Result      XR CHEST PORT   Final Result   No significant interval change. XR CHEST PORT   Final Result   Persistent bilateral airspace disease and with decreased aeration. XR CHEST PORT   Final Result      XR CHEST PORT   Final Result      XR CHEST PORT   Final Result      XR CHEST PORT   Final Result      XR CHEST PORT   Final Result      WRIST BRACHIAL INDEX AT REST   Final Result      XR CHEST PORT   Final Result   No significant change. XR CHEST PORT   Final Result   No interval change.       DUPLEX UPPER EXT ARTERY LEFT   Final Result      XR CHEST PORT   Final Result      XR CHEST PORT   Final Result   Endotracheal tube tip still at the maggie. Diffuse opacities in the   lungs, not significantly changed. XR CHEST PORT   Final Result      XR CHEST PORT   Final Result   Endotracheal tube tip at the maggie. Diffuse opacities in the   lungs, accentuated by lower lung volumes or increased. XR CHEST PORT   Final Result   Diffuse opacities in the lungs, not significantly changed. XR CHEST PORT   Final Result   1. ETT terminating 2 cm above the maggie. Remaining support apparatus as above. 2.  Worsening bilateral airspace disease with developing ARDS not excluded. XR CHEST PORT   Final Result   Moderate patchy diffuse bilateral airspace opacities, waxing and waning from the   prior exam.      XR CHEST PORT   Final Result   Moderate diffuse bilateral airspace opacities, likely a combination of edema and   airspace disease, mildly increased from the previous exam.      CT HEAD WO CONT   Final Result   1. There are milder microvascular changes within the central periventricular   white matter. 2.  There is an area of diminished density within the inferior aspect of the   right cerebellar hemisphere without change (series 201 image number 12). These   findings may represent an older ischemic insult within the right posterior   inferior cerebellar region. 3.  This examination is negative for acute intracranial pathology or short-term   interval changes dating back to 1/17/2022. XR CHEST PORT   Final Result   Patchy mixed asymmetric lung disease appears somewhat better but the   lungs are better inflated. No effusion or pneumothorax. Normal heart and   mediastinum      IR INSERT NON TUNL CVC OVER 5 YRS   Final Result   Ultrasound of the right neck demonstrated patent IJV. Successful US-guided placement of a right internal jugular triple lumen central   venous catheter as described above. The catheter is functioning.       PLAN:   Catheter position was confirmed by follow-up chest x-ray: catheter tip in the   lower SVC and ready to use. IR US GUIDED VASCULAR ACCESS   Final Result   Ultrasound of the right neck demonstrated patent IJV. Successful US-guided placement of a right internal jugular triple lumen central   venous catheter as described above. The catheter is functioning. PLAN:   Catheter position was confirmed by follow-up chest x-ray: catheter tip in the   lower SVC and ready to use. XR CHEST PORT   Final Result   Findings/impression:      Diffuse interstitial airspace disease/edema. No definite pleural effusion or   pneumothorax. Cardiac contours are partially obscured. No acute osseous abnormality identified.       XR CHEST PORT    (Results Pending)        Recent Results (from the past 24 hour(s))   GLUCOSE, POC    Collection Time: 02/09/22  6:14 PM   Result Value Ref Range    Glucose (POC) 170 (H) 65 - 117 mg/dL    Performed by KAYLIE WILLINGHAM    GLUCOSE, POC    Collection Time: 02/09/22 11:59 PM   Result Value Ref Range    Glucose (POC) 141 (H) 65 - 117 mg/dL    Performed by Kanchan Raza    BLOOD GAS, ARTERIAL    Collection Time: 02/10/22  3:10 AM   Result Value Ref Range    pH 7.46 (H) 7.35 - 7.45      PCO2 44 35 - 45 mmHg    PO2 61 (L) 75 - 100 mmHg    O2 SAT 94 (L) >95 %    BICARBONATE 30 (H) 22 - 26 mmol/L    BASE EXCESS 6.3 (H) 0 - 2 mmol/L    O2 METHOD VENT      FIO2 90.0 %    MODE AssistControl/Pressure Control      SET RATE 12      IPAP/PIP 25      EPAP/CPAP/PEEP 5.0      SITE Right Radial      BEN'S TEST PASS     CBC WITH AUTOMATED DIFF    Collection Time: 02/10/22  4:00 AM   Result Value Ref Range    WBC 9.1 3.6 - 11.0 K/uL    RBC 2.54 (L) 3.80 - 5.20 M/uL    HGB 8.2 (L) 11.5 - 16.0 g/dL    HCT 24.7 (L) 35.0 - 47.0 %    MCV 97.2 80.0 - 99.0 FL    MCH 32.3 26.0 - 34.0 PG    MCHC 33.2 30.0 - 36.5 g/dL    RDW 14.8 (H) 11.5 - 14.5 %    PLATELET 103 (L) 528 - 400 K/uL    MPV 11.6 8.9 - 12.9 FL    NRBC 0.0 0.0  WBC    ABSOLUTE NRBC 0.00 0.00 - 0.01 K/uL NEUTROPHILS 84 (H) 32 - 75 %    LYMPHOCYTES 9 (L) 12 - 49 %    MONOCYTES 3 (L) 5 - 13 %    EOSINOPHILS 4 0 - 7 %    BASOPHILS 0 0 - 1 %    IMMATURE GRANULOCYTES 0 0 - 0.5 %    ABS. NEUTROPHILS 7.6 1.8 - 8.0 K/UL    ABS. LYMPHOCYTES 0.8 0.8 - 3.5 K/UL    ABS. MONOCYTES 0.3 0.0 - 1.0 K/UL    ABS. EOSINOPHILS 0.4 0.0 - 0.4 K/UL    ABS. BASOPHILS 0.0 0.0 - 0.1 K/UL    ABS. IMM. GRANS. 0.0 0.00 - 0.04 K/UL    DF AUTOMATED     METABOLIC PANEL, COMPREHENSIVE    Collection Time: 02/10/22  4:00 AM   Result Value Ref Range    Sodium 138 136 - 145 mmol/L    Potassium 4.0 3.5 - 5.1 mmol/L    Chloride 104 97 - 108 mmol/L    CO2 31 21 - 32 mmol/L    Anion gap 3 (L) 5 - 15 mmol/L    Glucose 123 (H) 65 - 100 mg/dL    BUN 44 (H) 6 - 20 mg/dL    Creatinine 0.32 (L) 0.55 - 1.02 mg/dL    BUN/Creatinine ratio 138 (H) 12 - 20      GFR est AA >60 >60 ml/min/1.73m2    GFR est non-AA >60 >60 ml/min/1.73m2    Calcium 9.0 8.5 - 10.1 mg/dL    Bilirubin, total 0.5 0.2 - 1.0 mg/dL    AST (SGOT) 51 (H) 15 - 37 U/L    ALT (SGPT) 83 (H) 12 - 78 U/L    Alk.  phosphatase 72 45 - 117 U/L    Protein, total 5.2 (L) 6.4 - 8.2 g/dL    Albumin 1.8 (L) 3.5 - 5.0 g/dL    Globulin 3.4 2.0 - 4.0 g/dL    A-G Ratio 0.5 (L) 1.1 - 2.2     GLUCOSE, POC    Collection Time: 02/10/22  5:25 AM   Result Value Ref Range    Glucose (POC) 96 65 - 117 mg/dL    Performed by Samm Melton    COVID-19 RAPID TEST    Collection Time: 02/10/22  9:23 AM   Result Value Ref Range    Specimen source Please find results under separate order      COVID-19 rapid test DETECTED (A) Not Detected     GLUCOSE, POC    Collection Time: 02/10/22 11:38 AM   Result Value Ref Range    Glucose (POC) 150 (H) 65 - 117 mg/dL    Performed by [facility]        Results     Procedure Component Value Units Date/Time    COVID-19 RAPID TEST [[id number]]  (Abnormal) Collected: 02/10/22 0923    Order Status: Completed Specimen: Nasopharyngeal Updated: 02/10/22 0957     Specimen source       Please find results under separate order           COVID-19 rapid test DETECTED        Comment: Rapid [employer] ID Now   The specimen is POSITIVE for SARS-CoV-2, the novel coronavirus associated with COVID-19. This test has been authorized by the FDA under an Emergency Use Authorization (EUA) for use by authorized laboratories. Fact sheet for Healthcare Providers: ConventionCir[employer] Energydate.co.nz Fact sheet for Patients: Probitydate.co.nz   Methodology: Isothermal Nucleic Acid Amplification Results verified, phoned to and read back by Jorie Simmonds RN AT   [id number] BY                 Labs:     Recent Labs     02/10/22  0400 02/08/22  0442   WBC 9.1 17.1*   HGB 8.2* 7.3*   HCT 24.7* 22.7*   * 98*     Recent Labs     02/10/22  0400 02/08/22  0442    138  138   K 4.0 4.5  4.5    103  103   CO2 31 30  30   BUN 44* 51*  51*   CREA 0.32* 0.52*  0.48*   * 244*  250*   CA 9.0 8.7  8.6   PHOS  --  2.3*     Recent Labs     02/10/22  0400 02/08/22  0442   ALT 83* 107*   AP 72 84   TBILI 0.5 0.4   TP 5.2* 4.9*   ALB 1.8* 2.0*  2.0*   GLOB 3.4 2.9     No results for input(s): INR, PTP, APTT, INREXT, INREXT in the last 72 hours. No results for input(s): FE, TIBC, PSAT, FERR in the last 72 hours. No results found for: FOL, RBCF   Recent Labs     02/10/22  0310 02/09/22  0405   PH 7.46* 7.42   PCO2 44 45   PO2 61* 85     No results for input(s): CPK, CKNDX, TROIQ in the last 72 hours.     No lab exists for component: CPKMB  No results found for: CHOL, CHOLX, CHLST, CHOLV, HDL, HDLP, LDL, LDLC, DLDLP, TGLX, TRIGL, TRIGP, CHHD, CHHDX  Lab Results   Component Value Date/Time    Glucose (POC) 150 (H) 02/10/2022 11:38 AM    Glucose (POC) 96 02/10/2022 05:25 AM    Glucose (POC) 141 (H) 02/09/2022 11:59 PM    Glucose (POC) 170 (H) 02/09/2022 06:14 PM    Glucose (POC) 213 (H) 02/09/2022 11:53 AM     Lab Results   Component Value Date/Time    Color Yellow/Straw 01/23/2022 10:30 AM Appearance Turbid (A) 01/23/2022 10:30 AM    Specific gravity 1.027 01/23/2022 10:30 AM    pH (UA) 6.0 01/23/2022 10:30 AM    Protein 100 (A) 01/23/2022 10:30 AM    Glucose 50 (A) 01/23/2022 10:30 AM    Ketone 5 (A) 01/23/2022 10:30 AM    Bilirubin Negative 01/23/2022 10:30 AM    Urobilinogen 4.0 (H) 01/23/2022 10:30 AM    Nitrites Negative 01/23/2022 10:30 AM    Leukocyte Esterase Negative 01/23/2022 10:30 AM    Bacteria Negative 01/23/2022 10:30 AM    WBC 0-4 01/23/2022 10:30 AM    RBC 0-5 01/23/2022 10:30 AM         Assessment:     Acute hypoxemic respiratory failure on on ventilator 90% of   COVID-19 pneumonitis   hypotension  Thrombocytopenia  Hypertension  Arthritis  High blood sugar secondary to steroid  Hyperkalemia  Hyponatremia  Moderate protein calorie malnutrition  Patient need multiple partial finger amputation once stable as per vascular surgeon      Plan:     Seen by nephrologist discontinue D5W  Recommend free water flushes    Olumiant 2 mg daily  Decadron 4 mg every 6 hours  Meropenem 1 g every 8 hours  Diltiazem 30 mg 3 times  Follow-up with pulmonologist and nephrologist  Start Lantus 10 units subcu day    Monitor sodium and potassium    Overall prognosis  Normal saline 50 cc/h      Patient is DNR    Discussed with the patient son he want to continue present treatment till February 13, he is discussing with the family to make a final decision                    Current Facility-Administered Medications:     insulin glargine (LANTUS) injection 10 Units, 10 Units, SubCUTAneous, DAILY, Polo Lopez MD, 10 Units at 02/10/22 0844    heparin (porcine) injection 5,000 Units, 5,000 Units, SubCUTAneous, Q8H, Polo Lopez MD, 5,000 Units at 02/10/22 0526    insulin lispro (HUMALOG) injection, , SubCUTAneous, Q6H, Polo Lopez MD, 3 Units at 02/09/22 1823    glucose chewable tablet 16 g, 4 Tablet, Oral, PRN, Polo Lopez MD    glucagon (GLUCAGEN) injection 1 mg, 1 mg, IntraMUSCular, PRN, Tatiana Ortiz MD    dextrose 10% infusion 125-250 mL, 125-250 mL, IntraVENous, PRN, Cindy Lopez MD    dexamethasone (DECADRON) 4 mg/mL injection 4 mg, 4 mg, IntraVENous, Q24H, Karan Flower MD, 4 mg at 02/10/22 0844    0.9% sodium chloride infusion 250 mL, 250 mL, IntraVENous, PRN, Karan Flower MD    0.9% sodium chloride infusion 250 mL, 250 mL, IntraVENous, PRN, Tricia Flower MD    dilTIAZem IR (CARDIZEM) tablet 30 mg, 30 mg, Oral, TID, Karan Flower MD, 30 mg at 02/10/22 0843    fentaNYL (PF) 1,500 mcg/30 mL (50 mcg/mL) infusion, 0-200 mcg/hr, IntraVENous, TITRATE, Karan Flower MD, Last Rate: 1.5 mL/hr at 02/10/22 0914, 75 mcg/hr at 02/10/22 0914    hydrOXYzine pamoate (VISTARIL) capsule 25 mg, 25 mg, Oral, Q4H PRN, Polo Lopez MD    propofol (DIPRIVAN) 10 mg/mL infusion, 0-50 mcg/kg/min, IntraVENous, TITRATE, Tricia Flower MD, Last Rate: 13.6 mL/hr at 02/10/22 0927, 25 mcg/kg/min at 02/10/22 0927    dexmedeTOMidine (PRECEDEX) 400 mcg in 0.9% sodium chloride (MBP/ADV) 100 mL MBP, 0.1-1.5 mcg/kg/hr, IntraVENous, TITRATE, Karan Flower MD, Last Rate: 9.1 mL/hr at 02/10/22 0905, 0.4 mcg/kg/hr at 02/10/22 0905    NOREPINephrine (LEVOPHED) 8 mg in 0.9% NS 250ml infusion, 0.5-16 mcg/min, IntraVENous, TITRATE, Cindy Lopez MD, Stopped at 02/05/22 2049    acetaminophen (TYLENOL) tablet 650 mg, 650 mg, Oral, Q6H PRN **OR** acetaminophen (TYLENOL) suppository 650 mg, 650 mg, Rectal, Q6H PRN, Cindy Lopez MD    polyethylene glycol (MIRALAX) packet 17 g, 17 g, Oral, DAILY PRN, Cindy Lopez MD    ondansetron (ZOFRAN ODT) tablet 4 mg, 4 mg, Oral, Q8H PRN **OR** [DISCONTINUED] ondansetron (ZOFRAN) injection 4 mg, 4 mg, IntraVENous, Q6H PRN, Polo Lopez MD    baricitinib (OLUMIANT) tablet 2 mg, 2 mg, Oral, DAILY, Polo Lopez MD, 2 mg at 02/10/22 0844    albuterol (PROVENTIL HFA, VENTOLIN HFA, PROAIR HFA) inhaler 2 Puff, 2 Puff, Inhalation, Q6H PRN, Rosalia Lopez MD    polyethylene glycol (MIRALAX) packet 17 g, 17 g, Oral, DAILY PRN, Polo Lopez MD, 17 g at 01/28/22 0844    [DISCONTINUED] ondansetron (ZOFRAN ODT) tablet 4 mg, 4 mg, Oral, Q8H PRN **OR** ondansetron (ZOFRAN) injection 4 mg, 4 mg, IntraVENous, Q6H PRN, Polo Lopez MD    hydrOXYzine (VISTARIL) injection 25 mg, 25 mg, IntraMUSCular, Q6H PRN, Polo Lopez MD, 25 mg at 01/22/22 0111

## 2022-02-10 NOTE — PROGRESS NOTES
Bedside shift change report given to Tonya LEIGH (oncoming nurse) by Rekha Cruz (offgoing nurse). Report included the following information SBAR.

## 2022-02-11 NOTE — PROGRESS NOTES
IMPRESSION:   1. Acute hypoxic respiratory  2. Acute on chronic hypercapnic respiratory failure   3. COVID-19 pneumonia  4. Pulmonary edema  5. Severe Anemia hemoglobin dropped to 4 recieved blood transfusion   6. Sepsis with septic shock off pressors  7. Left hand ischemia  8. Arthritis hypertension by hx  9. Hyperkalemia corrected  10. Hyponatremia resolved      RECOMMENDATIONS/PLAN:   1. ICU monitoring  1. Patient condition got worse on 1/24 went to asystole subsequently got intubated now on ventilator assist control mode sedated with propofol and fentanyl Precedex was discontinued because of bradycardia arterial blood gases acceptable currently on A/C 12  PEEP 5 FiO2  90% PO2 PCO2 much improved, still not able to wean  2. Patient is on dexamethasone and baricitinib  3. Significant drop in hemoglobin recieved 3 unit packed RBC hemoglobin improved  4. Patient is back on Levophed hemodynamically unstable will continue to wean  5. Leukocytosis normal hemoglobin improved  6. Elevated blood sugar at steroid was decreased blood sugar trending down patient on sliding scale  7. Potassium corrected  8. Left hand NATHALIE normal continue local wound care   9. Pulmonary edema received Lasix chest x-ray shows bilateral infiltrate congestive changes  10. Left hand ischemia off IV heparin left ulnar artery occlusion radial  patent NATHALIE normal  11. Troponin is elevated  12. Procalcitonin 0.06 C-reactive 3.1 lactic acid 1.4  13.  She is off antibiotic     [x] High complexity decision making was performed  [x] See my orders for details  HPI  62-year-old lady came in because of shortness of breath and dyspnea she has significant past medical history of arthritis hypertension she was recently discharged from the hospital came back with worsening of hypoxia she was discharged on oxygen 2 L nasal cannula and patient condition got worse she was supposed to be discharged to skilled care but patient's son took her home where her condition got worse and she was transferred back to the hospital now she is on noninvasive ventilator BiPAP machine hemodynamically unstable hypotensive on vasopressors and not giving much history. PMH:  has no past medical history on file. PSH:   has a past surgical history that includes ir insert non tunl cvc over 5 yrs (1/21/2022). FHX: family history is not on file.      SHX:      ALL:   Allergies   Allergen Reactions    Penicillins Hives        MEDS:   [x] Reviewed - As Below   [] Not reviewed    Current Facility-Administered Medications   Medication    furosemide (LASIX) injection 20 mg    potassium chloride 10 mEq in 100 ml IVPB    magnesium sulfate 2 g/50 ml IVPB (premix or compounded)    insulin glargine (LANTUS) injection 10 Units    heparin (porcine) injection 5,000 Units    insulin lispro (HUMALOG) injection    glucose chewable tablet 16 g    glucagon (GLUCAGEN) injection 1 mg    dextrose 10% infusion 125-250 mL    dexamethasone (DECADRON) 4 mg/mL injection 4 mg    0.9% sodium chloride infusion 250 mL    0.9% sodium chloride infusion 250 mL    dilTIAZem IR (CARDIZEM) tablet 30 mg    fentaNYL (PF) 1,500 mcg/30 mL (50 mcg/mL) infusion    hydrOXYzine pamoate (VISTARIL) capsule 25 mg    propofol (DIPRIVAN) 10 mg/mL infusion    dexmedeTOMidine (PRECEDEX) 400 mcg in 0.9% sodium chloride (MBP/ADV) 100 mL MBP    NOREPINephrine (LEVOPHED) 8 mg in 0.9% NS 250ml infusion    acetaminophen (TYLENOL) tablet 650 mg    Or    acetaminophen (TYLENOL) suppository 650 mg    polyethylene glycol (MIRALAX) packet 17 g    ondansetron (ZOFRAN ODT) tablet 4 mg    baricitinib (OLUMIANT) tablet 2 mg    albuterol (PROVENTIL HFA, VENTOLIN HFA, PROAIR HFA) inhaler 2 Puff    polyethylene glycol (MIRALAX) packet 17 g    ondansetron (ZOFRAN) injection 4 mg    hydrOXYzine (VISTARIL) injection 25 mg      MAR reviewed and pertinent medications noted or modified as needed   Current Facility-Administered Medications   Medication    furosemide (LASIX) injection 20 mg    potassium chloride 10 mEq in 100 ml IVPB    magnesium sulfate 2 g/50 ml IVPB (premix or compounded)    insulin glargine (LANTUS) injection 10 Units    heparin (porcine) injection 5,000 Units    insulin lispro (HUMALOG) injection    glucose chewable tablet 16 g    glucagon (GLUCAGEN) injection 1 mg    dextrose 10% infusion 125-250 mL    dexamethasone (DECADRON) 4 mg/mL injection 4 mg    0.9% sodium chloride infusion 250 mL    0.9% sodium chloride infusion 250 mL    dilTIAZem IR (CARDIZEM) tablet 30 mg    fentaNYL (PF) 1,500 mcg/30 mL (50 mcg/mL) infusion    hydrOXYzine pamoate (VISTARIL) capsule 25 mg    propofol (DIPRIVAN) 10 mg/mL infusion    dexmedeTOMidine (PRECEDEX) 400 mcg in 0.9% sodium chloride (MBP/ADV) 100 mL MBP    NOREPINephrine (LEVOPHED) 8 mg in 0.9% NS 250ml infusion    acetaminophen (TYLENOL) tablet 650 mg    Or    acetaminophen (TYLENOL) suppository 650 mg    polyethylene glycol (MIRALAX) packet 17 g    ondansetron (ZOFRAN ODT) tablet 4 mg    baricitinib (OLUMIANT) tablet 2 mg    albuterol (PROVENTIL HFA, VENTOLIN HFA, PROAIR HFA) inhaler 2 Puff    polyethylene glycol (MIRALAX) packet 17 g    ondansetron (ZOFRAN) injection 4 mg    hydrOXYzine (VISTARIL) injection 25 mg      PMH:  has no past medical history on file. PSH:   has a past surgical history that includes ir insert non tunl cvc over 5 yrs (1/21/2022). FHX: family history is not on file. SHX:       ROS:  Unable to obtain sedated on ventilator    Hemodynamics:    CO:    CI:    CVP:    SVR:   PAP Systolic:    PAP Diastolic:    PVR:    AJ57:        Ventilator Settings:      Mode Rate TV Press PEEP FiO2 PIP Min.  Vent   Assist control,Pressure control    450 ml    5 cm H20 90 %  31 cm H2O  11.3 l/min        Vital Signs: Telemetry:    normal sinus rhythm Intake/Output:   Visit Vitals  BP (!) 112/55 (BP 1 Location: Right lower arm, BP Patient Position: At rest)   Pulse 72   Temp 98 °F (36.7 °C)   Resp 23   Ht 5' 5\" (1.651 m)   Wt 106.6 kg (235 lb 0.2 oz)   SpO2 99%   BMI 39.11 kg/m²       Temp (24hrs), Av.5 °F (36.9 °C), Min:98 °F (36.7 °C), Max:98.9 °F (37.2 °C)        O2 Device: Ventilator         Wt Readings from Last 4 Encounters:   22 106.6 kg (235 lb 0.2 oz)   22 90.7 kg (200 lb)          Intake/Output Summary (Last 24 hours) at 2022 0816  Last data filed at 2022 0630  Gross per 24 hour   Intake 886 ml   Output 1865 ml   Net -979 ml       Last shift:      No intake/output data recorded. Last 3 shifts:  1901 -  0700  In: 2668.6 [I.V.:878.6]  Out: 2940 [Urine:2940]       Physical Exam:     General: Intubated on ventilator unresponsive on propofol fentanyl  HEENT: NCAT,   Eyes: anicteric; conjunctiva clear no doll's eye reflex  Neck: no nodes, , trach midline; no accessory MM use.   Questionable neck vein distention  Chest: no deformity,   Cardiac: R regular; no murmur;   Lungs: Diffuse wheezes and rales  Abd: soft, NT, hypoactive BS  Ext: Edema of the leg edema; left hand finger blackish discoloration  : clear urine  Neuro: Sedated unresponsive on the ventilator no doll's eye reflex flaccid extremities  Psych-unable to assess  Skin: warm, dry, no cyanosis;   Pulses: Brachial radial pulses intact  Capillary: Normal capillary refill      DATA:    MAR reviewed and pertinent medications noted or modified as needed  MEDS:   Current Facility-Administered Medications   Medication    furosemide (LASIX) injection 20 mg    potassium chloride 10 mEq in 100 ml IVPB    magnesium sulfate 2 g/50 ml IVPB (premix or compounded)    insulin glargine (LANTUS) injection 10 Units    heparin (porcine) injection 5,000 Units    insulin lispro (HUMALOG) injection    glucose chewable tablet 16 g    glucagon (GLUCAGEN) injection 1 mg    dextrose 10% infusion 125-250 mL    dexamethasone (DECADRON) 4 mg/mL injection 4 mg    0.9% sodium chloride infusion 250 mL    0.9% sodium chloride infusion 250 mL    dilTIAZem IR (CARDIZEM) tablet 30 mg    fentaNYL (PF) 1,500 mcg/30 mL (50 mcg/mL) infusion    hydrOXYzine pamoate (VISTARIL) capsule 25 mg    propofol (DIPRIVAN) 10 mg/mL infusion    dexmedeTOMidine (PRECEDEX) 400 mcg in 0.9% sodium chloride (MBP/ADV) 100 mL MBP    NOREPINephrine (LEVOPHED) 8 mg in 0.9% NS 250ml infusion    acetaminophen (TYLENOL) tablet 650 mg    Or    acetaminophen (TYLENOL) suppository 650 mg    polyethylene glycol (MIRALAX) packet 17 g    ondansetron (ZOFRAN ODT) tablet 4 mg    baricitinib (OLUMIANT) tablet 2 mg    albuterol (PROVENTIL HFA, VENTOLIN HFA, PROAIR HFA) inhaler 2 Puff    polyethylene glycol (MIRALAX) packet 17 g    ondansetron (ZOFRAN) injection 4 mg    hydrOXYzine (VISTARIL) injection 25 mg        Labs:    Recent Labs     02/11/22  0440 02/10/22  0400   WBC 10.5 9.1   HGB 8.4* 8.2*    122*     Recent Labs     02/11/22  0440 02/10/22  0400 02/08/22  1200    138  --    K 3.5 4.0  --     104  --    CO2 27 31  --    * 123*  --    BUN 50* 44*  --    CREA 0.31* 0.32*  --    CA 9.1 9.0  --    LAC  --   --  1.4   ALB 1.9* 1.8*  --    ALT 77 83*  --      Recent Labs     02/11/22  0445 02/10/22  0310 02/09/22  0405   PH 7.45 7.46* 7.42   PCO2 41 44 45   PO2 60* 61* 85   HCO3 28* 30* 28*   FIO2 90.0 90.0 90.0   1/30 AC 12  PEEP 5 FiO2 100%    Lab Results   Component Value Date/Time    Culture result: No growth 6 days 01/21/2022 07:55 AM    Culture result: No growth 6 days 01/18/2022 07:12 AM    Culture result: (A) 01/17/2022 07:12 AM     Staphylococcus species, coagulase negative growing in 1 of 4 bottles drawn NO SITE INDICATED    Culture result:  01/17/2022 07:12 AM     (NOTE) [facility] GROWING IN 1 OF 2 BOTTLES CALLED TO JOSE MIGUEL PAGAN AT 0831 ON 1/19/22.  JF     No results found for: TSH, TSHEXT, TSHEXT     Imaging:    Results from [facility] Encounter encounter on 01/21/22    XR CHEST PORT    Narrative  Chest single view. Comparison single view chest January 10, 2022. Stable ET tube and NG tube. Right neck central vascular catheter unchanged position. Hazy relative diffuse  interstitial edema unchanged compared to prior imaging. Cardiac and mediastinal  structures unchanged. Thoracic aorta atherosclerosis. No pneumothorax or sizable  pleural effusion. Results from [place] encounter on 01/21/22    CT HEAD WO CONT    Narrative  The study is a noncontrasted head CT examination dated 1/21/2022. HISTORY: Unresponsive. TECHNIQUE: Thin section axial imaging was performed followed by sagittal and  coronal reconstructed imaging. Dose Reduction Technique was employed to reduce radiation exposure - This  includes reduction optimization techniques as appropriate to a performed exam  with automated exposure control adjustments of the mA and/or Kv according to  patient size, or use of iterative reconstruction technique. COMPARISON: CT head dated 1/17/2022. There is an appropriate size to the ventricles, the deep cortical sulci, and the  sylvian fissures for the patient's age. This examination is negative for  intracranial hemorrhage, mass effect or an extra axial collection. The  gray-white matter junctions are preserved without cytotoxic or vasogenic edema. An assessment of the white matter tracts demonstrates a mild decrease in the  density characteristics of the central periventricular white matter. These  findings are consistent with expected aging changes and milder microvascular  disease. There also is a region of diminished density within the right posterior  inferior cerebellar hemisphere which may represent an older cerebrovascular  insult. ORBITS: This examination is negative for acute orbital pathology. PARANASAL SINUSES: The paranasal sinuses are well pneumatized without acute  sinus disease.     There is a decreased number of right sided mastoid air cells which can be  associated with chronic mastoiditis. The craniocervical junction images in a  normal fashion. Impression  1. There are milder microvascular changes within the central periventricular  white matter. 2.  There is an area of diminished density within the inferior aspect of the  right cerebellar hemisphere without change (series 201 image number 12). These  findings may represent an older ischemic insult within the right posterior  inferior cerebellar region. 3.  This examination is negative for acute intracranial pathology or short-term  interval changes dating back to 1/17/2022. 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 Chest x-ray looks like pulmonary edema.   She has peripheral edema we will give Lasix today discontinue D5W continue tube feedings we will check echocardiogram  · 1/31 off pressors remain intubated   · 2/1 remain intubated sedated on ventilator will do sedation vacation in a.m. still on 85%  · 2/2 on ventilator sedated elevated PCO2  · 2/3 remain intubated off pressors significant drop in hemoglobin and increasing white count we will repeat CBC and work accordingly  · 2/4 patient is back on Levophed hemodynamically unstable still on ventilator 85% FiO2  · 2/5 remain on ventilator condition unstable  · 2/6 condition unstable sedated on ventilator  · 2/7 we will change vent settings  · 2/10 intubated on ventilator on 90% FiO2  · 2/11 remain intubated family to make decision  · Time of care 30-minute

## 2022-02-11 NOTE — PROGRESS NOTES
PROGRESS NOTE      Chief Complaints:  Patient examined in ICU. HPI and  subjective:    No major changes hemodynamics. Patient still intubated. Patient's FiO2 requirement went up to 90%. Current blood pressure 108/56. Heart rate 74. Temperature 98.6. On examination of patient's left arm. Left arm is warm to touch. Doppler signal noted on the radial artery. Palmar arch Doppler signal noted. Patient's left hand  5, 4, 3 mummified digits no changes. Review of Systems: Able to review patient is intubated. EXAM:  Visit Vitals  BP (!) 108/56 (BP Patient Position: At rest)   Pulse 74   Temp 98.6 °F (37 °C)   Resp 22   Ht 5' 5\" (1.651 m)   Wt 235 lb 0.2 oz (106.6 kg)   SpO2 98%   BMI 39.11 kg/m²       Patient is intubated. Head and neck atraumatic, normocephalic. ENT: No hoarse voice  Cardiac system regular rate rhythm. Pulmonary no audible wheeze  Chest wall excursion normal with respiration cycle  Abdomen is soft not particularly distended. Neurologically unable to assess  Skin is warm and moist.  Psychosocial: Unable to assess  Vascular examination as previously noted no changes.     Recent Results (from the past 24 hour(s))   GLUCOSE, POC    Collection Time: 02/10/22  5:25 AM   Result Value Ref Range    Glucose (POC) 96 65 - 117 mg/dL    Performed by Mehreen Lewis    COVID-19 RAPID TEST    Collection Time: 02/10/22  9:23 AM   Result Value Ref Range    Specimen source Please find results under separate order      COVID-19 rapid test DETECTED (A) Not Detected     GLUCOSE, POC    Collection Time: 02/10/22 11:38 AM   Result Value Ref Range    Glucose (POC) 150 (H) 65 - 117 mg/dL    Performed by Ubaldo Marino    GLUCOSE, POC    Collection Time: 02/10/22  6:03 PM   Result Value Ref Range    Glucose (POC) 206 (H) 65 - 117 mg/dL    Performed by Ubaldo Marino    GLUCOSE, POC    Collection Time: 02/11/22 12:15 AM   Result Value Ref Range    Glucose (POC) 96 65 - 117 mg/dL    Performed by Servando Nichols ASSESSMENT:   Patient is 80 y.o. with diagnosis of : Active Problems:    COVID-19 (1/17/2022)      Respiratory failure with hypoxia ([address] (1/21/2022)      Hypernatremia (1/23/2022)      Hypokalemia (1/23/2022)        PLAN:                 Patient's FiO2 requirement getting worse. Currently 90%. Patient currently off any pressors. Patient's left arm examination shows no changes. Demarcated and mummified digits of 5, 4, and 3 of the left hand no changes. Continued left arm elevation, continue local wound care with iodine swabs daily. Continue supportive care. I will continue monitor her progress. ,  Critical care time spent: 30 minutes.

## 2022-02-11 NOTE — PROGRESS NOTES
General Daily Progress Note          Patient Name:   Amilcar Diaz       YOB: 1940       Age:  80 y.o.       Admit Date: 1/21/2022      Subjective:     Patient is a 80y.o. year old female with signal past medical history of hypertension arthritis who discharged from the hospital yesterday in stable condition patient is admitted last admission with COVID-19 patient was asymptomatic all this time while in the hospital patient discharged on 2 L oxygen to skilled care but patient son want to go home with home health but is at home patient's saturations drops sent back to the ER seen by the ER physician patient placed on BiPAP patient was little hypotensive started on Levophed admitted for further work-up and treatment           Patient is DNR    Patient on ventilator 90% O2    On propofol and Precedex drip        NG tube in place for medication and feeding    Repeat Covid positive      Objective:     Visit Vitals  BP (!) 145/59 (BP 1 Location: Right lower arm, BP Patient Position: At rest)   Pulse 72   Temp 98 °F (36.7 °C)   Resp (!) 34   Ht 5' 5\" (1.651 m)   Wt 106.6 kg (235 lb 0.2 oz)   SpO2 95%   BMI 39.11 kg/m²        Recent Results (from the past 24 hour(s))   GLUCOSE, POC    Collection Time: 02/10/22 11:38 AM   Result Value Ref Range    Glucose (POC) 150 (H) 65 - 117 mg/dL    Performed by [facility]    GLUCOSE, POC    Collection Time: 02/10/22  6:03 PM   Result Value Ref Range    Glucose (POC) 206 (H) 65 - 117 mg/dL    Performed by [facility]    GLUCOSE, POC    Collection Time: 02/11/22 12:15 AM   Result Value Ref Range    Glucose (POC) 96 65 - 117 mg/dL    Performed by Nathaly Mahoney    CBC W/O DIFF    Collection Time: 02/11/22  4:40 AM   Result Value Ref Range    WBC 10.5 3.6 - 11.0 K/uL    RBC 2.58 (L) 3.80 - 5.20 M/uL    HGB 8.4 (L) 11.5 - 16.0 g/dL    HCT 25.1 (L) 35.0 - 47.0 %    MCV 97.3 80.0 - 99.0 FL    MCH 32.6 26.0 - 34.0 PG    MCHC 33.5 30.0 - 36.5 g/dL    RDW 15.2 (H) 11.5 - 14.5 % PLATELET 060 156 - 513 K/uL    MPV 12.2 8.9 - 12.9 FL    NRBC 0.0 0.0  WBC    ABSOLUTE NRBC 0.00 0.00 - 9.32 K/uL   METABOLIC PANEL, COMPREHENSIVE    Collection Time: 02/11/22  4:40 AM   Result Value Ref Range    Sodium 137 136 - 145 mmol/L    Potassium 3.5 3.5 - 5.1 mmol/L    Chloride 103 97 - 108 mmol/L    CO2 27 21 - 32 mmol/L    Anion gap 7 5 - 15 mmol/L    Glucose 140 (H) 65 - 100 mg/dL    BUN 50 (H) 6 - 20 mg/dL    Creatinine 0.31 (L) 0.55 - 1.02 mg/dL    BUN/Creatinine ratio 161 (H) 12 - 20      GFR est AA >60 >60 ml/min/1.73m2    GFR est non-AA >60 >60 ml/min/1.73m2    Calcium 9.1 8.5 - 10.1 mg/dL    Bilirubin, total 0.5 0.2 - 1.0 mg/dL    AST (SGOT) 55 (H) 15 - 37 U/L    ALT (SGPT) 77 12 - 78 U/L    Alk. phosphatase 71 45 - 117 U/L    Protein, total 5.5 (L) 6.4 - 8.2 g/dL    Albumin 1.9 (L) 3.5 - 5.0 g/dL    Globulin 3.6 2.0 - 4.0 g/dL    A-G Ratio 0.5 (L) 1.1 - 2.2     BLOOD GAS, ARTERIAL    Collection Time: 02/11/22  4:45 AM   Result Value Ref Range    pH 7.45 7.35 - 7.45      PCO2 41 35 - 45 mmHg    PO2 60 (L) 75 - 100 mmHg    O2 SAT 93 (L) >95 %    BICARBONATE 28 (H) 22 - 26 mmol/L    BASE EXCESS 3.8 (H) 0 - 2 mmol/L    O2 METHOD VENT      FIO2 90.0 %    MODE AssistControl/Pressure Control      SET RATE 12      IPAP/PIP 25      EPAP/CPAP/PEEP 5.0      SITE Right Radial      BEN'S TEST PASS     GLUCOSE, POC    Collection Time: 02/11/22  6:00 AM   Result Value Ref Range    Glucose (POC) 107 65 - 117 mg/dL    Performed by Monica Vanessa      [unfilled]      Review of Systems    Unable to obtain. Physical Exam:      Constitutional: Awake on vent   HENT:   Head: Normocephalic and atraumatic. Eyes: Pupils are equal, round, and reactive to light. EOM are normal.   Cardiovascular: Normal rate, regular rhythm and normal heart sounds. Pulmonary/Chest: Decreased breath sounds   abdominal: Soft. Bowel sounds are normal. There is no abdominal tenderness. There is no rebound and no guarding. Musculoskeletal: Normal range of motion. Neurological: vent     XR CHEST PORT   Final Result      XR CHEST PORT   Final Result   Findings/impression:   1. Limitations: Rotated and angulated. 2.  Medical devices: Right internal jugular venous catheter, endotracheal tube   and NG tube in stable. 3.  Ongoing moderate bilateral edema similar to prior exam.   4.  Ongoing collapse/consolidation in the left lower lobe concerning for   underlying airspace disease. 5.  The cardiac and mediastinal contours are stable though near completely   obscured by adjacent airspace disease. XR CHEST PORT   Final Result   No significant interval change. XR CHEST PORT   Final Result      XR CHEST PORT   Final Result   No significant interval change. XR CHEST PORT   Final Result   Persistent bilateral airspace disease and with decreased aeration. XR CHEST PORT   Final Result      XR CHEST PORT   Final Result      XR CHEST PORT   Final Result      XR CHEST PORT   Final Result      XR CHEST PORT   Final Result      WRIST BRACHIAL INDEX AT REST   Final Result      XR CHEST PORT   Final Result   No significant change. XR CHEST PORT   Final Result   No interval change. DUPLEX UPPER EXT ARTERY LEFT   Final Result      XR CHEST PORT   Final Result      XR CHEST PORT   Final Result   Endotracheal tube tip still at the maggie. Diffuse opacities in the   lungs, not significantly changed. XR CHEST PORT   Final Result      XR CHEST PORT   Final Result   Endotracheal tube tip at the maggie. Diffuse opacities in the   lungs, accentuated by lower lung volumes or increased. XR CHEST PORT   Final Result   Diffuse opacities in the lungs, not significantly changed. XR CHEST PORT   Final Result   1. ETT terminating 2 cm above the maggie. Remaining support apparatus as above. 2.  Worsening bilateral airspace disease with developing ARDS not excluded.       XR CHEST PORT   Final Result   Moderate patchy diffuse bilateral airspace opacities, waxing and waning from the   prior exam.      XR CHEST PORT   Final Result   Moderate diffuse bilateral airspace opacities, likely a combination of edema and   airspace disease, mildly increased from the previous exam.      CT HEAD WO CONT   Final Result   1. There are milder microvascular changes within the central periventricular   white matter. 2.  There is an area of diminished density within the inferior aspect of the   right cerebellar hemisphere without change (series 201 image number 12). These   findings may represent an older ischemic insult within the right posterior   inferior cerebellar region. 3.  This examination is negative for acute intracranial pathology or short-term   interval changes dating back to 1/17/2022. XR CHEST PORT   Final Result   Patchy mixed asymmetric lung disease appears somewhat better but the   lungs are better inflated. No effusion or pneumothorax. Normal heart and   mediastinum      IR INSERT NON TUNL CVC OVER 5 YRS   Final Result   Ultrasound of the right neck demonstrated patent IJV. Successful US-guided placement of a right internal jugular triple lumen central   venous catheter as described above. The catheter is functioning. PLAN:   Catheter position was confirmed by follow-up chest x-ray: catheter tip in the   lower SVC and ready to use. IR US GUIDED VASCULAR ACCESS   Final Result   Ultrasound of the right neck demonstrated patent IJV. Successful US-guided placement of a right internal jugular triple lumen central   venous catheter as described above. The catheter is functioning. PLAN:   Catheter position was confirmed by follow-up chest x-ray: catheter tip in the   lower SVC and ready to use. XR CHEST PORT   Final Result   Findings/impression:      Diffuse interstitial airspace disease/edema. No definite pleural effusion or   pneumothorax.       Cardiac contours are partially obscured. No acute osseous abnormality identified. XR CHEST PORT    (Results Pending)        Recent Results (from the past 24 hour(s))   GLUCOSE, POC    Collection Time: 02/10/22 11:38 AM   Result Value Ref Range    Glucose (POC) 150 (H) 65 - 117 mg/dL    Performed by Sterling JOHNSON    GLUCOSE, POC    Collection Time: 02/10/22  6:03 PM   Result Value Ref Range    Glucose (POC) 206 (H) 65 - 117 mg/dL    Performed by Tati Shaver    GLUCOSE, POC    Collection Time: 02/11/22 12:15 AM   Result Value Ref Range    Glucose (POC) 96 65 - 117 mg/dL    Performed by Deana Zapien    CBC W/O DIFF    Collection Time: 02/11/22  4:40 AM   Result Value Ref Range    WBC 10.5 3.6 - 11.0 K/uL    RBC 2.58 (L) 3.80 - 5.20 M/uL    HGB 8.4 (L) 11.5 - 16.0 g/dL    HCT 25.1 (L) 35.0 - 47.0 %    MCV 97.3 80.0 - 99.0 FL    MCH 32.6 26.0 - 34.0 PG    MCHC 33.5 30.0 - 36.5 g/dL    RDW 15.2 (H) 11.5 - 14.5 %    PLATELET 223 314 - 226 K/uL    MPV 12.2 8.9 - 12.9 FL    NRBC 0.0 0.0  WBC    ABSOLUTE NRBC 0.00 0.00 - 4.36 K/uL   METABOLIC PANEL, COMPREHENSIVE    Collection Time: 02/11/22  4:40 AM   Result Value Ref Range    Sodium 137 136 - 145 mmol/L    Potassium 3.5 3.5 - 5.1 mmol/L    Chloride 103 97 - 108 mmol/L    CO2 27 21 - 32 mmol/L    Anion gap 7 5 - 15 mmol/L    Glucose 140 (H) 65 - 100 mg/dL    BUN 50 (H) 6 - 20 mg/dL    Creatinine 0.31 (L) 0.55 - 1.02 mg/dL    BUN/Creatinine ratio 161 (H) 12 - 20      GFR est AA >60 >60 ml/min/1.73m2    GFR est non-AA >60 >60 ml/min/1.73m2    Calcium 9.1 8.5 - 10.1 mg/dL    Bilirubin, total 0.5 0.2 - 1.0 mg/dL    AST (SGOT) 55 (H) 15 - 37 U/L    ALT (SGPT) 77 12 - 78 U/L    Alk.  phosphatase 71 45 - 117 U/L    Protein, total 5.5 (L) 6.4 - 8.2 g/dL    Albumin 1.9 (L) 3.5 - 5.0 g/dL    Globulin 3.6 2.0 - 4.0 g/dL    A-G Ratio 0.5 (L) 1.1 - 2.2     BLOOD GAS, ARTERIAL    Collection Time: 02/11/22  4:45 AM   Result Value Ref Range    pH 7.45 7.35 - 7.45      PCO2 41 35 - 45 mmHg PO2 60 (L) 75 - 100 mmHg    O2 SAT 93 (L) >95 %    BICARBONATE 28 (H) 22 - 26 mmol/L    BASE EXCESS 3.8 (H) 0 - 2 mmol/L    O2 METHOD VENT      FIO2 90.0 %    MODE AssistControl/Pressure Control      SET RATE 12      IPAP/PIP 25      EPAP/CPAP/PEEP 5.0      SITE Right Radial      BEN'S TEST PASS     GLUCOSE, POC    Collection Time: 02/11/22  6:00 AM   Result Value Ref Range    Glucose (POC) 107 65 - 117 mg/dL    Performed by Monica Vanessa        Results     Procedure Component Value Units Date/Time    COVID-19 RAPID TEST [[id number]]  (Abnormal) Collected: 02/10/22 0923    Order Status: Completed Specimen: Nasopharyngeal Updated: 02/10/22 0957     Specimen source       Please find results under separate order           COVID-19 rapid test DETECTED        Comment: Rapid [employer] ID Now   The specimen is POSITIVE for SARS-CoV-2, the novel coronavirus associated with COVID-19. This test has been authorized by the FDA under an Emergency Use Authorization (EUA) for use by authorized laboratories. Fact sheet for Healthcare Providers: ConventionUpdate.co.nz Fact sheet for Patients: ConventionUpdate.co.nz   Methodology: Isothermal Nucleic Acid Amplification Results verified, phoned to and read back by Carol Ann Cardoso RN AT   [id number] BY CESAR                Labs:     Recent Labs     02/11/22 0440 02/10/22  0400   WBC 10.5 9.1   HGB 8.4* 8.2*   HCT 25.1* 24.7*    122*     Recent Labs     02/11/22  0440 02/10/22  0400    138   K 3.5 4.0    104   CO2 27 31   BUN 50* 44*   CREA 0.31* 0.32*   * 123*   CA 9.1 9.0     Recent Labs     02/11/22  0440 02/10/22  0400   ALT 77 83*   AP 71 72   TBILI 0.5 0.5   TP 5.5* 5.2*   ALB 1.9* 1.8*   GLOB 3.6 3.4     No results for input(s): INR, PTP, APTT, INREXT, INREXT in the last 72 hours. No results for input(s): FE, TIBC, PSAT, FERR in the last 72 hours.    No results found for: FOL, RBCF   Recent Labs     02/11/22  4325 02/10/22  0310   PH 7.45 7.46*   PCO2 41 44   PO2 60* 61*     No results for input(s): CPK, CKNDX, TROIQ in the last 72 hours.     No lab exists for component: CPKMB  No results found for: CHOL, CHOLX, CHLST, CHOLV, HDL, HDLP, LDL, LDLC, DLDLP, TGLX, TRIGL, TRIGP, CHHD, CHHDX  Lab Results   Component Value Date/Time    Glucose (POC) 107 02/11/2022 06:00 AM    Glucose (POC) 96 02/11/2022 12:15 AM    Glucose (POC) 206 (H) 02/10/2022 06:03 PM    Glucose (POC) 150 (H) 02/10/2022 11:38 AM    Glucose (POC) 96 02/10/2022 05:25 AM     Lab Results   Component Value Date/Time    Color Yellow/Straw 01/23/2022 10:30 AM    Appearance Turbid (A) 01/23/2022 10:30 AM    Specific gravity 1.027 01/23/2022 10:30 AM    pH (UA) 6.0 01/23/2022 10:30 AM    Protein 100 (A) 01/23/2022 10:30 AM    Glucose 50 (A) 01/23/2022 10:30 AM    Ketone 5 (A) 01/23/2022 10:30 AM    Bilirubin Negative 01/23/2022 10:30 AM    Urobilinogen 4.0 (H) 01/23/2022 10:30 AM    Nitrites Negative 01/23/2022 10:30 AM    Leukocyte Esterase Negative 01/23/2022 10:30 AM    Bacteria Negative 01/23/2022 10:30 AM    WBC 0-4 01/23/2022 10:30 AM    RBC 0-5 01/23/2022 10:30 AM         Assessment:     Acute hypoxemic respiratory failure on on ventilator 90% of   COVID-19 pneumonitis   hypotension  Thrombocytopenia  Hypertension  Arthritis  High blood sugar secondary to steroid  Hyperkalemia  Hyponatremia  Moderate protein calorie malnutrition  Patient need multiple partial finger amputation once stable as per vascular surgeon    Repeat Covid is positive  Plan:     Seen by nephrologist discontinue D5W  Recommend free water flushes    Olumiant 2 mg daily  Decadron 4 mg every 6 hours  Meropenem 1 g every 8 hours  Diltiazem 30 mg 3 times  Follow-up with pulmonologist and nephrologist  Start Lantus 10 units subcu day    Monitor sodium and potassium    Overall prognosis Poor        Patient is DNR    Discussed with the patient son he want to continue present treatment till February 13, he is discussing with the family to make a final decision                    Current Facility-Administered Medications:     furosemide (LASIX) injection 20 mg, 20 mg, IntraVENous, Q12H, Fredi, Jovanna Farooq MD, 20 mg at 02/11/22 0819    potassium chloride 10 mEq in 100 ml IVPB, 10 mEq, IntraVENous, Q1H, Fredi, Jovanna Farooq MD, Last Rate: 100 mL/hr at 02/11/22 0928, 10 mEq at 02/11/22 3365    magnesium sulfate 2 g/50 ml IVPB (premix or compounded), 2 g, IntraVENous, ONCE, Fredi, Jovanna Farooq MD    insulin glargine (LANTUS) injection 10 Units, 10 Units, SubCUTAneous, DAILY, Tatiana Ortiz MD, 10 Units at 02/11/22 0818    heparin (porcine) injection 5,000 Units, 5,000 Units, SubCUTAneous, Q8H, Polo Lopez MD, 5,000 Units at 02/11/22 9784    insulin lispro (HUMALOG) injection, , SubCUTAneous, Q6H, Polo Lopez MD, 6 Units at 02/10/22 1816    glucose chewable tablet 16 g, 4 Tablet, Oral, PRN, Cindy Lopez MD    glucagon (GLUCAGEN) injection 1 mg, 1 mg, IntraMUSCular, PRN, Cindy Lopez MD    dextrose 10% infusion 125-250 mL, 125-250 mL, IntraVENous, PRN, Cindy Lopez MD    dexamethasone (DECADRON) 4 mg/mL injection 4 mg, 4 mg, IntraVENous, Q24H, Karan Flower MD, 4 mg at 02/11/22 7952    0.9% sodium chloride infusion 250 mL, 250 mL, IntraVENous, PRN, Karan Flower MD    0.9% sodium chloride infusion 250 mL, 250 mL, IntraVENous, PRN, Karan Flower MD    dilTIAZem IR (CARDIZEM) tablet 30 mg, 30 mg, Oral, TID, Karan Flower MD, 30 mg at 02/10/22 2106    fentaNYL (PF) 1,500 mcg/30 mL (50 mcg/mL) infusion, 0-200 mcg/hr, IntraVENous, TITRATE, Karan Flower MD, Last Rate: 1.5 mL/hr at 02/10/22 0914, 75 mcg/hr at 02/10/22 0914    hydrOXYzine pamoate (VISTARIL) capsule 25 mg, 25 mg, Oral, Q4H PRN, Polo Lopez MD    propofol (DIPRIVAN) 10 mg/mL infusion, 0-50 mcg/kg/min, IntraVENous, TITRATE, Karan Flower MD, Last Rate: 19 mL/hr at 02/11/22 0831, 35 mcg/kg/min at 02/11/22 0831    dexmedeTOMidine (PRECEDEX) 400 mcg in 0.9% sodium chloride (MBP/ADV) 100 mL MBP, 0.1-1.5 mcg/kg/hr, IntraVENous, TITRATE, Karan Flower MD, Last Rate: 11.3 mL/hr at 02/11/22 0930, 0.5 mcg/kg/hr at 02/11/22 0930    NOREPINephrine (LEVOPHED) 8 mg in 0.9% NS 250ml infusion, 0.5-16 mcg/min, IntraVENous, TITRATE, Polo Lopez MD, Last Rate: 3.8 mL/hr at 02/10/22 1817, 2 mcg/min at 02/10/22 1817    acetaminophen (TYLENOL) tablet 650 mg, 650 mg, Oral, Q6H PRN **OR** acetaminophen (TYLENOL) suppository 650 mg, 650 mg, Rectal, Q6H PRN, Efraín Lopez MD    polyethylene glycol (MIRALAX) packet 17 g, 17 g, Oral, DAILY PRN, Efraín Lopez MD    ondansetron (ZOFRAN ODT) tablet 4 mg, 4 mg, Oral, Q8H PRN **OR** [DISCONTINUED] ondansetron (ZOFRAN) injection 4 mg, 4 mg, IntraVENous, Q6H PRN, Polo Lopez MD    baricitinib (OLUMIANT) tablet 2 mg, 2 mg, Oral, DAILY, Polo Lopez MD, 2 mg at 02/11/22 0819    albuterol (PROVENTIL HFA, VENTOLIN HFA, PROAIR HFA) inhaler 2 Puff, 2 Puff, Inhalation, Q6H PRN, Polo Lopez MD    polyethylene glycol (MIRALAX) packet 17 g, 17 g, Oral, DAILY PRN, Polo Lopez MD, 17 g at 01/28/22 0844    [DISCONTINUED] ondansetron (ZOFRAN ODT) tablet 4 mg, 4 mg, Oral, Q8H PRN **OR** ondansetron (ZOFRAN) injection 4 mg, 4 mg, IntraVENous, Q6H PRN, Polo Lopez MD    hydrOXYzine (VISTARIL) injection 25 mg, 25 mg, IntraMUSCular, Q6H PRN, Polo Lopez MD, 25 mg at 01/22/22 0111

## 2022-02-11 NOTE — PROGRESS NOTES
CM reviewed clinical chart. Patient's family will be making a decision regarding her care on 02/13/2022. CM will continue to follow.

## 2022-02-11 NOTE — PROGRESS NOTES
Bedside shift change report given to Tonya LEIGH (oncoming nurse) by Joseph Villalobos (offgoing nurse). Report included the following information SBAR.

## 2022-02-12 NOTE — PROGRESS NOTES
IMPRESSION:   1. Acute hypoxic respiratory  2. Acute on chronic hypercapnic respiratory failure   3. COVID-19 pneumonia  4. Pulmonary edema  5. Severe Anemia hemoglobin dropped to 4 recieved blood transfusion   6. Sepsis with septic shock on Levophed  7. Left hand ischemia  8. Arthritis hypertension by hx  9. Hyperkalemia corrected  10. Hyponatremia resolved      RECOMMENDATIONS/PLAN:   1. ICU monitoring  1. Patient condition got worse on 1/24 went to asystole subsequently got intubated now on ventilator assist control mode sedated with propofol and fentanyl Precedex was discontinued because of bradycardia arterial blood gases acceptable currently on A/C 12  PEEP 5 FiO2  90% PO2 PCO2 much improved, still not able to wean  2. Patient is on dexamethasone and baricitinib  3. Significant drop in hemoglobin recieved 3 unit packed RBC hemoglobin improved  4. Patient is back on Levophed hemodynamically unstable will continue to wean  5. Leukocytosis and hemoglobin improved  6. Elevated blood sugar at steroid was decreased blood sugar trending down patient on sliding scale  7. Potassium corrected  8. Left hand NATHALIE normal continue local wound care   9. Pulmonary edema received Lasix chest x-ray shows bilateral infiltrate congestive changes  10. Left hand ischemia off IV heparin left ulnar artery occlusion radial  patent NATHALIE normal  11. Troponin is elevated  12. Procalcitonin 0.06 C-reactive 3.1 lactic acid 1.4  13.  She is off antibiotics     [x] High complexity decision making was performed  [x] See my orders for details  HPI  29-year-old lady came in because of shortness of breath and dyspnea she has significant past medical history of arthritis hypertension she was recently discharged from the hospital came back with worsening of hypoxia she was discharged on oxygen 2 L nasal cannula and patient condition got worse she was supposed to be discharged to skilled care but patient's son took her home where her condition got worse and she was transferred back to the hospital now she is on noninvasive ventilator BiPAP machine hemodynamically unstable hypotensive on vasopressors and not giving much history. PMH:  has no past medical history on file. PSH:   has a past surgical history that includes ir insert non tunl cvc over 5 yrs (1/21/2022). FHX: family history is not on file.      SHX:      ALL:   Allergies   Allergen Reactions    Penicillins Hives        MEDS:   [x] Reviewed - As Below   [] Not reviewed    Current Facility-Administered Medications   Medication    furosemide (LASIX) injection 20 mg    insulin glargine (LANTUS) injection 10 Units    heparin (porcine) injection 5,000 Units    insulin lispro (HUMALOG) injection    glucose chewable tablet 16 g    glucagon (GLUCAGEN) injection 1 mg    dextrose 10% infusion 125-250 mL    dexamethasone (DECADRON) 4 mg/mL injection 4 mg    0.9% sodium chloride infusion 250 mL    0.9% sodium chloride infusion 250 mL    dilTIAZem IR (CARDIZEM) tablet 30 mg    fentaNYL (PF) 1,500 mcg/30 mL (50 mcg/mL) infusion    hydrOXYzine pamoate (VISTARIL) capsule 25 mg    propofol (DIPRIVAN) 10 mg/mL infusion    dexmedeTOMidine (PRECEDEX) 400 mcg in 0.9% sodium chloride (MBP/ADV) 100 mL MBP    NOREPINephrine (LEVOPHED) 8 mg in 0.9% NS 250ml infusion    acetaminophen (TYLENOL) tablet 650 mg    Or    acetaminophen (TYLENOL) suppository 650 mg    polyethylene glycol (MIRALAX) packet 17 g    ondansetron (ZOFRAN ODT) tablet 4 mg    baricitinib (OLUMIANT) tablet 2 mg    albuterol (PROVENTIL HFA, VENTOLIN HFA, PROAIR HFA) inhaler 2 Puff    polyethylene glycol (MIRALAX) packet 17 g    ondansetron (ZOFRAN) injection 4 mg    hydrOXYzine (VISTARIL) injection 25 mg      MAR reviewed and pertinent medications noted or modified as needed   Current Facility-Administered Medications   Medication    furosemide (LASIX) injection 20 mg    insulin glargine (LANTUS) injection 10 Units    heparin (porcine) injection 5,000 Units    insulin lispro (HUMALOG) injection    glucose chewable tablet 16 g    glucagon (GLUCAGEN) injection 1 mg    dextrose 10% infusion 125-250 mL    dexamethasone (DECADRON) 4 mg/mL injection 4 mg    0.9% sodium chloride infusion 250 mL    0.9% sodium chloride infusion 250 mL    dilTIAZem IR (CARDIZEM) tablet 30 mg    fentaNYL (PF) 1,500 mcg/30 mL (50 mcg/mL) infusion    hydrOXYzine pamoate (VISTARIL) capsule 25 mg    propofol (DIPRIVAN) 10 mg/mL infusion    dexmedeTOMidine (PRECEDEX) 400 mcg in 0.9% sodium chloride (MBP/ADV) 100 mL MBP    NOREPINephrine (LEVOPHED) 8 mg in 0.9% NS 250ml infusion    acetaminophen (TYLENOL) tablet 650 mg    Or    acetaminophen (TYLENOL) suppository 650 mg    polyethylene glycol (MIRALAX) packet 17 g    ondansetron (ZOFRAN ODT) tablet 4 mg    baricitinib (OLUMIANT) tablet 2 mg    albuterol (PROVENTIL HFA, VENTOLIN HFA, PROAIR HFA) inhaler 2 Puff    polyethylene glycol (MIRALAX) packet 17 g    ondansetron (ZOFRAN) injection 4 mg    hydrOXYzine (VISTARIL) injection 25 mg      PMH:  has no past medical history on file. PSH:   has a past surgical history that includes ir insert non tunl cvc over 5 yrs (2022). FHX: family history is not on file. SHX:       ROS:  Unable to obtain sedated on ventilator    Hemodynamics:    CO:    CI:    CVP:    SVR:   PAP Systolic:    PAP Diastolic:    PVR:    NG18:        Ventilator Settings:      Mode Rate TV Press PEEP FiO2 PIP Min.  Vent   Assist control,Pressure control    450 ml    5 cm H20 90 %  33 cm H2O  11 l/min        Vital Signs: Telemetry:    normal sinus rhythm Intake/Output:   Visit Vitals  BP (!) 89/50 (BP 1 Location: Right lower arm, BP Patient Position: At rest)   Pulse 74   Temp 98.8 °F (37.1 °C)   Resp (!) 32   Ht 5' 5\" (1.651 m)   Wt 103.5 kg (228 lb 2.8 oz)   SpO2 93%   BMI 37.97 kg/m²       Temp (24hrs), Av.7 °F (37.1 °C), Min:98.4 °F (36.9 °C), Max:98.8 °F (37.1 °C)        O2 Device: Ventilator         Wt Readings from Last 4 Encounters:   02/12/22 103.5 kg (228 lb 2.8 oz)   01/17/22 90.7 kg (200 lb)          Intake/Output Summary (Last 24 hours) at 2/12/2022 0810  Last data filed at 2/12/2022 0600  Gross per 24 hour   Intake 344 ml   Output 4500 ml   Net -4156 ml       Last shift:      No intake/output data recorded. Last 3 shifts: 02/10 1901 - 02/12 0700  In: 982   Out: 5765 [Urine:5765]       Physical Exam:     General: Intubated on ventilator unresponsive on propofol fentanyl  HEENT: NCAT,   Eyes: anicteric; conjunctiva clear no doll's eye reflex  Neck: no nodes, , trach midline; no accessory MM use.   Questionable neck vein distention  Chest: no deformity,   Cardiac: R regular; no murmur;   Lungs: Diffuse wheezes and rales  Abd: soft, NT, hypoactive BS  Ext: Edema of the leg edema; left hand finger blackish discoloration  : clear urine  Neuro: Sedated unresponsive on the ventilator no doll's eye reflex flaccid extremities  Psych-unable to assess  Skin: warm, dry, no cyanosis;   Pulses: Brachial radial pulses intact  Capillary: Normal capillary refill      DATA:    MAR reviewed and pertinent medications noted or modified as needed  MEDS:   Current Facility-Administered Medications   Medication    furosemide (LASIX) injection 20 mg    insulin glargine (LANTUS) injection 10 Units    heparin (porcine) injection 5,000 Units    insulin lispro (HUMALOG) injection    glucose chewable tablet 16 g    glucagon (GLUCAGEN) injection 1 mg    dextrose 10% infusion 125-250 mL    dexamethasone (DECADRON) 4 mg/mL injection 4 mg    0.9% sodium chloride infusion 250 mL    0.9% sodium chloride infusion 250 mL    dilTIAZem IR (CARDIZEM) tablet 30 mg    fentaNYL (PF) 1,500 mcg/30 mL (50 mcg/mL) infusion    hydrOXYzine pamoate (VISTARIL) capsule 25 mg    propofol (DIPRIVAN) 10 mg/mL infusion    dexmedeTOMidine (PRECEDEX) 400 mcg in 0.9% sodium chloride (MBP/ADV) 100 mL MBP  NOREPINephrine (LEVOPHED) 8 mg in 0.9% NS 250ml infusion    acetaminophen (TYLENOL) tablet 650 mg    Or    acetaminophen (TYLENOL) suppository 650 mg    polyethylene glycol (MIRALAX) packet 17 g    ondansetron (ZOFRAN ODT) tablet 4 mg    baricitinib (OLUMIANT) tablet 2 mg    albuterol (PROVENTIL HFA, VENTOLIN HFA, PROAIR HFA) inhaler 2 Puff    polyethylene glycol (MIRALAX) packet 17 g    ondansetron (ZOFRAN) injection 4 mg    hydrOXYzine (VISTARIL) injection 25 mg        Labs:    Recent Labs     02/12/22  0441 02/11/22  0440 02/10/22  0400   WBC 8.3 10.5 9.1   HGB 8.0* 8.4* 8.2*    171 122*     Recent Labs     02/12/22  0441 02/11/22  0440 02/10/22  0400    137 138   K 3.6 3.5 4.0    103 104   CO2 29 27 31   * 140* 123*   BUN 47* 50* 44*   CREA 0.34* 0.31* 0.32*   CA 9.0 9.1 9.0   ALB 1.8* 1.9* 1.8*   ALT 63 77 83*     Recent Labs     02/12/22  0440 02/11/22  0445 02/10/22  0310   PH 7.45 7.45 7.46*   PCO2 43 41 44   PO2 81 60* 61*   HCO3 29* 28* 30*   FIO2 90.0 90.0 90.0   1/30 AC 12  PEEP 5 FiO2 100%    Lab Results   Component Value Date/Time    Culture result: No growth 6 days 01/21/2022 07:55 AM    Culture result: No growth 6 days 01/18/2022 07:12 AM    Culture result: (A) 01/17/2022 07:12 AM     Staphylococcus species, coagulase negative growing in 1 of 4 bottles drawn NO SITE INDICATED    Culture result:  01/17/2022 07:12 AM     (NOTE) [facility] GROWING IN 1 OF 2 BOTTLES CALLED TO JOSE MIGUEL PAGAN AT 0831 ON 1/19/22. JF     No results found for: TSH, TSHEXT, TSHEXT     Imaging:    Results from Hospital Encounter encounter on 01/21/22    XR CHEST PORT    Narrative  Examination: XR CHEST PORT    History: chf    Comparison: Chest radiograph 2/11/2022    FINDINGS:    Single frontal portable view of the chest. Endotracheal tube tip projects over  the mid to distal intrathoracic trachea. Enteric tube courses below the  diaphragm with tip excluded from view.  Right neck central vascular catheter tip  projects over the caudal aspect of the SVC. Lung volumes are symmetrically low. There are diffuse hazy opacities in the  right chest, progressed. There are also hazy patchy opacities within the left  lung, roughly similar to prior. More confluent opacities in the lung bases. No  radiographically evident pneumothorax. Layering pleural effusions could  contribute to the above described opacities. The cardiac silhouette is  prominent, unchanged. Atherosclerosis of the thoracic aorta. Osseous structures  appear unchanged. Impression  Bilateral opacities, slightly progressed on the right. Results from [place] encounter on 01/21/22    CT HEAD WO CONT    Narrative  The study is a noncontrasted head CT examination dated 1/21/2022. HISTORY: Unresponsive. TECHNIQUE: Thin section axial imaging was performed followed by sagittal and  coronal reconstructed imaging. Dose Reduction Technique was employed to reduce radiation exposure - This  includes reduction optimization techniques as appropriate to a performed exam  with automated exposure control adjustments of the mA and/or Kv according to  patient size, or use of iterative reconstruction technique. COMPARISON: CT head dated 1/17/2022. There is an appropriate size to the ventricles, the deep cortical sulci, and the  sylvian fissures for the patient's age. This examination is negative for  intracranial hemorrhage, mass effect or an extra axial collection. The  gray-white matter junctions are preserved without cytotoxic or vasogenic edema. An assessment of the white matter tracts demonstrates a mild decrease in the  density characteristics of the central periventricular white matter. These  findings are consistent with expected aging changes and milder microvascular  disease.  There also is a region of diminished density within the right posterior  inferior cerebellar hemisphere which may represent an older cerebrovascular  insult. ORBITS: This examination is negative for acute orbital pathology. PARANASAL SINUSES: The paranasal sinuses are well pneumatized without acute  sinus disease. There is a decreased number of right sided mastoid air cells which can be  associated with chronic mastoiditis. The craniocervical junction images in a  normal fashion. Impression  1. There are milder microvascular changes within the central periventricular  white matter. 2.  There is an area of diminished density within the inferior aspect of the  right cerebellar hemisphere without change (series 201 image number 12). These  findings may represent an older ischemic insult within the right posterior  inferior cerebellar region. 3.  This examination is negative for acute intracranial pathology or short-term  interval changes dating back to 1/17/2022. 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 Chest x-ray looks like pulmonary edema.   She has peripheral edema we will give Lasix today discontinue D5W continue tube feedings we will check echocardiogram  · 1/31 off pressors remain intubated   · 2/1 remain intubated sedated on ventilator will do sedation vacation in a.m. still on 85%  · 2/2 on ventilator sedated elevated PCO2  · 2/3 remain intubated off pressors significant drop in hemoglobin and increasing white count we will repeat CBC and work accordingly  · 2/4 patient is back on Levophed hemodynamically unstable still on ventilator 85% FiO2  · 2/5 remain on ventilator condition unstable  · 2/6 condition unstable sedated on ventilator  · 2/7 we will change vent settings  · 2/10 intubated on ventilator on 90% FiO2  · 2/11 remain intubated family to make decision  · 2/12 on 90% FiO2 back on Levophed  · Time of care 30-minute

## 2022-02-12 NOTE — PROGRESS NOTES
Progress Note    Patient: Sofia Rothman MRN: [id number]  SSN: [id number]    YOB: 1940  Age: 80 y.o. Sex: female      Admit Date: 1/21/2022    LOS: 22 days     Subjective:     80year-old patient came in with shortness of breath got intubated was hypotensive on vasopressin was with septic shock  Objective:     Vitals:    02/12/22 1000 02/12/22 1100 02/12/22 1124 02/12/22 1200   BP: (!) 112/52 (!) 106/58  (!) 104/57   Pulse: 76 76 73 69   Resp: 29 28 25 24   Temp:       SpO2: 96% 97% 96% 97%   Weight:       Height:            Intake and Output:  Current Shift: 02/12 0701 - 02/12 1900  In: -   Out: 1200 [Urine:1200]  Last three shifts: 02/10 1901 - 02/12 0700  In: 982   Out: 5765 [Urine:5765]    Physical Exam:   General:   Intubated   Eyes:     Ears:  Normal TMs and external ear canals both ears. Nose: Nares normal. Septum midline. Mucosa normal. No drainage or sinus tenderness. Mouth/Throat: Lips, mucosa, and tongue normal. Teeth and gums normal.   Neck: Supple, symmetrical, trachea midline, no adenopathy, thyroid: no enlargment/tenderness/nodules, no carotid bruit and no JVD. Back:   Symmetric, no curvature. ROM normal. No CVA tenderness. Lungs:   Clear to auscultation bilaterally. Heart:  Regular rate and rhythm, S1, S2 normal, no murmur, click, rub or gallop. Abdomen:   Soft, non-tender. Bowel sounds normal. No masses,  No organomegaly. Extremities: Extremities normal, atraumatic, no cyanosis or edema. Pulses: 2+ and symmetric all extremities. Skin: Skin color, texture, turgor normal. No rashes or lesions   Lymph nodes:  Intubated   Neurologic:        Lab/Data Review: All lab results for the last 24 hours reviewed.      Recent Results (from the past 24 hour(s))   GLUCOSE, POC    Collection Time: 02/11/22  4:53 PM   Result Value Ref Range    Glucose (POC) 146 (H) 65 - 117 mg/dL    Performed by Flores Salazar, POC    Collection Time: 02/12/22  1:12 AM   Result Value Ref Range    Glucose (POC) 127 (H) 65 - 117 mg/dL    Performed by Fanny Smith    BLOOD GAS, ARTERIAL    Collection Time: 02/12/22  4:40 AM   Result Value Ref Range    pH 7.45 7.35 - 7.45      PCO2 43 35 - 45 mmHg    PO2 81 75 - 100 mmHg    O2 SAT 97 >95 %    BICARBONATE 29 (H) 22 - 26 mmol/L    BASE EXCESS 4.9 (H) 0 - 2 mmol/L    O2 METHOD VENT      FIO2 90.0 %    MODE AssistControl/Pressure Control      SET RATE 12      IPAP/PIP 25      EPAP/CPAP/PEEP 5.0      SITE Right Radial      BEN'S TEST PASS     CBC WITH AUTOMATED DIFF    Collection Time: 02/12/22  4:41 AM   Result Value Ref Range    WBC 8.3 3.6 - 11.0 K/uL    RBC 2.53 (L) 3.80 - 5.20 M/uL    HGB 8.0 (L) 11.5 - 16.0 g/dL    HCT 24.7 (L) 35.0 - 47.0 %    MCV 97.6 80.0 - 99.0 FL    MCH 31.6 26.0 - 34.0 PG    MCHC 32.4 30.0 - 36.5 g/dL    RDW 15.4 (H) 11.5 - 14.5 %    PLATELET 460 421 - 323 K/uL    MPV 11.3 8.9 - 12.9 FL    NRBC 0.0 0.0  WBC    ABSOLUTE NRBC 0.00 0.00 - 0.01 K/uL    NEUTROPHILS 77 (H) 32 - 75 %    LYMPHOCYTES 14 12 - 49 %    MONOCYTES 3 (L) 5 - 13 %    EOSINOPHILS 5 0 - 7 %    BASOPHILS 0 0 - 1 %    IMMATURE GRANULOCYTES 1 (H) 0 - 0.5 %    ABS. NEUTROPHILS 6.4 1.8 - 8.0 K/UL    ABS. LYMPHOCYTES 1.2 0.8 - 3.5 K/UL    ABS. MONOCYTES 0.3 0.0 - 1.0 K/UL    ABS. EOSINOPHILS 0.4 0.0 - 0.4 K/UL    ABS. BASOPHILS 0.0 0.0 - 0.1 K/UL    ABS. IMM.  GRANS. 0.0 0.00 - 0.04 K/UL    DF AUTOMATED     METABOLIC PANEL, COMPREHENSIVE    Collection Time: 02/12/22  4:41 AM   Result Value Ref Range    Sodium 139 136 - 145 mmol/L    Potassium 3.6 3.5 - 5.1 mmol/L    Chloride 105 97 - 108 mmol/L    CO2 29 21 - 32 mmol/L    Anion gap 5 5 - 15 mmol/L    Glucose 114 (H) 65 - 100 mg/dL    BUN 47 (H) 6 - 20 mg/dL    Creatinine 0.34 (L) 0.55 - 1.02 mg/dL    BUN/Creatinine ratio 138 (H) 12 - 20      GFR est AA >60 >60 ml/min/1.73m2    GFR est non-AA >60 >60 ml/min/1.73m2    Calcium 9.0 8.5 - 10.1 mg/dL    Bilirubin, total 0.5 0.2 - 1.0 mg/dL    AST (SGOT) 42 (H) 15 - 37 U/L    ALT (SGPT) 63 12 - 78 U/L    Alk. phosphatase 64 45 - 117 U/L    Protein, total 5.6 (L) 6.4 - 8.2 g/dL    Albumin 1.8 (L) 3.5 - 5.0 g/dL    Globulin 3.8 2.0 - 4.0 g/dL    A-G Ratio 0.5 (L) 1.1 - 2.2     GLUCOSE, POC    Collection Time: 02/12/22  5:48 AM   Result Value Ref Range    Glucose (POC) 100 65 - 117 mg/dL    Performed by Ede Delarosa    GLUCOSE, POC    Collection Time: 02/12/22 12:34 PM   Result Value Ref Range    Glucose (POC) 214 (H) 65 - 117 mg/dL    Performed by [employer])          Assessment:     Active Problems:    COVID-19 (1/17/2022)      Respiratory failure with hypoxia (HCC) (1/21/2022)      Hypernatremia (1/23/2022)      Hypokalemia (1/23/2022)      1. Acute hypoxic respiratory  2. Acute on chronic hypercapnic respiratory failure   3. COVID-19 pneumonia  4. Pulmonary edema  5. Severe Anemia hemoglobin dropped to 4 recieved blood transfusion   6. Sepsis with septic shock on Levophed  7. Left hand ischemia  8. Arthritis hypertension by hx  9. Hyperkalemia corrected  10. Hyponatremia resolved           Plan:     l present treatment continue.   Prognosis is guarded    Signed By: Sue Chapman MD     February 12, 2022

## 2022-02-13 NOTE — PROGRESS NOTES
IMPRESSION:   1. Acute hypoxic respiratory  2. Acute on chronic hypercapnic respiratory failure   3. COVID-19 pneumonia  4. Pulmonary edema  5. Bilateral pleural effusion  6. Hypokalemia to be replete  7. Severe Anemia hemoglobin dropped to 4 stable since transfusion  8. Sepsis with septic shock off pressors  9. Left hand ischemia  10. Arthritis hypertension by hx  11. Hyperkalemia corrected  12. Hyponatremia resolved      RECOMMENDATIONS/PLAN:   1. ICU monitoring  1. Patient condition got worse on 1/24 went to asystole subsequently got intubated now on ventilator assist control mode sedated with propofol and  Precedex arterial blood gases acceptable currently on A/C 12 PC 25 time 1.0 PEEP 5 FiO2  90%  not able to wean  2. Patient is on dexamethasone and baricitinib  3. Leukocytosis and hemoglobin improved  4. Elevated blood sugar at steroid was decreased blood sugar trending down patient on sliding scale  5. Potassium to be repleted  6. Left hand NATHALIE normal continue local wound care   7. Pulmonary edema received Lasix chest x-ray still shows bilateral effusion  8. Left hand ischemia off IV heparin left ulnar artery occlusion radial  patent NATHALIE normal  9. Troponin is elevated  10. Procalcitonin 0.06 C-reactive 3.1 lactic acid 1.4     [x] High complexity decision making was performed  [x] See my orders for details  HPI  81-year-old lady came in because of shortness of breath and dyspnea she has significant past medical history of arthritis hypertension she was recently discharged from the hospital came back with worsening of hypoxia she was discharged on oxygen 2 L nasal cannula and patient condition got worse she was supposed to be discharged to skilled care but patient's son took her home where her condition got worse and she was transferred back to the hospital now she is on noninvasive ventilator BiPAP machine hemodynamically unstable hypotensive on vasopressors and not giving much history.     PMH:  has no past medical history on file. PSH:   has a past surgical history that includes ir insert non tunl cvc over 5 yrs (1/21/2022). FHX: family history is not on file.      SHX:      ALL:   Allergies   Allergen Reactions    Penicillins Hives        MEDS:   [x] Reviewed - As Below   [] Not reviewed    Current Facility-Administered Medications   Medication    furosemide (LASIX) injection 20 mg    insulin glargine (LANTUS) injection 10 Units    heparin (porcine) injection 5,000 Units    insulin lispro (HUMALOG) injection    glucose chewable tablet 16 g    glucagon (GLUCAGEN) injection 1 mg    dextrose 10% infusion 125-250 mL    dexamethasone (DECADRON) 4 mg/mL injection 4 mg    0.9% sodium chloride infusion 250 mL    0.9% sodium chloride infusion 250 mL    dilTIAZem IR (CARDIZEM) tablet 30 mg    fentaNYL (PF) 1,500 mcg/30 mL (50 mcg/mL) infusion    hydrOXYzine pamoate (VISTARIL) capsule 25 mg    propofol (DIPRIVAN) 10 mg/mL infusion    dexmedeTOMidine (PRECEDEX) 400 mcg in 0.9% sodium chloride (MBP/ADV) 100 mL MBP    NOREPINephrine (LEVOPHED) 8 mg in 0.9% NS 250ml infusion    acetaminophen (TYLENOL) tablet 650 mg    Or    acetaminophen (TYLENOL) suppository 650 mg    polyethylene glycol (MIRALAX) packet 17 g    ondansetron (ZOFRAN ODT) tablet 4 mg    baricitinib (OLUMIANT) tablet 2 mg    albuterol (PROVENTIL HFA, VENTOLIN HFA, PROAIR HFA) inhaler 2 Puff    polyethylene glycol (MIRALAX) packet 17 g    ondansetron (ZOFRAN) injection 4 mg    hydrOXYzine (VISTARIL) injection 25 mg      MAR reviewed and pertinent medications noted or modified as needed   Current Facility-Administered Medications   Medication    furosemide (LASIX) injection 20 mg    insulin glargine (LANTUS) injection 10 Units    heparin (porcine) injection 5,000 Units    insulin lispro (HUMALOG) injection    glucose chewable tablet 16 g    glucagon (GLUCAGEN) injection 1 mg    dextrose 10% infusion 125-250 mL    dexamethasone (DECADRON) 4 mg/mL injection 4 mg    0.9% sodium chloride infusion 250 mL    0.9% sodium chloride infusion 250 mL    dilTIAZem IR (CARDIZEM) tablet 30 mg    fentaNYL (PF) 1,500 mcg/30 mL (50 mcg/mL) infusion    hydrOXYzine pamoate (VISTARIL) capsule 25 mg    propofol (DIPRIVAN) 10 mg/mL infusion    dexmedeTOMidine (PRECEDEX) 400 mcg in 0.9% sodium chloride (MBP/ADV) 100 mL MBP    NOREPINephrine (LEVOPHED) 8 mg in 0.9% NS 250ml infusion    acetaminophen (TYLENOL) tablet 650 mg    Or    acetaminophen (TYLENOL) suppository 650 mg    polyethylene glycol (MIRALAX) packet 17 g    ondansetron (ZOFRAN ODT) tablet 4 mg    baricitinib (OLUMIANT) tablet 2 mg    albuterol (PROVENTIL HFA, VENTOLIN HFA, PROAIR HFA) inhaler 2 Puff    polyethylene glycol (MIRALAX) packet 17 g    ondansetron (ZOFRAN) injection 4 mg    hydrOXYzine (VISTARIL) injection 25 mg      PMH:  has no past medical history on file. PSH:   has a past surgical history that includes ir insert non tunl cvc over 5 yrs (2022). FHX: family history is not on file. SHX:       ROS:  Unable to obtain sedated on ventilator    Hemodynamics:    CO:    CI:    CVP:    SVR:   PAP Systolic:    PAP Diastolic:    PVR:    LJ18:        Ventilator Settings:      Mode Rate TV Press PEEP FiO2 PIP Min.  Vent   Assist control,Pressure control    450 ml    5 cm H20 90 %  33 cm H2O  11.1 l/min        Vital Signs: Telemetry:    normal sinus rhythm Intake/Output:   Visit Vitals  BP (!) 122/45 (BP 1 Location: Right lower arm, BP Patient Position: At rest)   Pulse 94   Temp 98.1 °F (36.7 °C)   Resp 28   Ht 5' 5\" (1.651 m)   Wt 103.5 kg (228 lb 2.8 oz)   SpO2 97%   BMI 37.97 kg/m²       Temp (24hrs), Av.8 °F (36.6 °C), Min:97.5 °F (36.4 °C), Max:98.1 °F (36.7 °C)        O2 Device: Ventilator         Wt Readings from Last 4 Encounters:   02/12/22 103.5 kg (228 lb 2.8 oz)   22 90.7 kg (200 lb)          Intake/Output Summary (Last 24 hours) at 2022 [facility]  filed at 2/13/2022 1030  Gross per 24 hour   Intake    Output 3000 ml   Net -3000 ml       Last shift:      02/13 0701 - 02/13 1900  In: -   Out: 1200 [Urine:1200]  Last 3 shifts: 02/11 1901 - 02/13 0700  In: 1949.7 [I.V.:1799.7]  Out: 9637 [Urine:4650]       Physical Exam:     General: Intubated on ventilator unresponsive on propofol and  HEENT: NCAT,   Eyes: anicteric; conjunctiva clear no doll's eye reflex  Neck: no nodes, , trach midline; no accessory MM use.   Questionable neck vein distention  Chest: no deformity,   Cardiac: R regular; no murmur;   Lungs: Diffuse rales  Abd: soft, NT, hypoactive BS  Ext: Edema of the leg ; left hand finger blackish discoloration  : clear urine  Neuro: Sedated unresponsive on the ventilator no doll's eye reflex flaccid extremities  Psych-unable to assess  Skin: warm, dry, no cyanosis;   Pulses: Brachial radial pulses intact  Capillary: Normal capillary refill      DATA:    MAR reviewed and pertinent medications noted or modified as needed  MEDS:   Current Facility-Administered Medications   Medication    furosemide (LASIX) injection 20 mg    insulin glargine (LANTUS) injection 10 Units    heparin (porcine) injection 5,000 Units    insulin lispro (HUMALOG) injection    glucose chewable tablet 16 g    glucagon (GLUCAGEN) injection 1 mg    dextrose 10% infusion 125-250 mL    dexamethasone (DECADRON) 4 mg/mL injection 4 mg    0.9% sodium chloride infusion 250 mL    0.9% sodium chloride infusion 250 mL    dilTIAZem IR (CARDIZEM) tablet 30 mg    fentaNYL (PF) 1,500 mcg/30 mL (50 mcg/mL) infusion    hydrOXYzine pamoate (VISTARIL) capsule 25 mg    propofol (DIPRIVAN) 10 mg/mL infusion    dexmedeTOMidine (PRECEDEX) 400 mcg in 0.9% sodium chloride (MBP/ADV) 100 mL MBP    NOREPINephrine (LEVOPHED) 8 mg in 0.9% NS 250ml infusion    acetaminophen (TYLENOL) tablet 650 mg    Or    acetaminophen (TYLENOL) suppository 650 mg    polyethylene glycol (MIRALAX) packet 17 g    ondansetron (ZOFRAN ODT) tablet 4 mg    baricitinib (OLUMIANT) tablet 2 mg    albuterol (PROVENTIL HFA, VENTOLIN HFA, PROAIR HFA) inhaler 2 Puff    polyethylene glycol (MIRALAX) packet 17 g    ondansetron (ZOFRAN) injection 4 mg    hydrOXYzine (VISTARIL) injection 25 mg        Labs:    Recent Labs     02/13/22  0445 02/12/22  0441 02/11/22  0440   WBC 7.5 8.3 10.5   HGB 8.1* 8.0* 8.4*    160 171     Recent Labs     02/13/22  0445 02/12/22  0441 02/11/22  0440    139 137   K 3.2* 3.6 3.5    105 103   CO2 32 29 27   * 114* 140*   BUN 35* 47* 50*   CREA 0.36* 0.34* 0.31*   CA 9.0 9.0 9.1   ALB 1.9* 1.8* 1.9*   ALT 61 63 77     Recent Labs     02/13/22  0300 02/12/22  0440 02/11/22  0445   PH 7.48* 7.45 7.45   PCO2 43 43 41   PO2 60* 81 60*   HCO3 31* 29* 28*   FIO2 90.0 90.0 90.0   2/13 AC 12 PC 25 time 1 PEEP 5 FiO2 90% respirations 28   1/30 AC 12  PEEP 5 FiO2 100%    Lab Results   Component Value Date/Time    Culture result: No growth 6 days 01/21/2022 07:55 AM    Culture result: No growth 6 days 01/18/2022 07:12 AM    Culture result: (A) 01/17/2022 07:12 AM     Staphylococcus species, coagulase negative growing in 1 of 4 bottles drawn NO SITE INDICATED    Culture result:  01/17/2022 07:12 AM     (NOTE) [facility] GROWING IN 1 OF 2 BOTTLES CALLED TO JOSE MIGUEL PAGAN AT 0831 ON 1/19/22. CESAR     No results found for: TSH, TSHEXT, TSHEXT     Imaging:    Results from Hospital Encounter encounter on 01/21/22    XR CHEST PORT    Narrative  Examination: XR CHEST PORT    History: chf    Comparison: Chest radiograph 2/12/2022    FINDINGS:    Single frontal portable view of the chest. Endotracheal tube projects over the  mid intrathoracic trachea. Enteric tube courses below the diaphragm with tip  excluded from view. Right neck central vascular catheter tip projects over the  superior cavoatrial junction. Symmetric low lung volumes.  Diffuse hazy opacities within both lungs, right  greater than left, similar to prior. Layering pleural effusions could contribute  to this appearance. No radiographically evident pneumothorax. The cardiac  silhouette is prominent, unchanged. Atherosclerosis of the thoracic aorta. Mediastinal contours and osseous structures appear overall unchanged. Impression  No significant interval change, including bilateral opacities. Results from [place] encounter on 01/21/22    CT HEAD WO CONT    Narrative  The study is a noncontrasted head CT examination dated 1/21/2022. HISTORY: Unresponsive. TECHNIQUE: Thin section axial imaging was performed followed by sagittal and  coronal reconstructed imaging. Dose Reduction Technique was employed to reduce radiation exposure - This  includes reduction optimization techniques as appropriate to a performed exam  with automated exposure control adjustments of the mA and/or Kv according to  patient size, or use of iterative reconstruction technique. COMPARISON: CT head dated 1/17/2022. There is an appropriate size to the ventricles, the deep cortical sulci, and the  sylvian fissures for the patient's age. This examination is negative for  intracranial hemorrhage, mass effect or an extra axial collection. The  gray-white matter junctions are preserved without cytotoxic or vasogenic edema. An assessment of the white matter tracts demonstrates a mild decrease in the  density characteristics of the central periventricular white matter. These  findings are consistent with expected aging changes and milder microvascular  disease. There also is a region of diminished density within the right posterior  inferior cerebellar hemisphere which may represent an older cerebrovascular  insult. ORBITS: This examination is negative for acute orbital pathology. PARANASAL SINUSES: The paranasal sinuses are well pneumatized without acute  sinus disease.     There is a decreased number of right sided mastoid air cells which can be  associated with chronic mastoiditis. The craniocervical junction images in a  normal fashion. Impression  1. There are milder microvascular changes within the central periventricular  white matter. 2.  There is an area of diminished density within the inferior aspect of the  right cerebellar hemisphere without change (series 201 image number 12). These  findings may represent an older ischemic insult within the right posterior  inferior cerebellar region. 3.  This examination is negative for acute intracranial pathology or short-term  interval changes dating back to 1/17/2022. 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 Chest x-ray looks like pulmonary edema. She has peripheral edema we will give Lasix today discontinue D5W continue tube feedings we will check echocardiogram  · 1/31 off pressors remain intubated   · 2/1 remain intubated sedated on ventilator will do sedation vacation in a.m. still on 85%  · 2/2 on ventilator sedated elevated PCO2  · 2/3 remain intubated off pressors significant drop in hemoglobin and increasing white count we will repeat CBC and work accordingly  · 2/4 patient is back on Levophed hemodynamically unstable still on ventilator 85% FiO2  · 2/5 remain on ventilator condition unstable  · 2/6 condition unstable sedated on ventilator  · 2/7 we will change vent settings  · 2/10 intubated on ventilator on 90% FiO2  · 2/11 remain intubated family to make decision  · 2/12 on 90% FiO2 back on Levophed  · 2/13 remains on FiO2 90% respirations up to 28.   Have increase pressure control to 28.   If respirations remain elevated will increase propofol and add back fentanyl  · Time of care 30-minute    Avinash Goss MD

## 2022-02-13 NOTE — PROGRESS NOTES
Progress Note    Patient: Kraig Grimes MRN: [id number]  SSN: [id number]    YOB: 1940  Age: 80 y.o. Sex: female      Admit Date: 1/21/2022    LOS: 23 days     Subjective:     80year-old patient came in with shortness of breath got intubated was hypotensive on vasopressin was with septic shock  Objective:     Vitals:    02/13/22 0500 02/13/22 0600 02/13/22 0735 02/13/22 1103   BP: (!) 137/49 (!) 122/45     Pulse: 84 91 95 94   Resp: 30 (!) 32 30 28   Temp:       SpO2: 96% 94% 96% 97%   Weight:       Height:            Intake and Output:  Current Shift: 02/13 0701 - 02/13 1900  In: 100   Out: 1200 [Urine:1200]  Last three shifts: 02/11 1901 - 02/13 0700  In: 2657.9 [I.V.:2507.9]  Out: 4650 [Urine:4650]    Physical Exam:   General:   Intubated   Eyes:     Ears:  Normal TMs and external ear canals both ears. Nose: Nares normal. Septum midline. Mucosa normal. No drainage or sinus tenderness. Mouth/Throat: Lips, mucosa, and tongue normal. Teeth and gums normal.   Neck: Supple, symmetrical, trachea midline, no adenopathy, thyroid: no enlargment/tenderness/nodules, no carotid bruit and no JVD. Back:   Symmetric, no curvature. ROM normal. No CVA tenderness. Lungs:   Clear to auscultation bilaterally. Heart:  Regular rate and rhythm, S1, S2 normal, no murmur, click, rub or gallop. Abdomen:   Soft, non-tender. Bowel sounds normal. No masses,  No organomegaly. Extremities: Extremities normal, atraumatic, no cyanosis or edema. Pulses: 2+ and symmetric all extremities. Skin: Skin color, texture, turgor normal. No rashes or lesions   Lymph nodes:  Intubated   Neurologic:        Lab/Data Review: All lab results for the last 24 hours reviewed.      Recent Results (from the past 24 hour(s))   GLUCOSE, POC    Collection Time: 02/12/22  6:10 PM   Result Value Ref Range    Glucose (POC) 150 (H) 65 - 117 mg/dL    Performed by Destinee SimonsTrandrea)    GLUCOSE, POC    Collection Time: 02/13/22 12:00 AM   Result Value Ref Range    Glucose (POC) 82 65 - 117 mg/dL    Performed by Keri Andersen    BLOOD GAS, ARTERIAL    Collection Time: 02/13/22  3:00 AM   Result Value Ref Range    pH 7.48 (H) 7.35 - 7.45      PCO2 43 35 - 45 mmHg    PO2 60 (L) 75 - 100 mmHg    O2 SAT 93 (L) >95 %    BICARBONATE 31 (H) 22 - 26 mmol/L    BASE EXCESS 7.1 (H) 0 - 2 mmol/L    O2 METHOD VENT      FIO2 90.0 %    MODE AssistControl/Pressure Control      SET RATE 12      IPAP/PIP 25      EPAP/CPAP/PEEP 5.0      SITE Right Radial      BEN'S TEST PASS     CBC WITH AUTOMATED DIFF    Collection Time: 02/13/22  4:45 AM   Result Value Ref Range    WBC 7.5 3.6 - 11.0 K/uL    RBC 2.51 (L) 3.80 - 5.20 M/uL    HGB 8.1 (L) 11.5 - 16.0 g/dL    HCT 24.4 (L) 35.0 - 47.0 %    MCV 97.2 80.0 - 99.0 FL    MCH 32.3 26.0 - 34.0 PG    MCHC 33.2 30.0 - 36.5 g/dL    RDW 15.7 (H) 11.5 - 14.5 %    PLATELET 564 381 - 581 K/uL    MPV 11.1 8.9 - 12.9 FL    NRBC 0.0 0.0  WBC    ABSOLUTE NRBC 0.00 0.00 - 0.01 K/uL    NEUTROPHILS 75 32 - 75 %    LYMPHOCYTES 14 12 - 49 %    MONOCYTES 3 (L) 5 - 13 %    EOSINOPHILS 7 0 - 7 %    BASOPHILS 0 0 - 1 %    IMMATURE GRANULOCYTES 1 (H) 0 - 0.5 %    ABS. NEUTROPHILS 5.7 1.8 - 8.0 K/UL    ABS. LYMPHOCYTES 1.1 0.8 - 3.5 K/UL    ABS. MONOCYTES 0.2 0.0 - 1.0 K/UL    ABS. EOSINOPHILS 0.5 (H) 0.0 - 0.4 K/UL    ABS. BASOPHILS 0.0 0.0 - 0.1 K/UL    ABS. IMM.  GRANS. 0.0 0.00 - 0.04 K/UL    DF AUTOMATED     METABOLIC PANEL, COMPREHENSIVE    Collection Time: 02/13/22  4:45 AM   Result Value Ref Range    Sodium 138 136 - 145 mmol/L    Potassium 3.2 (L) 3.5 - 5.1 mmol/L    Chloride 102 97 - 108 mmol/L    CO2 32 21 - 32 mmol/L    Anion gap 4 (L) 5 - 15 mmol/L    Glucose 104 (H) 65 - 100 mg/dL    BUN 35 (H) 6 - 20 mg/dL    Creatinine 0.36 (L) 0.55 - 1.02 mg/dL    BUN/Creatinine ratio 97 (H) 12 - 20      GFR est AA >60 >60 ml/min/1.73m2    GFR est non-AA >60 >60 ml/min/1.73m2    Calcium 9.0 8.5 - 10.1 mg/dL    Bilirubin, total 0.6 0.2 - 1.0 mg/dL    AST (SGOT) 48 (H) 15 - 37 U/L    ALT (SGPT) 61 12 - 78 U/L    Alk. phosphatase 94 45 - 117 U/L    Protein, total 5.8 (L) 6.4 - 8.2 g/dL    Albumin 1.9 (L) 3.5 - 5.0 g/dL    Globulin 3.9 2.0 - 4.0 g/dL    A-G Ratio 0.5 (L) 1.1 - 2.2           Assessment:     Active Problems:    COVID-19 (1/17/2022)      Respiratory failure with hypoxia (HCC) (1/21/2022)      Hypernatremia (1/23/2022)      Hypokalemia (1/23/2022)      1. Acute hypoxic respiratory  2. Acute on chronic hypercapnic respiratory failure   3. COVID-19 pneumonia  4. Pulmonary edema  5. Severe Anemia hemoglobin dropped to 4 recieved blood transfusion   6. Sepsis with septic shock on Levophed  7. Left hand ischemia  8. Arthritis hypertension by hx  9. Hyperkalemia corrected  10.  Hyponatremia resolved           Plan:      Prognosis is guarded    Signed By: Ad Dunaway MD     February 13, 2022

## 2022-02-14 NOTE — PROGRESS NOTES
CM reviewed clinical chart. A goals of care meeting is scheduled for tomorrow at 10:30. CM will continue to follow.

## 2022-02-14 NOTE — PROGRESS NOTES
General Daily Progress Note          Patient Name:   Diaz Elizondo       YOB: 1940       Age:  80 y.o. Admit Date: 1/21/2022      Subjective:     Patient is a 80y.o. year old female with signal past medical history of hypertension arthritis who discharged from the hospital yesterday in stable condition patient is admitted last admission with COVID-19 patient was asymptomatic all this time while in the hospital patient discharged on 2 L oxygen to skilled care but patient son want to go home with home health but is at home patient's saturations drops sent back to the ER seen by the ER physician patient placed on BiPAP patient was little hypotensive started on Levophed admitted for further work-up and treatment           Patient is DNR    Patient on ventilator 80% O2    On propofol drip         NG tube in place for medication and feeding    Repeat Covid test positive      Objective:     Visit Vitals  BP (!) 98/46 (BP 1 Location: Right lower arm, BP Patient Position: At rest)   Pulse 74   Temp 98 °F (36.7 °C)   Resp 23   Ht 5' 5\" (1.651 m)   Wt 103.5 kg (228 lb 2.8 oz)   SpO2 92%   BMI 37.97 kg/m²        Recent Results (from the past 24 hour(s))   GLUCOSE, POC    Collection Time: 02/13/22  3:18 PM   Result Value Ref Range    Glucose (POC) 206 (H) 65 - 117 mg/dL    Performed by Nikky Connolly ([facility]    GLUCOSE, POC    Collection Time: 02/13/22  5:10 PM   Result Value Ref Range    Glucose (POC) 180 (H) 65 - 117 mg/dL    Performed by Nikky Connolly ([facility]    GLUCOSE, POC    Collection Time: 02/13/22 11:40 PM   Result Value Ref Range    Glucose (POC) 105 65 - 117 mg/dL    Performed by Neeru Galvan, POC    Collection Time: 02/14/22  5:00 AM   Result Value Ref Range    Glucose (POC) 96 65 - 117 mg/dL    Performed by Kallie May      [unfilled]      Review of Systems    Unable to obtain.         Physical Exam:      Constitutional: Awake on vent   HENT:   Head: Normocephalic and atraumatic. Eyes: Pupils are equal, round, and reactive to light. EOM are normal.   Cardiovascular: Normal rate, regular rhythm and normal heart sounds. Pulmonary/Chest: Decreased breath sounds   abdominal: Soft. Bowel sounds are normal. There is no abdominal tenderness. There is no rebound and no guarding. Musculoskeletal: Normal range of motion. Neurological: vent     XR CHEST PORT   Final Result   No significant interval change, including bilateral opacities. XR CHEST PORT   Final Result   Bilateral opacities, slightly progressed on the right. XR CHEST PORT   Final Result      XR CHEST PORT   Final Result   Findings/impression:   1. Limitations: Rotated and angulated. 2.  Medical devices: Right internal jugular venous catheter, endotracheal tube   and NG tube in stable. 3.  Ongoing moderate bilateral edema similar to prior exam.   4.  Ongoing collapse/consolidation in the left lower lobe concerning for   underlying airspace disease. 5.  The cardiac and mediastinal contours are stable though near completely   obscured by adjacent airspace disease. XR CHEST PORT   Final Result   No significant interval change. XR CHEST PORT   Final Result      XR CHEST PORT   Final Result   No significant interval change. XR CHEST PORT   Final Result   Persistent bilateral airspace disease and with decreased aeration. XR CHEST PORT   Final Result      XR CHEST PORT   Final Result      XR CHEST PORT   Final Result      XR CHEST PORT   Final Result      XR CHEST PORT   Final Result      WRIST BRACHIAL INDEX AT REST   Final Result      XR CHEST PORT   Final Result   No significant change. XR CHEST PORT   Final Result   No interval change. DUPLEX UPPER EXT ARTERY LEFT   Final Result      XR CHEST PORT   Final Result      XR CHEST PORT   Final Result   Endotracheal tube tip still at the maggie. Diffuse opacities in the   lungs, not significantly changed.       XR CHEST PORT   Final Result      XR CHEST PORT   Final Result   Endotracheal tube tip at the maggie. Diffuse opacities in the   lungs, accentuated by lower lung volumes or increased. XR CHEST PORT   Final Result   Diffuse opacities in the lungs, not significantly changed. XR CHEST PORT   Final Result   1. ETT terminating 2 cm above the maggie. Remaining support apparatus as above. 2.  Worsening bilateral airspace disease with developing ARDS not excluded. XR CHEST PORT   Final Result   Moderate patchy diffuse bilateral airspace opacities, waxing and waning from the   prior exam.      XR CHEST PORT   Final Result   Moderate diffuse bilateral airspace opacities, likely a combination of edema and   airspace disease, mildly increased from the previous exam.      CT HEAD WO CONT   Final Result   1. There are milder microvascular changes within the central periventricular   white matter. 2.  There is an area of diminished density within the inferior aspect of the   right cerebellar hemisphere without change (series 201 image number 12). These   findings may represent an older ischemic insult within the right posterior   inferior cerebellar region. 3.  This examination is negative for acute intracranial pathology or short-term   interval changes dating back to 1/17/2022. XR CHEST PORT   Final Result   Patchy mixed asymmetric lung disease appears somewhat better but the   lungs are better inflated. No effusion or pneumothorax. Normal heart and   mediastinum      IR INSERT NON TUNL CVC OVER 5 YRS   Final Result   Ultrasound of the right neck demonstrated patent IJV. Successful US-guided placement of a right internal jugular triple lumen central   venous catheter as described above. The catheter is functioning. PLAN:   Catheter position was confirmed by follow-up chest x-ray: catheter tip in the   lower SVC and ready to use.       IR Stuart   Final Result Ultrasound of the right neck demonstrated patent IJV. Successful US-guided placement of a right internal jugular triple lumen central   venous catheter as described above. The catheter is functioning. PLAN:   Catheter position was confirmed by follow-up chest x-ray: catheter tip in the   lower SVC and ready to use. XR CHEST PORT   Final Result   Findings/impression:      Diffuse interstitial airspace disease/edema. No definite pleural effusion or   pneumothorax. Cardiac contours are partially obscured. No acute osseous abnormality identified. XR CHEST PORT    (Results Pending)        Recent Results (from the past 24 hour(s))   GLUCOSE, POC    Collection Time: 02/13/22  3:18 PM   Result Value Ref Range    Glucose (POC) 206 (H) 65 - 117 mg/dL    Performed by Saúl Arely ([facility])    GLUCOSE, POC    Collection Time: 02/13/22  5:10 PM   Result Value Ref Range    Glucose (POC) 180 (H) 65 - 117 mg/dL    Performed by Saúl Arely ([facility])    GLUCOSE, POC    Collection Time: 02/13/22 11:40 PM   Result Value Ref Range    Glucose (POC) 105 65 - 117 mg/dL    Performed by Zoë [facility]    GLUCOSE, POC    Collection Time: 02/14/22  5:00 AM   Result Value Ref Range    Glucose (POC) 96 65 - 117 mg/dL    Performed by Zoë [facility]        Results     Procedure Component Value Units Date/Time    COVID-19 RAPID TEST [[id number]]  (Abnormal) Collected: 02/10/22 0923    Order Status: Completed Specimen: Nasopharyngeal Updated: 02/10/22 0957     Specimen source       Please find results under separate order           COVID-19 rapid test DETECTED        Comment: Rapid [employer] ID Now   The specimen is POSITIVE for SARS-CoV-2, the novel coronavirus associated with COVID-19. This test has been authorized by the FDA under an Emergency Use Authorization (EUA) for use by authorized laboratories.    Fact sheet for Healthcare Providers: ConventionUpdate.co.nz Fact sheet for Patients: [facility] HealthcareUpdate[facility]   Methodology: Isothermal Nucleic Acid Amplification Results verified, phoned to and read back by Khari Enamorado RN AT   [id number] BY CESAR                Labs:     Recent Labs     02/13/22 0445 02/12/22 0441   WBC 7.5 8.3   HGB 8.1* 8.0*   HCT 24.4* 24.7*    160     Recent Labs     02/13/22 0445 02/12/22 0441    139   K 3.2* 3.6    105   CO2 32 29   BUN 35* 47*   CREA 0.36* 0.34*   * 114*   CA 9.0 9.0     Recent Labs     02/13/22 0445 02/12/22 0441   ALT 61 63   AP 94 64   TBILI 0.6 0.5   TP 5.8* 5.6*   ALB 1.9* 1.8*   GLOB 3.9 3.8     No results for input(s): INR, PTP, APTT, INREXT, INREXT in the last 72 hours. No results for input(s): FE, TIBC, PSAT, FERR in the last 72 hours. No results found for: FOL, RBCF   Recent Labs     02/13/22  0300 02/12/22 0440   PH 7.48* 7.45   PCO2 43 43   PO2 60* 81     No results for input(s): CPK, CKNDX, TROIQ in the last 72 hours.     No lab exists for component: CPKMB  No results found for: CHOL, CHOLX, CHLST, CHOLV, HDL, HDLP, LDL, LDLC, DLDLP, TGLX, TRIGL, TRIGP, CHHD, CHHDX  Lab Results   Component Value Date/Time    Glucose (POC) 96 02/14/2022 05:00 AM    Glucose (POC) 105 02/13/2022 11:40 PM    Glucose (POC) 180 (H) 02/13/2022 05:10 PM    Glucose (POC) 206 (H) 02/13/2022 03:18 PM    Glucose (POC) 82 02/13/2022 12:00 AM     Lab Results   Component Value Date/Time    Color Yellow/Straw 01/23/2022 10:30 AM    Appearance Turbid (A) 01/23/2022 10:30 AM    Specific gravity 1.027 01/23/2022 10:30 AM    pH (UA) 6.0 01/23/2022 10:30 AM    Protein 100 (A) 01/23/2022 10:30 AM    Glucose 50 (A) 01/23/2022 10:30 AM    Ketone 5 (A) 01/23/2022 10:30 AM    Bilirubin Negative 01/23/2022 10:30 AM    Urobilinogen 4.0 (H) 01/23/2022 10:30 AM    Nitrites Negative 01/23/2022 10:30 AM    Leukocyte Esterase Negative 01/23/2022 10:30 AM    Bacteria Negative 01/23/2022 10:30 AM    WBC 0-4 01/23/2022 10:30 AM    RBC 0-5 01/23/2022 10:30 AM         Assessment:     Acute hypoxemic respiratory failure on on ventilator 80% of   COVID-19 pneumonitis   hypotension  Thrombocytopenia  Hypertension  Arthritis  High blood sugar secondary to steroid  Hyperkalemia  Hyponatremia  Moderate protein calorie malnutrition  Patient need multiple partial finger amputation once stable as per vascular surgeon    Repeat Covid is positive  Plan:         Olumiant 2 mg daily  Decadron 4 mg every 6 hours  Diltiazem 30 mg 3 times  Lasix 20 mg IV twice daily  Follow-up with pulmonologist and nephrologist  Start Lantus 10 units subcu day    Monitor sodium and potassium    Overall prognosis Poor        Patient is DNR    Discussed with the patient son he want to continue present treatment till February 13, he is discussing with the family to make a final decision                    Current Facility-Administered Medications:     potassium chloride 10 mEq in 100 ml IVPB, 10 mEq, IntraVENous, Q1H, Joanne Flower MD, Last Rate: 100 mL/hr at 02/14/22 0915, 10 mEq at 02/14/22 0915    furosemide (LASIX) injection 20 mg, 20 mg, IntraVENous, Q12H, Fredi, Jae Jaime MD, 20 mg at 02/14/22 0803    insulin glargine (LANTUS) injection 10 Units, 10 Units, SubCUTAneous, DAILY, Cedric Borrero MD, 10 Units at 02/14/22 0803    heparin (porcine) injection 5,000 Units, 5,000 Units, SubCUTAneous, Q8H, Polo Lopez MD, 5,000 Units at 02/14/22 0618    insulin lispro (HUMALOG) injection, , SubCUTAneous, Q6H, Polo Lopez MD, 3 Units at 02/13/22 1740    glucose chewable tablet 16 g, 4 Tablet, Oral, PRN, Alma Lopez MD    glucagon (GLUCAGEN) injection 1 mg, 1 mg, IntraMUSCular, PRN, Alma Lopez MD    dextrose 10% infusion 125-250 mL, 125-250 mL, IntraVENous, PRN, Alma Lopez MD    dexamethasone (DECADRON) 4 mg/mL injection 4 mg, 4 mg, IntraVENous, Q24H, Karan Flower MD, 4 mg at 02/14/22 0804    0.9% sodium chloride infusion 250 mL, 250 mL, IntraVENous, PRN, Amadou Boyd MD    0.9% sodium chloride infusion 250 mL, 250 mL, IntraVENous, PRN, Santos Flower MD    dilTIAZem IR (CARDIZEM) tablet 30 mg, 30 mg, Oral, TID, Karan Flower MD, 30 mg at 02/14/22 0803    fentaNYL (PF) 1,500 mcg/30 mL (50 mcg/mL) infusion, 0-200 mcg/hr, IntraVENous, TITRATE, Santos Flower MD, Last Rate: 1.5 mL/hr at 02/10/22 0914, 75 mcg/hr at 02/10/22 0914    hydrOXYzine pamoate (VISTARIL) capsule 25 mg, 25 mg, Oral, Q4H PRN, Polo Lopez MD    propofol (DIPRIVAN) 10 mg/mL infusion, 0-50 mcg/kg/min, IntraVENous, TITRATE, Santos Flower MD, Last Rate: 21.8 mL/hr at 02/14/22 0617, 40 mcg/kg/min at 02/14/22 0617    dexmedeTOMidine (PRECEDEX) 400 mcg in 0.9% sodium chloride (MBP/ADV) 100 mL MBP, 0.1-1.5 mcg/kg/hr, IntraVENous, TITRATE, Santos Flower MD, Last Rate: 13.6 mL/hr at 02/14/22 0916, 0.6 mcg/kg/hr at 02/14/22 0916    NOREPINephrine (LEVOPHED) 8 mg in 0.9% NS 250ml infusion, 0.5-16 mcg/min, IntraVENous, TITRATE, Lila Lopez MD, Stopped at 02/13/22 0836    acetaminophen (TYLENOL) tablet 650 mg, 650 mg, Oral, Q6H PRN **OR** acetaminophen (TYLENOL) suppository 650 mg, 650 mg, Rectal, Q6H PRN, Lila Lopez MD    polyethylene glycol (MIRALAX) packet 17 g, 17 g, Oral, DAILY PRN, Lila Lopez MD    ondansetron (ZOFRAN ODT) tablet 4 mg, 4 mg, Oral, Q8H PRN **OR** [DISCONTINUED] ondansetron (ZOFRAN) injection 4 mg, 4 mg, IntraVENous, Q6H PRN, Polo Lopze MD    baricitinib (OLUMIANT) tablet 2 mg, 2 mg, Oral, DAILY, Polo Lopez MD, 2 mg at 02/14/22 0803    albuterol (PROVENTIL HFA, VENTOLIN HFA, PROAIR HFA) inhaler 2 Puff, 2 Puff, Inhalation, Q6H PRN, Polo Lopez MD    polyethylene glycol (MIRALAX) packet 17 g, 17 g, Oral, DAILY PRN, Polo Lopez MD, 17 g at 01/28/22 0844    [DISCONTINUED] ondansetron (ZOFRAN ODT) tablet 4 mg, 4 mg, Oral, Q8H PRN **OR** ondansetron (ZOFRAN) injection 4 mg, 4 mg, IntraVENous, Q6H PRN, Nicolasa Garduno MD    hydrOXYzine (VISTARIL) injection 25 mg, 25 mg, IntraMUSCular, Q6H PRN, Polo Lopez MD, 25 mg at 01/22/22 0111

## 2022-02-14 NOTE — PROGRESS NOTES
IMPRESSION:   1. Acute hypoxic respiratory  2. Acute on chronic hypercapnic respiratory failure   3. COVID-19 pneumonia  4. Pulmonary edema  5. Bilateral pleural effusion  6. Hypokalemia to be repleted  7. Severe Anemia hemoglobin dropped to 4 stable since transfusion  8. Sepsis with septic shock off pressors  9. Left hand ischemia  10. Arthritis hypertension by hx  11. Hyperkalemia corrected  12. Hyponatremia resolved      RECOMMENDATIONS/PLAN:   1. ICU monitoring  1. Patient condition got worse on 1/24 went to asystole subsequently got intubated now on ventilator assist control mode sedated with propofol and  Precedex arterial blood gases acceptable currently on A/C 12 PC 25 time 1.0 PEEP 5 FiO2  90%  not able to wean  2. Patient is on dexamethasone and baricitinib  3. Leukocytosis and hemoglobin improved  4. Elevated blood sugar at steroid was decreased blood sugar trending down patient on sliding scale  5. Potassium to be repleted  6. Left hand NATHALIE normal continue local wound care   7. Pulmonary edema received Lasix chest x-ray still shows bilateral effusion  8. Left hand ischemia off IV heparin left ulnar artery occlusion radial  patent NATHALIE normal  9. Troponin is elevated  10. Procalcitonin 0.06 C-reactive 3.1 lactic acid 1.4     [x] High complexity decision making was performed  [x] See my orders for details  HPI  53-year-old lady came in because of shortness of breath and dyspnea she has significant past medical history of arthritis hypertension she was recently discharged from the hospital came back with worsening of hypoxia she was discharged on oxygen 2 L nasal cannula and patient condition got worse she was supposed to be discharged to skilled care but patient's son took her home where her condition got worse and she was transferred back to the hospital now she is on noninvasive ventilator BiPAP machine hemodynamically unstable hypotensive on vasopressors and not giving much history.     PMH:  has no past medical history on file. PSH:   has a past surgical history that includes ir insert non tunl cvc over 5 yrs (1/21/2022). FHX: family history is not on file.      SHX:      ALL:   Allergies   Allergen Reactions    Penicillins Hives        MEDS:   [x] Reviewed - As Below   [] Not reviewed    Current Facility-Administered Medications   Medication    furosemide (LASIX) injection 20 mg    insulin glargine (LANTUS) injection 10 Units    heparin (porcine) injection 5,000 Units    insulin lispro (HUMALOG) injection    glucose chewable tablet 16 g    glucagon (GLUCAGEN) injection 1 mg    dextrose 10% infusion 125-250 mL    dexamethasone (DECADRON) 4 mg/mL injection 4 mg    0.9% sodium chloride infusion 250 mL    0.9% sodium chloride infusion 250 mL    dilTIAZem IR (CARDIZEM) tablet 30 mg    fentaNYL (PF) 1,500 mcg/30 mL (50 mcg/mL) infusion    hydrOXYzine pamoate (VISTARIL) capsule 25 mg    propofol (DIPRIVAN) 10 mg/mL infusion    dexmedeTOMidine (PRECEDEX) 400 mcg in 0.9% sodium chloride (MBP/ADV) 100 mL MBP    NOREPINephrine (LEVOPHED) 8 mg in 0.9% NS 250ml infusion    acetaminophen (TYLENOL) tablet 650 mg    Or    acetaminophen (TYLENOL) suppository 650 mg    polyethylene glycol (MIRALAX) packet 17 g    ondansetron (ZOFRAN ODT) tablet 4 mg    baricitinib (OLUMIANT) tablet 2 mg    albuterol (PROVENTIL HFA, VENTOLIN HFA, PROAIR HFA) inhaler 2 Puff    polyethylene glycol (MIRALAX) packet 17 g    ondansetron (ZOFRAN) injection 4 mg    hydrOXYzine (VISTARIL) injection 25 mg      MAR reviewed and pertinent medications noted or modified as needed   Current Facility-Administered Medications   Medication    furosemide (LASIX) injection 20 mg    insulin glargine (LANTUS) injection 10 Units    heparin (porcine) injection 5,000 Units    insulin lispro (HUMALOG) injection    glucose chewable tablet 16 g    glucagon (GLUCAGEN) injection 1 mg    dextrose 10% infusion 125-250 mL    dexamethasone (DECADRON) 4 mg/mL injection 4 mg    0.9% sodium chloride infusion 250 mL    0.9% sodium chloride infusion 250 mL    dilTIAZem IR (CARDIZEM) tablet 30 mg    fentaNYL (PF) 1,500 mcg/30 mL (50 mcg/mL) infusion    hydrOXYzine pamoate (VISTARIL) capsule 25 mg    propofol (DIPRIVAN) 10 mg/mL infusion    dexmedeTOMidine (PRECEDEX) 400 mcg in 0.9% sodium chloride (MBP/ADV) 100 mL MBP    NOREPINephrine (LEVOPHED) 8 mg in 0.9% NS 250ml infusion    acetaminophen (TYLENOL) tablet 650 mg    Or    acetaminophen (TYLENOL) suppository 650 mg    polyethylene glycol (MIRALAX) packet 17 g    ondansetron (ZOFRAN ODT) tablet 4 mg    baricitinib (OLUMIANT) tablet 2 mg    albuterol (PROVENTIL HFA, VENTOLIN HFA, PROAIR HFA) inhaler 2 Puff    polyethylene glycol (MIRALAX) packet 17 g    ondansetron (ZOFRAN) injection 4 mg    hydrOXYzine (VISTARIL) injection 25 mg      PMH:  has no past medical history on file. PSH:   has a past surgical history that includes ir insert non tunl cvc over 5 yrs (2022). FHX: family history is not on file. SHX:       ROS:  Unable to obtain sedated on ventilator    Hemodynamics:    CO:    CI:    CVP:    SVR:   PAP Systolic:    PAP Diastolic:    PVR:    AB93:        Ventilator Settings:      Mode Rate TV Press PEEP FiO2 PIP Min.  Vent   Assist control,Pressure control    450 ml    5 cm H20 80 %  25 cm H2O  7.52 l/min        Vital Signs: Telemetry:    normal sinus rhythm Intake/Output:   Visit Vitals  BP (!) 98/46 (BP 1 Location: Right lower arm, BP Patient Position: At rest)   Pulse 71   Temp 98 °F (36.7 °C)   Resp 22   Ht 5' 5\" (1.651 m)   Wt 103.5 kg (228 lb 2.8 oz)   SpO2 96%   BMI 37.97 kg/m²       Temp (24hrs), Av.3 °F (36.8 °C), Min:98 °F (36.7 °C), Max:98.7 °F (37.1 °C)        O2 Device: Ventilator         Wt Readings from Last 4 Encounters:   22 103.5 kg (228 lb 2.8 oz)   22 90.7 kg (200 lb)          Intake/Output Summary (Last 24 hours) at 2022 [address] filed at 2/14/2022 0300  Gross per 24 hour   Intake 849.33 ml   Output 2850 ml   Net -2000.67 ml       Last shift:      No intake/output data recorded. Last 3 shifts: 02/12 1901 - 02/14 0700  In: 1557.5 [I.V.:1107.5]  Out: 4162 [Urine:4050]       Physical Exam:     General: Intubated on ventilator unresponsive on propofol and  HEENT: NCAT,   Eyes: anicteric; conjunctiva clear no doll's eye reflex  Neck: no nodes, , trach midline; no accessory MM use.   Questionable neck vein distention  Chest: no deformity,   Cardiac: R regular; no murmur;   Lungs: Diffuse rales  Abd: soft, NT, hypoactive BS  Ext: Edema of the leg ; left hand finger blackish discoloration  : clear urine  Neuro: Sedated unresponsive on the ventilator no doll's eye reflex flaccid extremities  Psych-unable to assess  Skin: warm, dry, no cyanosis;   Pulses: Brachial radial pulses intact  Capillary: Normal capillary refill      DATA:    MAR reviewed and pertinent medications noted or modified as needed  MEDS:   Current Facility-Administered Medications   Medication    furosemide (LASIX) injection 20 mg    insulin glargine (LANTUS) injection 10 Units    heparin (porcine) injection 5,000 Units    insulin lispro (HUMALOG) injection    glucose chewable tablet 16 g    glucagon (GLUCAGEN) injection 1 mg    dextrose 10% infusion 125-250 mL    dexamethasone (DECADRON) 4 mg/mL injection 4 mg    0.9% sodium chloride infusion 250 mL    0.9% sodium chloride infusion 250 mL    dilTIAZem IR (CARDIZEM) tablet 30 mg    fentaNYL (PF) 1,500 mcg/30 mL (50 mcg/mL) infusion    hydrOXYzine pamoate (VISTARIL) capsule 25 mg    propofol (DIPRIVAN) 10 mg/mL infusion    dexmedeTOMidine (PRECEDEX) 400 mcg in 0.9% sodium chloride (MBP/ADV) 100 mL MBP    NOREPINephrine (LEVOPHED) 8 mg in 0.9% NS 250ml infusion    acetaminophen (TYLENOL) tablet 650 mg    Or    acetaminophen (TYLENOL) suppository 650 mg    polyethylene glycol (MIRALAX) packet 17 g  ondansetron (ZOFRAN ODT) tablet 4 mg    baricitinib (OLUMIANT) tablet 2 mg    albuterol (PROVENTIL HFA, VENTOLIN HFA, PROAIR HFA) inhaler 2 Puff    polyethylene glycol (MIRALAX) packet 17 g    ondansetron (ZOFRAN) injection 4 mg    hydrOXYzine (VISTARIL) injection 25 mg        Labs:    Recent Labs     02/13/22  0445 02/12/22  0441   WBC 7.5 8.3   HGB 8.1* 8.0*    160     Recent Labs     02/13/22  0445 02/12/22  0441    139   K 3.2* 3.6    105   CO2 32 29   * 114*   BUN 35* 47*   CREA 0.36* 0.34*   CA 9.0 9.0   ALB 1.9* 1.8*   ALT 61 63     Recent Labs     02/13/22  0300 02/12/22  0440   PH 7.48* 7.45   PCO2 43 43   PO2 60* 81   HCO3 31* 29*   FIO2 90.0 90.0   2/13 AC 12 PC 25 time 1 PEEP 5 FiO2 90% respirations 28   1/30 AC 12  PEEP 5 FiO2 100%    Lab Results   Component Value Date/Time    Culture result: No growth 6 days 01/21/2022 07:55 AM    Culture result: No growth 6 days 01/18/2022 07:12 AM    Culture result: (A) 01/17/2022 07:12 AM     Staphylococcus species, coagulase negative growing in 1 of 4 bottles drawn NO SITE INDICATED    Culture result:  01/17/2022 07:12 AM     (NOTE) [facility] GROWING IN 1 OF 2 BOTTLES CALLED TO JOSE MIGUEL PAGAN AT 0831 ON 1/19/22.      No results found for: TSH, TSHEXT, TSHEXT     Imaging:    Results from Hospital Encounter encounter on 01/21/22    XR CHEST PORT    Narrative  Examination: XR CHEST PORT    History: chf    Comparison: Chest radiograph 2/12/2022    FINDINGS:    Single frontal portable view of the chest. Endotracheal tube projects over the  mid intrathoracic trachea. Enteric tube courses below the diaphragm with tip  excluded from view. Right neck central vascular catheter tip projects over the  superior cavoatrial junction. Symmetric low lung volumes. Diffuse hazy opacities within both lungs, right  greater than left, similar to prior. Layering pleural effusions could contribute  to this appearance.  No radiographically evident pneumothorax. The cardiac  silhouette is prominent, unchanged. Atherosclerosis of the thoracic aorta. Mediastinal contours and osseous structures appear overall unchanged. Impression  No significant interval change, including bilateral opacities. Results from [facility] encounter on 01/21/22    CT HEAD WO CONT    Narrative  The study is a noncontrasted head CT examination dated 1/21/2022. HISTORY: Unresponsive. TECHNIQUE: Thin section axial imaging was performed followed by sagittal and  coronal reconstructed imaging. Dose Reduction Technique was employed to reduce radiation exposure - This  includes reduction optimization techniques as appropriate to a performed exam  with automated exposure control adjustments of the mA and/or Kv according to  patient size, or use of iterative reconstruction technique. COMPARISON: CT head dated 1/17/2022. There is an appropriate size to the ventricles, the deep cortical sulci, and the  sylvian fissures for the patient's age. This examination is negative for  intracranial hemorrhage, mass effect or an extra axial collection. The  gray-white matter junctions are preserved without cytotoxic or vasogenic edema. An assessment of the white matter tracts demonstrates a mild decrease in the  density characteristics of the central periventricular white matter. These  findings are consistent with expected aging changes and milder microvascular  disease. There also is a region of diminished density within the right posterior  inferior cerebellar hemisphere which may represent an older cerebrovascular  insult. ORBITS: This examination is negative for acute orbital pathology. PARANASAL SINUSES: The paranasal sinuses are well pneumatized without acute  sinus disease. There is a decreased number of right sided mastoid air cells which can be  associated with chronic mastoiditis.  The craniocervical junction images in a  normal fashion. Impression  1. There are milder microvascular changes within the central periventricular  white matter. 2.  There is an area of diminished density within the inferior aspect of the  right cerebellar hemisphere without change (series 201 image number 12). These  findings may represent an older ischemic insult within the right posterior  inferior cerebellar region. 3.  This examination is negative for acute intracranial pathology or short-term  interval changes dating back to 1/17/2022. 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 Chest x-ray looks like pulmonary edema. She has peripheral edema we will give Lasix today discontinue D5W continue tube feedings we will check echocardiogram  · 1/31 off pressors remain intubated   · 2/1 remain intubated sedated on ventilator will do sedation vacation in a.m. still on 85%  · 2/2 on ventilator sedated elevated PCO2  · 2/3 remain intubated off pressors significant drop in hemoglobin and increasing white count we will repeat CBC and work accordingly  · 2/4 patient is back on Levophed hemodynamically unstable still on ventilator 85% FiO2  · 2/5 remain on ventilator condition unstable  · 2/6 condition unstable sedated on ventilator  · 2/7 we will change vent settings  · 2/10 intubated on ventilator on 90% FiO2  · 2/11 remain intubated family to make decision  · 2/12 on 90% FiO2 back on Levophed  · 2/13 remains on FiO2 90% respirations up to 28. Have increase pressure control to 28.   If respirations remain elevated will increase propofol and add back fentanyl  · 12/14 we will continue on assist control mode decrease FiO2 to 80%.   Replace potassium  · Time of care 30-minute

## 2022-02-15 NOTE — PROGRESS NOTES
PROGRESS NOTE      Chief Complaints:  Patient examined ICU. HPI and  Objective:  Patient is barely responding with noxious stimulation. Patient's current vent setting with FiO2 requirement is 90%. Patient has not made any improvement. Patient had no fever. Blood pressure 109/47. Left arm vascular examination shows Doppler signal noted on the radial artery on the left side and palmar arch Doppler signal present. Patient's left hand finger digits 5, 4, 3 about the same. No changes. Review of Systems:  Unable to assess secondary to intubation. EXAM:  Visit Vitals  BP (!) 109/47 (BP 1 Location: Right lower arm, BP Patient Position: At rest)   Pulse 92   Temp 98.2 °F (36.8 °C)   Resp 26   Ht 5' 5\" (1.651 m)   Wt 228 lb 2.8 oz (103.5 kg)   SpO2 98%   BMI 37.97 kg/m²       Patient is intubated. Head and neck atraumatic, normocephalic. ENT: No hoarse voice  Cardiac system regular rate rhythm. Pulmonary no audible wheeze  Chest wall excursion normal with respiration cycle  Abdomen is soft not particularly distended. Neurologically unable to assess  Skin is warm and moist.  Psychosocial: Unable to assess  Vascular examination as previously noted no changes.     Recent Results (from the past 24 hour(s))   GLUCOSE, POC    Collection Time: 02/14/22  5:13 PM   Result Value Ref Range    Glucose (POC) 172 (H) 65 - 117 mg/dL    Performed by [facility] ([facility])    GLUCOSE, POC    Collection Time: 02/15/22  1:01 AM   Result Value Ref Range    Glucose (POC) 98 65 - 117 mg/dL    Performed by [facility]    BLOOD GAS, ARTERIAL    Collection Time: 02/15/22  5:30 AM   Result Value Ref Range    pH 7.52 (H) 7.35 - 7.45      PCO2 37 35 - 45 mmHg    PO2 50 (LL) 75 - 100 mmHg    O2 SAT 89 (L) >95 %    BICARBONATE 30 (H) 22 - 26 mmol/L    BASE EXCESS 6.4 (H) 0 - 2 mmol/L    O2 METHOD VENT      FIO2 80.0 %    MODE AssistControl/Pressure Control      SET RATE 12      IPAP/PIP 28      EPAP/CPAP/PEEP 5.0      SITE Right Radial      BEN'S TEST PASS      Critical value read back CALLED TO [facility] RN MOON 50    CBC WITH AUTOMATED DIFF    Collection Time: 02/15/22  5:48 AM   Result Value Ref Range    WBC 6.2 3.6 - 11.0 K/uL    RBC 2.51 (L) 3.80 - 5.20 M/uL    HGB 8.0 (L) 11.5 - 16.0 g/dL    HCT 24.5 (L) 35.0 - 47.0 %    MCV 97.6 80.0 - 99.0 FL    MCH 31.9 26.0 - 34.0 PG    MCHC 32.7 30.0 - 36.5 g/dL    RDW 15.5 (H) 11.5 - 14.5 %    PLATELET 516 (L) 617 - 400 K/uL    MPV 11.6 8.9 - 12.9 FL    NRBC 0.0 0.0  WBC    ABSOLUTE NRBC 0.00 0.00 - 0.01 K/uL    NEUTROPHILS 82 (H) 32 - 75 %    LYMPHOCYTES 10 (L) 12 - 49 %    MONOCYTES 2 (L) 5 - 13 %    EOSINOPHILS 5 0 - 7 %    BASOPHILS 0 0 - 1 %    IMMATURE GRANULOCYTES 1 (H) 0 - 0.5 %    ABS. NEUTROPHILS 5.0 1.8 - 8.0 K/UL    ABS. LYMPHOCYTES 0.6 (L) 0.8 - 3.5 K/UL    ABS. MONOCYTES 0.1 0.0 - 1.0 K/UL    ABS. EOSINOPHILS 0.3 0.0 - 0.4 K/UL    ABS. BASOPHILS 0.0 0.0 - 0.1 K/UL    ABS. IMM. GRANS. 0.1 (H) 0.00 - 0.04 K/UL    DF AUTOMATED     METABOLIC PANEL, COMPREHENSIVE    Collection Time: 02/15/22  5:48 AM   Result Value Ref Range    Sodium 137 136 - 145 mmol/L    Potassium 3.6 3.5 - 5.1 mmol/L    Chloride 101 97 - 108 mmol/L    CO2 28 21 - 32 mmol/L    Anion gap 8 5 - 15 mmol/L    Glucose 110 (H) 65 - 100 mg/dL    BUN 20 6 - 20 mg/dL    Creatinine 0.35 (L) 0.55 - 1.02 mg/dL    BUN/Creatinine ratio 57 (H) 12 - 20      GFR est AA >60 >60 ml/min/1.73m2    GFR est non-AA >60 >60 ml/min/1.73m2    Calcium 9.0 8.5 - 10.1 mg/dL    Bilirubin, total 0.6 0.2 - 1.0 mg/dL    AST (SGOT) 46 (H) 15 - 37 U/L    ALT (SGPT) 54 12 - 78 U/L    Alk. phosphatase 78 45 - 117 U/L    Protein, total 6.1 (L) 6.4 - 8.2 g/dL    Albumin 1.8 (L) 3.5 - 5.0 g/dL    Globulin 4.3 (H) 2.0 - 4.0 g/dL    A-G Ratio 0.4 (L) 1.1 - 2.2         ASSESSMENT:   Patient is 80 y.o. with diagnosis of :  Active Problems:    COVID-19 (1/17/2022)      Respiratory failure with hypoxia ([address] (1/21/2022)      Hypernatremia (1/23/2022) Hypokalemia (1/23/2022)        PLAN:                 Continue local wound care left hand digits 3, 4, 5 with iodine swabs daily. Continue keep the left hand elevated. Overall prognosis is poor. Patient's left hand localized thrombosis in the setting of hypoperfusion syndrome about the same. Demarcated area of the left hand digits 3, 4, 5 no changes. I will continue monitor her progress. Critical care time spent: 30 minutes.

## 2022-02-15 NOTE — PROGRESS NOTES
IMPRESSION:   1. Acute hypoxic respiratory  2. Acute on chronic hypercapnic respiratory failure   3. COVID-19 pneumonia  4. Pulmonary edema  5. Bilateral pleural effusion  6. Hypokalemia to be repleted  7. Severe Anemia hemoglobin dropped to 4 stable since transfusion  8. Sepsis with septic shock off pressors  9. Left hand ischemia  10. Arthritis hypertension by hx  11. Hyperkalemia corrected  12. Hyponatremia resolved      RECOMMENDATIONS/PLAN:   1. ICU monitoring  1. Patient condition got worse on 1/24 went to asystole subsequently got intubated now on ventilator assist control mode sedated with propofol and  Precedex arterial blood gases acceptable currently on A/C 12 PC 25 time 1.0 PEEP 5 FiO2  90% not able to wean she was put on 80% FiO2 but she became hypoxic now she is back to 90  2. Patient is on dexamethasone and baricitinib  3. Leukocytosis and hemoglobin improved  4. Elevated blood sugar at steroid was decreased blood sugar trending down patient on sliding scale  5. Potassium to be repleted  6. Left hand NATHALIE normal continue local wound care   7. Pulmonary edema received Lasix chest x-ray still shows bilateral effusion  8. Left hand ischemia off IV heparin left ulnar artery occlusion radial  patent NATHALIE normal  9. Troponin is elevated  10.  Procalcitonin 0.06 C-reactive 3.1 lactic acid 1.4     [x] High complexity decision making was performed  [x] See my orders for details  HPI  72-year-old lady came in because of shortness of breath and dyspnea she has significant past medical history of arthritis hypertension she was recently discharged from the hospital came back with worsening of hypoxia she was discharged on oxygen 2 L nasal cannula and patient condition got worse she was supposed to be discharged to skilled care but patient's son took her home where her condition got worse and she was transferred back to the hospital now she is on noninvasive ventilator BiPAP machine hemodynamically unstable hypotensive on vasopressors and not giving much history. PMH:  has no past medical history on file. PSH:   has a past surgical history that includes ir insert non tunl cvc over 5 yrs (1/21/2022). FHX: family history is not on file.      SHX:      ALL:   Allergies   Allergen Reactions    Penicillins Hives        MEDS:   [x] Reviewed - As Below   [] Not reviewed    Current Facility-Administered Medications   Medication    furosemide (LASIX) injection 20 mg    insulin glargine (LANTUS) injection 10 Units    heparin (porcine) injection 5,000 Units    insulin lispro (HUMALOG) injection    glucose chewable tablet 16 g    glucagon (GLUCAGEN) injection 1 mg    dextrose 10% infusion 125-250 mL    dexamethasone (DECADRON) 4 mg/mL injection 4 mg    0.9% sodium chloride infusion 250 mL    dilTIAZem IR (CARDIZEM) tablet 30 mg    hydrOXYzine pamoate (VISTARIL) capsule 25 mg    propofol (DIPRIVAN) 10 mg/mL infusion    dexmedeTOMidine (PRECEDEX) 400 mcg in 0.9% sodium chloride (MBP/ADV) 100 mL MBP    NOREPINephrine (LEVOPHED) 8 mg in 0.9% NS 250ml infusion    acetaminophen (TYLENOL) tablet 650 mg    Or    acetaminophen (TYLENOL) suppository 650 mg    polyethylene glycol (MIRALAX) packet 17 g    ondansetron (ZOFRAN ODT) tablet 4 mg    baricitinib (OLUMIANT) tablet 2 mg    albuterol (PROVENTIL HFA, VENTOLIN HFA, PROAIR HFA) inhaler 2 Puff    ondansetron (ZOFRAN) injection 4 mg    hydrOXYzine (VISTARIL) injection 25 mg      MAR reviewed and pertinent medications noted or modified as needed   Current Facility-Administered Medications   Medication    furosemide (LASIX) injection 20 mg    insulin glargine (LANTUS) injection 10 Units    heparin (porcine) injection 5,000 Units    insulin lispro (HUMALOG) injection    glucose chewable tablet 16 g    glucagon (GLUCAGEN) injection 1 mg    dextrose 10% infusion 125-250 mL    dexamethasone (DECADRON) 4 mg/mL injection 4 mg    0.9% sodium chloride infusion 250 mL    dilTIAZem IR (CARDIZEM) tablet 30 mg    hydrOXYzine pamoate (VISTARIL) capsule 25 mg    propofol (DIPRIVAN) 10 mg/mL infusion    dexmedeTOMidine (PRECEDEX) 400 mcg in 0.9% sodium chloride (MBP/ADV) 100 mL MBP    NOREPINephrine (LEVOPHED) 8 mg in 0.9% NS 250ml infusion    acetaminophen (TYLENOL) tablet 650 mg    Or    acetaminophen (TYLENOL) suppository 650 mg    polyethylene glycol (MIRALAX) packet 17 g    ondansetron (ZOFRAN ODT) tablet 4 mg    baricitinib (OLUMIANT) tablet 2 mg    albuterol (PROVENTIL HFA, VENTOLIN HFA, PROAIR HFA) inhaler 2 Puff    ondansetron (ZOFRAN) injection 4 mg    hydrOXYzine (VISTARIL) injection 25 mg      PMH:  has no past medical history on file. PSH:   has a past surgical history that includes ir insert non tunl cvc over 5 yrs (2022). FHX: family history is not on file. SHX:       ROS:  Unable to obtain sedated on ventilator    Hemodynamics:    CO:    CI:    CVP:    SVR:   PAP Systolic:    PAP Diastolic:    PVR:    FO52:        Ventilator Settings:      Mode Rate TV Press PEEP FiO2 PIP Min. Vent   Assist control,Pressure control    450 ml    5 cm H20 90 %  35 cm H2O  10.2 l/min        Vital Signs: Telemetry:    normal sinus rhythm Intake/Output:   Visit Vitals  BP (!) 105/47   Pulse 87   Temp 98.2 °F (36.8 °C)   Resp 24   Ht 5' 5\" (1.651 m)   Wt 103.5 kg (228 lb 2.8 oz)   SpO2 100%   BMI 37.97 kg/m²       Temp (24hrs), Av.1 °F (36.7 °C), Min:97.9 °F (36.6 °C), Max:98.3 °F (36.8 °C)        O2 Device: Ventilator         Wt Readings from Last 4 Encounters:   22 103.5 kg (228 lb 2.8 oz)   22 90.7 kg (200 lb)          Intake/Output Summary (Last 24 hours) at 2/15/2022 0852  Last data filed at 2/15/2022 0400  Gross per 24 hour   Intake 1185.4 ml   Output 740 ml   Net 445.4 ml       Last shift:      No intake/output data recorded.   Last 3 shifts: 1901 - 02/15 0700  In: .6 [I.V.:1049.6]  Out:  [Urine:]       Physical Exam:     General: Intubated on ventilator unresponsive on propofol and  HEENT: NCAT,   Eyes: anicteric; conjunctiva clear no doll's eye reflex  Neck: no nodes, , trach midline; no accessory MM use.   Questionable neck vein distention  Chest: no deformity,   Cardiac: R regular; no murmur;   Lungs: Diffuse rales  Abd: soft, NT, hypoactive BS  Ext: Edema of the leg ; left hand finger blackish discoloration  : clear urine  Neuro: Sedated unresponsive on the ventilator no doll's eye reflex flaccid extremities  Psych-unable to assess  Skin: warm, dry, no cyanosis;   Pulses: Brachial radial pulses intact  Capillary: Normal capillary refill      DATA:    MAR reviewed and pertinent medications noted or modified as needed  MEDS:   Current Facility-Administered Medications   Medication    furosemide (LASIX) injection 20 mg    insulin glargine (LANTUS) injection 10 Units    heparin (porcine) injection 5,000 Units    insulin lispro (HUMALOG) injection    glucose chewable tablet 16 g    glucagon (GLUCAGEN) injection 1 mg    dextrose 10% infusion 125-250 mL    dexamethasone (DECADRON) 4 mg/mL injection 4 mg    0.9% sodium chloride infusion 250 mL    dilTIAZem IR (CARDIZEM) tablet 30 mg    hydrOXYzine pamoate (VISTARIL) capsule 25 mg    propofol (DIPRIVAN) 10 mg/mL infusion    dexmedeTOMidine (PRECEDEX) 400 mcg in 0.9% sodium chloride (MBP/ADV) 100 mL MBP    NOREPINephrine (LEVOPHED) 8 mg in 0.9% NS 250ml infusion    acetaminophen (TYLENOL) tablet 650 mg    Or    acetaminophen (TYLENOL) suppository 650 mg    polyethylene glycol (MIRALAX) packet 17 g    ondansetron (ZOFRAN ODT) tablet 4 mg    baricitinib (OLUMIANT) tablet 2 mg    albuterol (PROVENTIL HFA, VENTOLIN HFA, PROAIR HFA) inhaler 2 Puff    ondansetron (ZOFRAN) injection 4 mg    hydrOXYzine (VISTARIL) injection 25 mg        Labs:    Recent Labs     02/15/22  0548 02/13/22  0445   WBC 6.2 7.5   HGB 8.0* 8.1*   * 150     Recent Labs 02/15/22  0548 02/13/22  0445    138   K 3.6 3.2*    102   CO2 28 32   * 104*   BUN 20 35*   CREA 0.35* 0.36*   CA 9.0 9.0   ALB 1.8* 1.9*   ALT 54 61     Recent Labs     02/15/22  0530 02/13/22  0300   PH 7.52* 7.48*   PCO2 37 43   PO2 50* 60*   HCO3 30* 31*   FIO2 80.0 90.0   2/13 AC 12 PC 25 time 1 PEEP 5 FiO2 90% respirations 28   1/30 AC 12  PEEP 5 FiO2 100%    Lab Results   Component Value Date/Time    Culture result: No growth 6 days 01/21/2022 07:55 AM    Culture result: No growth 6 days 01/18/2022 07:12 AM    Culture result: (A) 01/17/2022 07:12 AM     Staphylococcus species, coagulase negative growing in 1 of 4 bottles drawn NO SITE INDICATED    Culture result:  01/17/2022 07:12 AM     (NOTE) [facility] GROWING IN 1 OF 2 BOTTLES CALLED TO JOSE MIGUEL PAGAN AT 0831 ON 1/19/22.      No results found for: TSH, TSHEXT, TSHEXT     Imaging:    Results from Hospital Encounter encounter on 01/21/22    XR CHEST PORT    Narrative  Examination: XR CHEST PORT    History: chf    Comparison: Chest radiograph 2/12/2022    FINDINGS:    Single frontal portable view of the chest. Endotracheal tube projects over the  mid intrathoracic trachea. Enteric tube courses below the diaphragm with tip  excluded from view. Right neck central vascular catheter tip projects over the  superior cavoatrial junction. Symmetric low lung volumes. Diffuse hazy opacities within both lungs, right  greater than left, similar to prior. Layering pleural effusions could contribute  to this appearance. No radiographically evident pneumothorax. The cardiac  silhouette is prominent, unchanged. Atherosclerosis of the thoracic aorta. Mediastinal contours and osseous structures appear overall unchanged. Impression  No significant interval change, including bilateral opacities.       Results from [place] encounter on 01/21/22    CT HEAD WO CONT    Narrative  The study is a noncontrasted head CT examination dated 1/21/2022. HISTORY: Unresponsive. TECHNIQUE: Thin section axial imaging was performed followed by sagittal and  coronal reconstructed imaging. Dose Reduction Technique was employed to reduce radiation exposure - This  includes reduction optimization techniques as appropriate to a performed exam  with automated exposure control adjustments of the mA and/or Kv according to  patient size, or use of iterative reconstruction technique. COMPARISON: CT head dated 1/17/2022. There is an appropriate size to the ventricles, the deep cortical sulci, and the  sylvian fissures for the patient's age. This examination is negative for  intracranial hemorrhage, mass effect or an extra axial collection. The  gray-white matter junctions are preserved without cytotoxic or vasogenic edema. An assessment of the white matter tracts demonstrates a mild decrease in the  density characteristics of the central periventricular white matter. These  findings are consistent with expected aging changes and milder microvascular  disease. There also is a region of diminished density within the right posterior  inferior cerebellar hemisphere which may represent an older cerebrovascular  insult. ORBITS: This examination is negative for acute orbital pathology. PARANASAL SINUSES: The paranasal sinuses are well pneumatized without acute  sinus disease. There is a decreased number of right sided mastoid air cells which can be  associated with chronic mastoiditis. The craniocervical junction images in a  normal fashion. Impression  1. There are milder microvascular changes within the central periventricular  white matter. 2.  There is an area of diminished density within the inferior aspect of the  right cerebellar hemisphere without change (series 201 image number 12). These  findings may represent an older ischemic insult within the right posterior  inferior cerebellar region.   3.  This examination is negative for acute intracranial pathology or short-term  interval changes dating back to 1/17/2022. 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 Chest x-ray looks like pulmonary edema. She has peripheral edema we will give Lasix today discontinue D5W continue tube feedings we will check echocardiogram  · 1/31 off pressors remain intubated   · 2/1 remain intubated sedated on ventilator will do sedation vacation in a.m. still on 85%  · 2/2 on ventilator sedated elevated PCO2  · 2/3 remain intubated off pressors significant drop in hemoglobin and increasing white count we will repeat CBC and work accordingly  · 2/4 patient is back on Levophed hemodynamically unstable still on ventilator 85% FiO2  · 2/5 remain on ventilator condition unstable  · 2/6 condition unstable sedated on ventilator  · 2/7 we will change vent settings  · 2/10 intubated on ventilator on 90% FiO2  · 2/11 remain intubated family to make decision  · 2/12 on 90% FiO2 back on Levophed  · 2/13 remains on FiO2 90% respirations up to 28. Have increase pressure control to 28. If respirations remain elevated will increase propofol and add back fentanyl  · 12/14 we will continue on assist control mode decrease FiO2 to 80%.   Replace potassium  · 12/15 FiO2 increased back to 90%   · Time of care 30-minute

## 2022-02-15 NOTE — PROGRESS NOTES
General Daily Progress Note          Patient Name:   Simon Dave       YOB: 1940       Age:  80 y.o.       Admit Date: 1/21/2022      Subjective:     Patient is a 80y.o. year old female with signal past medical history of hypertension arthritis who discharged from the hospital yesterday in stable condition patient is admitted last admission with COVID-19 patient was asymptomatic all this time while in the hospital patient discharged on 2 L oxygen to skilled care but patient son want to go home with home health but is at home patient's saturations drops sent back to the ER seen by the ER physician patient placed on BiPAP patient was little hypotensive started on Levophed admitted for further work-up and treatment           Patient is DNR    Patient on ventilator 90% O2    On propofol drip         NG tube in place for medication and feeding    Repeat Covid test positive      Objective:     Visit Vitals  BP (!) 101/53 (BP 1 Location: Right lower arm, BP Patient Position: At rest)   Pulse 89   Temp 98.2 °F (36.8 °C)   Resp 25   Ht 5' 5\" (1.651 m)   Wt 103.5 kg (228 lb 2.8 oz)   SpO2 100%   BMI 37.97 kg/m²        Recent Results (from the past 24 hour(s))   GLUCOSE, POC    Collection Time: 02/14/22  5:13 PM   Result Value Ref Range    Glucose (POC) 172 (H) 65 - 117 mg/dL    Performed by Kulwant Roque ([facility])    GLUCOSE, POC    Collection Time: 02/15/22  1:01 AM   Result Value Ref Range    Glucose (POC) 98 65 - 117 mg/dL    Performed by [facility]    BLOOD GAS, ARTERIAL    Collection Time: 02/15/22  5:30 AM   Result Value Ref Range    pH 7.52 (H) 7.35 - 7.45      PCO2 37 35 - 45 mmHg    PO2 50 (LL) 75 - 100 mmHg    O2 SAT 89 (L) >95 %    BICARBONATE 30 (H) 22 - 26 mmol/L    BASE EXCESS 6.4 (H) 0 - 2 mmol/L    O2 METHOD VENT      FIO2 80.0 %    MODE AssistControl/Pressure Control      SET RATE 12      IPAP/PIP 28      EPAP/CPAP/PEEP 5.0      SITE Right Radial      BEN'S TEST PASS      Critical value read back CALLED TO [facility] RN MOON 50    CBC WITH AUTOMATED DIFF    Collection Time: 02/15/22  5:48 AM   Result Value Ref Range    WBC 6.2 3.6 - 11.0 K/uL    RBC 2.51 (L) 3.80 - 5.20 M/uL    HGB 8.0 (L) 11.5 - 16.0 g/dL    HCT 24.5 (L) 35.0 - 47.0 %    MCV 97.6 80.0 - 99.0 FL    MCH 31.9 26.0 - 34.0 PG    MCHC 32.7 30.0 - 36.5 g/dL    RDW 15.5 (H) 11.5 - 14.5 %    PLATELET 085 (L) 460 - 400 K/uL    MPV 11.6 8.9 - 12.9 FL    NRBC 0.0 0.0  WBC    ABSOLUTE NRBC 0.00 0.00 - 0.01 K/uL    NEUTROPHILS 82 (H) 32 - 75 %    LYMPHOCYTES 10 (L) 12 - 49 %    MONOCYTES 2 (L) 5 - 13 %    EOSINOPHILS 5 0 - 7 %    BASOPHILS 0 0 - 1 %    IMMATURE GRANULOCYTES 1 (H) 0 - 0.5 %    ABS. NEUTROPHILS 5.0 1.8 - 8.0 K/UL    ABS. LYMPHOCYTES 0.6 (L) 0.8 - 3.5 K/UL    ABS. MONOCYTES 0.1 0.0 - 1.0 K/UL    ABS. EOSINOPHILS 0.3 0.0 - 0.4 K/UL    ABS. BASOPHILS 0.0 0.0 - 0.1 K/UL    ABS. IMM. GRANS. 0.1 (H) 0.00 - 0.04 K/UL    DF AUTOMATED     METABOLIC PANEL, COMPREHENSIVE    Collection Time: 02/15/22  5:48 AM   Result Value Ref Range    Sodium 137 136 - 145 mmol/L    Potassium 3.6 3.5 - 5.1 mmol/L    Chloride 101 97 - 108 mmol/L    CO2 28 21 - 32 mmol/L    Anion gap 8 5 - 15 mmol/L    Glucose 110 (H) 65 - 100 mg/dL    BUN 20 6 - 20 mg/dL    Creatinine 0.35 (L) 0.55 - 1.02 mg/dL    BUN/Creatinine ratio 57 (H) 12 - 20      GFR est AA >60 >60 ml/min/1.73m2    GFR est non-AA >60 >60 ml/min/1.73m2    Calcium 9.0 8.5 - 10.1 mg/dL    Bilirubin, total 0.6 0.2 - 1.0 mg/dL    AST (SGOT) 46 (H) 15 - 37 U/L    ALT (SGPT) 54 12 - 78 U/L    Alk. phosphatase 78 45 - 117 U/L    Protein, total 6.1 (L) 6.4 - 8.2 g/dL    Albumin 1.8 (L) 3.5 - 5.0 g/dL    Globulin 4.3 (H) 2.0 - 4.0 g/dL    A-G Ratio 0.4 (L) 1.1 - 2.2       [unfilled]      Review of Systems    Unable to obtain. Physical Exam:      Constitutional: Awake on vent   HENT:   Head: Normocephalic and atraumatic. Eyes: Pupils are equal, round, and reactive to light.  EOM are normal. Cardiovascular: Normal rate, regular rhythm and normal heart sounds. Pulmonary/Chest: Decreased breath sounds   abdominal: Soft. Bowel sounds are normal. There is no abdominal tenderness. There is no rebound and no guarding. Musculoskeletal: Normal range of motion. Neurological: vent     XR CHEST PORT   Final Result      XR CHEST PORT   Final Result   No significant interval change, including bilateral opacities. XR CHEST PORT   Final Result   Bilateral opacities, slightly progressed on the right. XR CHEST PORT   Final Result      XR CHEST PORT   Final Result   Findings/impression:   1. Limitations: Rotated and angulated. 2.  Medical devices: Right internal jugular venous catheter, endotracheal tube   and NG tube in stable. 3.  Ongoing moderate bilateral edema similar to prior exam.   4.  Ongoing collapse/consolidation in the left lower lobe concerning for   underlying airspace disease. 5.  The cardiac and mediastinal contours are stable though near completely   obscured by adjacent airspace disease. XR CHEST PORT   Final Result   No significant interval change. XR CHEST PORT   Final Result      XR CHEST PORT   Final Result   No significant interval change. XR CHEST PORT   Final Result   Persistent bilateral airspace disease and with decreased aeration. XR CHEST PORT   Final Result      XR CHEST PORT   Final Result      XR CHEST PORT   Final Result      XR CHEST PORT   Final Result      XR CHEST PORT   Final Result      WRIST BRACHIAL INDEX AT REST   Final Result      XR CHEST PORT   Final Result   No significant change. XR CHEST PORT   Final Result   No interval change. DUPLEX UPPER EXT ARTERY LEFT   Final Result      XR CHEST PORT   Final Result      XR CHEST PORT   Final Result   Endotracheal tube tip still at the maggie. Diffuse opacities in the   lungs, not significantly changed.       XR CHEST PORT   Final Result      XR CHEST PORT Final Result   Endotracheal tube tip at the maggie. Diffuse opacities in the   lungs, accentuated by lower lung volumes or increased. XR CHEST PORT   Final Result   Diffuse opacities in the lungs, not significantly changed. XR CHEST PORT   Final Result   1. ETT terminating 2 cm above the maggie. Remaining support apparatus as above. 2.  Worsening bilateral airspace disease with developing ARDS not excluded. XR CHEST PORT   Final Result   Moderate patchy diffuse bilateral airspace opacities, waxing and waning from the   prior exam.      XR CHEST PORT   Final Result   Moderate diffuse bilateral airspace opacities, likely a combination of edema and   airspace disease, mildly increased from the previous exam.      CT HEAD WO CONT   Final Result   1. There are milder microvascular changes within the central periventricular   white matter. 2.  There is an area of diminished density within the inferior aspect of the   right cerebellar hemisphere without change (series 201 image number 12). These   findings may represent an older ischemic insult within the right posterior   inferior cerebellar region. 3.  This examination is negative for acute intracranial pathology or short-term   interval changes dating back to 1/17/2022. XR CHEST PORT   Final Result   Patchy mixed asymmetric lung disease appears somewhat better but the   lungs are better inflated. No effusion or pneumothorax. Normal heart and   mediastinum      IR INSERT NON TUNL CVC OVER 5 YRS   Final Result   Ultrasound of the right neck demonstrated patent IJV. Successful US-guided placement of a right internal jugular triple lumen central   venous catheter as described above. The catheter is functioning. PLAN:   Catheter position was confirmed by follow-up chest x-ray: catheter tip in the   lower SVC and ready to use.       IR US GUIDED VASCULAR ACCESS   Final Result   Ultrasound of the right neck demonstrated patent IJV.      Successful US-guided placement of a right internal jugular triple lumen central   venous catheter as described above. The catheter is functioning. PLAN:   Catheter position was confirmed by follow-up chest x-ray: catheter tip in the   lower SVC and ready to use. XR CHEST PORT   Final Result   Findings/impression:      Diffuse interstitial airspace disease/edema. No definite pleural effusion or   pneumothorax. Cardiac contours are partially obscured. No acute osseous abnormality identified.       XR CHEST PORT    (Results Pending)        Recent Results (from the past 24 hour(s))   GLUCOSE, POC    Collection Time: 02/14/22  5:13 PM   Result Value Ref Range    Glucose (POC) 172 (H) 65 - 117 mg/dL    Performed by Kyra Haas ([facility])    GLUCOSE, POC    Collection Time: 02/15/22  1:01 AM   Result Value Ref Range    Glucose (POC) 98 65 - 117 mg/dL    Performed by [facility]    BLOOD GAS, ARTERIAL    Collection Time: 02/15/22  5:30 AM   Result Value Ref Range    pH 7.52 (H) 7.35 - 7.45      PCO2 37 35 - 45 mmHg    PO2 50 (LL) 75 - 100 mmHg    O2 SAT 89 (L) >95 %    BICARBONATE 30 (H) 22 - 26 mmol/L    BASE EXCESS 6.4 (H) 0 - 2 mmol/L    O2 METHOD VENT      FIO2 80.0 %    MODE AssistControl/Pressure Control      SET RATE 12      IPAP/PIP 28      EPAP/CPAP/PEEP 5.0      SITE Right Radial      BEN'S TEST PASS      Critical value read back CALLED TO [facility] RN MOON 50    CBC WITH AUTOMATED DIFF    Collection Time: 02/15/22  5:48 AM   Result Value Ref Range    WBC 6.2 3.6 - 11.0 K/uL    RBC 2.51 (L) 3.80 - 5.20 M/uL    HGB 8.0 (L) 11.5 - 16.0 g/dL    HCT 24.5 (L) 35.0 - 47.0 %    MCV 97.6 80.0 - 99.0 FL    MCH 31.9 26.0 - 34.0 PG    MCHC 32.7 30.0 - 36.5 g/dL    RDW 15.5 (H) 11.5 - 14.5 %    PLATELET 163 (L) 303 - 400 K/uL    MPV 11.6 8.9 - 12.9 FL    NRBC 0.0 0.0  WBC    ABSOLUTE NRBC 0.00 0.00 - 0.01 K/uL    NEUTROPHILS 82 (H) 32 - 75 %    LYMPHOCYTES 10 (L) 12 - 49 %    MONOCYTES 2 (L) 5 - 13 %    EOSINOPHILS 5 0 - 7 %    BASOPHILS 0 0 - 1 %    IMMATURE GRANULOCYTES 1 (H) 0 - 0.5 %    ABS. NEUTROPHILS 5.0 1.8 - 8.0 K/UL    ABS. LYMPHOCYTES 0.6 (L) 0.8 - 3.5 K/UL    ABS. MONOCYTES 0.1 0.0 - 1.0 K/UL    ABS. EOSINOPHILS 0.3 0.0 - 0.4 K/UL    ABS. BASOPHILS 0.0 0.0 - 0.1 K/UL    ABS. IMM. GRANS. 0.1 (H) 0.00 - 0.04 K/UL    DF AUTOMATED     METABOLIC PANEL, COMPREHENSIVE    Collection Time: 02/15/22  5:48 AM   Result Value Ref Range    Sodium 137 136 - 145 mmol/L    Potassium 3.6 3.5 - 5.1 mmol/L    Chloride 101 97 - 108 mmol/L    CO2 28 21 - 32 mmol/L    Anion gap 8 5 - 15 mmol/L    Glucose 110 (H) 65 - 100 mg/dL    BUN 20 6 - 20 mg/dL    Creatinine 0.35 (L) 0.55 - 1.02 mg/dL    BUN/Creatinine ratio 57 (H) 12 - 20      GFR est AA >60 >60 ml/min/1.73m2    GFR est non-AA >60 >60 ml/min/1.73m2    Calcium 9.0 8.5 - 10.1 mg/dL    Bilirubin, total 0.6 0.2 - 1.0 mg/dL    AST (SGOT) 46 (H) 15 - 37 U/L    ALT (SGPT) 54 12 - 78 U/L    Alk. phosphatase 78 45 - 117 U/L    Protein, total 6.1 (L) 6.4 - 8.2 g/dL    Albumin 1.8 (L) 3.5 - 5.0 g/dL    Globulin 4.3 (H) 2.0 - 4.0 g/dL    A-G Ratio 0.4 (L) 1.1 - 2.2         Results     Procedure Component Value Units Date/Time    COVID-19 RAPID TEST [[id number]]  (Abnormal) Collected: 02/10/22 0923    Order Status: Completed Specimen: Nasopharyngeal Updated: 02/10/22 0957     Specimen source       Please find results under separate order           COVID-19 rapid test DETECTED        Comment: Rapid [employer] ID Now   The specimen is POSITIVE for SARS-CoV-2, the novel coronavirus associated with COVID-19. This test has been authorized by the FDA under an Emergency Use Authorization (EUA) for use by authorized laboratories.    Fact sheet for Healthcare Providers: ConventionUpdate.co.nz Fact sheet for Patients: ConventionUpdate.co.nz   Methodology: Isothermal Nucleic Acid Amplification Results verified, phoned to and read back by [facility] KAYLIE RN AT   [id number] BY CESAR                Labs:     Recent Labs     02/15/22  0548 02/13/22 0445   WBC 6.2 7.5   HGB 8.0* 8.1*   HCT 24.5* 24.4*   * 150     Recent Labs     02/15/22  0548 02/13/22 0445    138   K 3.6 3.2*    102   CO2 28 32   BUN 20 35*   CREA 0.35* 0.36*   * 104*   CA 9.0 9.0     Recent Labs     02/15/22  0548 02/13/22 0445   ALT 54 61   AP 78 94   TBILI 0.6 0.6   TP 6.1* 5.8*   ALB 1.8* 1.9*   GLOB 4.3* 3.9     No results for input(s): INR, PTP, APTT, INREXT, INREXT in the last 72 hours. No results for input(s): FE, TIBC, PSAT, FERR in the last 72 hours. No results found for: FOL, RBCF   Recent Labs     02/15/22  0530 02/13/22  0300   PH 7.52* 7.48*   PCO2 37 43   PO2 50* 60*     No results for input(s): CPK, CKNDX, TROIQ in the last 72 hours.     No lab exists for component: CPKMB  No results found for: CHOL, CHOLX, CHLST, CHOLV, HDL, HDLP, LDL, LDLC, DLDLP, TGLX, TRIGL, TRIGP, CHHD, CHHDX  Lab Results   Component Value Date/Time    Glucose (POC) 98 02/15/2022 01:01 AM    Glucose (POC) 172 (H) 02/14/2022 05:13 PM    Glucose (POC) 96 02/14/2022 05:00 AM    Glucose (POC) 105 02/13/2022 11:40 PM    Glucose (POC) 180 (H) 02/13/2022 05:10 PM     Lab Results   Component Value Date/Time    Color Yellow/Straw 01/23/2022 10:30 AM    Appearance Turbid (A) 01/23/2022 10:30 AM    Specific gravity 1.027 01/23/2022 10:30 AM    pH (UA) 6.0 01/23/2022 10:30 AM    Protein 100 (A) 01/23/2022 10:30 AM    Glucose 50 (A) 01/23/2022 10:30 AM    Ketone 5 (A) 01/23/2022 10:30 AM    Bilirubin Negative 01/23/2022 10:30 AM    Urobilinogen 4.0 (H) 01/23/2022 10:30 AM    Nitrites Negative 01/23/2022 10:30 AM    Leukocyte Esterase Negative 01/23/2022 10:30 AM    Bacteria Negative 01/23/2022 10:30 AM    WBC 0-4 01/23/2022 10:30 AM    RBC 0-5 01/23/2022 10:30 AM         Assessment:     Acute hypoxemic respiratory failure on on ventilator 90% of   COVID-19 pneumonitis hypotension  Thrombocytopenia  Hypertension  Arthritis  High blood sugar secondary to steroid  Hyperkalemia  Hyponatremia  Moderate protein calorie malnutrition  Patient need multiple partial finger amputation once stable as per vascular surgeon    Repeat Covid is positive  Plan:         Olumiant 2 mg daily  Decadron 4 mg every 6 hours  Diltiazem 30 mg 3 times  Lasix 20 mg IV twice daily  Follow-up with pulmonologist and nephrologist  Start Lantus 10 units subcu day    Monitor sodium and potassium    Overall prognosis Poor        Patient is DNR      Discussed with the patient's son he want to wait till witnessed/to make final decision                    Current Facility-Administered Medications:     furosemide (LASIX) injection 20 mg, 20 mg, IntraVENous, Q12H, Fredi, Zulma Baldwin MD, 20 mg at 02/15/22 0849    insulin glargine (LANTUS) injection 10 Units, 10 Units, SubCUTAneous, DAILY, Polo Lopez MD, 10 Units at 02/15/22 0849    heparin (porcine) injection 5,000 Units, 5,000 Units, SubCUTAneous, Q8H, Polo Lopez MD, 5,000 Units at 02/15/22 0650    insulin lispro (HUMALOG) injection, , SubCUTAneous, Q6H, Polo Lopez MD, 3 Units at 02/14/22 1718    glucose chewable tablet 16 g, 4 Tablet, Oral, PRN, Joseph Lopez MD    glucagon (GLUCAGEN) injection 1 mg, 1 mg, IntraMUSCular, PRN, Joseph Lopez MD    dextrose 10% infusion 125-250 mL, 125-250 mL, IntraVENous, PRN, Joseph Lopez MD    dexamethasone (DECADRON) 4 mg/mL injection 4 mg, 4 mg, IntraVENous, Q24H, Allauddin, Havery Claude, MD, 4 mg at 02/15/22 0849    0.9% sodium chloride infusion 250 mL, 250 mL, IntraVENous, PRN, Karan Flower MD    dilTIAZem IR (CARDIZEM) tablet 30 mg, 30 mg, Oral, TID, Karan Flower MD, 30 mg at 02/15/22 0848    hydrOXYzine pamoate (VISTARIL) capsule 25 mg, 25 mg, Oral, Q4H PRN, Polo Lopez MD    propofol (DIPRIVAN) 10 mg/mL infusion, 0-50 mcg/kg/min, IntraVENous, TITRATE, Allauddin, Havery Claude, MD, Last Rate: 21.8 mL/hr at 02/15/22 0658, 40 mcg/kg/min at 02/15/22 0658    dexmedeTOMidine (PRECEDEX) 400 mcg in 0.9% sodium chloride (MBP/ADV) 100 mL MBP, 0.1-1.5 mcg/kg/hr, IntraVENous, TITRATE, Karan Flower MD, Last Rate: 13.6 mL/hr at 02/15/22 1004, 0.6 mcg/kg/hr at 02/15/22 1004    NOREPINephrine (LEVOPHED) 8 mg in 0.9% NS 250ml infusion, 0.5-16 mcg/min, IntraVENous, TITRATE, Efraín Lopez MD, Stopped at 02/13/22 0836    acetaminophen (TYLENOL) tablet 650 mg, 650 mg, Oral, Q6H PRN **OR** acetaminophen (TYLENOL) suppository 650 mg, 650 mg, Rectal, Q6H PRN, Efraín Lopez MD    polyethylene glycol (MIRALAX) packet 17 g, 17 g, Oral, DAILY PRN, Efraín Lopez MD    ondansetron (ZOFRAN ODT) tablet 4 mg, 4 mg, Oral, Q8H PRN **OR** [DISCONTINUED] ondansetron (ZOFRAN) injection 4 mg, 4 mg, IntraVENous, Q6H PRN, Polo Lopez MD    baricitinib (OLUMIANT) tablet 2 mg, 2 mg, Oral, DAILY, Polo Lopez MD, 2 mg at 02/15/22 0848    albuterol (PROVENTIL HFA, VENTOLIN HFA, PROAIR HFA) inhaler 2 Puff, 2 Puff, Inhalation, Q6H PRN, Polo Lopez MD    [DISCONTINUED] ondansetron (ZOFRAN ODT) tablet 4 mg, 4 mg, Oral, Q8H PRN **OR** ondansetron (ZOFRAN) injection 4 mg, 4 mg, IntraVENous, Q6H PRN, Polo Lopez MD    hydrOXYzine (VISTARIL) injection 25 mg, 25 mg, IntraMUSCular, Q6H PRN, Polo Lopez MD, 25 mg at 01/22/22 0116

## 2022-02-15 NOTE — PROGRESS NOTES
Clinical chart reviewed by CM. Per goals of care meeting today, patient's son, Laurence Gaytan, will be relaying his decision regarding his mother's care and treatment to her attending physician, Dr. Ryan Yu, tomorrow. CM team will continue to follow.

## 2022-02-16 NOTE — PROGRESS NOTES
IMPRESSION:   1. Acute hypoxic respiratory  2. Acute on chronic hypercapnic respiratory failure   3. COVID-19 pneumonia  4. Pulmonary edema  5. Bilateral pleural effusion  6. Hypokalemia to be repleted  7. Severe Anemia hemoglobin dropped to 4 stable since transfusion  8. Sepsis with septic shock off pressors  9. Left hand ischemia  10. Arthritis hypertension by hx  11. Hyperkalemia corrected  12. Hyponatremia resolved      RECOMMENDATIONS/PLAN:   1. ICU monitoring  1. Patient condition got worse on 1/24 went to asystole subsequently got intubated now on ventilator assist control mode sedated with propofol and  Precedex arterial blood gases acceptable currently on A/C 12 PC 25 time 1.0 PEEP 5 FiO2  90% not able to wean she was put on 80% FiO2 but she became hypoxic now she is back to 90  2. Patient is on dexamethasone and baricitinib  3. Leukocytosis and hemoglobin improved  4. Elevated blood sugar at steroid was decreased blood sugar trending down patient on sliding scale  5. Potassium to be repleted  6. Left hand NATHALIE normal continue local wound care   7. Pulmonary edema received Lasix chest x-ray still shows bilateral effusion  8. Left hand ischemia off IV heparin left ulnar artery occlusion radial  patent NATHALIE normal  9. Troponin is elevated  10. Procalcitonin 0.06 C-reactive 3.1 lactic acid 1.4  11.  Patient condition remain the same not doing well hypotensive remain intubated on 90% FiO2 family to make decision     [x] High complexity decision making was performed  [x] See my orders for details  HPI  43-year-old lady came in because of shortness of breath and dyspnea she has significant past medical history of arthritis hypertension she was recently discharged from the hospital came back with worsening of hypoxia she was discharged on oxygen 2 L nasal cannula and patient condition got worse she was supposed to be discharged to skilled care but patient's son took her home where her condition got worse and she was transferred back to the hospital now she is on noninvasive ventilator BiPAP machine hemodynamically unstable hypotensive on vasopressors and not giving much history. PMH:  has no past medical history on file. PSH:   has a past surgical history that includes ir insert non tunl cvc over 5 yrs (1/21/2022). FHX: family history is not on file.      SHX:      ALL:   Allergies   Allergen Reactions    Penicillins Hives        MEDS:   [x] Reviewed - As Below   [] Not reviewed    Current Facility-Administered Medications   Medication    furosemide (LASIX) injection 20 mg    insulin glargine (LANTUS) injection 10 Units    heparin (porcine) injection 5,000 Units    insulin lispro (HUMALOG) injection    glucose chewable tablet 16 g    glucagon (GLUCAGEN) injection 1 mg    dextrose 10% infusion 125-250 mL    dexamethasone (DECADRON) 4 mg/mL injection 4 mg    0.9% sodium chloride infusion 250 mL    dilTIAZem IR (CARDIZEM) tablet 30 mg    hydrOXYzine pamoate (VISTARIL) capsule 25 mg    propofol (DIPRIVAN) 10 mg/mL infusion    dexmedeTOMidine (PRECEDEX) 400 mcg in 0.9% sodium chloride (MBP/ADV) 100 mL MBP    NOREPINephrine (LEVOPHED) 8 mg in 0.9% NS 250ml infusion    acetaminophen (TYLENOL) tablet 650 mg    Or    acetaminophen (TYLENOL) suppository 650 mg    polyethylene glycol (MIRALAX) packet 17 g    ondansetron (ZOFRAN ODT) tablet 4 mg    baricitinib (OLUMIANT) tablet 2 mg    albuterol (PROVENTIL HFA, VENTOLIN HFA, PROAIR HFA) inhaler 2 Puff    ondansetron (ZOFRAN) injection 4 mg    hydrOXYzine (VISTARIL) injection 25 mg      MAR reviewed and pertinent medications noted or modified as needed   Current Facility-Administered Medications   Medication    furosemide (LASIX) injection 20 mg    insulin glargine (LANTUS) injection 10 Units    heparin (porcine) injection 5,000 Units    insulin lispro (HUMALOG) injection    glucose chewable tablet 16 g    glucagon (GLUCAGEN) injection 1 mg    dextrose 10% infusion 125-250 mL    dexamethasone (DECADRON) 4 mg/mL injection 4 mg    0.9% sodium chloride infusion 250 mL    dilTIAZem IR (CARDIZEM) tablet 30 mg    hydrOXYzine pamoate (VISTARIL) capsule 25 mg    propofol (DIPRIVAN) 10 mg/mL infusion    dexmedeTOMidine (PRECEDEX) 400 mcg in 0.9% sodium chloride (MBP/ADV) 100 mL MBP    NOREPINephrine (LEVOPHED) 8 mg in 0.9% NS 250ml infusion    acetaminophen (TYLENOL) tablet 650 mg    Or    acetaminophen (TYLENOL) suppository 650 mg    polyethylene glycol (MIRALAX) packet 17 g    ondansetron (ZOFRAN ODT) tablet 4 mg    baricitinib (OLUMIANT) tablet 2 mg    albuterol (PROVENTIL HFA, VENTOLIN HFA, PROAIR HFA) inhaler 2 Puff    ondansetron (ZOFRAN) injection 4 mg    hydrOXYzine (VISTARIL) injection 25 mg      PMH:  has no past medical history on file. PSH:   has a past surgical history that includes ir insert non tunl cvc over 5 yrs (2022). FHX: family history is not on file. SHX:       ROS:  Unable to obtain sedated on ventilator    Hemodynamics:    CO:    CI:    CVP:    SVR:   PAP Systolic:    PAP Diastolic:    PVR:    WB74:        Ventilator Settings:      Mode Rate TV Press PEEP FiO2 PIP Min.  Vent   Assist control,Pressure control    450 ml    5 cm H20 90 %  35 cm H2O  11.4 l/min        Vital Signs: Telemetry:    normal sinus rhythm Intake/Output:   Visit Vitals  BP (!) 116/44   Pulse 77   Temp 98.6 °F (37 °C)   Resp 26   Ht 5' 5\" (1.651 m)   Wt 103.5 kg (228 lb 2.8 oz)   SpO2 91%   BMI 37.97 kg/m²       Temp (24hrs), Av.3 °F (36.8 °C), Min:97.2 °F (36.2 °C), Max:98.8 °F (37.1 °C)        O2 Device: Ventilator         Wt Readings from Last 4 Encounters:   22 103.5 kg (228 lb 2.8 oz)   22 90.7 kg (200 lb)          Intake/Output Summary (Last 24 hours) at 2022 0835  Last data filed at 2022 2420  Gross per 24 hour   Intake 1207.09 ml   Output 1790 ml   Net -582.91 ml       Last shift:       07 -  1900  In: 200   Out: -   Last 3 shifts: 02/14 1901 - 02/16 0700  In: 1442.5 [I.V.:276.5]  Out: 4298 [Urine:2840]       Physical Exam:     General: Intubated on ventilator unresponsive on propofol and  HEENT: NCAT,   Eyes: anicteric; conjunctiva clear no doll's eye reflex  Neck: no nodes, , trach midline; no accessory MM use.   Questionable neck vein distention  Chest: no deformity,   Cardiac: R regular; no murmur;   Lungs: Diffuse rales  Abd: soft, NT, hypoactive BS  Ext: Edema of the leg ; left hand finger blackish discoloration  : clear urine  Neuro: Sedated unresponsive on the ventilator no doll's eye reflex flaccid extremities  Psych-unable to assess  Skin: warm, dry, no cyanosis;   Pulses: Brachial radial pulses intact  Capillary: Normal capillary refill      DATA:    MAR reviewed and pertinent medications noted or modified as needed  MEDS:   Current Facility-Administered Medications   Medication    furosemide (LASIX) injection 20 mg    insulin glargine (LANTUS) injection 10 Units    heparin (porcine) injection 5,000 Units    insulin lispro (HUMALOG) injection    glucose chewable tablet 16 g    glucagon (GLUCAGEN) injection 1 mg    dextrose 10% infusion 125-250 mL    dexamethasone (DECADRON) 4 mg/mL injection 4 mg    0.9% sodium chloride infusion 250 mL    dilTIAZem IR (CARDIZEM) tablet 30 mg    hydrOXYzine pamoate (VISTARIL) capsule 25 mg    propofol (DIPRIVAN) 10 mg/mL infusion    dexmedeTOMidine (PRECEDEX) 400 mcg in 0.9% sodium chloride (MBP/ADV) 100 mL MBP    NOREPINephrine (LEVOPHED) 8 mg in 0.9% NS 250ml infusion    acetaminophen (TYLENOL) tablet 650 mg    Or    acetaminophen (TYLENOL) suppository 650 mg    polyethylene glycol (MIRALAX) packet 17 g    ondansetron (ZOFRAN ODT) tablet 4 mg    baricitinib (OLUMIANT) tablet 2 mg    albuterol (PROVENTIL HFA, VENTOLIN HFA, PROAIR HFA) inhaler 2 Puff    ondansetron (ZOFRAN) injection 4 mg    hydrOXYzine (VISTARIL) injection 25 mg Labs:    Recent Labs     02/16/22  0530 02/15/22  0548   WBC 5.8 6.2   HGB 7.3* 8.0*   PLT 79* 103*     Recent Labs     02/16/22  0530 02/15/22  0548    137   K 3.1* 3.6    101   CO2 30 28   * 110*   BUN 29* 20   CREA 0.26* 0.35*   CA 8.9 9.0   ALB 1.6* 1.8*   ALT 55 54     Recent Labs     02/16/22  0430 02/15/22  0530   PH 7.46* 7.52*   PCO2 42 37   PO2 80 50*   HCO3 30* 30*   FIO2 90.0 80.0   2/13 AC 12 PC 25 time 1 PEEP 5 FiO2 90% respirations 28   1/30 AC 12  PEEP 5 FiO2 100%    Lab Results   Component Value Date/Time    Culture result: No growth 6 days 01/21/2022 07:55 AM    Culture result: No growth 6 days 01/18/2022 07:12 AM    Culture result: (A) 01/17/2022 07:12 AM     Staphylococcus species, coagulase negative growing in 1 of 4 bottles drawn NO SITE INDICATED    Culture result:  01/17/2022 07:12 AM     (NOTE) [facility] GROWING IN 1 OF 2 BOTTLES CALLED TO JOSE MIGUEL PAGAN AT 0831 ON 1/19/22. JF     No results found for: TSH, TSHEXT, TSHEXT     Imaging:    Results from Hospital Encounter encounter on 01/21/22    XR CHEST PORT    Narrative  Chest single view. Comparison single view chest February 13, 2022. Stable ET tube, NG tube, and right neck central vascular catheter. Improved aeration through the lungs; lesser degree pulmonary interstitial edema. Cardiac and mediastinal structures unchanged. Thoracic aorta atherosclerosis. No  pneumothorax or sizable pleural effusion. Results from [place] encounter on 01/21/22    CT HEAD WO CONT    Narrative  The study is a noncontrasted head CT examination dated 1/21/2022. HISTORY: Unresponsive. TECHNIQUE: Thin section axial imaging was performed followed by sagittal and  coronal reconstructed imaging.     Dose Reduction Technique was employed to reduce radiation exposure - This  includes reduction optimization techniques as appropriate to a performed exam  with automated exposure control adjustments of the mA and/or Kv according to  patient size, or use of iterative reconstruction technique. COMPARISON: CT head dated 1/17/2022. There is an appropriate size to the ventricles, the deep cortical sulci, and the  sylvian fissures for the patient's age. This examination is negative for  intracranial hemorrhage, mass effect or an extra axial collection. The  gray-white matter junctions are preserved without cytotoxic or vasogenic edema. An assessment of the white matter tracts demonstrates a mild decrease in the  density characteristics of the central periventricular white matter. These  findings are consistent with expected aging changes and milder microvascular  disease. There also is a region of diminished density within the right posterior  inferior cerebellar hemisphere which may represent an older cerebrovascular  insult. ORBITS: This examination is negative for acute orbital pathology. PARANASAL SINUSES: The paranasal sinuses are well pneumatized without acute  sinus disease. There is a decreased number of right sided mastoid air cells which can be  associated with chronic mastoiditis. The craniocervical junction images in a  normal fashion. Impression  1. There are milder microvascular changes within the central periventricular  white matter. 2.  There is an area of diminished density within the inferior aspect of the  right cerebellar hemisphere without change (series 201 image number 12). These  findings may represent an older ischemic insult within the right posterior  inferior cerebellar region. 3.  This examination is negative for acute intracranial pathology or short-term  interval changes dating back to 1/17/2022.       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 Chest x-ray looks like pulmonary edema. She has peripheral edema we will give Lasix today discontinue D5W continue tube feedings we will check echocardiogram  · 1/31 off pressors remain intubated   · 2/1 remain intubated sedated on ventilator will do sedation vacation in a.m. still on 85%  · 2/2 on ventilator sedated elevated PCO2  · 2/3 remain intubated off pressors significant drop in hemoglobin and increasing white count we will repeat CBC and work accordingly  · 2/4 patient is back on Levophed hemodynamically unstable still on ventilator 85% FiO2  · 2/5 remain on ventilator condition unstable  · 2/6 condition unstable sedated on ventilator  · 2/7 we will change vent settings  · 2/10 intubated on ventilator on 90% FiO2  · 2/11 remain intubated family to make decision  · 2/12 on 90% FiO2 back on Levophed  · 2/13 remains on FiO2 90% respirations up to 28. Have increase pressure control to 28. If respirations remain elevated will increase propofol and add back fentanyl  · 12/14 we will continue on assist control mode decrease FiO2 to 80%.   Replace potassium  · 12/15 FiO2 increased back to 90%   · 2/16 patient remains unstable intubated on ventilator  · Time of care 30-minute
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
## 2022-02-16 NOTE — PROGRESS NOTES
General Daily Progress Note          Patient Name:   Ortega Morocho       YOB: 1940       Age:  80 y.o.       Admit Date: 1/21/2022      Subjective:     Patient is a 80y.o. year old female with signal past medical history of hypertension arthritis who discharged from the hospital yesterday in stable condition patient is admitted last admission with COVID-19 patient was asymptomatic all this time while in the hospital patient discharged on 2 L oxygen to skilled care but patient son want to go home with home health but is at home patient's saturations drops sent back to the ER seen by the ER physician patient placed on BiPAP patient was little hypotensive started on Levophed admitted for further work-up and treatment           Patient is DNR    Patient on ventilator 90% O2    On propofol drip         NG tube in place for medication and feeding    Repeat Covid test positive      Objective:     Visit Vitals  BP (!) 123/59 (BP 1 Location: Right lower arm, BP Patient Position: At rest)   Pulse 96   Temp 98.6 °F (37 °C)   Resp (!) 31   Ht 5' 5\" (1.651 m)   Wt 103.5 kg (228 lb 2.8 oz)   SpO2 96%   BMI 37.97 kg/m²        Recent Results (from the past 24 hour(s))   GLUCOSE, POC    Collection Time: 02/15/22  5:38 PM   Result Value Ref Range    Glucose (POC) 173 (H) 65 - 117 mg/dL    Performed by Keri Fernando    GLUCOSE, POC    Collection Time: 02/15/22 11:09 PM   Result Value Ref Range    Glucose (POC) 97 65 - 117 mg/dL    Performed by Jennet Hatchet    BLOOD GAS, ARTERIAL    Collection Time: 02/16/22  4:30 AM   Result Value Ref Range    pH 7.46 (H) 7.35 - 7.45      PCO2 42 35 - 45 mmHg    PO2 80 75 - 100 mmHg    O2 SAT 97 >95 %    BICARBONATE 30 (H) 22 - 26 mmol/L    BASE EXCESS 5.7 (H) 0 - 2 mmol/L    O2 METHOD VENT      FIO2 90.0 %    MODE AssistControl/Pressure Control      SET RATE 12      IPAP/PIP 28      EPAP/CPAP/PEEP 5.0      SITE Right Radial      BEN'S TEST PASS     GLUCOSE, POC    Collection Time: 02/16/22  5:17 AM   Result Value Ref Range    Glucose (POC) 84 65 - 117 mg/dL    Performed by Tigre Fernando    NT-PRO BNP    Collection Time: 02/16/22  5:30 AM   Result Value Ref Range    NT pro- (H) <450 pg/mL   CBC W/O DIFF    Collection Time: 02/16/22  5:30 AM   Result Value Ref Range    WBC 5.8 3.6 - 11.0 K/uL    RBC 2.30 (L) 3.80 - 5.20 M/uL    HGB 7.3 (L) 11.5 - 16.0 g/dL    HCT 22.8 (L) 35.0 - 47.0 %    MCV 99.1 (H) 80.0 - 99.0 FL    MCH 31.7 26.0 - 34.0 PG    MCHC 32.0 30.0 - 36.5 g/dL    RDW 15.5 (H) 11.5 - 14.5 %    PLATELET 79 (L) 416 - 400 K/uL    MPV 11.3 8.9 - 12.9 FL    NRBC 0.0 0.0  WBC    ABSOLUTE NRBC 0.00 0.00 - 3.41 K/uL   METABOLIC PANEL, COMPREHENSIVE    Collection Time: 02/16/22  5:30 AM   Result Value Ref Range    Sodium 137 136 - 145 mmol/L    Potassium 3.1 (L) 3.5 - 5.1 mmol/L    Chloride 103 97 - 108 mmol/L    CO2 30 21 - 32 mmol/L    Anion gap 4 (L) 5 - 15 mmol/L    Glucose 112 (H) 65 - 100 mg/dL    BUN 29 (H) 6 - 20 mg/dL    Creatinine 0.26 (L) 0.55 - 1.02 mg/dL    BUN/Creatinine ratio 112 (H) 12 - 20      GFR est AA >60 >60 ml/min/1.73m2    GFR est non-AA >60 >60 ml/min/1.73m2    Calcium 8.9 8.5 - 10.1 mg/dL    Bilirubin, total 0.5 0.2 - 1.0 mg/dL    AST (SGOT) 47 (H) 15 - 37 U/L    ALT (SGPT) 55 12 - 78 U/L    Alk. phosphatase 61 45 - 117 U/L    Protein, total 5.8 (L) 6.4 - 8.2 g/dL    Albumin 1.6 (L) 3.5 - 5.0 g/dL    Globulin 4.2 (H) 2.0 - 4.0 g/dL    A-G Ratio 0.4 (L) 1.1 - 2.2       [unfilled]      Review of Systems    Unable to obtain. Physical Exam:      Constitutional: Awake on vent   HENT:   Head: Normocephalic and atraumatic. Eyes: Pupils are equal, round, and reactive to light. EOM are normal.   Cardiovascular: Normal rate, regular rhythm and normal heart sounds. Pulmonary/Chest: Decreased breath sounds   abdominal: Soft. Bowel sounds are normal. There is no abdominal tenderness. There is no rebound and no guarding.    Musculoskeletal: Normal range of motion. Neurological: vent     XR CHEST PORT   Final Result   Residual hazy diffuse infiltration in the right lung and at the left   lung base      XR CHEST PORT   Final Result      XR CHEST PORT   Final Result   No significant interval change, including bilateral opacities. XR CHEST PORT   Final Result   Bilateral opacities, slightly progressed on the right. XR CHEST PORT   Final Result      XR CHEST PORT   Final Result   Findings/impression:   1. Limitations: Rotated and angulated. 2.  Medical devices: Right internal jugular venous catheter, endotracheal tube   and NG tube in stable. 3.  Ongoing moderate bilateral edema similar to prior exam.   4.  Ongoing collapse/consolidation in the left lower lobe concerning for   underlying airspace disease. 5.  The cardiac and mediastinal contours are stable though near completely   obscured by adjacent airspace disease. XR CHEST PORT   Final Result   No significant interval change. XR CHEST PORT   Final Result      XR CHEST PORT   Final Result   No significant interval change. XR CHEST PORT   Final Result   Persistent bilateral airspace disease and with decreased aeration. XR CHEST PORT   Final Result      XR CHEST PORT   Final Result      XR CHEST PORT   Final Result      XR CHEST PORT   Final Result      XR CHEST PORT   Final Result      WRIST BRACHIAL INDEX AT REST   Final Result      XR CHEST PORT   Final Result   No significant change. XR CHEST PORT   Final Result   No interval change. DUPLEX UPPER EXT ARTERY LEFT   Final Result      XR CHEST PORT   Final Result      XR CHEST PORT   Final Result   Endotracheal tube tip still at the maggie. Diffuse opacities in the   lungs, not significantly changed. XR CHEST PORT   Final Result      XR CHEST PORT   Final Result   Endotracheal tube tip at the maggie. Diffuse opacities in the   lungs, accentuated by lower lung volumes or increased.       XR CHEST PORT   Final Result   Diffuse opacities in the lungs, not significantly changed. XR CHEST PORT   Final Result   1. ETT terminating 2 cm above the maggie. Remaining support apparatus as above. 2.  Worsening bilateral airspace disease with developing ARDS not excluded. XR CHEST PORT   Final Result   Moderate patchy diffuse bilateral airspace opacities, waxing and waning from the   prior exam.      XR CHEST PORT   Final Result   Moderate diffuse bilateral airspace opacities, likely a combination of edema and   airspace disease, mildly increased from the previous exam.      CT HEAD WO CONT   Final Result   1. There are milder microvascular changes within the central periventricular   white matter. 2.  There is an area of diminished density within the inferior aspect of the   right cerebellar hemisphere without change (series 201 image number 12). These   findings may represent an older ischemic insult within the right posterior   inferior cerebellar region. 3.  This examination is negative for acute intracranial pathology or short-term   interval changes dating back to 1/17/2022. XR CHEST PORT   Final Result   Patchy mixed asymmetric lung disease appears somewhat better but the   lungs are better inflated. No effusion or pneumothorax. Normal heart and   mediastinum      IR INSERT NON TUNL CVC OVER 5 YRS   Final Result   Ultrasound of the right neck demonstrated patent IJV. Successful US-guided placement of a right internal jugular triple lumen central   venous catheter as described above. The catheter is functioning. PLAN:   Catheter position was confirmed by follow-up chest x-ray: catheter tip in the   lower SVC and ready to use. IR US GUIDED VASCULAR ACCESS   Final Result   Ultrasound of the right neck demonstrated patent IJV. Successful US-guided placement of a right internal jugular triple lumen central   venous catheter as described above.  The catheter is functioning. PLAN:   Catheter position was confirmed by follow-up chest x-ray: catheter tip in the   lower SVC and ready to use. XR CHEST PORT   Final Result   Findings/impression:      Diffuse interstitial airspace disease/edema. No definite pleural effusion or   pneumothorax. Cardiac contours are partially obscured. No acute osseous abnormality identified.       XR CHEST PORT    (Results Pending)        Recent Results (from the past 24 hour(s))   GLUCOSE, POC    Collection Time: 02/15/22  5:38 PM   Result Value Ref Range    Glucose (POC) 173 (H) 65 - 117 mg/dL    Performed by Theo JOHNSON    GLUCOSE, POC    Collection Time: 02/15/22 11:09 PM   Result Value Ref Range    Glucose (POC) 97 65 - 117 mg/dL    Performed by Ca Bar    BLOOD GAS, ARTERIAL    Collection Time: 02/16/22  4:30 AM   Result Value Ref Range    pH 7.46 (H) 7.35 - 7.45      PCO2 42 35 - 45 mmHg    PO2 80 75 - 100 mmHg    O2 SAT 97 >95 %    BICARBONATE 30 (H) 22 - 26 mmol/L    BASE EXCESS 5.7 (H) 0 - 2 mmol/L    O2 METHOD VENT      FIO2 90.0 %    MODE AssistControl/Pressure Control      SET RATE 12      IPAP/PIP 28      EPAP/CPAP/PEEP 5.0      SITE Right Radial      BEN'S TEST PASS     GLUCOSE, POC    Collection Time: 02/16/22  5:17 AM   Result Value Ref Range    Glucose (POC) 84 65 - 117 mg/dL    Performed by Ca Bar    NT-PRO BNP    Collection Time: 02/16/22  5:30 AM   Result Value Ref Range    NT pro- (H) <450 pg/mL   CBC W/O DIFF    Collection Time: 02/16/22  5:30 AM   Result Value Ref Range    WBC 5.8 3.6 - 11.0 K/uL    RBC 2.30 (L) 3.80 - 5.20 M/uL    HGB 7.3 (L) 11.5 - 16.0 g/dL    HCT 22.8 (L) 35.0 - 47.0 %    MCV 99.1 (H) 80.0 - 99.0 FL    MCH 31.7 26.0 - 34.0 PG    MCHC 32.0 30.0 - 36.5 g/dL    RDW 15.5 (H) 11.5 - 14.5 %    PLATELET 79 (L) 839 - 400 K/uL    MPV 11.3 8.9 - 12.9 FL    NRBC 0.0 0.0  WBC    ABSOLUTE NRBC 0.00 0.00 - 8.68 K/uL   METABOLIC PANEL, COMPREHENSIVE    Collection Time: 02/16/22  5:30 AM   Result Value Ref Range    Sodium 137 136 - 145 mmol/L    Potassium 3.1 (L) 3.5 - 5.1 mmol/L    Chloride 103 97 - 108 mmol/L    CO2 30 21 - 32 mmol/L    Anion gap 4 (L) 5 - 15 mmol/L    Glucose 112 (H) 65 - 100 mg/dL    BUN 29 (H) 6 - 20 mg/dL    Creatinine 0.26 (L) 0.55 - 1.02 mg/dL    BUN/Creatinine ratio 112 (H) 12 - 20      GFR est AA >60 >60 ml/min/1.73m2    GFR est non-AA >60 >60 ml/min/1.73m2    Calcium 8.9 8.5 - 10.1 mg/dL    Bilirubin, total 0.5 0.2 - 1.0 mg/dL    AST (SGOT) 47 (H) 15 - 37 U/L    ALT (SGPT) 55 12 - 78 U/L    Alk. phosphatase 61 45 - 117 U/L    Protein, total 5.8 (L) 6.4 - 8.2 g/dL    Albumin 1.6 (L) 3.5 - 5.0 g/dL    Globulin 4.2 (H) 2.0 - 4.0 g/dL    A-G Ratio 0.4 (L) 1.1 - 2.2         Results     Procedure Component Value Units Date/Time    COVID-19 RAPID TEST [[id number]]  (Abnormal) Collected: 02/10/22 0923    Order Status: Completed Specimen: Nasopharyngeal Updated: 02/10/22 0957     Specimen source       Please find results under separate order           COVID-19 rapid test DETECTED        Comment: Rapid [employer] ID Now   The specimen is POSITIVE for SARS-CoV-2, the novel coronavirus associated with COVID-19. This test has been authorized by the FDA under an Emergency Use Authorization (EUA) for use by authorized laboratories.    Fact sheet for Healthcare Providers: ConventionUpdate.co.nz Fact sheet for Patients: ConventionUpdate.co.nz   Methodology: Isothermal Nucleic Acid Amplification Results verified, phoned to and read back by Irina Raymond RN AT   [id number] BY                 Labs:     Recent Labs     02/16/22  0530 02/15/22  0548   WBC 5.8 6.2   HGB 7.3* 8.0*   HCT 22.8* 24.5*   PLT 79* 103*     Recent Labs     02/16/22  0530 02/15/22  0548    137   K 3.1* 3.6    101   CO2 30 28   BUN 29* 20   CREA 0.26* 0.35*   * 110*   CA 8.9 9.0     Recent Labs     02/16/22 0530 02/15/22  0548   ALT 55 54   AP 61 78 TBILI 0.5 0.6   TP 5.8* 6.1*   ALB 1.6* 1.8*   GLOB 4.2* 4.3*     No results for input(s): INR, PTP, APTT, INREXT, INREXT in the last 72 hours. No results for input(s): FE, TIBC, PSAT, FERR in the last 72 hours. No results found for: FOL, RBCF   Recent Labs     02/16/22  0430 02/15/22  0530   PH 7.46* 7.52*   PCO2 42 37   PO2 80 50*     No results for input(s): CPK, CKNDX, TROIQ in the last 72 hours.     No lab exists for component: CPKMB  No results found for: CHOL, CHOLX, CHLST, CHOLV, HDL, HDLP, LDL, LDLC, DLDLP, TGLX, TRIGL, TRIGP, CHHD, CHHDX  Lab Results   Component Value Date/Time    Glucose (POC) 84 02/16/2022 05:17 AM    Glucose (POC) 97 02/15/2022 11:09 PM    Glucose (POC) 173 (H) 02/15/2022 05:38 PM    Glucose (POC) 160 (H) 02/15/2022 11:07 AM    Glucose (POC) 98 02/15/2022 01:01 AM     Lab Results   Component Value Date/Time    Color Yellow/Straw 01/23/2022 10:30 AM    Appearance Turbid (A) 01/23/2022 10:30 AM    Specific gravity 1.027 01/23/2022 10:30 AM    pH (UA) 6.0 01/23/2022 10:30 AM    Protein 100 (A) 01/23/2022 10:30 AM    Glucose 50 (A) 01/23/2022 10:30 AM    Ketone 5 (A) 01/23/2022 10:30 AM    Bilirubin Negative 01/23/2022 10:30 AM    Urobilinogen 4.0 (H) 01/23/2022 10:30 AM    Nitrites Negative 01/23/2022 10:30 AM    Leukocyte Esterase Negative 01/23/2022 10:30 AM    Bacteria Negative 01/23/2022 10:30 AM    WBC 0-4 01/23/2022 10:30 AM    RBC 0-5 01/23/2022 10:30 AM         Assessment:     Acute hypoxemic respiratory failure on on ventilator 90% of   COVID-19 pneumonitis   hypotension  Thrombocytopenia  Hypertension  Arthritis  High blood sugar secondary to steroid  Hyperkalemia  Hyponatremia  Moderate protein calorie malnutrition  Patient need multiple partial finger amputation once stable as per vascular surgeon    Repeat Covid is positive  Plan:         Olumiant 2 mg daily  Decadron 4 mg every 6 hours  Diltiazem 30 mg 3 times  Lasix 20 mg IV twice daily  Follow-up with pulmonologist and nephrologist  Start Lantus 10 units subcu day    Monitor sodium and potassium    Overall prognosis Poor        Patient is DNR      Discussed with the patient's son he want to wait till witnessed/to make final decision                    Current Facility-Administered Medications:     furosemide (LASIX) injection 20 mg, 20 mg, IntraVENous, Q12H, Fredi, Jolie Crawley MD, 20 mg at 02/16/22 0810    insulin glargine (LANTUS) injection 10 Units, 10 Units, SubCUTAneous, DAILY, Cristy Calderon MD, 10 Units at 02/16/22 0808    heparin (porcine) injection 5,000 Units, 5,000 Units, SubCUTAneous, Q8H, Polo Lopez MD, 5,000 Units at 02/16/22 0535    insulin lispro (HUMALOG) injection, , SubCUTAneous, Q6H, Polo Lopez MD, 3 Units at 02/15/22 1800    glucose chewable tablet 16 g, 4 Tablet, Oral, PRN, Efraín Lopez MD    glucagon (GLUCAGEN) injection 1 mg, 1 mg, IntraMUSCular, PRN, Cristy Calderon MD    dextrose 10% infusion 125-250 mL, 125-250 mL, IntraVENous, PRN, Efraín Lopez MD    dexamethasone (DECADRON) 4 mg/mL injection 4 mg, 4 mg, IntraVENous, Q24H, Sadie Flower MD, 4 mg at 02/16/22 0809    0.9% sodium chloride infusion 250 mL, 250 mL, IntraVENous, PRN, Karan Flower MD    dilTIAZem IR (CARDIZEM) tablet 30 mg, 30 mg, Oral, TID, Karan Flower MD, 30 mg at 02/16/22 0809    hydrOXYzine pamoate (VISTARIL) capsule 25 mg, 25 mg, Oral, Q4H PRN, Polo Lopez MD    propofol (DIPRIVAN) 10 mg/mL infusion, 0-50 mcg/kg/min, IntraVENous, TITRATE, Sadie Flower MD, Last Rate: 13.6 mL/hr at 02/16/22 0845, 25 mcg/kg/min at 02/16/22 0845    dexmedeTOMidine (PRECEDEX) 400 mcg in 0.9% sodium chloride (MBP/ADV) 100 mL MBP, 0.1-1.5 mcg/kg/hr, IntraVENous, TITRATE, Karan Flower MD, Last Rate: 13.6 mL/hr at 02/16/22 0534, 0.6 mcg/kg/hr at 02/16/22 0534    NOREPINephrine (LEVOPHED) 8 mg in 0.9% NS 250ml infusion, 0.5-16 mcg/min, IntraVENous, TITRATE, Efraín Lopez MD, Stopped at 02/13/22 0836    acetaminophen (TYLENOL) tablet 650 mg, 650 mg, Oral, Q6H PRN **OR** acetaminophen (TYLENOL) suppository 650 mg, 650 mg, Rectal, Q6H PRN, Thania Lopez MD    polyethylene glycol (MIRALAX) packet 17 g, 17 g, Oral, DAILY PRN, Polo Lopez MD, 17 g at 02/16/22 0813    ondansetron (ZOFRAN ODT) tablet 4 mg, 4 mg, Oral, Q8H PRN **OR** [DISCONTINUED] ondansetron (ZOFRAN) injection 4 mg, 4 mg, IntraVENous, Q6H PRN, Polo Lopez MD    baricitinib (OLUMIANT) tablet 2 mg, 2 mg, Oral, DAILY, Polo Lopez MD, 2 mg at 02/16/22 0809    albuterol (PROVENTIL HFA, VENTOLIN HFA, PROAIR HFA) inhaler 2 Puff, 2 Puff, Inhalation, Q6H PRN, Polo Lopez MD    [DISCONTINUED] ondansetron (ZOFRAN ODT) tablet 4 mg, 4 mg, Oral, Q8H PRN **OR** ondansetron (ZOFRAN) injection 4 mg, 4 mg, IntraVENous, Q6H PRN, Polo Lopez MD    hydrOXYzine (VISTARIL) injection 25 mg, 25 mg, IntraMUSCular, Q6H PRN, Polo Lopez MD, 25 mg at 01/22/22 0111

## 2022-02-17 NOTE — PROGRESS NOTES
PROGRESS NOTE      Chief Complaints:  Patient examined ICU. HPI and  Objective:  Patient is not made any progress in respiratory status. Patient still requiring high FiO2 requirement. Currently 90%. Patient was also having temperature spikes. On examination of left hand shows no change in vascular examination. Strong Doppler signal over the radial artery on the left. Palmar arch signal present. No changes in demarcation mummified fingers on the left hand. Review of Systems:  Unable to assess due to intubation. EXAM:  Visit Vitals  BP 97/67 (BP 1 Location: Right lower arm, BP Patient Position: At rest)   Pulse 88   Temp (!) 100.5 °F (38.1 °C)   Resp 28   Ht 5' 5\" (1.651 m)   Wt 228 lb 2.8 oz (103.5 kg)   SpO2 100%   BMI 37.97 kg/m²       Patient is intubated and not responsive on noxious stimulation. Head and neck atraumatic, normocephalic. ENT: No hoarse voice  Cardiac system regular rate rhythm. Pulmonary no audible wheeze  Chest wall excursion normal with respiration cycle  Abdomen is soft not particularly distended. Neurologically unable to assess  Skin is warm and moist.  Psychosocial: Unable to assess  Vascular examination as previously noted no changes.     Recent Results (from the past 24 hour(s))   GLUCOSE, POC    Collection Time: 02/16/22 11:12 AM   Result Value Ref Range    Glucose (POC) 83 65 - 117 mg/dL    Performed by [address] [place] [address] Dr Ryan, POC    Collection Time: 02/16/22  5:52 PM   Result Value Ref Range    Glucose (POC) 93 65 - 117 mg/dL    Performed by [address] [place] [address] Dr Ryan, POC    Collection Time: 02/17/22 12:13 AM   Result Value Ref Range    Glucose (POC) 83 65 - 117 mg/dL    Performed by Cheng Alcaraz    BLOOD GAS, ARTERIAL    Collection Time: 02/17/22  3:23 AM   Result Value Ref Range    pH 7.49 (H) 7.35 - 7.45      PCO2 36 35 - 45 mmHg    PO2 72 (L) 75 - 100 mmHg    O2 SAT 96 >95 %    BICARBONATE 28 (H) 22 - 26 mmol/L    BASE EXCESS 3.8 (H) 0 - 2 mmol/L    O2 METHOD VENT FIO2 90.0 %    MODE AssistControl/Pressure Control      SET RATE 12      IPAP/PIP 28      EPAP/CPAP/PEEP 5.0      SITE Right Radial      BEN'S TEST PASS     METABOLIC PANEL, COMPREHENSIVE    Collection Time: 02/17/22  6:25 AM   Result Value Ref Range    Sodium 137 136 - 145 mmol/L    Potassium 3.4 (L) 3.5 - 5.1 mmol/L    Chloride 103 97 - 108 mmol/L    CO2 26 21 - 32 mmol/L    Anion gap 8 5 - 15 mmol/L    Glucose 94 65 - 100 mg/dL    BUN 24 (H) 6 - 20 mg/dL    Creatinine 0.38 (L) 0.55 - 1.02 mg/dL    BUN/Creatinine ratio 63 (H) 12 - 20      GFR est AA >60 >60 ml/min/1.73m2    GFR est non-AA >60 >60 ml/min/1.73m2    Calcium 9.5 8.5 - 10.1 mg/dL    Bilirubin, total 0.6 0.2 - 1.0 mg/dL    AST (SGOT) 64 (H) 15 - 37 U/L    ALT (SGPT) 68 12 - 78 U/L    Alk. phosphatase 78 45 - 117 U/L    Protein, total 6.7 6.4 - 8.2 g/dL    Albumin 1.9 (L) 3.5 - 5.0 g/dL    Globulin 4.8 (H) 2.0 - 4.0 g/dL    A-G Ratio 0.4 (L) 1.1 - 2.2         ASSESSMENT:   Patient is 80 y.o. with diagnosis of : Active Problems:    COVID-19 (1/17/2022)      Respiratory failure with hypoxia ([address] (1/21/2022)      Hypernatremia (1/23/2022)      Hypokalemia (1/23/2022)        PLAN:                 Patient is now having fever spikes as well. Left hand examination no changes on clinical examination. Continue conservative management for ischemic left hand from hypoperfusion syndrome with localized thrombosis. I will continue monitor her progress. Critical care time spent: 30 minutes.

## 2022-02-17 NOTE — PROGRESS NOTES
IMPRESSION:   1. Acute hypoxic respiratory  2. Acute on chronic hypercapnic respiratory failure   3. COVID-19 pneumonia  4. Pulmonary edema  5. Bilateral pleural effusion  6. Hypokalemia to be repleted  7. Severe Anemia hemoglobin dropped to 4 stable since transfusion  8. Sepsis with septic shock off pressors  9. Left hand ischemia  10. Arthritis hypertension by hx  11. Hyperkalemia corrected  12. Hyponatremia resolved      RECOMMENDATIONS/PLAN:   1. ICU monitoring  1. Patient condition got worse on 1/24 went to asystole subsequently got intubated now on ventilator assist control mode sedated with propofol and  Precedex arterial blood gases acceptable currently on A/C 12 PC 25 time 1.0 PEEP 5 FiO2  90% not able to wean she was put on 80% FiO2 but she became hypoxic now she is back to 90% Fio2  2. Patient is on dexamethasone and baricitinib  3. Leukocytosis and hemoglobin improved  4. Elevated blood sugar at steroid was decreased blood sugar trending down patient on sliding scale  5. Potassium to be repleted  6. Left hand NATHALIE normal continue local wound care   7. Pulmonary edema received Lasix chest x-ray still shows bilateral effusion  8. Left hand ischemia off IV heparin left ulnar artery occlusion radial  patent NATHALIE normal  9. Troponin is elevated  10. Procalcitonin 0.06 C-reactive 3.1 lactic acid 1.4  11.  Patient condition remain the same not doing well hypotensive remain intubated on 90% FiO2 family to make decision     [x] High complexity decision making was performed  [x] See my orders for details  HPI  62-year-old lady came in because of shortness of breath and dyspnea she has significant past medical history of arthritis hypertension she was recently discharged from the hospital came back with worsening of hypoxia she was discharged on oxygen 2 L nasal cannula and patient condition got worse she was supposed to be discharged to skilled care but patient's son took her home where her condition got worse and she was transferred back to the hospital now she is on noninvasive ventilator BiPAP machine hemodynamically unstable hypotensive on vasopressors and not giving much history. PMH:  has no past medical history on file. PSH:   has a past surgical history that includes ir insert non tunl cvc over 5 yrs (1/21/2022). FHX: family history is not on file.      SHX:      ALL:   Allergies   Allergen Reactions    Penicillins Hives        MEDS:   [x] Reviewed - As Below   [] Not reviewed    Current Facility-Administered Medications   Medication    furosemide (LASIX) injection 20 mg    insulin glargine (LANTUS) injection 10 Units    heparin (porcine) injection 5,000 Units    insulin lispro (HUMALOG) injection    glucose chewable tablet 16 g    glucagon (GLUCAGEN) injection 1 mg    dextrose 10% infusion 125-250 mL    dexamethasone (DECADRON) 4 mg/mL injection 4 mg    0.9% sodium chloride infusion 250 mL    dilTIAZem IR (CARDIZEM) tablet 30 mg    hydrOXYzine pamoate (VISTARIL) capsule 25 mg    propofol (DIPRIVAN) 10 mg/mL infusion    dexmedeTOMidine (PRECEDEX) 400 mcg in 0.9% sodium chloride (MBP/ADV) 100 mL MBP    NOREPINephrine (LEVOPHED) 8 mg in 0.9% NS 250ml infusion    acetaminophen (TYLENOL) tablet 650 mg    Or    acetaminophen (TYLENOL) suppository 650 mg    polyethylene glycol (MIRALAX) packet 17 g    ondansetron (ZOFRAN ODT) tablet 4 mg    baricitinib (OLUMIANT) tablet 2 mg    albuterol (PROVENTIL HFA, VENTOLIN HFA, PROAIR HFA) inhaler 2 Puff    ondansetron (ZOFRAN) injection 4 mg    hydrOXYzine (VISTARIL) injection 25 mg      MAR reviewed and pertinent medications noted or modified as needed   Current Facility-Administered Medications   Medication    furosemide (LASIX) injection 20 mg    insulin glargine (LANTUS) injection 10 Units    heparin (porcine) injection 5,000 Units    insulin lispro (HUMALOG) injection    glucose chewable tablet 16 g    glucagon (GLUCAGEN) injection 1 mg    dextrose 10% infusion 125-250 mL    dexamethasone (DECADRON) 4 mg/mL injection 4 mg    0.9% sodium chloride infusion 250 mL    dilTIAZem IR (CARDIZEM) tablet 30 mg    hydrOXYzine pamoate (VISTARIL) capsule 25 mg    propofol (DIPRIVAN) 10 mg/mL infusion    dexmedeTOMidine (PRECEDEX) 400 mcg in 0.9% sodium chloride (MBP/ADV) 100 mL MBP    NOREPINephrine (LEVOPHED) 8 mg in 0.9% NS 250ml infusion    acetaminophen (TYLENOL) tablet 650 mg    Or    acetaminophen (TYLENOL) suppository 650 mg    polyethylene glycol (MIRALAX) packet 17 g    ondansetron (ZOFRAN ODT) tablet 4 mg    baricitinib (OLUMIANT) tablet 2 mg    albuterol (PROVENTIL HFA, VENTOLIN HFA, PROAIR HFA) inhaler 2 Puff    ondansetron (ZOFRAN) injection 4 mg    hydrOXYzine (VISTARIL) injection 25 mg      PMH:  has no past medical history on file. PSH:   has a past surgical history that includes ir insert non tunl cvc over 5 yrs (2022). FHX: family history is not on file. SHX:       ROS:  Unable to obtain sedated on ventilator    Hemodynamics:    CO:    CI:    CVP:    SVR:   PAP Systolic:    PAP Diastolic:    PVR:    KI94:        Ventilator Settings:      Mode Rate TV Press PEEP FiO2 PIP Min.  Vent   Assist control,Pressure control    450 ml    5 cm H20 90 %  35 cm H2O  10.4 l/min        Vital Signs: Telemetry:    normal sinus rhythm Intake/Output:   Visit Vitals  BP 97/67 (BP 1 Location: Right lower arm, BP Patient Position: At rest)   Pulse 88   Temp (!) 100.5 °F (38.1 °C)   Resp 28   Ht 5' 5\" (1.651 m)   Wt 103.5 kg (228 lb 2.8 oz)   SpO2 100%   BMI 37.97 kg/m²       Temp (24hrs), Av.7 °F (37.6 °C), Min:98.9 °F (37.2 °C), Max:100.5 °F (38.1 °C)        O2 Device: Ventilator         Wt Readings from Last 4 Encounters:   22 103.5 kg (228 lb 2.8 oz)   22 90.7 kg (200 lb)          Intake/Output Summary (Last 24 hours) at 2022 9419  Last data filed at 2022 0300  Gross per 24 hour   Intake 1059.22 ml   Output 400 ml Net 659.22 ml       Last shift:      No intake/output data recorded. Last 3 shifts: 02/15 1901 - 02/17 0700  In: 1486.4 [I.V.:342.4]  Out: 8178 [Urine:1540]       Physical Exam:     General: Intubated on ventilator unresponsive on propofol and  HEENT: NCAT,   Eyes: anicteric; conjunctiva clear no doll's eye reflex  Neck: no nodes, , trach midline; no accessory MM use.   Questionable neck vein distention  Chest: no deformity,   Cardiac: R regular; no murmur;   Lungs: Diffuse rales  Abd: soft, NT, hypoactive BS  Ext: Edema of the leg ; left hand finger blackish discoloration  : clear urine  Neuro: Sedated unresponsive on the ventilator no doll's eye reflex flaccid extremities  Psych-unable to assess  Skin: warm, dry, no cyanosis;   Pulses: Brachial radial pulses intact  Capillary: Normal capillary refill      DATA:    MAR reviewed and pertinent medications noted or modified as needed  MEDS:   Current Facility-Administered Medications   Medication    furosemide (LASIX) injection 20 mg    insulin glargine (LANTUS) injection 10 Units    heparin (porcine) injection 5,000 Units    insulin lispro (HUMALOG) injection    glucose chewable tablet 16 g    glucagon (GLUCAGEN) injection 1 mg    dextrose 10% infusion 125-250 mL    dexamethasone (DECADRON) 4 mg/mL injection 4 mg    0.9% sodium chloride infusion 250 mL    dilTIAZem IR (CARDIZEM) tablet 30 mg    hydrOXYzine pamoate (VISTARIL) capsule 25 mg    propofol (DIPRIVAN) 10 mg/mL infusion    dexmedeTOMidine (PRECEDEX) 400 mcg in 0.9% sodium chloride (MBP/ADV) 100 mL MBP    NOREPINephrine (LEVOPHED) 8 mg in 0.9% NS 250ml infusion    acetaminophen (TYLENOL) tablet 650 mg    Or    acetaminophen (TYLENOL) suppository 650 mg    polyethylene glycol (MIRALAX) packet 17 g    ondansetron (ZOFRAN ODT) tablet 4 mg    baricitinib (OLUMIANT) tablet 2 mg    albuterol (PROVENTIL HFA, VENTOLIN HFA, PROAIR HFA) inhaler 2 Puff    ondansetron (ZOFRAN) injection 4 mg    hydrOXYzine (VISTARIL) injection 25 mg        Labs:    Recent Labs     02/16/22  0530 02/15/22  0548   WBC 5.8 6.2   HGB 7.3* 8.0*   PLT 79* 103*     Recent Labs     02/17/22  0625 02/16/22  0530 02/15/22  0548    137 137   K 3.4* 3.1* 3.6    103 101   CO2 26 30 28   GLU 94 112* 110*   BUN 24* 29* 20   CREA 0.38* 0.26* 0.35*   CA 9.5 8.9 9.0   ALB 1.9* 1.6* 1.8*   ALT 68 55 54     Recent Labs     02/17/22  0323 02/16/22  0430 02/15/22  0530   PH 7.49* 7.46* 7.52*   PCO2 36 42 37   PO2 72* 80 50*   HCO3 28* 30* 30*   FIO2 90.0 90.0 80.0   2/13 AC 12 PC 25 time 1 PEEP 5 FiO2 90% respirations 28   1/30 AC 12  PEEP 5 FiO2 100%    Lab Results   Component Value Date/Time    Culture result: No growth 6 days 01/21/2022 07:55 AM    Culture result: No growth 6 days 01/18/2022 07:12 AM    Culture result: (A) 01/17/2022 07:12 AM     Staphylococcus species, coagulase negative growing in 1 of 4 bottles drawn NO SITE INDICATED    Culture result:  01/17/2022 07:12 AM     (NOTE) [facility] GROWING IN 1 OF 2 BOTTLES CALLED TO JOSE MIGUEL PAGAN AT 0831 ON 1/19/22. JF     No results found for: TSH, TSHEXT, TSHEXT     Imaging:    Results from Hospital Encounter encounter on 01/21/22    XR CHEST PORT    Narrative  1 view comparison yesterday    ET and NG tube and central line remain    Increased hazy disease over right greater than left lung, mainly due to rotation  and technique. No effusion or pneumothorax. Normal heart and mediastinum      Results from Hospital Encounter encounter on 01/21/22    CT HEAD WO CONT    Narrative  The study is a noncontrasted head CT examination dated 1/21/2022. HISTORY: Unresponsive. TECHNIQUE: Thin section axial imaging was performed followed by sagittal and  coronal reconstructed imaging.     Dose Reduction Technique was employed to reduce radiation exposure - This  includes reduction optimization techniques as appropriate to a performed exam  with automated exposure control adjustments of the mA and/or Kv according to  patient size, or use of iterative reconstruction technique. COMPARISON: CT head dated 1/17/2022. There is an appropriate size to the ventricles, the deep cortical sulci, and the  sylvian fissures for the patient's age. This examination is negative for  intracranial hemorrhage, mass effect or an extra axial collection. The  gray-white matter junctions are preserved without cytotoxic or vasogenic edema. An assessment of the white matter tracts demonstrates a mild decrease in the  density characteristics of the central periventricular white matter. These  findings are consistent with expected aging changes and milder microvascular  disease. There also is a region of diminished density within the right posterior  inferior cerebellar hemisphere which may represent an older cerebrovascular  insult. ORBITS: This examination is negative for acute orbital pathology. PARANASAL SINUSES: The paranasal sinuses are well pneumatized without acute  sinus disease. There is a decreased number of right sided mastoid air cells which can be  associated with chronic mastoiditis. The craniocervical junction images in a  normal fashion. Impression  1. There are milder microvascular changes within the central periventricular  white matter. 2.  There is an area of diminished density within the inferior aspect of the  right cerebellar hemisphere without change (series 201 image number 12). These  findings may represent an older ischemic insult within the right posterior  inferior cerebellar region. 3.  This examination is negative for acute intracranial pathology or short-term  interval changes dating back to 1/17/2022.       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 Chest x-ray looks like pulmonary edema. She has peripheral edema we will give Lasix today discontinue D5W continue tube feedings we will check echocardiogram  · 1/31 off pressors remain intubated   · 2/1 remain intubated sedated on ventilator will do sedation vacation in a.m. still on 85%  · 2/2 on ventilator sedated elevated PCO2  · 2/3 remain intubated off pressors significant drop in hemoglobin and increasing white count we will repeat CBC and work accordingly  · 2/4 patient is back on Levophed hemodynamically unstable still on ventilator 85% FiO2  · 2/5 remain on ventilator condition unstable  · 2/6 condition unstable sedated on ventilator  · 2/7 we will change vent settings  · 2/10 intubated on ventilator on 90% FiO2  · 2/11 remain intubated family to make decision  · 2/12 on 90% FiO2 back on Levophed  · 2/13 remains on FiO2 90% respirations up to 28. Have increase pressure control to 28. If respirations remain elevated will increase propofol and add back fentanyl  · 12/14 we will continue on assist control mode decrease FiO2 to 80%.   Replace potassium  · 12/15 FiO2 increased back to 90%   · 2/16 patient remains unstable intubated on ventilator  · Time of care 30-minute

## 2022-02-17 NOTE — PROGRESS NOTES
General Daily Progress Note          Patient Name:   Lizzette Rebolledo       YOB: 1940       Age:  80 y.o.       Admit Date: 1/21/2022      Subjective:     Patient is a 80y.o. year old female with signal past medical history of hypertension arthritis who discharged from the hospital yesterday in stable condition patient is admitted last admission with COVID-19 patient was asymptomatic all this time while in the hospital patient discharged on 2 L oxygen to skilled care but patient son want to go home with home health but is at home patient's saturations drops sent back to the ER seen by the ER physician patient placed on BiPAP patient was little hypotensive started on Levophed admitted for further work-up and treatment           Patient is DNR    Patient on ventilator 90% O2    On propofol drip         NG tube in place for medication and feeding    Repeat Covid test positive      Objective:     Visit Vitals  BP (!) 106/52 (BP 1 Location: Right lower arm, BP Patient Position: At rest)   Pulse 97   Temp (!) 100.5 °F (38.1 °C)   Resp 28   Ht 5' 5\" (1.651 m)   Wt 103.5 kg (228 lb 2.8 oz)   SpO2 96%   BMI 37.97 kg/m²        Recent Results (from the past 24 hour(s))   GLUCOSE, POC    Collection Time: 02/16/22 11:12 AM   Result Value Ref Range    Glucose (POC) 83 65 - 117 mg/dL    Performed by Mortimer Carney, POC    Collection Time: 02/16/22  5:52 PM   Result Value Ref Range    Glucose (POC) 93 65 - 117 mg/dL    Performed by Mortimer Carney, POC    Collection Time: 02/17/22 12:13 AM   Result Value Ref Range    Glucose (POC) 83 65 - 117 mg/dL    Performed by Dallin Mendoza    BLOOD GAS, ARTERIAL    Collection Time: 02/17/22  3:23 AM   Result Value Ref Range    pH 7.49 (H) 7.35 - 7.45      PCO2 36 35 - 45 mmHg    PO2 72 (L) 75 - 100 mmHg    O2 SAT 96 >95 %    BICARBONATE 28 (H) 22 - 26 mmol/L    BASE EXCESS 3.8 (H) 0 - 2 mmol/L    O2 METHOD VENT      FIO2 90.0 %    MODE AssistControl/Pressure Control      SET RATE 12      IPAP/PIP 28      EPAP/CPAP/PEEP 5.0      SITE Right Radial      BEN'S TEST PASS     CBC WITH AUTOMATED DIFF    Collection Time: 02/17/22  6:25 AM   Result Value Ref Range    WBC 7.4 3.6 - 11.0 K/uL    RBC 2.60 (L) 3.80 - 5.20 M/uL    HGB 8.2 (L) 11.5 - 16.0 g/dL    HCT 25.7 (L) 35.0 - 47.0 %    MCV 98.8 80.0 - 99.0 FL    MCH 31.5 26.0 - 34.0 PG    MCHC 31.9 30.0 - 36.5 g/dL    RDW 15.3 (H) 11.5 - 14.5 %    PLATELET 512 (L) 012 - 400 K/uL    MPV 11.7 8.9 - 12.9 FL    NRBC 0.0 0.0  WBC    ABSOLUTE NRBC 0.00 0.00 - 0.01 K/uL    NEUTROPHILS PENDING %    LYMPHOCYTES PENDING %    MONOCYTES PENDING %    EOSINOPHILS PENDING %    BASOPHILS PENDING %    IMMATURE GRANULOCYTES PENDING %    ABS. NEUTROPHILS PENDING K/UL    ABS. LYMPHOCYTES PENDING K/UL    ABS. MONOCYTES PENDING K/UL    ABS. EOSINOPHILS PENDING K/UL    ABS. BASOPHILS PENDING K/UL    ABS. IMM. GRANS. PENDING K/UL    DF PENDING    METABOLIC PANEL, COMPREHENSIVE    Collection Time: 02/17/22  6:25 AM   Result Value Ref Range    Sodium 137 136 - 145 mmol/L    Potassium 3.4 (L) 3.5 - 5.1 mmol/L    Chloride 103 97 - 108 mmol/L    CO2 26 21 - 32 mmol/L    Anion gap 8 5 - 15 mmol/L    Glucose 94 65 - 100 mg/dL    BUN 24 (H) 6 - 20 mg/dL    Creatinine 0.38 (L) 0.55 - 1.02 mg/dL    BUN/Creatinine ratio 63 (H) 12 - 20      GFR est AA >60 >60 ml/min/1.73m2    GFR est non-AA >60 >60 ml/min/1.73m2    Calcium 9.5 8.5 - 10.1 mg/dL    Bilirubin, total 0.6 0.2 - 1.0 mg/dL    AST (SGOT) 64 (H) 15 - 37 U/L    ALT (SGPT) 68 12 - 78 U/L    Alk. phosphatase 78 45 - 117 U/L    Protein, total 6.7 6.4 - 8.2 g/dL    Albumin 1.9 (L) 3.5 - 5.0 g/dL    Globulin 4.8 (H) 2.0 - 4.0 g/dL    A-G Ratio 0.4 (L) 1.1 - 2.2       [unfilled]      Review of Systems    Unable to obtain. Physical Exam:      Constitutional: Awake on vent   HENT:   Head: Normocephalic and atraumatic. Eyes: Pupils are equal, round, and reactive to light.  EOM are normal. Cardiovascular: Normal rate, regular rhythm and normal heart sounds. Pulmonary/Chest: Decreased breath sounds   abdominal: Soft. Bowel sounds are normal. There is no abdominal tenderness. There is no rebound and no guarding. Musculoskeletal: Normal range of motion. Neurological: vent     XR CHEST PORT   Final Result      XR CHEST PORT   Final Result   Residual hazy diffuse infiltration in the right lung and at the left   lung base      XR CHEST PORT   Final Result      XR CHEST PORT   Final Result   No significant interval change, including bilateral opacities. XR CHEST PORT   Final Result   Bilateral opacities, slightly progressed on the right. XR CHEST PORT   Final Result      XR CHEST PORT   Final Result   Findings/impression:   1. Limitations: Rotated and angulated. 2.  Medical devices: Right internal jugular venous catheter, endotracheal tube   and NG tube in stable. 3.  Ongoing moderate bilateral edema similar to prior exam.   4.  Ongoing collapse/consolidation in the left lower lobe concerning for   underlying airspace disease. 5.  The cardiac and mediastinal contours are stable though near completely   obscured by adjacent airspace disease. XR CHEST PORT   Final Result   No significant interval change. XR CHEST PORT   Final Result      XR CHEST PORT   Final Result   No significant interval change. XR CHEST PORT   Final Result   Persistent bilateral airspace disease and with decreased aeration. XR CHEST PORT   Final Result      XR CHEST PORT   Final Result      XR CHEST PORT   Final Result      XR CHEST PORT   Final Result      XR CHEST PORT   Final Result      WRIST BRACHIAL INDEX AT REST   Final Result      XR CHEST PORT   Final Result   No significant change. XR CHEST PORT   Final Result   No interval change.       DUPLEX UPPER EXT ARTERY LEFT   Final Result      XR CHEST PORT   Final Result      XR CHEST PORT   Final Result   Endotracheal tube tip still at the maggie. Diffuse opacities in the   lungs, not significantly changed. XR CHEST PORT   Final Result      XR CHEST PORT   Final Result   Endotracheal tube tip at the amggie. Diffuse opacities in the   lungs, accentuated by lower lung volumes or increased. XR CHEST PORT   Final Result   Diffuse opacities in the lungs, not significantly changed. XR CHEST PORT   Final Result   1. ETT terminating 2 cm above the maggie. Remaining support apparatus as above. 2.  Worsening bilateral airspace disease with developing ARDS not excluded. XR CHEST PORT   Final Result   Moderate patchy diffuse bilateral airspace opacities, waxing and waning from the   prior exam.      XR CHEST PORT   Final Result   Moderate diffuse bilateral airspace opacities, likely a combination of edema and   airspace disease, mildly increased from the previous exam.      CT HEAD WO CONT   Final Result   1. There are milder microvascular changes within the central periventricular   white matter. 2.  There is an area of diminished density within the inferior aspect of the   right cerebellar hemisphere without change (series 201 image number 12). These   findings may represent an older ischemic insult within the right posterior   inferior cerebellar region. 3.  This examination is negative for acute intracranial pathology or short-term   interval changes dating back to 1/17/2022. XR CHEST PORT   Final Result   Patchy mixed asymmetric lung disease appears somewhat better but the   lungs are better inflated. No effusion or pneumothorax. Normal heart and   mediastinum      IR INSERT NON TUNL CVC OVER 5 YRS   Final Result   Ultrasound of the right neck demonstrated patent IJV. Successful US-guided placement of a right internal jugular triple lumen central   venous catheter as described above. The catheter is functioning.       PLAN:   Catheter position was confirmed by follow-up chest x-ray: catheter tip in the   lower SVC and ready to use. IR US GUIDED VASCULAR ACCESS   Final Result   Ultrasound of the right neck demonstrated patent IJV. Successful US-guided placement of a right internal jugular triple lumen central   venous catheter as described above. The catheter is functioning. PLAN:   Catheter position was confirmed by follow-up chest x-ray: catheter tip in the   lower SVC and ready to use. XR CHEST PORT   Final Result   Findings/impression:      Diffuse interstitial airspace disease/edema. No definite pleural effusion or   pneumothorax. Cardiac contours are partially obscured. No acute osseous abnormality identified.            Recent Results (from the past 24 hour(s))   GLUCOSE, POC    Collection Time: 02/16/22 11:12 AM   Result Value Ref Range    Glucose (POC) 83 65 - 117 mg/dL    Performed by [address] [place] [address] Dr Ryan, POC    Collection Time: 02/16/22  5:52 PM   Result Value Ref Range    Glucose (POC) 93 65 - 117 mg/dL    Performed by [address] [place] [address] Dr Ryan, POC    Collection Time: 02/17/22 12:13 AM   Result Value Ref Range    Glucose (POC) 83 65 - 117 mg/dL    Performed by Dallin Mendoza    BLOOD GAS, ARTERIAL    Collection Time: 02/17/22  3:23 AM   Result Value Ref Range    pH 7.49 (H) 7.35 - 7.45      PCO2 36 35 - 45 mmHg    PO2 72 (L) 75 - 100 mmHg    O2 SAT 96 >95 %    BICARBONATE 28 (H) 22 - 26 mmol/L    BASE EXCESS 3.8 (H) 0 - 2 mmol/L    O2 METHOD VENT      FIO2 90.0 %    MODE AssistControl/Pressure Control      SET RATE 12      IPAP/PIP 28      EPAP/CPAP/PEEP 5.0      SITE Right Radial      BEN'S TEST PASS     CBC WITH AUTOMATED DIFF    Collection Time: 02/17/22  6:25 AM   Result Value Ref Range    WBC 7.4 3.6 - 11.0 K/uL    RBC 2.60 (L) 3.80 - 5.20 M/uL    HGB 8.2 (L) 11.5 - 16.0 g/dL    HCT 25.7 (L) 35.0 - 47.0 %    MCV 98.8 80.0 - 99.0 FL    MCH 31.5 26.0 - 34.0 PG    MCHC 31.9 30.0 - 36.5 g/dL    RDW 15.3 (H) 11.5 - 14.5 %    PLATELET 971 (L) 316 - 400 K/uL    MPV 11.7 8.9 - 12.9 FL    NRBC 0.0 0.0  WBC    ABSOLUTE NRBC 0.00 0.00 - 0.01 K/uL    NEUTROPHILS PENDING %    LYMPHOCYTES PENDING %    MONOCYTES PENDING %    EOSINOPHILS PENDING %    BASOPHILS PENDING %    IMMATURE GRANULOCYTES PENDING %    ABS. NEUTROPHILS PENDING K/UL    ABS. LYMPHOCYTES PENDING K/UL    ABS. MONOCYTES PENDING K/UL    ABS. EOSINOPHILS PENDING K/UL    ABS. BASOPHILS PENDING K/UL    ABS. IMM. GRANS. PENDING K/UL    DF PENDING    METABOLIC PANEL, COMPREHENSIVE    Collection Time: 02/17/22  6:25 AM   Result Value Ref Range    Sodium 137 136 - 145 mmol/L    Potassium 3.4 (L) 3.5 - 5.1 mmol/L    Chloride 103 97 - 108 mmol/L    CO2 26 21 - 32 mmol/L    Anion gap 8 5 - 15 mmol/L    Glucose 94 65 - 100 mg/dL    BUN 24 (H) 6 - 20 mg/dL    Creatinine 0.38 (L) 0.55 - 1.02 mg/dL    BUN/Creatinine ratio 63 (H) 12 - 20      GFR est AA >60 >60 ml/min/1.73m2    GFR est non-AA >60 >60 ml/min/1.73m2    Calcium 9.5 8.5 - 10.1 mg/dL    Bilirubin, total 0.6 0.2 - 1.0 mg/dL    AST (SGOT) 64 (H) 15 - 37 U/L    ALT (SGPT) 68 12 - 78 U/L    Alk. phosphatase 78 45 - 117 U/L    Protein, total 6.7 6.4 - 8.2 g/dL    Albumin 1.9 (L) 3.5 - 5.0 g/dL    Globulin 4.8 (H) 2.0 - 4.0 g/dL    A-G Ratio 0.4 (L) 1.1 - 2.2         Results     Procedure Component Value Units Date/Time    COVID-19 RAPID TEST [[id number]]  (Abnormal) Collected: 02/10/22 0923    Order Status: Completed Specimen: Nasopharyngeal Updated: 02/10/22 0957     Specimen source       Please find results under separate order           COVID-19 rapid test DETECTED        Comment: Rapid [employer] ID Now   The specimen is POSITIVE for SARS-CoV-2, the novel coronavirus associated with COVID-19. This test has been authorized by the FDA under an Emergency Use Authorization (EUA) for use by authorized laboratories.    Fact sheet for Healthcare Providers: ConventionUpdate.co.nz Fact sheet for Patients: [facility]   Methodology: Isothermal Nucleic Acid Amplification Results verified, phoned to and read back by Rey Wu RN AT   [id number] BY CESAR                Labs:     Recent Labs     02/17/22  0625 02/16/22  0530   WBC 7.4 5.8   HGB 8.2* 7.3*   HCT 25.7* 22.8*   * 79*     Recent Labs     02/17/22  0625 02/16/22  0530 02/15/22  0548    137 137   K 3.4* 3.1* 3.6    103 101   CO2 26 30 28   BUN 24* 29* 20   CREA 0.38* 0.26* 0.35*   GLU 94 112* 110*   CA 9.5 8.9 9.0     Recent Labs     02/17/22  0625 02/16/22  0530 02/15/22  0548   ALT 68 55 54   AP 78 61 78   TBILI 0.6 0.5 0.6   TP 6.7 5.8* 6.1*   ALB 1.9* 1.6* 1.8*   GLOB 4.8* 4.2* 4.3*     No results for input(s): INR, PTP, APTT, INREXT, INREXT in the last 72 hours. No results for input(s): FE, TIBC, PSAT, FERR in the last 72 hours. No results found for: FOL, RBCF   Recent Labs     02/17/22  0323 02/16/22  0430   PH 7.49* 7.46*   PCO2 36 42   PO2 72* 80     No results for input(s): CPK, CKNDX, TROIQ in the last 72 hours.     No lab exists for component: CPKMB  No results found for: CHOL, CHOLX, CHLST, CHOLV, HDL, HDLP, LDL, LDLC, DLDLP, TGLX, TRIGL, TRIGP, CHHD, CHHDX  Lab Results   Component Value Date/Time    Glucose (POC) 83 02/17/2022 12:13 AM    Glucose (POC) 93 02/16/2022 05:52 PM    Glucose (POC) 83 02/16/2022 11:12 AM    Glucose (POC) 84 02/16/2022 05:17 AM    Glucose (POC) 97 02/15/2022 11:09 PM     Lab Results   Component Value Date/Time    Color Yellow/Straw 01/23/2022 10:30 AM    Appearance Turbid (A) 01/23/2022 10:30 AM    Specific gravity 1.027 01/23/2022 10:30 AM    pH (UA) 6.0 01/23/2022 10:30 AM    Protein 100 (A) 01/23/2022 10:30 AM    Glucose 50 (A) 01/23/2022 10:30 AM    Ketone 5 (A) 01/23/2022 10:30 AM    Bilirubin Negative 01/23/2022 10:30 AM    Urobilinogen 4.0 (H) 01/23/2022 10:30 AM    Nitrites Negative 01/23/2022 10:30 AM    Leukocyte Esterase Negative 01/23/2022 10:30 AM    Bacteria Negative 01/23/2022 10:30 AM    WBC 0-4 01/23/2022 10:30 AM    RBC 0-5 01/23/2022 10:30 AM         Assessment:     Acute hypoxemic respiratory failure on on ventilator 90% of   COVID-19 pneumonitis   hypotension  Thrombocytopenia  Hypertension  Arthritis  High blood sugar secondary to steroid  Hyperkalemia  Hyponatremia  Moderate protein calorie malnutrition  Patient need multiple partial finger amputation once stable as per vascular surgeon    Repeat Covid is positive  Plan:         Olumiant 2 mg daily  Decadron 4 mg every 6 hours  Diltiazem 30 mg 3 times  Lasix 20 mg IV twice daily  Follow-up with pulmonologist and nephrologist  Start Lantus 10 units subcu day    Monitor sodium and potassium    Overall prognosis Poor        Patient is DNR      Discussed with the patient's son he want to wait till  make final decision                    Current Facility-Administered Medications:     furosemide (LASIX) injection 20 mg, 20 mg, IntraVENous, Q12H, Fredi, Joanne Frost MD, 20 mg at 02/16/22 2145    insulin glargine (LANTUS) injection 10 Units, 10 Units, SubCUTAneous, DAILY, Polo Lopez MD, 10 Units at 02/17/22 0915    heparin (porcine) injection 5,000 Units, 5,000 Units, SubCUTAneous, Q8H, Polo Lopez MD, 5,000 Units at 02/17/22 0556    insulin lispro (HUMALOG) injection, , SubCUTAneous, Q6H, Polo Lopez MD, 3 Units at 02/15/22 1800    glucose chewable tablet 16 g, 4 Tablet, Oral, PRN, Patricia Lopez MD    glucagon (GLUCAGEN) injection 1 mg, 1 mg, IntraMUSCular, PRN, Patricia Lopez MD    dextrose 10% infusion 125-250 mL, 125-250 mL, IntraVENous, PRN, Patricia Lopez MD    dexamethasone (DECADRON) 4 mg/mL injection 4 mg, 4 mg, IntraVENous, Q24H, Ondina Flower MD, 4 mg at 02/17/22 0915    0.9% sodium chloride infusion 250 mL, 250 mL, IntraVENous, PRN, Karan Flower MD    dilTIAZem IR (CARDIZEM) tablet 30 mg, 30 mg, Oral, TID, Karan Flower MD, 30 mg at 02/16/22 8262   hydrOXYzine pamoate (VISTARIL) capsule 25 mg, 25 mg, Oral, Q4H PRN, Johnathan Lopez Res, MD    propofol (DIPRIVAN) 10 mg/mL infusion, 0-50 mcg/kg/min, IntraVENous, TITRATE, Karan Flower MD, Last Rate: 2.7 mL/hr at 02/17/22 1006, 5 mcg/kg/min at 02/17/22 1006    dexmedeTOMidine (PRECEDEX) 400 mcg in 0.9% sodium chloride (MBP/ADV) 100 mL MBP, 0.1-1.5 mcg/kg/hr, IntraVENous, TITRATE, Karan Flower MD, Last Rate: 6.8 mL/hr at 02/17/22 1006, 0.3 mcg/kg/hr at 02/17/22 1006    NOREPINephrine (LEVOPHED) 8 mg in 0.9% NS 250ml infusion, 0.5-16 mcg/min, IntraVENous, TITRATE, Johnathan Lopez Res, MD, Stopped at 02/13/22 0836    acetaminophen (TYLENOL) tablet 650 mg, 650 mg, Oral, Q6H PRN, 650 mg at 02/17/22 0915 **OR** acetaminophen (TYLENOL) suppository 650 mg, 650 mg, Rectal, Q6H PRN, Johnathan Lopez Res, MD    polyethylene glycol (MIRALAX) packet 17 g, 17 g, Oral, DAILY PRN, Earlene Rea MD, 17 g at 02/16/22 0813    ondansetron (ZOFRAN ODT) tablet 4 mg, 4 mg, Oral, Q8H PRN **OR** [DISCONTINUED] ondansetron (ZOFRAN) injection 4 mg, 4 mg, IntraVENous, Q6H PRN, Polo Lopez MD    baricitinib (OLUMIANT) tablet 2 mg, 2 mg, Oral, DAILY, Polo Lopez MD, 2 mg at 02/17/22 0915    albuterol (PROVENTIL HFA, VENTOLIN HFA, PROAIR HFA) inhaler 2 Puff, 2 Puff, Inhalation, Q6H PRN, Polo Lopez MD    [DISCONTINUED] ondansetron (ZOFRAN ODT) tablet 4 mg, 4 mg, Oral, Q8H PRN **OR** ondansetron (ZOFRAN) injection 4 mg, 4 mg, IntraVENous, Q6H PRN, Polo Lopez MD    hydrOXYzine (VISTARIL) injection 25 mg, 25 mg, IntraMUSCular, Q6H PRN, Polo Lopez MD, 25 mg at 01/22/22 0111

## 2022-02-17 NOTE — PROGRESS NOTES
Clinical chart reviewed by CM. Patient's son will be making a decision about his mother's care and communicating to the attending physician. Discharge disposition is pending son's decision. CM team will continue to follow.

## 2022-02-17 NOTE — PROGRESS NOTES
Nutrition Assessment     Type and Reason for Visit: Reassess    Nutrition Recommendations/Plan:   Continue, Nepro at 12ml/hr  Flush 100ml q4hrs  Provide 2pkt prosource TID +1 additional (7pkt/d; 420kcals, 105g pro)  Provides 938kcals (74%), 128g protein (94%), and 809ml water (54%)  Propofol 10mcg/kg/min/ 142kcals (85%)     Nutrition Assessment:  Admitted for hypoxia, hypotension, intubated (1/23). +COVID-19. Noted recent re-admit, inpatient x5d with COVID-19 but asymptomatic. (1/24) TF ordered by JOSE WILLIAMSON adjusted. TF ran w/o issue until 2/3 when hbg drop brought concerns for GIB, RD rec'd to hold TF but appears TF continued. Pt tolerating well, GRV low and pt having non-bloody BMs. Will adjust TF d/t changes in sedation. (2/17): no change in status, not on pressor, on low rate propofol and precede drip, tolerating feeds well. Labs: K (3.4), BUN (24), alb (1.9), AST (64). Meds: Baricitinib, dexamethasone, precedex, fentanyl, lantus, SSI, levophed. Malnutrition Assessment:  Malnutrition Status: No malnutrition     Estimated Daily Nutrient Needs:  Energy (kcal):  1273kcals (14kcals/kg per [facility])  Protein (g):  136kcals (2.0g/kg per [facility])       Fluid (ml/day):  1500ml    Nutrition Related Findings:  NFPE not performed, appear nourish. No hx dysphagia. Last BM 2/8, green. Non-pitting generalized edema and to x4 extremities. Current Nutrition Therapies:  DIET NPO  ADULT TUBE FEEDING Orogastric; Renal; Delivery Method: Continuous; Continuous Initial Rate (mL/hr): 12; Continuous Advance Tube Feeding: No; Water Flush Volume (mL): 100; Water Flush Frequency: Q 4 hours; Modulars/Additives: Protein; Specify How t...     Anthropometric Measures:  · Height:  5' 5\" (165.1 cm)  · Current Body Wt:  130.6 kg (288 lb)  · BMI: 47.9    Nutrition Diagnosis:   · Inadequate oral intake related to impaired respiratory function as evidenced by intubation,nutrition support-enteral nutrition      Nutrition Intervention:  Food and/or Nutrient Delivery: Modify tube feeding  Nutrition Education and Counseling: No recommendations at this time,Education not appropriate  Coordination of Nutrition Care: Continue to monitor while inpatient    Goals:  Intakes >/=75% of EENs in >5 days, Wt maintenance within +/-0.5kg in >5 days       Nutrition Monitoring and Evaluation:   Behavioral-Environmental Outcomes: None identified  Food/Nutrient Intake Outcomes: Enteral nutrition intake/tolerance  Physical Signs/Symptoms Outcomes: Biochemical data,Fluid status or edema,Hemodynamic status    Discharge Planning:    No discharge needs at this time     Electronically signed by Chanel Lowe on 2/17/2022 at 2:02 PM    Contact Number: [email or web address]

## 2022-02-18 NOTE — PROGRESS NOTES
IMPRESSION:   1. Acute hypoxic respiratory  2. Acute on chronic hypercapnic respiratory failure   3. COVID-19 pneumonia  4. Pulmonary edema  5. Bilateral pleural effusion  6. Hypokalemia to be repleted  7. Severe Anemia hemoglobin dropped to 4 stable since transfusion  8. Sepsis with septic shock off pressors  9. Left hand ischemia  10. Arthritis hypertension by hx  11. Hypokalemia  12. Hyponatremia resolved      RECOMMENDATIONS/PLAN:   1. ICU monitoring  1. Patient condition got worse on 1/24 went to asystole subsequently got intubated now on ventilator assist control mode sedated with propofol and  Precedex arterial blood gases acceptable currently on A/C 12 PC 25 time 1.0 PEEP 5 FiO2  90% not able to wean she was put on 80% FiO2   2. Patient is on dexamethasone and baricitinib  3. Leukocytosis and hemoglobin improved  4. Elevated blood sugar at steroid was decreased blood sugar trending down patient on sliding scale  5. Potassium to be repleted  6. Left hand NATHALIE normal continue local wound care now right hand finger is getting discoloration  7. Pulmonary edema received Lasix chest x-ray still shows bilateral effusion  8. Left hand ischemia off IV heparin left ulnar artery occlusion radial  patent NATHALIE normal  9. Troponin is elevated  10. Procalcitonin 0.06 C-reactive 3.1 lactic acid 1.4  11.  Patient condition remain the same not doing well hypotensive remain intubated on 80% FiO2 family to make decision     [x] High complexity decision making was performed  [x] See my orders for details  HPI  42-year-old lady came in because of shortness of breath and dyspnea she has significant past medical history of arthritis hypertension she was recently discharged from the hospital came back with worsening of hypoxia she was discharged on oxygen 2 L nasal cannula and patient condition got worse she was supposed to be discharged to skilled care but patient's son took her home where her condition got worse and she was transferred back to the hospital now she is on noninvasive ventilator BiPAP machine hemodynamically unstable hypotensive on vasopressors and not giving much history. PMH:  has no past medical history on file. PSH:   has a past surgical history that includes ir insert non tunl cvc over 5 yrs (1/21/2022). FHX: family history is not on file.      SHX:      ALL:   Allergies   Allergen Reactions    Penicillins Hives        MEDS:   [x] Reviewed - As Below   [] Not reviewed    Current Facility-Administered Medications   Medication    furosemide (LASIX) injection 20 mg    insulin glargine (LANTUS) injection 10 Units    heparin (porcine) injection 5,000 Units    insulin lispro (HUMALOG) injection    glucose chewable tablet 16 g    glucagon (GLUCAGEN) injection 1 mg    dextrose 10% infusion 125-250 mL    dexamethasone (DECADRON) 4 mg/mL injection 4 mg    0.9% sodium chloride infusion 250 mL    dilTIAZem IR (CARDIZEM) tablet 30 mg    hydrOXYzine pamoate (VISTARIL) capsule 25 mg    propofol (DIPRIVAN) 10 mg/mL infusion    dexmedeTOMidine (PRECEDEX) 400 mcg in 0.9% sodium chloride (MBP/ADV) 100 mL MBP    NOREPINephrine (LEVOPHED) 8 mg in 0.9% NS 250ml infusion    acetaminophen (TYLENOL) tablet 650 mg    Or    acetaminophen (TYLENOL) suppository 650 mg    polyethylene glycol (MIRALAX) packet 17 g    ondansetron (ZOFRAN ODT) tablet 4 mg    baricitinib (OLUMIANT) tablet 2 mg    albuterol (PROVENTIL HFA, VENTOLIN HFA, PROAIR HFA) inhaler 2 Puff    ondansetron (ZOFRAN) injection 4 mg    hydrOXYzine (VISTARIL) injection 25 mg      MAR reviewed and pertinent medications noted or modified as needed   Current Facility-Administered Medications   Medication    furosemide (LASIX) injection 20 mg    insulin glargine (LANTUS) injection 10 Units    heparin (porcine) injection 5,000 Units    insulin lispro (HUMALOG) injection    glucose chewable tablet 16 g    glucagon (GLUCAGEN) injection 1 mg    dextrose 10% infusion 125-250 mL    dexamethasone (DECADRON) 4 mg/mL injection 4 mg    0.9% sodium chloride infusion 250 mL    dilTIAZem IR (CARDIZEM) tablet 30 mg    hydrOXYzine pamoate (VISTARIL) capsule 25 mg    propofol (DIPRIVAN) 10 mg/mL infusion    dexmedeTOMidine (PRECEDEX) 400 mcg in 0.9% sodium chloride (MBP/ADV) 100 mL MBP    NOREPINephrine (LEVOPHED) 8 mg in 0.9% NS 250ml infusion    acetaminophen (TYLENOL) tablet 650 mg    Or    acetaminophen (TYLENOL) suppository 650 mg    polyethylene glycol (MIRALAX) packet 17 g    ondansetron (ZOFRAN ODT) tablet 4 mg    baricitinib (OLUMIANT) tablet 2 mg    albuterol (PROVENTIL HFA, VENTOLIN HFA, PROAIR HFA) inhaler 2 Puff    ondansetron (ZOFRAN) injection 4 mg    hydrOXYzine (VISTARIL) injection 25 mg      PMH:  has no past medical history on file. PSH:   has a past surgical history that includes ir insert non tunl cvc over 5 yrs (2022). FHX: family history is not on file. SHX:       ROS:  Unable to obtain sedated on ventilator    Hemodynamics:    CO:    CI:    CVP:    SVR:   PAP Systolic:    PAP Diastolic:    PVR:    YU13:        Ventilator Settings:      Mode Rate TV Press PEEP FiO2 PIP Min.  Vent   Assist control,Pressure control    450 ml    5 cm H20 80 %  35 cm H2O  13.6 l/min        Vital Signs: Telemetry:    normal sinus rhythm Intake/Output:   Visit Vitals  /60 (BP 1 Location: Right lower arm, BP Patient Position: At rest)   Pulse (!) 122   Temp 99.2 °F (37.3 °C)   Resp 27   Ht 5' 5\" (1.651 m)   Wt 106.5 kg (234 lb 12.6 oz)   SpO2 98%   BMI 39.07 kg/m²       Temp (24hrs), Av.4 °F (37.4 °C), Min:99 °F (37.2 °C), Max:99.8 °F (37.7 °C)        O2 Device: Ventilator         Wt Readings from Last 4 Encounters:   22 106.5 kg (234 lb 12.6 oz)   22 90.7 kg (200 lb)          Intake/Output Summary (Last 24 hours) at 2022 0818  Last data filed at 2022 0300  Gross per 24 hour   Intake 507.53 ml   Output 1075 ml   Net -567.47 ml       Last shift:      No intake/output data recorded. Last 3 shifts: 02/16 1901 - 02/18 0700  In: 1327.8 [I.V.:429.8]  Out: 1075 [Urine:1075]       Physical Exam:     General: Intubated on ventilator unresponsive on propofol and  HEENT: NCAT,   Eyes: anicteric; conjunctiva clear no doll's eye reflex  Neck: no nodes, , trach midline; no accessory MM use.   Questionable neck vein distention  Chest: no deformity,   Cardiac: R regular; no murmur;   Lungs: Diffuse rales  Abd: soft, NT, hypoactive BS  Ext: Edema of the leg ; left hand finger blackish discoloration  : clear urine  Neuro: Sedated unresponsive on the ventilator no doll's eye reflex flaccid extremities  Psych-unable to assess  Skin: warm, dry, no cyanosis;   Pulses: Brachial radial pulses intact  Capillary: Normal capillary refill      DATA:    MAR reviewed and pertinent medications noted or modified as needed  MEDS:   Current Facility-Administered Medications   Medication    furosemide (LASIX) injection 20 mg    insulin glargine (LANTUS) injection 10 Units    heparin (porcine) injection 5,000 Units    insulin lispro (HUMALOG) injection    glucose chewable tablet 16 g    glucagon (GLUCAGEN) injection 1 mg    dextrose 10% infusion 125-250 mL    dexamethasone (DECADRON) 4 mg/mL injection 4 mg    0.9% sodium chloride infusion 250 mL    dilTIAZem IR (CARDIZEM) tablet 30 mg    hydrOXYzine pamoate (VISTARIL) capsule 25 mg    propofol (DIPRIVAN) 10 mg/mL infusion    dexmedeTOMidine (PRECEDEX) 400 mcg in 0.9% sodium chloride (MBP/ADV) 100 mL MBP    NOREPINephrine (LEVOPHED) 8 mg in 0.9% NS 250ml infusion    acetaminophen (TYLENOL) tablet 650 mg    Or    acetaminophen (TYLENOL) suppository 650 mg    polyethylene glycol (MIRALAX) packet 17 g    ondansetron (ZOFRAN ODT) tablet 4 mg    baricitinib (OLUMIANT) tablet 2 mg    albuterol (PROVENTIL HFA, VENTOLIN HFA, PROAIR HFA) inhaler 2 Puff    ondansetron (ZOFRAN) injection 4 mg    hydrOXYzine (VISTARIL) injection 25 mg        Labs:    Recent Labs     02/18/22  0620 02/17/22  0625 02/16/22  0530   WBC 9.5 7.4 5.8   HGB 7.9* 8.2* 7.3*   * 101* 79*     Recent Labs     02/18/22  0620 02/17/22  0625 02/16/22  0530   * 137 137   K 3.1* 3.4* 3.1*    103 103   CO2 24 26 30   * 94 112*   BUN 24* 24* 29*   CREA 0.36* 0.38* 0.26*   CA 9.3 9.5 8.9   ALB 1.8* 1.9* 1.6*   ALT 67 68 55     Recent Labs     02/17/22  0323 02/16/22  0430   PH 7.49* 7.46*   PCO2 36 42   PO2 72* 80   HCO3 28* 30*   FIO2 90.0 90.0   2/13 AC 12 PC 25 time 1 PEEP 5 FiO2 90% respirations 28   1/30 AC 12  PEEP 5 FiO2 100%    Lab Results   Component Value Date/Time    Culture result: No growth 6 days 01/21/2022 07:55 AM    Culture result: No growth 6 days 01/18/2022 07:12 AM    Culture result: (A) 01/17/2022 07:12 AM     Staphylococcus species, coagulase negative growing in 1 of 4 bottles drawn NO SITE INDICATED    Culture result:  01/17/2022 07:12 AM     (NOTE) [facility] GROWING IN 1 OF 2 BOTTLES CALLED TO JOSE MIGUEL PAGAN AT 0831 ON 1/19/22. JF     No results found for: TSH, TSHEXT, TSHEXT     Imaging:    Results from Hospital Encounter encounter on 01/21/22    XR CHEST PORT    Narrative  1 view comparison yesterday    ET and NG tube and central line remain    Increased hazy disease over right greater than left lung, mainly due to rotation  and technique. No effusion or pneumothorax. Normal heart and mediastinum      Results from Hospital Encounter encounter on 01/21/22    CT HEAD WO CONT    Narrative  The study is a noncontrasted head CT examination dated 1/21/2022. HISTORY: Unresponsive. TECHNIQUE: Thin section axial imaging was performed followed by sagittal and  coronal reconstructed imaging.     Dose Reduction Technique was employed to reduce radiation exposure - This  includes reduction optimization techniques as appropriate to a performed exam  with automated exposure control adjustments of the mA and/or Kv according to  patient size, or use of iterative reconstruction technique. COMPARISON: CT head dated 1/17/2022. There is an appropriate size to the ventricles, the deep cortical sulci, and the  sylvian fissures for the patient's age. This examination is negative for  intracranial hemorrhage, mass effect or an extra axial collection. The  gray-white matter junctions are preserved without cytotoxic or vasogenic edema. An assessment of the white matter tracts demonstrates a mild decrease in the  density characteristics of the central periventricular white matter. These  findings are consistent with expected aging changes and milder microvascular  disease. There also is a region of diminished density within the right posterior  inferior cerebellar hemisphere which may represent an older cerebrovascular  insult. ORBITS: This examination is negative for acute orbital pathology. PARANASAL SINUSES: The paranasal sinuses are well pneumatized without acute  sinus disease. There is a decreased number of right sided mastoid air cells which can be  associated with chronic mastoiditis. The craniocervical junction images in a  normal fashion. Impression  1. There are milder microvascular changes within the central periventricular  white matter. 2.  There is an area of diminished density within the inferior aspect of the  right cerebellar hemisphere without change (series 201 image number 12). These  findings may represent an older ischemic insult within the right posterior  inferior cerebellar region. 3.  This examination is negative for acute intracranial pathology or short-term  interval changes dating back to 1/17/2022.       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 Chest x-ray looks like pulmonary edema. She has peripheral edema we will give Lasix today discontinue D5W continue tube feedings we will check echocardiogram  · 1/31 off pressors remain intubated   · 2/1 remain intubated sedated on ventilator will do sedation vacation in a.m. still on 85%  · 2/2 on ventilator sedated elevated PCO2  · 2/3 remain intubated off pressors significant drop in hemoglobin and increasing white count we will repeat CBC and work accordingly  · 2/4 patient is back on Levophed hemodynamically unstable still on ventilator 85% FiO2  · 2/5 remain on ventilator condition unstable  · 2/6 condition unstable sedated on ventilator  · 2/7 we will change vent settings  · 2/10 intubated on ventilator on 90% FiO2  · 2/11 remain intubated family to make decision  · 2/12 on 90% FiO2 back on Levophed  · 2/13 remains on FiO2 90% respirations up to 28. Have increase pressure control to 28. If respirations remain elevated will increase propofol and add back fentanyl  · 12/14 we will continue on assist control mode decrease FiO2 to 80%.   Replace potassium  · 12/15 FiO2 increased back to 90%   · 2/16 patient remains unstable intubated on ventilator  · 2/18 on 80% FiO2 unresponsive  · Time of care 30-minute

## 2022-02-18 NOTE — PROGRESS NOTES
General Daily Progress Note          Patient Name:   Ayo Rangel       YOB: 1940       Age:  80 y.o. Admit Date: 1/21/2022      Subjective:     Patient is a 80y.o. year old female with signal past medical history of hypertension arthritis who discharged from the hospital yesterday in stable condition patient is admitted last admission with COVID-19 patient was asymptomatic all this time while in the hospital patient discharged on 2 L oxygen to skilled care but patient son want to go home with home health but is at home patient's saturations drops sent back to the ER seen by the ER physician patient placed on BiPAP patient was little hypotensive started on Levophed admitted for further work-up and treatment           Patient is DNR    Patient on ventilator 90% O2    On propofol drip         NG tube in place for medication and feeding    Repeat Covid test positive    2/18-  Patient seen in ICU on ventilator 80% FiO2. Patient left hand ischemia worsening. She is off pressors today. Patient discussed with family yesterday.   OG tube feeds  On propofol 20 mcg/min      Objective:     Visit Vitals  /60 (BP 1 Location: Right lower arm, BP Patient Position: At rest)   Pulse (!) 102   Temp 99.4 °F (37.4 °C)   Resp 25   Ht 5' 5\" (1.651 m)   Wt 106.5 kg (234 lb 12.6 oz)   SpO2 98%   BMI 39.07 kg/m²        Recent Results (from the past 24 hour(s))   GLUCOSE, POC    Collection Time: 02/17/22 11:31 AM   Result Value Ref Range    Glucose (POC) 105 65 - 117 mg/dL    Performed by [address] [place] [address], POC    Collection Time: 02/17/22  5:41 PM   Result Value Ref Range    Glucose (POC) 119 (H) 65 - 117 mg/dL    Performed by [address] [place] [address], POC    Collection Time: 02/17/22 10:14 PM   Result Value Ref Range    Glucose (POC) 93 65 - 117 mg/dL    Performed by Sharon Holding    GLUCOSE, POC    Collection Time: 02/18/22  5:57 AM   Result Value Ref Range    Glucose (POC) 122 (H) 65 - 117 mg/dL    Performed by Shira Nieto    CBC WITH AUTOMATED DIFF    Collection Time: 02/18/22  6:20 AM   Result Value Ref Range    WBC 9.5 3.6 - 11.0 K/uL    RBC 2.50 (L) 3.80 - 5.20 M/uL    HGB 7.9 (L) 11.5 - 16.0 g/dL    HCT 24.3 (L) 35.0 - 47.0 %    MCV 97.2 80.0 - 99.0 FL    MCH 31.6 26.0 - 34.0 PG    MCHC 32.5 30.0 - 36.5 g/dL    RDW 15.5 (H) 11.5 - 14.5 %    PLATELET 922 (L) 076 - 400 K/uL    MPV 11.9 8.9 - 12.9 FL    NRBC 0.0 0.0  WBC    ABSOLUTE NRBC 0.00 0.00 - 0.01 K/uL    NEUTROPHILS PENDING %    LYMPHOCYTES PENDING %    MONOCYTES PENDING %    EOSINOPHILS PENDING %    BASOPHILS PENDING %    IMMATURE GRANULOCYTES PENDING %    ABS. NEUTROPHILS PENDING K/UL    ABS. LYMPHOCYTES PENDING K/UL    ABS. MONOCYTES PENDING K/UL    ABS. EOSINOPHILS PENDING K/UL    ABS. BASOPHILS PENDING K/UL    ABS. IMM. GRANS. PENDING K/UL    DF PENDING    METABOLIC PANEL, COMPREHENSIVE    Collection Time: 02/18/22  6:20 AM   Result Value Ref Range    Sodium 135 (L) 136 - 145 mmol/L    Potassium 3.1 (L) 3.5 - 5.1 mmol/L    Chloride 102 97 - 108 mmol/L    CO2 24 21 - 32 mmol/L    Anion gap 9 5 - 15 mmol/L    Glucose 125 (H) 65 - 100 mg/dL    BUN 24 (H) 6 - 20 mg/dL    Creatinine 0.36 (L) 0.55 - 1.02 mg/dL    BUN/Creatinine ratio 67 (H) 12 - 20      GFR est AA >60 >60 ml/min/1.73m2    GFR est non-AA >60 >60 ml/min/1.73m2    Calcium 9.3 8.5 - 10.1 mg/dL    Bilirubin, total 0.5 0.2 - 1.0 mg/dL    AST (SGOT) 63 (H) 15 - 37 U/L    ALT (SGPT) 67 12 - 78 U/L    Alk. phosphatase 72 45 - 117 U/L    Protein, total 6.7 6.4 - 8.2 g/dL    Albumin 1.8 (L) 3.5 - 5.0 g/dL    Globulin 4.9 (H) 2.0 - 4.0 g/dL    A-G Ratio 0.4 (L) 1.1 - 2.2       [unfilled]      Review of Systems    Unable to obtain. Physical Exam:      Constitutional: Awake on vent   HENT:   Head: Normocephalic and atraumatic. Eyes: Pupils are equal, round, and reactive to light. EOM are normal.   Cardiovascular: Normal rate, regular rhythm and normal heart sounds. Pulmonary/Chest: Decreased breath sounds   abdominal: Soft. Bowel sounds are normal. There is no abdominal tenderness. There is no rebound and no guarding. Musculoskeletal: Normal range of motion. Skin: L hand 3rd, 4th, and 5th fingers blackened  Neurological: vent     XR CHEST PORT   Final Result      XR CHEST PORT   Final Result   Residual hazy diffuse infiltration in the right lung and at the left   lung base      XR CHEST PORT   Final Result      XR CHEST PORT   Final Result   No significant interval change, including bilateral opacities. XR CHEST PORT   Final Result   Bilateral opacities, slightly progressed on the right. XR CHEST PORT   Final Result      XR CHEST PORT   Final Result   Findings/impression:   1. Limitations: Rotated and angulated. 2.  Medical devices: Right internal jugular venous catheter, endotracheal tube   and NG tube in stable. 3.  Ongoing moderate bilateral edema similar to prior exam.   4.  Ongoing collapse/consolidation in the left lower lobe concerning for   underlying airspace disease. 5.  The cardiac and mediastinal contours are stable though near completely   obscured by adjacent airspace disease. XR CHEST PORT   Final Result   No significant interval change. XR CHEST PORT   Final Result      XR CHEST PORT   Final Result   No significant interval change. XR CHEST PORT   Final Result   Persistent bilateral airspace disease and with decreased aeration. XR CHEST PORT   Final Result      XR CHEST PORT   Final Result      XR CHEST PORT   Final Result      XR CHEST PORT   Final Result      XR CHEST PORT   Final Result      WRIST BRACHIAL INDEX AT REST   Final Result      XR CHEST PORT   Final Result   No significant change. XR CHEST PORT   Final Result   No interval change.       DUPLEX UPPER EXT ARTERY LEFT   Final Result      XR CHEST PORT   Final Result      XR CHEST PORT   Final Result   Endotracheal tube tip still at the maggie. Diffuse opacities in the   lungs, not significantly changed. XR CHEST PORT   Final Result      XR CHEST PORT   Final Result   Endotracheal tube tip at the maggie. Diffuse opacities in the   lungs, accentuated by lower lung volumes or increased. XR CHEST PORT   Final Result   Diffuse opacities in the lungs, not significantly changed. XR CHEST PORT   Final Result   1. ETT terminating 2 cm above the maggie. Remaining support apparatus as above. 2.  Worsening bilateral airspace disease with developing ARDS not excluded. XR CHEST PORT   Final Result   Moderate patchy diffuse bilateral airspace opacities, waxing and waning from the   prior exam.      XR CHEST PORT   Final Result   Moderate diffuse bilateral airspace opacities, likely a combination of edema and   airspace disease, mildly increased from the previous exam.      CT HEAD WO CONT   Final Result   1. There are milder microvascular changes within the central periventricular   white matter. 2.  There is an area of diminished density within the inferior aspect of the   right cerebellar hemisphere without change (series 201 image number 12). These   findings may represent an older ischemic insult within the right posterior   inferior cerebellar region. 3.  This examination is negative for acute intracranial pathology or short-term   interval changes dating back to 1/17/2022. XR CHEST PORT   Final Result   Patchy mixed asymmetric lung disease appears somewhat better but the   lungs are better inflated. No effusion or pneumothorax. Normal heart and   mediastinum      IR INSERT NON TUNL CVC OVER 5 YRS   Final Result   Ultrasound of the right neck demonstrated patent IJV. Successful US-guided placement of a right internal jugular triple lumen central   venous catheter as described above. The catheter is functioning.       PLAN:   Catheter position was confirmed by follow-up chest x-ray: catheter tip in the lower SVC and ready to use. IR US GUIDED VASCULAR ACCESS   Final Result   Ultrasound of the right neck demonstrated patent IJV. Successful US-guided placement of a right internal jugular triple lumen central   venous catheter as described above. The catheter is functioning. PLAN:   Catheter position was confirmed by follow-up chest x-ray: catheter tip in the   lower SVC and ready to use. XR CHEST PORT   Final Result   Findings/impression:      Diffuse interstitial airspace disease/edema. No definite pleural effusion or   pneumothorax. Cardiac contours are partially obscured. No acute osseous abnormality identified.       XR CHEST PORT    (Results Pending)        Recent Results (from the past 24 hour(s))   GLUCOSE, POC    Collection Time: 02/17/22 11:31 AM   Result Value Ref Range    Glucose (POC) 105 65 - 117 mg/dL    Performed by [address] [place] [address], POC    Collection Time: 02/17/22  5:41 PM   Result Value Ref Range    Glucose (POC) 119 (H) 65 - 117 mg/dL    Performed by [address] [place] [address], POC    Collection Time: 02/17/22 10:14 PM   Result Value Ref Range    Glucose (POC) 93 65 - 117 mg/dL    Performed by Wayne Rothman    GLUCOSE, POC    Collection Time: 02/18/22  5:57 AM   Result Value Ref Range    Glucose (POC) 122 (H) 65 - 117 mg/dL    Performed by MIRNA REMY    CBC WITH AUTOMATED DIFF    Collection Time: 02/18/22  6:20 AM   Result Value Ref Range    WBC 9.5 3.6 - 11.0 K/uL    RBC 2.50 (L) 3.80 - 5.20 M/uL    HGB 7.9 (L) 11.5 - 16.0 g/dL    HCT 24.3 (L) 35.0 - 47.0 %    MCV 97.2 80.0 - 99.0 FL    MCH 31.6 26.0 - 34.0 PG    MCHC 32.5 30.0 - 36.5 g/dL    RDW 15.5 (H) 11.5 - 14.5 %    PLATELET 161 (L) 471 - 400 K/uL    MPV 11.9 8.9 - 12.9 FL    NRBC 0.0 0.0  WBC    ABSOLUTE NRBC 0.00 0.00 - 0.01 K/uL    NEUTROPHILS PENDING %    LYMPHOCYTES PENDING %    MONOCYTES PENDING %    EOSINOPHILS PENDING %    BASOPHILS PENDING %    IMMATURE GRANULOCYTES PENDING % ABS. NEUTROPHILS PENDING K/UL    ABS. LYMPHOCYTES PENDING K/UL    ABS. MONOCYTES PENDING K/UL    ABS. EOSINOPHILS PENDING K/UL    ABS. BASOPHILS PENDING K/UL    ABS. IMM. GRANS. PENDING K/UL    DF PENDING    METABOLIC PANEL, COMPREHENSIVE    Collection Time: 02/18/22  6:20 AM   Result Value Ref Range    Sodium 135 (L) 136 - 145 mmol/L    Potassium 3.1 (L) 3.5 - 5.1 mmol/L    Chloride 102 97 - 108 mmol/L    CO2 24 21 - 32 mmol/L    Anion gap 9 5 - 15 mmol/L    Glucose 125 (H) 65 - 100 mg/dL    BUN 24 (H) 6 - 20 mg/dL    Creatinine 0.36 (L) 0.55 - 1.02 mg/dL    BUN/Creatinine ratio 67 (H) 12 - 20      GFR est AA >60 >60 ml/min/1.73m2    GFR est non-AA >60 >60 ml/min/1.73m2    Calcium 9.3 8.5 - 10.1 mg/dL    Bilirubin, total 0.5 0.2 - 1.0 mg/dL    AST (SGOT) 63 (H) 15 - 37 U/L    ALT (SGPT) 67 12 - 78 U/L    Alk. phosphatase 72 45 - 117 U/L    Protein, total 6.7 6.4 - 8.2 g/dL    Albumin 1.8 (L) 3.5 - 5.0 g/dL    Globulin 4.9 (H) 2.0 - 4.0 g/dL    A-G Ratio 0.4 (L) 1.1 - 2.2         Results     Procedure Component Value Units Date/Time    COVID-19 RAPID TEST [[id number]]  (Abnormal) Collected: 02/10/22 0923    Order Status: Completed Specimen: Nasopharyngeal Updated: 02/10/22 0957     Specimen source       Please find results under separate order           COVID-19 rapid test DETECTED        Comment: Rapid [employer] ID Now   The specimen is POSITIVE for SARS-CoV-2, the novel coronavirus associated with COVID-19. This test has been authorized by the FDA under an Emergency Use Authorization (EUA) for use by authorized laboratories.    Fact sheet for Healthcare Providers: ConventionUpdate.co.nz Fact sheet for Patients: ConventionUpdate.co.nz   Methodology: Isothermal Nucleic Acid Amplification Results verified, phoned to and read back by Britt Henley RN AT   [address]:     Recent Labs     02/18/22  0620 02/17/22  0625   WBC 9.5 7.4   HGB 7.9* 8.2*   HCT 24.3* 25.7*   * 101*     Recent Labs     02/18/22  0620 02/17/22  0625 02/16/22  0530   * 137 137   K 3.1* 3.4* 3.1*    103 103   CO2 24 26 30   BUN 24* 24* 29*   CREA 0.36* 0.38* 0.26*   * 94 112*   CA 9.3 9.5 8.9     Recent Labs     02/18/22  0620 02/17/22  0625 02/16/22  0530   ALT 67 68 55   AP 72 78 61   TBILI 0.5 0.6 0.5   TP 6.7 6.7 5.8*   ALB 1.8* 1.9* 1.6*   GLOB 4.9* 4.8* 4.2*     No results for input(s): INR, PTP, APTT, INREXT, INREXT in the last 72 hours. No results for input(s): FE, TIBC, PSAT, FERR in the last 72 hours. No results found for: FOL, RBCF   Recent Labs     02/17/22  0323 02/16/22  0430   PH 7.49* 7.46*   PCO2 36 42   PO2 72* 80     No results for input(s): CPK, CKNDX, TROIQ in the last 72 hours.     No lab exists for component: CPKMB  No results found for: CHOL, CHOLX, CHLST, CHOLV, HDL, HDLP, LDL, LDLC, DLDLP, TGLX, TRIGL, TRIGP, CHHD, CHHDX  Lab Results   Component Value Date/Time    Glucose (POC) 122 (H) 02/18/2022 05:57 AM    Glucose (POC) 93 02/17/2022 10:14 PM    Glucose (POC) 119 (H) 02/17/2022 05:41 PM    Glucose (POC) 105 02/17/2022 11:31 AM    Glucose (POC) 83 02/17/2022 12:13 AM     Lab Results   Component Value Date/Time    Color Yellow/Straw 01/23/2022 10:30 AM    Appearance Turbid (A) 01/23/2022 10:30 AM    Specific gravity 1.027 01/23/2022 10:30 AM    pH (UA) 6.0 01/23/2022 10:30 AM    Protein 100 (A) 01/23/2022 10:30 AM    Glucose 50 (A) 01/23/2022 10:30 AM    Ketone 5 (A) 01/23/2022 10:30 AM    Bilirubin Negative 01/23/2022 10:30 AM    Urobilinogen 4.0 (H) 01/23/2022 10:30 AM    Nitrites Negative 01/23/2022 10:30 AM    Leukocyte Esterase Negative 01/23/2022 10:30 AM    Bacteria Negative 01/23/2022 10:30 AM    WBC 0-4 01/23/2022 10:30 AM    RBC 0-5 01/23/2022 10:30 AM         Assessment:     Acute hypoxemic respiratory failure on on ventilator 80% of   COVID-19 pneumonitis   hypotension  Thrombocytopenia  Hypertension  Arthritis  High blood sugar secondary to steroid  Hypokalemia  Hyponatremia  Tachycardia  Moderate protein calorie malnutrition  Patient need multiple partial finger amputation once stable as per vascular surgeon    Repeat Covid is positive  Plan:     Olumiant 2 mg daily  Decadron 4 mg every 6 hours  Diltiazem 30 mg 3 times  Lasix 20 mg IV twice daily  Heparin 5000 units subQ q8h  Follow-up with pulmonologist and nephrologist  SSI  KCl 40 mEq p.o.     Monitor sodium and potassium    Overall prognosis Poor    Patient is DNR        Discussed with the patient's son yesterday he want terminal extubation and comfort care on Saturday                    Current Facility-Administered Medications:     furosemide (LASIX) injection 20 mg, 20 mg, IntraVENous, Q12H, Fredi, Florencia Rodriguez MD, 20 mg at 02/18/22 0958    insulin glargine (LANTUS) injection 10 Units, 10 Units, SubCUTAneous, DAILY, Jesús Pepper MD, 10 Units at 02/18/22 0900    heparin (porcine) injection 5,000 Units, 5,000 Units, SubCUTAneous, Q8H, Jesús Pepper MD, 5,000 Units at 02/18/22 0553    insulin lispro (HUMALOG) injection, , SubCUTAneous, Q6H, Polo Lopez MD, 3 Units at 02/15/22 1800    glucose chewable tablet 16 g, 4 Tablet, Oral, PRN, Mohiuddin, Gibson Babinski, MD    glucagon (GLUCAGEN) injection 1 mg, 1 mg, IntraMUSCular, PRN, Mohiuddin, Gibson Babinski, MD    dextrose 10% infusion 125-250 mL, 125-250 mL, IntraVENous, PRN, Mohiuddin, Gibson Babinski, MD    dexamethasone (DECADRON) 4 mg/mL injection 4 mg, 4 mg, IntraVENous, Q24H, Karan Flower MD, 4 mg at 02/18/22 1954    0.9% sodium chloride infusion 250 mL, 250 mL, IntraVENous, PRN, Karan Flower MD    dilTIAZem IR (CARDIZEM) tablet 30 mg, 30 mg, Oral, TID, Karan Flower MD, 30 mg at 02/18/22 9577    hydrOXYzine pamoate (VISTARIL) capsule 25 mg, 25 mg, Oral, Q4H PRN, Polo Lopez MD    propofol (DIPRIVAN) 10 mg/mL infusion, 0-50 mcg/kg/min, IntraVENous, TITRATE, Karan Flower MD, Last Rate: 8.2 mL/hr at 02/17/22 1841, 15 mcg/kg/min at 02/17/22 1841    dexmedeTOMidine (PRECEDEX) 400 mcg in 0.9% sodium chloride (MBP/ADV) 100 mL MBP, 0.1-1.5 mcg/kg/hr, IntraVENous, TITRATE, Karan Flower MD, Stopped at 02/17/22 1606    NOREPINephrine (LEVOPHED) 8 mg in 0.9% NS 250ml infusion, 0.5-16 mcg/min, IntraVENous, TITRATE, Patricia Lopez MD, Stopped at 02/13/22 0836    acetaminophen (TYLENOL) tablet 650 mg, 650 mg, Oral, Q6H PRN, 650 mg at 02/17/22 0915 **OR** acetaminophen (TYLENOL) suppository 650 mg, 650 mg, Rectal, Q6H PRN, Patricia Lopez MD    polyethylene glycol (MIRALAX) packet 17 g, 17 g, Oral, DAILY PRN, Sander Hull MD, 17 g at 02/16/22 0813    ondansetron (ZOFRAN ODT) tablet 4 mg, 4 mg, Oral, Q8H PRN **OR** [DISCONTINUED] ondansetron (ZOFRAN) injection 4 mg, 4 mg, IntraVENous, Q6H PRN, Polo Lopez MD    baricitinib (OLUMIANT) tablet 2 mg, 2 mg, Oral, DAILY, Polo Lopez MD, 2 mg at 02/18/22 0958    albuterol (PROVENTIL HFA, VENTOLIN HFA, PROAIR HFA) inhaler 2 Puff, 2 Puff, Inhalation, Q6H PRN, Polo Lopez MD    [DISCONTINUED] ondansetron (ZOFRAN ODT) tablet 4 mg, 4 mg, Oral, Q8H PRN **OR** ondansetron (ZOFRAN) injection 4 mg, 4 mg, IntraVENous, Q6H PRN, Polo Lopez MD    hydrOXYzine (VISTARIL) injection 25 mg, 25 mg, IntraMUSCular, Q6H PRN, Polo Lopez MD, 25 mg at 01/22/22 0111

## 2022-02-18 NOTE — ADT AUTH CERT NOTES
Comments  Comment            Patient Demographics    Patient Name   Greg Davis   [id number] Legal Sex   Female    1940 Address   [address] [address] Phone   [phone] (Home)     Patient Demographics    Patient Name   Greg Davis   [id number] Legal Sex   Female    1940 Address   [address] [address] Phone   [phone] (Home)   CSN:   [id number]     [address] [place] [address] Date: Admit Time Room Bed   2022  2:24  [[id number]] 01 [[id number]]       Attending Providers    Provider Pager From To   Lauryn Rodriguez DO  22   Orlando Lopez MD  22      Emergency Contact(s)    Name Relation Home Work Mobile   Hamilton Styles   [phone]   Mulu Whitfield Other Relative      [place], [place]         Utilization Reviews         Viral Illness, Acute - Care Day  (2022) by Jaylen Trevino       Review Entered Review Status   2022 14:33 Completed      Criteria Review      Care Day: 29 Care Date: 2022 Level of Care: ICU    Guideline Day 3    Clinical Status    ( ) * Hemodynamic stability    2022 14:33:30 EST by Jaylen Trevino      , 108, 122, 111  138/33    (X) * Afebrile or temperature acceptable for next level of care    2022 14:33:30 EST by Jaylen Bran      99.7    ( ) * Tachypnea absent    2022 14:33:30 EST by Jaylen Trevino      23-27    ( ) * Hypoxemia absent    2022 14:33:30 EST by Aurora Lucas  98%    ( ) * Mental status at baseline    ( ) * Renal function at baseline, or stable and acceptable for next level of care    ( ) * Discharge plans and education understood    Activity    (X) * Ambulatory or acceptable for next level of care    Routes    ( ) * Oral hydration    ( ) * Oral medications or regimen acceptable for next level of care    ( ) * Oral diet or acceptable for next level of care    Interventions    ( ) * Isolation not indicated, or is performable at next level of care    (X) Pulse oximetry    Medications    (X) * Antimicrobial medication absent or regimen established for next level of care    (X) Possible DVT prophylaxis    * Milestone   Additional Notes   2/18-   Patient seen in ICU on ventilator 80% FiO2. Patient left hand ischemia worsening. She is off pressors today. Patient discussed with family yesterday. OG tube feeds   On propofol 20 mcg/min         Assessment:       Acute hypoxemic respiratory failure on on ventilator 80% of    COVID-19 pneumonitis    hypotension   Thrombocytopenia   Hypertension   Arthritis   High blood sugar secondary to steroid   Hypokalemia   Hyponatremia   Tachycardia   Moderate protein calorie malnutrition   Patient need multiple partial finger amputation once stable as per vascular surgeon       Repeat Covid is positive   Plan:       Olumiant 2 mg daily   Decadron 4 mg every 6 hours   Diltiazem 30 mg 3 times   Lasix 20 mg IV twice daily   Heparin 5000 units subQ q8h   Follow-up with pulmonologist and nephrologist   SSI   KCl 40 mEq p.o.       Monitor sodium and potassium       Overall prognosis Poor       Patient is DNR       Discussed with the patient's son yesterday he want terminal extubation and comfort care on Saturday 2/18/2022 06:20   Sodium: 135 (L)   Potassium: 3.1 (L)   Chloride: 102   CO2: 24   Anion gap: 9   Glucose: 125 (H)   BUN: 24 (H)   Creatinine: 0.36 (L)   BUN/Creatinine ratio: 67 (H)   Calcium: 9.3   GFR est non-AA: >60   GFR est AA: >60   Bilirubin, total: 0.5   Protein, total: 6.7   Albumin: 1.8 (L)   Globulin: 4.9 (H)   A-G Ratio: 0.4 (L)   ALT: 67   AST: 63 (H)   Alk.  phosphatase: 72           Viral Illness, Acute - Care Day 28 (2/17/2022) by Ivonne Segundo RN       Review Entered Review Status   2/17/2022 11:25 Completed      Criteria Review      Care Day: 28 Care Date: 2/17/2022 Level of Care: ICU    Guideline Day 3    Clinical Status    ( ) * Hemodynamic stability    2/17/2022 11:25:58 EST by [employer]      100.5;89;87/46;33;99% vent    (X) * Afebrile or temperature acceptable for next level of care    2/17/2022 11:25:58 EST by [employer]      100.5    ( ) * Tachypnea absent    2/17/2022 11:25:58 EST by [employer]      33    ( ) * Hypoxemia absent    2/17/2022 11:25:58 EST by [employer]      99% vent    ( ) * Mental status at baseline    2/17/2022 11:25:58 EST by [employer]      sedated    ( ) * Renal function at baseline, or stable and acceptable for next level of care    2/17/2022 11:25:58 EST by [employer]      BUN24 (H)  Creatinine0.38 (L)  BUN/Creatinine ratio63 (H)    ( ) * Discharge plans and education understood    Activity    (X) * Ambulatory or acceptable for next level of care    Routes    ( ) * Oral hydration    2/17/2022 11:25:58 EST by [employer]      feeding tube    ( ) * Oral medications or regimen acceptable for next level of care    2/17/2022 11:25:58 EST by [employer]      feeding tube    ( ) * Oral diet or acceptable for next level of care    2/17/2022 11:25:58 EST by [employer]      feeding tube    Interventions    ( ) * Isolation not indicated, or is performable at next level of care    2/17/2022 11:25:58 EST by [employer]      covid positive, droplet    (X) Pulse oximetry    2/17/2022 11:25:58 EST by [employer]      routine    Medications    (X) * Antimicrobial medication absent or regimen established for next level of care    (X) Possible DVT prophylaxis    2/17/2022 11:25:58 EST by [employer]      heparin (porcine) injection 5,000 Units  Dose: 5,000 Units  Freq: EVERY 8 HOURS Route: SC X1    * Milestone   Additional Notes   2/17/22   In pt ICU      Medications   acetaminophen (TYLENOL) tablet 650 mg   Dose: 650 mg   Freq: EVERY 6 HOURS AS NEEDED Route: PO X1      baricitinib (OLUMIANT) tablet 2 mg   Dose: 2 mg   Freq: DAILY Route: PO      dexamethasone (DECADRON) 4 mg/mL injection 4 mg   Dose: 4 mg   Freq: EVERY 24 HOURS Route: IV dexmedeTOMidine (PRECEDEX) 400 mcg in 0.9% sodium chloride (MBP/ADV) 100 mL MBP   Rate: 2.3-34 mL/hr Dose: 0.1-1.5 mcg/kg/hr   Weight Dosing Info: 90.7 kg   Freq: TITRATE Route: IV      heparin (porcine) injection 5,000 Units   Dose: 5,000 Units   Freq: EVERY 8 HOURS Route: SC X1      insulin glargine (LANTUS) injection 10 Units   Dose: 10 Units   Freq: DAILY Route: SC      ropofol (DIPRIVAN) 10 mg/mL infusion   Rate: 0-27.2 mL/hr Dose: 0-50 mcg/kg/min   Weight Dosing Info: 90.7 kg   Freq: TITRATE Route: IV      polyethylene glycol (MIRALAX) packet 17 g   Dose: 17 g   Freq: DAILY PRN Route: PO      Vitals    100.5;89;87/46;33;99% vent      Labs   chest X ray   ET and NG tube and central line remain   Increased hazy disease over right greater than left lung, mainly due to rotation   and technique. No effusion or pneumothorax. Normal heart and mediastinum      RBC 2.60 (L)   HGB 8.2 (L)   HCT 25.7 (L)   MCV 98.8   RDW 15.3 (H)   PLATELET 086 (L)      Potassium 3.4 (L)   Chloride 103   CO2 26   Anion gap 8   Glucose 94   BUN 24 (H)   Creatinine 0.38 (L)   BUN/Creatinine ratio 63 (H)   GFR est non-AA >60   GFR est AA >60   Protein, total 6.7   Albumin 1.9 (L)   Globulin 4.8 (H)   A-G Ratio 0.4 (L)   ALT 68   AST 64 (H)      pH 7.49 (H)   PCO2 36   PO2 72 (L)   BICARBONATE 28 (H)   O2 SAT 96   BASE EXCESS 3.8 (H)      Trama Surgeon   Patient is not made any progress in respiratory status.  Patient still requiring high FiO2 requirement.  Currently 90%. Patient was also having temperature spikes. On examination of left hand shows no change in vascular examination.  Strong Doppler signal over the radial artery on the left.  Palmar arch signal present.  No changes in demarcation mummified fingers on the left hand.    PLAN:                  Patient is now having fever spikes as well.  Left hand examination no changes on clinical examination.  Continue conservative management for ischemic left hand from hypoperfusion syndrome with localized thrombosis.  I will continue monitor her progress.       Pulmonary   ICU monitoring   Patient condition got worse on 1/24 went to asystole subsequently got intubated now on ventilator assist control mode sedated with propofol and  Precedex arterial blood gases acceptable currently on A/C 12 PC 25 time 1.0 PEEP 5 FiO2  90% not able to wean she was put on 80% FiO2 but she became hypoxic now she is back to 90% Fio2      Attending   Assessment:       Acute hypoxemic respiratory failure on on ventilator 90% of    COVID-19 pneumonitis    hypotension   Thrombocytopenia   Hypertension   Arthritis   High blood sugar secondary to steroid   Hyperkalemia   Hyponatremia   Moderate protein calorie malnutrition   Patient need multiple partial finger amputation once stable as per vascular surgeon       Repeat Covid is positive   Plan:               Olumiant 2 mg daily   Decadron 4 mg every 6 hours   Diltiazem 30 mg 3 times   Lasix 20 mg IV twice daily   Follow-up with pulmonologist and nephrologist   Start Lantus 10 units subcu day       Monitor sodium and potassium       Overall prognosis Poor              Viral Illness, Acute - Care Day 27 (2/16/2022) by Mynor Bullock RN       Review Entered Review Status   2/17/2022 11:13 Completed      Criteria Review      Care Day: 27 Care Date: 2/16/2022 Level of Care: ICU    Guideline Day 3    Clinical Status    (X) * Hemodynamic stability    2/17/2022 11:13:07 EST by Mynor Bullock      99.6;80;103/49;27;100% vent    (X) * Afebrile or temperature acceptable for next level of care    2/17/2022 11:13:07 EST by Mynor Bullock      99.6    ( ) * Tachypnea absent    2/17/2022 11:13:07 EST by Mynor Bullock      27    ( ) * Hypoxemia absent    2/17/2022 11:13:07 EST by Mynor Bullock      100% vent    ( ) * Mental status at baseline    ( ) * Renal function at baseline, or stable and acceptable for next level of care    2/17/2022 11:13:07 EST by Carlos Hale (H)  Creatinine0.26 (L)  BUN/Creatinine [id number] (H)    ( ) * Discharge plans and education understood    Activity    (X) * Ambulatory or acceptable for next level of care    2/17/2022 11:13:07 EST by Anthony William with assistance    Routes    ( ) * Oral hydration    2/17/2022 11:13:07 EST by Ivonne       feeding tube    ( ) * Oral medications or regimen acceptable for next level of care    2/17/2022 11:13:07 EST by Ivonne       feeding tube    ( ) * Oral diet or acceptable for next level of care    2/17/2022 11:13:07 EST by Ivonne       feeding tube    Interventions    ( ) * Isolation not indicated, or is performable at next level of care    2/17/2022 11:13:07 EST by Ivonne       covid, droplet    (X) Pulse oximetry    2/17/2022 11:13:07 EST by [employer]      routine    Medications    (X) * Antimicrobial medication absent or regimen established for next level of care    (X) Possible DVT prophylaxis    2/17/2022 11:13:07 EST by Ivonne       heparin (porcine) injection 5,000 Units  Dose: 5,000 Units  Freq: EVERY 8 HOURS Route: SC    * Milestone   Additional Notes   2/16/22   In pt ICU      Medications   baricitinib (OLUMIANT) tablet 2 mg   Dose: 2 mg   Freq: DAILY Route: PO      dexamethasone (DECADRON) 4 mg/mL injection 4 mg   Dose: 4 mg   Freq: EVERY 24 HOURS Route: IV      dexmedeTOMidine (PRECEDEX) 400 mcg in 0.9% sodium chloride (MBP/ADV) 100 mL MBP   Rate: 2.3-34 mL/hr Dose: 0.1-1.5 mcg/kg/hr   Weight Dosing Info: 90.7 kg   Freq: TITRATE Route: IV      dilTIAZem IR (CARDIZEM) tablet 30 mg   Dose: 30 mg   Freq: 3 TIMES DAILY Route: PO      furosemide (LASIX) injection 20 mg   Dose: 20 mg   Freq: EVERY 12 HOURS Route: IV      heparin (porcine) injection 5,000 Units   Dose: 5,000 Units   Freq: EVERY 8 HOURS Route: SC      insulin glargine (LANTUS) injection 10 Units   Dose: 10 Units   Freq: DAILY Route: SC      ropofol (DIPRIVAN) 10 mg/mL infusion   Rate: 0-27.2 mL/hr Dose: 0-50 mcg/kg/min   Weight Dosing Info: 90.7 kg   Freq: TITRATE Route: IV      polyethylene glycol (MIRALAX) packet 17 g   Dose: 17 g   Freq: DAILY PRN Route: PO      Vitals 99.6;80;103/49;27;100% vent      Labs   Chest X ray   IMPRESSION   Residual hazy diffuse infiltration in the right lung and at the left   lung base         pH 7.46 (H)   PCO2 42   PO2 80   BICARBONATE 30 (H)   O2 SAT 97   BASE EXCESS 5.7 (H)      RBC 2.30 (L)   HGB 7.3 (L)   HCT 22.8 (L)   MCV 99.1 (H)   RDW 15.5 (H)   PLATELET 79 (L)   MPV 11.3   ABSOLUTE NRBC 0.00   Sodium 137   Potassium 3.1 (L)   Chloride 103   CO2 30   Anion gap 4 (L)   Glucose 112 (H)   BUN 29 (H)   Creatinine 0.26 (L)   BUN/Creatinine ratio 112 (H)   GFR est non-AA >60   GFR est AA >60   Protein, total 5.8 (L)   Albumin 1.6 (L)   Globulin 4.2 (H)   A-G Ratio 0.4 (L)   ALT 55   AST 47 (H)   Alk.  phosphatase 61   NT pro- (H)      Pulmonary   Patient condition remain the same not doing well hypotensive remain intubated on 90% FiO2 family to make decision      2/16 patient remains unstable intubated on ventilator      Attending      Assessment:       Acute hypoxemic respiratory failure on on ventilator 90% of    COVID-19 pneumonitis    hypotension   Thrombocytopenia   Hypertension   Arthritis   High blood sugar secondary to steroid   Hyperkalemia   Hyponatremia   Moderate protein calorie malnutrition   Patient need multiple partial finger amputation once stable as per vascular surgeon       Repeat Covid is positive   Plan:               Olumiant 2 mg daily   Decadron 4 mg every 6 hours   Diltiazem 30 mg 3 times   Lasix 20 mg IV twice daily   Follow-up with pulmonologist and nephrologist   Start Lantus 10 units subcu day       Monitor sodium and potassium                 Viral Illness, Acute - Care Day 26 (2/15/2022) by Scott Paulino RN       Review Entered Review Status   2/15/2022 13:53 Completed      Criteria Review      Care Day: 26 Care Date: 2/15/2022 Level of Care: ICU    Guideline Day 3    Clinical Status    (X) * Hemodynamic stability    (X) * Afebrile or temperature acceptable for next level of care    ( ) * Tachypnea absent    ( ) * Hypoxemia absent    ( ) * Mental status at baseline    ( ) * Renal function at baseline, or stable and acceptable for next level of care    ( ) * Discharge plans and education understood    Activity    ( ) * Ambulatory or acceptable for next level of care    Routes    ( ) * Oral hydration    ( ) * Oral medications or regimen acceptable for next level of care    ( ) * Oral diet or acceptable for next level of care    Interventions    ( ) * Isolation not indicated, or is performable at next level of care    (X) Pulse oximetry    Medications    (X) * Antimicrobial medication absent or regimen established for next level of care    (X) Possible DVT prophylaxis    * Milestone   Additional Notes   2/15/2022      ATTENDING NOTE:       Patient is DNR       Patient on ventilator 90% O2       On propofol drip        NG tube in place for medication and feeding       Repeat Covid test positive      Visit Vitals   BP (!) 101/53 (BP 1 Location: Right lower arm, BP Patient Position: At rest)   Pulse 89   Temp 98.2 °F (36.8 °C)   Resp 25   Ht 5' 5\" (1.651 m)   Wt 103.5 kg (228 lb 2.8 oz)   SpO2 100%      Physical Exam:         Constitutional: Awake on vent    HENT:    Head: Normocephalic and atraumatic. Eyes: Pupils are equal, round, and reactive to light. EOM are normal.    Cardiovascular: Normal rate, regular rhythm and normal heart sounds. Pulmonary/Chest: Decreased breath sounds    abdominal: Soft. Bowel sounds are normal. There is no abdominal tenderness. There is no rebound and no guarding. Musculoskeletal: Normal range of motion.        Assessment:       Acute hypoxemic respiratory failure on on ventilator 90% of    COVID-19 pneumonitis    hypotension   Thrombocytopenia   Hypertension   Arthritis   High blood sugar secondary to steroid Hyperkalemia   Hyponatremia   Moderate protein calorie malnutrition   Patient need multiple partial finger amputation once stable as per vascular surgeon       Repeat Covid is positive   Plan:       Olumiant 2 mg daily   Decadron 4 mg every 6 hours   Diltiazem 30 mg 3 times   Lasix 20 mg IV twice daily   Follow-up with pulmonologist and nephrologist   Start Lantus 10 units subcu day       Monitor sodium and potassium       Overall prognosis Poor      Patient is DNR      Discussed with the patient's son he want to wait till witnessed/to make final decision      Current Facility-Administered Medications:    ·  furosemide (LASIX) injection 20 mg, 20 mg, IntraVENous, Q12H, Fredi, Adina Hughes MD, 20 mg at 02/15/22 0849   ·  insulin glargine (LANTUS) injection 10 Units, 10 Units, SubCUTAneous, DAILY, Eber Mnuoz MD, 10 Units at 02/15/22 0849   ·  heparin (porcine) injection 5,000 Units, 5,000 Units, SubCUTAneous, Q8H, Eber Munoz MD, 5,000 Units at 02/15/22 0650   ·  insulin lispro (HUMALOG) injection, , SubCUTAneous, Q6H, Eber Munoz MD, 3 Units at 02/14/22 1718   ·  glucose chewable tablet 16 g, 4 Tablet, Oral, PRN, Cesar Lopez MD   ·  glucagon [facility]) injection 1 mg, 1 mg, IntraMUSCular, PRN, Cesar Lopez MD   ·  dextrose 10% infusion 125-250 mL, 125-250 mL, IntraVENous, PRN, Cesar Lopez MD   ·  dexamethasone (DECADRON) 4 mg/mL injection 4 mg, 4 mg, IntraVENous, Q24H, Ashkan Flower MD, 4 mg at 02/15/22 0849   ·  0.9% sodium chloride infusion 250 mL, 250 mL, IntraVENous, PRN, Karan Flower MD   ·  dilTIAZem IR (CARDIZEM) tablet 30 mg, 30 mg, Oral, TID, Karan Flower MD, 30 mg at 02/15/22 0848   ·  hydrOXYzine pamoate (VISTARIL) capsule 25 mg, 25 mg, Oral, Q4H PRN, Polo Lopez MD   ·  propofol (DIPRIVAN) 10 mg/mL infusion, 0-50 mcg/kg/min, IntraVENous, TITRATE, Karan Flower MD, Last Rate: 21.8 mL/hr at 02/15/22 0658, 40 mcg/kg/min at 02/15/22 0658   ·  dexmedeTOMidine (PRECEDEX) 400 mcg in 0.9% sodium chloride (MBP/ADV) 100 mL MBP, 0.1-1.5 mcg/kg/hr, IntraVENous, TITRATE, Karan Flower MD, Last Rate: 13.6 mL/hr at 02/15/22 1004, 0.6 mcg/kg/hr at 02/15/22 1004   ·  NOREPINephrine (LEVOPHED) 8 mg in 0.9% NS 250ml infusion, 0.5-16 mcg/min, IntraVENous, TITRATE, Cindy Lopez MD, Stopped at 02/13/22 0836   ·  acetaminophen (TYLENOL) tablet 650 mg, 650 mg, Oral, Q6H PRN **OR** acetaminophen (TYLENOL) suppository 650 mg, 650 mg, Rectal, Q6H PRN, Polo Lopez MD   ·  polyethylene glycol (MIRALAX) packet 17 g, 17 g, Oral, DAILY PRN, Cindy Lopez MD   ·  ondansetron [facility] ODT) tablet 4 mg, 4 mg, Oral, Q8H PRN **OR** [DISCONTINUED] ondansetron (ZOFRAN) injection 4 mg, 4 mg, IntraVENous, Q6H PRN, Polo Lopez MD   ·  baricitinib (OLUMIANT) tablet 2 mg, 2 mg, Oral, DAILY, Polo Lopez MD, 2 mg at 02/15/22 0848   ·  albuterol (PROVENTIL HFA, VENTOLIN HFA, PROAIR HFA) inhaler 2 Puff, 2 Puff, Inhalation, Q6H PRN, Polo Lopez MD   ·  [DISCONTINUED] ondansetron (ZOFRAN ODT) tablet 4 mg, 4 mg, Oral, Q8H PRN **OR** ondansetron (ZOFRAN) injection 4 mg, 4 mg, IntraVENous, Q6H PRN, Polo Lopez MD   ·  hydrOXYzine (VISTARIL) injection 25 mg, 25 mg, IntraMUSCular, Q6H PRN, Polo Lopez MD, 25 mg at 01/22/22 0111           Viral Illness, Acute - Care Day 25 (2/14/2022) by Bernard Angel       Review Entered Review Status   2/14/2022 15:09 Completed      Criteria Review      Care Day: 25 Care Date: 2/14/2022 Level of Care: ICU    Guideline Day 3    Clinical Status    (X) * Hemodynamic stability    2/14/2022 15:09:46 EST by Bernard Angel      VSS    (X) * Afebrile or temperature acceptable for next level of care    2/14/2022 15:09:46 EST by Bernard Angel      Temp 98.0    ( ) * Tachypnea absent    2/14/2022 15:09:46 EST by Bernard Angel      RR 24, 24, 30    ( ) * Hypoxemia absent    2/14/2022 15:09:46 EST by Mia Espitia Britney      92% on vent/ETT    ( ) * Mental status at baseline    2/14/2022 15:09:46 EST by Paty Abraham      intubated and sedated    ( ) * Renal function at baseline, or stable and acceptable for next level of care    ( ) * Discharge plans and education understood    Activity    ( ) * Ambulatory or acceptable for next level of care    2/14/2022 15:09:46 EST by Paty Abraham      intubated and sedated    Routes    ( ) * Oral hydration    2/14/2022 15:09:46 EST by Lizzie Pierce adult tube feedings, npo    ( ) * Oral medications or regimen acceptable for next level of care    2/14/2022 15:09:46 EST by Dominik Garza and IV meds    ( ) * Oral diet or acceptable for next level of care    2/14/2022 15:09:46 EST by Lizzie Pierce adult tube feedings, npo    Interventions    ( ) * Isolation not indicated, or is performable at next level of care    2/14/2022 15:09:46 EST by Paty Abraham      droplet plus isolation    (X) Pulse oximetry    2/14/2022 15:09:46 EST by Paty Abraham      92% on vent/ETT    Medications    (X) * Antimicrobial medication absent or regimen established for next level of care    2/14/2022 15:09:46 EST by Paty Abraham      none noted    (X) Possible DVT prophylaxis    2/14/2022 15:09:46 EST by Paty Abraham      Heparin 5,000units SC q8    * Milestone   Additional Notes   Date of care: 2/14/2022      IP-LOC-ICU      PD PN: IMPRESSION:   1. Acute hypoxic respiratory   2. Acute on chronic hypercapnic respiratory failure    3. COVID-19 pneumonia   4. Pulmonary edema   5. Bilateral pleural effusion   6. Hypokalemia to be repleted   7. Severe Anemia hemoglobin dropped to 4 stable since transfusion   8. Sepsis with septic shock off pressors   9. Left hand ischemia   10. Arthritis hypertension by hx   11. Hyperkalemia corrected   12. Hyponatremia resolved   RECOMMENDATIONS/PLAN:   1. ICU monitoring   1.  Patient condition got worse on 1/24 went to asystole subsequently got intubated now on ventilator assist control mode sedated with propofol and  Precedex arterial blood gases acceptable currently on A/C 12 PC 25 time 1.0 PEEP 5 FiO2  90%  not able to wean   2. Patient is on dexamethasone and baricitinib   3. Leukocytosis and hemoglobin improved   4. Elevated blood sugar at steroid was decreased blood sugar trending down patient on sliding scale   5. Potassium to be repleted   6. Left hand NATHALIE normal continue local wound care    7. Pulmonary edema received Lasix chest x-ray still shows bilateral effusion   8. Left hand ischemia off IV heparin left ulnar artery occlusion radial  patent NATHALIE normal   9. Troponin is elevated   10. Procalcitonin 0.06 C-reactive 3.1 lactic acid 1.4      Hospitalist PN: Patient is DNR   Patient on ventilator 80% O2   On propofol drip    NG tube in place for medication and feeding   Repeat Covid test positive   Constitutional: Awake on vent    HENT:    Head: Normocephalic and atraumatic. Eyes: Pupils are equal, round, and reactive to light. EOM are normal.    Cardiovascular: Normal rate, regular rhythm and normal heart sounds. Pulmonary/Chest: Decreased breath sounds    abdominal: Soft. Bowel sounds are normal. There is no abdominal tenderness. There is no rebound and no guarding. Musculoskeletal: Normal range of motion.     Neurological: vent   Assessment:   Acute hypoxemic respiratory failure on on ventilator 80% of    COVID-19 pneumonitis    hypotension   Thrombocytopenia   Hypertension   Arthritis   High blood sugar secondary to steroid   Hyperkalemia   Hyponatremia   Moderate protein calorie malnutrition   Patient need multiple partial finger amputation once stable as per vascular surgeon   Repeat Covid is positive   Plan:   Olumiant 2 mg daily   Decadron 4 mg every 6 hours   Diltiazem 30 mg 3 times   Lasix 20 mg IV twice daily   Follow-up with pulmonologist and nephrologist   Start Lantus 10 units subcu day Monitor sodium and potassium   Overall prognosis Poor      Vitals: Temp 98.0, HR 74, BP 93/50, RR 24, 24, 30, 92% on vent/ETT      No abnormal labs at time of review      Medications:    Olumiant 2mg PO daily   Dexamethasone 4mg IV q24   Precedex IV titrate   Diltiazem IR 30mg PO TID   Lasix 20mg IV q12   Heparin 5,000units SC q8   Insulin lantus 10units SC daily   Insulin lispro SC q6 SSI   Propofol IV titrate   Potassium chloride 10mEq IV q1      Plan: adult tube feedings, npo, activity as tolerated with assist, droplet plus isolation, elevate extremity, elevate HOB 30 degrees, livingston catheter, I&Os q8, neuro/vascular checks q4, spot check oximetry, oxygen cannula, mechanical ventilator                       Viral Illness, Acute - Care Day 24 (2/13/2022) by Sandy Jurado       Review Entered Review Status   2/14/2022 14:55 Completed      Criteria Review      Care Day: 24 Care Date: 2/13/2022 Level of Care: ICU    Guideline Day 3    Clinical Status    ( ) * Hemodynamic stability    2/14/2022 14:55:15 EST by Sandy Jurado      Levophed IV titrate    (X) * Afebrile or temperature acceptable for next level of care    2/14/2022 14:55:15 EST by Sandy Jurado      Temp 98.3    ( ) * Tachypnea absent    2/14/2022 14:55:15 EST by Sandy Jurado      RR 32, 32, 31    ( ) * Hypoxemia absent    2/14/2022 14:55:15 EST by Sandy Jurado      96% on vent/ETT    ( ) * Mental status at baseline    2/14/2022 14:55:15 EST by Sandy Jurado      intubated and sedated    (X) * Renal function at baseline, or stable and acceptable for next level of care    2/14/2022 14:55:15 EST by Sandy Jurado      BUN: 35 (H)  Creatinine: 0.36 (L)    ( ) * Discharge plans and education understood    Activity    ( ) * Ambulatory or acceptable for next level of care    2/14/2022 14:55:15 EST by Sandy Jurado      intubated and sedated    Routes    ( ) * Oral hydration    2/14/2022 14:55:15 EST by Sandy Jurado   adult tube feedings, npo    ( ) * Oral medications or regimen acceptable for next level of care    2/14/2022 14:55:15 EST by Yessy Nunn      PO and IV meds    ( ) * Oral diet or acceptable for next level of care    2/14/2022 14:55:15 EST by Yessy Nunn      adult tube feedings, npo    Interventions    ( ) * Isolation not indicated, or is performable at next level of care    2/14/2022 14:55:15 EST by Yessy Nunn      droplet plus isolation    (X) Pulse oximetry    2/14/2022 14:55:15 EST by Yessy Nunn      96% on vent/ETT    Medications    (X) * Antimicrobial medication absent or regimen established for next level of care    2/14/2022 14:55:15 EST by Yessy Nunn      none noted    (X) Possible DVT prophylaxis    2/14/2022 14:55:15 EST by Yessy Nunn      Heparin 5,000units SC q8    * Milestone   Additional Notes   Date of care: 2/13/2022      IP-LOC-ICU      PD PN: IMPRESSION:   1. Acute hypoxic respiratory   2. Acute on chronic hypercapnic respiratory failure    3. COVID-19 pneumonia   4. Pulmonary edema   5. Bilateral pleural effusion   6. Hypokalemia to be replete   7. Severe Anemia hemoglobin dropped to 4 stable since transfusion   8. Sepsis with septic shock off pressors   9. Left hand ischemia   10. Arthritis hypertension by hx   11. Hyperkalemia corrected   12. Hyponatremia resolved   RECOMMENDATIONS/PLAN:   1. ICU monitoring   1. Patient condition got worse on 1/24 went to asystole subsequently got intubated now on ventilator assist control mode sedated with propofol and  Precedex arterial blood gases acceptable currently on A/C 12 PC 25 time 1.0 PEEP 5 FiO2  90%  not able to wean   2. Patient is on dexamethasone and baricitinib   3. Leukocytosis and hemoglobin improved   4. Elevated blood sugar at steroid was decreased blood sugar trending down patient on sliding scale   5. Potassium to be repleted   6. Left hand NATHALIE normal continue local wound care    7. Pulmonary edema received Lasix chest x-ray still shows bilateral effusion   8. Left hand ischemia off IV heparin left ulnar artery occlusion radial  patent NATHALIE normal   9. Troponin is elevated   10. Procalcitonin 0.06 C-reactive 3.1 lactic acid 1.4      IM PN: Subjective:   80year-old patient came in with shortness of breath got intubated was hypotensive on vasopressin was with septic shock   General: Intubated   Eyes:    Ears: Normal TMs and external ear canals both ears. Nose: Nares normal. Septum midline. Mucosa normal. No drainage or sinus tenderness. Mouth/Throat: Lips, mucosa, and tongue normal. Teeth and gums normal.   Neck: Supple, symmetrical, trachea midline, no adenopathy, thyroid: no enlargment/tenderness/nodules, no carotid bruit and no JVD. Back:   Symmetric, no curvature. ROM normal. No CVA tenderness. Lungs:   Clear to auscultation bilaterally. Heart: Regular rate and rhythm, S1, S2 normal, no murmur, click, rub or gallop. Abdomen:   Soft, non-tender. Bowel sounds normal. No masses,  No organomegaly. Extremities: Extremities normal, atraumatic, no cyanosis or edema. Pulses: 2+ and symmetric all extremities. Skin: Skin color, texture, turgor normal. No rashes or lesions   Lymph nodes: Intubated   Assessment:   Active Problems:     COVID-19 (1/17/2022)     Respiratory failure with hypoxia ([address] (1/21/2022)     Hypernatremia (1/23/2022)     Hypokalemia (1/23/2022)   1. Acute hypoxic respiratory   2. Acute on chronic hypercapnic respiratory failure    3. COVID-19 pneumonia   4. Pulmonary edema   5. Severe Anemia hemoglobin dropped to 4 recieved blood transfusion    6. Sepsis with septic shock on Levophed   7. Left hand ischemia   8. Arthritis hypertension by hx   9. Hyperkalemia corrected   10.  Hyponatremia resolved      Vitals: Temp 98.3, HR 71, BP 92/46, RR 32, 32, 31, 96% on vent/ETT      Labs:   2/13/2022 03:00   pH: 7.48 (H)   PCO2: 43   PO2: 60 (L)   BICARBONATE: 31 (H)   O2 SAT: 93 (L)   BASE EXCESS: 7.1 (H)   2/13/2022 04:45   WBC: 7.5   NRBC: 0.0   RBC: 2.51 (L)   HGB: 8.1 (L)   HCT: 24.4 (L)   MCV: 97.2   RDW: 15.7 (H)   PLATELET: 357   MPV: 11.1   NEUTROPHILS: 75   LYMPHOCYTES: 14   MONOCYTES: 3 (L)   IMMATURE GRANULOCYTES: 1 (H)   ABSOLUTE NRBC: 0.00   ABS. NEUTROPHILS: 5.7   ABS. IMM. GRANS.: 0.0   ABS. LYMPHOCYTES: 1.1   ABS. MONOCYTES: 0.2   ABS. EOSINOPHILS: 0.5 (H)   Sodium: 138   Potassium: 3.2 (L)   Chloride: 102   CO2: 32   Anion gap: 4 (L)   Glucose: 104 (H)   BUN: 35 (H)   Creatinine: 0.36 (L)   BUN/Creatinine ratio: 97 (H)   GFR est non-AA: >60   GFR est AA: >60   Protein, total: 5.8 (L)   Albumin: 1.9 (L)   Globulin: 3.9   A-G Ratio: 0.5 (L)   ALT: 61   AST: 48 (H)   Alk. phosphatase: 94   Chest x-ray: IMPRESSION   No significant interval change, including bilateral opacities.       Medications:    Olumiant 2mg PO daily   Dexamethasone 4mg IV q24   Precedex IV titrate   Diltiazem IR 30mg PO TID   Heparin 5,000units SC q8   Insulin lantus 10units SC daily   Insulin lispro SC q6 SSI   Levophed IV titrate   Propofol IV titrate   Potassium chloride 40mEq G tube x1      Plan: adult tube feedings, npo, activity as tolerated with assist, droplet plus isolation, elevate extremity, elevate HOB 30 degrees, livingston catheter, I&Os q8, neuro/vascular checks q4, spot check oximetry, oxygen cannula, mechanical ventilator                    Viral Illness, Acute - Care Day 23 (2/12/2022) by Jaison Diamond       Review Entered Review Status   2/14/2022 14:49 Completed      Criteria Review      Care Day: 23 Care Date: 2/12/2022 Level of Care: ICU    Guideline Day 3    Clinical Status    ( ) * Hemodynamic stability    2/14/2022 14:49:07 EST by Jaison Diamond      Levophed IV titrate    (X) * Afebrile or temperature acceptable for next level of care    2/14/2022 14:49:08 EST by Jaison Diamond      Temp 98.8    ( ) * Tachypnea absent    2/14/2022 14:49:08 EST by Andrew Ramos RR 33, 32, 32    ( ) * Hypoxemia absent    2/14/2022 14:49:08 EST by Symone Whitney      97% on vent/ETT    ( ) * Mental status at baseline    2/14/2022 14:49:08 EST by Symone Whitney      intubated and sedated    (X) * Renal function at baseline, or stable and acceptable for next level of care    2/14/2022 14:49:08 EST by Symone Whitney      BUN: 47 (H)  Creatinine: 0.34 (L)    ( ) * Discharge plans and education understood    Activity    (X) * Ambulatory or acceptable for next level of care    2/14/2022 14:49:08 EST by Symone Whitney      intubated and sedated    Routes    ( ) * Oral hydration    2/14/2022 14:49:08 EST by Symone Whitney      adult tube feedings, npo    ( ) * Oral medications or regimen acceptable for next level of care    2/14/2022 14:49:08 EST by Symone Whitney      IV and PO meds    ( ) * Oral diet or acceptable for next level of care    2/14/2022 14:49:08 EST by Symone Whitney      adult tube feedings, npo    Interventions    ( ) * Isolation not indicated, or is performable at next level of care    2/14/2022 14:49:08 EST by Symone Whitney      droplet plus isolation    (X) Pulse oximetry    2/14/2022 14:49:08 EST by Symone Whitney      97% on vent/ETT    Medications    (X) * Antimicrobial medication absent or regimen established for next level of care    2/14/2022 14:49:08 EST by Symone Whitney      none noted    (X) Possible DVT prophylaxis    2/14/2022 14:49:08 EST by Symone Whitney      Heparin 5,000units SC q8    * Milestone   Additional Notes   Date of care: 2/12/2022      IP-LOC-ICU       PD PN: IMPRESSION:   1. Acute hypoxic respiratory   2. Acute on chronic hypercapnic respiratory failure    3. COVID-19 pneumonia   4. Pulmonary edema   5. Severe Anemia hemoglobin dropped to 4 recieved blood transfusion    6. Sepsis with septic shock on Levophed   7. Left hand ischemia   8. Arthritis hypertension by hx   9. Hyperkalemia corrected   10. Hyponatremia resolved   RECOMMENDATIONS/PLAN:   1. ICU monitoring   1. Patient condition got worse on 1/24 went to asystole subsequently got intubated now on ventilator assist control mode sedated with propofol and fentanyl Precedex was discontinued because of bradycardia arterial blood gases acceptable currently on A/C 12  PEEP 5 FiO2  90% PO2 PCO2 much improved, still not able to wean   2. Patient is on dexamethasone and baricitinib   3. Significant drop in hemoglobin recieved 3 unit packed RBC hemoglobin improved   4. Patient is back on Levophed hemodynamically unstable will continue to wean   5. Leukocytosis and hemoglobin improved   6. Elevated blood sugar at steroid was decreased blood sugar trending down patient on sliding scale   7. Potassium corrected   8. Left hand NATHALIE normal continue local wound care    9. Pulmonary edema received Lasix chest x-ray shows bilateral infiltrate congestive changes   10. Left hand ischemia off IV heparin left ulnar artery occlusion radial  patent NATHALIE normal   11. Troponin is elevated   12. Procalcitonin 0.06 C-reactive 3.1 lactic acid 1.4   13. She is off antibiotics      IM PN: Subjective:   80year-old patient came in with shortness of breath got intubated was hypotensive on vasopressin was with septic shock   General: Intubated   Eyes:    Ears: Normal TMs and external ear canals both ears. Nose: Nares normal. Septum midline. Mucosa normal. No drainage or sinus tenderness. Mouth/Throat: Lips, mucosa, and tongue normal. Teeth and gums normal.   Neck: Supple, symmetrical, trachea midline, no adenopathy, thyroid: no enlargment/tenderness/nodules, no carotid bruit and no JVD. Back:   Symmetric, no curvature. ROM normal. No CVA tenderness. Lungs:   Clear to auscultation bilaterally. Heart: Regular rate and rhythm, S1, S2 normal, no murmur, click, rub or gallop. Abdomen:   Soft, non-tender. Bowel sounds normal. No masses,  No organomegaly.    Extremities: Extremities normal, atraumatic, no cyanosis or edema. Pulses: 2+ and symmetric all extremities. Skin: Skin color, texture, turgor normal. No rashes or lesions   Lymph nodes: Intubated   Assessment:   Active Problems:     COVID-19 (1/17/2022)     Respiratory failure with hypoxia ([address] (1/21/2022)     Hypernatremia (1/23/2022)     Hypokalemia (1/23/2022)   1. Acute hypoxic respiratory   2. Acute on chronic hypercapnic respiratory failure    3. COVID-19 pneumonia   4. Pulmonary edema   5. Severe Anemia hemoglobin dropped to 4 recieved blood transfusion    6. Sepsis with septic shock on Levophed   7. Left hand ischemia   8. Arthritis hypertension by hx   9. Hyperkalemia corrected   10. Hyponatremia resolved   Plan:   l present treatment continue.  Prognosis is guarded      Vitals: Temp 98.8, HR 70, /49, RR 33, 32, 32, 97% on vent/ETT      Labs:   2/12/2022 04:40   pH: 7.45   PCO2: 43   PO2: 81   BICARBONATE: 29 (H)   O2 SAT: 97   BASE EXCESS: 4.9 (H)   2/12/2022 04:41   WBC: 8.3   NRBC: 0.0   RBC: 2.53 (L)   HGB: 8.0 (L)   HCT: 24.7 (L)   MCV: 97.6   RDW: 15.4 (H)   PLATELET: 036   MPV: 11.3   NEUTROPHILS: 77 (H)   LYMPHOCYTES: 14   MONOCYTES: 3 (L)   IMMATURE GRANULOCYTES: 1 (H)   ABSOLUTE NRBC: 0.00   ABS. NEUTROPHILS: 6.4   ABS. IMM. GRANS.: 0.0   ABS. LYMPHOCYTES: 1.2   ABS. MONOCYTES: 0.3   ABS. EOSINOPHILS: 0.4   Sodium: 139   Potassium: 3.6   Chloride: 105   CO2: 29   Anion gap: 5   Glucose: 114 (H)   BUN: 47 (H)   Creatinine: 0.34 (L)   BUN/Creatinine ratio: 138 (H)   GFR est non-AA: >60   GFR est AA: >60   Protein, total: 5.6 (L)   Albumin: 1.8 (L)   Globulin: 3.8   A-G Ratio: 0.5 (L)   ALT: 63   AST: 42 (H)   Alk. phosphatase: 64   Chest x-ray: IMPRESSION   Bilateral opacities, slightly progressed on the right.       Medications:    Olumiant 2mg PO daily   Dexamethasone 4mg IV q24   Precedex IV titrate   Diltiazem IR 30mg PO TID   Heparin 5,000units SC q8   Insulin lantus 10units SC daily   Insulin lispro SC q6 SSI   Levophed IV titrate   Propofol IV titrate      Plan: adult tube feedings, npo, activity as tolerated with assist, droplet plus isolation, elevate extremity, elevate HOB 30 degrees, livingston catheter, I&Os q8, neuro/vascular checks q4, spot check oximetry, oxygen cannula, mechanical ventilator                 Viral Illness, Acute - Care Day 22 (2/11/2022) by Crescencio Irving, RN       Review Entered Review Status   2/11/2022 14:04 Completed      Criteria Review      Care Day: 22 Care Date: 2/11/2022 Level of Care: ICU    Guideline Day 3    Clinical Status    (X) * Hemodynamic stability    2/11/2022 14:04:15 EST by Crescencio Irving      78, 113/59,    (X) * Afebrile or temperature acceptable for next level of care    2/11/2022 14:04:15 EST by Crescencio Irving      98.6,    ( ) * Tachypnea absent    2/11/2022 14:04:16 EST by Crescencio Irving      RR 28    ( ) * Hypoxemia absent    2/11/2022 14:04:16 EST by Crescencio Irving      96% Vent 90% FiO2    ( ) * Mental status at baseline    (X) * Renal function at baseline, or stable and acceptable for next level of care    2/11/2022 14:04:16 EST by Crescencio Irving      BUN 50/ Creat 0.31,    ( ) * Discharge plans and education understood    Activity    (X) * Ambulatory or acceptable for next level of care    2/11/2022 14:04:16 EST by Viral Gomez as tara    Routes    ( ) * Oral hydration    ( ) * Oral medications or regimen acceptable for next level of care    ( ) * Oral diet or acceptable for next level of care    2/11/2022 14:04:16 EST by Crescencio Irving      npo/ tube feedings    Interventions    (X) * Isolation not indicated, or is performable at next level of care    2/11/2022 14:04:16 EST by Crescencio Irving      droplet plus    (X) Pulse oximetry    Medications    (X) * Antimicrobial medication absent or regimen established for next level of care    (X) Possible DVT prophylaxis    2/11/2022 14:04:16 EST by Crescencio Irving      heparin 5000 units sq q8    * Milestone   Additional Notes   2/11 ICU      VS: 98.6, 78, 113/59, 28, 96% Vent 90% FiO2      Results: wbc 10.5, h/h 8.4/25.1,        ABG: pH 7.45, pCO2 41, pO1 60, bicarb 28, O2 sat 93, on 90% FiO2      CXR: Right neck central vascular catheter unchanged position. Hazy relative diffuse   interstitial edema unchanged compared to prior imaging. Cardiac and mediastinal   structures unchanged. Thoracic aorta atherosclerosis. No pneumothorax or sizable   pleural effusion      Meds: precedex 400mcg/100 IV drip cont, propofol 10mg/ml IV drip cont,    decadron 4mg IV qd, lasix 20mg IV bid,   olumiant 2mg po qd      Tx: DNR, I&O, vitals per routine, neuro/vascular checks q4,          Plan:   Seen by nephrologist discontinue D5W   Recommend free water flushes       Olumiant 2 mg daily   Decadron 4 mg every 6 hours   Meropenem 1 g every 8 hours   Diltiazem 30 mg 3 times   Follow-up with pulmonologist and nephrologist   Start Lantus 10 units subcu day       Monitor sodium and potassium      Exam:   Constitutional: Awake on vent    HENT:    Head: Normocephalic and atraumatic. Eyes: Pupils are equal, round, and reactive to light. EOM are normal.    Cardiovascular: Normal rate, regular rhythm and normal heart sounds. Pulmonary/Chest: Decreased breath sounds    abdominal: Soft. Bowel sounds are normal. There is no abdominal tenderness. There is no rebound and no guarding. Musculoskeletal: Normal range of motion. Neurological: vent       Pulmo:   1. ICU monitoring   1. Patient condition got worse on 1/24 went to asystole subsequently got intubated now on ventilator assist control mode sedated with propofol and fentanyl Precedex was discontinued because of bradycardia arterial blood gases acceptable currently on A/C 12  PEEP 5 FiO2  90% PO2 PCO2 much improved, still not able to wean   2. Patient is on dexamethasone and baricitinib   3.  Significant drop in hemoglobin recieved 3 unit packed RBC hemoglobin improved 4. Patient is back on Levophed hemodynamically unstable will continue to wean   5. Leukocytosis normal hemoglobin improved   6. Elevated blood sugar at steroid was decreased blood sugar trending down patient on sliding scale   7. Potassium corrected   8. Left hand NATHALIE normal continue local wound care    9. Pulmonary edema received Lasix chest x-ray shows bilateral infiltrate congestive changes   10. Left hand ischemia off IV heparin left ulnar artery occlusion radial  patent NATHALIE normal   11. Troponin is elevated   12. Procalcitonin 0.06 C-reactive 3.1 lactic acid 1.4   13.  She is off antibiotic        Viral Illness, Acute - Care Day 21 (2/10/2022) by Audrey Wilkerson, RN       Review Entered Review Status   2/11/2022 10:57 Completed      Criteria Review      Care Day: 21 Care Date: 2/10/2022 Level of Care: ICU    Guideline Day 3    Level Of Care    ( ) Floor to discharge    2/11/2022 10:57:36 EST by Audreyjuni Wilkerson      ICU    Clinical Status    ( ) * Hemodynamic stability    2/11/2022 10:57:36 EST by Tara  8mg/250ml  IV gtt cont    (X) * Afebrile or temperature acceptable for next level of care    2/11/2022 10:57:36 EST by [employer]      98.6,    ( ) * Tachypnea absent    2/11/2022 10:57:36 EST by [employer]      RR 25    ( ) * Hypoxemia absent    2/11/2022 10:57:36 EST by [employer]      97% Vent 90% FIO2    ( ) * Mental status at baseline    (X) * Renal function at baseline, or stable and acceptable for next level of care    2/11/2022 10:57:36 EST by [employer]      BUN 44/ Creat 0.32,    ( ) * Discharge plans and education understood    Activity    ( ) * Ambulatory or acceptable for next level of care    2/11/2022 10:57:36 EST by Nilo Del Rosario intubated/ sedated    Routes    ( ) * Oral hydration    ( ) * Oral medications or regimen acceptable for next level of care    ( ) * Oral diet or acceptable for next level of care    2/11/2022 10:57:36 EST by Nilo Del Rosario NPO/ tube feeds via OG    Interventions    (X) * Isolation not indicated, or is performable at next level of care    2/11/2022 10:57:36 EST by Sandi Cam      droplet plus    (X) Pulse oximetry    Medications    (X) * Antimicrobial medication absent or regimen established for next level of care    (X) Possible DVT prophylaxis    2/11/2022 10:57:36 EST by Sandi Cam      heparin 5000 units sq q8    * Milestone   Additional Notes   2/10      VS: 98.6, 66, 115/60, 25, 97% Vent 90% FIO2      Results: wbc 9.1, h/h 8.2/24.7, Na 138, K 4,    ABG: pH 7.26, pCO2 44, pO2 61, bicarb 30, O2 sat 94 on vent 90% FiO2      CXR: 1.  Limitations: Rotated and angulated. 2.  Medical devices: Right internal jugular venous catheter, endotracheal tube   and NG tube in stable. 3.  Ongoing moderate bilateral edema similar to prior exam.   4.  Ongoing collapse/consolidation in the left lower lobe concerning for   underlying airspace disease. 5.  The cardiac and mediastinal contours are stable though near completely   obscured by adjacent airspace disease. Meds: precedex 400mcg/100ml IV drip cont, fentanyl 1500mcg/20ml IV drip cont, decadron 4mg IV qd, olumiant 2mg po qd, propofol 10mg/ml IV drip cont,       Tx: DNR, I&O, vitals per routine, neuro/vascular checks q4, elevate HOB 30 degrees         Plan:   Seen by nephrologist discontinue D5W   Recommend free water flushes       Olumiant 2 mg daily   Decadron 4 mg every 6 hours   Meropenem 1 g every 8 hours   Diltiazem 30 mg 3 times   Follow-up with pulmonologist and nephrologist   Start Lantus 10 units subcu day       Monitor sodium and potassium       Overall prognosis   Normal saline 50 cc/h          Exam:   Constitutional: Awake on vent    HENT:    Head: Normocephalic and atraumatic. Eyes: Pupils are equal, round, and reactive to light. EOM are normal.    Cardiovascular: Normal rate, regular rhythm and normal heart sounds.     Pulmonary/Chest: Decreased breath sounds abdominal: Soft. Bowel sounds are normal. There is no abdominal tenderness. There is no rebound and no guarding. Musculoskeletal: Normal range of motion. Neurological: vent       Pulmo:   1. ICU monitoring   1. Patient condition got worse on 1/24 went to asystole subsequently got intubated now on ventilator assist control mode sedated with propofol and fentanyl Precedex was discontinued because of bradycardia arterial blood gases acceptable currently on A/C 12  PEEP 5 FiO2  90% PO2 PCO2 much improved, still not able to wean   2. Patient is on dexamethasone and baricitinib   3. Significant drop in hemoglobin recieved 3 unit packed RBC hemoglobin improved   4. Patient is back on Levophed hemodynamically unstable will continue to wean   5. Leukocytosis normal hemoglobin improved   6. Elevated blood sugar at steroid was decreased blood sugar trending down patient on sliding scale   7. Potassium corrected   8. Left hand NATHALIE normal continue local wound care    9. Pulmonary edema received Lasix chest x-ray shows bilateral infiltrate congestive changes   10. Left hand ischemia off IV heparin left ulnar artery occlusion radial  patent NATHALIE normal   11. Troponin is elevated   12. Procalcitonin 0.06 C-reactive 3.1 lactic acid 1.4   13. She is on meropenem and finished Zithromax will discontinue meropenem      Gen Surg:  Patient's FiO2 requirement getting worse.  Currently 90%.  Patient currently off any pressors.  Patient's left arm examination shows no changes.  Demarcated and mummified digits of 5, 4, and 3 of the left hand no changes.    Continued left arm elevation, continue local wound care with iodine swabs daily.  Continue supportive care.

## 2022-02-18 NOTE — PROGRESS NOTES
CM reviewed clinical chart. Per attending physician's note, the plan is for a final decision tomorrow. CM will continue to follow.

## 2022-02-18 NOTE — PROGRESS NOTES
General Daily Progress Note          Patient Name:   Kraig Grimes       YOB: 1940       Age:  80 y.o. Admit Date: 1/21/2022      Subjective:     Patient is a 80y.o. year old female with signal past medical history of hypertension arthritis who discharged from the hospital yesterday in stable condition patient is admitted last admission with COVID-19 patient was asymptomatic all this time while in the hospital patient discharged on 2 L oxygen to skilled care but patient son want to go home with home health but is at home patient's saturations drops sent back to the ER seen by the ER physician patient placed on BiPAP patient was little hypotensive started on Levophed admitted for further work-up and treatment           Patient is DNR    Patient on ventilator 90% O2    On propofol drip         NG tube in place for medication and feeding    Repeat Covid test positive    2/18-  Patient seen in ICU on ventilator 80% FiO2. Patient left hand ischemia worsening. She is off pressors today. Patient discussed with family yesterday.   OG tube feeds  On propofol 20 mcg/min      Objective:     Visit Vitals  /60 (BP 1 Location: Right lower arm, BP Patient Position: At rest)   Pulse (!) 122   Temp 99.2 °F (37.3 °C)   Resp 27   Ht 5' 5\" (1.651 m)   Wt 234 lb 12.6 oz (106.5 kg)   SpO2 98%   BMI 39.07 kg/m²        Recent Results (from the past 24 hour(s))   GLUCOSE, POC    Collection Time: 02/17/22 11:31 AM   Result Value Ref Range    Glucose (POC) 105 65 - 117 mg/dL    Performed by Patricio Mcclure, POC    Collection Time: 02/17/22  5:41 PM   Result Value Ref Range    Glucose (POC) 119 (H) 65 - 117 mg/dL    Performed by Patricio Mcclure, POC    Collection Time: 02/17/22 10:14 PM   Result Value Ref Range    Glucose (POC) 93 65 - 117 mg/dL    Performed by Sara Burgess    GLUCOSE, POC    Collection Time: 02/18/22  5:57 AM   Result Value Ref Range    Glucose (POC) 122 (H) 65 - 117 mg/dL    Performed by Diana Chacon    CBC WITH AUTOMATED DIFF    Collection Time: 02/18/22  6:20 AM   Result Value Ref Range    WBC 9.5 3.6 - 11.0 K/uL    RBC 2.50 (L) 3.80 - 5.20 M/uL    HGB 7.9 (L) 11.5 - 16.0 g/dL    HCT 24.3 (L) 35.0 - 47.0 %    MCV 97.2 80.0 - 99.0 FL    MCH 31.6 26.0 - 34.0 PG    MCHC 32.5 30.0 - 36.5 g/dL    RDW 15.5 (H) 11.5 - 14.5 %    PLATELET 350 (L) 006 - 400 K/uL    MPV 11.9 8.9 - 12.9 FL    NRBC 0.0 0.0  WBC    ABSOLUTE NRBC 0.00 0.00 - 0.01 K/uL    NEUTROPHILS PENDING %    LYMPHOCYTES PENDING %    MONOCYTES PENDING %    EOSINOPHILS PENDING %    BASOPHILS PENDING %    IMMATURE GRANULOCYTES PENDING %    ABS. NEUTROPHILS PENDING K/UL    ABS. LYMPHOCYTES PENDING K/UL    ABS. MONOCYTES PENDING K/UL    ABS. EOSINOPHILS PENDING K/UL    ABS. BASOPHILS PENDING K/UL    ABS. IMM. GRANS. PENDING K/UL    DF PENDING    METABOLIC PANEL, COMPREHENSIVE    Collection Time: 02/18/22  6:20 AM   Result Value Ref Range    Sodium 135 (L) 136 - 145 mmol/L    Potassium 3.1 (L) 3.5 - 5.1 mmol/L    Chloride 102 97 - 108 mmol/L    CO2 24 21 - 32 mmol/L    Anion gap 9 5 - 15 mmol/L    Glucose 125 (H) 65 - 100 mg/dL    BUN 24 (H) 6 - 20 mg/dL    Creatinine 0.36 (L) 0.55 - 1.02 mg/dL    BUN/Creatinine ratio 67 (H) 12 - 20      GFR est AA >60 >60 ml/min/1.73m2    GFR est non-AA >60 >60 ml/min/1.73m2    Calcium 9.3 8.5 - 10.1 mg/dL    Bilirubin, total 0.5 0.2 - 1.0 mg/dL    AST (SGOT) 63 (H) 15 - 37 U/L    ALT (SGPT) 67 12 - 78 U/L    Alk. phosphatase 72 45 - 117 U/L    Protein, total 6.7 6.4 - 8.2 g/dL    Albumin 1.8 (L) 3.5 - 5.0 g/dL    Globulin 4.9 (H) 2.0 - 4.0 g/dL    A-G Ratio 0.4 (L) 1.1 - 2.2       [unfilled]      Review of Systems    Unable to obtain. Physical Exam:      Constitutional: Awake on vent   HENT:   Head: Normocephalic and atraumatic. Eyes: Pupils are equal, round, and reactive to light. EOM are normal.   Cardiovascular: Normal rate, regular rhythm and normal heart sounds. Pulmonary/Chest: Decreased breath sounds   abdominal: Soft. Bowel sounds are normal. There is no abdominal tenderness. There is no rebound and no guarding. Musculoskeletal: Normal range of motion. Skin: L hand 3rd, 4th, and 5th fingers blackened  Neurological: vent     XR CHEST PORT   Final Result      XR CHEST PORT   Final Result   Residual hazy diffuse infiltration in the right lung and at the left   lung base      XR CHEST PORT   Final Result      XR CHEST PORT   Final Result   No significant interval change, including bilateral opacities. XR CHEST PORT   Final Result   Bilateral opacities, slightly progressed on the right. XR CHEST PORT   Final Result      XR CHEST PORT   Final Result   Findings/impression:   1. Limitations: Rotated and angulated. 2.  Medical devices: Right internal jugular venous catheter, endotracheal tube   and NG tube in stable. 3.  Ongoing moderate bilateral edema similar to prior exam.   4.  Ongoing collapse/consolidation in the left lower lobe concerning for   underlying airspace disease. 5.  The cardiac and mediastinal contours are stable though near completely   obscured by adjacent airspace disease. XR CHEST PORT   Final Result   No significant interval change. XR CHEST PORT   Final Result      XR CHEST PORT   Final Result   No significant interval change. XR CHEST PORT   Final Result   Persistent bilateral airspace disease and with decreased aeration. XR CHEST PORT   Final Result      XR CHEST PORT   Final Result      XR CHEST PORT   Final Result      XR CHEST PORT   Final Result      XR CHEST PORT   Final Result      WRIST BRACHIAL INDEX AT REST   Final Result      XR CHEST PORT   Final Result   No significant change. XR CHEST PORT   Final Result   No interval change.       DUPLEX UPPER EXT ARTERY LEFT   Final Result      XR CHEST PORT   Final Result      XR CHEST PORT   Final Result   Endotracheal tube tip still at the maggie. Diffuse opacities in the   lungs, not significantly changed. XR CHEST PORT   Final Result      XR CHEST PORT   Final Result   Endotracheal tube tip at the maggie. Diffuse opacities in the   lungs, accentuated by lower lung volumes or increased. XR CHEST PORT   Final Result   Diffuse opacities in the lungs, not significantly changed. XR CHEST PORT   Final Result   1. ETT terminating 2 cm above the maggie. Remaining support apparatus as above. 2.  Worsening bilateral airspace disease with developing ARDS not excluded. XR CHEST PORT   Final Result   Moderate patchy diffuse bilateral airspace opacities, waxing and waning from the   prior exam.      XR CHEST PORT   Final Result   Moderate diffuse bilateral airspace opacities, likely a combination of edema and   airspace disease, mildly increased from the previous exam.      CT HEAD WO CONT   Final Result   1. There are milder microvascular changes within the central periventricular   white matter. 2.  There is an area of diminished density within the inferior aspect of the   right cerebellar hemisphere without change (series 201 image number 12). These   findings may represent an older ischemic insult within the right posterior   inferior cerebellar region. 3.  This examination is negative for acute intracranial pathology or short-term   interval changes dating back to 1/17/2022. XR CHEST PORT   Final Result   Patchy mixed asymmetric lung disease appears somewhat better but the   lungs are better inflated. No effusion or pneumothorax. Normal heart and   mediastinum      IR INSERT NON TUNL CVC OVER 5 YRS   Final Result   Ultrasound of the right neck demonstrated patent IJV. Successful US-guided placement of a right internal jugular triple lumen central   venous catheter as described above. The catheter is functioning.       PLAN:   Catheter position was confirmed by follow-up chest x-ray: catheter tip in the lower SVC and ready to use. IR US GUIDED VASCULAR ACCESS   Final Result   Ultrasound of the right neck demonstrated patent IJV. Successful US-guided placement of a right internal jugular triple lumen central   venous catheter as described above. The catheter is functioning. PLAN:   Catheter position was confirmed by follow-up chest x-ray: catheter tip in the   lower SVC and ready to use. XR CHEST PORT   Final Result   Findings/impression:      Diffuse interstitial airspace disease/edema. No definite pleural effusion or   pneumothorax. Cardiac contours are partially obscured. No acute osseous abnormality identified.       XR CHEST PORT    (Results Pending)        Recent Results (from the past 24 hour(s))   GLUCOSE, POC    Collection Time: 02/17/22 11:31 AM   Result Value Ref Range    Glucose (POC) 105 65 - 117 mg/dL    Performed by [address] [place] [address], POC    Collection Time: 02/17/22  5:41 PM   Result Value Ref Range    Glucose (POC) 119 (H) 65 - 117 mg/dL    Performed by [address] [place] [address], POC    Collection Time: 02/17/22 10:14 PM   Result Value Ref Range    Glucose (POC) 93 65 - 117 mg/dL    Performed by Shira Nieto    GLUCOSE, POC    Collection Time: 02/18/22  5:57 AM   Result Value Ref Range    Glucose (POC) 122 (H) 65 - 117 mg/dL    Performed by MIRNA REMY    CBC WITH AUTOMATED DIFF    Collection Time: 02/18/22  6:20 AM   Result Value Ref Range    WBC 9.5 3.6 - 11.0 K/uL    RBC 2.50 (L) 3.80 - 5.20 M/uL    HGB 7.9 (L) 11.5 - 16.0 g/dL    HCT 24.3 (L) 35.0 - 47.0 %    MCV 97.2 80.0 - 99.0 FL    MCH 31.6 26.0 - 34.0 PG    MCHC 32.5 30.0 - 36.5 g/dL    RDW 15.5 (H) 11.5 - 14.5 %    PLATELET 416 (L) 998 - 400 K/uL    MPV 11.9 8.9 - 12.9 FL    NRBC 0.0 0.0  WBC    ABSOLUTE NRBC 0.00 0.00 - 0.01 K/uL    NEUTROPHILS PENDING %    LYMPHOCYTES PENDING %    MONOCYTES PENDING %    EOSINOPHILS PENDING %    BASOPHILS PENDING %    IMMATURE GRANULOCYTES PENDING % ABS. NEUTROPHILS PENDING K/UL    ABS. LYMPHOCYTES PENDING K/UL    ABS. MONOCYTES PENDING K/UL    ABS. EOSINOPHILS PENDING K/UL    ABS. BASOPHILS PENDING K/UL    ABS. IMM. GRANS. PENDING K/UL    DF PENDING    METABOLIC PANEL, COMPREHENSIVE    Collection Time: 02/18/22  6:20 AM   Result Value Ref Range    Sodium 135 (L) 136 - 145 mmol/L    Potassium 3.1 (L) 3.5 - 5.1 mmol/L    Chloride 102 97 - 108 mmol/L    CO2 24 21 - 32 mmol/L    Anion gap 9 5 - 15 mmol/L    Glucose 125 (H) 65 - 100 mg/dL    BUN 24 (H) 6 - 20 mg/dL    Creatinine 0.36 (L) 0.55 - 1.02 mg/dL    BUN/Creatinine ratio 67 (H) 12 - 20      GFR est AA >60 >60 ml/min/1.73m2    GFR est non-AA >60 >60 ml/min/1.73m2    Calcium 9.3 8.5 - 10.1 mg/dL    Bilirubin, total 0.5 0.2 - 1.0 mg/dL    AST (SGOT) 63 (H) 15 - 37 U/L    ALT (SGPT) 67 12 - 78 U/L    Alk. phosphatase 72 45 - 117 U/L    Protein, total 6.7 6.4 - 8.2 g/dL    Albumin 1.8 (L) 3.5 - 5.0 g/dL    Globulin 4.9 (H) 2.0 - 4.0 g/dL    A-G Ratio 0.4 (L) 1.1 - 2.2         Results     Procedure Component Value Units Date/Time    COVID-19 RAPID TEST [[id number]]  (Abnormal) Collected: 02/10/22 0923    Order Status: Completed Specimen: Nasopharyngeal Updated: 02/10/22 0957     Specimen source       Please find results under separate order           COVID-19 rapid test DETECTED        Comment: Rapid [employer] ID Now   The specimen is POSITIVE for SARS-CoV-2, the novel coronavirus associated with COVID-19. This test has been authorized by the FDA under an Emergency Use Authorization (EUA) for use by authorized laboratories.    Fact sheet for Healthcare Providers: ConventionUpdate.co.nz Fact sheet for Patients: ConventionUpdate.co.nz   Methodology: Isothermal Nucleic Acid Amplification Results verified, phoned to and read back by Mya Zhang RN AT   [address]:     Recent Labs     02/18/22  0620 02/17/22  0625   WBC 9.5 7.4   HGB 7.9* 8.2*   HCT 24.3* 25.7*   * 101*     Recent Labs     02/18/22  0620 02/17/22  0625 02/16/22  0530   * 137 137   K 3.1* 3.4* 3.1*    103 103   CO2 24 26 30   BUN 24* 24* 29*   CREA 0.36* 0.38* 0.26*   * 94 112*   CA 9.3 9.5 8.9     Recent Labs     02/18/22  0620 02/17/22  0625 02/16/22  0530   ALT 67 68 55   AP 72 78 61   TBILI 0.5 0.6 0.5   TP 6.7 6.7 5.8*   ALB 1.8* 1.9* 1.6*   GLOB 4.9* 4.8* 4.2*     No results for input(s): INR, PTP, APTT, INREXT, INREXT in the last 72 hours. No results for input(s): FE, TIBC, PSAT, FERR in the last 72 hours. No results found for: FOL, RBCF   Recent Labs     02/17/22  0323 02/16/22  0430   PH 7.49* 7.46*   PCO2 36 42   PO2 72* 80     No results for input(s): CPK, CKNDX, TROIQ in the last 72 hours.     No lab exists for component: CPKMB  No results found for: CHOL, CHOLX, CHLST, CHOLV, HDL, HDLP, LDL, LDLC, DLDLP, TGLX, TRIGL, TRIGP, CHHD, CHHDX  Lab Results   Component Value Date/Time    Glucose (POC) 122 (H) 02/18/2022 05:57 AM    Glucose (POC) 93 02/17/2022 10:14 PM    Glucose (POC) 119 (H) 02/17/2022 05:41 PM    Glucose (POC) 105 02/17/2022 11:31 AM    Glucose (POC) 83 02/17/2022 12:13 AM     Lab Results   Component Value Date/Time    Color Yellow/Straw 01/23/2022 10:30 AM    Appearance Turbid (A) 01/23/2022 10:30 AM    Specific gravity 1.027 01/23/2022 10:30 AM    pH (UA) 6.0 01/23/2022 10:30 AM    Protein 100 (A) 01/23/2022 10:30 AM    Glucose 50 (A) 01/23/2022 10:30 AM    Ketone 5 (A) 01/23/2022 10:30 AM    Bilirubin Negative 01/23/2022 10:30 AM    Urobilinogen 4.0 (H) 01/23/2022 10:30 AM    Nitrites Negative 01/23/2022 10:30 AM    Leukocyte Esterase Negative 01/23/2022 10:30 AM    Bacteria Negative 01/23/2022 10:30 AM    WBC 0-4 01/23/2022 10:30 AM    RBC 0-5 01/23/2022 10:30 AM         Assessment:     Acute hypoxemic respiratory failure on on ventilator 90% of   COVID-19 pneumonitis   hypotension  Thrombocytopenia  Hypertension  Arthritis  High blood sugar secondary to steroid  Hyperkalemia  Hyponatremia  Tachycardia  Moderate protein calorie malnutrition  Patient need multiple partial finger amputation once stable as per vascular surgeon    Repeat Covid is positive  Plan:     Olumiant 2 mg daily  Decadron 4 mg every 6 hours  Diltiazem 30 mg 3 times  Lasix 20 mg IV twice daily  Heparin 5000 units subQ q8h  Follow-up with pulmonologist and nephrologist  [facility]    Monitor sodium and potassium    Overall prognosis Poor    Patient is DNR      Discussed with the patient's son he want to wait till  make final decision                    Current Facility-Administered Medications:     furosemide (LASIX) injection 20 mg, 20 mg, IntraVENous, Q12H, Fredi, Jovanna Farooq MD, 20 mg at 02/18/22 0958    insulin glargine (LANTUS) injection 10 Units, 10 Units, SubCUTAneous, DAILY, Tatiana Ortiz MD, 10 Units at 02/18/22 0900    heparin (porcine) injection 5,000 Units, 5,000 Units, SubCUTAneous, Q8H, Tatiana Ortiz MD, 5,000 Units at 02/18/22 0553    insulin lispro (HUMALOG) injection, , SubCUTAneous, Q6H, Polo Lopez MD, 3 Units at 02/15/22 1800    glucose chewable tablet 16 g, 4 Tablet, Oral, PRN, Cindy Lopez MD    glucagon (GLUCAGEN) injection 1 mg, 1 mg, IntraMUSCular, PRN, Cindy Lopez MD    dextrose 10% infusion 125-250 mL, 125-250 mL, IntraVENous, PRN, Cindy Lopez MD    dexamethasone (DECADRON) 4 mg/mL injection 4 mg, 4 mg, IntraVENous, Q24H, Tricia Flower MD, 4 mg at 02/18/22 6567    0.9% sodium chloride infusion 250 mL, 250 mL, IntraVENous, PRN, Karan Flower MD    dilTIAZem IR (CARDIZEM) tablet 30 mg, 30 mg, Oral, TID, Karan Flower MD, 30 mg at 02/18/22 4564    hydrOXYzine pamoate (VISTARIL) capsule 25 mg, 25 mg, Oral, Q4H PRN, Polo Lopez MD    propofol (DIPRIVAN) 10 mg/mL infusion, 0-50 mcg/kg/min, IntraVENous, TITRATE, Karan Flower MD, Last Rate: 8.2 mL/hr at 02/17/22 1841, 15 mcg/kg/min at 02/17/22 1841   dexmedeTOMidine (PRECEDEX) 400 mcg in 0.9% sodium chloride (MBP/ADV) 100 mL MBP, 0.1-1.5 mcg/kg/hr, IntraVENous, TITRATE, Karan Flower MD, Stopped at 02/17/22 1606    NOREPINephrine (LEVOPHED) 8 mg in 0.9% NS 250ml infusion, 0.5-16 mcg/min, IntraVENous, TITRATE, Jazzy Lopez MD, Stopped at 02/13/22 0836    acetaminophen (TYLENOL) tablet 650 mg, 650 mg, Oral, Q6H PRN, 650 mg at 02/17/22 0915 **OR** acetaminophen (TYLENOL) suppository 650 mg, 650 mg, Rectal, Q6H PRN, Jazzy Lopez MD    polyethylene glycol (MIRALAX) packet 17 g, 17 g, Oral, DAILY PRN, Emigdio Zepeda MD, 17 g at 02/16/22 0813    ondansetron (ZOFRAN ODT) tablet 4 mg, 4 mg, Oral, Q8H PRN **OR** [DISCONTINUED] ondansetron (ZOFRAN) injection 4 mg, 4 mg, IntraVENous, Q6H PRN, Polo Lopez MD    baricitinib (OLUMIANT) tablet 2 mg, 2 mg, Oral, DAILY, Polo Lopez MD, 2 mg at 02/18/22 0958    albuterol (PROVENTIL HFA, VENTOLIN HFA, PROAIR HFA) inhaler 2 Puff, 2 Puff, Inhalation, Q6H PRN, Polo Lopez MD    [DISCONTINUED] ondansetron (ZOFRAN ODT) tablet 4 mg, 4 mg, Oral, Q8H PRN **OR** ondansetron (ZOFRAN) injection 4 mg, 4 mg, IntraVENous, Q6H PRN, Polo Lopez MD    hydrOXYzine (VISTARIL) injection 25 mg, 25 mg, IntraMUSCular, Q6H PRN, Polo Lopez MD, 25 mg at 01/22/22 0111

## 2022-02-19 NOTE — PROGRESS NOTES
General Daily Progress Note          Patient Name:   Benita West       YOB: 1940       Age:  80 y.o. Admit Date: 1/21/2022      Subjective:     Patient is a 80y.o. year old female with signal past medical history of hypertension arthritis who discharged from the hospital yesterday in stable condition patient is admitted last admission with COVID-19 patient was asymptomatic all this time while in the hospital patient discharged on 2 L oxygen to skilled care but patient son want to go home with home health but is at home patient's saturations drops sent back to the ER seen by the ER physician patient placed on BiPAP patient was little hypotensive started on Levophed admitted for further work-up and treatment           Patient is DNR    Patient on ventilator 90% O2    On propofol drip         NG tube in place for medication and feeding    Repeat Covid test positive    2/18-  Patient seen in ICU on ventilator 80% FiO2. Patient left hand ischemia worsening. She is off pressors today. Patient discussed with family yesterday.   OG tube feeds  On propofol 20 mcg/min      Objective:     Visit Vitals  BP (!) 125/52   Pulse 96   Temp 99.4 °F (37.4 °C)   Resp 26   Ht 5' 5\" (1.651 m)   Wt 98.5 kg (217 lb 2.5 oz)   SpO2 100%   BMI 36.14 kg/m²        Recent Results (from the past 24 hour(s))   GLUCOSE, POC    Collection Time: 02/18/22 12:00 PM   Result Value Ref Range    Glucose (POC) 121 (H) 65 - 117 mg/dL    Performed by Val Cruz    GLUCOSE, POC    Collection Time: 02/18/22  6:14 PM   Result Value Ref Range    Glucose (POC) 143 (H) 65 - 117 mg/dL    Performed by Val Cruz    GLUCOSE, POC    Collection Time: 02/19/22 12:09 AM   Result Value Ref Range    Glucose (POC) 187 (H) 65 - 117 mg/dL    Performed by Cherry Llanes    BLOOD GAS, ARTERIAL    Collection Time: 02/19/22  3:12 AM   Result Value Ref Range    pH 7.48 (H) 7.35 - 7.45      PCO2 38 35 - 45 mmHg    PO2 88 75 - 100 mmHg    O2 SAT 98 >95 % BICARBONATE 28 (H) 22 - 26 mmol/L    BASE EXCESS 4.4 (H) 0 - 2 mmol/L    O2 METHOD VENT      FIO2 80.0 %    MODE AssistControl/Pressure Control      SET RATE 12      IPAP/PIP 28      EPAP/CPAP/PEEP 5.0      SITE Right Radial      BEN'S TEST PASS     CBC WITH AUTOMATED DIFF    Collection Time: 02/19/22  3:12 AM   Result Value Ref Range    WBC 9.8 3.6 - 11.0 K/uL    RBC 2.42 (L) 3.80 - 5.20 M/uL    HGB 7.8 (L) 11.5 - 16.0 g/dL    HCT 23.4 (L) 35.0 - 47.0 %    MCV 96.7 80.0 - 99.0 FL    MCH 32.2 26.0 - 34.0 PG    MCHC 33.3 30.0 - 36.5 g/dL    RDW 15.4 (H) 11.5 - 14.5 %    PLATELET 107 684 - 769 K/uL    MPV 11.6 8.9 - 12.9 FL    NRBC 0.0 0.0  WBC    ABSOLUTE NRBC 0.00 0.00 - 0.01 K/uL    NEUTROPHILS 77 (H) 32 - 75 %    LYMPHOCYTES 15 12 - 49 %    MONOCYTES 5 5 - 13 %    EOSINOPHILS 1 0 - 7 %    BASOPHILS 0 0 - 1 %    IMMATURE GRANULOCYTES 2 (H) 0 - 0.5 %    ABS. NEUTROPHILS 7.6 1.8 - 8.0 K/UL    ABS. LYMPHOCYTES 1.5 0.8 - 3.5 K/UL    ABS. MONOCYTES 0.5 0.0 - 1.0 K/UL    ABS. EOSINOPHILS 0.1 0.0 - 0.4 K/UL    ABS. BASOPHILS 0.0 0.0 - 0.1 K/UL    ABS. IMM. GRANS. 0.2 (H) 0.00 - 0.04 K/UL    DF AUTOMATED     METABOLIC PANEL, COMPREHENSIVE    Collection Time: 02/19/22  3:12 AM   Result Value Ref Range    Sodium 133 (L) 136 - 145 mmol/L    Potassium 3.5 3.5 - 5.1 mmol/L    Chloride 101 97 - 108 mmol/L    CO2 27 21 - 32 mmol/L    Anion gap 5 5 - 15 mmol/L    Glucose 99 65 - 100 mg/dL    BUN 25 (H) 6 - 20 mg/dL    Creatinine 0.38 (L) 0.55 - 1.02 mg/dL    BUN/Creatinine ratio 66 (H) 12 - 20      GFR est AA >60 >60 ml/min/1.73m2    GFR est non-AA >60 >60 ml/min/1.73m2    Calcium 9.3 8.5 - 10.1 mg/dL    Bilirubin, total 0.4 0.2 - 1.0 mg/dL    AST (SGOT) 54 (H) 15 - 37 U/L    ALT (SGPT) 61 12 - 78 U/L    Alk.  phosphatase 70 45 - 117 U/L    Protein, total 6.5 6.4 - 8.2 g/dL    Albumin 1.9 (L) 3.5 - 5.0 g/dL    Globulin 4.6 (H) 2.0 - 4.0 g/dL    A-G Ratio 0.4 (L) 1.1 - 2.2     GLUCOSE, POC    Collection Time: 02/19/22  4:57 AM   Result Value Ref Range    Glucose (POC) 91 65 - 117 mg/dL    Performed by Sandy Shah      [unfilled]      Review of Systems    Unable to obtain. Physical Exam:      Constitutional: Awake on vent   HENT:   Head: Normocephalic and atraumatic. Eyes: Pupils are equal, round, and reactive to light. EOM are normal.   Cardiovascular: Normal rate, regular rhythm and normal heart sounds. Pulmonary/Chest: Decreased breath sounds   abdominal: Soft. Bowel sounds are normal. There is no abdominal tenderness. There is no rebound and no guarding. Musculoskeletal: Normal range of motion. Skin: L hand 3rd, 4th, and 5th fingers blackened  Neurological: vent     XR CHEST PORT   Final Result   Diffuse haziness of both lungs which have been previously described   and although interstitial and/or airspace disease throughout the right lung in   particular could be present, layering pleural effusions can cause a similar   appearance. Ultimately cross-sectional imaging can best assess. Medical devices   in places as described      XR CHEST PORT   Final Result      XR CHEST PORT   Final Result   Residual hazy diffuse infiltration in the right lung and at the left   lung base      XR CHEST PORT   Final Result      XR CHEST PORT   Final Result   No significant interval change, including bilateral opacities. XR CHEST PORT   Final Result   Bilateral opacities, slightly progressed on the right. XR CHEST PORT   Final Result      XR CHEST PORT   Final Result   Findings/impression:   1. Limitations: Rotated and angulated. 2.  Medical devices: Right internal jugular venous catheter, endotracheal tube   and NG tube in stable. 3.  Ongoing moderate bilateral edema similar to prior exam.   4.  Ongoing collapse/consolidation in the left lower lobe concerning for   underlying airspace disease. 5.  The cardiac and mediastinal contours are stable though near completely   obscured by adjacent airspace disease. XR CHEST PORT   Final Result   No significant interval change. XR CHEST PORT   Final Result      XR CHEST PORT   Final Result   No significant interval change. XR CHEST PORT   Final Result   Persistent bilateral airspace disease and with decreased aeration. XR CHEST PORT   Final Result      XR CHEST PORT   Final Result      XR CHEST PORT   Final Result      XR CHEST PORT   Final Result      XR CHEST PORT   Final Result      WRIST BRACHIAL INDEX AT REST   Final Result      XR CHEST PORT   Final Result   No significant change. XR CHEST PORT   Final Result   No interval change. DUPLEX UPPER EXT ARTERY LEFT   Final Result      XR CHEST PORT   Final Result      XR CHEST PORT   Final Result   Endotracheal tube tip still at the maggie. Diffuse opacities in the   lungs, not significantly changed. XR CHEST PORT   Final Result      XR CHEST PORT   Final Result   Endotracheal tube tip at the maggie. Diffuse opacities in the   lungs, accentuated by lower lung volumes or increased. XR CHEST PORT   Final Result   Diffuse opacities in the lungs, not significantly changed. XR CHEST PORT   Final Result   1. ETT terminating 2 cm above the maggie. Remaining support apparatus as above. 2.  Worsening bilateral airspace disease with developing ARDS not excluded. XR CHEST PORT   Final Result   Moderate patchy diffuse bilateral airspace opacities, waxing and waning from the   prior exam.      XR CHEST PORT   Final Result   Moderate diffuse bilateral airspace opacities, likely a combination of edema and   airspace disease, mildly increased from the previous exam.      CT HEAD WO CONT   Final Result   1. There are milder microvascular changes within the central periventricular   white matter. 2.  There is an area of diminished density within the inferior aspect of the   right cerebellar hemisphere without change (series 201 image number 12).  These   findings may represent an older ischemic insult within the right posterior   inferior cerebellar region. 3.  This examination is negative for acute intracranial pathology or short-term   interval changes dating back to 1/17/2022. XR CHEST PORT   Final Result   Patchy mixed asymmetric lung disease appears somewhat better but the   lungs are better inflated. No effusion or pneumothorax. Normal heart and   mediastinum      IR INSERT NON TUNL CVC OVER 5 YRS   Final Result   Ultrasound of the right neck demonstrated patent IJV. Successful US-guided placement of a right internal jugular triple lumen central   venous catheter as described above. The catheter is functioning. PLAN:   Catheter position was confirmed by follow-up chest x-ray: catheter tip in the   lower SVC and ready to use. IR US GUIDED VASCULAR ACCESS   Final Result   Ultrasound of the right neck demonstrated patent IJV. Successful US-guided placement of a right internal jugular triple lumen central   venous catheter as described above. The catheter is functioning. PLAN:   Catheter position was confirmed by follow-up chest x-ray: catheter tip in the   lower SVC and ready to use. XR CHEST PORT   Final Result   Findings/impression:      Diffuse interstitial airspace disease/edema. No definite pleural effusion or   pneumothorax. Cardiac contours are partially obscured. No acute osseous abnormality identified.       XR CHEST PORT    (Results Pending)        Recent Results (from the past 24 hour(s))   GLUCOSE, POC    Collection Time: 02/18/22 12:00 PM   Result Value Ref Range    Glucose (POC) 121 (H) 65 - 117 mg/dL    Performed by Robby Masterson    GLUCOSE, POC    Collection Time: 02/18/22  6:14 PM   Result Value Ref Range    Glucose (POC) 143 (H) 65 - 117 mg/dL    Performed by Robby Masterson    GLUCOSE, POC    Collection Time: 02/19/22 12:09 AM   Result Value Ref Range    Glucose (POC) 187 (H) 65 - 117 mg/dL    Performed by [facility]    BLOOD GAS, ARTERIAL    Collection Time: 02/19/22  3:12 AM   Result Value Ref Range    pH 7.48 (H) 7.35 - 7.45      PCO2 38 35 - 45 mmHg    PO2 88 75 - 100 mmHg    O2 SAT 98 >95 %    BICARBONATE 28 (H) 22 - 26 mmol/L    BASE EXCESS 4.4 (H) 0 - 2 mmol/L    O2 METHOD VENT      FIO2 80.0 %    MODE AssistControl/Pressure Control      SET RATE 12      IPAP/PIP 28      EPAP/CPAP/PEEP 5.0      SITE Right Radial      BEN'S TEST PASS     CBC WITH AUTOMATED DIFF    Collection Time: 02/19/22  3:12 AM   Result Value Ref Range    WBC 9.8 3.6 - 11.0 K/uL    RBC 2.42 (L) 3.80 - 5.20 M/uL    HGB 7.8 (L) 11.5 - 16.0 g/dL    HCT 23.4 (L) 35.0 - 47.0 %    MCV 96.7 80.0 - 99.0 FL    MCH 32.2 26.0 - 34.0 PG    MCHC 33.3 30.0 - 36.5 g/dL    RDW 15.4 (H) 11.5 - 14.5 %    PLATELET 221 757 - 092 K/uL    MPV 11.6 8.9 - 12.9 FL    NRBC 0.0 0.0  WBC    ABSOLUTE NRBC 0.00 0.00 - 0.01 K/uL    NEUTROPHILS 77 (H) 32 - 75 %    LYMPHOCYTES 15 12 - 49 %    MONOCYTES 5 5 - 13 %    EOSINOPHILS 1 0 - 7 %    BASOPHILS 0 0 - 1 %    IMMATURE GRANULOCYTES 2 (H) 0 - 0.5 %    ABS. NEUTROPHILS 7.6 1.8 - 8.0 K/UL    ABS. LYMPHOCYTES 1.5 0.8 - 3.5 K/UL    ABS. MONOCYTES 0.5 0.0 - 1.0 K/UL    ABS. EOSINOPHILS 0.1 0.0 - 0.4 K/UL    ABS. BASOPHILS 0.0 0.0 - 0.1 K/UL    ABS. IMM. GRANS. 0.2 (H) 0.00 - 0.04 K/UL    DF AUTOMATED     METABOLIC PANEL, COMPREHENSIVE    Collection Time: 02/19/22  3:12 AM   Result Value Ref Range    Sodium 133 (L) 136 - 145 mmol/L    Potassium 3.5 3.5 - 5.1 mmol/L    Chloride 101 97 - 108 mmol/L    CO2 27 21 - 32 mmol/L    Anion gap 5 5 - 15 mmol/L    Glucose 99 65 - 100 mg/dL    BUN 25 (H) 6 - 20 mg/dL    Creatinine 0.38 (L) 0.55 - 1.02 mg/dL    BUN/Creatinine ratio 66 (H) 12 - 20      GFR est AA >60 >60 ml/min/1.73m2    GFR est non-AA >60 >60 ml/min/1.73m2    Calcium 9.3 8.5 - 10.1 mg/dL    Bilirubin, total 0.4 0.2 - 1.0 mg/dL    AST (SGOT) 54 (H) 15 - 37 U/L    ALT (SGPT) 61 12 - 78 U/L    Alk.  phosphatase 70 45 - 117 U/L    Protein, total 6.5 6.4 - 8.2 g/dL    Albumin 1.9 (L) 3.5 - 5.0 g/dL    Globulin 4.6 (H) 2.0 - 4.0 g/dL    A-G Ratio 0.4 (L) 1.1 - 2.2     GLUCOSE, POC    Collection Time: 02/19/22  4:57 AM   Result Value Ref Range    Glucose (POC) 91 65 - 117 mg/dL    Performed by Jaspal Rene        Results     Procedure Component Value Units Date/Time    COVID-19 RAPID TEST [[id number]]  (Abnormal) Collected: 02/10/22 0923    Order Status: Completed Specimen: Nasopharyngeal Updated: 02/10/22 0957     Specimen source       Please find results under separate order           COVID-19 rapid test DETECTED        Comment: Rapid [employer] ID Now   The specimen is POSITIVE for SARS-CoV-2, the novel coronavirus associated with COVID-19. This test has been authorized by the FDA under an Emergency Use Authorization (EUA) for use by authorized laboratories. Fact sheet for Healthcare Providers: ConventionUpdate.co.nz Fact sheet for Patients: ConventionUpdate.co.nz   Methodology: Isothermal Nucleic Acid Amplification Results verified, phoned to and read back by Rudolph Sawyer RN AT   [id number] BY                 Labs:     Recent Labs     02/19/22 0312 02/18/22  0620   WBC 9.8 9.5   HGB 7.8* 7.9*   HCT 23.4* 24.3*    134*     Recent Labs     02/19/22 0312 02/18/22  0620 02/17/22  0625   * 135* 137   K 3.5 3.1* 3.4*    102 103   CO2 27 24 26   BUN 25* 24* 24*   CREA 0.38* 0.36* 0.38*   GLU 99 125* 94   CA 9.3 9.3 9.5     Recent Labs     02/19/22 0312 02/18/22  0620 02/17/22  0625   ALT 61 67 68   AP 70 72 78   TBILI 0.4 0.5 0.6   TP 6.5 6.7 6.7   ALB 1.9* 1.8* 1.9*   GLOB 4.6* 4.9* 4.8*     No results for input(s): INR, PTP, APTT, INREXT, INREXT in the last 72 hours. No results for input(s): FE, TIBC, PSAT, FERR in the last 72 hours.    No results found for: FOL, RBCF   Recent Labs     02/19/22  0312 02/17/22  0323   PH 7.48* 7.49*   PCO2 38 36   PO2 88 72*     No results for input(s): CPK, CKNDX, TROIQ in the last 72 hours. No lab exists for component: CPKMB  No results found for: CHOL, CHOLX, CHLST, CHOLV, HDL, HDLP, LDL, LDLC, DLDLP, TGLX, TRIGL, TRIGP, CHHD, CHHDX  Lab Results   Component Value Date/Time    Glucose (POC) 91 02/19/2022 04:57 AM    Glucose (POC) 187 (H) 02/19/2022 12:09 AM    Glucose (POC) 143 (H) 02/18/2022 06:14 PM    Glucose (POC) 121 (H) 02/18/2022 12:00 PM    Glucose (POC) 122 (H) 02/18/2022 05:57 AM     Lab Results   Component Value Date/Time    Color Yellow/Straw 01/23/2022 10:30 AM    Appearance Turbid (A) 01/23/2022 10:30 AM    Specific gravity 1.027 01/23/2022 10:30 AM    pH (UA) 6.0 01/23/2022 10:30 AM    Protein 100 (A) 01/23/2022 10:30 AM    Glucose 50 (A) 01/23/2022 10:30 AM    Ketone 5 (A) 01/23/2022 10:30 AM    Bilirubin Negative 01/23/2022 10:30 AM    Urobilinogen 4.0 (H) 01/23/2022 10:30 AM    Nitrites Negative 01/23/2022 10:30 AM    Leukocyte Esterase Negative 01/23/2022 10:30 AM    Bacteria Negative 01/23/2022 10:30 AM    WBC 0-4 01/23/2022 10:30 AM    RBC 0-5 01/23/2022 10:30 AM         Assessment:     Acute hypoxemic respiratory failure on on ventilator 90% of   COVID-19 pneumonitis   hypotension  Thrombocytopenia  Hypertension  Arthritis  High blood sugar secondary to steroid  Hypokalemia  Hyponatremia  Tachycardia  Moderate protein calorie malnutrition  Patient need multiple partial finger amputation once stable as per vascular surgeon    Repeat Covid is positive  Plan:     Olumiant 2 mg daily  Decadron 4 mg every 6 hours  Diltiazem 30 mg 3 times  Lasix 20 mg IV twice daily  Heparin 5000 units subQ q8h  Follow-up with pulmonologist and nephrologist  SSI  KCl 40 mEq p.o.     Monitor sodium and potassium    Overall prognosis Poor    Patient is DNR        Discussed with the patient's son today   Pain about the comfort care and terminal extubation     he want terminal extubation and comfort care on Sunday                  Current Facility-Administered Medications:     furosemide (LASIX) injection 20 mg, 20 mg, IntraVENous, Q12H, Fredi, Murray Valdez MD, 20 mg at 02/19/22 0830    insulin glargine (LANTUS) injection 10 Units, 10 Units, SubCUTAneous, DAILY, Polo Lopez MD, 10 Units at 02/19/22 0900    heparin (porcine) injection 5,000 Units, 5,000 Units, SubCUTAneous, Q8H, Polo Lopez MD, 5,000 Units at 02/19/22 0533    insulin lispro (HUMALOG) injection, , SubCUTAneous, Q6H, Polo Lopez MD, 3 Units at 02/19/22 0022    glucose chewable tablet 16 g, 4 Tablet, Oral, PRN, Orlando Lopez MD    glucagon (GLUCAGEN) injection 1 mg, 1 mg, IntraMUSCular, PRN, Orlando Lopez MD    dextrose 10% infusion 125-250 mL, 125-250 mL, IntraVENous, PRN, Orlando Lopez MD    dexamethasone (DECADRON) 4 mg/mL injection 4 mg, 4 mg, IntraVENous, Q24H, Claudia Flower MD, 4 mg at 02/19/22 0830    0.9% sodium chloride infusion 250 mL, 250 mL, IntraVENous, PRN, Karan Flower MD    dilTIAZem IR (CARDIZEM) tablet 30 mg, 30 mg, Oral, TID, Karan Flower MD, 30 mg at 02/19/22 1027    hydrOXYzine pamoate (VISTARIL) capsule 25 mg, 25 mg, Oral, Q4H PRN, Polo Lopez MD    propofol (DIPRIVAN) 10 mg/mL infusion, 0-50 mcg/kg/min, IntraVENous, TITRATE, Claudia Flower MD, Last Rate: 24.5 mL/hr at 02/19/22 1025, 45 mcg/kg/min at 02/19/22 1025    dexmedeTOMidine (PRECEDEX) 400 mcg in 0.9% sodium chloride (MBP/ADV) 100 mL MBP, 0.1-1.5 mcg/kg/hr, IntraVENous, TITRATE, Karan Flower MD, Stopped at 02/17/22 1606    NOREPINephrine (LEVOPHED) 8 mg in 0.9% NS 250ml infusion, 0.5-16 mcg/min, IntraVENous, TITRATE, Polo Lopez MD, Stopped at 02/13/22 0836    acetaminophen (TYLENOL) tablet 650 mg, 650 mg, Oral, Q6H PRN, 650 mg at 02/18/22 2303 **OR** acetaminophen (TYLENOL) suppository 650 mg, 650 mg, Rectal, Q6H PRN, Polo Lopez MD    polyethylene glycol (MIRALAX) packet 17 g, 17 g, Oral, DAILY PRN, Rahat Nascimento MD, 17 g at 02/16/22 0813    ondansetron (ZOFRAN ODT) tablet 4 mg, 4 mg, Oral, Q8H PRN **OR** [DISCONTINUED] ondansetron (ZOFRAN) injection 4 mg, 4 mg, IntraVENous, Q6H PRN, Polo Lopez MD    baricitinib (OLUMIANT) tablet 2 mg, 2 mg, Oral, DAILY, Polo Lopez MD, 2 mg at 02/19/22 0830    albuterol (PROVENTIL HFA, VENTOLIN HFA, PROAIR HFA) inhaler 2 Puff, 2 Puff, Inhalation, Q6H PRN, Polo Lopez MD    [DISCONTINUED] ondansetron (ZOFRAN ODT) tablet 4 mg, 4 mg, Oral, Q8H PRN **OR** ondansetron (ZOFRAN) injection 4 mg, 4 mg, IntraVENous, Q6H PRN, Polo Lopez MD    hydrOXYzine (VISTARIL) injection 25 mg, 25 mg, IntraMUSCular, Q6H PRN, Polo Lopez MD, 25 mg at 01/22/22 0111

## 2022-02-19 NOTE — PROGRESS NOTES
Dr. Carlita Rangel made aware of accidental extubation of patient. Patient reintubated by RT. Stable at this time. PCXR done to confirm placement.

## 2022-02-19 NOTE — PROGRESS NOTES
IMPRESSION:   1. Acute hypoxic respiratory  2. Acute on chronic hypercapnic respiratory failure   3. COVID-19 pneumonia  4. Pulmonary edema  5. Bilateral pleural effusion  6. Hypokalemia to be repleted  7. Severe Anemia hemoglobin dropped to 4 stable since transfusion  8. Sepsis with septic shock off pressors  9. Left hand ischemia  10. Arthritis hypertension by hx  11. Hypokalemia  12. Hyponatremia resolved      RECOMMENDATIONS/PLAN:   1. ICU monitoring  1. Patient condition got worse on 1/24 went to asystole subsequently got intubated remains on the ventilator AC 12 PC 28 PEEP 5 FiO2 80%  2. Patient is on dexamethasone and baricitinib  3. Leukocytosis normal and hemoglobin low but stable  4. Elevated blood sugar improved with decrease steroid  5. Potassium repleted  6. Left hand NATHALIE normal continue local wound care now right hand finger is getting discoloration  7. Pulmonary edema received Lasix chest x-ray still shows bilateral effusion  8. Left hand ischemia off IV heparin left ulnar artery occlusion radial  patent NATHALIE normal  9. Troponin is elevated  10. Procalcitonin 0.06 C-reactive 3.1 lactic acid 1.4  11. Patient condition remain the same not doing well hypotensive remain intubated on 80% FiO2 family to make decision     [x] High complexity decision making was performed  [x] See my orders for details  HPI  81-year-old lady came in because of shortness of breath and dyspnea she has significant past medical history of arthritis hypertension she was recently discharged from the hospital came back with worsening of hypoxia she was discharged on oxygen 2 L nasal cannula and patient condition got worse she was supposed to be discharged to skilled care but patient's son took her home where her condition got worse and she was transferred back to the hospital now she is on noninvasive ventilator BiPAP machine hemodynamically unstable hypotensive on vasopressors and not giving much history.     PMH:  has no past medical history on file. PSH:   has a past surgical history that includes ir insert non tunl cvc over 5 yrs (1/21/2022). FHX: family history is not on file.      SHX:      ALL:   Allergies   Allergen Reactions    Penicillins Hives        MEDS:   [x] Reviewed - As Below   [] Not reviewed    Current Facility-Administered Medications   Medication    furosemide (LASIX) injection 20 mg    insulin glargine (LANTUS) injection 10 Units    heparin (porcine) injection 5,000 Units    insulin lispro (HUMALOG) injection    glucose chewable tablet 16 g    glucagon (GLUCAGEN) injection 1 mg    dextrose 10% infusion 125-250 mL    dexamethasone (DECADRON) 4 mg/mL injection 4 mg    0.9% sodium chloride infusion 250 mL    dilTIAZem IR (CARDIZEM) tablet 30 mg    hydrOXYzine pamoate (VISTARIL) capsule 25 mg    propofol (DIPRIVAN) 10 mg/mL infusion    dexmedeTOMidine (PRECEDEX) 400 mcg in 0.9% sodium chloride (MBP/ADV) 100 mL MBP    NOREPINephrine (LEVOPHED) 8 mg in 0.9% NS 250ml infusion    acetaminophen (TYLENOL) tablet 650 mg    Or    acetaminophen (TYLENOL) suppository 650 mg    polyethylene glycol (MIRALAX) packet 17 g    ondansetron (ZOFRAN ODT) tablet 4 mg    baricitinib (OLUMIANT) tablet 2 mg    albuterol (PROVENTIL HFA, VENTOLIN HFA, PROAIR HFA) inhaler 2 Puff    ondansetron (ZOFRAN) injection 4 mg    hydrOXYzine (VISTARIL) injection 25 mg      MAR reviewed and pertinent medications noted or modified as needed   Current Facility-Administered Medications   Medication    furosemide (LASIX) injection 20 mg    insulin glargine (LANTUS) injection 10 Units    heparin (porcine) injection 5,000 Units    insulin lispro (HUMALOG) injection    glucose chewable tablet 16 g    glucagon (GLUCAGEN) injection 1 mg    dextrose 10% infusion 125-250 mL    dexamethasone (DECADRON) 4 mg/mL injection 4 mg    0.9% sodium chloride infusion 250 mL    dilTIAZem IR (CARDIZEM) tablet 30 mg    hydrOXYzine pamoate (VISTARIL) capsule 25 mg    propofol (DIPRIVAN) 10 mg/mL infusion    dexmedeTOMidine (PRECEDEX) 400 mcg in 0.9% sodium chloride (MBP/ADV) 100 mL MBP    NOREPINephrine (LEVOPHED) 8 mg in 0.9% NS 250ml infusion    acetaminophen (TYLENOL) tablet 650 mg    Or    acetaminophen (TYLENOL) suppository 650 mg    polyethylene glycol (MIRALAX) packet 17 g    ondansetron (ZOFRAN ODT) tablet 4 mg    baricitinib (OLUMIANT) tablet 2 mg    albuterol (PROVENTIL HFA, VENTOLIN HFA, PROAIR HFA) inhaler 2 Puff    ondansetron (ZOFRAN) injection 4 mg    hydrOXYzine (VISTARIL) injection 25 mg      PMH:  has no past medical history on file. PSH:   has a past surgical history that includes ir insert non tunl cvc over 5 yrs (2022). FHX: family history is not on file. SHX:       ROS:  Unable to obtain sedated on ventilator    Hemodynamics:    CO:    CI:    CVP:    SVR:   PAP Systolic:    PAP Diastolic:    PVR:    XL95:        Ventilator Settings:      Mode Rate TV Press PEEP FiO2 PIP Min. Vent   Assist control,Pressure control    450 ml    5 cm H20 80 %  35 cm H2O  10.9 l/min        Vital Signs: Telemetry:    normal sinus rhythm Intake/Output:   Visit Vitals  BP (!) 109/54   Pulse 92   Temp 99.4 °F (37.4 °C)   Resp 24   Ht 5' 5\" (1.651 m)   Wt 98.5 kg (217 lb 2.5 oz)   SpO2 99%   BMI 36.14 kg/m²       Temp (24hrs), Av.1 °F (37.3 °C), Min:98.7 °F (37.1 °C), Max:99.4 °F (37.4 °C)        O2 Device: Ventilator         Wt Readings from Last 4 Encounters:   22 98.5 kg (217 lb 2.5 oz)   22 90.7 kg (200 lb)          Intake/Output Summary (Last 24 hours) at 2022 1206  Last data filed at 2022 0700  Gross per 24 hour   Intake 475.14 ml   Output 900 ml   Net -424.86 ml       Last shift:      No intake/output data recorded.   Last 3 shifts: 1901 -  0700  In: 1217 [I.V.:397]  Out: 1500 [Urine:1500]       Physical Exam:     General: Intubated on ventilator unresponsive on propofol   HEENT: NCAT, Eyes: anicteric; conjunctiva clear no doll's eye reflex  Neck: no nodes, , trach midline; no accessory MM use.   Questionable neck vein distention  Chest: no deformity,   Cardiac: R regular; no murmur;   Lungs: Diffuse rales  Abd: soft, NT, hypoactive BS  Ext: Edema of the leg ; left hand finger blackish discoloration  : clear urine  Neuro: Sedated unresponsive on the ventilator no doll's eye reflex flaccid extremities  Psych-unable to assess  Skin: warm, dry, no cyanosis;   Pulses: Brachial radial pulses intact  Capillary: Normal capillary refill      DATA:    MAR reviewed and pertinent medications noted or modified as needed  MEDS:   Current Facility-Administered Medications   Medication    furosemide (LASIX) injection 20 mg    insulin glargine (LANTUS) injection 10 Units    heparin (porcine) injection 5,000 Units    insulin lispro (HUMALOG) injection    glucose chewable tablet 16 g    glucagon (GLUCAGEN) injection 1 mg    dextrose 10% infusion 125-250 mL    dexamethasone (DECADRON) 4 mg/mL injection 4 mg    0.9% sodium chloride infusion 250 mL    dilTIAZem IR (CARDIZEM) tablet 30 mg    hydrOXYzine pamoate (VISTARIL) capsule 25 mg    propofol (DIPRIVAN) 10 mg/mL infusion    dexmedeTOMidine (PRECEDEX) 400 mcg in 0.9% sodium chloride (MBP/ADV) 100 mL MBP    NOREPINephrine (LEVOPHED) 8 mg in 0.9% NS 250ml infusion    acetaminophen (TYLENOL) tablet 650 mg    Or    acetaminophen (TYLENOL) suppository 650 mg    polyethylene glycol (MIRALAX) packet 17 g    ondansetron (ZOFRAN ODT) tablet 4 mg    baricitinib (OLUMIANT) tablet 2 mg    albuterol (PROVENTIL HFA, VENTOLIN HFA, PROAIR HFA) inhaler 2 Puff    ondansetron (ZOFRAN) injection 4 mg    hydrOXYzine (VISTARIL) injection 25 mg        Labs:    Recent Labs     02/19/22 0312 02/18/22  0620 02/17/22  0625   WBC 9.8 9.5 7.4   HGB 7.8* 7.9* 8.2*    134* 101*     Recent Labs     02/19/22 0312 02/18/22  0620 02/17/22  0625   * 135* 137 K 3.5 3.1* 3.4*    102 103   CO2 27 24 26   GLU 99 125* 94   BUN 25* 24* 24*   CREA 0.38* 0.36* 0.38*   CA 9.3 9.3 9.5   ALB 1.9* 1.8* 1.9*   ALT 61 67 68     Recent Labs     02/19/22  0312 02/17/22  0323   PH 7.48* 7.49*   PCO2 38 36   PO2 88 72*   HCO3 28* 28*   FIO2 80.0 90.0   2/13 AC 12 PC 25 time 1 PEEP 5 FiO2 90% respirations 28   1/30 AC 12  PEEP 5 FiO2 100%    Lab Results   Component Value Date/Time    Culture result: No growth 6 days 01/21/2022 07:55 AM    Culture result: No growth 6 days 01/18/2022 07:12 AM    Culture result: (A) 01/17/2022 07:12 AM     Staphylococcus species, coagulase negative growing in 1 of 4 bottles drawn NO SITE INDICATED    Culture result:  01/17/2022 07:12 AM     (NOTE) [facility] GROWING IN 1 OF 2 BOTTLES CALLED TO JOSE MIGUEL PAGAN AT 0831 ON 1/19/22.      No results found for: TSH, TSHEXT, TSHEXT     Imaging:    Results from Hospital Encounter encounter on 01/21/22    XR CHEST PORT    Narrative  HISTORY:  post intubation    TECHNIQUE:  XR CHEST PORT    COMPARISON: No images available due to PACS downtime; prior reports were  available. Prior report from 2/16/2022 chest x-ray was reviewed  LIMITATIONS: None    TUBES/LINES: Endotracheal tube 2.5 mm above the maggie. NG tube appears well  into the stomach. Right dual-lumen IJ catheter with tip to the atriocaval  region. LUNG PARENCHYMA: Diffuse hazy opacification of the near entire right lung  although pulmonary markings are still visualized at the right mid and lower lobe  levels. Hazy opacity throughout the left lung which appears adequately aerated  and the diaphragm is visualized. TRACHEA/BRONCHI: Normal  PULMONARY VESSELS: Normal  PLEURA: Normal  HEART: Normal  AORTIC SHADOW:Normal.  MEDIASTINUM: Normal  BONE/SOFT TISSUES: No acute abnormality.     OTHER: None    Impression  Diffuse haziness of both lungs which have been previously described  and although interstitial and/or airspace disease throughout the right lung in  particular could be present, layering pleural effusions can cause a similar  appearance. Ultimately cross-sectional imaging can best assess. Medical devices  in places as described      Results from Hospital Encounter encounter on 01/21/22    CT HEAD WO CONT    Narrative  The study is a noncontrasted head CT examination dated 1/21/2022. HISTORY: Unresponsive. TECHNIQUE: Thin section axial imaging was performed followed by sagittal and  coronal reconstructed imaging. Dose Reduction Technique was employed to reduce radiation exposure - This  includes reduction optimization techniques as appropriate to a performed exam  with automated exposure control adjustments of the mA and/or Kv according to  patient size, or use of iterative reconstruction technique. COMPARISON: CT head dated 1/17/2022. There is an appropriate size to the ventricles, the deep cortical sulci, and the  sylvian fissures for the patient's age. This examination is negative for  intracranial hemorrhage, mass effect or an extra axial collection. The  gray-white matter junctions are preserved without cytotoxic or vasogenic edema. An assessment of the white matter tracts demonstrates a mild decrease in the  density characteristics of the central periventricular white matter. These  findings are consistent with expected aging changes and milder microvascular  disease. There also is a region of diminished density within the right posterior  inferior cerebellar hemisphere which may represent an older cerebrovascular  insult. ORBITS: This examination is negative for acute orbital pathology. PARANASAL SINUSES: The paranasal sinuses are well pneumatized without acute  sinus disease. There is a decreased number of right sided mastoid air cells which can be  associated with chronic mastoiditis. The craniocervical junction images in a  normal fashion. Impression  1.   There are milder microvascular changes within the central periventricular  white matter. 2.  There is an area of diminished density within the inferior aspect of the  right cerebellar hemisphere without change (series 201 image number 12). These  findings may represent an older ischemic insult within the right posterior  inferior cerebellar region. 3.  This examination is negative for acute intracranial pathology or short-term  interval changes dating back to 1/17/2022. 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 Chest x-ray looks like pulmonary edema. She has peripheral edema we will give Lasix today discontinue D5W continue tube feedings we will check echocardiogram  · 1/31 off pressors remain intubated   · 2/1 remain intubated sedated on ventilator will do sedation vacation in a.m. still on 85%  · 2/2 on ventilator sedated elevated PCO2  · 2/3 remain intubated off pressors significant drop in hemoglobin and increasing white count we will repeat CBC and work accordingly  · 2/4 patient is back on Levophed hemodynamically unstable still on ventilator 85% FiO2  · 2/5 remain on ventilator condition unstable  · 2/6 condition unstable sedated on ventilator  · 2/7 we will change vent settings  · 2/10 intubated on ventilator on 90% FiO2  · 2/11 remain intubated family to make decision  · 2/12 on 90% FiO2 back on Levophed  · 2/13 remains on FiO2 90% respirations up to 28. Have increase pressure control to 28. If respirations remain elevated will increase propofol and add back fentanyl  · 12/14 we will continue on assist control mode decrease FiO2 to 80%.   Replace potassium  · 12/15 FiO2 increased back to 90%   · 2/16 patient remains unstable intubated on ventilator  · 2/18 on 80% FiO2 unresponsive  · 2/19 remains on the ventilator FiO2 80% chest x-ray with continued diffuse infiltrate right greater than left small effusion  · Time of care 30-minute

## 2022-02-19 NOTE — PROGRESS NOTES
Problem: Pressure Injury - Risk of  Goal: *Prevention of pressure injury  Description: Document Samuel Scale and appropriate interventions in the flowsheet. Outcome: Progressing Towards Goal  Note: Pressure Injury Interventions:  Sensory Interventions: Assess changes in LOC,Turn and reposition approx. every two hours (pillows and wedges if needed),Float heels    Moisture Interventions: Absorbent underpads,Apply protective barrier, creams and emollients,Moisture barrier    Activity Interventions: Pressure redistribution bed/mattress(bed type)    Mobility Interventions: Turn and reposition approx.  every two hours(pillow and wedges),Pressure redistribution bed/mattress (bed type)    Nutrition Interventions: Document food/fluid/supplement intake    Friction and Shear Interventions: Apply protective barrier, creams and emollients,Lift team/patient mobility team                Problem: Gas Exchange - Impaired  Goal: Absence of hypoxia  Outcome: Progressing Towards Goal

## 2022-02-19 NOTE — PROGRESS NOTES
0926 PT. EXTUBATED DURING TURN WITH NURSING.  PT. BAGGED UP WITH MANUAL RESUSCITATION BAG AND OXYGEN AND NEW ET TUBE  PLACED ORALLY. 7.5 ET TUBE DOWN TO 22CM AT GUMLINE, ETCO2 27. PT.  PLACE BACK ON PREVIOUS VENTILATOR SETTINGS A/C 12 PI 28 Ti 1.0seconds, fio2 80% peep 5cm h2o

## 2022-02-20 NOTE — PROGRESS NOTES
Per Dr Aneudy Wagner, spoke with patients' son Manuel Weller to confirm wishes to proceed with comfort care measures, son agreed, does not wish to be present at time of extubation.

## 2022-02-20 NOTE — PROGRESS NOTES
IMPRESSION:   1. Acute hypoxic respiratory  2. Acute on chronic hypercapnic respiratory failure   3. COVID-19 pneumonia  4. Pulmonary edema  5. Bilateral pleural effusion  6. Hypokalemia to be repleted  7. Severe Anemia hemoglobin dropped to 4 stable since transfusion  8. Sepsis with septic shock off pressors  9. Left hand ischemia  10. Arthritis hypertension by hx  11. Hypokalemia  12. Hyponatremia resolved      RECOMMENDATIONS/PLAN:   1. ICU monitoring  1. Patient condition got worse on 1/24 went to asystole subsequently got intubated remains on the ventilator AC 12 PC 28 PEEP 5 FiO2 75%  2. Patient is on dexamethasone and baricitinib  3. Leukocytosis normal and hemoglobin low but stable  4. Elevated blood sugar improved with decrease steroid  5. Potassium repleted  6. Left hand NATHALIE normal continue local wound care now right hand finger is getting discoloration  7. Pulmonary edema received Lasix chest x-ray still shows bilateral effusion  8. Left hand ischemia off IV heparin left ulnar artery occlusion radial  patent NATHALIE normal  9. Troponin is elevated  10. Procalcitonin 0.06 C-reactive 3.1 lactic acid 1.4  11. Patient condition remain the same not doing well hypotensive remain intubated on 80% FiO2 family to make decision     [x] High complexity decision making was performed  [x] See my orders for details  HPI  70-year-old lady came in because of shortness of breath and dyspnea she has significant past medical history of arthritis hypertension she was recently discharged from the hospital came back with worsening of hypoxia she was discharged on oxygen 2 L nasal cannula and patient condition got worse she was supposed to be discharged to skilled care but patient's son took her home where her condition got worse and she was transferred back to the hospital now she is on noninvasive ventilator BiPAP machine hemodynamically unstable hypotensive on vasopressors and not giving much history.     PMH:  has no past medical history on file. PSH:   has a past surgical history that includes ir insert non tunl cvc over 5 yrs (1/21/2022). FHX: family history is not on file.      SHX:      ALL:   Allergies   Allergen Reactions    Penicillins Hives        MEDS:   [x] Reviewed - As Below   [] Not reviewed    Current Facility-Administered Medications   Medication    furosemide (LASIX) injection 20 mg    insulin glargine (LANTUS) injection 10 Units    heparin (porcine) injection 5,000 Units    insulin lispro (HUMALOG) injection    glucose chewable tablet 16 g    glucagon (GLUCAGEN) injection 1 mg    dextrose 10% infusion 125-250 mL    dexamethasone (DECADRON) 4 mg/mL injection 4 mg    0.9% sodium chloride infusion 250 mL    dilTIAZem IR (CARDIZEM) tablet 30 mg    hydrOXYzine pamoate (VISTARIL) capsule 25 mg    propofol (DIPRIVAN) 10 mg/mL infusion    dexmedeTOMidine (PRECEDEX) 400 mcg in 0.9% sodium chloride (MBP/ADV) 100 mL MBP    NOREPINephrine (LEVOPHED) 8 mg in 0.9% NS 250ml infusion    acetaminophen (TYLENOL) tablet 650 mg    Or    acetaminophen (TYLENOL) suppository 650 mg    polyethylene glycol (MIRALAX) packet 17 g    ondansetron (ZOFRAN ODT) tablet 4 mg    baricitinib (OLUMIANT) tablet 2 mg    albuterol (PROVENTIL HFA, VENTOLIN HFA, PROAIR HFA) inhaler 2 Puff    ondansetron (ZOFRAN) injection 4 mg    hydrOXYzine (VISTARIL) injection 25 mg      MAR reviewed and pertinent medications noted or modified as needed   Current Facility-Administered Medications   Medication    furosemide (LASIX) injection 20 mg    insulin glargine (LANTUS) injection 10 Units    heparin (porcine) injection 5,000 Units    insulin lispro (HUMALOG) injection    glucose chewable tablet 16 g    glucagon (GLUCAGEN) injection 1 mg    dextrose 10% infusion 125-250 mL    dexamethasone (DECADRON) 4 mg/mL injection 4 mg    0.9% sodium chloride infusion 250 mL    dilTIAZem IR (CARDIZEM) tablet 30 mg    hydrOXYzine pamoate (VISTARIL) capsule 25 mg    propofol (DIPRIVAN) 10 mg/mL infusion    dexmedeTOMidine (PRECEDEX) 400 mcg in 0.9% sodium chloride (MBP/ADV) 100 mL MBP    NOREPINephrine (LEVOPHED) 8 mg in 0.9% NS 250ml infusion    acetaminophen (TYLENOL) tablet 650 mg    Or    acetaminophen (TYLENOL) suppository 650 mg    polyethylene glycol (MIRALAX) packet 17 g    ondansetron (ZOFRAN ODT) tablet 4 mg    baricitinib (OLUMIANT) tablet 2 mg    albuterol (PROVENTIL HFA, VENTOLIN HFA, PROAIR HFA) inhaler 2 Puff    ondansetron (ZOFRAN) injection 4 mg    hydrOXYzine (VISTARIL) injection 25 mg      PMH:  has no past medical history on file. PSH:   has a past surgical history that includes ir insert non tunl cvc over 5 yrs (2022). FHX: family history is not on file. SHX:       ROS:  Unable to obtain sedated on ventilator    Hemodynamics:    CO:    CI:    CVP:    SVR:   PAP Systolic:    PAP Diastolic:    PVR:    BZ22:        Ventilator Settings:      Mode Rate TV Press PEEP FiO2 PIP Min. Vent   Assist control,Pressure control    450 ml    5 cm H20 75 %  35 cm H2O  9.64 l/min        Vital Signs: Telemetry:    normal sinus rhythm Intake/Output:   Visit Vitals  /60 (BP 1 Location: Right lower arm, BP Patient Position: At rest)   Pulse 88   Temp 98 °F (36.7 °C)   Resp 24   Ht 5' 5\" (1.651 m)   Wt 98.9 kg (218 lb 0.6 oz)   SpO2 100%   BMI 36.28 kg/m²       Temp (24hrs), Av.2 °F (36.8 °C), Min:97.5 °F (36.4 °C), Max:98.6 °F (37 °C)        O2 Device: Ventilator         Wt Readings from Last 4 Encounters:   22 98.9 kg (218 lb 0.6 oz)   22 90.7 kg (200 lb)          Intake/Output Summary (Last 24 hours) at 2022 0828  Last data filed at 2022 0531  Gross per 24 hour   Intake 911.75 ml   Output 1760 ml   Net -848.25 ml       Last shift:      No intake/output data recorded.   Last 3 shifts: 1901 -  0700  In: 1188.3 [I.V.:436.3]  Out:  [Urine:]       Physical Exam:     General: Intubated on ventilator unresponsive on propofol   HEENT: NCAT,   Eyes: anicteric; conjunctiva clear no doll's eye reflex  Neck: no nodes, , trach midline; no accessory MM use.   Questionable neck vein distention  Chest: no deformity,   Cardiac: R regular; no murmur;   Lungs: Diffuse rales  Abd: soft, NT, hypoactive BS  Ext: Edema of the leg ; left hand finger blackish discoloration  : clear urine  Neuro: Sedated unresponsive on the ventilator no doll's eye reflex flaccid extremities  Psych-unable to assess  Skin: warm, dry, no cyanosis;   Pulses: Brachial radial pulses intact  Capillary: Normal capillary refill      DATA:    MAR reviewed and pertinent medications noted or modified as needed  MEDS:   Current Facility-Administered Medications   Medication    furosemide (LASIX) injection 20 mg    insulin glargine (LANTUS) injection 10 Units    heparin (porcine) injection 5,000 Units    insulin lispro (HUMALOG) injection    glucose chewable tablet 16 g    glucagon (GLUCAGEN) injection 1 mg    dextrose 10% infusion 125-250 mL    dexamethasone (DECADRON) 4 mg/mL injection 4 mg    0.9% sodium chloride infusion 250 mL    dilTIAZem IR (CARDIZEM) tablet 30 mg    hydrOXYzine pamoate (VISTARIL) capsule 25 mg    propofol (DIPRIVAN) 10 mg/mL infusion    dexmedeTOMidine (PRECEDEX) 400 mcg in 0.9% sodium chloride (MBP/ADV) 100 mL MBP    NOREPINephrine (LEVOPHED) 8 mg in 0.9% NS 250ml infusion    acetaminophen (TYLENOL) tablet 650 mg    Or    acetaminophen (TYLENOL) suppository 650 mg    polyethylene glycol (MIRALAX) packet 17 g    ondansetron (ZOFRAN ODT) tablet 4 mg    baricitinib (OLUMIANT) tablet 2 mg    albuterol (PROVENTIL HFA, VENTOLIN HFA, PROAIR HFA) inhaler 2 Puff    ondansetron (ZOFRAN) injection 4 mg    hydrOXYzine (VISTARIL) injection 25 mg        Labs:    Recent Labs     02/19/22 0312 02/18/22  0620   WBC 9.8 9.5   HGB 7.8* 7.9*    134*     Recent Labs     02/19/22 0312 02/18/22  0620   * 135*   K 3.5 3.1*    102   CO2 27 24   GLU 99 125*   BUN 25* 24*   CREA 0.38* 0.36*   CA 9.3 9.3   ALB 1.9* 1.8*   ALT 61 67     Recent Labs     02/19/22  0312   PH 7.48*   PCO2 38   PO2 88   HCO3 28*   FIO2 80.0   2/13 AC 12 PC 25 time 1 PEEP 5 FiO2 90% respirations 28   1/30 AC 12  PEEP 5 FiO2 100%    Lab Results   Component Value Date/Time    Culture result: No growth 6 days 01/21/2022 07:55 AM    Culture result: No growth 6 days 01/18/2022 07:12 AM    Culture result: (A) 01/17/2022 07:12 AM     Staphylococcus species, coagulase negative growing in 1 of 4 bottles drawn NO SITE INDICATED    Culture result:  01/17/2022 07:12 AM     (NOTE) [facility] GROWING IN 1 OF 2 BOTTLES CALLED TO JOSE MIGUEL PAGAN AT 0831 ON 1/19/22. JF     No results found for: TSH, TSHEXT, TSHEXT     Imaging:    Results from Hospital Encounter encounter on 01/21/22    XR CHEST PORT    Narrative  HISTORY:  post intubation    TECHNIQUE:  XR CHEST PORT    COMPARISON: No images available due to PACS downtime; prior reports were  available. Prior report from 2/16/2022 chest x-ray was reviewed  LIMITATIONS: None    TUBES/LINES: Endotracheal tube 2.5 mm above the maggie. NG tube appears well  into the stomach. Right dual-lumen IJ catheter with tip to the atriocaval  region. LUNG PARENCHYMA: Diffuse hazy opacification of the near entire right lung  although pulmonary markings are still visualized at the right mid and lower lobe  levels. Hazy opacity throughout the left lung which appears adequately aerated  and the diaphragm is visualized. TRACHEA/BRONCHI: Normal  PULMONARY VESSELS: Normal  PLEURA: Normal  HEART: Normal  AORTIC SHADOW:Normal.  MEDIASTINUM: Normal  BONE/SOFT TISSUES: No acute abnormality.     OTHER: None    Impression  Diffuse haziness of both lungs which have been previously described  and although interstitial and/or airspace disease throughout the right lung in  particular could be present, layering pleural effusions can cause a similar  appearance. Ultimately cross-sectional imaging can best assess. Medical devices  in places as described      Results from Hospital Encounter encounter on 01/21/22    CT HEAD WO CONT    Narrative  The study is a noncontrasted head CT examination dated 1/21/2022. HISTORY: Unresponsive. TECHNIQUE: Thin section axial imaging was performed followed by sagittal and  coronal reconstructed imaging. Dose Reduction Technique was employed to reduce radiation exposure - This  includes reduction optimization techniques as appropriate to a performed exam  with automated exposure control adjustments of the mA and/or Kv according to  patient size, or use of iterative reconstruction technique. COMPARISON: CT head dated 1/17/2022. There is an appropriate size to the ventricles, the deep cortical sulci, and the  sylvian fissures for the patient's age. This examination is negative for  intracranial hemorrhage, mass effect or an extra axial collection. The  gray-white matter junctions are preserved without cytotoxic or vasogenic edema. An assessment of the white matter tracts demonstrates a mild decrease in the  density characteristics of the central periventricular white matter. These  findings are consistent with expected aging changes and milder microvascular  disease. There also is a region of diminished density within the right posterior  inferior cerebellar hemisphere which may represent an older cerebrovascular  insult. ORBITS: This examination is negative for acute orbital pathology. PARANASAL SINUSES: The paranasal sinuses are well pneumatized without acute  sinus disease. There is a decreased number of right sided mastoid air cells which can be  associated with chronic mastoiditis. The craniocervical junction images in a  normal fashion. Impression  1. There are milder microvascular changes within the central periventricular  white matter.   2.  There is an area of diminished density within the inferior aspect of the  right cerebellar hemisphere without change (series 201 image number 12). These  findings may represent an older ischemic insult within the right posterior  inferior cerebellar region. 3.  This examination is negative for acute intracranial pathology or short-term  interval changes dating back to 1/17/2022. 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 Chest x-ray looks like pulmonary edema. She has peripheral edema we will give Lasix today discontinue D5W continue tube feedings we will check echocardiogram  · 1/31 off pressors remain intubated   · 2/1 remain intubated sedated on ventilator will do sedation vacation in a.m. still on 85%  · 2/2 on ventilator sedated elevated PCO2  · 2/3 remain intubated off pressors significant drop in hemoglobin and increasing white count we will repeat CBC and work accordingly  · 2/4 patient is back on Levophed hemodynamically unstable still on ventilator 85% FiO2  · 2/5 remain on ventilator condition unstable  · 2/6 condition unstable sedated on ventilator  · 2/7 we will change vent settings  · 2/10 intubated on ventilator on 90% FiO2  · 2/11 remain intubated family to make decision  · 2/12 on 90% FiO2 back on Levophed  · 2/13 remains on FiO2 90% respirations up to 28. Have increase pressure control to 28. If respirations remain elevated will increase propofol and add back fentanyl  · 12/14 we will continue on assist control mode decrease FiO2 to 80%.   Replace potassium  · 12/15 FiO2 increased back to 90%   · 2/16 patient remains unstable intubated on ventilator  · 2/18 on 80% FiO2 unresponsive  · 2/19 remains on the ventilator FiO2 80% chest x-ray with continued diffuse infiltrate right greater than left small effusion  · Time of care 30-minute

## 2022-02-20 NOTE — PROGRESS NOTES
1240:  Comfort Care measures initiated. [facility] notified, per Brenda Rod with [employer] patient is not appropriate for organ donation; requesting follow up phone call with time of death. 1645: Time of death noted and confirmed by two RN @ [phone]. Left message for [employer], Left message for son. Awaiting return call. 1730: Son informed of mothers death. Wishes are for patient to be sent to [facility] in [address] called and approved release, no further actions will be done.

## 2022-02-20 NOTE — PROGRESS NOTES
General Daily Progress Note          Patient Name:   Neftali Dutton       YOB: 1940       Age:  80 y.o. Admit Date: 1/21/2022      Subjective:     Patient is a 80y.o. year old female with signal past medical history of hypertension arthritis who discharged from the hospital yesterday in stable condition patient is admitted last admission with COVID-19 patient was asymptomatic all this time while in the hospital patient discharged on 2 L oxygen to skilled care but patient son want to go home with home health but is at home patient's saturations drops sent back to the ER seen by the ER physician patient placed on BiPAP patient was little hypotensive started on Levophed admitted for further work-up and treatment           Patient is DNR    Patient on ventilator 90% O2    On propofol drip         NG tube in place for medication and feeding    Repeat Covid test positive    2/18-  Patient seen in ICU on ventilator 80% FiO2. Patient left hand ischemia worsening. She is off pressors today. Patient discussed with family yesterday.   OG tube feeds  On propofol 20 mcg/min      Objective:     Visit Vitals  BP (!) 91/53 (BP 1 Location: Right lower arm, BP Patient Position: At rest)   Pulse 78   Temp 98 °F (36.7 °C)   Resp 30   Ht 5' 5\" (1.651 m)   Wt 98.9 kg (218 lb 0.6 oz)   SpO2 98%   BMI 36.28 kg/m²        Recent Results (from the past 24 hour(s))   GLUCOSE, POC    Collection Time: 02/19/22 11:12 AM   Result Value Ref Range    Glucose (POC) 128 (H) 65 - 117 mg/dL    Performed by Christa Boyle RN (Traveler)    GLUCOSE, POC    Collection Time: 02/19/22  4:53 PM   Result Value Ref Range    Glucose (POC) 153 (H) 65 - 117 mg/dL    Performed by 160Gloria Lang RN (Traveler)    GLUCOSE, POC    Collection Time: 02/20/22 12:30 AM   Result Value Ref Range    Glucose (POC) 111 65 - 117 mg/dL    Performed by [address] [place] [address], POC    Collection Time: 02/20/22  5:11 AM   Result Value Ref Range Glucose (POC) 100 65 - 117 mg/dL    Performed by Blair Pearson, POC    Collection Time: 02/20/22  7:49 AM   Result Value Ref Range    Glucose (POC) 98 65 - 117 mg/dL    Performed by Pratibha Lang RN (Traveler)    GLUCOSE, POC    Collection Time: 02/20/22 10:42 AM   Result Value Ref Range    Glucose (POC) 138 (H) 65 - 117 mg/dL    Performed by [facility] [place] Lang RN (Traveler)      [unfilled]      Review of Systems    Unable to obtain. Physical Exam:      Constitutional: Awake on vent   HENT:   Head: Normocephalic and atraumatic. Eyes: Pupils are equal, round, and reactive to light. EOM are normal.   Cardiovascular: Normal rate, regular rhythm and normal heart sounds. Pulmonary/Chest: Decreased breath sounds   abdominal: Soft. Bowel sounds are normal. There is no abdominal tenderness. There is no rebound and no guarding. Musculoskeletal: Normal range of motion. Skin: L hand 3rd, 4th, and 5th fingers blackened  Neurological: vent     XR CHEST PORT   Final Result   Diffuse haziness of both lungs which have been previously described   and although interstitial and/or airspace disease throughout the right lung in   particular could be present, layering pleural effusions can cause a similar   appearance. Ultimately cross-sectional imaging can best assess. Medical devices   in places as described      XR CHEST PORT   Final Result      XR CHEST PORT   Final Result   Residual hazy diffuse infiltration in the right lung and at the left   lung base      XR CHEST PORT   Final Result      XR CHEST PORT   Final Result   No significant interval change, including bilateral opacities. XR CHEST PORT   Final Result   Bilateral opacities, slightly progressed on the right. XR CHEST PORT   Final Result      XR CHEST PORT   Final Result   Findings/impression:   1. Limitations: Rotated and angulated.    2.  Medical devices: Right internal jugular venous catheter, endotracheal tube   and NG tube in stable. 3.  Ongoing moderate bilateral edema similar to prior exam.   4.  Ongoing collapse/consolidation in the left lower lobe concerning for   underlying airspace disease. 5.  The cardiac and mediastinal contours are stable though near completely   obscured by adjacent airspace disease. XR CHEST PORT   Final Result   No significant interval change. XR CHEST PORT   Final Result      XR CHEST PORT   Final Result   No significant interval change. XR CHEST PORT   Final Result   Persistent bilateral airspace disease and with decreased aeration. XR CHEST PORT   Final Result      XR CHEST PORT   Final Result      XR CHEST PORT   Final Result      XR CHEST PORT   Final Result      XR CHEST PORT   Final Result      WRIST BRACHIAL INDEX AT REST   Final Result      XR CHEST PORT   Final Result   No significant change. XR CHEST PORT   Final Result   No interval change. DUPLEX UPPER EXT ARTERY LEFT   Final Result      XR CHEST PORT   Final Result      XR CHEST PORT   Final Result   Endotracheal tube tip still at the maggie. Diffuse opacities in the   lungs, not significantly changed. XR CHEST PORT   Final Result      XR CHEST PORT   Final Result   Endotracheal tube tip at the maggie. Diffuse opacities in the   lungs, accentuated by lower lung volumes or increased. XR CHEST PORT   Final Result   Diffuse opacities in the lungs, not significantly changed. XR CHEST PORT   Final Result   1. ETT terminating 2 cm above the maggie. Remaining support apparatus as above. 2.  Worsening bilateral airspace disease with developing ARDS not excluded.       XR CHEST PORT   Final Result   Moderate patchy diffuse bilateral airspace opacities, waxing and waning from the   prior exam.      XR CHEST PORT   Final Result   Moderate diffuse bilateral airspace opacities, likely a combination of edema and   airspace disease, mildly increased from the previous exam.      CT HEAD WO CONT   Final Result   1. There are milder microvascular changes within the central periventricular   white matter. 2.  There is an area of diminished density within the inferior aspect of the   right cerebellar hemisphere without change (series 201 image number 12). These   findings may represent an older ischemic insult within the right posterior   inferior cerebellar region. 3.  This examination is negative for acute intracranial pathology or short-term   interval changes dating back to 1/17/2022. XR CHEST PORT   Final Result   Patchy mixed asymmetric lung disease appears somewhat better but the   lungs are better inflated. No effusion or pneumothorax. Normal heart and   mediastinum      IR INSERT NON TUNL CVC OVER 5 YRS   Final Result   Ultrasound of the right neck demonstrated patent IJV. Successful US-guided placement of a right internal jugular triple lumen central   venous catheter as described above. The catheter is functioning. PLAN:   Catheter position was confirmed by follow-up chest x-ray: catheter tip in the   lower SVC and ready to use. IR US GUIDED VASCULAR ACCESS   Final Result   Ultrasound of the right neck demonstrated patent IJV. Successful US-guided placement of a right internal jugular triple lumen central   venous catheter as described above. The catheter is functioning. PLAN:   Catheter position was confirmed by follow-up chest x-ray: catheter tip in the   lower SVC and ready to use. XR CHEST PORT   Final Result   Findings/impression:      Diffuse interstitial airspace disease/edema. No definite pleural effusion or   pneumothorax. Cardiac contours are partially obscured. No acute osseous abnormality identified.       XR CHEST PORT    (Results Pending)        Recent Results (from the past 24 hour(s))   GLUCOSE, POC    Collection Time: 02/19/22 11:12 AM   Result Value Ref Range    Glucose (POC) 128 (H) 65 - 117 mg/dL    Performed by Sprague [place] Primrose RN (Traveler)    GLUCOSE, POC    Collection Time: 02/19/22  4:53 PM   Result Value Ref Range    Glucose (POC) 153 (H) 65 - 117 mg/dL    Performed by Pratibha Lang RN (Traveler)    GLUCOSE, POC    Collection Time: 02/20/22 12:30 AM   Result Value Ref Range    Glucose (POC) 111 65 - 117 mg/dL    Performed by [address] [place] [address] Street, POC    Collection Time: 02/20/22  5:11 AM   Result Value Ref Range    Glucose (POC) 100 65 - 117 mg/dL    Performed by [address], POC    Collection Time: 02/20/22  7:49 AM   Result Value Ref Range    Glucose (POC) 98 65 - 117 mg/dL    Performed by Pratibha Lang RN (Traveler)    GLUCOSE, POC    Collection Time: 02/20/22 10:42 AM   Result Value Ref Range    Glucose (POC) 138 (H) 65 - 117 mg/dL    Performed by Sprague [place] Primrose RN (Traveler)        Results     Procedure Component Value Units Date/Time    COVID-19 RAPID TEST [[id number]]  (Abnormal) Collected: 02/10/22 0923    Order Status: Completed Specimen: Nasopharyngeal Updated: 02/10/22 0957     Specimen source       Please find results under separate order           COVID-19 rapid test DETECTED        Comment: Rapid Abbott ID Now   The specimen is POSITIVE for SARS-CoV-2, the novel coronavirus associated with COVID-19. This test has been authorized by the FDA under an Emergency Use Authorization (EUA) for use by authorized laboratories.    Fact sheet for Healthcare Providers: ConventionUpdate.co.nz Fact sheet for Patients: ConventionUpdate.co.nz   Methodology: Isothermal Nucleic Acid Amplification Results verified, phoned to and read back by Kylah Kemp RN AT   [address]:     Recent Labs     02/19/22 0312 02/18/22 0620   WBC 9.8 9.5   HGB 7.8* 7.9*   HCT 23.4* 24.3*    134*     Recent Labs     02/19/22 0312 02/18/22 0620   * 135*   K 3.5 3.1*    102   CO2 27 24   BUN 25* 24*   CREA 0.38* 0.36*   GLU 99 125*   CA 9.3 9.3     Recent Labs     02/19/22 0312 02/18/22  0620   ALT 61 67   AP 70 72   TBILI 0.4 0.5   TP 6.5 6.7   ALB 1.9* 1.8*   GLOB 4.6* 4.9*     No results for input(s): INR, PTP, APTT, INREXT, INREXT in the last 72 hours. No results for input(s): FE, TIBC, PSAT, FERR in the last 72 hours. No results found for: FOL, RBCF   Recent Labs     02/19/22 0312   PH 7.48*   PCO2 38   PO2 88     No results for input(s): CPK, CKNDX, TROIQ in the last 72 hours.     No lab exists for component: CPKMB  No results found for: CHOL, CHOLX, CHLST, CHOLV, HDL, HDLP, LDL, LDLC, DLDLP, TGLX, TRIGL, TRIGP, CHHD, CHHDX  Lab Results   Component Value Date/Time    Glucose (POC) 138 (H) 02/20/2022 10:42 AM    Glucose (POC) 98 02/20/2022 07:49 AM    Glucose (POC) 100 02/20/2022 05:11 AM    Glucose (POC) 111 02/20/2022 12:30 AM    Glucose (POC) 153 (H) 02/19/2022 04:53 PM     Lab Results   Component Value Date/Time    Color Yellow/Straw 01/23/2022 10:30 AM    Appearance Turbid (A) 01/23/2022 10:30 AM    Specific gravity 1.027 01/23/2022 10:30 AM    pH (UA) 6.0 01/23/2022 10:30 AM    Protein 100 (A) 01/23/2022 10:30 AM    Glucose 50 (A) 01/23/2022 10:30 AM    Ketone 5 (A) 01/23/2022 10:30 AM    Bilirubin Negative 01/23/2022 10:30 AM    Urobilinogen 4.0 (H) 01/23/2022 10:30 AM    Nitrites Negative 01/23/2022 10:30 AM    Leukocyte Esterase Negative 01/23/2022 10:30 AM    Bacteria Negative 01/23/2022 10:30 AM    WBC 0-4 01/23/2022 10:30 AM    RBC 0-5 01/23/2022 10:30 AM         Assessment:     Acute hypoxemic respiratory failure on on ventilator 90% of   COVID-19 pneumonitis   hypotension  Thrombocytopenia  Hypertension  Arthritis  High blood sugar secondary to steroid  Hypokalemia  Hyponatremia  Tachycardia  Moderate protein calorie malnutrition  Patient need multiple partial finger amputation once stable as per vascular surgeon    Repeat Covid is positive  Plan:     Olumiant 2 mg daily  Decadron 4 mg every 6 hours  Diltiazem 30 mg 3 times  Lasix 20 mg IV twice daily  Heparin 5000 units subQ q8h  Follow-up with pulmonologist and nephrologist  SSI  KCl 40 mEq p.o.     Monitor sodium and potassium    Overall prognosis Poor    Patient is DNR        Discussed with the patient's son today   Pain about the comfort care and terminal extubation    Discussed with the patient's son this morning he want comfort care only stop all the medication terminal extubation and comfort medication like morphine and Ativan    Discussed with the patient nurse                  Current Facility-Administered Medications:     furosemide (LASIX) injection 20 mg, 20 mg, IntraVENous, Q12H, FrediFlorencia MD, 20 mg at 02/20/22 5987    insulin glargine (LANTUS) injection 10 Units, 10 Units, SubCUTAneous, DAILY, Jesús Pepper MD, 10 Units at 02/20/22 2009    heparin (porcine) injection 5,000 Units, 5,000 Units, SubCUTAneous, Q8H, Polo Lopez MD, 5,000 Units at 02/20/22 0531    insulin lispro (HUMALOG) injection, , SubCUTAneous, Q6H, Polo Lopez MD, 3 Units at 02/19/22 1800    glucose chewable tablet 16 g, 4 Tablet, Oral, PRN, Mohiuddin, Gibson Babinski, MD    glucagon (GLUCAGEN) injection 1 mg, 1 mg, IntraMUSCular, PRN, Mohiuddin, Gibson Babinski, MD    dextrose 10% infusion 125-250 mL, 125-250 mL, IntraVENous, PRN, Mohiuddin, Gibson Babinski, MD    dexamethasone (DECADRON) 4 mg/mL injection 4 mg, 4 mg, IntraVENous, Q24H, Karan Flower MD, 4 mg at 02/20/22 3338    0.9% sodium chloride infusion 250 mL, 250 mL, IntraVENous, PRN, Karan Flower MD    dilTIAZem IR (CARDIZEM) tablet 30 mg, 30 mg, Oral, TID, Karan Flower MD, 30 mg at 02/20/22 0202    hydrOXYzine pamoate (VISTARIL) capsule 25 mg, 25 mg, Oral, Q4H PRN, Polo Lopez MD    propofol (DIPRIVAN) 10 mg/mL infusion, 0-50 mcg/kg/min, IntraVENous, TITRATE, Karan Flower MD, Last Rate: 21.8 mL/hr at 02/20/22 0811, 40 mcg/kg/min at 02/20/22 0811    dexmedeTOMidine (PRECEDEX) 400 mcg in 0.9% sodium chloride (MBP/ADV) 100 mL MBP, 0.1-1.5 mcg/kg/hr, IntraVENous, TITRATE, Karan Flower MD, Last Rate: 9.1 mL/hr at 02/20/22 0911, 0.4 mcg/kg/hr at 02/20/22 0911    NOREPINephrine (LEVOPHED) 8 mg in 0.9% NS 250ml infusion, 0.5-16 mcg/min, IntraVENous, TITRATE, Jazzy Lopez MD, Stopped at 02/13/22 0836    acetaminophen (TYLENOL) tablet 650 mg, 650 mg, Oral, Q6H PRN, 650 mg at 02/18/22 2303 **OR** acetaminophen (TYLENOL) suppository 650 mg, 650 mg, Rectal, Q6H PRN, Jazzy Lopez MD    polyethylene glycol (MIRALAX) packet 17 g, 17 g, Oral, DAILY PRN, Emigdio Zepeda MD, 17 g at 02/16/22 0813    ondansetron (ZOFRAN ODT) tablet 4 mg, 4 mg, Oral, Q8H PRN **OR** [DISCONTINUED] ondansetron (ZOFRAN) injection 4 mg, 4 mg, IntraVENous, Q6H PRN, Polo Lopez MD    baricitinib (OLUMIANT) tablet 2 mg, 2 mg, Oral, DAILY, Polo Lopez MD, 2 mg at 02/20/22 0812    albuterol (PROVENTIL HFA, VENTOLIN HFA, PROAIR HFA) inhaler 2 Puff, 2 Puff, Inhalation, Q6H PRN, Polo Lopez MD    [DISCONTINUED] ondansetron (ZOFRAN ODT) tablet 4 mg, 4 mg, Oral, Q8H PRN **OR** ondansetron (ZOFRAN) injection 4 mg, 4 mg, IntraVENous, Q6H PRN, Polo Lopez MD    hydrOXYzine (VISTARIL) injection 25 mg, 25 mg, IntraMUSCular, Q6H PRN, Polo Lopez MD, 25 mg at 01/22/22 0111

## 2022-02-20 NOTE — PROGRESS NOTES
Problem: Nutrition Deficits  Goal: Optimize nutrtional status  Outcome: Progressing Towards Goal     Problem: Pressure Injury - Risk of  Goal: *Prevention of pressure injury  Description: Document Samuel Scale and appropriate interventions in the flowsheet.   Outcome: Progressing Towards Goal  Note: Pressure Injury Interventions:  Sensory Interventions: Float heels,Pressure redistribution bed/mattress (bed type) (rotation therapy)    Moisture Interventions: Absorbent underpads,Apply protective barrier, creams and emollients,Internal/External urinary devices    Activity Interventions: Pressure redistribution bed/mattress(bed type)    Mobility Interventions: Float heels,Pressure redistribution bed/mattress (bed type) (rotation therapy)    Nutrition Interventions:  (tube feedings with water, protein ordered)    Friction and Shear Interventions: Apply protective barrier, creams and emollients (rotation therapy)

## 2022-02-20 NOTE — PROGRESS NOTES
Patient lying in bed with no response to verbal or tactile stimuli. No visible respiratory efforts noted. No pupillary response and no heart sounds auscultated. Time of death called at [facility]. by myself and primary nurse, CLIFF Cerna RN.

## 2022-02-20 NOTE — ROUTINE PROCESS
Patient extubated after 100% oxygen and suction without complications and placed on oxygen at 2 lpm nc per Md order and family wishes.

## 2022-02-20 NOTE — PROGRESS NOTES
was called at time of death,  offered ministry of presence took a moment to honer the patient life. Advised nurse to contact [facility] for any further referrals.     [address] [place] [address], [facility]    Please [facility]  in order to get in touch with  for any Spiritual Care Needs   [phone]   OR   Reach out to us on [facility]

## 2022-03-04 NOTE — DISCHARGE SUMMARY
General Daily Progress Note          Patient Name:   Candi Kelley       YOB: 1940       Age:  80 y.o. Admit Date: 1/21/2022      Subjective:     Patient is a 80y.o. year old female with signal past medical history of hypertension arthritis who discharged from the hospital yesterday in stable condition patient is admitted last admission with COVID-19 patient was asymptomatic all this time while in the hospital patient discharged on 2 L oxygen to skilled care but patient son want to go home with home health but is at home patient's saturations drops sent back to the ER seen by the ER physician patient placed on BiPAP patient was little hypotensive started on Levophed admitted for further work-up and treatment           Patient is DNR    Patient on ventilator 90% O2    On propofol drip         NG tube in place for medication and feeding    Repeat Covid test positive    2/18-  Patient seen in ICU on ventilator 80% FiO2. Patient left hand ischemia worsening. She is off pressors today. Patient discussed with family yesterday. OG tube feeds  On propofol 20 mcg/min      Objective:     Visit Vitals  BP (!) 95/53 (BP 1 Location: Right lower arm, BP Patient Position: At rest)   Pulse 70   Temp 98 °F (36.7 °C)   Resp 17   Ht 5' 5\" (1.651 m)   Wt 98.9 kg (218 lb 0.6 oz)   SpO2 98%   BMI 36.28 kg/m²        No results found for this or any previous visit (from the past 24 hour(s)).   [unfilled]        Assessment:     Acute hypoxemic respiratory failure on on ventilator 90% of   COVID-19 pneumonitis   hypotension  Thrombocytopenia  Hypertension  Arthritis  High blood sugar secondary to steroid  Hypokalemia  Hyponatremia  Tachycardia  Moderate protein calorie malnutrition  Patient need multiple partial finger amputation once stable as per vascular surgeon    Repeat Covid is positive  Plan:     Olumiant 2 mg daily  Decadron 4 mg every 6 hours  Diltiazem 30 mg 3 times  Lasix 20 mg IV twice daily  Heparin 5000 units subQ q8h  Follow-up with pulmonologist and nephrologist  SSI  KCl 40 mEq p.o.     Monitor sodium and potassium    Overall prognosis Poor    Patient is DNR        Discussed with the patient's son today   Pain about the comfort care and terminal extubation    Discussed with the patient's son this morning he want comfort care only stop all the medication terminal extubation and comfort medication like morphine and Ativan    Discussed with the patient nurse    Later patient